# Patient Record
Sex: FEMALE | Race: BLACK OR AFRICAN AMERICAN | Employment: OTHER
[De-identification: names, ages, dates, MRNs, and addresses within clinical notes are randomized per-mention and may not be internally consistent; named-entity substitution may affect disease eponyms.]

---

## 2017-01-06 DIAGNOSIS — I50.22 CHRONIC SYSTOLIC HEART FAILURE (HCC): ICD-10-CM

## 2017-01-06 RX ORDER — SPIRONOLACTONE 25 MG/1
TABLET ORAL
Qty: 28 TABLET | Refills: 0 | Status: SHIPPED | OUTPATIENT
Start: 2017-01-06 | End: 2017-01-31 | Stop reason: SDUPTHER

## 2017-01-06 RX ORDER — LISINOPRIL 5 MG/1
TABLET ORAL
Qty: 28 TABLET | Refills: 0 | Status: SHIPPED | OUTPATIENT
Start: 2017-01-06 | End: 2017-02-01 | Stop reason: SDUPTHER

## 2017-01-12 ENCOUNTER — OFFICE VISIT (OUTPATIENT)
Dept: INTERNAL MEDICINE | Facility: CLINIC | Age: 52
End: 2017-01-12

## 2017-01-12 VITALS
HEART RATE: 55 BPM | HEIGHT: 62 IN | BODY MASS INDEX: 27.97 KG/M2 | WEIGHT: 152 LBS | DIASTOLIC BLOOD PRESSURE: 56 MMHG | SYSTOLIC BLOOD PRESSURE: 101 MMHG

## 2017-01-12 DIAGNOSIS — K21.9 GASTROESOPHAGEAL REFLUX DISEASE WITHOUT ESOPHAGITIS: ICD-10-CM

## 2017-01-12 DIAGNOSIS — Z12.11 SCREEN FOR COLON CANCER: ICD-10-CM

## 2017-01-12 DIAGNOSIS — R21 RASH: ICD-10-CM

## 2017-01-12 DIAGNOSIS — L85.3 XEROSIS OF SKIN: ICD-10-CM

## 2017-01-12 DIAGNOSIS — R16.0 LIVER MASS: Primary | ICD-10-CM

## 2017-01-12 DIAGNOSIS — Z12.31 ENCOUNTER FOR SCREENING MAMMOGRAM FOR BREAST CANCER: ICD-10-CM

## 2017-01-12 DIAGNOSIS — R73.9 HYPERGLYCEMIA: ICD-10-CM

## 2017-01-12 LAB — HBA1C MFR BLD: 6 %

## 2017-01-12 PROCEDURE — 99214 OFFICE O/P EST MOD 30 MIN: CPT | Performed by: INTERNAL MEDICINE

## 2017-01-12 PROCEDURE — 83036 HEMOGLOBIN GLYCOSYLATED A1C: CPT | Performed by: INTERNAL MEDICINE

## 2017-01-12 RX ORDER — DIAPER,BRIEF,INFANT-TODD,DISP
EACH MISCELLANEOUS
Qty: 1 TUBE | Refills: 0 | Status: SHIPPED | OUTPATIENT
Start: 2017-01-12 | End: 2017-01-19

## 2017-01-12 RX ORDER — OMEPRAZOLE 20 MG/1
20 TABLET, DELAYED RELEASE ORAL DAILY
Qty: 30 TABLET | Refills: 3 | Status: SHIPPED | OUTPATIENT
Start: 2017-01-12 | End: 2017-03-15

## 2017-01-12 RX ORDER — MULTIVIT-MIN/IRON FUM/FOLIC AC 7.5 MG-4
1 TABLET ORAL DAILY
Qty: 30 TABLET | Refills: 3 | Status: SHIPPED | OUTPATIENT
Start: 2017-01-12 | End: 2017-03-16

## 2017-01-19 ENCOUNTER — CARE COORDINATION (OUTPATIENT)
Dept: FAMILY MEDICINE CLINIC | Facility: CLINIC | Age: 52
End: 2017-01-19

## 2017-01-26 ENCOUNTER — TELEPHONE (OUTPATIENT)
Dept: INTERNAL MEDICINE | Facility: CLINIC | Age: 52
End: 2017-01-26

## 2017-01-27 ENCOUNTER — TELEPHONE (OUTPATIENT)
Dept: INTERNAL MEDICINE | Facility: CLINIC | Age: 52
End: 2017-01-27

## 2017-01-30 ENCOUNTER — CARE COORDINATION (OUTPATIENT)
Dept: INTERNAL MEDICINE | Facility: CLINIC | Age: 52
End: 2017-01-30

## 2017-01-30 VITALS
HEART RATE: 80 BPM | SYSTOLIC BLOOD PRESSURE: 110 MMHG | RESPIRATION RATE: 18 BRPM | DIASTOLIC BLOOD PRESSURE: 70 MMHG | OXYGEN SATURATION: 97 % | BODY MASS INDEX: 27.44 KG/M2 | WEIGHT: 150 LBS

## 2017-01-31 DIAGNOSIS — I50.22 CHRONIC SYSTOLIC HEART FAILURE (HCC): ICD-10-CM

## 2017-01-31 RX ORDER — SPIRONOLACTONE 25 MG/1
TABLET ORAL
Qty: 28 TABLET | Refills: 0 | Status: SHIPPED | OUTPATIENT
Start: 2017-01-31 | End: 2017-02-01 | Stop reason: SDUPTHER

## 2017-02-01 DIAGNOSIS — I50.22 CHRONIC SYSTOLIC HEART FAILURE (HCC): ICD-10-CM

## 2017-02-01 RX ORDER — SPIRONOLACTONE 25 MG/1
TABLET ORAL
Qty: 28 TABLET | Refills: 0 | Status: SHIPPED | OUTPATIENT
Start: 2017-02-01 | End: 2017-03-01 | Stop reason: SDUPTHER

## 2017-02-01 RX ORDER — LISINOPRIL 5 MG/1
TABLET ORAL
Qty: 28 TABLET | Refills: 0 | Status: SHIPPED | OUTPATIENT
Start: 2017-02-01 | End: 2017-03-01 | Stop reason: SDUPTHER

## 2017-02-03 ENCOUNTER — TELEPHONE (OUTPATIENT)
Dept: INTERNAL MEDICINE | Facility: CLINIC | Age: 52
End: 2017-02-03

## 2017-02-03 DIAGNOSIS — R92.8 ABNORMAL SCREENING MAMMOGRAM: Primary | ICD-10-CM

## 2017-02-03 DIAGNOSIS — R16.0 LIVER MASS: ICD-10-CM

## 2017-02-14 ENCOUNTER — TELEPHONE (OUTPATIENT)
Dept: INTERNAL MEDICINE | Facility: CLINIC | Age: 52
End: 2017-02-14

## 2017-02-23 ENCOUNTER — CARE COORDINATION (OUTPATIENT)
Dept: FAMILY MEDICINE CLINIC | Facility: CLINIC | Age: 52
End: 2017-02-23

## 2017-03-01 DIAGNOSIS — I50.22 CHRONIC SYSTOLIC HEART FAILURE (HCC): ICD-10-CM

## 2017-03-02 RX ORDER — SPIRONOLACTONE 25 MG/1
TABLET ORAL
Qty: 28 TABLET | Refills: 0 | Status: SHIPPED | OUTPATIENT
Start: 2017-03-02 | End: 2017-04-11 | Stop reason: SDUPTHER

## 2017-03-02 RX ORDER — LISINOPRIL 5 MG/1
TABLET ORAL
Qty: 28 TABLET | Refills: 0 | Status: SHIPPED | OUTPATIENT
Start: 2017-03-02 | End: 2017-04-11 | Stop reason: SDUPTHER

## 2017-03-15 ENCOUNTER — TELEPHONE (OUTPATIENT)
Dept: INTERNAL MEDICINE | Age: 52
End: 2017-03-15

## 2017-03-15 ENCOUNTER — CARE COORDINATION (OUTPATIENT)
Dept: INTERNAL MEDICINE | Age: 52
End: 2017-03-15

## 2017-03-15 VITALS
RESPIRATION RATE: 18 BRPM | SYSTOLIC BLOOD PRESSURE: 120 MMHG | DIASTOLIC BLOOD PRESSURE: 92 MMHG | HEART RATE: 100 BPM | OXYGEN SATURATION: 95 %

## 2017-03-15 RX ORDER — LORATADINE 10 MG/1
TABLET ORAL
Qty: 15 TABLET | Refills: 0 | Status: SHIPPED | OUTPATIENT
Start: 2017-03-15 | End: 2017-03-16

## 2017-03-16 ENCOUNTER — TELEPHONE (OUTPATIENT)
Dept: INTERNAL MEDICINE | Age: 52
End: 2017-03-16

## 2017-03-17 ENCOUNTER — OFFICE VISIT (OUTPATIENT)
Dept: INTERNAL MEDICINE | Age: 52
End: 2017-03-17
Payer: MEDICAID

## 2017-03-17 VITALS
DIASTOLIC BLOOD PRESSURE: 82 MMHG | HEIGHT: 62 IN | HEART RATE: 69 BPM | SYSTOLIC BLOOD PRESSURE: 127 MMHG | WEIGHT: 154.4 LBS | BODY MASS INDEX: 28.41 KG/M2

## 2017-03-17 DIAGNOSIS — I50.22 CHRONIC SYSTOLIC HEART FAILURE (HCC): Primary | ICD-10-CM

## 2017-03-17 DIAGNOSIS — J45.20 MILD INTERMITTENT ASTHMA WITHOUT COMPLICATION: ICD-10-CM

## 2017-03-17 DIAGNOSIS — I10 ESSENTIAL HYPERTENSION: ICD-10-CM

## 2017-03-17 DIAGNOSIS — F17.200 SMOKING: ICD-10-CM

## 2017-03-17 DIAGNOSIS — Z12.11 COLON CANCER SCREENING: ICD-10-CM

## 2017-03-17 DIAGNOSIS — K21.9 GASTROESOPHAGEAL REFLUX DISEASE, ESOPHAGITIS PRESENCE NOT SPECIFIED: ICD-10-CM

## 2017-03-17 DIAGNOSIS — R73.03 PREDIABETES: ICD-10-CM

## 2017-03-17 LAB
CONTROL: NORMAL
HEMOCCULT STL QL: NORMAL

## 2017-03-17 PROCEDURE — 99213 OFFICE O/P EST LOW 20 MIN: CPT | Performed by: INTERNAL MEDICINE

## 2017-03-17 PROCEDURE — 82274 ASSAY TEST FOR BLOOD FECAL: CPT | Performed by: INTERNAL MEDICINE

## 2017-03-17 RX ORDER — OMEPRAZOLE 20 MG/1
20 TABLET, DELAYED RELEASE ORAL DAILY
Qty: 30 TABLET | Refills: 3 | Status: SHIPPED | OUTPATIENT
Start: 2017-03-17 | End: 2017-07-30 | Stop reason: SDUPTHER

## 2017-03-17 RX ORDER — ALBUTEROL SULFATE 90 UG/1
2 AEROSOL, METERED RESPIRATORY (INHALATION) EVERY 6 HOURS PRN
Qty: 1 INHALER | Refills: 3 | Status: SHIPPED | OUTPATIENT
Start: 2017-03-17 | End: 2018-03-26 | Stop reason: SDUPTHER

## 2017-03-21 ENCOUNTER — TELEPHONE (OUTPATIENT)
Dept: INTERNAL MEDICINE | Age: 52
End: 2017-03-21

## 2017-03-29 ENCOUNTER — TELEPHONE (OUTPATIENT)
Dept: INTERNAL MEDICINE | Age: 52
End: 2017-03-29

## 2017-04-11 DIAGNOSIS — I50.22 CHRONIC SYSTOLIC HEART FAILURE (HCC): ICD-10-CM

## 2017-04-11 RX ORDER — SPIRONOLACTONE 25 MG/1
TABLET ORAL
Qty: 28 TABLET | Refills: 0 | Status: SHIPPED | OUTPATIENT
Start: 2017-04-11 | End: 2017-05-09

## 2017-04-11 RX ORDER — LISINOPRIL 5 MG/1
TABLET ORAL
Qty: 14 TABLET | Refills: 0 | Status: SHIPPED | OUTPATIENT
Start: 2017-04-11 | End: 2017-05-08 | Stop reason: SDUPTHER

## 2017-04-27 ENCOUNTER — CARE COORDINATION (OUTPATIENT)
Dept: FAMILY MEDICINE CLINIC | Age: 52
End: 2017-04-27

## 2017-05-09 RX ORDER — LISINOPRIL 5 MG/1
TABLET ORAL
Qty: 14 TABLET | Refills: 0 | Status: SHIPPED | OUTPATIENT
Start: 2017-05-09 | End: 2017-05-23 | Stop reason: SDUPTHER

## 2017-05-23 ENCOUNTER — TELEPHONE (OUTPATIENT)
Dept: FAMILY MEDICINE CLINIC | Age: 52
End: 2017-05-23

## 2017-05-25 RX ORDER — LISINOPRIL 5 MG/1
TABLET ORAL
Qty: 14 TABLET | Refills: 0 | Status: SHIPPED | OUTPATIENT
Start: 2017-05-25 | End: 2017-06-05 | Stop reason: SDUPTHER

## 2017-05-25 RX ORDER — SPIRONOLACTONE 25 MG/1
TABLET ORAL
Qty: 28 TABLET | Refills: 0 | Status: SHIPPED | OUTPATIENT
Start: 2017-05-25 | End: 2017-06-06 | Stop reason: SDUPTHER

## 2017-06-05 RX ORDER — LISINOPRIL 5 MG/1
TABLET ORAL
Qty: 14 TABLET | Refills: 0 | Status: SHIPPED | OUTPATIENT
Start: 2017-06-05 | End: 2017-06-06 | Stop reason: SDUPTHER

## 2017-06-06 RX ORDER — LISINOPRIL 5 MG/1
TABLET ORAL
Qty: 30 TABLET | Refills: 0 | Status: SHIPPED | OUTPATIENT
Start: 2017-06-06 | End: 2017-07-18 | Stop reason: SDUPTHER

## 2017-06-06 RX ORDER — MULTIVITAMIN WITH FOLIC ACID 400 MCG
TABLET ORAL
Qty: 30 TABLET | Refills: 0 | Status: SHIPPED | OUTPATIENT
Start: 2017-06-06 | End: 2017-07-18 | Stop reason: SDUPTHER

## 2017-06-06 RX ORDER — SPIRONOLACTONE 25 MG/1
TABLET ORAL
Qty: 28 TABLET | Refills: 0 | Status: SHIPPED | OUTPATIENT
Start: 2017-06-06 | End: 2017-07-18 | Stop reason: SDUPTHER

## 2017-06-15 ENCOUNTER — OFFICE VISIT (OUTPATIENT)
Dept: INTERNAL MEDICINE | Age: 52
End: 2017-06-15
Payer: MEDICAID

## 2017-06-15 VITALS
BODY MASS INDEX: 27.6 KG/M2 | WEIGHT: 150 LBS | HEIGHT: 62 IN | HEART RATE: 62 BPM | DIASTOLIC BLOOD PRESSURE: 72 MMHG | SYSTOLIC BLOOD PRESSURE: 128 MMHG

## 2017-06-15 DIAGNOSIS — R16.0 LIVER MASS: ICD-10-CM

## 2017-06-15 DIAGNOSIS — R92.8 ABNORMAL MAMMOGRAM: ICD-10-CM

## 2017-06-15 PROCEDURE — 99213 OFFICE O/P EST LOW 20 MIN: CPT | Performed by: INTERNAL MEDICINE

## 2017-06-15 RX ORDER — FLUTICASONE PROPIONATE 50 MCG
1 SPRAY, SUSPENSION (ML) NASAL DAILY
Qty: 1 BOTTLE | Refills: 3 | Status: ON HOLD | OUTPATIENT
Start: 2017-06-15 | End: 2019-11-01 | Stop reason: SDUPTHER

## 2017-06-15 ASSESSMENT — PATIENT HEALTH QUESTIONNAIRE - PHQ9
SUM OF ALL RESPONSES TO PHQ9 QUESTIONS 1 & 2: 1
5. POOR APPETITE OR OVEREATING: 1
6. FEELING BAD ABOUT YOURSELF - OR THAT YOU ARE A FAILURE OR HAVE LET YOURSELF OR YOUR FAMILY DOWN: 1
SUM OF ALL RESPONSES TO PHQ QUESTIONS 1-9: 7
10. IF YOU CHECKED OFF ANY PROBLEMS, HOW DIFFICULT HAVE THESE PROBLEMS MADE IT FOR YOU TO DO YOUR WORK, TAKE CARE OF THINGS AT HOME, OR GET ALONG WITH OTHER PEOPLE: 0
9. THOUGHTS THAT YOU WOULD BE BETTER OFF DEAD, OR OF HURTING YOURSELF: 0
1. LITTLE INTEREST OR PLEASURE IN DOING THINGS: 0
3. TROUBLE FALLING OR STAYING ASLEEP: 1
8. MOVING OR SPEAKING SO SLOWLY THAT OTHER PEOPLE COULD HAVE NOTICED. OR THE OPPOSITE, BEING SO FIGETY OR RESTLESS THAT YOU HAVE BEEN MOVING AROUND A LOT MORE THAN USUAL: 1
4. FEELING TIRED OR HAVING LITTLE ENERGY: 1
2. FEELING DOWN, DEPRESSED OR HOPELESS: 1
7. TROUBLE CONCENTRATING ON THINGS, SUCH AS READING THE NEWSPAPER OR WATCHING TELEVISION: 1

## 2017-06-29 ENCOUNTER — TELEPHONE (OUTPATIENT)
Dept: FAMILY MEDICINE CLINIC | Age: 52
End: 2017-06-29

## 2017-06-30 ENCOUNTER — TELEPHONE (OUTPATIENT)
Dept: INTERNAL MEDICINE | Age: 52
End: 2017-06-30

## 2017-07-06 ENCOUNTER — TELEPHONE (OUTPATIENT)
Dept: FAMILY MEDICINE CLINIC | Age: 52
End: 2017-07-06

## 2017-07-13 ENCOUNTER — TELEPHONE (OUTPATIENT)
Dept: FAMILY MEDICINE CLINIC | Age: 52
End: 2017-07-13

## 2017-07-18 RX ORDER — SPIRONOLACTONE 25 MG/1
TABLET ORAL
Qty: 28 TABLET | Refills: 0 | Status: SHIPPED | OUTPATIENT
Start: 2017-07-18 | End: 2017-08-02 | Stop reason: SDUPTHER

## 2017-07-18 RX ORDER — LISINOPRIL 5 MG/1
TABLET ORAL
Qty: 30 TABLET | Refills: 0 | Status: SHIPPED | OUTPATIENT
Start: 2017-07-18 | End: 2017-08-02 | Stop reason: SDUPTHER

## 2017-07-18 RX ORDER — MULTIVITAMIN WITH FOLIC ACID 400 MCG
TABLET ORAL
Qty: 30 TABLET | Refills: 0 | Status: SHIPPED | OUTPATIENT
Start: 2017-07-18 | End: 2017-08-02 | Stop reason: SDUPTHER

## 2017-07-25 ENCOUNTER — TELEPHONE (OUTPATIENT)
Dept: FAMILY MEDICINE CLINIC | Age: 52
End: 2017-07-25

## 2017-07-30 ENCOUNTER — TELEPHONE (OUTPATIENT)
Dept: INTERNAL MEDICINE | Facility: CLINIC | Age: 52
End: 2017-07-30

## 2017-07-30 VITALS
RESPIRATION RATE: 18 BRPM | DIASTOLIC BLOOD PRESSURE: 87 MMHG | OXYGEN SATURATION: 97 % | SYSTOLIC BLOOD PRESSURE: 118 MMHG | HEART RATE: 90 BPM

## 2017-08-01 ENCOUNTER — TELEPHONE (OUTPATIENT)
Dept: FAMILY MEDICINE CLINIC | Age: 52
End: 2017-08-01

## 2017-08-02 RX ORDER — LISINOPRIL 5 MG/1
TABLET ORAL
Qty: 30 TABLET | Refills: 0 | Status: SHIPPED | OUTPATIENT
Start: 2017-08-02 | End: 2017-08-28 | Stop reason: SDUPTHER

## 2017-08-02 RX ORDER — SPIRONOLACTONE 25 MG/1
TABLET ORAL
Qty: 28 TABLET | Refills: 0 | Status: SHIPPED | OUTPATIENT
Start: 2017-08-02 | End: 2017-08-16 | Stop reason: SDUPTHER

## 2017-08-02 RX ORDER — MULTIVITAMIN WITH FOLIC ACID 400 MCG
TABLET ORAL
Qty: 30 TABLET | Refills: 0 | Status: SHIPPED | OUTPATIENT
Start: 2017-08-02 | End: 2017-09-11 | Stop reason: SDUPTHER

## 2017-08-22 ENCOUNTER — TELEPHONE (OUTPATIENT)
Dept: INTERNAL MEDICINE | Facility: CLINIC | Age: 52
End: 2017-08-22

## 2017-08-22 VITALS
HEART RATE: 72 BPM | SYSTOLIC BLOOD PRESSURE: 129 MMHG | DIASTOLIC BLOOD PRESSURE: 95 MMHG | OXYGEN SATURATION: 93 % | RESPIRATION RATE: 18 BRPM

## 2017-08-28 RX ORDER — LISINOPRIL 5 MG/1
TABLET ORAL
Qty: 30 TABLET | Refills: 2 | Status: SHIPPED | OUTPATIENT
Start: 2017-08-28 | End: 2017-12-11 | Stop reason: SDUPTHER

## 2017-09-25 RX ORDER — OMEPRAZOLE 20 MG/1
CAPSULE, DELAYED RELEASE ORAL
Qty: 30 CAPSULE | Refills: 0 | Status: SHIPPED | OUTPATIENT
Start: 2017-09-25 | End: 2017-10-10 | Stop reason: SDUPTHER

## 2017-11-02 ENCOUNTER — TELEPHONE (OUTPATIENT)
Dept: FAMILY MEDICINE CLINIC | Age: 52
End: 2017-11-02

## 2017-11-03 ENCOUNTER — HOSPITAL ENCOUNTER (OUTPATIENT)
Dept: CT IMAGING | Age: 52
Discharge: HOME | End: 2017-11-03
Payer: MEDICAID

## 2017-11-03 DIAGNOSIS — R16.0 LIVER MASS: ICD-10-CM

## 2017-11-03 PROCEDURE — 71250 CT THORAX DX C-: CPT

## 2017-11-07 RX ORDER — FUROSEMIDE 40 MG/1
TABLET ORAL
Qty: 60 TABLET | Refills: 0 | Status: SHIPPED | OUTPATIENT
Start: 2017-11-07 | End: 2017-11-13 | Stop reason: SDUPTHER

## 2017-11-07 RX ORDER — CARVEDILOL 3.12 MG/1
TABLET ORAL
Qty: 60 TABLET | Refills: 0 | Status: SHIPPED | OUTPATIENT
Start: 2017-11-07 | End: 2017-11-13 | Stop reason: SDUPTHER

## 2017-11-07 NOTE — TELEPHONE ENCOUNTER
E-scribe request for     All meds . Please review and e-scribe if applicable.      Last Visit Date:  06/15/17  Next Visit Date:  Not able to reach pt to schedule    Hemoglobin A1C (%)   Date Value   02/03/2017 5.9   01/12/2017 6.0             ( goal A1C is < 7)   No results found for: LABMICR  LDL Cholesterol (mg/dL)   Date Value   10/04/2015 85       (goal LDL is <100)   AST (U/L)   Date Value   05/02/2016 41 (H)     ALT (U/L)   Date Value   05/02/2016 33     BUN (mg/dL)   Date Value   05/03/2016 16     BP Readings from Last 3 Encounters:   08/22/17 (!) 129/95   07/19/17 118/87   06/15/17 128/72          (goal 120/80)        Patient Active Problem List:     Alcohol abuse     Paroxysmal atrial fibrillation (HCC)     Breast mass, left     Fibroids     Lung nodule < 6cm on CT     Chronic systolic heart failure (HCC) s/p AICD     Mild intermittent asthma     Essential hypertension

## 2017-11-08 ENCOUNTER — APPOINTMENT (OUTPATIENT)
Dept: GENERAL RADIOLOGY | Age: 52
End: 2017-11-08
Payer: MEDICAID

## 2017-11-08 ENCOUNTER — APPOINTMENT (OUTPATIENT)
Dept: CT IMAGING | Age: 52
End: 2017-11-08
Payer: MEDICAID

## 2017-11-08 ENCOUNTER — HOSPITAL ENCOUNTER (OUTPATIENT)
Age: 52
Setting detail: OBSERVATION
Discharge: HOME | End: 2017-11-10
Attending: EMERGENCY MEDICINE | Admitting: EMERGENCY MEDICINE
Payer: MEDICAID

## 2017-11-08 DIAGNOSIS — R55 SYNCOPE AND COLLAPSE: Primary | ICD-10-CM

## 2017-11-08 DIAGNOSIS — I50.22 CHRONIC SYSTOLIC CONGESTIVE HEART FAILURE (HCC): ICD-10-CM

## 2017-11-08 LAB
ABSOLUTE EOS #: 0.06 K/UL (ref 0–0.44)
ABSOLUTE IMMATURE GRANULOCYTE: 0 K/UL (ref 0–0.3)
ABSOLUTE LYMPH #: 1.82 K/UL (ref 1.1–3.7)
ABSOLUTE MONO #: 0.63 K/UL (ref 0.1–1.2)
ANION GAP SERPL CALCULATED.3IONS-SCNC: 12 MMOL/L (ref 9–17)
BASOPHILS # BLD: 0 % (ref 0–2)
BASOPHILS ABSOLUTE: 0 K/UL (ref 0–0.2)
BNP INTERPRETATION: ABNORMAL
BUN BLDV-MCNC: 15 MG/DL (ref 6–20)
BUN/CREAT BLD: ABNORMAL (ref 9–20)
CALCIUM SERPL-MCNC: 9.5 MG/DL (ref 8.6–10.4)
CHLORIDE BLD-SCNC: 100 MMOL/L (ref 98–107)
CO2: 29 MMOL/L (ref 20–31)
CREAT SERPL-MCNC: 0.89 MG/DL (ref 0.5–0.9)
DIFFERENTIAL TYPE: ABNORMAL
EOSINOPHILS RELATIVE PERCENT: 1 % (ref 1–4)
GFR AFRICAN AMERICAN: >60 ML/MIN
GFR NON-AFRICAN AMERICAN: >60 ML/MIN
GFR SERPL CREATININE-BSD FRML MDRD: ABNORMAL ML/MIN/{1.73_M2}
GFR SERPL CREATININE-BSD FRML MDRD: ABNORMAL ML/MIN/{1.73_M2}
GLUCOSE BLD-MCNC: 108 MG/DL (ref 70–99)
HCT VFR BLD CALC: 41.9 % (ref 36.3–47.1)
HEMOGLOBIN: 13.3 G/DL (ref 11.9–15.1)
IMMATURE GRANULOCYTES: 0 %
LYMPHOCYTES # BLD: 32 % (ref 24–43)
MAGNESIUM: 1.8 MG/DL (ref 1.6–2.6)
MCH RBC QN AUTO: 29.3 PG (ref 25.2–33.5)
MCHC RBC AUTO-ENTMCNC: 31.7 G/DL (ref 28.4–34.8)
MCV RBC AUTO: 92.3 FL (ref 82.6–102.9)
MONOCYTES # BLD: 11 % (ref 3–12)
MORPHOLOGY: ABNORMAL
PDW BLD-RTO: 14.6 % (ref 11.8–14.4)
PHOSPHORUS: 3.4 MG/DL (ref 2.6–4.5)
PLATELET # BLD: ABNORMAL K/UL (ref 138–453)
PLATELET ESTIMATE: ABNORMAL
PLATELET, FLUORESCENCE: 127 K/UL (ref 138–453)
PLATELET, IMMATURE FRACTION: 13.2 % (ref 1.1–10.3)
PMV BLD AUTO: ABNORMAL FL (ref 8.1–13.5)
POC TROPONIN I: 0 NG/ML (ref 0–0.1)
POC TROPONIN INTERP: NORMAL
POTASSIUM SERPL-SCNC: 3.8 MMOL/L (ref 3.7–5.3)
PRO-BNP: 606 PG/ML
RBC # BLD: 4.54 M/UL (ref 3.95–5.11)
RBC # BLD: ABNORMAL 10*6/UL
SEG NEUTROPHILS: 56 % (ref 36–65)
SEGMENTED NEUTROPHILS ABSOLUTE COUNT: 3.19 K/UL (ref 1.5–8.1)
SODIUM BLD-SCNC: 141 MMOL/L (ref 135–144)
TROPONIN INTERP: NORMAL
TROPONIN T: <0.03 NG/ML
TSH SERPL DL<=0.05 MIU/L-ACNC: 0.73 MIU/L (ref 0.3–5)
WBC # BLD: 5.7 K/UL (ref 3.5–11.3)
WBC # BLD: ABNORMAL 10*3/UL

## 2017-11-08 PROCEDURE — 73030 X-RAY EXAM OF SHOULDER: CPT

## 2017-11-08 PROCEDURE — 6360000002 HC RX W HCPCS: Performed by: EMERGENCY MEDICINE

## 2017-11-08 PROCEDURE — 2500000003 HC RX 250 WO HCPCS: Performed by: EMERGENCY MEDICINE

## 2017-11-08 PROCEDURE — 84443 ASSAY THYROID STIM HORMONE: CPT

## 2017-11-08 PROCEDURE — 84100 ASSAY OF PHOSPHORUS: CPT

## 2017-11-08 PROCEDURE — 96375 TX/PRO/DX INJ NEW DRUG ADDON: CPT

## 2017-11-08 PROCEDURE — 2580000003 HC RX 258: Performed by: EMERGENCY MEDICINE

## 2017-11-08 PROCEDURE — 93005 ELECTROCARDIOGRAM TRACING: CPT

## 2017-11-08 PROCEDURE — 6370000000 HC RX 637 (ALT 250 FOR IP): Performed by: EMERGENCY MEDICINE

## 2017-11-08 PROCEDURE — 84484 ASSAY OF TROPONIN QUANT: CPT

## 2017-11-08 PROCEDURE — 36415 COLL VENOUS BLD VENIPUNCTURE: CPT

## 2017-11-08 PROCEDURE — 96374 THER/PROPH/DIAG INJ IV PUSH: CPT

## 2017-11-08 PROCEDURE — G0378 HOSPITAL OBSERVATION PER HR: HCPCS

## 2017-11-08 PROCEDURE — 83880 ASSAY OF NATRIURETIC PEPTIDE: CPT

## 2017-11-08 PROCEDURE — 85025 COMPLETE CBC W/AUTO DIFF WBC: CPT

## 2017-11-08 PROCEDURE — 83735 ASSAY OF MAGNESIUM: CPT

## 2017-11-08 PROCEDURE — 70450 CT HEAD/BRAIN W/O DYE: CPT

## 2017-11-08 PROCEDURE — 99285 EMERGENCY DEPT VISIT HI MDM: CPT

## 2017-11-08 PROCEDURE — 80048 BASIC METABOLIC PNL TOTAL CA: CPT

## 2017-11-08 PROCEDURE — 71010 XR CHEST PORTABLE: CPT

## 2017-11-08 RX ORDER — ACETAMINOPHEN 325 MG/1
650 TABLET ORAL EVERY 4 HOURS PRN
Status: DISCONTINUED | OUTPATIENT
Start: 2017-11-08 | End: 2017-11-10 | Stop reason: HOSPADM

## 2017-11-08 RX ORDER — LISINOPRIL 5 MG/1
5 TABLET ORAL DAILY
Status: DISCONTINUED | OUTPATIENT
Start: 2017-11-08 | End: 2017-11-10 | Stop reason: HOSPADM

## 2017-11-08 RX ORDER — SPIRONOLACTONE 25 MG/1
25 TABLET ORAL DAILY
Status: DISCONTINUED | OUTPATIENT
Start: 2017-11-08 | End: 2017-11-10 | Stop reason: HOSPADM

## 2017-11-08 RX ORDER — ONDANSETRON 2 MG/ML
4 INJECTION INTRAMUSCULAR; INTRAVENOUS EVERY 8 HOURS PRN
Status: DISCONTINUED | OUTPATIENT
Start: 2017-11-08 | End: 2017-11-10 | Stop reason: HOSPADM

## 2017-11-08 RX ORDER — DIPHENHYDRAMINE HYDROCHLORIDE 50 MG/ML
25 INJECTION INTRAMUSCULAR; INTRAVENOUS ONCE
Status: COMPLETED | OUTPATIENT
Start: 2017-11-08 | End: 2017-11-08

## 2017-11-08 RX ORDER — CARVEDILOL 3.12 MG/1
3.12 TABLET ORAL 2 TIMES DAILY WITH MEALS
Status: DISCONTINUED | OUTPATIENT
Start: 2017-11-08 | End: 2017-11-10 | Stop reason: HOSPADM

## 2017-11-08 RX ORDER — SODIUM CHLORIDE 0.9 % (FLUSH) 0.9 %
10 SYRINGE (ML) INJECTION PRN
Status: DISCONTINUED | OUTPATIENT
Start: 2017-11-08 | End: 2017-11-10 | Stop reason: HOSPADM

## 2017-11-08 RX ORDER — ALBUTEROL SULFATE 90 UG/1
2 AEROSOL, METERED RESPIRATORY (INHALATION) EVERY 6 HOURS PRN
Status: DISCONTINUED | OUTPATIENT
Start: 2017-11-08 | End: 2017-11-10 | Stop reason: HOSPADM

## 2017-11-08 RX ORDER — THIAMINE MONONITRATE (VIT B1) 100 MG
100 TABLET ORAL DAILY
Status: DISCONTINUED | OUTPATIENT
Start: 2017-11-08 | End: 2017-11-10 | Stop reason: HOSPADM

## 2017-11-08 RX ORDER — SODIUM CHLORIDE 0.9 % (FLUSH) 0.9 %
10 SYRINGE (ML) INJECTION EVERY 12 HOURS SCHEDULED
Status: DISCONTINUED | OUTPATIENT
Start: 2017-11-08 | End: 2017-11-10 | Stop reason: HOSPADM

## 2017-11-08 RX ORDER — FOLIC ACID 5 MG/ML
1 INJECTION, SOLUTION INTRAMUSCULAR; INTRAVENOUS; SUBCUTANEOUS DAILY
Status: DISCONTINUED | OUTPATIENT
Start: 2017-11-08 | End: 2017-11-08

## 2017-11-08 RX ORDER — FLUTICASONE PROPIONATE 50 MCG
1 SPRAY, SUSPENSION (ML) NASAL DAILY
Status: DISCONTINUED | OUTPATIENT
Start: 2017-11-08 | End: 2017-11-10 | Stop reason: HOSPADM

## 2017-11-08 RX ORDER — MAGNESIUM SULFATE 1 G/100ML
1 INJECTION INTRAVENOUS ONCE
Status: COMPLETED | OUTPATIENT
Start: 2017-11-08 | End: 2017-11-09

## 2017-11-08 RX ORDER — PANTOPRAZOLE SODIUM 40 MG/1
40 TABLET, DELAYED RELEASE ORAL
Status: DISCONTINUED | OUTPATIENT
Start: 2017-11-09 | End: 2017-11-10 | Stop reason: HOSPADM

## 2017-11-08 RX ORDER — FUROSEMIDE 40 MG/1
40 TABLET ORAL 2 TIMES DAILY
Status: DISCONTINUED | OUTPATIENT
Start: 2017-11-08 | End: 2017-11-10 | Stop reason: HOSPADM

## 2017-11-08 RX ORDER — KETOROLAC TROMETHAMINE 30 MG/ML
15 INJECTION, SOLUTION INTRAMUSCULAR; INTRAVENOUS ONCE
Status: COMPLETED | OUTPATIENT
Start: 2017-11-08 | End: 2017-11-08

## 2017-11-08 RX ORDER — 0.9 % SODIUM CHLORIDE 0.9 %
250 INTRAVENOUS SOLUTION INTRAVENOUS ONCE
Status: COMPLETED | OUTPATIENT
Start: 2017-11-08 | End: 2017-11-08

## 2017-11-08 RX ADMIN — LISINOPRIL 5 MG: 5 TABLET ORAL at 19:16

## 2017-11-08 RX ADMIN — SODIUM CHLORIDE 250 ML: 9 INJECTION, SOLUTION INTRAVENOUS at 13:34

## 2017-11-08 RX ADMIN — Medication 10 ML: at 21:12

## 2017-11-08 RX ADMIN — SPIRONOLACTONE 25 MG: 25 TABLET ORAL at 19:16

## 2017-11-08 RX ADMIN — DIPHENHYDRAMINE HYDROCHLORIDE 25 MG: 50 INJECTION, SOLUTION INTRAMUSCULAR; INTRAVENOUS at 13:19

## 2017-11-08 RX ADMIN — FLUTICASONE PROPIONATE 1 SPRAY: 50 SPRAY, METERED NASAL at 21:12

## 2017-11-08 RX ADMIN — ACETAMINOPHEN 650 MG: 325 TABLET ORAL at 21:09

## 2017-11-08 RX ADMIN — Medication 100 MG: at 13:33

## 2017-11-08 RX ADMIN — CARVEDILOL 3.12 MG: 3.12 TABLET, FILM COATED ORAL at 19:16

## 2017-11-08 RX ADMIN — FOLIC ACID 1 MG: 5 INJECTION, SOLUTION INTRAMUSCULAR; INTRAVENOUS; SUBCUTANEOUS at 14:24

## 2017-11-08 RX ADMIN — MAGNESIUM SULFATE HEPTAHYDRATE 1 G: 1 INJECTION, SOLUTION INTRAVENOUS at 21:12

## 2017-11-08 RX ADMIN — FUROSEMIDE 40 MG: 40 TABLET ORAL at 19:16

## 2017-11-08 RX ADMIN — KETOROLAC TROMETHAMINE 15 MG: 30 INJECTION, SOLUTION INTRAMUSCULAR at 13:19

## 2017-11-08 ASSESSMENT — ENCOUNTER SYMPTOMS
ABDOMINAL PAIN: 0
VOMITING: 0
WHEEZING: 0
SHORTNESS OF BREATH: 0
NAUSEA: 0
COLOR CHANGE: 0

## 2017-11-08 ASSESSMENT — PAIN DESCRIPTION - PAIN TYPE: TYPE: ACUTE PAIN

## 2017-11-08 ASSESSMENT — PAIN SCALES - GENERAL
PAINLEVEL_OUTOF10: 9
PAINLEVEL_OUTOF10: 8
PAINLEVEL_OUTOF10: 8
PAINLEVEL_OUTOF10: 3

## 2017-11-08 ASSESSMENT — PAIN DESCRIPTION - DESCRIPTORS: DESCRIPTORS: HEADACHE

## 2017-11-08 ASSESSMENT — PAIN DESCRIPTION - LOCATION: LOCATION: HEAD

## 2017-11-08 NOTE — ED PROVIDER NOTES
[facility]  Emergency Department  Faculty Attestation     I performed a history and physical examination of the patient and discussed management with the resident. I reviewed the residents note and agree with the documented findings and plan of care. Any areas of disagreement are noted on the chart. I was personally present for the key portions of any procedures. I have documented in the chart those procedures where I was not present during the key portions. I have reviewed the emergency nurses triage note. I agree with the chief complaint, past medical history, past surgical history, allergies, medications, social and family history as documented unless otherwise noted below. For Physician Assistant/ Nurse Practitioner cases/documentation I have personally evaluated this patient and have completed at least one if not all key elements of the E/M (history, physical exam, and MDM). Additional findings are as noted. Primary Care Physician:  Tuyet Vargas MD    Screenings:  [unfilled]    CHIEF COMPLAINT       Chief Complaint   Patient presents with    Loss of Consciousness     with incont of urine       RECENT VITALS:   Temp: 97 °F (36.1 °C),  Pulse: 55, Resp: 16, BP: 102/74    LABS:  Labs Reviewed   CBC WITH AUTO DIFFERENTIAL   BASIC METABOLIC PANEL   BRAIN NATRIURETIC PEPTIDE   POCT TROPONIN   POCT TROPONIN       Radiology  XR Chest Portable    (Results Pending)   XR SHOULDER LEFT (MIN 2 VIEWS)    (Results Pending)       CRITICAL CARE: There was a high probability of clinically significant/life threatening deterioration in this patient's condition which required my urgent intervention. Total critical care time was 5 minutes. This excludes any time for separately reportable procedures.      EKG:  EKG Interpretation    Interpreted by me    Rhythm: paced  Rate: bradycardic  Axis: left  Ectopy: none  Conduction: LBBB  ST Segments: no acute change  T Waves: no acute

## 2017-11-08 NOTE — ED PROVIDER NOTES
defibrillator placement (09/2005); Pacemaker insertion; Tubal ligation; Cardiac defibrillator placement; and Uterine fibroid surgery (maarch 2015). Social History     Social History    Marital status: Single     Spouse name: N/A    Number of children: N/A    Years of education: N/A     Occupational History    Not on file. Social History Main Topics    Smoking status: Current Every Day Smoker     Packs/day: 0.25     Types: Cigarettes     Last attempt to quit: 2/2/2016    Smokeless tobacco: Never Used      Comment: resumed smoking  3-4 cigarettes/day    Alcohol use 0.6 oz/week     1 Cans of beer per week    Drug use: No    Sexual activity: No     Other Topics Concern    Not on file     Social History Narrative    No narrative on file       Family History   Problem Relation Age of Onset    Diabetes Mother     High Blood Pressure Mother     Heart Disease Mother     Diabetes Father     Heart Disease Brother        Allergies:  Review of patient's allergies indicates no known allergies. Home Medications:  Prior to Admission medications    Medication Sig Start Date End Date Taking? Authorizing Provider   carvedilol (COREG) 3.125 MG tablet TAKE ONE TABLET BY MOUTH 2 TIMES A DAY WITH MEALS 11/7/17  Yes Tuyet Vargas MD   furosemide (LASIX) 40 MG tablet TAKE ONE TABLET BY MOUTH 2 TIMES A DAY 11/7/17  Yes Tuyet Vargas MD   omeprazole (PRILOSEC) 20 MG delayed release capsule TAKE (1) CAPSULE BY MOUTH ONE TIME DAILY. 10/11/17  Yes Tuyet Vargas MD   Multiple Vitamin (MULTIVITAMIN) tablet TAKE (1) TABLET BY MOUTH ONE TIME DAILY.  9/11/17  Yes Tuyet Vargas MD   lisinopril (PRINIVIL;ZESTRIL) 5 MG tablet TAKE 1 TABLET BY MOUTH DAILY 8/28/17  Yes Tuyet Vargas MD   spironolactone (ALDACTONE) 25 MG tablet TAKE ONE TABLET BY MOUTH EVERY DAY 8/16/17  Yes Tuyet Vargas MD   fluticasone Phylliss [place]nee) 50 MCG/ACT nasal spray 1 spray by Nasal route daily 6/15/17  Yes Tuyet Vargas MD nasal spray 1 spray    furosemide (LASIX) tablet 40 mg    lisinopril (PRINIVIL;ZESTRIL) tablet 5 mg    pantoprazole (PROTONIX) tablet 40 mg    spironolactone (ALDACTONE) tablet 25 mg    sodium chloride flush 0.9 % injection 10 mL    sodium chloride flush 0.9 % injection 10 mL    enoxaparin (LOVENOX) injection 40 mg    ondansetron (ZOFRAN) injection 4 mg    magnesium sulfate 1 g in dextrose 5% 100 mL IVPB    acetaminophen (TYLENOL) tablet 650 mg       DDX: Cardiogenic syncope versus arrhythmia versus seizure versus personal syncope versus    DIAGNOSTIC RESULTS / EMERGENCY DEPARTMENT COURSE / MDM     LABS:  Results for orders placed or performed during the hospital encounter of 11/08/17   CBC WITH AUTO DIFFERENTIAL   Result Value Ref Range    WBC 5.7 3.5 - 11.3 k/uL    RBC 4.54 3.95 - 5.11 m/uL    Hemoglobin 13.3 11.9 - 15.1 g/dL    Hematocrit 41.9 36.3 - 47.1 %    MCV 92.3 82.6 - 102.9 fL    MCH 29.3 25.2 - 33.5 pg    MCHC 31.7 28.4 - 34.8 g/dL    RDW 14.6 (H) 11.8 - 14.4 %    Platelets See Reflexed IPF Result 138 - 453 k/uL    MPV NOT REPORTED 8.1 - 13.5 fL    Differential Type NOT REPORTED     WBC Morphology NOT REPORTED     RBC Morphology NOT REPORTED     Platelet Estimate NOT REPORTED     Immature Granulocytes 0 0 %    Seg Neutrophils 56 36 - 65 %    Lymphocytes 32 24 - 43 %    Monocytes 11 3 - 12 %    Eosinophils % 1 1 - 4 %    Basophils 0 0 - 2 %    Absolute Immature Granulocyte 0.00 0.00 - 0.30 k/uL    Segs Absolute 3.19 1.50 - 8.10 k/uL    Absolute Lymph # 1.82 1.10 - 3.70 k/uL    Absolute Mono # 0.63 0.10 - 1.20 k/uL    Absolute Eos # 0.06 0.00 - 0.44 k/uL    Basophils # 0.00 0.00 - 0.20 k/uL    Morphology ANISOCYTOSIS PRESENT    BASIC METABOLIC PANEL   Result Value Ref Range    Glucose 108 (H) 70 - 99 mg/dL    BUN 15 6 - 20 mg/dL    CREATININE 0.89 0.50 - 0.90 mg/dL    Bun/Cre Ratio NOT REPORTED 9 - 20    Calcium 9.5 8.6 - 10.4 mg/dL    Sodium 141 135 - 144 mmol/L    Potassium 3.8 3.7 - 5.3 mmol/L Chloride 100 98 - 107 mmol/L    CO2 29 20 - 31 mmol/L    Anion Gap 12 9 - 17 mmol/L    GFR Non-African American >60 >60 mL/min    GFR African American >60 >60 mL/min    GFR Comment          GFR Staging NOT REPORTED    Brain Natriuretic Peptide   Result Value Ref Range    Pro- (H) <300 pg/mL    BNP Interpretation         Immature Platelet Fraction   Result Value Ref Range    Platelet, Immature Fraction 13.2 (H) 1.1 - 10.3 %    Platelet, Fluorescence 127 (L) 138 - 453 k/uL   TSH with Reflex   Result Value Ref Range    TSH 0.73 0.30 - 5.00 mIU/L   Magnesium   Result Value Ref Range    Magnesium 1.8 1.6 - 2.6 mg/dL   Phosphorus   Result Value Ref Range    Phosphorus 3.4 2.6 - 4.5 mg/dL   Troponin   Result Value Ref Range    Troponin T <0.03 <0.03 ng/mL    Troponin Interp         POCT troponin   Result Value Ref Range    POC Troponin I 0.00 0.00 - 0.10 ng/mL    POC Troponin Interp       The Troponin-I (POC) results cannot be compared to the Troponin-T results. EKG 12 Lead   Result Value Ref Range    Ventricular Rate 59 BPM    Atrial Rate 59 BPM    P-R Interval 126 ms    QRS Duration 136 ms    Q-T Interval 526 ms    QTc Calculation (Bazett) 520 ms    P Axis 35 degrees    R Axis -92 degrees    T Axis 71 degrees   EKG 12 Lead   Result Value Ref Range    Ventricular Rate 60 BPM    Atrial Rate 60 BPM    P-R Interval 140 ms    QRS Duration 138 ms    Q-T Interval 520 ms    QTc Calculation (Bazett) 520 ms    P Axis 40 degrees    R Axis -101 degrees    T Axis 60 degrees       IMPRESSION: 79-year-old female comes into the emergency department with a syncopal event. Patient has multiple risk factors including her last ejection fraction of 10%. Concern for a arrhythmia causing the syncopal event versus a seizure. We'll go ahead and do a cardiac workup consisting of EKG, 2 sets of troponins, chest x-ray, CBC, BMP. We will also do pacemaker interrogation.   Given the history of her, and patient saying that she is on Interpretation    Interpreted by me    Rhythm: Paced  Rate: 59  Axis: normal  Ectopy: none  Conduction: QTC prolonged 520  ST Segments: No acute ST segment changes  T Waves: no acute change  Q Waves: none    Clinical Impression: Prolonged QTC, no acute ST segment changes    All EKG's are interpreted by the Emergency Department Physician who either signs or Co-signs this chart in the absence of a cardiologist.    EMERGENCY DEPARTMENT COURSE:    Patient given 1 g of Magnesium for prolonged QTC. Patient defibrillator is from [address] [place] [address]. Interrogation was performed, no events took place. Patient workup was unremarkable, however given the history of EF of 10% will consider a ETU admission for evaluation by cardiology for syncopal episode. PROCEDURES:  None    CONSULTS:  IP CONSULT TO CARDIOLOGY    CRITICAL CARE:  None    FINAL IMPRESSION      1. Syncope and collapse    2.  Chronic systolic congestive heart failure ([address]          DISPOSITION / PLAN     DISPOSITION Admitted    PATIENT REFERRED TO:  Timothy Mancini MD  [address]  [phone]            DISCHARGE MEDICATIONS:  Current Discharge Medication List          Stephania Mckeon MD  Emergency Medicine Resident    (Please note that portions of this note were completed with a voice recognition program.  Efforts were made to edit the dictations but occasionally words are mis-transcribed.)       Stephania Mckeon MD  11/08/17 [id number]

## 2017-11-08 NOTE — ED NOTES
Pt reports loss of consciousness prior to arrival;  Pt reports falling and hitting frontal portion of head;  Pt is alert and oriented x 3 on assessment in ED.        Dottie Jones RN  11/08/17 [id number]

## 2017-11-08 NOTE — ED NOTES
Pt has a [address] pacemaker. Rep stated he will have a card sent to her in the mail. Writer explained pt needs to make sure she keeps card in her wallet.      Lore Hendrickson RN  11/08/17 [id number]

## 2017-11-08 NOTE — ED NOTES
Bed: 12  Expected date:   Expected time:   Means of arrival:   Comments:     Selwyn Pelaez RN  11/08/17 [id number]

## 2017-11-08 NOTE — ED NOTES
Received report from Bettie Ansari. Pt resting in the bed on monitor. NAD noted. Rr are even and unlabored.  Pt updated     Jazlyn Arriaga RN  11/08/17 [id number]

## 2017-11-09 LAB
EKG ATRIAL RATE: 59 BPM
EKG ATRIAL RATE: 60 BPM
EKG ATRIAL RATE: 63 BPM
EKG P AXIS: 35 DEGREES
EKG P AXIS: 40 DEGREES
EKG P AXIS: 46 DEGREES
EKG P-R INTERVAL: 124 MS
EKG P-R INTERVAL: 126 MS
EKG P-R INTERVAL: 140 MS
EKG Q-T INTERVAL: 520 MS
EKG Q-T INTERVAL: 522 MS
EKG Q-T INTERVAL: 526 MS
EKG QRS DURATION: 136 MS
EKG QRS DURATION: 138 MS
EKG QRS DURATION: 138 MS
EKG QTC CALCULATION (BAZETT): 520 MS
EKG QTC CALCULATION (BAZETT): 520 MS
EKG QTC CALCULATION (BAZETT): 534 MS
EKG R AXIS: -101 DEGREES
EKG R AXIS: -85 DEGREES
EKG R AXIS: -92 DEGREES
EKG T AXIS: 60 DEGREES
EKG T AXIS: 69 DEGREES
EKG T AXIS: 71 DEGREES
EKG VENTRICULAR RATE: 59 BPM
EKG VENTRICULAR RATE: 60 BPM
EKG VENTRICULAR RATE: 63 BPM
TROPONIN INTERP: NORMAL
TROPONIN T: <0.03 NG/ML

## 2017-11-09 PROCEDURE — 6370000000 HC RX 637 (ALT 250 FOR IP): Performed by: EMERGENCY MEDICINE

## 2017-11-09 PROCEDURE — 2500000003 HC RX 250 WO HCPCS: Performed by: EMERGENCY MEDICINE

## 2017-11-09 PROCEDURE — 96372 THER/PROPH/DIAG INJ SC/IM: CPT

## 2017-11-09 PROCEDURE — 2580000003 HC RX 258: Performed by: EMERGENCY MEDICINE

## 2017-11-09 PROCEDURE — G0378 HOSPITAL OBSERVATION PER HR: HCPCS

## 2017-11-09 PROCEDURE — 99255 IP/OBS CONSLTJ NEW/EST HI 80: CPT | Performed by: PSYCHIATRY & NEUROLOGY

## 2017-11-09 PROCEDURE — 93005 ELECTROCARDIOGRAM TRACING: CPT

## 2017-11-09 PROCEDURE — 36415 COLL VENOUS BLD VENIPUNCTURE: CPT

## 2017-11-09 PROCEDURE — 6360000002 HC RX W HCPCS: Performed by: EMERGENCY MEDICINE

## 2017-11-09 PROCEDURE — 84484 ASSAY OF TROPONIN QUANT: CPT

## 2017-11-09 RX ADMIN — LISINOPRIL 5 MG: 5 TABLET ORAL at 08:22

## 2017-11-09 RX ADMIN — CARVEDILOL 3.12 MG: 3.12 TABLET, FILM COATED ORAL at 18:20

## 2017-11-09 RX ADMIN — ACETAMINOPHEN 650 MG: 325 TABLET ORAL at 11:37

## 2017-11-09 RX ADMIN — FUROSEMIDE 40 MG: 40 TABLET ORAL at 18:21

## 2017-11-09 RX ADMIN — FLUTICASONE PROPIONATE 1 SPRAY: 50 SPRAY, METERED NASAL at 08:23

## 2017-11-09 RX ADMIN — FUROSEMIDE 40 MG: 40 TABLET ORAL at 08:22

## 2017-11-09 RX ADMIN — FOLIC ACID 1 MG: 5 INJECTION, SOLUTION INTRAMUSCULAR; INTRAVENOUS; SUBCUTANEOUS at 11:30

## 2017-11-09 RX ADMIN — ENOXAPARIN SODIUM 40 MG: 40 INJECTION SUBCUTANEOUS at 08:23

## 2017-11-09 RX ADMIN — Medication 10 ML: at 11:23

## 2017-11-09 RX ADMIN — SPIRONOLACTONE 25 MG: 25 TABLET ORAL at 08:22

## 2017-11-09 RX ADMIN — Medication 10 ML: at 21:00

## 2017-11-09 RX ADMIN — Medication 100 MG: at 08:22

## 2017-11-09 RX ADMIN — PANTOPRAZOLE SODIUM 40 MG: 40 TABLET, DELAYED RELEASE ORAL at 08:22

## 2017-11-09 RX ADMIN — CARVEDILOL 3.12 MG: 3.12 TABLET, FILM COATED ORAL at 08:22

## 2017-11-09 ASSESSMENT — PAIN DESCRIPTION - PAIN TYPE: TYPE: ACUTE PAIN

## 2017-11-09 ASSESSMENT — PAIN DESCRIPTION - LOCATION: LOCATION: HEAD

## 2017-11-09 ASSESSMENT — PAIN DESCRIPTION - DESCRIPTORS: DESCRIPTORS: ACHING

## 2017-11-09 ASSESSMENT — PAIN SCALES - GENERAL
PAINLEVEL_OUTOF10: 0
PAINLEVEL_OUTOF10: 9
PAINLEVEL_OUTOF10: 3

## 2017-11-09 NOTE — PROGRESS NOTES
Our group had seen patient once 2.5 years ago here and the patient never followed up with our group in the office. I offered to see the patient, but she told me our office is too far away for follow up and she would prefer to see a group whose office is closer to [facility] since she cannot come to our office. Will defer consult to staff call.     Dr. Triston Monet

## 2017-11-09 NOTE — H&P
[facility]  CDU / OBSERVATION eNCOUnter  Resident Note     Pt Name: Randolph Godinez  MRN: [id number]  [place] 1965  Date of evaluation: 11/9/17  Patient's PCP is :  Aura Blackman MD    CHIEF COMPLAINT       Chief Complaint   Patient presents with    Loss of Consciousness     with incont of urine     HISTORY OF PRESENT ILLNESS    Randolph Nurse is a 46 y.o. female who presents With acute syncopal episode lasting several seconds onset yesterday while sitting in a chair, with associated episode of urinary incontinence, tongue biting, and 8/10 left-sided aching headache that began after the syncopal episode. Patient fell to the side and hit the L side of her head and her left shoulder on the ground. No neurologic symptoms reported, no symptoms preceding syncopal episode. Patients family witnessed event, did not report any shaking or seizure like movements. Patient with history of severe CHF with AICD, ejection fraction of 10%, hypertension, COPD. AICD was interrogated in the emergency department, no events recorded. Patient was admitted to the observation unit for cardiology evaluation. Will also consult neurology for evaluation.      Location/Symptom: syncopal episode  Timing/Onset: yesterday  Provocation: none  Quality: n/a  Radiation: n/a  Severity: n/a  Timing/Duration: several seconds  Modifying Factors: n/a    REVIEW OF SYSTEMS       General ROS - No fevers, No malaise  Ophthalmic ROS - No discharge, No changes in vision  ENT ROS -  No sore throat, No rhinorrhea, +episode of tongue biting  Respiratory ROS - no shortness of breath, no cough, no  wheezing  Cardiovascular ROS - No chest pain, no dyspnea on exertion  Gastrointestinal ROS - No abdominal pain, no nausea or vomiting, no change in bowel habits, no black or bloody stools  Genito-Urinary ROS - No dysuria, trouble voiding, or hematuria, + episode of urinary incontinence  Musculoskeletal ROS - + L shoulder pain  Neurological ROS - + headache, + syncopal episode, no dizziness/lightheadedness, No focal weakness, no loss of sensation  Dermatological ROS - No lesions, No rash     (PQRS) Advance directives on face sheet per hospital policy. No change unless specifically mentioned in chart    PAST MEDICAL HISTORY    has a past medical history of Asthma; CAD (coronary artery disease); Cardiomegaly; CHF (congestive heart failure) ([facility] COPD (chronic obstructive pulmonary disease) ([facility] Depression; Hypertension; MI (myocardial infarction); and Unspecified diseases of blood and blood-forming organs. I have reviewed the past medical history with the patient and it is pertinent to this complaint. SURGICAL HISTORY      has a past surgical history that includes Cardiac defibrillator placement (2005); Pacemaker insertion; Tubal ligation; Cardiac defibrillator placement; and Uterine fibroid surgery (malucretia ). I have reviewed and agree with Surgical History entered    CURRENT MEDICATIONS       vitamin B-1 (THIAMINE) tablet 577 mg Daily   folic acid 1 mg in dextrose 5 % 50 mL IVPB Daily   albuterol sulfate  (90 Base) MCG/ACT inhaler 2 puff Q6H PRN   carvedilol (COREG) tablet 3.125 mg BID WC   fluticasone (FLONASE) 50 MCG/ACT nasal spray 1 spray Daily   furosemide (LASIX) tablet 40 mg BID   lisinopril (PRINIVIL;ZESTRIL) tablet 5 mg Daily   pantoprazole (PROTONIX) tablet 40 mg QAM AC   spironolactone (ALDACTONE) tablet 25 mg Daily   sodium chloride flush 0.9 % injection 10 mL 2 times per day   sodium chloride flush 0.9 % injection 10 mL PRN   enoxaparin (LOVENOX) injection 40 mg Daily   ondansetron (ZOFRAN) injection 4 mg Q8H PRN   acetaminophen (TYLENOL) tablet 650 mg Q4H PRN     All medication charted and reviewed. ALLERGIES     has No Known Allergies. FAMILY HISTORY     indicated that her mother is alive. She indicated that her father is . She indicated that her brother is alive.  She indicated that her maternal grandmother is . She indicated that her maternal grandfather is . She indicated that her paternal grandmother is . She indicated that her paternal grandfather is . family history includes Diabetes in her father and mother; Heart Disease in her brother and mother; High Blood Pressure in her mother. The patient denies any pertinent family history. I have reviewed and agree with the family history entered. I have reviewed the Family History and it is not significant to the case    SOCIAL HISTORY      reports that she has been smoking Cigarettes. She has been smoking about 0.25 packs per day. She has never used smokeless tobacco. She reports that she drinks about 0.6 oz of alcohol per week . She reports that she does not use drugs. I have reviewed and agree with all Social.  There are concerns for substance abuse/use. Hx of Alcohol abuse    PHYSICAL EXAM     INITIAL VITALS:  height is 5' 2\" (1.575 m) and weight is 140 lb (63.5 kg). Her oral temperature is 97.8 °F (36.6 °C). Her blood pressure is 103/71 and her pulse is 61. Her respiration is 17 and oxygen saturation is 99%.       CONSTITUTIONAL: AOx4, no apparent distress, appears stated age   HEAD: normocephalic, atraumatic   EYES: PERRLA, EOMI    ENT: moist mucous membranes, uvula midline   NECK: supple, symmetric   BACK: symmetric   LUNGS: clear to auscultation bilaterally   CARDIOVASCULAR: regular rate and rhythm, no murmurs, rubs or gallops   ABDOMEN: soft, non-tender, non-distended with normal active bowel sounds   NEUROLOGIC:  MAEx4, no focal sensory or motor deficits   MUSCULOSKELETAL: no clubbing, cyanosis or edema   SKIN: no rash or wounds     DIFFERENTIAL DIAGNOSIS/MDM:   Syncope/ Pre-syncope:  DDX: cardiac arrhythmia/ valvular, low glucose, anemia, SAH, dissection, PE, AAA rupture, ectopic pregnancy rupture, seizure, vasovagal, orthostatic    DIAGNOSTIC RESULTS     EKG: All EKG's are interpreted by Rita Ordonez is a 46 y.o. female who presents with     1. Acute syncopal episode lasting several seconds onset yesterday while sitting in a chair, with associated episode of urinary incontinence, tongue biting    2. Acute 8/10 left sided aching headache and aching L shoulder pain secondary to falling and hitting her side due to syncopal episode    · IP CONSULT TO NEUROLOGY  · IP CONSULT TO CARDIOLOGY  · Further workup and evaluation   · Follow up recommendations   · Continue home meds, pain control   · Monitor vitals, labs, imaging     DISPO: pending consults and clinical improvement     CONSULTS:    IP CONSULT TO NEUROLOGY  IP CONSULT TO CARDIOLOGY    PROCEDURES:  Not indicated     PATIENT REFERRED TO:    Ann Maria MD  [address] [phone]          --  Sb Pod, DO   Emergency Medicine Resident     This dictation was generated by voice recognition computer software. Although all attempts are made to edit the dictation for accuracy, there may be errors in the transcription that are not intended.

## 2017-11-09 NOTE — PROGRESS NOTES
CDU Daily Progress Note  Attending Physician       Pt Name: Vinita Eli  MRN: [id number]  [place] 1965  Date of evaluation: 11/9/17    I performed a history and physical examination of the patient and discussed management with the resident. I reviewed the residents note and agree with the documented findings and plan of care. Any areas of disagreement are noted on the chart. I was personally present for the key portions of any procedures. I have documented in the chart those procedures where I was not present during the key portions. I have reviewed the emergency nurses triage note. I agree with the chief complaint, past medical history, past surgical history, allergies, medications, social and family history as documented unless otherwise noted below. Documentation of the HPI, Physical Exam and Medical Decision Making performed by medical students or scribes is based on my personal performance of the HPI, PE and MDM. For Physician Assistant/ Nurse Practitioner cases/documentation I have personally evaluated this patient and have completed at least one if not all key elements of the E/M (history, physical exam, and MDM). Additional findings are as noted. The Family History, Social History and Review of Systems are unchanged from the previous day. No significant events overnight. Had syncopal episode while sitting in chair, fell sideways hit head and R shoulder, states bit her tongue and had urine incontinence, then developed headache. PMHx: CHF w AICD, COPD, HTN, asthma, alcoholic. ECG ventricular paced rhythm, CXR no acute some cardiomegaly, trops neg. Cards consulted. Neuro consult for EEG    Patient smokes 5-40 cigarettes per day for 31 years. Smoking cessation counseling for 3 minutes. Discussed the effects of smoking in regards to heart disease, lung disease, stroke and cancer.  I explained how this negatively effects their condition, will contribute to increased recovery time, and possible lead to further health problems in the future.       Ivan Dakins, MD  Attending Physician  Critical Decision Unit

## 2017-11-09 NOTE — CONSULTS
NEUROLOGY INPATIENT CONSULT NOTE    11/9/2017         Samuel Tierney is a  46 y.o. female admitted on 11/8/2017 with  Syncope and collapse [R55]      History is obtained mostly from the patient and the medical record and from the caregivers. Chart is reviewed and patient is examined. Briefly, this is a  46 y.o. female admitted on 11/8/2017 with  syncopal episode lasting several seconds 11/7 while sitting in a chair, with associated episode of urinary incontinence, tongue biting, and 8/10 left-sided aching headache that began after the syncopal episode. Patient fell to the side and hit the L side of her head and her left shoulder on the ground. No neurologic symptoms reported, no symptoms preceding syncopal episode. Patients family witnessed event, did not report any shaking or seizure like movements. Patient with history of severe CHF with AICD, ejection fraction of 10%, hypertension, COPD. AICD was interrogated in the emergency department, no events recorded. Patient was admitted to the observation unit for cardiology evaluation. Patient states that she knows what happened. She says, \"I was passing blunts [marijuana cigarettes], I only took 2 puffs then a told me I just went out. \"  As noted above, there was no witnessed convulsive activity. She did have a large amount urinary incontinence. Reportedly she bit her tongue but she just has the slightest sore spot on the left side of her tongue in no apparent contusion, so this is a questionable element of history and seems that it may have been layered on later.            CT scan of brain films reviewed by present position within normal limits. No current facility-administered medications on file prior to encounter.       Current Outpatient Prescriptions on File Prior to Encounter   Medication Sig Dispense Refill    carvedilol (COREG) 3.125 MG tablet TAKE ONE TABLET BY MOUTH 2 TIMES A DAY WITH MEALS 60 tablet 0    furosemide (LASIX) 40 MG tablet TAKE ONE TABLET BY MOUTH 2 TIMES A DAY 60 tablet 0    omeprazole (PRILOSEC) 20 MG delayed release capsule TAKE (1) CAPSULE BY MOUTH ONE TIME DAILY. 28 capsule 3    Multiple Vitamin (MULTIVITAMIN) tablet TAKE (1) TABLET BY MOUTH ONE TIME DAILY. 30 tablet 3    lisinopril (PRINIVIL;ZESTRIL) 5 MG tablet TAKE 1 TABLET BY MOUTH DAILY 30 tablet 2    spironolactone (ALDACTONE) 25 MG tablet TAKE ONE TABLET BY MOUTH EVERY DAY 28 tablet 3    fluticasone (FLONASE) 50 MCG/ACT nasal spray 1 spray by Nasal route daily 1 Bottle 3    Umeclidinium Bromide (INCRUSE ELLIPTA) 62.5 MCG/INH AEPB Inhale 1 puff into the lungs daily DC Spiriva 1 each 3    albuterol sulfate HFA (VENTOLIN HFA) 108 (90 BASE) MCG/ACT inhaler Inhale 2 puffs into the lungs every 6 hours as needed for Wheezing DC Proventil 1 Inhaler 3    nicotine polacrilex (NICORETTE) 2 MG gum Take 1 each by mouth as needed for Smoking cessation 110 each 3    furosemide (LASIX) 40 MG tablet Take 1 tablet by mouth 2 times daily 60 tablet 11    carvedilol (COREG) 3.125 MG tablet Take 1 tablet by mouth 2 times daily (with meals) 60 tablet 11     Allergies: Zoila Arora has No Known Allergies. Past Medical History:   Diagnosis Date    Asthma     CAD (coronary artery disease)     ICD    Cardiomegaly     CHF (congestive heart failure) (HCC)     COPD (chronic obstructive pulmonary disease) (HCC)     Depression     Hypertension     MI (myocardial infarction)     Unspecified diseases of blood and blood-forming organs     anemia,        Past Surgical History:   Procedure Laterality Date    CARDIAC DEFIBRILLATOR PLACEMENT  09/2005    CARDIAC DEFIBRILLATOR PLACEMENT      PACEMAKER INSERTION      TUBAL LIGATION      UTERINE FIBROID SURGERY  maarch 2015     Social History: Columba Coburn  reports that she has been smoking Cigarettes. She has been smoking about 0.25 packs per day.  She has never used smokeless tobacco. She reports that she drinks about intact to confrontation  III,IV,VI -  PRRRE EOMs full, no afferent defect, no                      DINA, no ptosis  V     -     Normal facial sensation  VII    -     Normal facial symmetry  VIII   -     Intact hearing  IX,X -     Symmetrical palate  XI    -     Symmetrical shoulder shrug  XII   -     Midline tongue, no atrophy    MOTOR FUNCTION:  Normal bulk, normal tone, normal power;  no involuntary movements, no tremor   SENSORY FUNCTION:  Normal touch, normal pain grossly   CEREBELLAR FUNCTION:  No limb nor truncal cerebellar signs   REFLEX FUNCTION:  Symmetric, no perverted reflex, no Babinski sign   STATION and GAIT  Normal         Data:    Lab Results:     Lab Results   Component Value Date    CHOL 145 10/04/2015    LDLCHOLESTEROL 85 10/04/2015    HDL 44 10/04/2015    TRIG 80 10/04/2015    ALT 33 05/02/2016    AST 41 (H) 05/02/2016    TSH 0.73 11/08/2017    INR 1.1 05/02/2016    LABA1C 5.9 02/03/2017     Hematology:  Recent Labs      11/08/17   1314   WBC  5.7   HGB  13.3   HCT  41.9   PLT  See Reflexed IPF Result     Chemistry:  Recent Labs      11/08/17   1314   NA  141   K  3.8   CL  100   CO2  29   GLUCOSE  108*   BUN  15   CREATININE  0.89   MG  1.8   CALCIUM  9.5     Recent Labs      11/08/17   1314   TSH  0.73             Diagnostic data reviewed:  Ct Head Wo Contrast    Result Date: 11/8/2017  EXAMINATION: CT OF THE HEAD WITHOUT CONTRAST - STROKE ALERT  11/8/2017 2:21 pm TECHNIQUE: CT of the head was performed without the administration of intravenous contrast. Dose modulation, iterative reconstruction, and/or weight based adjustment of the mA/kV was utilized to reduce the radiation dose to as low as reasonably achievable. COMPARISON: 03/12/2012 HISTORY: ORDERING SYSTEM PROVIDED HISTORY: syncope, headache, anticoagulation TECHNOLOGIST PROVIDED HISTORY: Has a \"code stroke\" or \"stroke alert\" been called? ->No 59-year-old female who is on anticoagulation who complains of headache and syncope FINDINGS: BRAIN/VENTRICLES: Foramen magnum and 4th ventricle appear patent. Stable mild prominence of the sulci, cisterns, and extra-axial spaces near the vertex. Ventricles normal in size and midline in position. Atherosclerotic calcification of the bilateral parasellar carotid arteries. No abnormal extra-axial fluid collections. No clear evidence for acute intracranial hemorrhage, mass, mass effect, or midline shift. Gray-white matter differentiation is relatively well maintained. Bilateral basal ganglia, thalami, and insular ribbons are well defined. ORBITS: The visualized portion of the orbits demonstrate no acute abnormality. SINUSES: The visualized paranasal sinuses and mastoid air cells demonstrate no acute abnormality. SOFT TISSUES/SKULL:  No acute abnormality of the visualized skull or soft tissues. 1. No clear evidence for acute intracranial hemorrhage or midline shift. 2. Atherosclerotic calcification of the vasculature. Ct Chest Wo Contrast    Result Date: 11/3/2017  EXAMINATION: CT OF THE CHEST WITHOUT CONTRAST 11/3/2017 12:02 pm TECHNIQUE: CT of the chest was performed without the administration of intravenous contrast. Multiplanar reformatted images are provided for review. Dose modulation, iterative reconstruction, and/or weight based adjustment of the mA/kV was utilized to reduce the radiation dose to as low as reasonably achievable. COMPARISON: 10/03/2015, 10/10/2014 HISTORY: ORDERING SYSTEM PROVIDED HISTORY: Lung nodule < 10m on CT 55-year-old female with lung nodule less than 6 cm on CT ; follow-up FINDINGS: Mediastinum: Left-sided cardiac pacemaker device in place. Moderate cardiomegaly. Coronary artery disease. No axillary, mediastinal, or hilar lymphadenopathy identified. Fluid in the superior pericardial recess. No sizable pleural effusions. Fluid in the inferior pericardial space. Visualized thyroid gland grossly unremarkable in appearance.  Lungs/pleura: Trachea and proximal central airways appear patent. Mild dependent atelectasis within both lungs. Mild diffuse emphysema. No acute focal airspace consolidation. Calcified granuloma in the medial right lower lobe on image 68, series 4. Stable pulmonary nodule measuring up to 3 mm in the posterior aspect of the right upper lobe on image 40, series 4 which is unchanged dating back to 10/10/2014. Previously described 4.5 mm pulmonary nodule in the left upper lobe is not identified on this examination and appears to have resolved when compared with 10/03/2015. Upper Abdomen: Small hiatal hernia. Partially visualized right renal atrophy. Nonobstructing punctate calculi at the upper pole of the right kidney. Soft Tissues/Bones: Mild diffuse degenerative changes within the spine. Schmorl's node deformity within the lower thoracic spine. 1. 3 mm posterior right upper lobe pulmonary nodule which is stable dating back to 10/10/2014, likely benign. No mandatory follow-up is necessary according to Fleischner society guidelines provided below unless clinically indicated. 2. Previously described 4.5 mm left upper lobe pulmonary nodule not identified on this exam and appears to have resolved when compared with 10/03/2015. This could be related to a prior infectious or inflammatory etiology. 3. Mild emphysema. Mild dependent atelectasis in both lungs. No acute focal airspace consolidation. 4. Small hiatal hernia. 5. Coronary artery disease. Moderate cardiomegaly. Small amount of pericardial fluid. 6. Nonobstructing punctate calculi at the upper pole the right kidney which is partially visualized and atrophic. RECOMMENDATIONS: Fleischner Society guidelines for follow-up and management of incidentally detected pulmonary nodules: Single Solid Nodule: Nodule size less than 6 mm In a low-risk patient, no routine follow-up. In a high-risk patient, optional CT at 12 months.  - Low risk patients include individuals with minimal or absent history of smoking and other known risk factors. - High risk patients include individuals with a history or smoking or known risk factors. Radiology 2017 http://pubs. rsna.org/doi/full/10.1148/radiol. 8906207605     Xr Shoulder Left (min 2 Views)    Result Date: 11/8/2017  EXAMINATION: 3 VIEWS OF THE LEFT SHOULDER 11/8/2017 1:52 pm COMPARISON: None. HISTORY: ORDERING SYSTEM PROVIDED HISTORY: Syncope and fall. humeral head tenderness FINDINGS: Glenohumeral joint is normally aligned. No evidence of acute fracture or dislocation. No abnormal periarticular calcifications. [facility] joint shows no significant abnormality. Negative left shoulder with no acute abnormality. Xr Chest Portable    Result Date: 11/8/2017  EXAMINATION: SINGLE VIEW OF THE CHEST 11/8/2017 1:52 pm COMPARISON: May 2, 2016 HISTORY: ORDERING SYSTEM PROVIDED HISTORY: Syncope FINDINGS: Stable cardiomegaly. Implantable left-sided cardiac device remains in place with stable intact leads. No consolidation, effusion or pneumothorax. Osseous structures appear intact without acute process. Stable cardiomegaly. No focal airspace disease. EKG 12 Lead   11/9/17   Deneise Alberta 46 y.o. multigravida   Reading Physician Unknown Provider Result;  Jose Monaco MD   Ordering Physician Daniella Moyer MD   Result Information     Status: Final result (Resulted: 11/9/2017 14:40) Provider Status: Ordered   Order Questions     Question Answer Comment   Reason for Exam? Chest pain    Reason for Exam? Chest pain           PACS Images     Show images for EKG 12 Lead   Signed     Electronically signed by Jose Monaco MD on 11/9/17 at 1440 EST   11/9/2017  2:40 PM - Mikey, [facility] Incoming Ekg Results From [facility]     Component Results     Component Value Ref Range & Units Status Collected Lab   Ventricular Rate 63  BPM Final 11/09/2017  6:32    Atrial Rate 63  BPM Final 11/09/2017  6:32    P-R Interval 124  ms Final 11/09/2017  6:32    QRS Duration 138  ms Final 11/09/2017  6:32    Q-T Interval 522  ms Final 11/09/2017  6:32    QTc Calculation (Bazett) 534  ms Final 11/09/2017  6:32    P Axis 46  degrees Final 11/09/2017  6:32    R Axis -85  degrees Final 11/09/2017  6:32    T Axis 69  degrees Final 11/09/2017  6:32    Testing Performed By     Lab - Abbreviation Name Director Address Valid Date Range   212-Unknown [facility] STV MUSE Unknown Unknown 12/17/12 1755-Present   Narrative     Atrial-sensed ventricular-paced rhythm  Biventricular pacemaker detected  Abnormal ECG   Lab and Collection     EKG 12 Lead on 11/9/2017   Result History     EKG 12 Lead on 11/9/2017             Impression and Plan: Ms. Brian Cherry is a 46 y.o. female with cardiac history as above, with a brief syncopal event in the context of concurrent marijuana use with urinary incontinence and questionable tongue biting. Neither these latter 2 elements of which the last as stated is questionable anyway need change the diagnosis from syncope to convulsive seizure. There is no epilepsy history. EEG is sufficiently unlikely to alter the evaluation that is not considered warranted for this history. Patient is neurologically stable and clear. Discussed with patient and Nurse. Will follow with you. Thank you for consultation.

## 2017-11-09 NOTE — PROGRESS NOTES
Congestive Heart Failure Education completed and charted. CHF booklet given. Patient was receptive to education. Pt to be contacted post discharge for possible CHF Clinic followup    Discussed 2000 mg sodium restricted daily in diet. Patient instructed to limit fluid intake to  1.5 to 2 liters per day. Patient instructed to weigh self at the same time of each day each morning, reinforced teaching to monitor for 3-5 lb weight increase over 1-2 days notify physician if change noted. Signs and symptoms of CHF discussed with patient, such as feeling more tired than normal, feeling short of breath, coughing that increases when you lie down, sudden weight gain, swelling of your feet, legs or belly. Patient verbalized understanding to notify physician office if these symptoms occur.

## 2017-11-10 VITALS
WEIGHT: 140 LBS | SYSTOLIC BLOOD PRESSURE: 110 MMHG | TEMPERATURE: 98.6 F | RESPIRATION RATE: 17 BRPM | HEART RATE: 61 BPM | BODY MASS INDEX: 25.76 KG/M2 | HEIGHT: 62 IN | DIASTOLIC BLOOD PRESSURE: 70 MMHG | OXYGEN SATURATION: 95 %

## 2017-11-10 PROCEDURE — G0378 HOSPITAL OBSERVATION PER HR: HCPCS

## 2017-11-10 PROCEDURE — 99225 PR SBSQ OBSERVATION CARE/DAY 25 MINUTES: CPT | Performed by: NURSE PRACTITIONER

## 2017-11-10 PROCEDURE — 2580000003 HC RX 258: Performed by: EMERGENCY MEDICINE

## 2017-11-10 PROCEDURE — 2500000003 HC RX 250 WO HCPCS: Performed by: EMERGENCY MEDICINE

## 2017-11-10 PROCEDURE — 6370000000 HC RX 637 (ALT 250 FOR IP): Performed by: EMERGENCY MEDICINE

## 2017-11-10 RX ADMIN — FOLIC ACID 1 MG: 5 INJECTION, SOLUTION INTRAMUSCULAR; INTRAVENOUS; SUBCUTANEOUS at 08:55

## 2017-11-10 RX ADMIN — FLUTICASONE PROPIONATE 1 SPRAY: 50 SPRAY, METERED NASAL at 08:55

## 2017-11-10 RX ADMIN — LISINOPRIL 5 MG: 5 TABLET ORAL at 08:54

## 2017-11-10 RX ADMIN — FUROSEMIDE 40 MG: 40 TABLET ORAL at 08:54

## 2017-11-10 RX ADMIN — PANTOPRAZOLE SODIUM 40 MG: 40 TABLET, DELAYED RELEASE ORAL at 08:54

## 2017-11-10 RX ADMIN — SPIRONOLACTONE 25 MG: 25 TABLET ORAL at 08:54

## 2017-11-10 RX ADMIN — Medication 100 MG: at 08:54

## 2017-11-10 NOTE — PROGRESS NOTES
NEUROLOGY INPATIENT PROGRESS NOTE    11/10/2017         Subjective: Lexis Shirley is a  46 y.o. female admitted on 11/8/2017 with Syncope and collapse [R55]. Chart reviewed. Discussed with patient/caregivers. Patient examined. No acute events overnight. No new motor, sensory, visual or bulbar symptoms. She denies any headache, numbness, tingling, or weakness. No seizures, no syncope. Is ambulating in her room without difficulty. Briefly, this is a  46 y.o. female with H/O asthma, CAD, CHF, COPD, depression, HTN, MI, who was admitted on 11/8/2017 with a syncopal episode lasting several seconds on 11/7 while she was sitting in a chair, and this episode was associated with urinary incontinence, tongue biting, and a left sided headache grade 8/10 that started after the syncopal episode. Per the records the patient reports falling to the side and hitting the left side of her head/left shoulder on the ground. There was no warning to this event, no preceding symptoms. No report of tonic clonic activity by family who witnessed the event. Patient's AICD was interrogated in the ED with no events recorded. The patient did admit to smoking marijuana at the time and reports this episode happened after taking 2 puffs. CT head no acute pathology. No current facility-administered medications on file prior to encounter. Current Outpatient Prescriptions on File Prior to Encounter   Medication Sig Dispense Refill    carvedilol (COREG) 3.125 MG tablet TAKE ONE TABLET BY MOUTH 2 TIMES A DAY WITH MEALS 60 tablet 0    furosemide (LASIX) 40 MG tablet TAKE ONE TABLET BY MOUTH 2 TIMES A DAY 60 tablet 0    omeprazole (PRILOSEC) 20 MG delayed release capsule TAKE (1) CAPSULE BY MOUTH ONE TIME DAILY. 28 capsule 3    Multiple Vitamin (MULTIVITAMIN) tablet TAKE (1) TABLET BY MOUTH ONE TIME DAILY.  30 tablet 3    lisinopril (PRINIVIL;ZESTRIL) 5 MG tablet TAKE 1 TABLET BY MOUTH DAILY 30 tablet 2    spironolactone

## 2017-11-10 NOTE — CONSULTS
Attestation signed by      Attending Physician Statement:    I have discussed the care of  Sallie Wynn , including pertinent history and exam findings, with the Cardiology fellow/resident. I have seen and examined the patient and the key elements of all parts of the encounter have been performed by me. I agree with the assessment, plan and orders as documented by the fellow/resident, after I modified exam findings and plan of treatments, and the final version is my approved version of the assessment. Additional Comments: The patient was seen and examined, agree with below, does have history of dilated cardiomyopathy s/p AICD placement, was smoking weed when she passed out, no reported seizure, no chest pain or SOB, no dizziness or syncope. Feels back to normal and able to ambulate with no symptoms. AICD interrogation showed no arrhythmias. Will check orthostatics. No further cardiac work up needed. [facility]    Consult / H&P               Today's Date: 11/10/2017  Patient Name: Sallie Wynn  Date of admission: 11/8/2017 12:41 PM  Patient's age: 46 y.o., 1965  Admission Dx: Syncope and collapse [R55]    Reason for Consult:  Cardiac evaluation    Requesting Physician: Cirilo Leyva MD    CHIEF COMPLAINT:  Syncopal episode    History Obtained From:  patient    HISTORY OF PRESENT ILLNESS:      The patient is a 46 y.o.  female who is admitted to the hospital for a syncopal episode. The patient has a PMH of severe CHF with AICD (EF of 10%) as well as HTN, COPD. She states that two days ago while she was sitting with her friends in a [place] and passing around a marijuana joint, she suddenly passed out. She states that this has never happened to her recently but in the past she used to pass out occasionally and her boyfriend would wake her up.  During this episode, the patient states that she did not have any indication that she was going History:   reports that she has been smoking Cigarettes. She has been smoking about 0.25 packs per day. She has never used smokeless tobacco. She reports that she drinks about 0.6 oz of alcohol per week . She reports that she does not use drugs. Family History: family history includes Diabetes in her father and mother; Heart Disease in her brother and mother; High Blood Pressure in her mother. No h/o sudden cardiac death. No for premature CAD    REVIEW OF SYSTEMS:    · Constitutional: there has been no unanticipated weight loss. There's been No change in energy level, No change in activity level. · Eyes: No visual changes or diplopia. No scleral icterus. · ENT: +left sided headache  · Cardiovascular: CHF with AICD,   · Respiratory: No previous pulmonary problems, No cough  · Gastrointestinal: No abdominal pain. No change in bowel or bladder habits. · Genitourinary: No dysuria, trouble voiding, or hematuria. · Musculoskeletal:  No gait disturbance, No weakness or joint complaints. · Integumentary: No rash or pruritis. · Neurological: No headache, diplopia, change in muscle strength, numbness or tingling. No change in gait, balance, coordination, mood, affect, memory, mentation, behavior. · Psychiatric: No anxiety, or depression. · Endocrine: No temperature intolerance. No excessive thirst, fluid intake, or urination. No tremor. · Hematologic/Lymphatic: No abnormal bruising or bleeding, blood clots or swollen lymph nodes. · Allergic/Immunologic: No nasal congestion or hives. PHYSICAL EXAM:      /70   Pulse 61   Temp 98.6 °F (37 °C) (Oral)   Resp 17   Ht 5' 2\" (1.575 m)   Wt 140 lb (63.5 kg)   SpO2 95%   BMI 25.61 kg/m²    Constitutional and General Appearance: alert, cooperative, no distress and appears stated age  HEENT: PERRL, no cervical lymphadenopathy. No masses palpable.  Normal oral mucosa  Respiratory:  · Normal excursion and expansion without use of accessory muscles  · Resp LABGLOM  >60   GLUCOSE  108*     BNP: No results for input(s): BNP in the last 72 hours. PT/INR: No results for input(s): PROTIME, INR in the last 72 hours. APTT:No results for input(s): APTT in the last 72 hours. CARDIAC ENZYMES:  Recent Labs      11/08/17   1320   TROPONINI  0.00     FASTING LIPID PANEL:  Lab Results   Component Value Date    HDL 44 10/04/2015    TRIG 80 10/04/2015     LIVER PROFILE:No results for input(s): AST, ALT, LABALBU in the last 72 hours. IMPRESSION:    Patient Active Problem List   Diagnosis    Alcohol abuse    Paroxysmal atrial fibrillation (HCC)    Breast mass, left    Fibroids    Lung nodule < 6cm on CT    Chronic systolic heart failure (HCC) s/p AICD    Mild intermittent asthma    Essential hypertension       RECOMMENDATIONS:  1. Syncopal episode   -unlikely to be cardiogenic, likely situational secondary to drug exposure  2. Continue medical management and supportive care per primary  3. Follow up outpatient given risk factors and CHF    Will discuss with rounding attending Dr. Karla Wilder for final recommendations.     [address]

## 2017-11-10 NOTE — DISCHARGE SUMMARY
PRINIVIL;ZESTRIL  TAKE 1 TABLET BY MOUTH DAILY     multivitamin tablet  TAKE (1) TABLET BY MOUTH ONE TIME DAILY. nicotine polacrilex 2 MG gum  Commonly known as:  NICORETTE  Take 1 each by mouth as needed for Smoking cessation     omeprazole 20 MG delayed release capsule  Commonly known as:  PRILOSEC  TAKE (1) CAPSULE BY MOUTH ONE TIME DAILY. spironolactone 25 MG tablet  Commonly known as:  ALDACTONE  TAKE ONE TABLET BY MOUTH EVERY DAY     Umeclidinium Bromide 62.5 MCG/INH Aepb  Commonly known as:  INCRUSE ELLIPTA  Inhale 1 puff into the lungs daily DC Spiriva        * This list has 4 medication(s) that are the same as other medications prescribed for you. Read the directions carefully, and ask your doctor or other care provider to review them with you.                 Diet:   , advance as tolerated     Activity:  As tolerated    Consultants: IP CONSULT TO NEUROLOGY  IP CONSULT TO CARDIOLOGY    Procedures:  Not indicated     Diagnostic Test:   Results for orders placed or performed during the hospital encounter of 11/08/17   CBC WITH AUTO DIFFERENTIAL   Result Value Ref Range    WBC 5.7 3.5 - 11.3 k/uL    RBC 4.54 3.95 - 5.11 m/uL    Hemoglobin 13.3 11.9 - 15.1 g/dL    Hematocrit 41.9 36.3 - 47.1 %    MCV 92.3 82.6 - 102.9 fL    MCH 29.3 25.2 - 33.5 pg    MCHC 31.7 28.4 - 34.8 g/dL    RDW 14.6 (H) 11.8 - 14.4 %    Platelets See Reflexed IPF Result 138 - 453 k/uL    MPV NOT REPORTED 8.1 - 13.5 fL    Differential Type NOT REPORTED     WBC Morphology NOT REPORTED     RBC Morphology NOT REPORTED     Platelet Estimate NOT REPORTED     Immature Granulocytes 0 0 %    Seg Neutrophils 56 36 - 65 %    Lymphocytes 32 24 - 43 %    Monocytes 11 3 - 12 %    Eosinophils % 1 1 - 4 %    Basophils 0 0 - 2 %    Absolute Immature Granulocyte 0.00 0.00 - 0.30 k/uL    Segs Absolute 3.19 1.50 - 8.10 k/uL    Absolute Lymph # 1.82 1.10 - 3.70 k/uL    Absolute Mono # 0.63 0.10 - 1.20 k/uL    Absolute Eos # 0.06 0.00 - 0.44 k/uL Basophils # 0.00 0.00 - 0.20 k/uL    Morphology ANISOCYTOSIS PRESENT    BASIC METABOLIC PANEL   Result Value Ref Range    Glucose 108 (H) 70 - 99 mg/dL    BUN 15 6 - 20 mg/dL    CREATININE 0.89 0.50 - 0.90 mg/dL    Bun/Cre Ratio NOT REPORTED 9 - 20    Calcium 9.5 8.6 - 10.4 mg/dL    Sodium 141 135 - 144 mmol/L    Potassium 3.8 3.7 - 5.3 mmol/L    Chloride 100 98 - 107 mmol/L    CO2 29 20 - 31 mmol/L    Anion Gap 12 9 - 17 mmol/L    GFR Non-African American >60 >60 mL/min    GFR African American >60 >60 mL/min    GFR Comment          GFR Staging NOT REPORTED    Brain Natriuretic Peptide   Result Value Ref Range    Pro- (H) <300 pg/mL    BNP Interpretation         Immature Platelet Fraction   Result Value Ref Range    Platelet, Immature Fraction 13.2 (H) 1.1 - 10.3 %    Platelet, Fluorescence 127 (L) 138 - 453 k/uL   TSH with Reflex   Result Value Ref Range    TSH 0.73 0.30 - 5.00 mIU/L   Magnesium   Result Value Ref Range    Magnesium 1.8 1.6 - 2.6 mg/dL   Phosphorus   Result Value Ref Range    Phosphorus 3.4 2.6 - 4.5 mg/dL   Troponin   Result Value Ref Range    Troponin T <0.03 <0.03 ng/mL    Troponin Interp         Troponin   Result Value Ref Range    Troponin T <0.03 <0.03 ng/mL    Troponin Interp         POCT troponin   Result Value Ref Range    POC Troponin I 0.00 0.00 - 0.10 ng/mL    POC Troponin Interp       The Troponin-I (POC) results cannot be compared to the Troponin-T results.    EKG 12 Lead   Result Value Ref Range    Ventricular Rate 59 BPM    Atrial Rate 59 BPM    P-R Interval 126 ms    QRS Duration 136 ms    Q-T Interval 526 ms    QTc Calculation (Bazett) 520 ms    P Axis 35 degrees    R Axis -92 degrees    T Axis 71 degrees   EKG 12 Lead   Result Value Ref Range    Ventricular Rate 60 BPM    Atrial Rate 60 BPM    P-R Interval 140 ms    QRS Duration 138 ms    Q-T Interval 520 ms    QTc Calculation (Bazett) 520 ms    P Axis 40 degrees    R Axis -101 degrees    T Axis 60 degrees   EKG 12 Lead Result Value Ref Range    Ventricular Rate 63 BPM    Atrial Rate 63 BPM    P-R Interval 124 ms    QRS Duration 138 ms    Q-T Interval 522 ms    QTc Calculation (Bazett) 534 ms    P Axis 46 degrees    R Axis -85 degrees    T Axis 69 degrees     Ct Head Wo Contrast    Result Date: 11/8/2017  EXAMINATION: CT OF THE HEAD WITHOUT CONTRAST - STROKE ALERT  11/8/2017 2:21 pm TECHNIQUE: CT of the head was performed without the administration of intravenous contrast. Dose modulation, iterative reconstruction, and/or weight based adjustment of the mA/kV was utilized to reduce the radiation dose to as low as reasonably achievable. COMPARISON: 03/12/2012 HISTORY: ORDERING SYSTEM PROVIDED HISTORY: syncope, headache, anticoagulation TECHNOLOGIST PROVIDED HISTORY: Has a \"code stroke\" or \"stroke alert\" been called? ->No 51-year-old female who is on anticoagulation who complains of headache and syncope FINDINGS: BRAIN/VENTRICLES: Foramen magnum and 4th ventricle appear patent. Stable mild prominence of the sulci, cisterns, and extra-axial spaces near the vertex. Ventricles normal in size and midline in position. Atherosclerotic calcification of the bilateral parasellar carotid arteries. No abnormal extra-axial fluid collections. No clear evidence for acute intracranial hemorrhage, mass, mass effect, or midline shift. Gray-white matter differentiation is relatively well maintained. Bilateral basal ganglia, thalami, and insular ribbons are well defined. ORBITS: The visualized portion of the orbits demonstrate no acute abnormality. SINUSES: The visualized paranasal sinuses and mastoid air cells demonstrate no acute abnormality. SOFT TISSUES/SKULL:  No acute abnormality of the visualized skull or soft tissues. 1. No clear evidence for acute intracranial hemorrhage or midline shift. 2. Atherosclerotic calcification of the vasculature.      Xr Shoulder Left (min 2 Views)    Result Date: 11/8/2017  EXAMINATION: 3 VIEWS OF THE LEFT SHOULDER 11/8/2017 1:52 pm COMPARISON: None. HISTORY: ORDERING SYSTEM PROVIDED HISTORY: Syncope and fall. humeral head tenderness FINDINGS: Glenohumeral joint is normally aligned. No evidence of acute fracture or dislocation. No abnormal periarticular calcifications. The [facility] joint shows no significant abnormality. Negative left shoulder with no acute abnormality. Xr Chest Portable    Result Date: 11/8/2017  EXAMINATION: SINGLE VIEW OF THE CHEST 11/8/2017 1:52 pm COMPARISON: May 2, 2016 HISTORY: ORDERING SYSTEM PROVIDED HISTORY: Syncope FINDINGS: Stable cardiomegaly. Implantable left-sided cardiac device remains in place with stable intact leads. No consolidation, effusion or pneumothorax. Osseous structures appear intact without acute process. Stable cardiomegaly. No focal airspace disease. Physical Exam:    General appearance - NAD, AOx 3   Lungs -CTAB, no R/R/R  Heart - RRR, no M/R/G  Abdomen - Soft, NT/ND  Neurological:  MAEx4, No focal motor deficit, sensory loss  Extremities - Cap refil <2 sec in all ext., no edema  Skin -warm, dry      Hospital Course:  Clinical course has improved, labs and imaging reviewed. Amina Stack originally presented to the hospital on 11/8/2017 12:41 PM with      Acute syncopal episode lasting several seconds while sitting in a chair, with associated episode of urinary incontinence, tongue biting  -likely situational secondary to drug exposure (patient had been drinking and had smoked marijuana)  -cleared by both cardiology and neurology  -to follow up with cardiology and PCP      Acute 8/10 left sided aching headache and aching L shoulder pain secondary to falling and hitting her side due to syncopal episode; treated with analgesics with improvement  -follow up with PCP. Labs and imaging were followed daily. Imaging results as above. She is medically stable to be discharged.        Disposition: Home    Patient stated that they will

## 2017-11-13 ENCOUNTER — CARE COORDINATION (OUTPATIENT)
Dept: INTERNAL MEDICINE | Age: 52
End: 2017-11-13

## 2017-11-13 VITALS — SYSTOLIC BLOOD PRESSURE: 97 MMHG | DIASTOLIC BLOOD PRESSURE: 60 MMHG | OXYGEN SATURATION: 97 % | HEART RATE: 70 BPM

## 2017-11-13 NOTE — CARE COORDINATION
vision. She verbalized understanding. Home care services initiated: ordered. Home care to initiate services this week. Services provided: CHF Nurse, Gerard Bland      Does that patient have equipment needs? No   Does the patient have the appropriate equipment? Yes   Can the patient use the equipment properly and safely? Yes    Reinforce Follow-Up  - Does patient have an appointment with her PCP? no - to be scheduled in next week  - Does patient have an appointment with her specialist physicians? No      Care Coordination  Is patient assigned ambulatory care coordinator for chronic condition management? N/A          Follow up appointment date: (7 days for more severe illness, 14 days for others)  No future appointments. Other Interventions or Actions taken as result of  Assessment  - Alert, oriented x 3, engaged and cooperative. Speech clear, articulate. She denies nausea, dizziness. Lungs clear without wheezing or cough. No edema. She had not completed testing ordered prior to hospitalization for CT scan of abdomen. Advised to reschedule testing. She had not contacted PCP for follow up appointment and will schedule tomorrow. Follow up visit in 5-7 days.         Anais Charles RN   [place]

## 2017-11-20 ENCOUNTER — OFFICE VISIT (OUTPATIENT)
Dept: INTERNAL MEDICINE | Age: 52
End: 2017-11-20
Payer: MEDICAID

## 2017-11-20 VITALS
DIASTOLIC BLOOD PRESSURE: 60 MMHG | SYSTOLIC BLOOD PRESSURE: 100 MMHG | HEIGHT: 62 IN | BODY MASS INDEX: 26.31 KG/M2 | HEART RATE: 76 BPM | WEIGHT: 143 LBS

## 2017-11-20 DIAGNOSIS — I50.22 CHRONIC SYSTOLIC HEART FAILURE (HCC): ICD-10-CM

## 2017-11-20 DIAGNOSIS — R92.8 ABNORMAL MAMMOGRAM: ICD-10-CM

## 2017-11-20 DIAGNOSIS — R16.0 LIVER MASS: Primary | ICD-10-CM

## 2017-11-20 DIAGNOSIS — F17.200 SMOKER: ICD-10-CM

## 2017-11-20 PROCEDURE — 99214 OFFICE O/P EST MOD 30 MIN: CPT | Performed by: INTERNAL MEDICINE

## 2017-11-20 PROCEDURE — 3014F SCREEN MAMMO DOC REV: CPT | Performed by: INTERNAL MEDICINE

## 2017-11-20 PROCEDURE — G8427 DOCREV CUR MEDS BY ELIG CLIN: HCPCS | Performed by: INTERNAL MEDICINE

## 2017-11-20 PROCEDURE — G8417 CALC BMI ABV UP PARAM F/U: HCPCS | Performed by: INTERNAL MEDICINE

## 2017-11-20 PROCEDURE — G8484 FLU IMMUNIZE NO ADMIN: HCPCS | Performed by: INTERNAL MEDICINE

## 2017-11-20 PROCEDURE — 4004F PT TOBACCO SCREEN RCVD TLK: CPT | Performed by: INTERNAL MEDICINE

## 2017-11-20 PROCEDURE — 3017F COLORECTAL CA SCREEN DOC REV: CPT | Performed by: INTERNAL MEDICINE

## 2017-11-20 RX ORDER — METRONIDAZOLE 7.5 MG/G
GEL TOPICAL
Qty: 2 TUBE | Refills: 1 | Status: SHIPPED | OUTPATIENT
Start: 2017-11-20 | End: 2018-11-26

## 2017-11-20 RX ORDER — SERTRALINE HYDROCHLORIDE 25 MG/1
25 TABLET, FILM COATED ORAL DAILY
Qty: 30 TABLET | Refills: 3 | Status: ON HOLD | OUTPATIENT
Start: 2017-11-20 | End: 2018-11-25 | Stop reason: HOSPADM

## 2017-11-20 RX ORDER — CYCLOBENZAPRINE HCL 5 MG
5 TABLET ORAL NIGHTLY PRN
Qty: 30 TABLET | Refills: 0 | Status: SHIPPED | OUTPATIENT
Start: 2017-11-20 | End: 2017-11-30

## 2017-11-20 RX ORDER — FAMOTIDINE 20 MG/1
20 TABLET, FILM COATED ORAL 2 TIMES DAILY
COMMUNITY
End: 2018-02-19 | Stop reason: ALTCHOICE

## 2017-11-28 ENCOUNTER — TELEPHONE (OUTPATIENT)
Dept: FAMILY MEDICINE CLINIC | Age: 52
End: 2017-11-28

## 2018-01-19 ENCOUNTER — TELEPHONE (OUTPATIENT)
Dept: FAMILY MEDICINE CLINIC | Age: 53
End: 2018-01-19

## 2018-02-07 ENCOUNTER — CARE COORDINATION (OUTPATIENT)
Dept: CARE COORDINATION | Age: 53
End: 2018-02-07

## 2018-02-07 VITALS
OXYGEN SATURATION: 98 % | SYSTOLIC BLOOD PRESSURE: 132 MMHG | HEART RATE: 70 BPM | DIASTOLIC BLOOD PRESSURE: 72 MMHG | RESPIRATION RATE: 18 BRPM

## 2018-02-12 RX ORDER — FUROSEMIDE 40 MG/1
TABLET ORAL
Qty: 60 TABLET | Refills: 0 | Status: SHIPPED | OUTPATIENT
Start: 2018-02-12 | End: 2018-03-13 | Stop reason: SDUPTHER

## 2018-02-12 RX ORDER — CARVEDILOL 3.12 MG/1
TABLET ORAL
Qty: 60 TABLET | Refills: 0 | Status: SHIPPED | OUTPATIENT
Start: 2018-02-12 | End: 2018-03-13 | Stop reason: SDUPTHER

## 2018-02-12 RX ORDER — SPIRONOLACTONE 25 MG/1
TABLET ORAL
Qty: 28 TABLET | Refills: 3 | Status: SHIPPED | OUTPATIENT
Start: 2018-02-12 | End: 2018-05-17

## 2018-02-19 ENCOUNTER — OFFICE VISIT (OUTPATIENT)
Dept: INTERNAL MEDICINE | Age: 53
End: 2018-02-19
Payer: MEDICAID

## 2018-02-19 VITALS
OXYGEN SATURATION: 98 % | WEIGHT: 155 LBS | HEART RATE: 93 BPM | BODY MASS INDEX: 28.52 KG/M2 | HEIGHT: 62 IN | SYSTOLIC BLOOD PRESSURE: 143 MMHG | DIASTOLIC BLOOD PRESSURE: 100 MMHG

## 2018-02-19 DIAGNOSIS — J45.41 MODERATE PERSISTENT ASTHMA WITH EXACERBATION: ICD-10-CM

## 2018-02-19 DIAGNOSIS — R92.8 ABNORMAL MAMMOGRAM OF RIGHT BREAST: Primary | ICD-10-CM

## 2018-02-19 DIAGNOSIS — R73.03 PREDIABETES: ICD-10-CM

## 2018-02-19 DIAGNOSIS — N92.1 MENORRHAGIA WITH IRREGULAR CYCLE: ICD-10-CM

## 2018-02-19 PROCEDURE — 3017F COLORECTAL CA SCREEN DOC REV: CPT | Performed by: INTERNAL MEDICINE

## 2018-02-19 PROCEDURE — 3014F SCREEN MAMMO DOC REV: CPT | Performed by: INTERNAL MEDICINE

## 2018-02-19 PROCEDURE — G8427 DOCREV CUR MEDS BY ELIG CLIN: HCPCS | Performed by: INTERNAL MEDICINE

## 2018-02-19 PROCEDURE — G8417 CALC BMI ABV UP PARAM F/U: HCPCS | Performed by: INTERNAL MEDICINE

## 2018-02-19 PROCEDURE — 4004F PT TOBACCO SCREEN RCVD TLK: CPT | Performed by: INTERNAL MEDICINE

## 2018-02-19 PROCEDURE — G8484 FLU IMMUNIZE NO ADMIN: HCPCS | Performed by: INTERNAL MEDICINE

## 2018-02-19 PROCEDURE — 99214 OFFICE O/P EST MOD 30 MIN: CPT | Performed by: INTERNAL MEDICINE

## 2018-02-19 RX ORDER — GUAIFENESIN/DEXTROMETHORPHAN 100-10MG/5
5 SYRUP ORAL 3 TIMES DAILY PRN
Qty: 120 ML | Refills: 0 | Status: SHIPPED | OUTPATIENT
Start: 2018-02-19 | End: 2018-03-01

## 2018-02-19 RX ORDER — PREDNISONE 20 MG/1
40 TABLET ORAL DAILY
Qty: 10 TABLET | Refills: 0 | Status: SHIPPED | OUTPATIENT
Start: 2018-02-19 | End: 2018-02-24

## 2018-02-19 RX ORDER — FLUTICASONE PROPIONATE 50 MCG
1 SPRAY, SUSPENSION (ML) NASAL DAILY
Qty: 1 BOTTLE | Refills: 3 | Status: SHIPPED | OUTPATIENT
Start: 2018-02-19 | End: 2018-04-25 | Stop reason: SDUPTHER

## 2018-02-19 RX ORDER — AZITHROMYCIN 250 MG/1
TABLET, FILM COATED ORAL
Qty: 1 PACKET | Refills: 0 | Status: SHIPPED | OUTPATIENT
Start: 2018-02-19 | End: 2018-03-01

## 2018-02-19 NOTE — PATIENT INSTRUCTIONS
[facility] location of your choice    You will be called by the Scheduling Department to schedule your testing. If you do not hear from them within a week, please call them at [phone] to schedule the appointment. On the day of your appointment, please report to the Admitting Department, located on the main floor of the medical center behind the information desk. If you cannot keep this appointment, please call [phone] to cancel and reschedule your appointment.

## 2018-02-20 NOTE — PROGRESS NOTES
nickie. Assessment:     1. Abnormal mammogram of right breast    2. Menorrhagia with irregular cycle    3. Prediabetes    4. Moderate persistent asthma with exacerbation    5. CHF  Plan:   1. Will order diagnostic mammogram and US right breast  2. GYN referral  3. Lifestyle modifications. Check A1c, lipids,  LFT, CBC  4. Azithromycin, prednisone, guaifenesin DM. Continue Flonase and Claritin. Continue current inhalers  Pt has been advised to call or go to the ER if symptoms persist, worsen or new symptoms appear. 5. Continue Coreg, lisinopril, aldactone. Continue follow up at CHF clinic  6. PAP- neg-12/15 HIV-neg pneumovax-05/16  FOBT-neg-03/17  Refused flu shot  7. Return in about 6 weeks (around 4/2/2018).

## 2018-02-26 RX ORDER — MULTIVITAMIN WITH FOLIC ACID 400 MCG
TABLET ORAL
Qty: 30 TABLET | Refills: 3 | Status: ON HOLD | OUTPATIENT
Start: 2018-02-26 | End: 2018-06-18 | Stop reason: SDUPTHER

## 2018-03-07 ENCOUNTER — TELEPHONE (OUTPATIENT)
Dept: INTERNAL MEDICINE | Age: 53
End: 2018-03-07

## 2018-03-13 NOTE — TELEPHONE ENCOUNTER
E-scribe request for Carvedilol 3.125 MG & Furosemide 40 MG. Please review and e-scribe if applicable.      Health Maintenance   Topic Date Due    DTaP/Tdap/Td vaccine (1 - Tdap) 06/13/1984    Shingles Vaccine (1 of 2 - 2 Dose Series) 06/13/2015    Flu vaccine (1) 09/01/2017    A1C test (Diabetic or Prediabetic)  02/03/2018    Colon Cancer Screen FIT/FOBT  03/17/2018    Potassium monitoring  11/08/2018    Creatinine monitoring  11/08/2018    Cervical cancer screen  12/28/2018    Breast cancer screen  02/03/2019    Lipid screen  10/04/2020    Pneumococcal med risk  Completed    Hepatitis C screen  Completed    HIV screen  Completed             (applicable per patient's age: Cancer Screenings, Depression Screening, Fall Risk Screening, Immunizations)    Hemoglobin A1C (%)   Date Value   02/03/2017 5.9   01/12/2017 6.0     LDL Cholesterol (mg/dL)   Date Value   10/04/2015 85     AST (U/L)   Date Value   05/02/2016 41 (H)     ALT (U/L)   Date Value   05/02/2016 33     BUN (mg/dL)   Date Value   11/08/2017 15      (goal A1C is < 7)   (goal LDL is <100) need 30-50% reduction from baseline     BP Readings from Last 3 Encounters:   02/19/18 (!) 143/100   02/07/18 132/72   11/20/17 100/60    (goal /80)      All Future Testing planned in [facility]:  Lab Frequency Next Occurrence   ALYX DIGITAL DIAGNOSTIC RIGHT Once 08/15/2018   US LIVER Once 04/07/2018   Hemoglobin A1C Once 05/27/2018   US BREAST COMPLETE RIGHT Once 05/19/2018   ALYX DIGITAL DIAGNOSTIC W OR WO CAD RIGHT Once 05/19/2018   Hepatic Function Panel Once 05/19/2018   Lipid Panel Once 05/19/2018   CBC With Auto Differential Once 05/19/2018       Next Visit Date:  Future Appointments  Date Time Provider Yanelis Alfaro   3/14/2018 10:45 AM Maria L Farris MD [facility] OB/Gyn [id number]   4/3/2018 10:00 AM Caio Meza MD [facility] IM [address]            Patient Active Problem List:     Alcohol abuse     Paroxysmal atrial fibrillation ([address]     Breast mass,

## 2018-03-14 RX ORDER — FUROSEMIDE 40 MG/1
TABLET ORAL
Qty: 60 TABLET | Refills: 0 | Status: SHIPPED | OUTPATIENT
Start: 2018-03-14 | End: 2018-03-26 | Stop reason: SDUPTHER

## 2018-03-14 RX ORDER — CARVEDILOL 3.12 MG/1
TABLET ORAL
Qty: 60 TABLET | Refills: 0 | Status: SHIPPED | OUTPATIENT
Start: 2018-03-14 | End: 2018-03-26 | Stop reason: SDUPTHER

## 2018-03-26 RX ORDER — FUROSEMIDE 40 MG/1
TABLET ORAL
Qty: 60 TABLET | Refills: 0 | Status: SHIPPED | OUTPATIENT
Start: 2018-03-26 | End: 2018-05-17

## 2018-03-26 RX ORDER — OMEPRAZOLE 20 MG/1
CAPSULE, DELAYED RELEASE ORAL
Qty: 30 CAPSULE | Refills: 3 | Status: ON HOLD | OUTPATIENT
Start: 2018-03-26 | End: 2018-06-18 | Stop reason: HOSPADM

## 2018-03-26 RX ORDER — CARVEDILOL 3.12 MG/1
TABLET ORAL
Qty: 60 TABLET | Refills: 0 | Status: SHIPPED | OUTPATIENT
Start: 2018-03-26 | End: 2018-05-17

## 2018-03-26 RX ORDER — UMECLIDINIUM 62.5 UG/1
AEROSOL, POWDER ORAL
Qty: 1 EACH | Refills: 3 | Status: ON HOLD | OUTPATIENT
Start: 2018-03-26 | End: 2018-11-25

## 2018-04-23 ENCOUNTER — APPOINTMENT (OUTPATIENT)
Dept: GENERAL RADIOLOGY | Age: 53
End: 2018-04-23
Payer: MEDICAID

## 2018-04-23 ENCOUNTER — HOSPITAL ENCOUNTER (OUTPATIENT)
Age: 53
Setting detail: OBSERVATION
Discharge: LEFT BEFORE DISCHARGE | End: 2018-04-24
Attending: EMERGENCY MEDICINE | Admitting: EMERGENCY MEDICINE
Payer: MEDICAID

## 2018-04-23 DIAGNOSIS — R07.9 CHEST PAIN, UNSPECIFIED TYPE: Primary | ICD-10-CM

## 2018-04-23 DIAGNOSIS — B02.9 HERPES ZOSTER WITHOUT COMPLICATION: ICD-10-CM

## 2018-04-23 LAB
ABSOLUTE EOS #: 0.07 K/UL (ref 0–0.44)
ABSOLUTE IMMATURE GRANULOCYTE: <0.03 K/UL (ref 0–0.3)
ABSOLUTE LYMPH #: 2.21 K/UL (ref 1.1–3.7)
ABSOLUTE MONO #: 0.58 K/UL (ref 0.1–1.2)
ANION GAP SERPL CALCULATED.3IONS-SCNC: 20 MMOL/L (ref 9–17)
BASOPHILS # BLD: 1 % (ref 0–2)
BASOPHILS ABSOLUTE: 0.04 K/UL (ref 0–0.2)
BNP INTERPRETATION: ABNORMAL
BUN BLDV-MCNC: 12 MG/DL (ref 6–20)
BUN/CREAT BLD: ABNORMAL (ref 9–20)
CALCIUM SERPL-MCNC: 9.3 MG/DL (ref 8.6–10.4)
CHLORIDE BLD-SCNC: 99 MMOL/L (ref 98–107)
CO2: 19 MMOL/L (ref 20–31)
CREAT SERPL-MCNC: 0.74 MG/DL (ref 0.5–0.9)
DIFFERENTIAL TYPE: ABNORMAL
EOSINOPHILS RELATIVE PERCENT: 1 % (ref 1–4)
GFR AFRICAN AMERICAN: >60 ML/MIN
GFR NON-AFRICAN AMERICAN: >60 ML/MIN
GFR SERPL CREATININE-BSD FRML MDRD: ABNORMAL ML/MIN/{1.73_M2}
GFR SERPL CREATININE-BSD FRML MDRD: ABNORMAL ML/MIN/{1.73_M2}
GLUCOSE BLD-MCNC: 87 MG/DL (ref 70–99)
HCT VFR BLD CALC: 42.8 % (ref 36.3–47.1)
HEMOGLOBIN: 14.2 G/DL (ref 11.9–15.1)
IMMATURE GRANULOCYTES: 0 %
LYMPHOCYTES # BLD: 38 % (ref 24–43)
MCH RBC QN AUTO: 29 PG (ref 25.2–33.5)
MCHC RBC AUTO-ENTMCNC: 33.2 G/DL (ref 28.4–34.8)
MCV RBC AUTO: 87.3 FL (ref 82.6–102.9)
MONOCYTES # BLD: 10 % (ref 3–12)
NRBC AUTOMATED: 0 PER 100 WBC
PDW BLD-RTO: 14.8 % (ref 11.8–14.4)
PLATELET # BLD: ABNORMAL K/UL (ref 138–453)
PLATELET ESTIMATE: ABNORMAL
PLATELET, FLUORESCENCE: 147 K/UL (ref 138–453)
PLATELET, IMMATURE FRACTION: 12.7 % (ref 1.1–10.3)
PMV BLD AUTO: ABNORMAL FL (ref 8.1–13.5)
POC TROPONIN I: 0.01 NG/ML (ref 0–0.1)
POC TROPONIN INTERP: NORMAL
POTASSIUM SERPL-SCNC: 3.7 MMOL/L (ref 3.7–5.3)
PRO-BNP: 3303 PG/ML
RBC # BLD: 4.9 M/UL (ref 3.95–5.11)
RBC # BLD: ABNORMAL 10*6/UL
SEG NEUTROPHILS: 50 % (ref 36–65)
SEGMENTED NEUTROPHILS ABSOLUTE COUNT: 2.94 K/UL (ref 1.5–8.1)
SODIUM BLD-SCNC: 138 MMOL/L (ref 135–144)
TROPONIN INTERP: NORMAL
TROPONIN T: <0.03 NG/ML
WBC # BLD: 5.9 K/UL (ref 3.5–11.3)
WBC # BLD: ABNORMAL 10*3/UL

## 2018-04-23 PROCEDURE — 6370000000 HC RX 637 (ALT 250 FOR IP): Performed by: EMERGENCY MEDICINE

## 2018-04-23 PROCEDURE — 85025 COMPLETE CBC W/AUTO DIFF WBC: CPT

## 2018-04-23 PROCEDURE — G0378 HOSPITAL OBSERVATION PER HR: HCPCS

## 2018-04-23 PROCEDURE — 80048 BASIC METABOLIC PNL TOTAL CA: CPT

## 2018-04-23 PROCEDURE — 83880 ASSAY OF NATRIURETIC PEPTIDE: CPT

## 2018-04-23 PROCEDURE — 99285 EMERGENCY DEPT VISIT HI MDM: CPT

## 2018-04-23 PROCEDURE — 93005 ELECTROCARDIOGRAM TRACING: CPT

## 2018-04-23 PROCEDURE — 84484 ASSAY OF TROPONIN QUANT: CPT

## 2018-04-23 PROCEDURE — 71046 X-RAY EXAM CHEST 2 VIEWS: CPT

## 2018-04-23 PROCEDURE — 85055 RETICULATED PLATELET ASSAY: CPT

## 2018-04-23 RX ORDER — ACETAMINOPHEN 325 MG/1
650 TABLET ORAL EVERY 4 HOURS PRN
Status: DISCONTINUED | OUTPATIENT
Start: 2018-04-23 | End: 2018-04-24 | Stop reason: HOSPADM

## 2018-04-23 RX ORDER — SERTRALINE HYDROCHLORIDE 25 MG/1
25 TABLET, FILM COATED ORAL DAILY
Status: DISCONTINUED | OUTPATIENT
Start: 2018-04-24 | End: 2018-04-24

## 2018-04-23 RX ORDER — ALBUTEROL SULFATE 90 UG/1
1 AEROSOL, METERED RESPIRATORY (INHALATION) EVERY 6 HOURS PRN
Status: DISCONTINUED | OUTPATIENT
Start: 2018-04-23 | End: 2018-04-24 | Stop reason: HOSPADM

## 2018-04-23 RX ORDER — ONDANSETRON 4 MG/1
4 TABLET, ORALLY DISINTEGRATING ORAL EVERY 8 HOURS PRN
Status: DISCONTINUED | OUTPATIENT
Start: 2018-04-23 | End: 2018-04-24 | Stop reason: HOSPADM

## 2018-04-23 RX ORDER — LISINOPRIL 10 MG/1
5 TABLET ORAL DAILY
Status: DISCONTINUED | OUTPATIENT
Start: 2018-04-24 | End: 2018-04-24 | Stop reason: HOSPADM

## 2018-04-23 RX ORDER — SODIUM CHLORIDE 0.9 % (FLUSH) 0.9 %
10 SYRINGE (ML) INJECTION EVERY 12 HOURS SCHEDULED
Status: DISCONTINUED | OUTPATIENT
Start: 2018-04-24 | End: 2018-04-24 | Stop reason: HOSPADM

## 2018-04-23 RX ORDER — ACYCLOVIR 200 MG/1
800 CAPSULE ORAL ONCE
Status: COMPLETED | OUTPATIENT
Start: 2018-04-23 | End: 2018-04-24

## 2018-04-23 RX ORDER — ACYCLOVIR 200 MG/1
800 CAPSULE ORAL
Status: DISCONTINUED | OUTPATIENT
Start: 2018-04-24 | End: 2018-04-24 | Stop reason: HOSPADM

## 2018-04-23 RX ORDER — IBUPROFEN 400 MG/1
400 TABLET ORAL EVERY 6 HOURS PRN
Status: DISCONTINUED | OUTPATIENT
Start: 2018-04-23 | End: 2018-04-24 | Stop reason: HOSPADM

## 2018-04-23 RX ORDER — SODIUM CHLORIDE 0.9 % (FLUSH) 0.9 %
10 SYRINGE (ML) INJECTION PRN
Status: DISCONTINUED | OUTPATIENT
Start: 2018-04-23 | End: 2018-04-24 | Stop reason: HOSPADM

## 2018-04-23 RX ORDER — CARVEDILOL 3.12 MG/1
3.12 TABLET ORAL 2 TIMES DAILY WITH MEALS
Status: DISCONTINUED | OUTPATIENT
Start: 2018-04-24 | End: 2018-04-24 | Stop reason: HOSPADM

## 2018-04-23 RX ORDER — HYDROCODONE BITARTRATE AND ACETAMINOPHEN 5; 325 MG/1; MG/1
1 TABLET ORAL ONCE
Status: COMPLETED | OUTPATIENT
Start: 2018-04-23 | End: 2018-04-23

## 2018-04-23 RX ORDER — ASPIRIN 81 MG/1
324 TABLET, CHEWABLE ORAL ONCE
Status: COMPLETED | OUTPATIENT
Start: 2018-04-23 | End: 2018-04-23

## 2018-04-23 RX ADMIN — HYDROCODONE BITARTRATE AND ACETAMINOPHEN 1 TABLET: 5; 325 TABLET ORAL at 22:47

## 2018-04-23 RX ADMIN — ASPIRIN 81 MG 324 MG: 81 TABLET ORAL at 21:30

## 2018-04-23 ASSESSMENT — HEART SCORE: ECG: 0

## 2018-04-23 ASSESSMENT — PAIN SCALES - GENERAL
PAINLEVEL_OUTOF10: 7
PAINLEVEL_OUTOF10: 8

## 2018-04-23 ASSESSMENT — PAIN DESCRIPTION - PAIN TYPE: TYPE: ACUTE PAIN

## 2018-04-23 ASSESSMENT — PAIN DESCRIPTION - LOCATION: LOCATION: NECK;ARM;CHEST

## 2018-04-23 ASSESSMENT — PAIN DESCRIPTION - ORIENTATION: ORIENTATION: LEFT

## 2018-04-24 VITALS
TEMPERATURE: 98.1 F | HEART RATE: 81 BPM | WEIGHT: 150 LBS | HEIGHT: 62 IN | OXYGEN SATURATION: 94 % | DIASTOLIC BLOOD PRESSURE: 93 MMHG | RESPIRATION RATE: 20 BRPM | BODY MASS INDEX: 27.6 KG/M2 | SYSTOLIC BLOOD PRESSURE: 131 MMHG

## 2018-04-24 LAB
EKG ATRIAL RATE: 103 BPM
EKG P AXIS: 63 DEGREES
EKG P-R INTERVAL: 124 MS
EKG Q-T INTERVAL: 400 MS
EKG QRS DURATION: 124 MS
EKG QTC CALCULATION (BAZETT): 524 MS
EKG R AXIS: -83 DEGREES
EKG T AXIS: 73 DEGREES
EKG VENTRICULAR RATE: 103 BPM
LV EF: 13 %
LVEF MODALITY: NORMAL
TROPONIN INTERP: NORMAL
TROPONIN T: <0.03 NG/ML

## 2018-04-24 PROCEDURE — 6360000002 HC RX W HCPCS: Performed by: EMERGENCY MEDICINE

## 2018-04-24 PROCEDURE — 6370000000 HC RX 637 (ALT 250 FOR IP): Performed by: EMERGENCY MEDICINE

## 2018-04-24 PROCEDURE — 93306 TTE W/DOPPLER COMPLETE: CPT

## 2018-04-24 PROCEDURE — 93005 ELECTROCARDIOGRAM TRACING: CPT

## 2018-04-24 PROCEDURE — 6370000000 HC RX 637 (ALT 250 FOR IP): Performed by: INTERNAL MEDICINE

## 2018-04-24 PROCEDURE — G0378 HOSPITAL OBSERVATION PER HR: HCPCS

## 2018-04-24 PROCEDURE — 84484 ASSAY OF TROPONIN QUANT: CPT

## 2018-04-24 PROCEDURE — 6370000000 HC RX 637 (ALT 250 FOR IP): Performed by: STUDENT IN AN ORGANIZED HEALTH CARE EDUCATION/TRAINING PROGRAM

## 2018-04-24 RX ORDER — CARVEDILOL 3.12 MG/1
TABLET ORAL
Qty: 60 TABLET | Refills: 0 | Status: SHIPPED | OUTPATIENT
Start: 2018-04-24 | End: 2018-04-25 | Stop reason: SDUPTHER

## 2018-04-24 RX ORDER — SPIRONOLACTONE 25 MG/1
25 TABLET ORAL DAILY
Status: DISCONTINUED | OUTPATIENT
Start: 2018-04-24 | End: 2018-04-24 | Stop reason: HOSPADM

## 2018-04-24 RX ORDER — SERTRALINE HYDROCHLORIDE 25 MG/1
25 TABLET, FILM COATED ORAL 2 TIMES DAILY
Status: DISCONTINUED | OUTPATIENT
Start: 2018-04-24 | End: 2018-04-24 | Stop reason: HOSPADM

## 2018-04-24 RX ORDER — FUROSEMIDE 40 MG/1
TABLET ORAL
Qty: 60 TABLET | Refills: 0 | Status: SHIPPED | OUTPATIENT
Start: 2018-04-24 | End: 2018-04-25 | Stop reason: SDUPTHER

## 2018-04-24 RX ORDER — ACYCLOVIR 200 MG/1
800 CAPSULE ORAL
Qty: 35 CAPSULE | Refills: 0 | Status: SHIPPED | OUTPATIENT
Start: 2018-04-24 | End: 2018-05-01

## 2018-04-24 RX ORDER — FUROSEMIDE 40 MG/1
40 TABLET ORAL 2 TIMES DAILY
Status: DISCONTINUED | OUTPATIENT
Start: 2018-04-24 | End: 2018-04-24 | Stop reason: HOSPADM

## 2018-04-24 RX ORDER — OXYCODONE HYDROCHLORIDE 5 MG/1
5 TABLET ORAL
Status: DISCONTINUED | OUTPATIENT
Start: 2018-04-24 | End: 2018-04-24 | Stop reason: HOSPADM

## 2018-04-24 RX ORDER — DIPHENHYDRAMINE HCL 25 MG
25 TABLET ORAL EVERY 6 HOURS PRN
Status: DISCONTINUED | OUTPATIENT
Start: 2018-04-24 | End: 2018-04-24 | Stop reason: HOSPADM

## 2018-04-24 RX ADMIN — SERTRALINE 25 MG: 25 TABLET, FILM COATED ORAL at 01:14

## 2018-04-24 RX ADMIN — DIPHENHYDRAMINE HCL 25 MG: 25 TABLET ORAL at 16:18

## 2018-04-24 RX ADMIN — ACYCLOVIR 800 MG: 200 CAPSULE ORAL at 16:17

## 2018-04-24 RX ADMIN — ACYCLOVIR 800 MG: 200 CAPSULE ORAL at 07:19

## 2018-04-24 RX ADMIN — OXYCODONE HYDROCHLORIDE 5 MG: 5 TABLET ORAL at 08:33

## 2018-04-24 RX ADMIN — LISINOPRIL 5 MG: 10 TABLET ORAL at 12:03

## 2018-04-24 RX ADMIN — ONDANSETRON 4 MG: 4 TABLET, ORALLY DISINTEGRATING ORAL at 08:33

## 2018-04-24 RX ADMIN — SPIRONOLACTONE 25 MG: 25 TABLET ORAL at 12:03

## 2018-04-24 RX ADMIN — ACYCLOVIR 800 MG: 200 CAPSULE ORAL at 12:02

## 2018-04-24 RX ADMIN — SERTRALINE 25 MG: 25 TABLET, FILM COATED ORAL at 12:03

## 2018-04-24 RX ADMIN — OXYCODONE HYDROCHLORIDE 5 MG: 5 TABLET ORAL at 00:24

## 2018-04-24 RX ADMIN — FUROSEMIDE 40 MG: 40 TABLET ORAL at 12:03

## 2018-04-24 RX ADMIN — ACYCLOVIR 800 MG: 200 CAPSULE ORAL at 00:24

## 2018-04-24 ASSESSMENT — PAIN SCALES - GENERAL
PAINLEVEL_OUTOF10: 8
PAINLEVEL_OUTOF10: 7

## 2018-04-25 ENCOUNTER — CARE COORDINATION (OUTPATIENT)
Dept: CASE MANAGEMENT | Age: 53
End: 2018-04-25

## 2018-04-25 ENCOUNTER — CARE COORDINATION (OUTPATIENT)
Dept: CARE COORDINATION | Age: 53
End: 2018-04-25

## 2018-04-25 DIAGNOSIS — R07.9 CHEST PAIN, UNSPECIFIED TYPE: Primary | ICD-10-CM

## 2018-04-25 LAB
EKG ATRIAL RATE: 70 BPM
EKG P AXIS: 50 DEGREES
EKG P-R INTERVAL: 128 MS
EKG Q-T INTERVAL: 498 MS
EKG QRS DURATION: 136 MS
EKG QTC CALCULATION (BAZETT): 537 MS
EKG R AXIS: -92 DEGREES
EKG T AXIS: 74 DEGREES
EKG VENTRICULAR RATE: 70 BPM

## 2018-04-25 PROCEDURE — 1111F DSCHRG MED/CURRENT MED MERGE: CPT | Performed by: INTERNAL MEDICINE

## 2018-05-03 ENCOUNTER — TELEPHONE (OUTPATIENT)
Dept: OBGYN | Age: 53
End: 2018-05-03

## 2018-05-07 ENCOUNTER — CARE COORDINATION (OUTPATIENT)
Dept: CARE COORDINATION | Age: 53
End: 2018-05-07

## 2018-05-17 RX ORDER — SPIRONOLACTONE 25 MG/1
TABLET ORAL
Qty: 28 TABLET | Refills: 3 | Status: SHIPPED | OUTPATIENT
Start: 2018-05-17 | End: 2018-07-16 | Stop reason: SDUPTHER

## 2018-05-17 RX ORDER — CARVEDILOL 3.12 MG/1
TABLET ORAL
Qty: 60 TABLET | Refills: 0 | Status: SHIPPED | OUTPATIENT
Start: 2018-05-17 | End: 2018-06-19 | Stop reason: SDUPTHER

## 2018-05-17 RX ORDER — FUROSEMIDE 40 MG/1
TABLET ORAL
Qty: 60 TABLET | Refills: 0 | Status: ON HOLD | OUTPATIENT
Start: 2018-05-17 | End: 2018-06-18 | Stop reason: HOSPADM

## 2018-05-17 RX ORDER — LISINOPRIL 5 MG/1
TABLET ORAL
Qty: 30 TABLET | Refills: 3 | Status: ON HOLD | OUTPATIENT
Start: 2018-05-17 | End: 2018-06-18 | Stop reason: HOSPADM

## 2018-06-14 ENCOUNTER — CARE COORDINATION (OUTPATIENT)
Dept: CARE COORDINATION | Age: 53
End: 2018-06-14

## 2018-06-15 ENCOUNTER — APPOINTMENT (OUTPATIENT)
Dept: GENERAL RADIOLOGY | Age: 53
DRG: 194 | End: 2018-06-15
Payer: MEDICAID

## 2018-06-15 ENCOUNTER — HOSPITAL ENCOUNTER (INPATIENT)
Age: 53
LOS: 2 days | Discharge: HOME | DRG: 194 | End: 2018-06-18
Attending: EMERGENCY MEDICINE | Admitting: INTERNAL MEDICINE
Payer: MEDICAID

## 2018-06-15 ENCOUNTER — APPOINTMENT (OUTPATIENT)
Dept: CT IMAGING | Age: 53
DRG: 194 | End: 2018-06-15
Payer: MEDICAID

## 2018-06-15 ENCOUNTER — APPOINTMENT (OUTPATIENT)
Dept: ULTRASOUND IMAGING | Age: 53
DRG: 194 | End: 2018-06-15
Payer: MEDICAID

## 2018-06-15 ENCOUNTER — TELEPHONE (OUTPATIENT)
Dept: PHARMACY | Age: 53
End: 2018-06-15

## 2018-06-15 DIAGNOSIS — R07.9 CHEST PAIN, UNSPECIFIED TYPE: Primary | ICD-10-CM

## 2018-06-15 DIAGNOSIS — I50.22 CHRONIC SYSTOLIC HEART FAILURE (HCC): ICD-10-CM

## 2018-06-15 DIAGNOSIS — N28.89 RIGHT RENAL MASS: ICD-10-CM

## 2018-06-15 LAB
-: ABNORMAL
ABSOLUTE EOS #: 0.05 K/UL (ref 0–0.44)
ABSOLUTE IMMATURE GRANULOCYTE: <0.03 K/UL (ref 0–0.3)
ABSOLUTE LYMPH #: 2.22 K/UL (ref 1.1–3.7)
ABSOLUTE MONO #: 0.61 K/UL (ref 0.1–1.2)
ALBUMIN SERPL-MCNC: 4.4 G/DL (ref 3.5–5.2)
ALBUMIN/GLOBULIN RATIO: 1.2 (ref 1–2.5)
ALP BLD-CCNC: 111 U/L (ref 35–104)
ALT SERPL-CCNC: 23 U/L (ref 5–33)
AMORPHOUS: ABNORMAL
ANION GAP SERPL CALCULATED.3IONS-SCNC: 15 MMOL/L (ref 9–17)
AST SERPL-CCNC: 33 U/L
BACTERIA: ABNORMAL
BASOPHILS # BLD: 1 % (ref 0–2)
BASOPHILS ABSOLUTE: 0.03 K/UL (ref 0–0.2)
BILIRUB SERPL-MCNC: 1.67 MG/DL (ref 0.3–1.2)
BILIRUBIN URINE: NEGATIVE
BNP INTERPRETATION: ABNORMAL
BUN BLDV-MCNC: 14 MG/DL (ref 6–20)
BUN/CREAT BLD: ABNORMAL (ref 9–20)
CALCIUM SERPL-MCNC: 9.8 MG/DL (ref 8.6–10.4)
CASTS UA: ABNORMAL /LPF (ref 0–2)
CHLORIDE BLD-SCNC: 105 MMOL/L (ref 98–107)
CO2: 22 MMOL/L (ref 20–31)
COLOR: YELLOW
CREAT SERPL-MCNC: 0.92 MG/DL (ref 0.5–0.9)
CRYSTALS, UA: ABNORMAL /HPF
DIFFERENTIAL TYPE: ABNORMAL
EOSINOPHILS RELATIVE PERCENT: 1 % (ref 1–4)
EPITHELIAL CELLS UA: ABNORMAL /HPF (ref 0–5)
GFR AFRICAN AMERICAN: >60 ML/MIN
GFR NON-AFRICAN AMERICAN: >60 ML/MIN
GFR SERPL CREATININE-BSD FRML MDRD: ABNORMAL ML/MIN/{1.73_M2}
GFR SERPL CREATININE-BSD FRML MDRD: ABNORMAL ML/MIN/{1.73_M2}
GLUCOSE BLD-MCNC: 110 MG/DL (ref 70–99)
GLUCOSE URINE: NEGATIVE
HCG QUANTITATIVE: <1 IU/L
HCT VFR BLD CALC: 43 % (ref 36.3–47.1)
HEMOGLOBIN: 14 G/DL (ref 11.9–15.1)
IMMATURE GRANULOCYTES: 0 %
KETONES, URINE: NEGATIVE
LEUKOCYTE ESTERASE, URINE: ABNORMAL
LIPASE: 10 U/L (ref 13–60)
LYMPHOCYTES # BLD: 38 % (ref 24–43)
MCH RBC QN AUTO: 30 PG (ref 25.2–33.5)
MCHC RBC AUTO-ENTMCNC: 32.6 G/DL (ref 28.4–34.8)
MCV RBC AUTO: 92.1 FL (ref 82.6–102.9)
MONOCYTES # BLD: 10 % (ref 3–12)
MUCUS: ABNORMAL
NITRITE, URINE: NEGATIVE
NRBC AUTOMATED: 0 PER 100 WBC
OTHER OBSERVATIONS UA: ABNORMAL
PDW BLD-RTO: 15.4 % (ref 11.8–14.4)
PH UA: 6 (ref 5–8)
PLATELET # BLD: 159 K/UL (ref 138–453)
PLATELET ESTIMATE: ABNORMAL
PMV BLD AUTO: 13 FL (ref 8.1–13.5)
POC TROPONIN I: 0.01 NG/ML (ref 0–0.1)
POC TROPONIN INTERP: NORMAL
POTASSIUM SERPL-SCNC: 4.2 MMOL/L (ref 3.7–5.3)
PRO-BNP: 5969 PG/ML
PROTEIN UA: ABNORMAL
RBC # BLD: 4.67 M/UL (ref 3.95–5.11)
RBC # BLD: ABNORMAL 10*6/UL
RBC UA: ABNORMAL /HPF (ref 0–2)
RENAL EPITHELIAL, UA: ABNORMAL /HPF
SEG NEUTROPHILS: 50 % (ref 36–65)
SEGMENTED NEUTROPHILS ABSOLUTE COUNT: 2.97 K/UL (ref 1.5–8.1)
SODIUM BLD-SCNC: 142 MMOL/L (ref 135–144)
SPECIFIC GRAVITY UA: 1.01 (ref 1–1.03)
TOTAL PROTEIN: 8.1 G/DL (ref 6.4–8.3)
TRICHOMONAS: ABNORMAL
TROPONIN INTERP: NORMAL
TROPONIN INTERP: NORMAL
TROPONIN T: <0.03 NG/ML
TROPONIN T: <0.03 NG/ML
TURBIDITY: CLEAR
URINE HGB: ABNORMAL
UROBILINOGEN, URINE: NEGATIVE
WBC # BLD: 5.9 K/UL (ref 3.5–11.3)
WBC # BLD: ABNORMAL 10*3/UL
WBC UA: ABNORMAL /HPF (ref 0–5)
YEAST: ABNORMAL

## 2018-06-15 PROCEDURE — 84702 CHORIONIC GONADOTROPIN TEST: CPT

## 2018-06-15 PROCEDURE — 93005 ELECTROCARDIOGRAM TRACING: CPT

## 2018-06-15 PROCEDURE — 6370000000 HC RX 637 (ALT 250 FOR IP): Performed by: STUDENT IN AN ORGANIZED HEALTH CARE EDUCATION/TRAINING PROGRAM

## 2018-06-15 PROCEDURE — 71046 X-RAY EXAM CHEST 2 VIEWS: CPT

## 2018-06-15 PROCEDURE — 2580000003 HC RX 258: Performed by: EMERGENCY MEDICINE

## 2018-06-15 PROCEDURE — G0378 HOSPITAL OBSERVATION PER HR: HCPCS

## 2018-06-15 PROCEDURE — 80053 COMPREHEN METABOLIC PANEL: CPT

## 2018-06-15 PROCEDURE — 96374 THER/PROPH/DIAG INJ IV PUSH: CPT

## 2018-06-15 PROCEDURE — 74177 CT ABD & PELVIS W/CONTRAST: CPT

## 2018-06-15 PROCEDURE — 6370000000 HC RX 637 (ALT 250 FOR IP): Performed by: EMERGENCY MEDICINE

## 2018-06-15 PROCEDURE — 6360000002 HC RX W HCPCS: Performed by: EMERGENCY MEDICINE

## 2018-06-15 PROCEDURE — 84484 ASSAY OF TROPONIN QUANT: CPT

## 2018-06-15 PROCEDURE — 36415 COLL VENOUS BLD VENIPUNCTURE: CPT

## 2018-06-15 PROCEDURE — 76770 US EXAM ABDO BACK WALL COMP: CPT

## 2018-06-15 PROCEDURE — 83690 ASSAY OF LIPASE: CPT

## 2018-06-15 PROCEDURE — 6360000004 HC RX CONTRAST MEDICATION: Performed by: EMERGENCY MEDICINE

## 2018-06-15 PROCEDURE — 83880 ASSAY OF NATRIURETIC PEPTIDE: CPT

## 2018-06-15 PROCEDURE — 81001 URINALYSIS AUTO W/SCOPE: CPT

## 2018-06-15 PROCEDURE — 85025 COMPLETE CBC W/AUTO DIFF WBC: CPT

## 2018-06-15 PROCEDURE — 99285 EMERGENCY DEPT VISIT HI MDM: CPT

## 2018-06-15 RX ORDER — HYDROCODONE BITARTRATE AND ACETAMINOPHEN 5; 325 MG/1; MG/1
2 TABLET ORAL EVERY 4 HOURS PRN
Status: DISCONTINUED | OUTPATIENT
Start: 2018-06-15 | End: 2018-06-18 | Stop reason: HOSPADM

## 2018-06-15 RX ORDER — HYDROCODONE BITARTRATE AND ACETAMINOPHEN 5; 325 MG/1; MG/1
1 TABLET ORAL EVERY 4 HOURS PRN
Status: DISCONTINUED | OUTPATIENT
Start: 2018-06-15 | End: 2018-06-18 | Stop reason: HOSPADM

## 2018-06-15 RX ORDER — SERTRALINE HYDROCHLORIDE 25 MG/1
25 TABLET, FILM COATED ORAL DAILY
Status: DISCONTINUED | OUTPATIENT
Start: 2018-06-15 | End: 2018-06-18 | Stop reason: HOSPADM

## 2018-06-15 RX ORDER — SODIUM CHLORIDE 0.9 % (FLUSH) 0.9 %
10 SYRINGE (ML) INJECTION EVERY 12 HOURS SCHEDULED
Status: DISCONTINUED | OUTPATIENT
Start: 2018-06-15 | End: 2018-06-18 | Stop reason: HOSPADM

## 2018-06-15 RX ORDER — MULTIVITAMIN WITH FOLIC ACID 400 MCG
1 TABLET ORAL DAILY
Status: DISCONTINUED | OUTPATIENT
Start: 2018-06-15 | End: 2018-06-18 | Stop reason: HOSPADM

## 2018-06-15 RX ORDER — CARVEDILOL 3.12 MG/1
3.12 TABLET ORAL 2 TIMES DAILY
Status: DISCONTINUED | OUTPATIENT
Start: 2018-06-15 | End: 2018-06-18 | Stop reason: HOSPADM

## 2018-06-15 RX ORDER — ACETAMINOPHEN 325 MG/1
650 TABLET ORAL EVERY 4 HOURS PRN
Status: DISCONTINUED | OUTPATIENT
Start: 2018-06-15 | End: 2018-06-18 | Stop reason: HOSPADM

## 2018-06-15 RX ORDER — LISINOPRIL 5 MG/1
5 TABLET ORAL DAILY
Status: DISCONTINUED | OUTPATIENT
Start: 2018-06-15 | End: 2018-06-16

## 2018-06-15 RX ORDER — FLUTICASONE PROPIONATE 50 MCG
1 SPRAY, SUSPENSION (ML) NASAL DAILY
Status: DISCONTINUED | OUTPATIENT
Start: 2018-06-15 | End: 2018-06-18 | Stop reason: HOSPADM

## 2018-06-15 RX ORDER — MORPHINE SULFATE 4 MG/ML
4 INJECTION, SOLUTION INTRAMUSCULAR; INTRAVENOUS ONCE
Status: DISCONTINUED | OUTPATIENT
Start: 2018-06-15 | End: 2018-06-18 | Stop reason: HOSPADM

## 2018-06-15 RX ORDER — PANTOPRAZOLE SODIUM 40 MG/1
40 TABLET, DELAYED RELEASE ORAL
Status: DISCONTINUED | OUTPATIENT
Start: 2018-06-16 | End: 2018-06-18

## 2018-06-15 RX ORDER — ASPIRIN 81 MG/1
324 TABLET, CHEWABLE ORAL ONCE
Status: COMPLETED | OUTPATIENT
Start: 2018-06-15 | End: 2018-06-15

## 2018-06-15 RX ORDER — ONDANSETRON 4 MG/1
4 TABLET, ORALLY DISINTEGRATING ORAL EVERY 8 HOURS PRN
Status: DISCONTINUED | OUTPATIENT
Start: 2018-06-15 | End: 2018-06-18

## 2018-06-15 RX ORDER — FUROSEMIDE 40 MG/1
40 TABLET ORAL DAILY
Status: DISCONTINUED | OUTPATIENT
Start: 2018-06-15 | End: 2018-06-15

## 2018-06-15 RX ORDER — FUROSEMIDE 40 MG/1
40 TABLET ORAL 2 TIMES DAILY
Status: DISCONTINUED | OUTPATIENT
Start: 2018-06-15 | End: 2018-06-16

## 2018-06-15 RX ORDER — SPIRONOLACTONE 25 MG/1
25 TABLET ORAL DAILY
Status: DISCONTINUED | OUTPATIENT
Start: 2018-06-15 | End: 2018-06-18 | Stop reason: HOSPADM

## 2018-06-15 RX ORDER — SODIUM CHLORIDE 0.9 % (FLUSH) 0.9 %
10 SYRINGE (ML) INJECTION PRN
Status: DISCONTINUED | OUTPATIENT
Start: 2018-06-15 | End: 2018-06-18 | Stop reason: HOSPADM

## 2018-06-15 RX ORDER — MORPHINE SULFATE 4 MG/ML
4 INJECTION, SOLUTION INTRAMUSCULAR; INTRAVENOUS ONCE
Status: COMPLETED | OUTPATIENT
Start: 2018-06-15 | End: 2018-06-15

## 2018-06-15 RX ADMIN — IOPAMIDOL 75 ML: 755 INJECTION, SOLUTION INTRAVENOUS at 11:39

## 2018-06-15 RX ADMIN — HYDROCODONE BITARTRATE AND ACETAMINOPHEN 2 TABLET: 5; 325 TABLET ORAL at 20:49

## 2018-06-15 RX ADMIN — HYDROCODONE BITARTRATE AND ACETAMINOPHEN 2 TABLET: 5; 325 TABLET ORAL at 15:41

## 2018-06-15 RX ADMIN — ASPIRIN 81 MG 324 MG: 81 TABLET ORAL at 08:19

## 2018-06-15 RX ADMIN — Medication 10 ML: at 23:14

## 2018-06-15 RX ADMIN — FUROSEMIDE 40 MG: 40 TABLET ORAL at 20:49

## 2018-06-15 RX ADMIN — CARVEDILOL 3.12 MG: 3.12 TABLET, FILM COATED ORAL at 20:49

## 2018-06-15 RX ADMIN — MORPHINE SULFATE 4 MG: 4 INJECTION INTRAVENOUS at 11:53

## 2018-06-15 ASSESSMENT — ENCOUNTER SYMPTOMS
VOMITING: 0
COUGH: 0
NAUSEA: 0
EYE PAIN: 0
COLOR CHANGE: 0
ABDOMINAL DISTENTION: 1
SHORTNESS OF BREATH: 1
ABDOMINAL PAIN: 0

## 2018-06-15 ASSESSMENT — HEART SCORE: ECG: 1

## 2018-06-15 ASSESSMENT — PAIN SCALES - GENERAL
PAINLEVEL_OUTOF10: 7
PAINLEVEL_OUTOF10: 8
PAINLEVEL_OUTOF10: 8
PAINLEVEL_OUTOF10: 9

## 2018-06-15 ASSESSMENT — PAIN DESCRIPTION - LOCATION: LOCATION: CHEST

## 2018-06-15 ASSESSMENT — PAIN SCALES - WONG BAKER: WONGBAKER_NUMERICALRESPONSE: 8

## 2018-06-15 ASSESSMENT — PAIN DESCRIPTION - ORIENTATION: ORIENTATION: LEFT

## 2018-06-15 ASSESSMENT — PAIN DESCRIPTION - PAIN TYPE: TYPE: ACUTE PAIN

## 2018-06-16 ENCOUNTER — APPOINTMENT (OUTPATIENT)
Dept: ULTRASOUND IMAGING | Age: 53
DRG: 194 | End: 2018-06-16
Payer: MEDICAID

## 2018-06-16 PROBLEM — I50.21 ACUTE SYSTOLIC CHF (CONGESTIVE HEART FAILURE) (HCC): Status: ACTIVE | Noted: 2018-06-16

## 2018-06-16 LAB
ALBUMIN SERPL-MCNC: 4.2 G/DL (ref 3.5–5.2)
ALBUMIN/GLOBULIN RATIO: 1.4 (ref 1–2.5)
ALP BLD-CCNC: 96 U/L (ref 35–104)
ALT SERPL-CCNC: 19 U/L (ref 5–33)
ANION GAP SERPL CALCULATED.3IONS-SCNC: 17 MMOL/L (ref 9–17)
AST SERPL-CCNC: 29 U/L
BILIRUB SERPL-MCNC: NORMAL MG/DL (ref 0.3–1.2)
BILIRUBIN DIRECT: 0.23 MG/DL
BILIRUBIN, INDIRECT: NORMAL MG/DL (ref 0–1)
BUN BLDV-MCNC: 16 MG/DL (ref 6–20)
BUN/CREAT BLD: ABNORMAL (ref 9–20)
CALCIUM SERPL-MCNC: 9.3 MG/DL (ref 8.6–10.4)
CHLORIDE BLD-SCNC: 105 MMOL/L (ref 98–107)
CO2: 18 MMOL/L (ref 20–31)
CREAT SERPL-MCNC: 0.83 MG/DL (ref 0.5–0.9)
EKG ATRIAL RATE: 105 BPM
EKG ATRIAL RATE: 70 BPM
EKG ATRIAL RATE: 83 BPM
EKG P AXIS: 52 DEGREES
EKG P AXIS: 61 DEGREES
EKG P AXIS: 62 DEGREES
EKG P-R INTERVAL: 122 MS
EKG Q-T INTERVAL: 394 MS
EKG Q-T INTERVAL: 478 MS
EKG Q-T INTERVAL: 494 MS
EKG QRS DURATION: 122 MS
EKG QRS DURATION: 130 MS
EKG QRS DURATION: 132 MS
EKG QTC CALCULATION (BAZETT): 520 MS
EKG QTC CALCULATION (BAZETT): 533 MS
EKG QTC CALCULATION (BAZETT): 561 MS
EKG R AXIS: -81 DEGREES
EKG R AXIS: -88 DEGREES
EKG R AXIS: -90 DEGREES
EKG T AXIS: 61 DEGREES
EKG T AXIS: 68 DEGREES
EKG T AXIS: 72 DEGREES
EKG VENTRICULAR RATE: 105 BPM
EKG VENTRICULAR RATE: 70 BPM
EKG VENTRICULAR RATE: 83 BPM
GFR AFRICAN AMERICAN: >60 ML/MIN
GFR NON-AFRICAN AMERICAN: >60 ML/MIN
GFR SERPL CREATININE-BSD FRML MDRD: ABNORMAL ML/MIN/{1.73_M2}
GFR SERPL CREATININE-BSD FRML MDRD: ABNORMAL ML/MIN/{1.73_M2}
GLOBULIN: NORMAL G/DL (ref 1.5–3.8)
GLUCOSE BLD-MCNC: 95 MG/DL (ref 70–99)
POTASSIUM SERPL-SCNC: 4.2 MMOL/L (ref 3.7–5.3)
SODIUM BLD-SCNC: 140 MMOL/L (ref 135–144)
TOTAL PROTEIN: 7.3 G/DL (ref 6.4–8.3)
TROPONIN INTERP: NORMAL
TROPONIN INTERP: NORMAL
TROPONIN T: <0.03 NG/ML
TROPONIN T: <0.03 NG/ML
TSH SERPL DL<=0.05 MIU/L-ACNC: 1.76 MIU/L (ref 0.3–5)

## 2018-06-16 PROCEDURE — 80076 HEPATIC FUNCTION PANEL: CPT

## 2018-06-16 PROCEDURE — 6370000000 HC RX 637 (ALT 250 FOR IP): Performed by: STUDENT IN AN ORGANIZED HEALTH CARE EDUCATION/TRAINING PROGRAM

## 2018-06-16 PROCEDURE — 96376 TX/PRO/DX INJ SAME DRUG ADON: CPT

## 2018-06-16 PROCEDURE — 2580000003 HC RX 258: Performed by: EMERGENCY MEDICINE

## 2018-06-16 PROCEDURE — 6360000002 HC RX W HCPCS: Performed by: STUDENT IN AN ORGANIZED HEALTH CARE EDUCATION/TRAINING PROGRAM

## 2018-06-16 PROCEDURE — 2500000003 HC RX 250 WO HCPCS: Performed by: INTERNAL MEDICINE

## 2018-06-16 PROCEDURE — 1200000000 HC SEMI PRIVATE

## 2018-06-16 PROCEDURE — 6370000000 HC RX 637 (ALT 250 FOR IP): Performed by: EMERGENCY MEDICINE

## 2018-06-16 PROCEDURE — G0378 HOSPITAL OBSERVATION PER HR: HCPCS

## 2018-06-16 PROCEDURE — 76705 ECHO EXAM OF ABDOMEN: CPT

## 2018-06-16 PROCEDURE — 84484 ASSAY OF TROPONIN QUANT: CPT

## 2018-06-16 PROCEDURE — 93005 ELECTROCARDIOGRAM TRACING: CPT

## 2018-06-16 PROCEDURE — 80048 BASIC METABOLIC PNL TOTAL CA: CPT

## 2018-06-16 PROCEDURE — 96375 TX/PRO/DX INJ NEW DRUG ADDON: CPT

## 2018-06-16 PROCEDURE — 84443 ASSAY THYROID STIM HORMONE: CPT

## 2018-06-16 PROCEDURE — 94640 AIRWAY INHALATION TREATMENT: CPT

## 2018-06-16 PROCEDURE — 99223 1ST HOSP IP/OBS HIGH 75: CPT | Performed by: INTERNAL MEDICINE

## 2018-06-16 PROCEDURE — 36415 COLL VENOUS BLD VENIPUNCTURE: CPT

## 2018-06-16 RX ORDER — IPRATROPIUM BROMIDE AND ALBUTEROL SULFATE 2.5; .5 MG/3ML; MG/3ML
1 SOLUTION RESPIRATORY (INHALATION)
Status: DISCONTINUED | OUTPATIENT
Start: 2018-06-16 | End: 2018-06-16

## 2018-06-16 RX ORDER — IPRATROPIUM BROMIDE AND ALBUTEROL SULFATE 2.5; .5 MG/3ML; MG/3ML
SOLUTION RESPIRATORY (INHALATION)
Status: DISPENSED
Start: 2018-06-16 | End: 2018-06-17

## 2018-06-16 RX ORDER — BUMETANIDE 0.25 MG/ML
1 INJECTION, SOLUTION INTRAMUSCULAR; INTRAVENOUS 2 TIMES DAILY
Status: DISCONTINUED | OUTPATIENT
Start: 2018-06-16 | End: 2018-06-17

## 2018-06-16 RX ORDER — ONDANSETRON 2 MG/ML
4 INJECTION INTRAMUSCULAR; INTRAVENOUS ONCE
Status: COMPLETED | OUTPATIENT
Start: 2018-06-16 | End: 2018-06-16

## 2018-06-16 RX ORDER — ALBUTEROL SULFATE 2.5 MG/3ML
2.5 SOLUTION RESPIRATORY (INHALATION) EVERY 6 HOURS PRN
Status: DISCONTINUED | OUTPATIENT
Start: 2018-06-16 | End: 2018-06-18 | Stop reason: HOSPADM

## 2018-06-16 RX ORDER — IPRATROPIUM BROMIDE AND ALBUTEROL SULFATE 2.5; .5 MG/3ML; MG/3ML
1 SOLUTION RESPIRATORY (INHALATION) 4 TIMES DAILY
Status: DISCONTINUED | OUTPATIENT
Start: 2018-06-17 | End: 2018-06-18

## 2018-06-16 RX ADMIN — BUMETANIDE 1 MG: 0.25 INJECTION INTRAMUSCULAR; INTRAVENOUS at 23:48

## 2018-06-16 RX ADMIN — MOMETASONE FUROATE AND FORMOTEROL FUMARATE DIHYDRATE 2 PUFF: 200; 5 AEROSOL RESPIRATORY (INHALATION) at 21:00

## 2018-06-16 RX ADMIN — CARVEDILOL 3.12 MG: 3.12 TABLET, FILM COATED ORAL at 10:04

## 2018-06-16 RX ADMIN — HYDROCODONE BITARTRATE AND ACETAMINOPHEN 2 TABLET: 5; 325 TABLET ORAL at 10:05

## 2018-06-16 RX ADMIN — IPRATROPIUM BROMIDE AND ALBUTEROL SULFATE 1 AMPULE: .5; 3 SOLUTION RESPIRATORY (INHALATION) at 21:00

## 2018-06-16 RX ADMIN — ONDANSETRON 4 MG: 2 INJECTION INTRAMUSCULAR; INTRAVENOUS at 22:32

## 2018-06-16 RX ADMIN — BUMETANIDE 1 MG: 0.25 INJECTION INTRAMUSCULAR; INTRAVENOUS at 08:23

## 2018-06-16 RX ADMIN — HYDROCODONE BITARTRATE AND ACETAMINOPHEN 2 TABLET: 5; 325 TABLET ORAL at 18:08

## 2018-06-16 RX ADMIN — PANTOPRAZOLE SODIUM 40 MG: 40 TABLET, DELAYED RELEASE ORAL at 10:04

## 2018-06-16 RX ADMIN — THERA TABS 1 TABLET: TAB at 10:04

## 2018-06-16 RX ADMIN — IPRATROPIUM BROMIDE AND ALBUTEROL SULFATE 1 AMPULE: .5; 3 SOLUTION RESPIRATORY (INHALATION) at 17:28

## 2018-06-16 RX ADMIN — SPIRONOLACTONE 25 MG: 25 TABLET ORAL at 10:04

## 2018-06-16 RX ADMIN — Medication 10 ML: at 09:34

## 2018-06-16 RX ADMIN — Medication 10 ML: at 22:32

## 2018-06-16 RX ADMIN — SERTRALINE 25 MG: 25 TABLET, FILM COATED ORAL at 10:04

## 2018-06-16 ASSESSMENT — PAIN SCALES - GENERAL
PAINLEVEL_OUTOF10: 9
PAINLEVEL_OUTOF10: 9

## 2018-06-17 ENCOUNTER — APPOINTMENT (OUTPATIENT)
Dept: CT IMAGING | Age: 53
DRG: 194 | End: 2018-06-17
Payer: MEDICAID

## 2018-06-17 LAB
ANION GAP SERPL CALCULATED.3IONS-SCNC: 13 MMOL/L (ref 9–17)
BUN BLDV-MCNC: 17 MG/DL (ref 6–20)
BUN/CREAT BLD: ABNORMAL (ref 9–20)
CALCIUM SERPL-MCNC: 8.9 MG/DL (ref 8.6–10.4)
CHLORIDE BLD-SCNC: 100 MMOL/L (ref 98–107)
CO2: 21 MMOL/L (ref 20–31)
CREAT SERPL-MCNC: 0.93 MG/DL (ref 0.5–0.9)
GFR AFRICAN AMERICAN: >60 ML/MIN
GFR NON-AFRICAN AMERICAN: >60 ML/MIN
GFR SERPL CREATININE-BSD FRML MDRD: ABNORMAL ML/MIN/{1.73_M2}
GFR SERPL CREATININE-BSD FRML MDRD: ABNORMAL ML/MIN/{1.73_M2}
GLUCOSE BLD-MCNC: 86 MG/DL (ref 70–99)
POTASSIUM SERPL-SCNC: 3.7 MMOL/L (ref 3.7–5.3)
SODIUM BLD-SCNC: 134 MMOL/L (ref 135–144)

## 2018-06-17 PROCEDURE — 94640 AIRWAY INHALATION TREATMENT: CPT

## 2018-06-17 PROCEDURE — 93005 ELECTROCARDIOGRAM TRACING: CPT

## 2018-06-17 PROCEDURE — 6360000004 HC RX CONTRAST MEDICATION: Performed by: INTERNAL MEDICINE

## 2018-06-17 PROCEDURE — 94762 N-INVAS EAR/PLS OXIMTRY CONT: CPT

## 2018-06-17 PROCEDURE — 1200000000 HC SEMI PRIVATE

## 2018-06-17 PROCEDURE — 36415 COLL VENOUS BLD VENIPUNCTURE: CPT

## 2018-06-17 PROCEDURE — 96375 TX/PRO/DX INJ NEW DRUG ADDON: CPT

## 2018-06-17 PROCEDURE — 99232 SBSQ HOSP IP/OBS MODERATE 35: CPT | Performed by: INTERNAL MEDICINE

## 2018-06-17 PROCEDURE — 6360000002 HC RX W HCPCS: Performed by: STUDENT IN AN ORGANIZED HEALTH CARE EDUCATION/TRAINING PROGRAM

## 2018-06-17 PROCEDURE — 6370000000 HC RX 637 (ALT 250 FOR IP): Performed by: INTERNAL MEDICINE

## 2018-06-17 PROCEDURE — G0378 HOSPITAL OBSERVATION PER HR: HCPCS

## 2018-06-17 PROCEDURE — 74178 CT ABD&PLV WO CNTR FLWD CNTR: CPT

## 2018-06-17 PROCEDURE — 2580000003 HC RX 258: Performed by: EMERGENCY MEDICINE

## 2018-06-17 PROCEDURE — 80048 BASIC METABOLIC PNL TOTAL CA: CPT

## 2018-06-17 PROCEDURE — 6370000000 HC RX 637 (ALT 250 FOR IP): Performed by: STUDENT IN AN ORGANIZED HEALTH CARE EDUCATION/TRAINING PROGRAM

## 2018-06-17 PROCEDURE — 6370000000 HC RX 637 (ALT 250 FOR IP): Performed by: EMERGENCY MEDICINE

## 2018-06-17 RX ORDER — BUMETANIDE 1 MG/1
1 TABLET ORAL 2 TIMES DAILY
Status: DISCONTINUED | OUTPATIENT
Start: 2018-06-17 | End: 2018-06-18 | Stop reason: HOSPADM

## 2018-06-17 RX ORDER — DIPHENHYDRAMINE HYDROCHLORIDE 50 MG/ML
10 INJECTION INTRAMUSCULAR; INTRAVENOUS ONCE
Status: COMPLETED | OUTPATIENT
Start: 2018-06-17 | End: 2018-06-17

## 2018-06-17 RX ADMIN — PROCHLORPERAZINE EDISYLATE 5 MG: 5 INJECTION INTRAMUSCULAR; INTRAVENOUS at 10:04

## 2018-06-17 RX ADMIN — SERTRALINE 25 MG: 25 TABLET, FILM COATED ORAL at 12:16

## 2018-06-17 RX ADMIN — SPIRONOLACTONE 25 MG: 25 TABLET ORAL at 12:19

## 2018-06-17 RX ADMIN — BUMETANIDE 1 MG: 1 TABLET ORAL at 12:18

## 2018-06-17 RX ADMIN — SACUBITRIL AND VALSARTAN 0.5 TABLET: 24; 26 TABLET, FILM COATED ORAL at 21:29

## 2018-06-17 RX ADMIN — DIPHENHYDRAMINE HYDROCHLORIDE 10 MG: 50 INJECTION, SOLUTION INTRAMUSCULAR; INTRAVENOUS at 05:56

## 2018-06-17 RX ADMIN — SACUBITRIL AND VALSARTAN 0.5 TABLET: 24; 26 TABLET, FILM COATED ORAL at 12:17

## 2018-06-17 RX ADMIN — THERA TABS 1 TABLET: TAB at 12:17

## 2018-06-17 RX ADMIN — IPRATROPIUM BROMIDE AND ALBUTEROL SULFATE 1 AMPULE: .5; 3 SOLUTION RESPIRATORY (INHALATION) at 14:06

## 2018-06-17 RX ADMIN — MOMETASONE FUROATE AND FORMOTEROL FUMARATE DIHYDRATE 2 PUFF: 200; 5 AEROSOL RESPIRATORY (INHALATION) at 19:29

## 2018-06-17 RX ADMIN — PANTOPRAZOLE SODIUM 40 MG: 40 TABLET, DELAYED RELEASE ORAL at 06:25

## 2018-06-17 RX ADMIN — IOPAMIDOL 75 ML: 755 INJECTION, SOLUTION INTRAVENOUS at 17:11

## 2018-06-17 RX ADMIN — BUMETANIDE 1 MG: 1 TABLET ORAL at 21:29

## 2018-06-17 RX ADMIN — CARVEDILOL 3.12 MG: 3.12 TABLET, FILM COATED ORAL at 21:29

## 2018-06-17 RX ADMIN — CARVEDILOL 3.12 MG: 3.12 TABLET, FILM COATED ORAL at 12:16

## 2018-06-17 RX ADMIN — Medication 10 ML: at 12:19

## 2018-06-17 RX ADMIN — IPRATROPIUM BROMIDE AND ALBUTEROL SULFATE 1 AMPULE: .5; 3 SOLUTION RESPIRATORY (INHALATION) at 19:29

## 2018-06-17 RX ADMIN — Medication 10 ML: at 21:30

## 2018-06-17 ASSESSMENT — PAIN SCALES - GENERAL: PAINLEVEL_OUTOF10: 0

## 2018-06-18 ENCOUNTER — CARE COORDINATION (OUTPATIENT)
Dept: CARE COORDINATION | Age: 53
End: 2018-06-18

## 2018-06-18 VITALS
HEART RATE: 60 BPM | SYSTOLIC BLOOD PRESSURE: 115 MMHG | BODY MASS INDEX: 26.92 KG/M2 | HEIGHT: 62 IN | TEMPERATURE: 98.5 F | OXYGEN SATURATION: 100 % | WEIGHT: 146.3 LBS | DIASTOLIC BLOOD PRESSURE: 79 MMHG | RESPIRATION RATE: 16 BRPM

## 2018-06-18 LAB
ANION GAP SERPL CALCULATED.3IONS-SCNC: 13 MMOL/L (ref 9–17)
BUN BLDV-MCNC: 18 MG/DL (ref 6–20)
BUN/CREAT BLD: ABNORMAL (ref 9–20)
CALCIUM SERPL-MCNC: 9 MG/DL (ref 8.6–10.4)
CHLORIDE BLD-SCNC: 96 MMOL/L (ref 98–107)
CO2: 28 MMOL/L (ref 20–31)
CREAT SERPL-MCNC: 0.9 MG/DL (ref 0.5–0.9)
EKG ATRIAL RATE: 59 BPM
EKG ATRIAL RATE: 62 BPM
EKG P AXIS: 46 DEGREES
EKG P AXIS: 49 DEGREES
EKG P-R INTERVAL: 126 MS
EKG P-R INTERVAL: 128 MS
EKG Q-T INTERVAL: 490 MS
EKG Q-T INTERVAL: 536 MS
EKG QRS DURATION: 130 MS
EKG QRS DURATION: 134 MS
EKG QTC CALCULATION (BAZETT): 497 MS
EKG QTC CALCULATION (BAZETT): 530 MS
EKG R AXIS: -83 DEGREES
EKG R AXIS: -86 DEGREES
EKG T AXIS: 68 DEGREES
EKG T AXIS: 79 DEGREES
EKG VENTRICULAR RATE: 59 BPM
EKG VENTRICULAR RATE: 62 BPM
GFR AFRICAN AMERICAN: >60 ML/MIN
GFR NON-AFRICAN AMERICAN: >60 ML/MIN
GFR SERPL CREATININE-BSD FRML MDRD: ABNORMAL ML/MIN/{1.73_M2}
GFR SERPL CREATININE-BSD FRML MDRD: ABNORMAL ML/MIN/{1.73_M2}
GLUCOSE BLD-MCNC: 90 MG/DL (ref 70–99)
POTASSIUM SERPL-SCNC: 3.7 MMOL/L (ref 3.7–5.3)
SODIUM BLD-SCNC: 137 MMOL/L (ref 135–144)

## 2018-06-18 PROCEDURE — 6370000000 HC RX 637 (ALT 250 FOR IP): Performed by: INTERNAL MEDICINE

## 2018-06-18 PROCEDURE — 6360000002 HC RX W HCPCS: Performed by: EMERGENCY MEDICINE

## 2018-06-18 PROCEDURE — 6370000000 HC RX 637 (ALT 250 FOR IP): Performed by: STUDENT IN AN ORGANIZED HEALTH CARE EDUCATION/TRAINING PROGRAM

## 2018-06-18 PROCEDURE — G0378 HOSPITAL OBSERVATION PER HR: HCPCS

## 2018-06-18 PROCEDURE — 2580000003 HC RX 258: Performed by: EMERGENCY MEDICINE

## 2018-06-18 PROCEDURE — 80048 BASIC METABOLIC PNL TOTAL CA: CPT

## 2018-06-18 PROCEDURE — 99239 HOSP IP/OBS DSCHRG MGMT >30: CPT | Performed by: INTERNAL MEDICINE

## 2018-06-18 PROCEDURE — 93005 ELECTROCARDIOGRAM TRACING: CPT

## 2018-06-18 PROCEDURE — 6360000002 HC RX W HCPCS: Performed by: STUDENT IN AN ORGANIZED HEALTH CARE EDUCATION/TRAINING PROGRAM

## 2018-06-18 PROCEDURE — 6370000000 HC RX 637 (ALT 250 FOR IP): Performed by: EMERGENCY MEDICINE

## 2018-06-18 PROCEDURE — 36415 COLL VENOUS BLD VENIPUNCTURE: CPT

## 2018-06-18 PROCEDURE — 94640 AIRWAY INHALATION TREATMENT: CPT

## 2018-06-18 PROCEDURE — 96372 THER/PROPH/DIAG INJ SC/IM: CPT

## 2018-06-18 RX ORDER — LISINOPRIL 10 MG/1
10 TABLET ORAL DAILY
Qty: 30 TABLET | Refills: 2 | Status: SHIPPED | OUTPATIENT
Start: 2018-06-18 | End: 2018-07-16 | Stop reason: SDUPTHER

## 2018-06-18 RX ORDER — IPRATROPIUM BROMIDE AND ALBUTEROL SULFATE 2.5; .5 MG/3ML; MG/3ML
1 SOLUTION RESPIRATORY (INHALATION) 3 TIMES DAILY
Status: DISCONTINUED | OUTPATIENT
Start: 2018-06-18 | End: 2018-06-18 | Stop reason: HOSPADM

## 2018-06-18 RX ORDER — IPRATROPIUM BROMIDE AND ALBUTEROL SULFATE 2.5; .5 MG/3ML; MG/3ML
3 SOLUTION RESPIRATORY (INHALATION) 2 TIMES DAILY
Qty: 360 ML | Refills: 1 | Status: SHIPPED | OUTPATIENT
Start: 2018-06-18 | End: 2018-06-18

## 2018-06-18 RX ORDER — IPRATROPIUM BROMIDE AND ALBUTEROL SULFATE 2.5; .5 MG/3ML; MG/3ML
3 SOLUTION RESPIRATORY (INHALATION) 2 TIMES DAILY PRN
Qty: 360 ML | Refills: 1 | Status: ON HOLD | OUTPATIENT
Start: 2018-06-18 | End: 2018-11-25

## 2018-06-18 RX ORDER — CARVEDILOL 3.12 MG/1
TABLET ORAL
Qty: 60 TABLET | Refills: 0 | Status: SHIPPED | OUTPATIENT
Start: 2018-06-18 | End: 2018-07-16 | Stop reason: SDUPTHER

## 2018-06-18 RX ORDER — MULTIVITAMIN WITH FOLIC ACID 400 MCG
TABLET ORAL
Qty: 30 TABLET | Refills: 3 | Status: SHIPPED | OUTPATIENT
Start: 2018-06-18

## 2018-06-18 RX ORDER — BUMETANIDE 1 MG/1
1 TABLET ORAL 2 TIMES DAILY
Qty: 30 TABLET | Refills: 3 | Status: SHIPPED | OUTPATIENT
Start: 2018-06-18 | End: 2018-07-16 | Stop reason: SDUPTHER

## 2018-06-18 RX ADMIN — ENOXAPARIN SODIUM 40 MG: 100 INJECTION SUBCUTANEOUS at 08:35

## 2018-06-18 RX ADMIN — SERTRALINE 25 MG: 25 TABLET, FILM COATED ORAL at 08:35

## 2018-06-18 RX ADMIN — IPRATROPIUM BROMIDE AND ALBUTEROL SULFATE 1 AMPULE: .5; 3 SOLUTION RESPIRATORY (INHALATION) at 07:50

## 2018-06-18 RX ADMIN — SACUBITRIL AND VALSARTAN 0.5 TABLET: 24; 26 TABLET, FILM COATED ORAL at 08:36

## 2018-06-18 RX ADMIN — SPIRONOLACTONE 25 MG: 25 TABLET ORAL at 08:35

## 2018-06-18 RX ADMIN — HYDROCODONE BITARTRATE AND ACETAMINOPHEN 2 TABLET: 5; 325 TABLET ORAL at 08:39

## 2018-06-18 RX ADMIN — BUMETANIDE 1 MG: 1 TABLET ORAL at 08:34

## 2018-06-18 RX ADMIN — THERA TABS 1 TABLET: TAB at 08:34

## 2018-06-18 RX ADMIN — ALBUTEROL SULFATE 2.5 MG: 2.5 SOLUTION RESPIRATORY (INHALATION) at 14:52

## 2018-06-18 RX ADMIN — CARVEDILOL 3.12 MG: 3.12 TABLET, FILM COATED ORAL at 08:34

## 2018-06-18 RX ADMIN — Medication 10 ML: at 08:35

## 2018-06-18 ASSESSMENT — PAIN SCALES - GENERAL: PAINLEVEL_OUTOF10: 8

## 2018-06-19 ENCOUNTER — CARE COORDINATION (OUTPATIENT)
Dept: CASE MANAGEMENT | Age: 53
End: 2018-06-19

## 2018-06-19 DIAGNOSIS — I50.21 ACUTE SYSTOLIC CHF (CONGESTIVE HEART FAILURE) (HCC): Primary | ICD-10-CM

## 2018-06-19 PROCEDURE — 1111F DSCHRG MED/CURRENT MED MERGE: CPT | Performed by: INTERNAL MEDICINE

## 2018-06-20 ENCOUNTER — CARE COORDINATION (OUTPATIENT)
Dept: CARE COORDINATION | Age: 53
End: 2018-06-20

## 2018-06-20 VITALS — DIASTOLIC BLOOD PRESSURE: 88 MMHG | SYSTOLIC BLOOD PRESSURE: 155 MMHG | OXYGEN SATURATION: 97 % | HEART RATE: 54 BPM

## 2018-06-27 ENCOUNTER — HOSPITAL ENCOUNTER (OUTPATIENT)
Dept: OTHER | Age: 53
Discharge: HOME | End: 2018-06-27
Payer: MEDICAID

## 2018-06-27 VITALS
BODY MASS INDEX: 25.42 KG/M2 | OXYGEN SATURATION: 99 % | SYSTOLIC BLOOD PRESSURE: 110 MMHG | HEART RATE: 75 BPM | RESPIRATION RATE: 20 BRPM | WEIGHT: 139 LBS | DIASTOLIC BLOOD PRESSURE: 62 MMHG

## 2018-06-27 PROCEDURE — 99211 OFF/OP EST MAY X REQ PHY/QHP: CPT

## 2018-07-16 ENCOUNTER — HOSPITAL ENCOUNTER (OUTPATIENT)
Age: 53
Setting detail: SPECIMEN
Discharge: HOME | End: 2018-07-16
Payer: MEDICAID

## 2018-07-16 ENCOUNTER — OFFICE VISIT (OUTPATIENT)
Dept: FAMILY MEDICINE CLINIC | Age: 53
End: 2018-07-16
Payer: MEDICAID

## 2018-07-16 VITALS
SYSTOLIC BLOOD PRESSURE: 121 MMHG | HEART RATE: 78 BPM | HEIGHT: 62 IN | DIASTOLIC BLOOD PRESSURE: 78 MMHG | WEIGHT: 138.6 LBS | BODY MASS INDEX: 25.51 KG/M2

## 2018-07-16 DIAGNOSIS — I10 ESSENTIAL HYPERTENSION: ICD-10-CM

## 2018-07-16 DIAGNOSIS — I50.22 CHRONIC SYSTOLIC HEART FAILURE (HCC): Primary | ICD-10-CM

## 2018-07-16 DIAGNOSIS — J42 CHRONIC BRONCHITIS, UNSPECIFIED CHRONIC BRONCHITIS TYPE (HCC): ICD-10-CM

## 2018-07-16 DIAGNOSIS — Z00.00 HEALTHCARE MAINTENANCE: ICD-10-CM

## 2018-07-16 LAB
CHOLESTEROL/HDL RATIO: 3.9
CHOLESTEROL: 281 MG/DL
ESTIMATED AVERAGE GLUCOSE: 137 MG/DL
HBA1C MFR BLD: 6.4 % (ref 4–6)
HDLC SERPL-MCNC: 72 MG/DL
LDL CHOLESTEROL: 191 MG/DL (ref 0–130)
TRIGL SERPL-MCNC: 89 MG/DL
VLDLC SERPL CALC-MCNC: ABNORMAL MG/DL (ref 1–30)

## 2018-07-16 PROCEDURE — 1111F DSCHRG MED/CURRENT MED MERGE: CPT | Performed by: HOSPITALIST

## 2018-07-16 PROCEDURE — G8926 SPIRO NO PERF OR DOC: HCPCS | Performed by: HOSPITALIST

## 2018-07-16 PROCEDURE — G8417 CALC BMI ABV UP PARAM F/U: HCPCS | Performed by: HOSPITALIST

## 2018-07-16 PROCEDURE — G8428 CUR MEDS NOT DOCUMENT: HCPCS | Performed by: HOSPITALIST

## 2018-07-16 PROCEDURE — 3023F SPIROM DOC REV: CPT | Performed by: HOSPITALIST

## 2018-07-16 PROCEDURE — 3017F COLORECTAL CA SCREEN DOC REV: CPT | Performed by: HOSPITALIST

## 2018-07-16 PROCEDURE — 99203 OFFICE O/P NEW LOW 30 MIN: CPT | Performed by: HOSPITALIST

## 2018-07-16 PROCEDURE — 4004F PT TOBACCO SCREEN RCVD TLK: CPT | Performed by: HOSPITALIST

## 2018-07-16 PROCEDURE — 99213 OFFICE O/P EST LOW 20 MIN: CPT | Performed by: HOSPITALIST

## 2018-07-16 RX ORDER — OMEPRAZOLE 20 MG/1
20 CAPSULE, DELAYED RELEASE ORAL DAILY
COMMUNITY
End: 2018-07-16 | Stop reason: SDUPTHER

## 2018-07-16 RX ORDER — OMEPRAZOLE 20 MG/1
20 CAPSULE, DELAYED RELEASE ORAL DAILY
Qty: 30 CAPSULE | Refills: 0 | Status: SHIPPED | OUTPATIENT
Start: 2018-07-16 | End: 2018-08-08 | Stop reason: SDUPTHER

## 2018-07-16 RX ORDER — LISINOPRIL 10 MG/1
10 TABLET ORAL DAILY
Qty: 30 TABLET | Refills: 2 | Status: ON HOLD | OUTPATIENT
Start: 2018-07-16 | End: 2018-11-25

## 2018-07-16 RX ORDER — BUMETANIDE 1 MG/1
1 TABLET ORAL 2 TIMES DAILY
Qty: 30 TABLET | Refills: 3 | Status: SHIPPED | OUTPATIENT
Start: 2018-07-16 | End: 2018-10-08 | Stop reason: SDUPTHER

## 2018-07-16 RX ORDER — SPIRONOLACTONE 25 MG/1
TABLET ORAL
Qty: 28 TABLET | Refills: 3 | Status: ON HOLD | OUTPATIENT
Start: 2018-07-16 | End: 2018-11-25

## 2018-07-16 RX ORDER — CARVEDILOL 3.12 MG/1
TABLET ORAL
Qty: 60 TABLET | Refills: 0 | Status: SHIPPED | OUTPATIENT
Start: 2018-07-16 | End: 2018-08-06 | Stop reason: SDUPTHER

## 2018-07-16 ASSESSMENT — ENCOUNTER SYMPTOMS
COUGH: 0
VOMITING: 0
WHEEZING: 0
ABDOMINAL PAIN: 0
SHORTNESS OF BREATH: 0
CONSTIPATION: 0
RHINORRHEA: 0
DIARRHEA: 0
NAUSEA: 0
BACK PAIN: 0
SORE THROAT: 0

## 2018-07-16 ASSESSMENT — PATIENT HEALTH QUESTIONNAIRE - PHQ9
1. LITTLE INTEREST OR PLEASURE IN DOING THINGS: 1
SUM OF ALL RESPONSES TO PHQ9 QUESTIONS 1 & 2: 2
2. FEELING DOWN, DEPRESSED OR HOPELESS: 1
SUM OF ALL RESPONSES TO PHQ QUESTIONS 1-9: 2

## 2018-07-16 NOTE — PROGRESS NOTES
Negative for difficulty urinating, dysuria and flank pain. Musculoskeletal: Negative for arthralgias, back pain, myalgias and neck pain. Neurological: Negative for dizziness, syncope, speech difficulty, numbness and headaches. Psychiatric/Behavioral: Negative for confusion, decreased concentration, self-injury, sleep disturbance and suicidal ideas. The patient is nervous/anxious. Objective:    /78 (Site: Left Arm, Position: Sitting, Cuff Size: Medium Adult)   Pulse 78   Ht 5' 2\" (1.575 m)   Wt 138 lb 9.6 oz (62.9 kg)   LMP  (Within Months) Comment: LMP approx 2 mo ago  BMI 25.35 kg/m²    BP Readings from Last 3 Encounters:   07/16/18 121/78   06/27/18 110/62   06/20/18 (!) 155/88     Physical Exam   Constitutional: She is oriented to person, place, and time. She appears well-developed and well-nourished. HENT:   Head: Normocephalic and atraumatic. Right Ear: Hearing and tympanic membrane normal. No drainage, swelling or tenderness. No middle ear effusion. Left Ear: Hearing and tympanic membrane normal. No drainage, swelling or tenderness. No middle ear effusion. Mouth/Throat: Uvula is midline, oropharynx is clear and moist and mucous membranes are normal. No oropharyngeal exudate. Neck: Normal range of motion. Neck supple. Cardiovascular: Normal rate, regular rhythm and normal heart sounds. Pulmonary/Chest: Effort normal and breath sounds normal. She has no wheezes. Abdominal: Soft. Bowel sounds are normal. There is no tenderness. Neurological: She is alert and oriented to person, place, and time. Nursing note and vitals reviewed.         Lab Results   Component Value Date    WBC 5.9 06/15/2018    HGB 14.0 06/15/2018    HCT 43.0 06/15/2018     06/15/2018    CHOL 145 10/04/2015    TRIG 80 10/04/2015    HDL 44 10/04/2015    ALT 19 06/16/2018    AST 29 06/16/2018     06/18/2018    K 3.7 06/18/2018    CL 96 (L) 06/18/2018    CREATININE 0.90 06/18/2018    BUN 18 cessation  Reviewed prior labs and health maintenance  Continue current medications, diet and exercise. Discussed use, benefit, and side effects of prescribed medications. Barriers to medication compliance addressed. Patient given educational materials - see patient instructions  Was a self-tracking handout given in paper form or via AboutMyStart? No    Requested Prescriptions     Signed Prescriptions Disp Refills    omeprazole (PRILOSEC) 20 MG delayed release capsule 30 capsule 0     Sig: Take 1 capsule by mouth daily    bumetanide (BUMEX) 1 MG tablet 30 tablet 3     Sig: Take 1 tablet by mouth 2 times daily    lisinopril (PRINIVIL;ZESTRIL) 10 MG tablet 30 tablet 2     Sig: Take 1 tablet by mouth daily    carvedilol (COREG) 3.125 MG tablet 60 tablet 0     Sig: TAKE 1 TABLET BY MOUTH 2 TIMES A DAY WITH MEALS    spironolactone (ALDACTONE) 25 MG tablet 28 tablet 3     Sig: TAKE 1 TABLET BY MOUTH DAILY       All patient questions answered. Patient voiced understanding. Quality Measures    Body mass index is 25.35 kg/m². Normal. Weight control planned discussed Healthy diet and regular exercise. BP: 121/78 Blood pressure is normal. Treatment plan consists of No treatment change needed   . Lab Results   Component Value Date    LDLCHOLESTEROL 85 10/04/2015    (goal LDL reduction with dx if diabetes is 50% LDL reduction)      PHQ Scores 7/16/2018 6/15/2017 4/7/2016   PHQ2 Score 2 1 2   PHQ9 Score 2 7 2     Interpretation of Total Score Depression Severity: 1-4 = Minimal depression, 5-9 = Mild depression, 10-14 = Moderate depression, 15-19 = Moderately severe depression, 20-27 = Severe depression  Discussed use, benefit, and side effects of prescribed medications. Barriers to medication compliance addressed. All patient questions answered. Pt voiced understanding.    Medications Discontinued During This Encounter   Medication Reason    omeprazole (PRILOSEC) 20 MG delayed release capsule REORDER    bumetanide (BUMEX) 1 MG tablet REORDER    lisinopril (PRINIVIL;ZESTRIL) 10 MG tablet REORDER    carvedilol (COREG) 3.125 MG tablet REORDER    spironolactone (ALDACTONE) 25 MG tablet REORDER       Return in about 2 months (around 9/16/2018).

## 2018-07-24 ENCOUNTER — CARE COORDINATION (OUTPATIENT)
Dept: CARE COORDINATION | Age: 53
End: 2018-07-24

## 2018-07-24 NOTE — CARE COORDINATION
Marilia Orozco phoned [facility] office and requested follow up visit. She c/o of onset of swelling in right neck and jaw area in the past 2 days. She denies fever, cough, cold symptoms. Right lower jaw and neck tender to palpation with tenderness along lower jaw. She c/o of pain when swallowing and difficulty chewing foods. Respirations unlabored. No visible swelling noted of mouth and pharynx. B/P 104/55    Pulse 80 and regular resp 20/min and easy. 29 [facility] office phoned and sick visit scheduled for Thursday July 26 at 2:30 pm.  She was advised to call the [facility] office for increase in symptoms. Instructed to go the ED for increase in swelling of face, difficulty breathing or swallowing. She was advised to rinse mouth with warm water and attempt salt water gargle for discomfort. She reports taking OTC motrin for control of discomfort.     Bin Johnson RN, BSN  [place]

## 2018-08-06 DIAGNOSIS — I50.22 CHRONIC SYSTOLIC HEART FAILURE (HCC): ICD-10-CM

## 2018-08-06 RX ORDER — CARVEDILOL 3.12 MG/1
TABLET ORAL
Qty: 60 TABLET | Refills: 0 | Status: ON HOLD | OUTPATIENT
Start: 2018-08-06 | End: 2018-11-25

## 2018-08-06 NOTE — TELEPHONE ENCOUNTER
Next Visit Date:  Future Appointments  Date Time Provider Yanelis Alfaro   8/9/2018 1:30 PM Kell Krishna MD [facility] Maintenance   Topic Date Due    DTaP/Tdap/Td vaccine (1 - Tdap) 06/13/1984    Shingles Vaccine (1 of 2 - 2 Dose Series) 06/13/2015    Colon Cancer Screen FIT/FOBT  03/17/2018    Flu vaccine (1) 09/01/2018    Cervical cancer screen  12/28/2018    Breast cancer screen  02/03/2019    Potassium monitoring  06/18/2019    Creatinine monitoring  06/18/2019    A1C test (Diabetic or Prediabetic)  07/16/2019    Lipid screen  07/16/2023    Pneumococcal med risk  Completed    Hepatitis C screen  Completed    HIV screen  Completed       Hemoglobin A1C (%)   Date Value   07/16/2018 6.4 (H)   02/03/2017 5.9   01/12/2017 6.0             ( goal A1C is < 7)   No results found for: LABMICR  LDL Cholesterol (mg/dL)   Date Value   07/16/2018 191 (H)   10/04/2015 85       (goal LDL is <100)   AST (U/L)   Date Value   06/16/2018 29     ALT (U/L)   Date Value   06/16/2018 19     BUN (mg/dL)   Date Value   06/18/2018 18     BP Readings from Last 3 Encounters:   07/16/18 121/78   06/27/18 110/62   06/20/18 (!) 155/88          (goal 120/80)    All Future Testing planned in [facility]  Lab Frequency Next Occurrence   ALYX DIGITAL DIAGNOSTIC RIGHT Once 08/15/2018   US LIVER Once 07/23/2018   US BREAST COMPLETE RIGHT Once 12/31/2018   ALYX DIGITAL DIAGNOSTIC W OR WO CAD RIGHT Once 12/31/2018   Hepatic Function Panel Once 07/29/2018               Patient Active Problem List:     Fibroids     Lung nodule < 6cm on CT     Chronic systolic heart failure (HCC) s/p AICD     Mild intermittent asthma     Essential hypertension     Chest pain     Acute systolic CHF (congestive heart failure) ([address]     Please address the medication refill and close the encounter. If I can be of assistance, please route to the applicable pool. Thank you.

## 2018-08-08 ENCOUNTER — TELEPHONE (OUTPATIENT)
Dept: FAMILY MEDICINE CLINIC | Age: 53
End: 2018-08-08

## 2018-08-08 RX ORDER — OMEPRAZOLE 20 MG/1
CAPSULE, DELAYED RELEASE ORAL
Qty: 30 CAPSULE | Refills: 0 | Status: SHIPPED | OUTPATIENT
Start: 2018-08-08 | End: 2018-08-29 | Stop reason: SDUPTHER

## 2018-08-15 NOTE — TELEPHONE ENCOUNTER
Phone call to patient to remind her of her upcoming medical appointment and appointment with [facility]. Patient stated that she would not be able to make it to the [facility] appointment as she is in [facility] with family. Patient stated that she will be at the appointment tomorrow. Patient stated that she also would like discuss housing options.

## 2018-08-30 RX ORDER — OMEPRAZOLE 20 MG/1
CAPSULE, DELAYED RELEASE ORAL
Qty: 30 CAPSULE | Refills: 3 | Status: ON HOLD | OUTPATIENT
Start: 2018-08-30 | End: 2018-11-25

## 2018-08-30 RX ORDER — CARVEDILOL 3.12 MG/1
TABLET ORAL
Qty: 60 TABLET | Refills: 0 | Status: SHIPPED | OUTPATIENT
Start: 2018-08-30 | End: 2018-09-17 | Stop reason: SDUPTHER

## 2018-09-17 ENCOUNTER — TELEPHONE (OUTPATIENT)
Dept: FAMILY MEDICINE CLINIC | Age: 53
End: 2018-09-17

## 2018-09-17 ENCOUNTER — CARE COORDINATION (OUTPATIENT)
Dept: CARE COORDINATION | Age: 53
End: 2018-09-17

## 2018-09-17 VITALS
DIASTOLIC BLOOD PRESSURE: 78 MMHG | HEART RATE: 82 BPM | SYSTOLIC BLOOD PRESSURE: 104 MMHG | OXYGEN SATURATION: 97 % | RESPIRATION RATE: 18 BRPM

## 2018-09-19 ENCOUNTER — TELEPHONE (OUTPATIENT)
Dept: FAMILY MEDICINE CLINIC | Age: 53
End: 2018-09-19

## 2018-09-25 ENCOUNTER — TELEPHONE (OUTPATIENT)
Dept: FAMILY MEDICINE CLINIC | Age: 53
End: 2018-09-25

## 2018-09-25 NOTE — TELEPHONE ENCOUNTER
Phone call to patient to remind about upcoming appointment. She stated she remembered and planned to attend. This  called [facility] on 9/24 to ensure transportation has been arranged; transportation had been arranged. Patient complained of some back pain due to sleeping the wrong way but otherwise had no complaints.

## 2018-09-26 ENCOUNTER — HOSPITAL ENCOUNTER (OUTPATIENT)
Dept: OTHER | Age: 53
Discharge: HOME | End: 2018-09-26
Payer: MEDICAID

## 2018-09-26 ENCOUNTER — TELEPHONE (OUTPATIENT)
Dept: FAMILY MEDICINE CLINIC | Age: 53
End: 2018-09-26

## 2018-09-26 VITALS
BODY MASS INDEX: 25.42 KG/M2 | DIASTOLIC BLOOD PRESSURE: 60 MMHG | RESPIRATION RATE: 20 BRPM | WEIGHT: 139 LBS | OXYGEN SATURATION: 99 % | HEART RATE: 85 BPM | SYSTOLIC BLOOD PRESSURE: 102 MMHG

## 2018-09-26 PROCEDURE — 99211 OFF/OP EST MAY X REQ PHY/QHP: CPT

## 2018-09-26 NOTE — PROGRESS NOTES
Date:  2018  Time:  12:56 PM    CHF Clinic at [facility]    Office: [phone] Fax: [phone]    Re:  Charo Ordoñez   Patient : 1965    Vital Signs: /60   Pulse 85   Resp 20   Wt 139 lb (63 kg)   LMP  (Within Months) Comment: LMP approx 2 mo ago  SpO2 99%   BMI 25.42 kg/m²                       O2 Device: None (Room air)                           No results for input(s): CBC, HGB, HCT, WBC, PLATELET, NA, K, CL, CO2, BUN, CREATININE, GLUCOSE, BNP, INR in the last 72 hours. Respiratory:    Assessment  Charting Type: Reassessment    Breath Sounds  Right Upper Lobe: Clear  Right Middle Lobe: Clear  Right Lower Lobe: Clear  Left Upper Lobe: Clear  Left Lower Lobe: Clear    Cough/Sputum  Cough: None         Peripheral Vascular  RLE Edema: None  LLE Edema: None      Complaints: None        Comment : Weight stable. Last visit here 3 months ago. No new or acute s/s CHF. States compliance with medications. Reminded of low salt diet and fluid limits. Patient has a pacemaker/ICD and is unsure of when she last saw a cardiologist. Kenny Garrido with [address] [facility] and [facility] and patient has not been seen either place for at least 5 years. Discussed with patient importance of cardiology follow up. Left a message with Yudy Toledo RN with [facility] who visits the patient in her home, and helps her arrange medical appointments. Next visit here 18.     Electronically signed by Krysta Gaines RN on 2018 at 12:56 PM

## 2018-09-27 ENCOUNTER — TELEPHONE (OUTPATIENT)
Dept: FAMILY MEDICINE CLINIC | Age: 53
End: 2018-09-27

## 2018-10-03 ENCOUNTER — TELEPHONE (OUTPATIENT)
Dept: FAMILY MEDICINE CLINIC | Age: 53
End: 2018-10-03

## 2018-10-03 RX ORDER — CARVEDILOL 3.12 MG/1
TABLET ORAL
Qty: 60 TABLET | Refills: 0 | Status: ON HOLD | OUTPATIENT
Start: 2018-10-03 | End: 2018-11-25 | Stop reason: HOSPADM

## 2018-10-03 NOTE — TELEPHONE ENCOUNTER
Pharmacy called and said patient need a refill and it is not to early for the refill on Bumetanide 1 mg and need qaunity of 60 for 30 day supply

## 2018-10-08 DIAGNOSIS — I50.22 CHRONIC SYSTOLIC HEART FAILURE (HCC): ICD-10-CM

## 2018-10-08 RX ORDER — BUMETANIDE 1 MG/1
TABLET ORAL
Qty: 30 TABLET | Refills: 3 | Status: ON HOLD | OUTPATIENT
Start: 2018-10-08 | End: 2018-11-25

## 2018-11-01 ENCOUNTER — TELEPHONE (OUTPATIENT)
Dept: FAMILY MEDICINE CLINIC | Age: 53
End: 2018-11-01

## 2018-11-08 ENCOUNTER — TELEPHONE (OUTPATIENT)
Dept: FAMILY MEDICINE CLINIC | Age: 53
End: 2018-11-08

## 2018-11-15 ENCOUNTER — TELEPHONE (OUTPATIENT)
Dept: FAMILY MEDICINE CLINIC | Age: 53
End: 2018-11-15

## 2018-11-21 NOTE — TELEPHONE ENCOUNTER
Phone call to patient to inform her of her sick visit appointment time. Patient stated that she would not be able to make it as she had just moved and is getting utilities turned on. Strongly urged patient to keep her appointment as her health is her priority. Patient still refused and stated that she would come in another day. Patient stated she would call the office in about a week to follow up.

## 2018-11-24 ENCOUNTER — APPOINTMENT (OUTPATIENT)
Dept: GENERAL RADIOLOGY | Age: 53
DRG: 194 | End: 2018-11-24
Payer: MEDICAID

## 2018-11-24 ENCOUNTER — HOSPITAL ENCOUNTER (INPATIENT)
Age: 53
LOS: 1 days | Discharge: HOME | DRG: 194 | End: 2018-11-25
Attending: EMERGENCY MEDICINE | Admitting: FAMILY MEDICINE
Payer: MEDICAID

## 2018-11-24 DIAGNOSIS — R07.9 CHEST PAIN, UNSPECIFIED TYPE: ICD-10-CM

## 2018-11-24 DIAGNOSIS — I50.22 CHRONIC SYSTOLIC HEART FAILURE (HCC): ICD-10-CM

## 2018-11-24 DIAGNOSIS — I50.9 ACUTE ON CHRONIC CONGESTIVE HEART FAILURE, UNSPECIFIED HEART FAILURE TYPE (HCC): Primary | ICD-10-CM

## 2018-11-24 DIAGNOSIS — I10 ESSENTIAL HYPERTENSION: ICD-10-CM

## 2018-11-24 LAB
ABSOLUTE EOS #: 0.14 K/UL (ref 0–0.44)
ABSOLUTE IMMATURE GRANULOCYTE: <0.03 K/UL (ref 0–0.3)
ABSOLUTE LYMPH #: 2.18 K/UL (ref 1.1–3.7)
ABSOLUTE MONO #: 0.55 K/UL (ref 0.1–1.2)
ALBUMIN SERPL-MCNC: 4.1 G/DL (ref 3.5–5.2)
ALBUMIN/GLOBULIN RATIO: 1.2 (ref 1–2.5)
ALP BLD-CCNC: 86 U/L (ref 35–104)
ALT SERPL-CCNC: 23 U/L (ref 5–33)
ANION GAP SERPL CALCULATED.3IONS-SCNC: 13 MMOL/L (ref 9–17)
AST SERPL-CCNC: 24 U/L
BASOPHILS # BLD: 1 % (ref 0–2)
BASOPHILS ABSOLUTE: 0.05 K/UL (ref 0–0.2)
BILIRUB SERPL-MCNC: 0.45 MG/DL (ref 0.3–1.2)
BNP INTERPRETATION: ABNORMAL
BUN BLDV-MCNC: 20 MG/DL (ref 6–20)
BUN/CREAT BLD: ABNORMAL (ref 9–20)
CALCIUM SERPL-MCNC: 9.6 MG/DL (ref 8.6–10.4)
CHLORIDE BLD-SCNC: 104 MMOL/L (ref 98–107)
CO2: 25 MMOL/L (ref 20–31)
CREAT SERPL-MCNC: 1.03 MG/DL (ref 0.5–0.9)
DIFFERENTIAL TYPE: ABNORMAL
EOSINOPHILS RELATIVE PERCENT: 2 % (ref 1–4)
GFR AFRICAN AMERICAN: >60 ML/MIN
GFR NON-AFRICAN AMERICAN: 56 ML/MIN
GFR SERPL CREATININE-BSD FRML MDRD: ABNORMAL ML/MIN/{1.73_M2}
GFR SERPL CREATININE-BSD FRML MDRD: ABNORMAL ML/MIN/{1.73_M2}
GLUCOSE BLD-MCNC: 118 MG/DL (ref 70–99)
HCT VFR BLD CALC: 37.5 % (ref 36.3–47.1)
HEMOGLOBIN: 12 G/DL (ref 11.9–15.1)
IMMATURE GRANULOCYTES: 0 %
LIPASE: 14 U/L (ref 13–60)
LYMPHOCYTES # BLD: 35 % (ref 24–43)
MCH RBC QN AUTO: 30.2 PG (ref 25.2–33.5)
MCHC RBC AUTO-ENTMCNC: 32 G/DL (ref 28.4–34.8)
MCV RBC AUTO: 94.2 FL (ref 82.6–102.9)
MONOCYTES # BLD: 9 % (ref 3–12)
MYOGLOBIN: 26 NG/ML (ref 25–58)
NRBC AUTOMATED: 0 PER 100 WBC
PDW BLD-RTO: 15.7 % (ref 11.8–14.4)
PLATELET # BLD: 203 K/UL (ref 138–453)
PLATELET ESTIMATE: ABNORMAL
PMV BLD AUTO: 12.1 FL (ref 8.1–13.5)
POC TROPONIN I: 0.01 NG/ML (ref 0–0.1)
POC TROPONIN INTERP: NORMAL
POTASSIUM SERPL-SCNC: 3.6 MMOL/L (ref 3.7–5.3)
PRO-BNP: 6634 PG/ML
PROCALCITONIN: 0.06 NG/ML
RBC # BLD: 3.98 M/UL (ref 3.95–5.11)
RBC # BLD: ABNORMAL 10*6/UL
SEG NEUTROPHILS: 53 % (ref 36–65)
SEGMENTED NEUTROPHILS ABSOLUTE COUNT: 3.22 K/UL (ref 1.5–8.1)
SODIUM BLD-SCNC: 142 MMOL/L (ref 135–144)
TOTAL PROTEIN: 7.4 G/DL (ref 6.4–8.3)
TROPONIN INTERP: NORMAL
TROPONIN T: <0.03 NG/ML
WBC # BLD: 6.2 K/UL (ref 3.5–11.3)
WBC # BLD: ABNORMAL 10*3/UL

## 2018-11-24 PROCEDURE — 6370000000 HC RX 637 (ALT 250 FOR IP): Performed by: STUDENT IN AN ORGANIZED HEALTH CARE EDUCATION/TRAINING PROGRAM

## 2018-11-24 PROCEDURE — 6360000002 HC RX W HCPCS: Performed by: STUDENT IN AN ORGANIZED HEALTH CARE EDUCATION/TRAINING PROGRAM

## 2018-11-24 PROCEDURE — 96375 TX/PRO/DX INJ NEW DRUG ADDON: CPT

## 2018-11-24 PROCEDURE — 94660 CPAP INITIATION&MGMT: CPT

## 2018-11-24 PROCEDURE — 71046 X-RAY EXAM CHEST 2 VIEWS: CPT

## 2018-11-24 PROCEDURE — 83690 ASSAY OF LIPASE: CPT

## 2018-11-24 PROCEDURE — 83874 ASSAY OF MYOGLOBIN: CPT

## 2018-11-24 PROCEDURE — 99285 EMERGENCY DEPT VISIT HI MDM: CPT

## 2018-11-24 PROCEDURE — 96374 THER/PROPH/DIAG INJ IV PUSH: CPT

## 2018-11-24 PROCEDURE — G0378 HOSPITAL OBSERVATION PER HR: HCPCS

## 2018-11-24 PROCEDURE — 83880 ASSAY OF NATRIURETIC PEPTIDE: CPT

## 2018-11-24 PROCEDURE — 36415 COLL VENOUS BLD VENIPUNCTURE: CPT

## 2018-11-24 PROCEDURE — 2060000000 HC ICU INTERMEDIATE R&B

## 2018-11-24 PROCEDURE — 85025 COMPLETE CBC W/AUTO DIFF WBC: CPT

## 2018-11-24 PROCEDURE — 84484 ASSAY OF TROPONIN QUANT: CPT

## 2018-11-24 PROCEDURE — 2580000003 HC RX 258: Performed by: STUDENT IN AN ORGANIZED HEALTH CARE EDUCATION/TRAINING PROGRAM

## 2018-11-24 PROCEDURE — 84145 PROCALCITONIN (PCT): CPT

## 2018-11-24 PROCEDURE — 80053 COMPREHEN METABOLIC PANEL: CPT

## 2018-11-24 PROCEDURE — 93005 ELECTROCARDIOGRAM TRACING: CPT

## 2018-11-24 PROCEDURE — 2500000003 HC RX 250 WO HCPCS: Performed by: STUDENT IN AN ORGANIZED HEALTH CARE EDUCATION/TRAINING PROGRAM

## 2018-11-24 RX ORDER — ALBUTEROL SULFATE 2.5 MG/3ML
2.5 SOLUTION RESPIRATORY (INHALATION) EVERY 4 HOURS PRN
Status: DISCONTINUED | OUTPATIENT
Start: 2018-11-24 | End: 2018-11-25 | Stop reason: HOSPADM

## 2018-11-24 RX ORDER — ONDANSETRON 2 MG/ML
4 INJECTION INTRAMUSCULAR; INTRAVENOUS EVERY 6 HOURS PRN
Status: DISCONTINUED | OUTPATIENT
Start: 2018-11-24 | End: 2018-11-25 | Stop reason: HOSPADM

## 2018-11-24 RX ORDER — POTASSIUM CHLORIDE 7.45 MG/ML
10 INJECTION INTRAVENOUS PRN
Status: DISCONTINUED | OUTPATIENT
Start: 2018-11-24 | End: 2018-11-25 | Stop reason: HOSPADM

## 2018-11-24 RX ORDER — BISACODYL 10 MG
10 SUPPOSITORY, RECTAL RECTAL DAILY PRN
Status: DISCONTINUED | OUTPATIENT
Start: 2018-11-24 | End: 2018-11-25 | Stop reason: HOSPADM

## 2018-11-24 RX ORDER — B12/LEVOMEFOLATE CALCIUM/B-6 2-1.13-25
1 TABLET ORAL DAILY
Status: DISCONTINUED | OUTPATIENT
Start: 2018-11-24 | End: 2018-11-25 | Stop reason: HOSPADM

## 2018-11-24 RX ORDER — HEPARIN SODIUM 5000 [USP'U]/ML
5000 INJECTION, SOLUTION INTRAVENOUS; SUBCUTANEOUS EVERY 8 HOURS SCHEDULED
Status: DISCONTINUED | OUTPATIENT
Start: 2018-11-24 | End: 2018-11-25 | Stop reason: HOSPADM

## 2018-11-24 RX ORDER — DOCUSATE SODIUM 100 MG/1
100 CAPSULE, LIQUID FILLED ORAL 2 TIMES DAILY PRN
Status: DISCONTINUED | OUTPATIENT
Start: 2018-11-24 | End: 2018-11-25 | Stop reason: HOSPADM

## 2018-11-24 RX ORDER — FUROSEMIDE 10 MG/ML
20 INJECTION INTRAMUSCULAR; INTRAVENOUS ONCE
Status: COMPLETED | OUTPATIENT
Start: 2018-11-24 | End: 2018-11-24

## 2018-11-24 RX ORDER — LISINOPRIL 10 MG/1
10 TABLET ORAL DAILY
Status: DISCONTINUED | OUTPATIENT
Start: 2018-11-24 | End: 2018-11-25 | Stop reason: HOSPADM

## 2018-11-24 RX ORDER — POTASSIUM CHLORIDE 20MEQ/15ML
40 LIQUID (ML) ORAL PRN
Status: DISCONTINUED | OUTPATIENT
Start: 2018-11-24 | End: 2018-11-25 | Stop reason: HOSPADM

## 2018-11-24 RX ORDER — ACETAMINOPHEN 325 MG/1
650 TABLET ORAL EVERY 4 HOURS PRN
Status: DISCONTINUED | OUTPATIENT
Start: 2018-11-24 | End: 2018-11-25 | Stop reason: HOSPADM

## 2018-11-24 RX ORDER — BUMETANIDE 0.25 MG/ML
1 INJECTION, SOLUTION INTRAMUSCULAR; INTRAVENOUS 2 TIMES DAILY
Status: DISCONTINUED | OUTPATIENT
Start: 2018-11-24 | End: 2018-11-25

## 2018-11-24 RX ORDER — FLUTICASONE PROPIONATE 50 MCG
1 SPRAY, SUSPENSION (ML) NASAL DAILY
Status: DISCONTINUED | OUTPATIENT
Start: 2018-11-24 | End: 2018-11-25 | Stop reason: HOSPADM

## 2018-11-24 RX ORDER — PANTOPRAZOLE SODIUM 40 MG/1
40 TABLET, DELAYED RELEASE ORAL
Status: DISCONTINUED | OUTPATIENT
Start: 2018-11-25 | End: 2018-11-25 | Stop reason: HOSPADM

## 2018-11-24 RX ORDER — SPIRONOLACTONE 25 MG/1
25 TABLET ORAL DAILY
Status: DISCONTINUED | OUTPATIENT
Start: 2018-11-24 | End: 2018-11-25 | Stop reason: HOSPADM

## 2018-11-24 RX ORDER — CARVEDILOL 3.12 MG/1
3.12 TABLET ORAL 2 TIMES DAILY WITH MEALS
Status: DISCONTINUED | OUTPATIENT
Start: 2018-11-24 | End: 2018-11-25 | Stop reason: HOSPADM

## 2018-11-24 RX ORDER — SODIUM CHLORIDE 0.9 % (FLUSH) 0.9 %
10 SYRINGE (ML) INJECTION EVERY 12 HOURS SCHEDULED
Status: DISCONTINUED | OUTPATIENT
Start: 2018-11-24 | End: 2018-11-25 | Stop reason: HOSPADM

## 2018-11-24 RX ORDER — POTASSIUM CHLORIDE 20 MEQ/1
40 TABLET, EXTENDED RELEASE ORAL PRN
Status: DISCONTINUED | OUTPATIENT
Start: 2018-11-24 | End: 2018-11-25 | Stop reason: HOSPADM

## 2018-11-24 RX ORDER — SERTRALINE HYDROCHLORIDE 25 MG/1
25 TABLET, FILM COATED ORAL DAILY
Status: DISCONTINUED | OUTPATIENT
Start: 2018-11-24 | End: 2018-11-25 | Stop reason: HOSPADM

## 2018-11-24 RX ORDER — KETOROLAC TROMETHAMINE 30 MG/ML
15 INJECTION, SOLUTION INTRAMUSCULAR; INTRAVENOUS ONCE
Status: COMPLETED | OUTPATIENT
Start: 2018-11-24 | End: 2018-11-24

## 2018-11-24 RX ORDER — SODIUM CHLORIDE 0.9 % (FLUSH) 0.9 %
10 SYRINGE (ML) INJECTION PRN
Status: DISCONTINUED | OUTPATIENT
Start: 2018-11-24 | End: 2018-11-25 | Stop reason: HOSPADM

## 2018-11-24 RX ORDER — ASPIRIN 81 MG/1
324 TABLET, CHEWABLE ORAL ONCE
Status: COMPLETED | OUTPATIENT
Start: 2018-11-24 | End: 2018-11-24

## 2018-11-24 RX ADMIN — CARVEDILOL 3.12 MG: 3.12 TABLET, FILM COATED ORAL at 20:13

## 2018-11-24 RX ADMIN — FUROSEMIDE 20 MG: 10 INJECTION, SOLUTION INTRAMUSCULAR; INTRAVENOUS at 16:52

## 2018-11-24 RX ADMIN — KETOROLAC TROMETHAMINE 15 MG: 30 INJECTION, SOLUTION INTRAMUSCULAR at 15:50

## 2018-11-24 RX ADMIN — FLUTICASONE PROPIONATE 1 SPRAY: 50 SPRAY, METERED NASAL at 20:13

## 2018-11-24 RX ADMIN — Medication 10 ML: at 20:10

## 2018-11-24 RX ADMIN — Medication 1 TABLET: at 20:12

## 2018-11-24 RX ADMIN — BUMETANIDE 1 MG: 0.25 INJECTION INTRAMUSCULAR; INTRAVENOUS at 20:12

## 2018-11-24 RX ADMIN — ASPIRIN 81 MG 324 MG: 81 TABLET ORAL at 15:49

## 2018-11-24 ASSESSMENT — ENCOUNTER SYMPTOMS
VOICE CHANGE: 0
NAUSEA: 0
CONSTIPATION: 0
RHINORRHEA: 1
VOMITING: 0
DIARRHEA: 0
TROUBLE SWALLOWING: 0
WHEEZING: 0
COUGH: 1
ABDOMINAL PAIN: 1
SHORTNESS OF BREATH: 1

## 2018-11-24 ASSESSMENT — HEART SCORE: ECG: 1

## 2018-11-24 ASSESSMENT — PAIN SCALES - GENERAL
PAINLEVEL_OUTOF10: 8
PAINLEVEL_OUTOF10: 8
PAINLEVEL_OUTOF10: 9

## 2018-11-24 ASSESSMENT — PAIN DESCRIPTION - PAIN TYPE: TYPE: CHRONIC PAIN

## 2018-11-24 ASSESSMENT — PAIN DESCRIPTION - LOCATION
LOCATION: GENERALIZED
LOCATION: BACK

## 2018-11-24 ASSESSMENT — PAIN DESCRIPTION - DESCRIPTORS: DESCRIPTORS: ACHING

## 2018-11-24 NOTE — H&P
[facility]  History & Physical Examination Note              Date:   11/24/2018  Patient name:  Mamie Rodriguez  Date of admission:  11/24/2018  3:07 PM  MRN:   [id number]  YOB: 1965    CHIEF COMPLAINT:     Chief Complaint   Patient presents with    Cough     green productive mucous    Illness     thinks she has the flu       History Obtained From:  Patient and chart review. HPI:     The patient is a 48 y.o.  female with history of Chronic systolic CHF, HFrEF, severe mitral regurgitation likely 2/2 dilated cardiomyopathy s/p AICD placement (2005), Mild intermittent asthma, HTN, MI, COPD, who presented worsening SOB for the past few weeks. Associated with green productive cough, chills, 2 episodes of non-bloody vomitus, dull upper abdominal pain, intermittent bilateral LE edema. Denies fever, headache, dysuria, diarrhea/constipation. Pt has not used nebulizer in one month due to moving houses but has been compliant with other meds. In the ER, CXR showed Cardiomegaly without evidence of failure, Trop was negative x1, BNP was elevated to 6,634 (last noted to be 5,969 in 6/2018)    Pt given IV lasix and placed on BiPAP which helped improve her symptoms      PAST MEDICAL HISTORY:        has a past medical history of Asthma; CAD (coronary artery disease); Cardiomegaly; CHF (congestive heart failure) ([address] COPD (chronic obstructive pulmonary disease) ([facility] [address] Depression; Hypertension; MI (myocardial infarction) ([facility] [address] and Unspecified diseases of blood and blood-forming organs. PAST SURGICAL HISTORY:      has a past surgical history that includes Cardiac defibrillator placement (09/2005); Pacemaker insertion; Tubal ligation; Cardiac defibrillator placement; and Uterine fibroid surgery (jocelin 2015).      FAMILY HISTORY:     family history includes Diabetes in her father and mother; Heart Disease in her brother and mother; High about 0.25 packs per day. She has never used smokeless tobacco. She reports that she drinks about 0.6 oz of alcohol per week . She reports that she does not use drugs. REVIEW OF SYSTEMS:     CONSTITUTIONAL:  Chills, no fevers  EYES: negative for double vision  HEENT: No headaches, no rhinorrhea, no nasal congestion, no sore throat, no difficulty swallowing  RESPIRATORY:SOB, greenish productive cough negative for dyspnea, no wheezing. CARDIOVASCULAR: negative for chest pain, no palpitations, no fatigue, intermittent bilateral LE edema  GASTROINTESTINAL: no nausea, positive vomiting x2, no change in bowel habits, upper abdominal pain   GENITOURINARY: negative for frequency, no dysuria, no nocturia   INTEGUMENT: negative for rash, no easy bruising   HEMATOLOGIC/LYMPHATIC: negative for swelling/edema   ALLERGIC/IMMUNOLOGIC: negative for urticaria   ENDOCRINE: no polydipsia, no polyuria, no hot or cold intolerance  MUSCULOSKELETAL: no joint pains, no muscle aches, no swelling of joints or extremities  NEUROLOGICAL: no numbness, no tingling  BEHAVIOR/PSYCH: negative      PHYSICAL EXAM:     Vitals:    11/24/18 1631 11/24/18 1632 11/24/18 1633 11/24/18 1713   BP: (!) 125/92   (!) 134/95   Pulse: 85 81 80 81   Resp:    20   Temp:    97.4 °F (36.3 °C)   TempSrc:    Oral   SpO2:  97% 96% 92%   Weight:    135 lb 12.9 oz (61.6 kg)   Height:    5' 2\" (1.575 m)       No intake or output data in the 24 hours ending 11/24/18 1746    General Appearance  Alert , awake , oriented x 3, not in acute distress  HEENT - Head is normocephalic, atraumatic. Eye - no icterus no redness, EOMI  Ear- NO ear pain, normal external ear , no discharge  Lungs - Bilateral equal air entry , no wheezes, rales or rhonchi, aeration good  Cardiovascular - Heart sounds are normal.  Regular rhythm, normal rate without murmur, gallop or rub.   Abdomen - Soft, nontender, nondistended, no masses or organomegaly  Neurologic - There are no new focal motor or sensory deficits, muscle strength 5/5 in all extremities, normal muscle tone and bulk, no abnormal sensation. Skin - No bruising or bleeding on exposed skin area  Extremities - No cyanosis, clubbing or edema  Psych - normal affect        DIAGNOSTICS:      Laboratory Testing:    Recent Results (from the past 24 hour(s))   POCT troponin    Collection Time: 11/24/18  3:26 PM   Result Value Ref Range    POC Troponin I 0.01 0.00 - 0.10 ng/mL    POC Troponin Interp       The Troponin-I (POC) results cannot be compared to the Troponin-T results.    CBC Auto Differential    Collection Time: 11/24/18  3:27 PM   Result Value Ref Range    WBC 6.2 3.5 - 11.3 k/uL    RBC 3.98 3.95 - 5.11 m/uL    Hemoglobin 12.0 11.9 - 15.1 g/dL    Hematocrit 37.5 36.3 - 47.1 %    MCV 94.2 82.6 - 102.9 fL    MCH 30.2 25.2 - 33.5 pg    MCHC 32.0 28.4 - 34.8 g/dL    RDW 15.7 (H) 11.8 - 14.4 %    Platelets 672 899 - 909 k/uL    MPV 12.1 8.1 - 13.5 fL    NRBC Automated 0.0 0.0 per 100 WBC    Differential Type NOT REPORTED     Seg Neutrophils 53 36 - 65 %    Lymphocytes 35 24 - 43 %    Monocytes 9 3 - 12 %    Eosinophils % 2 1 - 4 %    Basophils 1 0 - 2 %    Immature Granulocytes 0 0 %    Segs Absolute 3.22 1.50 - 8.10 k/uL    Absolute Lymph # 2.18 1.10 - 3.70 k/uL    Absolute Mono # 0.55 0.10 - 1.20 k/uL    Absolute Eos # 0.14 0.00 - 0.44 k/uL    Basophils # 0.05 0.00 - 0.20 k/uL    Absolute Immature Granulocyte <0.03 0.00 - 0.30 k/uL    WBC Morphology NOT REPORTED     RBC Morphology ANISOCYTOSIS PRESENT     Platelet Estimate NOT REPORTED    Comprehensive Metabolic Panel    Collection Time: 11/24/18  3:27 PM   Result Value Ref Range    Glucose 118 (H) 70 - 99 mg/dL    BUN 20 6 - 20 mg/dL    CREATININE 1.03 (H) 0.50 - 0.90 mg/dL    Bun/Cre Ratio NOT REPORTED 9 - 20    Calcium 9.6 8.6 - 10.4 mg/dL    Sodium 142 135 - 144 mmol/L    Potassium 3.6 (L) 3.7 - 5.3 mmol/L    Chloride 104 98 - 107 mmol/L    CO2 25 20 - 31 mmol/L    Anion Gap 13 9 - 17 mmol/L Alkaline Phosphatase 86 35 - 104 U/L    ALT 23 5 - 33 U/L    AST 24 <32 U/L    Total Bilirubin 0.45 0.3 - 1.2 mg/dL    Total Protein 7.4 6.4 - 8.3 g/dL    Alb 4.1 3.5 - 5.2 g/dL    Albumin/Globulin Ratio 1.2 1.0 - 2.5    GFR Non- 56 (L) >60 mL/min    GFR African American >60 >60 mL/min    GFR Comment          GFR Staging NOT REPORTED    Brain Natriuretic Peptide    Collection Time: 11/24/18  3:27 PM   Result Value Ref Range    Pro-BNP 6,634 (H) <300 pg/mL    BNP Interpretation         Procalcitonin    Collection Time: 11/24/18  3:27 PM   Result Value Ref Range    Procalcitonin 0.06 <0.09 ng/mL         Imaging/Diagonstics:  Xr Chest Standard (2 Vw)    Result Date: 11/24/2018  EXAMINATION: TWO VIEWS OF THE CHEST 11/24/2018 3:35 pm COMPARISON: None. HISTORY: ORDERING SYSTEM PROVIDED HISTORY: Dyspnea on exertion, Productive cough green phlegm, HX CAD, COPD TECHNOLOGIST PROVIDED HISTORY: Dyspnea on exertion, Productive cough green phlegm, HX CAD, COPD FINDINGS: Lungs: Clear Mediastinum: Cardiomegaly Pleura: No pleural effusion or pneumothorax Other: Left chest wall cardiac pacemaker noted. Cardiomegaly without evidence of failure. ASSESSMENT:       Principal Problem:    Acute decompensated heart failure (HCC)  Active Problems:    COPD (chronic obstructive pulmonary disease) (HCC)    Mild intermittent asthma    Essential hypertension  Resolved Problems:    * No resolved hospital problems.  *      PLAN:     Patient status: Admit the patient as Inpatient in the Progressive Unit     Acute on Chronic Systolic HF, HFrEF   - EF of 10-15% from ECHO on 4/2018  - IV Bumex 1 mg BID  - Cardiology Consulted  - BNP every other day  - Continue home medications  - Strict I&O  - Cardiac diet, low sodium and 1500 mL fluid restriction    Mild Intermittent asthma  - Resp eval and treat  - Continue home mdications  - BiPAP PRN    HTN  - Coreg 3.125 mg BID  - Lisinopril 10 mg       DVT prophylaxis: heparin (porcine) injection 5,000 TID  GI prophylaxis: Protonix 40 mg daily      Consultations:   Consults: IP CONSULT TO FAMILY MEDICINE  IP CONSULT TO HEART FAILURE NURSE/COORDINATOR  IP CONSULT TO DIETITIAN  IP CONSULT TO CARDIOLOGY  PT/OT       The severity of this patient's signs and symptoms (specify CHF, SOB, CP) indicate the need for an inpatient admission.       Above plan discussed with the patient, who agreed to the above plan     Plan will be discussed with the attending, Dr. Jolie Gerber MD  Family Medicine Resident  11/24/2018 5:46 PM

## 2018-11-24 NOTE — ED PROVIDER NOTES
will need admission and based on fluid on lungs will await BNP for furosemide as the pt did not have significant edema in legs to suggest fluid overload. Dayana Leal,, DO, RDMS.   Attending Emergency Physician          Dayana Leal DO  11/24/18 9299

## 2018-11-24 NOTE — ED PROVIDER NOTES
HOURS AS NEEDED FOR WHEEZING 3/26/18   Shamir Shook MD   metroNIDAZOLE (METROGEL) 0.75 % gel Apply topically 2 times daily. 11/20/17   Shamir Shook MD   sertraline (ZOLOFT) 25 MG tablet Take 1 tablet by mouth daily 11/20/17   Shamir Shook MD   fluticasone [facility]) 50 MCG/ACT nasal spray 1 spray by Nasal route daily 6/15/17   Shamir Shook MD       REVIEW OF SYSTEMS    (2-9 systems for level 4, 10 or more for level 5)      Review of Systems   Constitutional: Positive for appetite change and fatigue. Negative for activity change and fever. HENT: Positive for congestion and rhinorrhea. Negative for trouble swallowing and voice change. Respiratory: Positive for cough and shortness of breath. Negative for wheezing. Cardiovascular: Positive for chest pain. Patient admits to dyspnea on exertion   Gastrointestinal: Positive for abdominal pain (Diffuse nonspecific). Negative for constipation, diarrhea, nausea and vomiting. Genitourinary: Negative for difficulty urinating and dysuria. Musculoskeletal: Negative for gait problem. Skin: Negative for rash. Neurological: Negative for weakness and headaches. PHYSICAL EXAM   (up to 7 for level 4, 8 or more for level 5)      INITIAL VITALS:  /86   Pulse 75   Temp 97.6 °F (36.4 °C) (Oral)   Resp 24   Ht 5' 2\" (1.575 m)   Wt 135 lb 12.9 oz (61.6 kg)   LMP  (Within Months) Comment: LMP approx 2 mo ago  SpO2 100%   BMI 24.84 kg/m²     Physical Exam   Constitutional: She is oriented to person, place, and time. She appears well-developed and well-nourished. No distress. HENT:   Head: Normocephalic and atraumatic. Eyes: Pupils are equal, round, and reactive to light. Conjunctivae and EOM are normal.   Neck: Normal range of motion. Neck supple. Cardiovascular: Normal rate, regular rhythm, normal heart sounds and intact distal pulses. Pulmonary/Chest: No stridor. She has no wheezes.    Patient has diminished breath sounds at the bases bilaterally with slight tachypnea and worsening shortness of breath while speaking. Abdominal: Soft. Bowel sounds are normal. There is no tenderness. There is no rebound. Musculoskeletal: Normal range of motion. She exhibits no edema. Neurological: She is alert and oriented to person, place, and time. No cranial nerve deficit. Skin: Skin is warm and dry. She is not diaphoretic. Psychiatric: She has a normal mood and affect. Thought content normal.   Nursing note and vitals reviewed. DIFFERENTIAL  DIAGNOSIS     PLAN (LABS / IMAGING / EKG):  Orders Placed This Encounter   Procedures    XR CHEST STANDARD (2 VW)    CBC Auto Differential    Comprehensive Metabolic Panel    Brain Natriuretic Peptide    Basic Metabolic Panel w/ Reflex to MG    Magnesium    TSH without Reflex    Brain Natriuretic Peptide    Lipid panel - fasting    CBC    Procalcitonin    Lipase    TROP/MYOGLOBIN    DIET CARDIAC; Low Sodium (2 GM);  Daily Fluid Restriction: 1500 ml    Vital signs per unit routine    Telemetry monitoring    Notify physician    Up with assistance    Daily weights    Intake and output    Tobacco cessation education protocol    Encourage deep breathing and coughing every two hours while awake    Place intermittent pneumatic compression device    Full Code    Inpatient [facility] to [facility]    Inpatient consult to Heart Failure Nurse/Coordinator    Inpatient consult to Dietitian    Consult to Cardiology    OT eval and treat    PT evaluation and treat    BIPAP    Initiate Oxygen Therapy Protocol    Pulse Oximetry Spot Check    Respiratory Care Evaluation and Treat    POCT troponin    POCT troponin    EKG 12 Lead    EKG 12 Lead    PATIENT STATUS (FROM ED OR OR/PROCEDURAL) Inpatient       MEDICATIONS ORDERED:  Orders Placed This Encounter   Medications    aspirin chewable tablet 324 mg    ketorolac (TORADOL) injection 15 mg    furosemide (LASIX) injection 20 Admitted    PATIENT REFERRED TO:  Howie Gregory MD  [address]  [phone]            DISCHARGE MEDICATIONS:  Current Discharge Medication List          Amanda Phillips DO  Emergency Medicine Resident    (Please note thatportions of this note were completed with a voice recognition program.  Efforts were made to edit the dictations but occasionally words are mis-transcribed.)       Amanda Phillips DO  Resident  11/24/18 0065

## 2018-11-25 VITALS
OXYGEN SATURATION: 98 % | HEART RATE: 80 BPM | HEIGHT: 62 IN | RESPIRATION RATE: 18 BRPM | BODY MASS INDEX: 24.99 KG/M2 | DIASTOLIC BLOOD PRESSURE: 58 MMHG | WEIGHT: 135.8 LBS | SYSTOLIC BLOOD PRESSURE: 105 MMHG | TEMPERATURE: 98 F

## 2018-11-25 LAB
ANION GAP SERPL CALCULATED.3IONS-SCNC: 15 MMOL/L (ref 9–17)
BNP INTERPRETATION: ABNORMAL
BUN BLDV-MCNC: 21 MG/DL (ref 6–20)
BUN/CREAT BLD: ABNORMAL (ref 9–20)
CALCIUM SERPL-MCNC: 9.5 MG/DL (ref 8.6–10.4)
CHLORIDE BLD-SCNC: 103 MMOL/L (ref 98–107)
CHOLESTEROL/HDL RATIO: 3.6
CHOLESTEROL: 217 MG/DL
CO2: 22 MMOL/L (ref 20–31)
CREAT SERPL-MCNC: 0.96 MG/DL (ref 0.5–0.9)
GFR AFRICAN AMERICAN: >60 ML/MIN
GFR NON-AFRICAN AMERICAN: >60 ML/MIN
GFR SERPL CREATININE-BSD FRML MDRD: ABNORMAL ML/MIN/{1.73_M2}
GFR SERPL CREATININE-BSD FRML MDRD: ABNORMAL ML/MIN/{1.73_M2}
GLUCOSE BLD-MCNC: 108 MG/DL (ref 70–99)
HCT VFR BLD CALC: 38.9 % (ref 36.3–47.1)
HDLC SERPL-MCNC: 61 MG/DL
HEMOGLOBIN: 11.8 G/DL (ref 11.9–15.1)
LDL CHOLESTEROL: 128 MG/DL (ref 0–130)
MAGNESIUM: 1.8 MG/DL (ref 1.6–2.6)
MCH RBC QN AUTO: 29.4 PG (ref 25.2–33.5)
MCHC RBC AUTO-ENTMCNC: 30.3 G/DL (ref 28.4–34.8)
MCV RBC AUTO: 96.8 FL (ref 82.6–102.9)
NRBC AUTOMATED: 0 PER 100 WBC
PDW BLD-RTO: 15.7 % (ref 11.8–14.4)
PLATELET # BLD: 203 K/UL (ref 138–453)
PMV BLD AUTO: 12.1 FL (ref 8.1–13.5)
POTASSIUM SERPL-SCNC: 3.7 MMOL/L (ref 3.7–5.3)
PRO-BNP: 3728 PG/ML
RBC # BLD: 4.02 M/UL (ref 3.95–5.11)
SODIUM BLD-SCNC: 140 MMOL/L (ref 135–144)
TRIGL SERPL-MCNC: 140 MG/DL
TSH SERPL DL<=0.05 MIU/L-ACNC: 1.76 MIU/L (ref 0.3–5)
VLDLC SERPL CALC-MCNC: ABNORMAL MG/DL (ref 1–30)
WBC # BLD: 6.5 K/UL (ref 3.5–11.3)

## 2018-11-25 PROCEDURE — 84443 ASSAY THYROID STIM HORMONE: CPT

## 2018-11-25 PROCEDURE — G0378 HOSPITAL OBSERVATION PER HR: HCPCS

## 2018-11-25 PROCEDURE — 36415 COLL VENOUS BLD VENIPUNCTURE: CPT

## 2018-11-25 PROCEDURE — 83735 ASSAY OF MAGNESIUM: CPT

## 2018-11-25 PROCEDURE — 80048 BASIC METABOLIC PNL TOTAL CA: CPT

## 2018-11-25 PROCEDURE — 80061 LIPID PANEL: CPT

## 2018-11-25 PROCEDURE — 6370000000 HC RX 637 (ALT 250 FOR IP): Performed by: STUDENT IN AN ORGANIZED HEALTH CARE EDUCATION/TRAINING PROGRAM

## 2018-11-25 PROCEDURE — 99236 HOSP IP/OBS SAME DATE HI 85: CPT | Performed by: FAMILY MEDICINE

## 2018-11-25 PROCEDURE — 85027 COMPLETE CBC AUTOMATED: CPT

## 2018-11-25 PROCEDURE — 83880 ASSAY OF NATRIURETIC PEPTIDE: CPT

## 2018-11-25 RX ORDER — ALBUTEROL SULFATE 2.5 MG/3ML
2.5 SOLUTION RESPIRATORY (INHALATION) EVERY 4 HOURS PRN
Status: DISCONTINUED | OUTPATIENT
Start: 2018-11-25 | End: 2018-11-25 | Stop reason: HOSPADM

## 2018-11-25 RX ORDER — BUMETANIDE 1 MG/1
TABLET ORAL
Qty: 30 TABLET | Refills: 3 | Status: SHIPPED | OUTPATIENT
Start: 2018-11-25 | End: 2019-08-29 | Stop reason: DRUGHIGH

## 2018-11-25 RX ORDER — ALBUTEROL SULFATE 90 UG/1
AEROSOL, METERED RESPIRATORY (INHALATION)
Qty: 18 G | Refills: 3 | Status: SHIPPED | OUTPATIENT
Start: 2018-11-25 | End: 2018-12-07 | Stop reason: SDUPTHER

## 2018-11-25 RX ORDER — BUMETANIDE 1 MG/1
1 TABLET ORAL 2 TIMES DAILY
Status: DISCONTINUED | OUTPATIENT
Start: 2018-11-25 | End: 2018-11-25 | Stop reason: HOSPADM

## 2018-11-25 RX ORDER — SPIRONOLACTONE 25 MG/1
TABLET ORAL
Qty: 28 TABLET | Refills: 3 | Status: SHIPPED | OUTPATIENT
Start: 2018-11-25 | End: 2019-05-30 | Stop reason: SDUPTHER

## 2018-11-25 RX ORDER — CARVEDILOL 3.12 MG/1
TABLET ORAL
Qty: 60 TABLET | Refills: 0 | Status: SHIPPED | OUTPATIENT
Start: 2018-11-25 | End: 2019-01-09 | Stop reason: SDUPTHER

## 2018-11-25 RX ORDER — IPRATROPIUM BROMIDE AND ALBUTEROL SULFATE 2.5; .5 MG/3ML; MG/3ML
3 SOLUTION RESPIRATORY (INHALATION) 2 TIMES DAILY PRN
Qty: 360 ML | Refills: 1 | Status: SHIPPED | OUTPATIENT
Start: 2018-11-25 | End: 2020-01-19 | Stop reason: SDUPTHER

## 2018-11-25 RX ORDER — OMEPRAZOLE 20 MG/1
CAPSULE, DELAYED RELEASE ORAL
Qty: 30 CAPSULE | Refills: 3 | Status: SHIPPED | OUTPATIENT
Start: 2018-11-25 | End: 2019-03-18 | Stop reason: SDUPTHER

## 2018-11-25 RX ORDER — LISINOPRIL 10 MG/1
10 TABLET ORAL DAILY
Qty: 30 TABLET | Refills: 2 | Status: SHIPPED | OUTPATIENT
Start: 2018-11-25 | End: 2019-03-28 | Stop reason: SDUPTHER

## 2018-11-25 RX ADMIN — Medication 1 TABLET: at 10:17

## 2018-11-25 RX ADMIN — PANTOPRAZOLE SODIUM 40 MG: 40 TABLET, DELAYED RELEASE ORAL at 06:14

## 2018-11-25 RX ADMIN — FLUTICASONE PROPIONATE 1 SPRAY: 50 SPRAY, METERED NASAL at 10:17

## 2018-11-25 RX ADMIN — BUMETANIDE 1 MG: 1 TABLET ORAL at 10:16

## 2018-11-25 RX ADMIN — CARVEDILOL 3.12 MG: 3.12 TABLET, FILM COATED ORAL at 10:17

## 2018-11-25 RX ADMIN — LISINOPRIL 10 MG: 10 TABLET ORAL at 10:16

## 2018-11-25 RX ADMIN — SPIRONOLACTONE 25 MG: 25 TABLET ORAL at 10:17

## 2018-11-25 NOTE — DISCHARGE SUMMARY
45 [facility]  Discharge Summary      NAME:  Jaciel Valdez  :  1965  MRN:  [id number]    Admit date:  2018  Discharge date:   18      Admitting Physician:  Fidel Layne MD    Primary Diagnosis on Admission:   Present on Admission:   Acute decompensated heart failure ([facility] [address]   Essential hypertension   Mild intermittent asthma   COPD (chronic obstructive pulmonary disease) ([facility] [address]      Secondary Diagnoses:   does not have any pertinent problems on file. Admission Condition:  fair     Discharged Condition: stable    Hospital Course: The patient was admitted for the management of acute COPD and CHF failure due to non compliance and running out of her meds. She was given a dose of IV diuretic as well as Duoneb nebs. She markedly improved overnight and had meds to beds given she ran out of her meds. She was also given inhaler education and advised to use a spacer with inhalers which was ordered for her to  from pharmacy. Today on day of discharge pt feels better with no further complaints. Vitals and Labs are at pts baseline. All consultants involved during this admission are agreeable to d/c.     Consults:  cardiology    Significant Diagnostic/theraputic interventions:       Lab Results   Component Value Date    WBC 6.5 2018    HGB 11.8 (L) 2018    HCT 38.9 2018     2018    CHOL 217 (H) 2018    TRIG 140 2018    HDL 61 2018    ALT 23 2018    AST 24 2018     2018    K 3.7 2018     2018    CREATININE 0.96 (H) 2018    BUN 21 (H) 2018    CO2 22 2018    TSH 1.76 2018    INR 1.1 2016    LABA1C 6.4 (H) 2018         Disposition:   home    Instructions to Patient:     Follow up with MD Howie Ronquillo MD  [address]  [phone]            Discharge

## 2018-11-25 NOTE — PROGRESS NOTES
CLINICAL PHARMACY NOTE: MEDS TO [address] Select Patient?: No  Total # of Prescriptions Filled: 9   The following medications were delivered to the patient:  · Omeprazole  · Lisinopril  · Spironolactone  · Bumetanide  · incruse  · Ventolin  · Carvedilol  · Vortex  · Ipratropium-albuterol  Total # of Interventions Completed: 0  Time Spent (min): 0    Additional Documentation:

## 2018-11-25 NOTE — CONSULTS
203  203     BMP: Recent Labs      11/24/18   1527   NA  142   K  3.6*   CO2  25   BUN  20   CREATININE  1.03*   LABGLOM  56*   GLUCOSE  118*     BNP: No results for input(s): BNP in the last 72 hours. PT/INR: No results for input(s): PROTIME, INR in the last 72 hours. APTT:No results for input(s): APTT in the last 72 hours. CARDIAC ENZYMES:  Recent Labs      11/24/18   1526   TROPONINI  0.01     FASTING LIPID PANEL:  Lab Results   Component Value Date    HDL 72 07/16/2018    TRIG 89 07/16/2018     LIVER PROFILE:  Recent Labs      11/24/18   1527   AST  24   ALT  23   LABALBU  4.1       Patient Active Problem List   Diagnosis    COPD (chronic obstructive pulmonary disease) ([address]    Fibroids    Lung nodule < 6cm on CT    Chronic systolic heart failure (HCC) s/p AICD    Mild intermittent asthma    Essential hypertension    Chest pain    Acute systolic CHF (congestive heart failure) ([facility] [address]    Acute decompensated heart failure (HCC)         IMPRESSION:    1. Exertional dyspnea likely secondary to COPD. 2. Low ejection fraction heart failure. 3. Noncardiac chest pain. 4. Smoking. 5. Alcohol abuse    RECOMMENDATIONS:  1. IV bumec changed to oral Bumex 1 mg twice a day. Follow up in CHF clinic as an outpatient. 2. Patient to be follow up in cardiology clinic. 3. Patient educated about smoking cessation and medication compliance.   4. Patient can be discharge per cardiology        Electronically signed by Jessie Palacio MD on 11/25/2018 at 10:43 AM  Internal Medicine resident [facility]

## 2018-11-26 ENCOUNTER — CARE COORDINATION (OUTPATIENT)
Dept: CARE COORDINATION | Age: 53
End: 2018-11-26

## 2018-11-26 ENCOUNTER — TELEPHONE (OUTPATIENT)
Dept: PHARMACY | Age: 53
End: 2018-11-26

## 2018-11-26 LAB
EKG ATRIAL RATE: 89 BPM
EKG P AXIS: 57 DEGREES
EKG P-R INTERVAL: 124 MS
EKG Q-T INTERVAL: 494 MS
EKG QRS DURATION: 176 MS
EKG QTC CALCULATION (BAZETT): 601 MS
EKG R AXIS: -78 DEGREES
EKG T AXIS: 59 DEGREES
EKG VENTRICULAR RATE: 89 BPM

## 2018-11-26 RX ORDER — GUAIFENESIN DEXTROMETHORPHAN HYDROBROMIDE ORAL SOLUTION 10; 100 MG/5ML; MG/5ML
15 SOLUTION ORAL EVERY 6 HOURS PRN
COMMUNITY
End: 2019-06-07 | Stop reason: ALTCHOICE

## 2018-11-27 NOTE — CARE COORDINATION
MCG/INH AEPB INHALE 1 PUFF INTO THE LUNGS DAILY STOP SPIRIVA 1 each 3    lisinopril (PRINIVIL;ZESTRIL) 10 MG tablet Take 1 tablet by mouth daily 30 tablet 2    omeprazole (PRILOSEC) 20 MG delayed release capsule TAKE (1) CAPSULE BY MOUTH ONE TIME DAILY. 30 capsule 3    spironolactone (ALDACTONE) 25 MG tablet TAKE 1 TABLET BY MOUTH DAILY 28 tablet 3    albuterol sulfate HFA (VENTOLIN HFA) 108 (90 Base) MCG/ACT inhaler INHALE 2 PUFFS INTO THE LUNGS EVERY 6 HOURS AS NEEDED FOR WHEEZING 18 g 3    folic acid-pyridoxine-cyanocobalamine (FOLTX) 2.5-25-1 MG TABS tablet Take 1 tablet by mouth daily 30 tablet 0    Multiple Vitamin (MULTIVITAMIN) tablet TAKE ONE TABLET BY MOUTH ONE TIME A DAY 30 tablet 3    fluticasone (FLONASE) 50 MCG/ACT nasal spray 1 spray by Nasal route daily 1 Bottle 3     No current facility-administered medications for this visit.           Current Medications (record all meds as 'taken' or 'not taken' in home med activity)  Outpatient Prescriptions Marked as Taking for the 11/26/18 encounter (Care Coordination) with Russell Hutton RN   Medication Sig Dispense Refill    dextromethorphan-guaiFENesin (TUSSIN DM)  MG/5ML syrup Take 15 mLs by mouth every 6 hours as needed for Cough      ipratropium-albuterol (DUONEB) 0.5-2.5 (3) MG/3ML SOLN nebulizer solution Inhale 3 mLs into the lungs 2 times daily as needed for Shortness of Breath 360 mL 1    Spacer/Aero-Holding Chambers (E-Z SPACER) ELVER 1 Device by Does not apply route daily 1 Device 0    bumetanide (BUMEX) 1 MG tablet TAKE ONE TABLET BY MOUTH TWICE A DAY 30 tablet 3    carvedilol (COREG) 3.125 MG tablet TAKE ONE TABLET BY MOUTH TWICE A DAY WITH MEALS 60 tablet 0    Umeclidinium Bromide (INCRUSE ELLIPTA) 62.5 MCG/INH AEPB INHALE 1 PUFF INTO THE LUNGS DAILY STOP SPIRIVA 1 each 3    lisinopril (PRINIVIL;ZESTRIL) 10 MG tablet Take 1 tablet by mouth daily 30 tablet 2    omeprazole (PRILOSEC) 20 MG delayed release capsule TAKE (1)

## 2018-12-05 ENCOUNTER — HOSPITAL ENCOUNTER (OUTPATIENT)
Dept: OTHER | Age: 53
Discharge: HOME | End: 2018-12-05
Payer: MEDICAID

## 2018-12-05 VITALS
WEIGHT: 136.25 LBS | DIASTOLIC BLOOD PRESSURE: 58 MMHG | BODY MASS INDEX: 24.92 KG/M2 | RESPIRATION RATE: 18 BRPM | SYSTOLIC BLOOD PRESSURE: 93 MMHG | OXYGEN SATURATION: 98 % | HEART RATE: 71 BPM

## 2018-12-05 PROCEDURE — 99211 OFF/OP EST MAY X REQ PHY/QHP: CPT

## 2018-12-07 ENCOUNTER — OFFICE VISIT (OUTPATIENT)
Dept: FAMILY MEDICINE CLINIC | Age: 53
End: 2018-12-07
Payer: MEDICAID

## 2018-12-07 VITALS
OXYGEN SATURATION: 97 % | BODY MASS INDEX: 25.42 KG/M2 | WEIGHT: 139 LBS | HEART RATE: 76 BPM | SYSTOLIC BLOOD PRESSURE: 102 MMHG | DIASTOLIC BLOOD PRESSURE: 69 MMHG | TEMPERATURE: 97.3 F

## 2018-12-07 DIAGNOSIS — J44.9 CHRONIC OBSTRUCTIVE PULMONARY DISEASE, UNSPECIFIED COPD TYPE (HCC): ICD-10-CM

## 2018-12-07 DIAGNOSIS — I50.22 CHRONIC SYSTOLIC HEART FAILURE (HCC): ICD-10-CM

## 2018-12-07 DIAGNOSIS — G43.809 OTHER MIGRAINE WITHOUT STATUS MIGRAINOSUS, NOT INTRACTABLE: Primary | ICD-10-CM

## 2018-12-07 LAB
EKG ATRIAL RATE: 82 BPM
EKG P AXIS: 63 DEGREES
EKG P-R INTERVAL: 120 MS
EKG Q-T INTERVAL: 514 MS
EKG QRS DURATION: 180 MS
EKG QTC CALCULATION (BAZETT): 600 MS
EKG R AXIS: -96 DEGREES
EKG T AXIS: 55 DEGREES
EKG VENTRICULAR RATE: 82 BPM

## 2018-12-07 PROCEDURE — 4004F PT TOBACCO SCREEN RCVD TLK: CPT | Performed by: HOSPITALIST

## 2018-12-07 PROCEDURE — G8926 SPIRO NO PERF OR DOC: HCPCS | Performed by: HOSPITALIST

## 2018-12-07 PROCEDURE — 1111F DSCHRG MED/CURRENT MED MERGE: CPT | Performed by: HOSPITALIST

## 2018-12-07 PROCEDURE — G8417 CALC BMI ABV UP PARAM F/U: HCPCS | Performed by: HOSPITALIST

## 2018-12-07 PROCEDURE — 99213 OFFICE O/P EST LOW 20 MIN: CPT | Performed by: HOSPITALIST

## 2018-12-07 PROCEDURE — G8484 FLU IMMUNIZE NO ADMIN: HCPCS | Performed by: HOSPITALIST

## 2018-12-07 PROCEDURE — 3017F COLORECTAL CA SCREEN DOC REV: CPT | Performed by: HOSPITALIST

## 2018-12-07 PROCEDURE — 3023F SPIROM DOC REV: CPT | Performed by: HOSPITALIST

## 2018-12-07 PROCEDURE — G8427 DOCREV CUR MEDS BY ELIG CLIN: HCPCS | Performed by: HOSPITALIST

## 2018-12-07 RX ORDER — SUMATRIPTAN 100 MG/1
100 TABLET, FILM COATED ORAL
Qty: 9 TABLET | Refills: 3 | Status: ON HOLD | OUTPATIENT
Start: 2018-12-07 | End: 2019-02-05 | Stop reason: HOSPADM

## 2018-12-07 RX ORDER — ALBUTEROL SULFATE 90 UG/1
AEROSOL, METERED RESPIRATORY (INHALATION)
Qty: 18 G | Refills: 3 | Status: SHIPPED | OUTPATIENT
Start: 2018-12-07 | End: 2020-04-01 | Stop reason: SDUPTHER

## 2018-12-07 RX ORDER — AMITRIPTYLINE HYDROCHLORIDE 25 MG/1
25 TABLET, FILM COATED ORAL NIGHTLY
Qty: 30 TABLET | Refills: 3 | Status: SHIPPED | OUTPATIENT
Start: 2018-12-07 | End: 2019-03-28 | Stop reason: SDUPTHER

## 2018-12-07 NOTE — PROGRESS NOTES
Post-Discharge Transitional Care Management Services      Hui Massey   YOB: 1965    Date of Visit:  12/7/2018    No Known Allergies  Outpatient Prescriptions Marked as Taking for the 12/7/18 encounter (Office Visit) with Renetta Cates MD   Medication Sig Dispense Refill    albuterol sulfate HFA (VENTOLIN HFA) 108 (90 Base) MCG/ACT inhaler INHALE 2 PUFFS INTO THE LUNGS EVERY 6 HOURS AS NEEDED FOR WHEEZING 18 g 3    amitriptyline (ELAVIL) 25 MG tablet Take 1 tablet by mouth nightly 30 tablet 3    SUMAtriptan (IMITREX) 100 MG tablet Take 1 tablet by mouth once as needed for Migraine 9 tablet 3    dextromethorphan-guaiFENesin (TUSSIN DM)  MG/5ML syrup Take 15 mLs by mouth every 6 hours as needed for Cough      ipratropium-albuterol (DUONEB) 0.5-2.5 (3) MG/3ML SOLN nebulizer solution Inhale 3 mLs into the lungs 2 times daily as needed for Shortness of Breath 360 mL 1    Spacer/Aero-Holding Chambers (E-Z SPACER) ELVER 1 Device by Does not apply route daily 1 Device 0    bumetanide (BUMEX) 1 MG tablet TAKE ONE TABLET BY MOUTH TWICE A DAY 30 tablet 3    carvedilol (COREG) 3.125 MG tablet TAKE ONE TABLET BY MOUTH TWICE A DAY WITH MEALS 60 tablet 0    folic acid-pyridoxine-cyanocobalamine (FOLTX) 2.5-25-1 MG TABS tablet Take 1 tablet by mouth daily 30 tablet 0    Umeclidinium Bromide (INCRUSE ELLIPTA) 62.5 MCG/INH AEPB INHALE 1 PUFF INTO THE LUNGS DAILY STOP SPIRIVA 1 each 3    lisinopril (PRINIVIL;ZESTRIL) 10 MG tablet Take 1 tablet by mouth daily 30 tablet 2    omeprazole (PRILOSEC) 20 MG delayed release capsule TAKE (1) CAPSULE BY MOUTH ONE TIME DAILY.  30 capsule 3    spironolactone (ALDACTONE) 25 MG tablet TAKE 1 TABLET BY MOUTH DAILY 28 tablet 3    Multiple Vitamin (MULTIVITAMIN) tablet TAKE ONE TABLET BY MOUTH ONE TIME A DAY 30 tablet 3    fluticasone (FLONASE) 50 MCG/ACT nasal spray 1 spray by Nasal route daily 1 Bottle 3         Vitals:    12/07/18 1022   BP: 102/69

## 2018-12-10 ENCOUNTER — TELEPHONE (OUTPATIENT)
Dept: FAMILY MEDICINE CLINIC | Age: 53
End: 2018-12-10

## 2018-12-20 ENCOUNTER — TELEPHONE (OUTPATIENT)
Dept: FAMILY MEDICINE CLINIC | Age: 53
End: 2018-12-20

## 2018-12-23 ENCOUNTER — HOSPITAL ENCOUNTER (EMERGENCY)
Age: 53
Discharge: HOME | End: 2018-12-23
Attending: EMERGENCY MEDICINE
Payer: MEDICAID

## 2018-12-23 VITALS
DIASTOLIC BLOOD PRESSURE: 76 MMHG | RESPIRATION RATE: 18 BRPM | HEART RATE: 84 BPM | TEMPERATURE: 97.3 F | SYSTOLIC BLOOD PRESSURE: 113 MMHG | OXYGEN SATURATION: 98 %

## 2018-12-23 DIAGNOSIS — K42.9 UMBILICAL HERNIA WITHOUT OBSTRUCTION AND WITHOUT GANGRENE: Primary | ICD-10-CM

## 2018-12-23 PROCEDURE — 99283 EMERGENCY DEPT VISIT LOW MDM: CPT

## 2018-12-23 RX ORDER — SENNA AND DOCUSATE SODIUM 50; 8.6 MG/1; MG/1
1 TABLET, FILM COATED ORAL DAILY
Qty: 20 TABLET | Refills: 0 | Status: SHIPPED | OUTPATIENT
Start: 2018-12-23

## 2018-12-23 ASSESSMENT — PAIN DESCRIPTION - ORIENTATION: ORIENTATION: MID

## 2018-12-23 ASSESSMENT — PAIN SCALES - GENERAL: PAINLEVEL_OUTOF10: 9

## 2018-12-23 ASSESSMENT — PAIN SCALES - WONG BAKER: WONGBAKER_NUMERICALRESPONSE: 10

## 2018-12-23 ASSESSMENT — PAIN DESCRIPTION - DESCRIPTORS: DESCRIPTORS: CONSTANT;ACHING

## 2018-12-23 ASSESSMENT — PAIN DESCRIPTION - PAIN TYPE: TYPE: ACUTE PAIN

## 2018-12-23 ASSESSMENT — PAIN DESCRIPTION - LOCATION: LOCATION: ABDOMEN

## 2018-12-24 NOTE — ED PROVIDER NOTES
[address] [facility]     Emergency Department     Faculty Attestation    I performed a history and physical examination of the patient and discussed management with the resident. I reviewed the residents note and agree with the documented findings and plan of care. Any areas of disagreement are noted on the chart. I was personally present for the key portions of any procedures. I have documented in the chart those procedures where I was not present during the key portions. I have reviewed the emergency nurses triage note. I agree with the chief complaint, past medical history, past surgical history, allergies, medications, social and family history as documented unless otherwise noted below. For Physician Assistant/ Nurse Practitioner cases/documentation I have personally evaluated this patient and have completed at least one if not all key elements of the E/M (history, physical exam, and MDM). Additional findings are as noted. I have personally seen and evaluated the patient. I find the patient's history and physical exam are consistent with the NP/PA documentation. I agree with the care provided, treatment rendered, disposition and follow-up plan.    48 y.o. female with small umbilical hernia. Became larger and more tender after heavy lifting yesterday. Easily reducible on exam. Having normal bowel movements, no other abdominal pain. No nausea or vomiting. Denies blood in the bowels. Discussed signs of strangulated hernia, patient stated verbal understanding of when to return to ED. Follow up with PCP.        Critical Care    Tisha Quick MD   Attending Emergency  Physician             Tisha Quick MD  12/23/18 2236

## 2019-01-09 DIAGNOSIS — I50.22 CHRONIC SYSTOLIC HEART FAILURE (HCC): ICD-10-CM

## 2019-01-09 RX ORDER — CARVEDILOL 3.12 MG/1
TABLET ORAL
Qty: 60 TABLET | Refills: 0 | Status: SHIPPED | OUTPATIENT
Start: 2019-01-09 | End: 2019-02-01 | Stop reason: SDUPTHER

## 2019-01-15 ENCOUNTER — CARE COORDINATION (OUTPATIENT)
Dept: CARE COORDINATION | Age: 54
End: 2019-01-15

## 2019-02-01 DIAGNOSIS — I50.22 CHRONIC SYSTOLIC HEART FAILURE (HCC): ICD-10-CM

## 2019-02-01 RX ORDER — CARVEDILOL 3.12 MG/1
TABLET ORAL
Qty: 60 TABLET | Refills: 0 | Status: SHIPPED | OUTPATIENT
Start: 2019-02-01 | End: 2019-03-01 | Stop reason: SDUPTHER

## 2019-02-02 ENCOUNTER — APPOINTMENT (OUTPATIENT)
Dept: GENERAL RADIOLOGY | Age: 54
DRG: 140 | End: 2019-02-02
Payer: MEDICAID

## 2019-02-02 ENCOUNTER — HOSPITAL ENCOUNTER (INPATIENT)
Age: 54
LOS: 3 days | Discharge: HOME | DRG: 140 | End: 2019-02-05
Attending: EMERGENCY MEDICINE | Admitting: INTERNAL MEDICINE
Payer: MEDICAID

## 2019-02-02 DIAGNOSIS — I50.9 ACUTE ON CHRONIC CONGESTIVE HEART FAILURE, UNSPECIFIED HEART FAILURE TYPE (HCC): ICD-10-CM

## 2019-02-02 DIAGNOSIS — J44.1 COPD EXACERBATION (HCC): ICD-10-CM

## 2019-02-02 DIAGNOSIS — I50.22 CHRONIC SYSTOLIC HEART FAILURE (HCC): ICD-10-CM

## 2019-02-02 DIAGNOSIS — R06.89 DYSPNEA AND RESPIRATORY ABNORMALITIES: Primary | ICD-10-CM

## 2019-02-02 DIAGNOSIS — I10 ESSENTIAL HYPERTENSION: ICD-10-CM

## 2019-02-02 DIAGNOSIS — R06.00 DYSPNEA AND RESPIRATORY ABNORMALITIES: Primary | ICD-10-CM

## 2019-02-02 LAB
ABSOLUTE EOS #: 0.07 K/UL (ref 0–0.4)
ABSOLUTE IMMATURE GRANULOCYTE: 0 K/UL (ref 0–0.3)
ABSOLUTE LYMPH #: 2.7 K/UL (ref 1–4.8)
ABSOLUTE MONO #: 0.71 K/UL (ref 0.1–0.8)
ALBUMIN SERPL-MCNC: 4.5 G/DL (ref 3.5–5.2)
ALBUMIN/GLOBULIN RATIO: 1.3 (ref 1–2.5)
ALLEN TEST: ABNORMAL
ALP BLD-CCNC: 93 U/L (ref 35–104)
ALT SERPL-CCNC: 21 U/L (ref 5–33)
ANION GAP SERPL CALCULATED.3IONS-SCNC: 13 MMOL/L (ref 9–17)
ANION GAP: 9 MMOL/L (ref 7–16)
AST SERPL-CCNC: 27 U/L
BASOPHILS # BLD: 0 % (ref 0–2)
BASOPHILS ABSOLUTE: 0 K/UL (ref 0–0.2)
BILIRUB SERPL-MCNC: 0.54 MG/DL (ref 0.3–1.2)
BNP INTERPRETATION: ABNORMAL
BUN BLDV-MCNC: 19 MG/DL (ref 6–20)
BUN/CREAT BLD: ABNORMAL (ref 9–20)
CALCIUM IONIZED: 1.16 MMOL/L (ref 1.13–1.33)
CALCIUM SERPL-MCNC: 9.8 MG/DL (ref 8.6–10.4)
CHLORIDE BLD-SCNC: 113 MMOL/L (ref 98–107)
CO2: 18 MMOL/L (ref 20–31)
CREAT SERPL-MCNC: 1.22 MG/DL (ref 0.5–0.9)
DIFFERENTIAL TYPE: ABNORMAL
DIRECT EXAM: NORMAL
EOSINOPHILS RELATIVE PERCENT: 1 % (ref 1–4)
FIO2: ABNORMAL
GFR AFRICAN AMERICAN: 56 ML/MIN
GFR NON-AFRICAN AMERICAN: 46 ML/MIN
GFR NON-AFRICAN AMERICAN: 52 ML/MIN
GFR SERPL CREATININE-BSD FRML MDRD: >60 ML/MIN
GFR SERPL CREATININE-BSD FRML MDRD: ABNORMAL ML/MIN/{1.73_M2}
GLUCOSE BLD-MCNC: 85 MG/DL (ref 74–100)
GLUCOSE BLD-MCNC: 88 MG/DL (ref 70–99)
HCO3 VENOUS: 19.5 MMOL/L (ref 22–29)
HCT VFR BLD CALC: 40.4 % (ref 36.3–47.1)
HEMOGLOBIN: 13.1 G/DL (ref 11.9–15.1)
IMMATURE GRANULOCYTES: 0 %
INR BLD: 0.9
LACTIC ACID, WHOLE BLOOD: 1.7 MMOL/L (ref 0.7–2.1)
LIPASE: 13 U/L (ref 13–60)
LYMPHOCYTES # BLD: 38 % (ref 24–44)
Lab: NORMAL
MAGNESIUM: 2.1 MG/DL (ref 1.6–2.6)
MCH RBC QN AUTO: 29.6 PG (ref 25.2–33.5)
MCHC RBC AUTO-ENTMCNC: 32.4 G/DL (ref 28.4–34.8)
MCV RBC AUTO: 91.2 FL (ref 82.6–102.9)
MODE: ABNORMAL
MONOCYTES # BLD: 10 % (ref 1–7)
MORPHOLOGY: NORMAL
NEGATIVE BASE EXCESS, VEN: 4 (ref 0–2)
NRBC AUTOMATED: 0 PER 100 WBC
O2 DEVICE/FLOW/%: ABNORMAL
O2 SAT, VEN: 88 % (ref 60–85)
PARTIAL THROMBOPLASTIN TIME: 24.5 SEC (ref 20.5–30.5)
PATIENT TEMP: ABNORMAL
PCO2, VEN: 30.1 MM HG (ref 41–51)
PDW BLD-RTO: 15 % (ref 11.8–14.4)
PH VENOUS: 7.42 (ref 7.32–7.43)
PLATELET # BLD: 130 K/UL (ref 138–453)
PLATELET ESTIMATE: ABNORMAL
PMV BLD AUTO: 13 FL (ref 8.1–13.5)
PO2, VEN: 52.9 MM HG (ref 30–50)
POC CHLORIDE: 119 MMOL/L (ref 98–107)
POC CREATININE: 1.1 MG/DL (ref 0.51–1.19)
POC HEMATOCRIT: 41 % (ref 36–46)
POC HEMOGLOBIN: 14.1 G/DL (ref 12–16)
POC IONIZED CALCIUM: 1.07 MMOL/L (ref 1.15–1.33)
POC LACTIC ACID: 1.47 MMOL/L (ref 0.56–1.39)
POC PCO2 TEMP: ABNORMAL MM HG
POC PH TEMP: ABNORMAL
POC PO2 TEMP: ABNORMAL MM HG
POC POTASSIUM: 4.7 MMOL/L (ref 3.5–4.5)
POC SODIUM: 147 MMOL/L (ref 138–146)
POSITIVE BASE EXCESS, VEN: ABNORMAL (ref 0–3)
POTASSIUM SERPL-SCNC: 4.9 MMOL/L (ref 3.7–5.3)
PRO-BNP: 9745 PG/ML
PROTHROMBIN TIME: 10.2 SEC (ref 9–12)
RBC # BLD: 4.43 M/UL (ref 3.95–5.11)
RBC # BLD: ABNORMAL 10*6/UL
SAMPLE SITE: ABNORMAL
SEG NEUTROPHILS: 51 % (ref 36–66)
SEGMENTED NEUTROPHILS ABSOLUTE COUNT: 3.62 K/UL (ref 1.8–7.7)
SODIUM BLD-SCNC: 144 MMOL/L (ref 135–144)
SPECIMEN DESCRIPTION: NORMAL
STATUS: NORMAL
TOTAL CK: 152 U/L (ref 26–192)
TOTAL CO2, VENOUS: 21 MMOL/L (ref 23–30)
TOTAL PROTEIN: 8.1 G/DL (ref 6.4–8.3)
TROPONIN INTERP: ABNORMAL
TROPONIN INTERP: ABNORMAL
TROPONIN T: ABNORMAL NG/ML
TROPONIN T: ABNORMAL NG/ML
TROPONIN, HIGH SENSITIVITY: 19 NG/L (ref 0–14)
TROPONIN, HIGH SENSITIVITY: 22 NG/L (ref 0–14)
WBC # BLD: 7.1 K/UL (ref 3.5–11.3)
WBC # BLD: ABNORMAL 10*3/UL

## 2019-02-02 PROCEDURE — 2580000003 HC RX 258: Performed by: STUDENT IN AN ORGANIZED HEALTH CARE EDUCATION/TRAINING PROGRAM

## 2019-02-02 PROCEDURE — 6370000000 HC RX 637 (ALT 250 FOR IP): Performed by: EMERGENCY MEDICINE

## 2019-02-02 PROCEDURE — 85025 COMPLETE CBC W/AUTO DIFF WBC: CPT

## 2019-02-02 PROCEDURE — 82565 ASSAY OF CREATININE: CPT

## 2019-02-02 PROCEDURE — 87804 INFLUENZA ASSAY W/OPTIC: CPT

## 2019-02-02 PROCEDURE — 83690 ASSAY OF LIPASE: CPT

## 2019-02-02 PROCEDURE — 82947 ASSAY GLUCOSE BLOOD QUANT: CPT

## 2019-02-02 PROCEDURE — 6360000002 HC RX W HCPCS: Performed by: STUDENT IN AN ORGANIZED HEALTH CARE EDUCATION/TRAINING PROGRAM

## 2019-02-02 PROCEDURE — 84295 ASSAY OF SERUM SODIUM: CPT

## 2019-02-02 PROCEDURE — 82330 ASSAY OF CALCIUM: CPT

## 2019-02-02 PROCEDURE — G0378 HOSPITAL OBSERVATION PER HR: HCPCS

## 2019-02-02 PROCEDURE — 6360000002 HC RX W HCPCS: Performed by: EMERGENCY MEDICINE

## 2019-02-02 PROCEDURE — 93005 ELECTROCARDIOGRAM TRACING: CPT

## 2019-02-02 PROCEDURE — 83880 ASSAY OF NATRIURETIC PEPTIDE: CPT

## 2019-02-02 PROCEDURE — 2700000000 HC OXYGEN THERAPY PER DAY

## 2019-02-02 PROCEDURE — 85730 THROMBOPLASTIN TIME PARTIAL: CPT

## 2019-02-02 PROCEDURE — 83605 ASSAY OF LACTIC ACID: CPT

## 2019-02-02 PROCEDURE — 94640 AIRWAY INHALATION TREATMENT: CPT

## 2019-02-02 PROCEDURE — 80053 COMPREHEN METABOLIC PANEL: CPT

## 2019-02-02 PROCEDURE — 6370000000 HC RX 637 (ALT 250 FOR IP): Performed by: STUDENT IN AN ORGANIZED HEALTH CARE EDUCATION/TRAINING PROGRAM

## 2019-02-02 PROCEDURE — 99285 EMERGENCY DEPT VISIT HI MDM: CPT

## 2019-02-02 PROCEDURE — 82803 BLOOD GASES ANY COMBINATION: CPT

## 2019-02-02 PROCEDURE — 96365 THER/PROPH/DIAG IV INF INIT: CPT

## 2019-02-02 PROCEDURE — 84484 ASSAY OF TROPONIN QUANT: CPT

## 2019-02-02 PROCEDURE — 96375 TX/PRO/DX INJ NEW DRUG ADDON: CPT

## 2019-02-02 PROCEDURE — 84132 ASSAY OF SERUM POTASSIUM: CPT

## 2019-02-02 PROCEDURE — 82550 ASSAY OF CK (CPK): CPT

## 2019-02-02 PROCEDURE — 94664 DEMO&/EVAL PT USE INHALER: CPT

## 2019-02-02 PROCEDURE — 82435 ASSAY OF BLOOD CHLORIDE: CPT

## 2019-02-02 PROCEDURE — 96374 THER/PROPH/DIAG INJ IV PUSH: CPT

## 2019-02-02 PROCEDURE — 2500000003 HC RX 250 WO HCPCS: Performed by: EMERGENCY MEDICINE

## 2019-02-02 PROCEDURE — 1200000000 HC SEMI PRIVATE

## 2019-02-02 PROCEDURE — 83735 ASSAY OF MAGNESIUM: CPT

## 2019-02-02 PROCEDURE — 71045 X-RAY EXAM CHEST 1 VIEW: CPT

## 2019-02-02 PROCEDURE — 85610 PROTHROMBIN TIME: CPT

## 2019-02-02 PROCEDURE — 85014 HEMATOCRIT: CPT

## 2019-02-02 RX ORDER — ALBUTEROL SULFATE 90 UG/1
2 AEROSOL, METERED RESPIRATORY (INHALATION)
Status: DISCONTINUED | OUTPATIENT
Start: 2019-02-02 | End: 2019-02-05 | Stop reason: HOSPADM

## 2019-02-02 RX ORDER — BUMETANIDE 0.25 MG/ML
1 INJECTION, SOLUTION INTRAMUSCULAR; INTRAVENOUS ONCE
Status: COMPLETED | OUTPATIENT
Start: 2019-02-02 | End: 2019-02-02

## 2019-02-02 RX ORDER — SODIUM CHLORIDE 0.9 % (FLUSH) 0.9 %
10 SYRINGE (ML) INJECTION EVERY 12 HOURS SCHEDULED
Status: DISCONTINUED | OUTPATIENT
Start: 2019-02-02 | End: 2019-02-05 | Stop reason: HOSPADM

## 2019-02-02 RX ORDER — AMITRIPTYLINE HYDROCHLORIDE 25 MG/1
25 TABLET, FILM COATED ORAL NIGHTLY
Status: DISCONTINUED | OUTPATIENT
Start: 2019-02-02 | End: 2019-02-05 | Stop reason: HOSPADM

## 2019-02-02 RX ORDER — DEXAMETHASONE SODIUM PHOSPHATE 10 MG/ML
10 INJECTION INTRAMUSCULAR; INTRAVENOUS ONCE
Status: DISCONTINUED | OUTPATIENT
Start: 2019-02-02 | End: 2019-02-02

## 2019-02-02 RX ORDER — IPRATROPIUM BROMIDE AND ALBUTEROL SULFATE 2.5; .5 MG/3ML; MG/3ML
1 SOLUTION RESPIRATORY (INHALATION)
Status: DISCONTINUED | OUTPATIENT
Start: 2019-02-02 | End: 2019-02-03

## 2019-02-02 RX ORDER — SPIRONOLACTONE 25 MG/1
25 TABLET ORAL DAILY
Status: DISCONTINUED | OUTPATIENT
Start: 2019-02-03 | End: 2019-02-05 | Stop reason: HOSPADM

## 2019-02-02 RX ORDER — POTASSIUM CHLORIDE 7.45 MG/ML
10 INJECTION INTRAVENOUS PRN
Status: DISCONTINUED | OUTPATIENT
Start: 2019-02-02 | End: 2019-02-05 | Stop reason: HOSPADM

## 2019-02-02 RX ORDER — BUMETANIDE 1 MG/1
1 TABLET ORAL 2 TIMES DAILY
Status: DISCONTINUED | OUTPATIENT
Start: 2019-02-03 | End: 2019-02-05 | Stop reason: HOSPADM

## 2019-02-02 RX ORDER — ONDANSETRON 2 MG/ML
4 INJECTION INTRAMUSCULAR; INTRAVENOUS EVERY 6 HOURS PRN
Status: DISCONTINUED | OUTPATIENT
Start: 2019-02-02 | End: 2019-02-05 | Stop reason: HOSPADM

## 2019-02-02 RX ORDER — SODIUM CHLORIDE 450 MG/100ML
INJECTION, SOLUTION INTRAVENOUS CONTINUOUS
Status: DISCONTINUED | OUTPATIENT
Start: 2019-02-02 | End: 2019-02-03

## 2019-02-02 RX ORDER — LEVOFLOXACIN 5 MG/ML
750 INJECTION, SOLUTION INTRAVENOUS EVERY 24 HOURS
Status: DISCONTINUED | OUTPATIENT
Start: 2019-02-02 | End: 2019-02-03

## 2019-02-02 RX ORDER — SODIUM CHLORIDE 0.9 % (FLUSH) 0.9 %
10 SYRINGE (ML) INJECTION PRN
Status: DISCONTINUED | OUTPATIENT
Start: 2019-02-02 | End: 2019-02-05 | Stop reason: HOSPADM

## 2019-02-02 RX ORDER — DEXAMETHASONE 4 MG/1
8 TABLET ORAL ONCE
Status: COMPLETED | OUTPATIENT
Start: 2019-02-02 | End: 2019-02-02

## 2019-02-02 RX ORDER — ALBUTEROL SULFATE 90 UG/1
2 AEROSOL, METERED RESPIRATORY (INHALATION)
Status: DISCONTINUED | OUTPATIENT
Start: 2019-02-02 | End: 2019-02-02

## 2019-02-02 RX ORDER — MAGNESIUM SULFATE 1 G/100ML
1 INJECTION INTRAVENOUS PRN
Status: DISCONTINUED | OUTPATIENT
Start: 2019-02-02 | End: 2019-02-05 | Stop reason: HOSPADM

## 2019-02-02 RX ORDER — CARVEDILOL 3.12 MG/1
3.12 TABLET ORAL 2 TIMES DAILY
Status: DISCONTINUED | OUTPATIENT
Start: 2019-02-02 | End: 2019-02-05 | Stop reason: HOSPADM

## 2019-02-02 RX ORDER — DOCUSATE SODIUM 100 MG/1
100 CAPSULE, LIQUID FILLED ORAL 2 TIMES DAILY PRN
Status: DISCONTINUED | OUTPATIENT
Start: 2019-02-02 | End: 2019-02-05 | Stop reason: HOSPADM

## 2019-02-02 RX ORDER — HEPARIN SODIUM 5000 [USP'U]/ML
5000 INJECTION, SOLUTION INTRAVENOUS; SUBCUTANEOUS EVERY 8 HOURS SCHEDULED
Status: DISCONTINUED | OUTPATIENT
Start: 2019-02-02 | End: 2019-02-05 | Stop reason: HOSPADM

## 2019-02-02 RX ORDER — ALBUTEROL SULFATE 2.5 MG/3ML
5 SOLUTION RESPIRATORY (INHALATION)
Status: DISCONTINUED | OUTPATIENT
Start: 2019-02-02 | End: 2019-02-02

## 2019-02-02 RX ORDER — B12/LEVOMEFOLATE CALCIUM/B-6 2-1.13-25
1 TABLET ORAL DAILY
Status: DISCONTINUED | OUTPATIENT
Start: 2019-02-03 | End: 2019-02-05 | Stop reason: HOSPADM

## 2019-02-02 RX ADMIN — ALBUTEROL SULFATE 2.5 MG: 2.5 SOLUTION RESPIRATORY (INHALATION) at 17:27

## 2019-02-02 RX ADMIN — CARVEDILOL 3.12 MG: 3.12 TABLET, FILM COATED ORAL at 21:59

## 2019-02-02 RX ADMIN — IPRATROPIUM BROMIDE AND ALBUTEROL SULFATE 1 AMPULE: .5; 3 SOLUTION RESPIRATORY (INHALATION) at 15:48

## 2019-02-02 RX ADMIN — AMITRIPTYLINE HYDROCHLORIDE 25 MG: 25 TABLET, FILM COATED ORAL at 21:59

## 2019-02-02 RX ADMIN — BUMETANIDE 1 MG: 0.25 INJECTION INTRAMUSCULAR; INTRAVENOUS at 18:48

## 2019-02-02 RX ADMIN — SODIUM CHLORIDE: 4.5 INJECTION, SOLUTION INTRAVENOUS at 21:58

## 2019-02-02 RX ADMIN — DEXAMETHASONE 8 MG: 4 TABLET ORAL at 15:55

## 2019-02-02 RX ADMIN — ALBUTEROL SULFATE 2.5 MG: 2.5 SOLUTION RESPIRATORY (INHALATION) at 15:48

## 2019-02-02 RX ADMIN — LEVOFLOXACIN 750 MG: 5 INJECTION, SOLUTION INTRAVENOUS at 21:59

## 2019-02-02 ASSESSMENT — PAIN SCALES - GENERAL
PAINLEVEL_OUTOF10: 0
PAINLEVEL_OUTOF10: 10

## 2019-02-02 ASSESSMENT — HEART SCORE: ECG: 1

## 2019-02-02 ASSESSMENT — PAIN DESCRIPTION - PAIN TYPE: TYPE: ACUTE PAIN

## 2019-02-02 ASSESSMENT — PAIN DESCRIPTION - LOCATION: LOCATION: THROAT

## 2019-02-03 PROBLEM — N28.9 LESION OF RIGHT NATIVE KIDNEY: Status: ACTIVE | Noted: 2019-02-03

## 2019-02-03 PROBLEM — I42.8 NON-ISCHEMIC CARDIOMYOPATHY (HCC): Status: ACTIVE | Noted: 2019-02-03

## 2019-02-03 PROBLEM — J45.901 ASTHMA WITH COPD WITH EXACERBATION (HCC): Status: ACTIVE | Noted: 2019-02-03

## 2019-02-03 PROBLEM — J06.9 VIRAL URI WITH COUGH: Status: ACTIVE | Noted: 2019-02-03

## 2019-02-03 PROBLEM — R94.31 QT PROLONGATION: Status: ACTIVE | Noted: 2019-02-03

## 2019-02-03 PROBLEM — J44.1 ASTHMA WITH COPD WITH EXACERBATION (HCC): Status: ACTIVE | Noted: 2019-02-03

## 2019-02-03 LAB
ABSOLUTE EOS #: <0.03 K/UL (ref 0–0.44)
ABSOLUTE IMMATURE GRANULOCYTE: <0.03 K/UL (ref 0–0.3)
ABSOLUTE LYMPH #: 1.35 K/UL (ref 1.1–3.7)
ABSOLUTE MONO #: 0.29 K/UL (ref 0.1–1.2)
ADENOVIRUS PCR: NOT DETECTED
ANION GAP SERPL CALCULATED.3IONS-SCNC: 14 MMOL/L (ref 9–17)
BASOPHILS # BLD: 0 % (ref 0–2)
BASOPHILS ABSOLUTE: <0.03 K/UL (ref 0–0.2)
BORDETELLA PERTUSSIS PCR: NOT DETECTED
BUN BLDV-MCNC: 23 MG/DL (ref 6–20)
BUN/CREAT BLD: ABNORMAL (ref 9–20)
CALCIUM SERPL-MCNC: 9.3 MG/DL (ref 8.6–10.4)
CHLAMYDIA PNEUMONIAE BY PCR: NOT DETECTED
CHLORIDE BLD-SCNC: 108 MMOL/L (ref 98–107)
CO2: 17 MMOL/L (ref 20–31)
CORONAVIRUS 229E PCR: NOT DETECTED
CORONAVIRUS HKU1 PCR: NOT DETECTED
CORONAVIRUS NL63 PCR: NOT DETECTED
CORONAVIRUS OC43 PCR: NOT DETECTED
CREAT SERPL-MCNC: 1 MG/DL (ref 0.5–0.9)
DIFFERENTIAL TYPE: ABNORMAL
EOSINOPHILS RELATIVE PERCENT: 0 % (ref 1–4)
GFR AFRICAN AMERICAN: >60 ML/MIN
GFR NON-AFRICAN AMERICAN: 58 ML/MIN
GFR SERPL CREATININE-BSD FRML MDRD: ABNORMAL ML/MIN/{1.73_M2}
GFR SERPL CREATININE-BSD FRML MDRD: ABNORMAL ML/MIN/{1.73_M2}
GLUCOSE BLD-MCNC: 108 MG/DL (ref 65–105)
GLUCOSE BLD-MCNC: 116 MG/DL (ref 70–99)
GLUCOSE BLD-MCNC: 124 MG/DL (ref 65–105)
HCT VFR BLD CALC: 37.1 % (ref 36.3–47.1)
HEMOGLOBIN: 11.9 G/DL (ref 11.9–15.1)
HUMAN METAPNEUMOVIRUS PCR: NOT DETECTED
IMMATURE GRANULOCYTES: 0 %
INFLUENZA A BY PCR: NOT DETECTED
INFLUENZA A H1 (2009) PCR: NORMAL
INFLUENZA A H1 PCR: NORMAL
INFLUENZA A H3 PCR: NORMAL
INFLUENZA B BY PCR: NOT DETECTED
LYMPHOCYTES # BLD: 28 % (ref 24–43)
MCH RBC QN AUTO: 29.8 PG (ref 25.2–33.5)
MCHC RBC AUTO-ENTMCNC: 32.1 G/DL (ref 28.4–34.8)
MCV RBC AUTO: 93 FL (ref 82.6–102.9)
MONOCYTES # BLD: 6 % (ref 3–12)
MYCOPLASMA PNEUMONIAE PCR: NOT DETECTED
NRBC AUTOMATED: 0 PER 100 WBC
PARAINFLUENZA 1 PCR: NOT DETECTED
PARAINFLUENZA 2 PCR: NOT DETECTED
PARAINFLUENZA 3 PCR: NOT DETECTED
PARAINFLUENZA 4 PCR: NOT DETECTED
PDW BLD-RTO: 15.2 % (ref 11.8–14.4)
PLATELET # BLD: 117 K/UL (ref 138–453)
PLATELET ESTIMATE: ABNORMAL
PMV BLD AUTO: 12.6 FL (ref 8.1–13.5)
POTASSIUM SERPL-SCNC: 4.1 MMOL/L (ref 3.7–5.3)
RBC # BLD: 3.99 M/UL (ref 3.95–5.11)
RBC # BLD: ABNORMAL 10*6/UL
RESP SYNCYTIAL VIRUS PCR: NOT DETECTED
RHINO/ENTEROVIRUS PCR: NOT DETECTED
SEG NEUTROPHILS: 65 % (ref 36–65)
SEGMENTED NEUTROPHILS ABSOLUTE COUNT: 3.15 K/UL (ref 1.5–8.1)
SODIUM BLD-SCNC: 139 MMOL/L (ref 135–144)
SOURCE: NORMAL
WBC # BLD: 4.8 K/UL (ref 3.5–11.3)
WBC # BLD: ABNORMAL 10*3/UL

## 2019-02-03 PROCEDURE — 87581 M.PNEUMON DNA AMP PROBE: CPT

## 2019-02-03 PROCEDURE — 6370000000 HC RX 637 (ALT 250 FOR IP): Performed by: STUDENT IN AN ORGANIZED HEALTH CARE EDUCATION/TRAINING PROGRAM

## 2019-02-03 PROCEDURE — 99223 1ST HOSP IP/OBS HIGH 75: CPT | Performed by: INTERNAL MEDICINE

## 2019-02-03 PROCEDURE — 2580000003 HC RX 258: Performed by: STUDENT IN AN ORGANIZED HEALTH CARE EDUCATION/TRAINING PROGRAM

## 2019-02-03 PROCEDURE — 36415 COLL VENOUS BLD VENIPUNCTURE: CPT

## 2019-02-03 PROCEDURE — G0378 HOSPITAL OBSERVATION PER HR: HCPCS

## 2019-02-03 PROCEDURE — 87486 CHLMYD PNEUM DNA AMP PROBE: CPT

## 2019-02-03 PROCEDURE — 93005 ELECTROCARDIOGRAM TRACING: CPT

## 2019-02-03 PROCEDURE — 82947 ASSAY GLUCOSE BLOOD QUANT: CPT

## 2019-02-03 PROCEDURE — 87798 DETECT AGENT NOS DNA AMP: CPT

## 2019-02-03 PROCEDURE — 80048 BASIC METABOLIC PNL TOTAL CA: CPT

## 2019-02-03 PROCEDURE — 87633 RESP VIRUS 12-25 TARGETS: CPT

## 2019-02-03 PROCEDURE — 85025 COMPLETE CBC W/AUTO DIFF WBC: CPT

## 2019-02-03 PROCEDURE — 2700000000 HC OXYGEN THERAPY PER DAY

## 2019-02-03 PROCEDURE — 6360000002 HC RX W HCPCS: Performed by: STUDENT IN AN ORGANIZED HEALTH CARE EDUCATION/TRAINING PROGRAM

## 2019-02-03 PROCEDURE — 94640 AIRWAY INHALATION TREATMENT: CPT

## 2019-02-03 PROCEDURE — 96367 TX/PROPH/DG ADDL SEQ IV INF: CPT

## 2019-02-03 PROCEDURE — 6370000000 HC RX 637 (ALT 250 FOR IP): Performed by: INTERNAL MEDICINE

## 2019-02-03 PROCEDURE — 1200000000 HC SEMI PRIVATE

## 2019-02-03 RX ORDER — METHYLPREDNISOLONE SODIUM SUCCINATE 40 MG/ML
40 INJECTION, POWDER, LYOPHILIZED, FOR SOLUTION INTRAMUSCULAR; INTRAVENOUS EVERY 12 HOURS
Status: DISCONTINUED | OUTPATIENT
Start: 2019-02-03 | End: 2019-02-03

## 2019-02-03 RX ORDER — OXYCODONE HYDROCHLORIDE AND ACETAMINOPHEN 5; 325 MG/1; MG/1
1 TABLET ORAL EVERY 8 HOURS PRN
Status: DISCONTINUED | OUTPATIENT
Start: 2019-02-03 | End: 2019-02-05 | Stop reason: HOSPADM

## 2019-02-03 RX ORDER — MAGNESIUM SULFATE 1 G/100ML
1 INJECTION INTRAVENOUS
Status: DISPENSED | OUTPATIENT
Start: 2019-02-03 | End: 2019-02-03

## 2019-02-03 RX ORDER — GUAIFENESIN DEXTROMETHORPHAN HYDROBROMIDE ORAL SOLUTION 10; 100 MG/5ML; MG/5ML
10 SOLUTION ORAL EVERY 6 HOURS PRN
Status: DISCONTINUED | OUTPATIENT
Start: 2019-02-03 | End: 2019-02-05 | Stop reason: HOSPADM

## 2019-02-03 RX ORDER — DOXYCYCLINE HYCLATE 100 MG
100 TABLET ORAL EVERY 12 HOURS SCHEDULED
Status: DISCONTINUED | OUTPATIENT
Start: 2019-02-03 | End: 2019-02-05 | Stop reason: HOSPADM

## 2019-02-03 RX ORDER — IPRATROPIUM BROMIDE AND ALBUTEROL SULFATE 2.5; .5 MG/3ML; MG/3ML
1 SOLUTION RESPIRATORY (INHALATION) 4 TIMES DAILY
Status: DISCONTINUED | OUTPATIENT
Start: 2019-02-03 | End: 2019-02-05 | Stop reason: HOSPADM

## 2019-02-03 RX ORDER — PREDNISONE 20 MG/1
40 TABLET ORAL DAILY
Status: DISCONTINUED | OUTPATIENT
Start: 2019-02-03 | End: 2019-02-05 | Stop reason: HOSPADM

## 2019-02-03 RX ORDER — METHYLPREDNISOLONE SODIUM SUCCINATE 40 MG/ML
40 INJECTION, POWDER, LYOPHILIZED, FOR SOLUTION INTRAMUSCULAR; INTRAVENOUS DAILY
Status: DISCONTINUED | OUTPATIENT
Start: 2019-02-03 | End: 2019-02-03

## 2019-02-03 RX ADMIN — DOXYCYCLINE HYCLATE 100 MG: 100 TABLET, COATED ORAL at 09:42

## 2019-02-03 RX ADMIN — OXYCODONE HYDROCHLORIDE AND ACETAMINOPHEN 1 TABLET: 5; 325 TABLET ORAL at 18:57

## 2019-02-03 RX ADMIN — AMITRIPTYLINE HYDROCHLORIDE 25 MG: 25 TABLET, FILM COATED ORAL at 21:26

## 2019-02-03 RX ADMIN — CARVEDILOL 3.12 MG: 3.12 TABLET, FILM COATED ORAL at 09:42

## 2019-02-03 RX ADMIN — SPIRONOLACTONE 25 MG: 25 TABLET ORAL at 12:15

## 2019-02-03 RX ADMIN — IPRATROPIUM BROMIDE AND ALBUTEROL SULFATE 1 AMPULE: .5; 3 SOLUTION RESPIRATORY (INHALATION) at 15:34

## 2019-02-03 RX ADMIN — Medication 1 TABLET: at 09:42

## 2019-02-03 RX ADMIN — BUMETANIDE 1 MG: 1 TABLET ORAL at 12:15

## 2019-02-03 RX ADMIN — DOXYCYCLINE HYCLATE 100 MG: 100 TABLET, COATED ORAL at 21:26

## 2019-02-03 RX ADMIN — PREDNISONE 40 MG: 20 TABLET ORAL at 18:57

## 2019-02-03 RX ADMIN — MAGNESIUM SULFATE HEPTAHYDRATE 1 G: 1 INJECTION, SOLUTION INTRAVENOUS at 07:36

## 2019-02-03 RX ADMIN — CARVEDILOL 3.12 MG: 3.12 TABLET, FILM COATED ORAL at 21:26

## 2019-02-03 RX ADMIN — Medication 10 ML: at 21:27

## 2019-02-03 RX ADMIN — BUMETANIDE 1 MG: 1 TABLET ORAL at 21:26

## 2019-02-03 RX ADMIN — BENZOCAINE, MENTHOL 1 LOZENGE: 15; 3.6 LOZENGE ORAL at 21:31

## 2019-02-03 RX ADMIN — IPRATROPIUM BROMIDE AND ALBUTEROL SULFATE 1 AMPULE: .5; 3 SOLUTION RESPIRATORY (INHALATION) at 08:18

## 2019-02-03 ASSESSMENT — ENCOUNTER SYMPTOMS
ABDOMINAL DISTENTION: 1
SHORTNESS OF BREATH: 1
DIARRHEA: 1
BACK PAIN: 0
RHINORRHEA: 0
ABDOMINAL PAIN: 1
VOMITING: 1
BLOOD IN STOOL: 0
SORE THROAT: 1
COUGH: 1
COLOR CHANGE: 0
NAUSEA: 0

## 2019-02-03 ASSESSMENT — PAIN SCALES - GENERAL
PAINLEVEL_OUTOF10: 6
PAINLEVEL_OUTOF10: 0
PAINLEVEL_OUTOF10: 3

## 2019-02-04 LAB
ABSOLUTE EOS #: <0.03 K/UL (ref 0–0.44)
ABSOLUTE IMMATURE GRANULOCYTE: 0.07 K/UL (ref 0–0.3)
ABSOLUTE LYMPH #: 1.68 K/UL (ref 1.1–3.7)
ABSOLUTE MONO #: 0.25 K/UL (ref 0.1–1.2)
ANION GAP SERPL CALCULATED.3IONS-SCNC: 13 MMOL/L (ref 9–17)
BASOPHILS # BLD: 0 % (ref 0–2)
BASOPHILS ABSOLUTE: <0.03 K/UL (ref 0–0.2)
BUN BLDV-MCNC: 33 MG/DL (ref 6–20)
BUN/CREAT BLD: ABNORMAL (ref 9–20)
CALCIUM SERPL-MCNC: 9.5 MG/DL (ref 8.6–10.4)
CHLORIDE BLD-SCNC: 102 MMOL/L (ref 98–107)
CO2: 20 MMOL/L (ref 20–31)
CREAT SERPL-MCNC: 1.08 MG/DL (ref 0.5–0.9)
DIFFERENTIAL TYPE: ABNORMAL
EKG ATRIAL RATE: 100 BPM
EKG ATRIAL RATE: 115 BPM
EKG ATRIAL RATE: 92 BPM
EKG P AXIS: 118 DEGREES
EKG P AXIS: 65 DEGREES
EKG P AXIS: 67 DEGREES
EKG P-R INTERVAL: 114 MS
EKG P-R INTERVAL: 116 MS
EKG P-R INTERVAL: 122 MS
EKG Q-T INTERVAL: 390 MS
EKG Q-T INTERVAL: 466 MS
EKG Q-T INTERVAL: 472 MS
EKG QRS DURATION: 166 MS
EKG QRS DURATION: 176 MS
EKG QRS DURATION: 178 MS
EKG QTC CALCULATION (BAZETT): 539 MS
EKG QTC CALCULATION (BAZETT): 583 MS
EKG QTC CALCULATION (BAZETT): 601 MS
EKG R AXIS: -85 DEGREES
EKG R AXIS: -87 DEGREES
EKG R AXIS: -97 DEGREES
EKG T AXIS: 115 DEGREES
EKG T AXIS: 61 DEGREES
EKG T AXIS: 70 DEGREES
EKG VENTRICULAR RATE: 100 BPM
EKG VENTRICULAR RATE: 115 BPM
EKG VENTRICULAR RATE: 92 BPM
EOSINOPHILS RELATIVE PERCENT: 0 % (ref 1–4)
GFR AFRICAN AMERICAN: >60 ML/MIN
GFR NON-AFRICAN AMERICAN: 53 ML/MIN
GFR SERPL CREATININE-BSD FRML MDRD: ABNORMAL ML/MIN/{1.73_M2}
GFR SERPL CREATININE-BSD FRML MDRD: ABNORMAL ML/MIN/{1.73_M2}
GLUCOSE BLD-MCNC: 124 MG/DL (ref 65–105)
GLUCOSE BLD-MCNC: 126 MG/DL (ref 70–99)
GLUCOSE BLD-MCNC: 130 MG/DL (ref 65–105)
GLUCOSE BLD-MCNC: 137 MG/DL (ref 65–105)
GLUCOSE BLD-MCNC: 165 MG/DL (ref 65–105)
HCT VFR BLD CALC: 38.4 % (ref 36.3–47.1)
HEMOGLOBIN: 12.6 G/DL (ref 11.9–15.1)
IMMATURE GRANULOCYTES: 1 %
LYMPHOCYTES # BLD: 22 % (ref 24–43)
MCH RBC QN AUTO: 30 PG (ref 25.2–33.5)
MCHC RBC AUTO-ENTMCNC: 32.8 G/DL (ref 28.4–34.8)
MCV RBC AUTO: 91.4 FL (ref 82.6–102.9)
MONOCYTES # BLD: 3 % (ref 3–12)
NRBC AUTOMATED: 0 PER 100 WBC
PDW BLD-RTO: 15 % (ref 11.8–14.4)
PLATELET # BLD: 150 K/UL (ref 138–453)
PLATELET ESTIMATE: ABNORMAL
PMV BLD AUTO: 13 FL (ref 8.1–13.5)
POTASSIUM SERPL-SCNC: 4.5 MMOL/L (ref 3.7–5.3)
RBC # BLD: 4.2 M/UL (ref 3.95–5.11)
RBC # BLD: ABNORMAL 10*6/UL
SEG NEUTROPHILS: 74 % (ref 36–65)
SEGMENTED NEUTROPHILS ABSOLUTE COUNT: 5.69 K/UL (ref 1.5–8.1)
SODIUM BLD-SCNC: 135 MMOL/L (ref 135–144)
WBC # BLD: 7.7 K/UL (ref 3.5–11.3)
WBC # BLD: ABNORMAL 10*3/UL

## 2019-02-04 PROCEDURE — 94640 AIRWAY INHALATION TREATMENT: CPT

## 2019-02-04 PROCEDURE — 6370000000 HC RX 637 (ALT 250 FOR IP): Performed by: STUDENT IN AN ORGANIZED HEALTH CARE EDUCATION/TRAINING PROGRAM

## 2019-02-04 PROCEDURE — 36415 COLL VENOUS BLD VENIPUNCTURE: CPT

## 2019-02-04 PROCEDURE — 2700000000 HC OXYGEN THERAPY PER DAY

## 2019-02-04 PROCEDURE — G0378 HOSPITAL OBSERVATION PER HR: HCPCS

## 2019-02-04 PROCEDURE — 80048 BASIC METABOLIC PNL TOTAL CA: CPT

## 2019-02-04 PROCEDURE — 1200000000 HC SEMI PRIVATE

## 2019-02-04 PROCEDURE — 6370000000 HC RX 637 (ALT 250 FOR IP): Performed by: INTERNAL MEDICINE

## 2019-02-04 PROCEDURE — 2580000003 HC RX 258: Performed by: STUDENT IN AN ORGANIZED HEALTH CARE EDUCATION/TRAINING PROGRAM

## 2019-02-04 PROCEDURE — 76937 US GUIDE VASCULAR ACCESS: CPT

## 2019-02-04 PROCEDURE — 82947 ASSAY GLUCOSE BLOOD QUANT: CPT

## 2019-02-04 PROCEDURE — 85025 COMPLETE CBC W/AUTO DIFF WBC: CPT

## 2019-02-04 PROCEDURE — 99232 SBSQ HOSP IP/OBS MODERATE 35: CPT | Performed by: INTERNAL MEDICINE

## 2019-02-04 RX ADMIN — IPRATROPIUM BROMIDE AND ALBUTEROL SULFATE 1 AMPULE: .5; 3 SOLUTION RESPIRATORY (INHALATION) at 12:38

## 2019-02-04 RX ADMIN — PREDNISONE 40 MG: 20 TABLET ORAL at 09:19

## 2019-02-04 RX ADMIN — OXYCODONE HYDROCHLORIDE AND ACETAMINOPHEN 1 TABLET: 5; 325 TABLET ORAL at 14:35

## 2019-02-04 RX ADMIN — BUMETANIDE 1 MG: 1 TABLET ORAL at 09:20

## 2019-02-04 RX ADMIN — IPRATROPIUM BROMIDE AND ALBUTEROL SULFATE 1 AMPULE: .5; 3 SOLUTION RESPIRATORY (INHALATION) at 16:39

## 2019-02-04 RX ADMIN — Medication 1 TABLET: at 09:19

## 2019-02-04 RX ADMIN — Medication 10 ML: at 14:35

## 2019-02-04 RX ADMIN — Medication 10 ML: at 07:05

## 2019-02-04 RX ADMIN — DOXYCYCLINE HYCLATE 100 MG: 100 TABLET, COATED ORAL at 09:20

## 2019-02-04 RX ADMIN — CARVEDILOL 3.12 MG: 3.12 TABLET, FILM COATED ORAL at 20:55

## 2019-02-04 RX ADMIN — OXYCODONE HYDROCHLORIDE AND ACETAMINOPHEN 1 TABLET: 5; 325 TABLET ORAL at 07:05

## 2019-02-04 RX ADMIN — CARVEDILOL 3.12 MG: 3.12 TABLET, FILM COATED ORAL at 09:20

## 2019-02-04 RX ADMIN — IPRATROPIUM BROMIDE AND ALBUTEROL SULFATE 1 AMPULE: .5; 3 SOLUTION RESPIRATORY (INHALATION) at 20:10

## 2019-02-04 RX ADMIN — BUMETANIDE 1 MG: 1 TABLET ORAL at 20:55

## 2019-02-04 RX ADMIN — Medication 10 ML: at 20:55

## 2019-02-04 RX ADMIN — IPRATROPIUM BROMIDE AND ALBUTEROL SULFATE 1 AMPULE: .5; 3 SOLUTION RESPIRATORY (INHALATION) at 09:05

## 2019-02-04 RX ADMIN — DOXYCYCLINE HYCLATE 100 MG: 100 TABLET, COATED ORAL at 20:55

## 2019-02-04 RX ADMIN — Medication 10 ML: at 00:07

## 2019-02-04 RX ADMIN — AMITRIPTYLINE HYDROCHLORIDE 25 MG: 25 TABLET, FILM COATED ORAL at 20:55

## 2019-02-04 RX ADMIN — SPIRONOLACTONE 25 MG: 25 TABLET ORAL at 09:19

## 2019-02-04 ASSESSMENT — PAIN SCALES - GENERAL
PAINLEVEL_OUTOF10: 5
PAINLEVEL_OUTOF10: 7
PAINLEVEL_OUTOF10: 6
PAINLEVEL_OUTOF10: 6

## 2019-02-05 ENCOUNTER — TELEPHONE (OUTPATIENT)
Dept: FAMILY MEDICINE CLINIC | Age: 54
End: 2019-02-05

## 2019-02-05 VITALS
DIASTOLIC BLOOD PRESSURE: 68 MMHG | HEIGHT: 62 IN | TEMPERATURE: 97.3 F | HEART RATE: 73 BPM | RESPIRATION RATE: 17 BRPM | SYSTOLIC BLOOD PRESSURE: 104 MMHG | BODY MASS INDEX: 27.05 KG/M2 | OXYGEN SATURATION: 99 % | WEIGHT: 147 LBS

## 2019-02-05 PROBLEM — J44.1 COPD WITH EXACERBATION (HCC): Status: ACTIVE | Noted: 2019-02-05

## 2019-02-05 LAB
ANION GAP SERPL CALCULATED.3IONS-SCNC: 15 MMOL/L (ref 9–17)
BUN BLDV-MCNC: 38 MG/DL (ref 6–20)
BUN/CREAT BLD: ABNORMAL (ref 9–20)
CALCIUM SERPL-MCNC: 9.7 MG/DL (ref 8.6–10.4)
CHLORIDE BLD-SCNC: 102 MMOL/L (ref 98–107)
CO2: 20 MMOL/L (ref 20–31)
CREAT SERPL-MCNC: 1.11 MG/DL (ref 0.5–0.9)
GFR AFRICAN AMERICAN: >60 ML/MIN
GFR NON-AFRICAN AMERICAN: 51 ML/MIN
GFR SERPL CREATININE-BSD FRML MDRD: ABNORMAL ML/MIN/{1.73_M2}
GFR SERPL CREATININE-BSD FRML MDRD: ABNORMAL ML/MIN/{1.73_M2}
GLUCOSE BLD-MCNC: 102 MG/DL (ref 70–99)
GLUCOSE BLD-MCNC: 131 MG/DL (ref 65–105)
GLUCOSE BLD-MCNC: 139 MG/DL (ref 65–105)
GLUCOSE BLD-MCNC: 99 MG/DL (ref 65–105)
POTASSIUM SERPL-SCNC: 4.1 MMOL/L (ref 3.7–5.3)
SODIUM BLD-SCNC: 137 MMOL/L (ref 135–144)

## 2019-02-05 PROCEDURE — 99239 HOSP IP/OBS DSCHRG MGMT >30: CPT | Performed by: INTERNAL MEDICINE

## 2019-02-05 PROCEDURE — G0378 HOSPITAL OBSERVATION PER HR: HCPCS

## 2019-02-05 PROCEDURE — 94760 N-INVAS EAR/PLS OXIMETRY 1: CPT

## 2019-02-05 PROCEDURE — 6370000000 HC RX 637 (ALT 250 FOR IP): Performed by: STUDENT IN AN ORGANIZED HEALTH CARE EDUCATION/TRAINING PROGRAM

## 2019-02-05 PROCEDURE — 36415 COLL VENOUS BLD VENIPUNCTURE: CPT

## 2019-02-05 PROCEDURE — 6370000000 HC RX 637 (ALT 250 FOR IP): Performed by: INTERNAL MEDICINE

## 2019-02-05 PROCEDURE — 2580000003 HC RX 258: Performed by: STUDENT IN AN ORGANIZED HEALTH CARE EDUCATION/TRAINING PROGRAM

## 2019-02-05 PROCEDURE — 82947 ASSAY GLUCOSE BLOOD QUANT: CPT

## 2019-02-05 PROCEDURE — 2700000000 HC OXYGEN THERAPY PER DAY

## 2019-02-05 PROCEDURE — 80048 BASIC METABOLIC PNL TOTAL CA: CPT

## 2019-02-05 PROCEDURE — 94618 PULMONARY STRESS TESTING: CPT

## 2019-02-05 PROCEDURE — 94640 AIRWAY INHALATION TREATMENT: CPT

## 2019-02-05 RX ORDER — DOXYCYCLINE HYCLATE 100 MG
100 TABLET ORAL EVERY 12 HOURS SCHEDULED
Qty: 4 TABLET | Refills: 0 | Status: SHIPPED | OUTPATIENT
Start: 2019-02-05 | End: 2019-02-07

## 2019-02-05 RX ORDER — PREDNISONE 20 MG/1
40 TABLET ORAL DAILY
Qty: 8 TABLET | Refills: 0 | Status: SHIPPED | OUTPATIENT
Start: 2019-02-06 | End: 2019-02-10

## 2019-02-05 RX ADMIN — CARVEDILOL 3.12 MG: 3.12 TABLET, FILM COATED ORAL at 08:51

## 2019-02-05 RX ADMIN — Medication 10 ML: at 08:54

## 2019-02-05 RX ADMIN — IPRATROPIUM BROMIDE AND ALBUTEROL SULFATE 1 AMPULE: .5; 3 SOLUTION RESPIRATORY (INHALATION) at 07:41

## 2019-02-05 RX ADMIN — Medication 1 TABLET: at 08:50

## 2019-02-05 RX ADMIN — PREDNISONE 40 MG: 20 TABLET ORAL at 08:50

## 2019-02-05 RX ADMIN — DOXYCYCLINE HYCLATE 100 MG: 100 TABLET, COATED ORAL at 08:51

## 2019-02-05 RX ADMIN — IPRATROPIUM BROMIDE AND ALBUTEROL SULFATE 1 AMPULE: .5; 3 SOLUTION RESPIRATORY (INHALATION) at 16:54

## 2019-02-05 RX ADMIN — IPRATROPIUM BROMIDE AND ALBUTEROL SULFATE 1 AMPULE: .5; 3 SOLUTION RESPIRATORY (INHALATION) at 12:12

## 2019-02-05 RX ADMIN — Medication 10 ML: at 08:51

## 2019-02-05 RX ADMIN — OXYCODONE HYDROCHLORIDE AND ACETAMINOPHEN 1 TABLET: 5; 325 TABLET ORAL at 08:52

## 2019-02-05 RX ADMIN — SPIRONOLACTONE 25 MG: 25 TABLET ORAL at 08:50

## 2019-02-05 RX ADMIN — BUMETANIDE 1 MG: 1 TABLET ORAL at 08:51

## 2019-02-05 ASSESSMENT — ENCOUNTER SYMPTOMS
SHORTNESS OF BREATH: 1
ABDOMINAL PAIN: 0
COUGH: 1
NAUSEA: 0
RHINORRHEA: 0
DIARRHEA: 0

## 2019-02-05 ASSESSMENT — PAIN SCALES - GENERAL
PAINLEVEL_OUTOF10: 8
PAINLEVEL_OUTOF10: 5

## 2019-02-06 ENCOUNTER — TELEPHONE (OUTPATIENT)
Dept: FAMILY MEDICINE CLINIC | Age: 54
End: 2019-02-06

## 2019-02-07 ENCOUNTER — CARE COORDINATION (OUTPATIENT)
Dept: CARE COORDINATION | Age: 54
End: 2019-02-07

## 2019-03-01 DIAGNOSIS — I50.22 CHRONIC SYSTOLIC HEART FAILURE (HCC): ICD-10-CM

## 2019-03-04 RX ORDER — CARVEDILOL 3.12 MG/1
TABLET ORAL
Qty: 60 TABLET | Refills: 0 | Status: SHIPPED | OUTPATIENT
Start: 2019-03-04 | End: 2019-03-28 | Stop reason: SDUPTHER

## 2019-03-13 ENCOUNTER — CARE COORDINATION (OUTPATIENT)
Dept: CARE COORDINATION | Age: 54
End: 2019-03-13

## 2019-03-13 VITALS
DIASTOLIC BLOOD PRESSURE: 74 MMHG | RESPIRATION RATE: 18 BRPM | OXYGEN SATURATION: 96 % | SYSTOLIC BLOOD PRESSURE: 107 MMHG | HEART RATE: 82 BPM

## 2019-03-18 ENCOUNTER — OFFICE VISIT (OUTPATIENT)
Dept: FAMILY MEDICINE CLINIC | Age: 54
End: 2019-03-18
Payer: MEDICAID

## 2019-03-18 ENCOUNTER — HOSPITAL ENCOUNTER (OUTPATIENT)
Dept: OTHER | Age: 54
Discharge: HOME | End: 2019-03-18
Payer: MEDICAID

## 2019-03-18 ENCOUNTER — TELEPHONE (OUTPATIENT)
Dept: FAMILY MEDICINE CLINIC | Age: 54
End: 2019-03-18

## 2019-03-18 VITALS
RESPIRATION RATE: 18 BRPM | DIASTOLIC BLOOD PRESSURE: 64 MMHG | OXYGEN SATURATION: 100 % | SYSTOLIC BLOOD PRESSURE: 97 MMHG | WEIGHT: 142.6 LBS | HEART RATE: 88 BPM | BODY MASS INDEX: 26.08 KG/M2

## 2019-03-18 VITALS
HEIGHT: 62 IN | HEART RATE: 88 BPM | BODY MASS INDEX: 26.68 KG/M2 | WEIGHT: 145 LBS | DIASTOLIC BLOOD PRESSURE: 76 MMHG | TEMPERATURE: 98.6 F | SYSTOLIC BLOOD PRESSURE: 102 MMHG

## 2019-03-18 DIAGNOSIS — I50.22 CHRONIC SYSTOLIC HEART FAILURE (HCC): ICD-10-CM

## 2019-03-18 DIAGNOSIS — J45.20 MILD INTERMITTENT ASTHMA WITHOUT COMPLICATION: ICD-10-CM

## 2019-03-18 DIAGNOSIS — G89.4 CHRONIC PAIN SYNDROME: ICD-10-CM

## 2019-03-18 DIAGNOSIS — N28.89 RIGHT RENAL MASS: Primary | ICD-10-CM

## 2019-03-18 DIAGNOSIS — I42.8 NON-ISCHEMIC CARDIOMYOPATHY (HCC): ICD-10-CM

## 2019-03-18 DIAGNOSIS — J44.9 CHRONIC OBSTRUCTIVE PULMONARY DISEASE, UNSPECIFIED COPD TYPE (HCC): ICD-10-CM

## 2019-03-18 PROCEDURE — 3023F SPIROM DOC REV: CPT | Performed by: HOSPITALIST

## 2019-03-18 PROCEDURE — 99211 OFF/OP EST MAY X REQ PHY/QHP: CPT | Performed by: NURSE PRACTITIONER

## 2019-03-18 PROCEDURE — 3017F COLORECTAL CA SCREEN DOC REV: CPT | Performed by: HOSPITALIST

## 2019-03-18 PROCEDURE — 99213 OFFICE O/P EST LOW 20 MIN: CPT | Performed by: HOSPITALIST

## 2019-03-18 PROCEDURE — G8417 CALC BMI ABV UP PARAM F/U: HCPCS | Performed by: HOSPITALIST

## 2019-03-18 PROCEDURE — 4004F PT TOBACCO SCREEN RCVD TLK: CPT | Performed by: HOSPITALIST

## 2019-03-18 PROCEDURE — G8484 FLU IMMUNIZE NO ADMIN: HCPCS | Performed by: HOSPITALIST

## 2019-03-18 PROCEDURE — G8427 DOCREV CUR MEDS BY ELIG CLIN: HCPCS | Performed by: HOSPITALIST

## 2019-03-18 PROCEDURE — 99211 OFF/OP EST MAY X REQ PHY/QHP: CPT | Performed by: HOSPITALIST

## 2019-03-18 PROCEDURE — G8926 SPIRO NO PERF OR DOC: HCPCS | Performed by: HOSPITALIST

## 2019-03-18 RX ORDER — OMEPRAZOLE 20 MG/1
20 CAPSULE, DELAYED RELEASE ORAL
Qty: 30 CAPSULE | Refills: 0 | Status: SHIPPED | OUTPATIENT
Start: 2019-03-18 | End: 2019-05-02 | Stop reason: SDUPTHER

## 2019-03-18 ASSESSMENT — ENCOUNTER SYMPTOMS
CONSTIPATION: 0
DIARRHEA: 0
BACK PAIN: 0
WHEEZING: 0
COUGH: 0
VOMITING: 0
SHORTNESS OF BREATH: 0
ABDOMINAL PAIN: 0
NAUSEA: 0

## 2019-03-18 ASSESSMENT — PATIENT HEALTH QUESTIONNAIRE - PHQ9
SUM OF ALL RESPONSES TO PHQ QUESTIONS 1-9: 0
1. LITTLE INTEREST OR PLEASURE IN DOING THINGS: 0
2. FEELING DOWN, DEPRESSED OR HOPELESS: 0
SUM OF ALL RESPONSES TO PHQ QUESTIONS 1-9: 0
SUM OF ALL RESPONSES TO PHQ9 QUESTIONS 1 & 2: 0

## 2019-03-19 ENCOUNTER — TELEPHONE (OUTPATIENT)
Dept: FAMILY MEDICINE CLINIC | Age: 54
End: 2019-03-19

## 2019-03-21 RX ORDER — PROMETHAZINE HYDROCHLORIDE 25 MG/ML
25 INJECTION, SOLUTION INTRAMUSCULAR; INTRAVENOUS ONCE
Qty: 1 ML | Refills: 0
Start: 2019-03-21 | End: 2019-03-21

## 2019-03-22 ENCOUNTER — HOSPITAL ENCOUNTER (OUTPATIENT)
Dept: PAIN MANAGEMENT | Age: 54
Discharge: HOME | End: 2019-03-22
Payer: MEDICAID

## 2019-03-22 VITALS
HEIGHT: 62 IN | WEIGHT: 144 LBS | HEART RATE: 84 BPM | DIASTOLIC BLOOD PRESSURE: 50 MMHG | RESPIRATION RATE: 16 BRPM | SYSTOLIC BLOOD PRESSURE: 88 MMHG | TEMPERATURE: 97.9 F | BODY MASS INDEX: 26.5 KG/M2

## 2019-03-22 DIAGNOSIS — M54.12 CERVICAL RADICULOPATHY: ICD-10-CM

## 2019-03-22 DIAGNOSIS — G89.29 CHRONIC BILATERAL THORACIC BACK PAIN: ICD-10-CM

## 2019-03-22 DIAGNOSIS — M54.6 CHRONIC BILATERAL THORACIC BACK PAIN: ICD-10-CM

## 2019-03-22 DIAGNOSIS — M54.16 LUMBAR RADICULOPATHY: Primary | ICD-10-CM

## 2019-03-22 PROCEDURE — 99244 OFF/OP CNSLTJ NEW/EST MOD 40: CPT | Performed by: PAIN MEDICINE

## 2019-03-22 PROCEDURE — 99203 OFFICE O/P NEW LOW 30 MIN: CPT

## 2019-03-22 ASSESSMENT — ENCOUNTER SYMPTOMS
BACK PAIN: 1
SHORTNESS OF BREATH: 1
BOWEL INCONTINENCE: 0
BLURRED VISION: 0

## 2019-03-25 ENCOUNTER — TELEPHONE (OUTPATIENT)
Dept: FAMILY MEDICINE CLINIC | Age: 54
End: 2019-03-25

## 2019-03-26 ENCOUNTER — TELEPHONE (OUTPATIENT)
Dept: FAMILY MEDICINE CLINIC | Age: 54
End: 2019-03-26

## 2019-03-27 DIAGNOSIS — I50.22 CHRONIC SYSTOLIC HEART FAILURE (HCC): ICD-10-CM

## 2019-03-27 DIAGNOSIS — G43.809 OTHER MIGRAINE WITHOUT STATUS MIGRAINOSUS, NOT INTRACTABLE: ICD-10-CM

## 2019-03-27 DIAGNOSIS — I10 ESSENTIAL HYPERTENSION: ICD-10-CM

## 2019-03-28 ENCOUNTER — TELEPHONE (OUTPATIENT)
Dept: FAMILY MEDICINE CLINIC | Age: 54
End: 2019-03-28

## 2019-03-28 RX ORDER — LISINOPRIL 10 MG/1
TABLET ORAL
Qty: 30 TABLET | Refills: 1 | Status: SHIPPED | OUTPATIENT
Start: 2019-03-28 | End: 2019-05-30 | Stop reason: SDUPTHER

## 2019-03-28 RX ORDER — AMITRIPTYLINE HYDROCHLORIDE 25 MG/1
TABLET, FILM COATED ORAL
Qty: 30 TABLET | Refills: 3 | Status: SHIPPED | OUTPATIENT
Start: 2019-03-28 | End: 2019-08-22 | Stop reason: SDUPTHER

## 2019-03-28 RX ORDER — CARVEDILOL 3.12 MG/1
TABLET ORAL
Qty: 60 TABLET | Refills: 0 | Status: SHIPPED | OUTPATIENT
Start: 2019-03-28 | End: 2019-05-02 | Stop reason: SDUPTHER

## 2019-04-02 ENCOUNTER — TELEPHONE (OUTPATIENT)
Dept: FAMILY MEDICINE CLINIC | Age: 54
End: 2019-04-02

## 2019-04-03 ENCOUNTER — TELEPHONE (OUTPATIENT)
Dept: FAMILY MEDICINE CLINIC | Age: 54
End: 2019-04-03

## 2019-04-03 ENCOUNTER — TELEPHONE (OUTPATIENT)
Dept: INTERVENTIONAL RADIOLOGY/VASCULAR | Age: 54
End: 2019-04-03

## 2019-04-08 NOTE — TELEPHONE ENCOUNTER
Home visit scheduled for noon. Patient called and stated she would be out and wishes to reschedule the appointment for 1pm.  Attempted visit around 1pm.  No answer at door, left vm.

## 2019-04-08 NOTE — TELEPHONE ENCOUNTER
Follow up visit with patient in her home. Patient stated that she followed up with pain clinic. Patient apprehensive about conversation with physician but will continue to follow up. Gave patient reminder about upcoming CT scan.  scheduled transportation for appointment. Patient stated she will attend. Discussed housing and options. Patient stated that she would prefer to stay at [facility] as it is a better option financially and it is closer to family. Patient stated that she still needs assistance with managing her utilities as she is unable to pay them. Referred to financial counselor. Patient stated that she received a request from [employer] for documents proving she has a disability and had been following up with her physicians. Printed a few after visit summaries as proof. Referred to financial counselor for further assistance. Discussed mental health. Patient stated that she has a mental health diagnosis but unsure of the name. Patient stated that she received assistance from [facility] in the past and feel as if she needs to return in the future. Patient admitted to feeling symptoms of anxiety and is having difficulty with coping at times. Patient stated that she has no desire of turing to substances to use as a coping mechanism and prefers to stay away from environments that could potentially interfere with her sobriety. Discussed services to combat victimization and to help boost her self esteem. Patient stated that she participated in such programs and the did not help as she is still in the same mindset and situation. She declined. Patient is open to attending Christianity as she felt it helped. Patient is planning on putting money aside next check to purchase Christianity clothes. Will follow up with patient next week.

## 2019-04-08 NOTE — TELEPHONE ENCOUNTER
Phone call to patient for follow up regarding utilities. Patient stated that she has yet to follow up but will do so later in the day.

## 2019-04-08 NOTE — TELEPHONE ENCOUNTER
Phone call to patient to remind her that the cab will be arriving soon for her medical appointment. Patient stated that she forgot she had an appointment and is at the [facility]. Patient was urged to have someone take her to appointment.

## 2019-04-08 NOTE — TELEPHONE ENCOUNTER
Attempted to pickup patient's medication from [address] [place] [address]. Was told other than routine bubble packed medication, nothing else was found in system. To follow up with RN. Phone call to patient, patient stated that she had not received any new medication via delivery nor did she pick it up. Patient stated that she followed up with the utility companies and all of the utilities have been restored.

## 2019-04-17 ENCOUNTER — TELEPHONE (OUTPATIENT)
Dept: FAMILY MEDICINE CLINIC | Age: 54
End: 2019-04-17

## 2019-04-18 ENCOUNTER — HOSPITAL ENCOUNTER (OUTPATIENT)
Dept: OTHER | Age: 54
Discharge: HOME | End: 2019-04-18
Payer: MEDICAID

## 2019-04-18 VITALS
RESPIRATION RATE: 18 BRPM | HEART RATE: 79 BPM | BODY MASS INDEX: 27.18 KG/M2 | DIASTOLIC BLOOD PRESSURE: 66 MMHG | SYSTOLIC BLOOD PRESSURE: 99 MMHG | WEIGHT: 148.6 LBS | OXYGEN SATURATION: 99 %

## 2019-04-18 DIAGNOSIS — I50.22 CHRONIC SYSTOLIC HEART FAILURE (HCC): Primary | ICD-10-CM

## 2019-04-18 PROCEDURE — 99211 OFF/OP EST MAY X REQ PHY/QHP: CPT | Performed by: NURSE PRACTITIONER

## 2019-04-18 PROCEDURE — 99212 OFFICE O/P EST SF 10 MIN: CPT | Performed by: NURSE PRACTITIONER

## 2019-04-18 ASSESSMENT — PAIN DESCRIPTION - LOCATION: LOCATION: HIP;NECK

## 2019-04-18 ASSESSMENT — PAIN DESCRIPTION - PAIN TYPE: TYPE: CHRONIC PAIN

## 2019-04-18 ASSESSMENT — PAIN SCALES - GENERAL: PAINLEVEL_OUTOF10: 6

## 2019-04-18 NOTE — PROGRESS NOTES
Preventing Falls: After Your Visit  Your Care Instructions  If you are a patient with heart problems you may be at risk for falling because of unsteadiness, dizziness, diabetes, and shortness of breathe. Some of your medicines also may add to your risk. By making your home safer, you can lower your risk of falling. You can make your home safer with a few simple measures. Follow-up care is a key part of your treatment and safety. Be sure to make and go to all appointments, and call your doctor if you are having problems. It's also a good idea to know your test results and keep a list of the medicines you take. How can you care for yourself at home? Taking care of yourself  · Keep your blood sugar at a target level (which you set with your doctor). · Exercise regularly, as approved by your Doctor, to improve your strength, muscle tone, and balance. Walk if you can. Swimming may be a good choice if you cannot walk easily. · Have your vision checked as often as your doctor recommends. It is usually once a year or more often if you have eye problems. · Know the side effects of the medicines you take. Ask your doctor or pharmacist whether the medicines you take can affect your balance. Sleeping pills or sedatives can affect your balance. · Limit the amount of alcohol you drink. Alcohol can impair your balance and other senses. ·   Preventing falls at home  · Remove raised doorway thresholds, throw rugs, and clutter. Repair loose carpet or raised areas in the floor. · Move furniture and electrical cords to keep them out of walking paths. · Use nonskid floor wax, and wipe up spills right away, especially on ceramic tile floors. · If you use a walker or cane, put rubber tips on it. If you use crutches, clean the bottoms of them regularly with an abrasive pad, such as steel wool. · Keep your house well lit, especially Carvel Browner, and outside walkways.  Use night-lights in areas such as hallways and bathrooms. Add extra light switches or use remote switches (such as switches that go on or off when you clap your hands) to make it easier to turn lights on if you have to get up during the night. · Install sturdy handrails on stairways. Put grab bars near your shower, bathtub, and toilet. · Store household items on low shelves so that you do not have to climb or reach high. Or use a reaching device that you can get at a medical supply store. If you have to climb for something, use a step stool with handrails, or ask someone to get it for you. · Keep a cordless phone and a flashlight with new batteries by your bed. If possible, put a phone in each of the main rooms of your house, or carry a cell phone in case you fall and cannot reach a phone. Or you can wear a device around your neck or wrist. You push a button that sends a signal for help. · Wear low-heeled shoes that fit well and give your feet good support. Use footwear with nonskid soles. Check the heels and soles of your shoes for wear. Repair or replace worn heels or soles. · Do not wear socks without shoes on wood floors. · Walk on the grass when the sidewalks are slippery. If you live in an area that gets snow and ice in the winter, sprinkle salt on slippery steps and sidewalks. Where can you learn more? Go to [email or web address] and sign in to your [employer] account. Enter Q294 in the [facility] box to learn more about Diabetes and Preventing Falls: After Your Visit.     If you do not have an account, please click on the Sign Up Now link. © 0404-0150 Healthwise, Incorporated. Care instructions adapted under license by [facility].  This care instruction is for use with your licensed healthcare professional. If you have questions about a medical condition or this instruction, always ask your healthcare professional. [address] any warranty or liability for your use of this

## 2019-04-18 NOTE — PROGRESS NOTES
Verbally reviewed medication list with patient; patient verbalized understanding. Discussed 2000mg/day sodium restricted diet; patient verbalized understanding. Moderate daily exercise encouraged as tolerated. Discussed rest breaks as needed; patient verbalized understanding. Patient instructed to weigh self at the same time of each day, using same clothes and same scale; reinforced teaching to monitor for 3-5 lb weight increase over 1-2 days, and to notify the CHF clinic at [phone] or physician office if weight change noted. Patient verbalized understanding. Risks of smoking discussed with the patient if applicable; patient strongly discouraged to smoke. Patient verbalized understanding. Signs and symptoms of CHF discussed with patient, such as feeling more tired than normal, feeling short of breath, coughing that increases when you lie down, sudden weight gain, swelling of your feet, legs or belly. Patient verbalized understanding to notify the CHF clinic at [phone] or physician office if these symptoms occur. Compliance with plan of care and further disease process causes discussed with patient, patient encouraged to keep all follow up appointments. Patient verbalized understanding.

## 2019-05-01 NOTE — TELEPHONE ENCOUNTER
Met with patient in her home.  and financial counselor also present during visit. Started process to schedule all of her scheduled CT tests. Rescheduled PCP appointment and scheduled a next day CHF clinic appointment. Reminded patient that she has a housing interview with [facility] next week. Patient stated that she has all of her documents and is prepared. Financial counselor assisted patient with paperwork pertaining to [employer] benefits, social security, and her utilities. Will contact patient next week for a reminder regarding her appointment.

## 2019-05-02 ENCOUNTER — HOSPITAL ENCOUNTER (OUTPATIENT)
Age: 54
Discharge: HOME | End: 2019-05-02
Payer: MEDICAID

## 2019-05-02 ENCOUNTER — HOSPITAL ENCOUNTER (OUTPATIENT)
Dept: OTHER | Age: 54
Discharge: HOME | End: 2019-05-02
Payer: MEDICAID

## 2019-05-02 VITALS
RESPIRATION RATE: 16 BRPM | WEIGHT: 154 LBS | BODY MASS INDEX: 28.17 KG/M2 | HEART RATE: 56 BPM | SYSTOLIC BLOOD PRESSURE: 102 MMHG | DIASTOLIC BLOOD PRESSURE: 60 MMHG | OXYGEN SATURATION: 99 %

## 2019-05-02 DIAGNOSIS — I50.22 CHRONIC SYSTOLIC HEART FAILURE (HCC): ICD-10-CM

## 2019-05-02 LAB
ANION GAP SERPL CALCULATED.3IONS-SCNC: 13 MMOL/L (ref 9–17)
BUN BLDV-MCNC: 19 MG/DL (ref 6–20)
BUN/CREAT BLD: ABNORMAL (ref 9–20)
CALCIUM SERPL-MCNC: 10 MG/DL (ref 8.6–10.4)
CHLORIDE BLD-SCNC: 102 MMOL/L (ref 98–107)
CO2: 24 MMOL/L (ref 20–31)
CREAT SERPL-MCNC: 0.81 MG/DL (ref 0.5–0.9)
GFR AFRICAN AMERICAN: >60 ML/MIN
GFR NON-AFRICAN AMERICAN: >60 ML/MIN
GFR SERPL CREATININE-BSD FRML MDRD: ABNORMAL ML/MIN/{1.73_M2}
GFR SERPL CREATININE-BSD FRML MDRD: ABNORMAL ML/MIN/{1.73_M2}
GLUCOSE BLD-MCNC: 112 MG/DL (ref 70–99)
POTASSIUM SERPL-SCNC: 3.7 MMOL/L (ref 3.7–5.3)
SODIUM BLD-SCNC: 139 MMOL/L (ref 135–144)

## 2019-05-02 PROCEDURE — 80048 BASIC METABOLIC PNL TOTAL CA: CPT

## 2019-05-02 PROCEDURE — 99212 OFFICE O/P EST SF 10 MIN: CPT

## 2019-05-02 ASSESSMENT — PAIN SCALES - GENERAL: PAINLEVEL_OUTOF10: 0

## 2019-05-02 NOTE — PROGRESS NOTES
Verbally reviewed medication list with patient; patient verbalized understanding. Discussed 2000mg/day sodium restricted diet; patient verbalized understanding. Moderate daily exercise encouraged as tolerated. Discussed rest breaks as needed; patient verbalized understanding. Patient instructed to weigh self at the same time of each day, using same clothes and same scale; reinforced teaching to monitor for 3-5 lb weight increase over 1-2 days, and to notify the CHF clinic at [phone] or physician office if weight change noted. Patient verbalized understanding. Risks of smoking discussed with the patient if applicable; patient strongly discouraged to smoke. Patient verbalized understanding. Signs and symptoms of CHF discussed with patient, such as feeling more tired than normal, feeling short of breath, coughing that increases when you lie down, sudden weight gain, swelling of your feet, legs or belly. Patient verbalized understanding to notify the CHF clinic at [phone] or physician office if these symptoms occur. Compliance with plan of care and further disease process causes discussed with patient, patient encouraged to keep all follow up appointments. Patient verbalized understanding.

## 2019-05-02 NOTE — PROGRESS NOTES
Lab results obtained, will fax results to [facility] and call patient with results. Next scheduled CHF Clinic appointment in 2 weeks.

## 2019-05-03 NOTE — TELEPHONE ENCOUNTER
Please address the medication refill and close the encounter. If I can be of assistance, please route to the applicable pool. Thank you.   Last visit: [id number]  Last Med refill: [id number]  Does patient have enough medication for 72 hours:   Next Visit Date:  Future Appointments   Date Time Provider Yanelis Alfaro   5/6/2019  2:30 PM STV CT  [facility] CT SCAN STV Radiolog   5/17/2019  1:30 PM [facility] CHF CLINIC RM 1 ST[facility] CHF CLI [place] Maintenance   Topic Date Due    DTaP/Tdap/Td vaccine (1 - Tdap) 06/13/1984    Shingles Vaccine (1 of 2) 06/13/2015    Colon Cancer Screen FIT/FOBT  03/17/2018    Cervical cancer screen  12/28/2018    Breast cancer screen  02/03/2019    Flu vaccine (Season Ended) 12/07/2019 (Originally 9/1/2019)    A1C test (Diabetic or Prediabetic)  07/16/2019    Potassium monitoring  05/02/2020    Creatinine monitoring  05/02/2020    Lipid screen  11/25/2023    Pneumococcal 0-64 years Vaccine  Completed    Hepatitis C screen  Completed    HIV screen  Completed       Hemoglobin A1C (%)   Date Value   07/16/2018 6.4 (H)   02/03/2017 5.9   01/12/2017 6.0             ( goal A1C is < 7)   No results found for: LABMICR  LDL Cholesterol (mg/dL)   Date Value   11/25/2018 128   07/16/2018 191 (H)       (goal LDL is <100)   AST (U/L)   Date Value   02/02/2019 27     ALT (U/L)   Date Value   02/02/2019 21     BUN (mg/dL)   Date Value   05/02/2019 19     BP Readings from Last 3 Encounters:   05/02/19 102/60   04/18/19 99/66   03/22/19 (!) 88/50          (goal 120/80)    All Future Testing planned in [facility]  Lab Frequency Next Occurrence   CT ABDOMEN PELVIS W WO CONTRAST Additional Contrast? Radiologist Recommendation Once 03/18/2020   IR Lumbar Puncture For Myelogram CT Once 03/22/2019   CT Cervical Spine W Contrast Once 03/22/2019   CT Lumbar Spine W Contrast Once 03/22/2019   CT Thoracic Spine W Contrast Once 45/67/5697   Basic Metabolic Panel Once 86/95/4906 Patient Active Problem List:     COPD (chronic obstructive pulmonary disease) ([address]     Fibroids     Lung nodule < 6cm on CT     Chronic systolic heart failure (HCC) s/p AICD     Mild intermittent asthma     Essential hypertension     Chest pain     Acute systolic CHF (congestive heart failure) (HCC)     Acute on chronic congestive heart failure (HCC)     QT prolongation     Viral URI with cough     Asthma with COPD with exacerbation ([address]     Non-ischemic cardiomyopathy ([address]     Lesion of right native kidney     COPD with exacerbation ([address]

## 2019-05-06 RX ORDER — OMEPRAZOLE 20 MG/1
CAPSULE, DELAYED RELEASE ORAL
Qty: 30 CAPSULE | Refills: 0 | Status: SHIPPED | OUTPATIENT
Start: 2019-05-06 | End: 2019-05-30 | Stop reason: SDUPTHER

## 2019-05-06 RX ORDER — CARVEDILOL 3.12 MG/1
TABLET ORAL
Qty: 60 TABLET | Refills: 0 | Status: SHIPPED | OUTPATIENT
Start: 2019-05-06 | End: 2019-05-30 | Stop reason: SDUPTHER

## 2019-05-06 RX ORDER — BUMETANIDE 1 MG/1
TABLET ORAL
Qty: 60 TABLET | Refills: 3 | Status: SHIPPED | OUTPATIENT
Start: 2019-05-06 | End: 2019-06-07 | Stop reason: SDUPTHER

## 2019-05-09 ENCOUNTER — TELEPHONE (OUTPATIENT)
Dept: FAMILY MEDICINE CLINIC | Age: 54
End: 2019-05-09

## 2019-05-14 ENCOUNTER — TELEPHONE (OUTPATIENT)
Dept: FAMILY MEDICINE CLINIC | Age: 54
End: 2019-05-14

## 2019-05-30 DIAGNOSIS — I50.22 CHRONIC SYSTOLIC HEART FAILURE (HCC): ICD-10-CM

## 2019-05-30 DIAGNOSIS — I10 ESSENTIAL HYPERTENSION: ICD-10-CM

## 2019-05-30 NOTE — TELEPHONE ENCOUNTER
Acute on chronic congestive heart failure (HCC)     QT prolongation     Viral URI with cough     Asthma with COPD with exacerbation (HCC)     Non-ischemic cardiomyopathy (HCC)     Lesion of right native kidney     COPD with exacerbation ([facility]

## 2019-05-31 RX ORDER — LISINOPRIL 10 MG/1
TABLET ORAL
Qty: 30 TABLET | Refills: 1 | Status: SHIPPED | OUTPATIENT
Start: 2019-05-31 | End: 2019-06-26 | Stop reason: DRUGHIGH

## 2019-05-31 RX ORDER — CARVEDILOL 3.12 MG/1
TABLET ORAL
Qty: 60 TABLET | Refills: 0 | Status: ON HOLD | OUTPATIENT
Start: 2019-05-31 | End: 2019-06-14 | Stop reason: HOSPADM

## 2019-05-31 RX ORDER — SPIRONOLACTONE 25 MG/1
TABLET ORAL
Qty: 28 TABLET | Refills: 2 | Status: SHIPPED | OUTPATIENT
Start: 2019-05-31 | End: 2019-08-22 | Stop reason: SDUPTHER

## 2019-05-31 RX ORDER — OMEPRAZOLE 20 MG/1
CAPSULE, DELAYED RELEASE ORAL
Qty: 30 CAPSULE | Refills: 0 | Status: SHIPPED | OUTPATIENT
Start: 2019-05-31 | End: 2019-08-22 | Stop reason: SDUPTHER

## 2019-06-04 ENCOUNTER — APPOINTMENT (OUTPATIENT)
Dept: CT IMAGING | Age: 54
DRG: 198 | End: 2019-06-04
Payer: MEDICAID

## 2019-06-04 ENCOUNTER — APPOINTMENT (OUTPATIENT)
Dept: GENERAL RADIOLOGY | Age: 54
DRG: 198 | End: 2019-06-04
Payer: MEDICAID

## 2019-06-04 ENCOUNTER — HOSPITAL ENCOUNTER (INPATIENT)
Age: 54
LOS: 1 days | Discharge: LEFT BEFORE DISCHARGE | DRG: 198 | End: 2019-06-05
Attending: EMERGENCY MEDICINE | Admitting: FAMILY MEDICINE
Payer: MEDICAID

## 2019-06-04 DIAGNOSIS — R07.9 CHEST PAIN, UNSPECIFIED TYPE: Primary | ICD-10-CM

## 2019-06-04 PROBLEM — I20.0 UNSTABLE ANGINA (HCC): Status: ACTIVE | Noted: 2019-06-04

## 2019-06-04 LAB
ANION GAP SERPL CALCULATED.3IONS-SCNC: 16 MMOL/L (ref 9–17)
BNP INTERPRETATION: ABNORMAL
BUN BLDV-MCNC: 18 MG/DL (ref 6–20)
BUN/CREAT BLD: ABNORMAL (ref 9–20)
CALCIUM SERPL-MCNC: 9.6 MG/DL (ref 8.6–10.4)
CHLORIDE BLD-SCNC: 102 MMOL/L (ref 98–107)
CO2: 23 MMOL/L (ref 20–31)
CREAT SERPL-MCNC: 0.8 MG/DL (ref 0.5–0.9)
GFR AFRICAN AMERICAN: >60 ML/MIN
GFR NON-AFRICAN AMERICAN: >60 ML/MIN
GFR SERPL CREATININE-BSD FRML MDRD: ABNORMAL ML/MIN/{1.73_M2}
GFR SERPL CREATININE-BSD FRML MDRD: ABNORMAL ML/MIN/{1.73_M2}
GLUCOSE BLD-MCNC: 87 MG/DL (ref 70–99)
HCT VFR BLD CALC: 41.1 % (ref 36.3–47.1)
HEMOGLOBIN: 13.1 G/DL (ref 11.9–15.1)
MCH RBC QN AUTO: 29.4 PG (ref 25.2–33.5)
MCHC RBC AUTO-ENTMCNC: 31.9 G/DL (ref 28.4–34.8)
MCV RBC AUTO: 92.4 FL (ref 82.6–102.9)
NRBC AUTOMATED: 0 PER 100 WBC
PDW BLD-RTO: 15.2 % (ref 11.8–14.4)
PLATELET # BLD: 175 K/UL (ref 138–453)
PMV BLD AUTO: 12.8 FL (ref 8.1–13.5)
POTASSIUM SERPL-SCNC: 3.2 MMOL/L (ref 3.7–5.3)
PRO-BNP: 1089 PG/ML
RBC # BLD: 4.45 M/UL (ref 3.95–5.11)
SODIUM BLD-SCNC: 141 MMOL/L (ref 135–144)
TROPONIN INTERP: ABNORMAL
TROPONIN INTERP: ABNORMAL
TROPONIN T: ABNORMAL NG/ML
TROPONIN T: ABNORMAL NG/ML
TROPONIN, HIGH SENSITIVITY: 21 NG/L (ref 0–14)
TROPONIN, HIGH SENSITIVITY: 22 NG/L (ref 0–14)
WBC # BLD: 4.8 K/UL (ref 3.5–11.3)

## 2019-06-04 PROCEDURE — 71046 X-RAY EXAM CHEST 2 VIEWS: CPT

## 2019-06-04 PROCEDURE — 93005 ELECTROCARDIOGRAM TRACING: CPT | Performed by: STUDENT IN AN ORGANIZED HEALTH CARE EDUCATION/TRAINING PROGRAM

## 2019-06-04 PROCEDURE — 80048 BASIC METABOLIC PNL TOTAL CA: CPT

## 2019-06-04 PROCEDURE — 85027 COMPLETE CBC AUTOMATED: CPT

## 2019-06-04 PROCEDURE — 93005 ELECTROCARDIOGRAM TRACING: CPT

## 2019-06-04 PROCEDURE — 83880 ASSAY OF NATRIURETIC PEPTIDE: CPT

## 2019-06-04 PROCEDURE — 84484 ASSAY OF TROPONIN QUANT: CPT

## 2019-06-04 PROCEDURE — 6370000000 HC RX 637 (ALT 250 FOR IP): Performed by: STUDENT IN AN ORGANIZED HEALTH CARE EDUCATION/TRAINING PROGRAM

## 2019-06-04 PROCEDURE — 2060000000 HC ICU INTERMEDIATE R&B

## 2019-06-04 PROCEDURE — 71275 CT ANGIOGRAPHY CHEST: CPT

## 2019-06-04 PROCEDURE — 6360000004 HC RX CONTRAST MEDICATION: Performed by: STUDENT IN AN ORGANIZED HEALTH CARE EDUCATION/TRAINING PROGRAM

## 2019-06-04 PROCEDURE — 99285 EMERGENCY DEPT VISIT HI MDM: CPT

## 2019-06-04 RX ORDER — SODIUM CHLORIDE 0.9 % (FLUSH) 0.9 %
10 SYRINGE (ML) INJECTION EVERY 12 HOURS SCHEDULED
Status: DISCONTINUED | OUTPATIENT
Start: 2019-06-04 | End: 2019-06-05 | Stop reason: HOSPADM

## 2019-06-04 RX ORDER — ASPIRIN 81 MG/1
324 TABLET, CHEWABLE ORAL ONCE
Status: COMPLETED | OUTPATIENT
Start: 2019-06-04 | End: 2019-06-04

## 2019-06-04 RX ORDER — BUMETANIDE 1 MG/1
1 TABLET ORAL 2 TIMES DAILY
Status: DISCONTINUED | OUTPATIENT
Start: 2019-06-04 | End: 2019-06-05 | Stop reason: HOSPADM

## 2019-06-04 RX ORDER — NICOTINE 21 MG/24HR
1 PATCH, TRANSDERMAL 24 HOURS TRANSDERMAL DAILY
Status: DISCONTINUED | OUTPATIENT
Start: 2019-06-05 | End: 2019-06-05 | Stop reason: HOSPADM

## 2019-06-04 RX ORDER — SPIRONOLACTONE 25 MG/1
25 TABLET ORAL DAILY
Status: DISCONTINUED | OUTPATIENT
Start: 2019-06-04 | End: 2019-06-05 | Stop reason: HOSPADM

## 2019-06-04 RX ORDER — SODIUM CHLORIDE 0.9 % (FLUSH) 0.9 %
10 SYRINGE (ML) INJECTION PRN
Status: DISCONTINUED | OUTPATIENT
Start: 2019-06-04 | End: 2019-06-05 | Stop reason: HOSPADM

## 2019-06-04 RX ORDER — MORPHINE SULFATE 2 MG/ML
2 INJECTION, SOLUTION INTRAMUSCULAR; INTRAVENOUS ONCE
Status: DISCONTINUED | OUTPATIENT
Start: 2019-06-05 | End: 2019-06-05 | Stop reason: HOSPADM

## 2019-06-04 RX ORDER — FAMOTIDINE 20 MG/1
20 TABLET, FILM COATED ORAL 2 TIMES DAILY
Status: DISCONTINUED | OUTPATIENT
Start: 2019-06-04 | End: 2019-06-05 | Stop reason: HOSPADM

## 2019-06-04 RX ORDER — CARVEDILOL 3.12 MG/1
3.12 TABLET ORAL 2 TIMES DAILY
Status: DISCONTINUED | OUTPATIENT
Start: 2019-06-04 | End: 2019-06-05 | Stop reason: HOSPADM

## 2019-06-04 RX ORDER — ACETAMINOPHEN 500 MG
1000 TABLET ORAL EVERY 6 HOURS PRN
Status: DISCONTINUED | OUTPATIENT
Start: 2019-06-04 | End: 2019-06-05 | Stop reason: HOSPADM

## 2019-06-04 RX ORDER — POTASSIUM CHLORIDE 20 MEQ/1
40 TABLET, EXTENDED RELEASE ORAL PRN
Status: DISCONTINUED | OUTPATIENT
Start: 2019-06-04 | End: 2019-06-05

## 2019-06-04 RX ORDER — NITROGLYCERIN 0.4 MG/1
0.4 TABLET SUBLINGUAL EVERY 5 MIN PRN
Status: DISCONTINUED | OUTPATIENT
Start: 2019-06-04 | End: 2019-06-05 | Stop reason: HOSPADM

## 2019-06-04 RX ORDER — DIPHENHYDRAMINE HCL 25 MG
25 TABLET ORAL NIGHTLY PRN
Status: DISCONTINUED | OUTPATIENT
Start: 2019-06-04 | End: 2019-06-05 | Stop reason: HOSPADM

## 2019-06-04 RX ORDER — ALBUTEROL SULFATE 90 UG/1
1 AEROSOL, METERED RESPIRATORY (INHALATION) EVERY 6 HOURS PRN
Status: DISCONTINUED | OUTPATIENT
Start: 2019-06-04 | End: 2019-06-05 | Stop reason: HOSPADM

## 2019-06-04 RX ORDER — ASPIRIN 81 MG/1
81 TABLET, CHEWABLE ORAL DAILY
Status: DISCONTINUED | OUTPATIENT
Start: 2019-06-05 | End: 2019-06-05 | Stop reason: HOSPADM

## 2019-06-04 RX ORDER — ATORVASTATIN CALCIUM 40 MG/1
40 TABLET, FILM COATED ORAL NIGHTLY
Status: DISCONTINUED | OUTPATIENT
Start: 2019-06-04 | End: 2019-06-05 | Stop reason: HOSPADM

## 2019-06-04 RX ORDER — POTASSIUM CHLORIDE 7.45 MG/ML
10 INJECTION INTRAVENOUS PRN
Status: DISCONTINUED | OUTPATIENT
Start: 2019-06-04 | End: 2019-06-05

## 2019-06-04 RX ORDER — AMITRIPTYLINE HYDROCHLORIDE 25 MG/1
25 TABLET, FILM COATED ORAL NIGHTLY
Status: DISCONTINUED | OUTPATIENT
Start: 2019-06-04 | End: 2019-06-05 | Stop reason: HOSPADM

## 2019-06-04 RX ORDER — LISINOPRIL 10 MG/1
10 TABLET ORAL DAILY
Status: DISCONTINUED | OUTPATIENT
Start: 2019-06-04 | End: 2019-06-05 | Stop reason: HOSPADM

## 2019-06-04 RX ORDER — ONDANSETRON 2 MG/ML
4 INJECTION INTRAMUSCULAR; INTRAVENOUS EVERY 6 HOURS PRN
Status: DISCONTINUED | OUTPATIENT
Start: 2019-06-04 | End: 2019-06-05 | Stop reason: HOSPADM

## 2019-06-04 RX ADMIN — ASPIRIN 81 MG 324 MG: 81 TABLET ORAL at 19:27

## 2019-06-04 RX ADMIN — NITROGLYCERIN 0.4 MG: 0.4 TABLET SUBLINGUAL at 21:41

## 2019-06-04 RX ADMIN — IOHEXOL 75 ML: 350 INJECTION, SOLUTION INTRAVENOUS at 19:06

## 2019-06-04 ASSESSMENT — PAIN DESCRIPTION - PAIN TYPE
TYPE: ACUTE PAIN
TYPE: ACUTE PAIN

## 2019-06-04 ASSESSMENT — PAIN DESCRIPTION - LOCATION
LOCATION: CHEST;ARM
LOCATION: CHEST
LOCATION: CHEST

## 2019-06-04 ASSESSMENT — PAIN DESCRIPTION - ORIENTATION
ORIENTATION: LEFT
ORIENTATION: LEFT

## 2019-06-04 ASSESSMENT — HEART SCORE: ECG: 1

## 2019-06-04 ASSESSMENT — PAIN SCALES - GENERAL
PAINLEVEL_OUTOF10: 10
PAINLEVEL_OUTOF10: 8
PAINLEVEL_OUTOF10: 7

## 2019-06-04 ASSESSMENT — PAIN SCALES - WONG BAKER: WONGBAKER_NUMERICALRESPONSE: 0

## 2019-06-04 ASSESSMENT — PAIN DESCRIPTION - FREQUENCY: FREQUENCY: INTERMITTENT

## 2019-06-04 ASSESSMENT — PAIN DESCRIPTION - DESCRIPTORS
DESCRIPTORS: ACHING;PRESSURE
DESCRIPTORS: ACHING

## 2019-06-04 NOTE — ED PROVIDER NOTES
Medications:  Prior to Admission medications    Medication Sig Start Date End Date Taking?  Authorizing Provider   lisinopril (PRINIVIL;ZESTRIL) 10 MG tablet TAKE 1 TABLET BY MOUTH 1 TIME A DAY. 5/31/19   Chan Haque MD   omeprazole (PRILOSEC) 20 MG delayed release capsule TAKE ONE CAPSULE BY MOUTH EVERY MORNING BEFORE BREAKFAST 5/31/19   Chan Haque MD   carvedilol (COREG) 3.125 MG tablet TAKE 1 TABLET BY MOUTH 2 TIMES A DAY WITH MEALS 5/31/19   Marisa Farr MD   spironolactone (ALDACTONE) 25 MG tablet TAKE 1 TABLET BY MOUTH ONE TIME A DAY 5/31/19   Chan Haque MD   bumetanide (BUMEX) 1 MG tablet TAKE 1 TABLET BY MOUTH 2 TIMES A DAY 5/6/19   Cuba Hernandez MD   amitriptyline (ELAVIL) 25 MG tablet TAKE ONE TABLET BY MOUTH NIGHTLY 3/28/19   Cuba Hernandez MD   sennosides-docusate sodium (SENOKOT-S) 8.6-50 MG tablet Take 1 tablet by mouth daily 12/23/18   Karli Erazo MD   albuterol sulfate HFA (VENTOLIN HFA) 108 (90 Base) MCG/ACT inhaler INHALE 2 PUFFS INTO THE LUNGS EVERY 6 HOURS AS NEEDED FOR WHEEZING 12/7/18   Cuba Hernandez MD   dextromethorphan-guaiFENesin (TUSSIN DM)  MG/5ML syrup Take 15 mLs by mouth every 6 hours as needed for Cough    Historical Provider, MD   ipratropium-albuterol (DUONEB) 0.5-2.5 (3) MG/3ML SOLN nebulizer solution Inhale 3 mLs into the lungs 2 times daily as needed for Shortness of Breath 11/25/18   Candace Kunz MD   Spacer/Aero-Holding Chambers (E-Z SPACER) ELVER 1 Device by Does not apply route daily 11/25/18   Candace Kunz MD   bumetanide (BUMEX) 1 MG tablet TAKE ONE TABLET BY MOUTH TWICE A DAY 11/25/18   Candace Kunz MD   folic acid-pyridoxine-cyanocobalamine (FOLTX) 2.5-25-1 MG TABS tablet Take 1 tablet by mouth daily 11/25/18   Yodit Meyers MD   Umeclidinium Bromide (INCRUSE ELLIPTA) 62.5 MCG/INH AEPB INHALE 1 PUFF INTO THE LUNGS DAILY STOP SPIRIVA 11/25/18   Candace Kunz MD   Multiple Vitamin (MULTIVITAMIN) tablet TAKE ONE TABLET BY MOUTH ONE TIME A DAY 6/18/18   Carlos Lugo MD   fluticasone Jose Sickle) 50 MCG/ACT nasal spray 1 spray by Nasal route daily 6/15/17   Carlos Lugo MD       REVIEW OF SYSTEMS    (2-9 systems for level 4, 10 or more for level 5)      General ROS - No fevers, No chills, no gradual weight loss, no night sweats  Ophthalmic ROS - No discharge, No changes in vision  ENT ROS -  No sore throat, No rhinorrhea,   Respiratory ROS - no shortness of breath, no cough, no  wheezing  Cardiovascular ROS - positive chest pain, no dyspnea on exertion  Gastrointestinal ROS - No abdominal pain, no nausea, no vomiting, no change in bowel habits, no black or bloody stools  Genito-Urinary ROS - No dysuria, trouble voiding, or hematuria  Musculoskeletal ROS - No myalgias, No arthalgias  Neurological ROS - No headache, no dizziness/lightheadedness, positive focal weakness, no loss of sensation  Dermatological ROS - No lesions, No rash         PHYSICAL EXAM   (up to 7 for level 4, 8 or more for level 5)      INITIAL VITALS:   /73   Pulse 82   Temp 97.5 °F (36.4 °C) (Axillary)   Resp 20   Ht 5' 2\" (1.575 m)   Wt 146 lb 6.4 oz (66.4 kg)   LMP  (Approximate)   SpO2 100%   BMI 26.78 kg/m²     General Appearance: Well-appearing, in no acute distress    HEENT: Head: normocephalic/atraumatic eyes: PERRLA, EOMT, conjunctiva not injected, sclerae nonicteric ears: External canals patent nose: Nares patent, no rhinorrhea, throat:mucous membranes moist, oropharynx clear     Neck: Trachea midline, no JVD. Lungs: No evidence of increased work of breathing. CTA B/L, no wheezes/rhonchi     Cardiovascular: RRR, no murmur, 2+ peripheral pulses bilaterally. Cap refill less than 2 seconds. Mild bilateral nonpitting edema to mid tibia    Abdomen: Soft, nontender. No guarding or rebound tenderness. No post pulsatile mass. Back nontender to palpation    Neurologic: RANDHAWA  to person, place, time, and event.    No sensation deficits. Moving all extremities    Cranial nerves II through XII intact. Equal strength upper extremities and lower extremities fingerheel to shin intact. Extraocular muscles intact, pupils equally reactive    No focal unilateral deficits    Extremities: Skin warm, dry and intact.       DIFFERENTIAL  DIAGNOSIS     PLAN (LABS / IMAGING / EKG):  Orders Placed This Encounter   Procedures    XR CHEST STANDARD (2 VW)    CTA CHEST W WO CONTRAST    NM MYOCARDIAL SPECT REST EXERCISE OR RX    Troponin    CBC    BASIC METABOLIC PANEL    Brain Natriuretic Peptide    Troponin    Troponin    Comprehensive Metabolic Panel w/ Reflex to MG    CBC auto differential    Diet NPO, After Midnight    Misc nursing order (specify)    Vital signs per unit routine    Telemetry monitoring    Daily weights    Intake and output    Tobacco cessation education    Notify physician    Activity as tolerated    Full Code    Inpatient consult to Internal Medicine    Inpatient consult to Family Practice    Consult to Cardiology    Inpatient consult to Social Work    OT eval and treat    PT evaluation and treat    Initiate Oxygen Therapy Protocol    EKG 12 Lead    EKG 12 Lead    EKG 12 lead    EKG 12 lead    Echocardiogram complete 2D with doppler with color    PATIENT STATUS (FROM ED OR OR/PROCEDURAL) Inpatient       MEDICATIONS ORDERED:  Orders Placed This Encounter   Medications    nitroGLYCERIN (NITROSTAT) SL tablet 0.4 mg    iohexol (OMNIPAQUE 350) solution 75 mL    aspirin chewable tablet 324 mg    albuterol sulfate  (90 Base) MCG/ACT inhaler 1 puff    amitriptyline (ELAVIL) tablet 25 mg    bumetanide (BUMEX) tablet 1 mg    carvedilol (COREG) tablet 3.125 mg    lisinopril (PRINIVIL;ZESTRIL) tablet 10 mg    spironolactone (ALDACTONE) tablet 25 mg    sodium chloride flush 0.9 % injection 10 mL    sodium chloride flush 0.9 % injection 10 mL    magnesium hydroxide (MILK OF MAGNESIA) 400 MG/5ML suspension 30 mL    ondansetron (ZOFRAN) injection 4 mg    atorvastatin (LIPITOR) tablet 40 mg    aspirin chewable tablet 81 mg    OR Linked Order Group     potassium chloride (KLOR-CON M) extended release tablet 40 mEq     potassium bicarb-citric acid (EFFER-K) effervescent tablet 40 mEq     potassium chloride 10 mEq/100 mL IVPB (Peripheral Line)    nicotine (NICODERM CQ) 21 MG/24HR 1 patch    famotidine (PEPCID) tablet 20 mg    enoxaparin (LOVENOX) injection 40 mg    diphenhydrAMINE (BENADRYL) tablet 25 mg    morphine injection 2 mg    acetaminophen (TYLENOL) tablet 1,000 mg       DDX: N STEMI, unstable angina, CHF exacerbation, COPD exacerbation, TIA unlikely    DIAGNOSTIC RESULTS / EMERGENCY DEPARTMENT COURSE / MDM     LABS:  Results for orders placed or performed during the hospital encounter of 06/04/19   Troponin   Result Value Ref Range    Troponin, High Sensitivity 21 (H) 0 - 14 ng/L    Troponin T NOT REPORTED <0.03 ng/mL    Troponin Interp NOT REPORTED    CBC   Result Value Ref Range    WBC 4.8 3.5 - 11.3 k/uL    RBC 4.45 3.95 - 5.11 m/uL    Hemoglobin 13.1 11.9 - 15.1 g/dL    Hematocrit 41.1 36.3 - 47.1 %    MCV 92.4 82.6 - 102.9 fL    MCH 29.4 25.2 - 33.5 pg    MCHC 31.9 28.4 - 34.8 g/dL    RDW 15.2 (H) 11.8 - 14.4 %    Platelets 467 717 - 554 k/uL    MPV 12.8 8.1 - 13.5 fL    NRBC Automated 0.0 0.0 per 100 WBC   BASIC METABOLIC PANEL   Result Value Ref Range    Glucose 87 70 - 99 mg/dL    BUN 18 6 - 20 mg/dL    CREATININE 0.80 0.50 - 0.90 mg/dL    Bun/Cre Ratio NOT REPORTED 9 - 20    Calcium 9.6 8.6 - 10.4 mg/dL    Sodium 141 135 - 144 mmol/L    Potassium 3.2 (L) 3.7 - 5.3 mmol/L    Chloride 102 98 - 107 mmol/L    CO2 23 20 - 31 mmol/L    Anion Gap 16 9 - 17 mmol/L    GFR Non-African American >60 >60 mL/min    GFR African American >60 >60 mL/min    GFR Comment          GFR Staging NOT REPORTED    Brain Natriuretic Peptide   Result Value Ref Range    Pro-BNP 1,089 (H) <300 pg/mL    BNP Interpretation Pro-BNP Reference Range:    Troponin   Result Value Ref Range    Troponin, High Sensitivity 22 (H) 0 - 14 ng/L    Troponin T NOT REPORTED <0.03 ng/mL    Troponin Interp NOT REPORTED            RADIOLOGY:  No results found. EKG  EKG Interpretation    Interpreted by me    Rhythm: normal sinus   Rate: normal  Axis: Left  Ectopy: no   Conduction: Atrial sense, ventricular paced  ST Segments: no acute change  T Waves: no acute change  Q Waves: none    Clinical Impression: no acute changes     No criteria for sgarbossa     All EKG's are interpreted by the Emergency Department Physician who either signs or Co-signs this chart in the absence of a cardiologist.    EMERGENCY DEPARTMENT COURSE/IMPRESSION:  ED Course as of Jun 04 2358   Tue Jun 04, 2019   1803 Hemoglobin Quant: 13.1 [EF]   1803 Platelet Count: 338 [EF]   1812 2 hours of central chest pain with radiation to back left arm heaviness, significant cardiac history    Cardiac workup, BNP, CTA chest evaluate for dissection or PE, bedside ultrasound evaluate cardiac function    Will give nitro for pain and reassess, will get one hour delta troponin given pain happened within 3 hours    Patient is not received aspirin, after CT if no dissection will get a full dose aspirin    Likely consult and cardiology as well as medicine admission pending workup    [EF]   1814 Pro-BNP(!): 1,089 [EF]   1814 Troponin, High Sensitivity(!): 21 [EF]   1900 XR CHEST STANDARD (2 VW) [EF]   1900 Will recheck pt, initial lab    XR CHEST STANDARD (2 VW) [EF]   1922 Repeat EKG shows no acute changes    [EF]   1922 CTA CHEST W WO CONTRAST [EF]   1958 Troponin, High Sensitivity(!): 22 [EF]   2005 Accepted by Family medicine    [EF]   2006 Pain now 8/10 improved from 10/10 updated pt on resutls, and admission , pt is aggreable    [EF]      ED Course User Index  [EF] Richard Madrid DO     Heart Score    Heart Score for chest pain patients  History:  Moderately Suspicious  ECG: Non-Specifc repolarization disturbance/LBTB/PM  Patient Age: > 39 and < 65 years  *Risk factors for Atherosclerotic disease: Cigarette smoking, Hypertension, Obesity, Coronary Artery Disease  Risk Factors: > 3 Risk factors or history of atherosclerotic disease*  Troponin: > 1 and < 3X normal limit  Heart Score Total: 6    Score 0 - 3 = 2.5%  MACE over next 6 wks = Discharge home  Score 4 - 6 = 20.3%  MACE over next 6 wks = Obs admit  Score 7 - 10 = 72.7%  MACE over next 6 wks = Early invasive Rx      PROCEDURES:  None    CONSULTS:  IP CONSULT TO INTERNAL MEDICINE  IP CONSULT TO FAMILY MEDICINE  IP CONSULT TO CARDIOLOGY  IP CONSULT TO SOCIAL WORK    CRITICAL CARE:  None    FINAL IMPRESSION      1. Chest pain, unspecified type          DISPOSITION / PLAN     DISPOSITION        PATIENT REFERRED TO:  Alexx Leyva MD  [address] [place] [address] 21             DISCHARGE MEDICATIONS:  Current Discharge Medication List          DO Shilpa Chilel D.O.   Emergency Medicine Resident    (Please note that portions of this note were completed with a voice recognition program.  Efforts were made to edit the dictations but occasionally words aremis-transcribed.)     Giovanni Soto DO  Resident  06/04/19 [id number]

## 2019-06-04 NOTE — ED TRIAGE NOTES
Arrives to room ambulatory from triage; reports she has been moving and initially reported left arm pain; on assessment, she is clutching left side chest, reports hx of defibrillator and states she is nauseated; alert/appropriate from home; skin is intact; VSS

## 2019-06-04 NOTE — ED PROVIDER NOTES
[address]     Emergency Department     Faculty Attestation    I performed a history and physical examination of the patient and discussed management with the resident. I reviewed the residents note and agree with the documented findings and plan of care. Any areas of disagreement are noted on the chart. I was personally present for the key portions of any procedures. I have documented in the chart those procedures where I was not present during the key portions. I have reviewed the emergency nurses triage note. I agree with the chief complaint, past medical history, past surgical history, allergies, medications, social and family history as documented unless otherwise noted below. Documentation of the HPI, Physical Exam and Medical Decision Making performed by medical students or scribes is based on my personal performance of the HPI, PE and MDM. For Physician Assistant/ Nurse Practitioner cases/documentation I have personally evaluated this patient and have completed at least one if not all key elements of the E/M (history, physical exam, and MDM). Additional findings are as noted. Vital signs:   Vitals:    06/04/19 1719   BP: 114/79   Pulse: 85   Resp: 17   Temp:    SpO2: 8%      68-year-old female presents complaining of retrosternal chest pressure. Patient has a known history of coronary artery disease, cardiomyopathy, cocaine abuse, and has a defibrillator in place. Complains of left arm numbness. Pain also radiates to her back. Patient complains of nausea as well. Also has known history of COPD.     EKG Interpretation    Interpreted by emergency department physician    Rhythm: paced  Rate: paced  Axis: left  Ectopy: none  Conduction: paced  ST Segments: no acute change  T Waves: no acute change  Q Waves: none    Clinical Impression: non-specific EKG    Lewis Cardoso M.D,  Attending Emergency  Physician            Juni Rudolph MD  06/04/19 4031

## 2019-06-04 NOTE — PROGRESS NOTES
CARDIAC ULTRASOUND:  A limited, bedside ultrasound of the heart was performed. The medical necessity was to evaluate for a pericardial effusion and ejection fraction. The structures studied were the heart and pericardium. FINDINGS:  Pericardial effusion was not identified. The ejection fraction was not calculated during the examination, however, the mitral valve did not touch the septal wall during systole, which could indicate reduced ejection fraction. The study was technically adequate    THORACIC ULTRASOUND:    A limited, bedside ultrasound of the thorax was performed. The structures studied were the overlying soft tissue, ribs, and pleural space. The purpose was to evaluate for pulmonary edema and pneumothorax. Evaluation for B-lines for pulmonary edema, lung sliding and comet tails for pneumothorax was performed. Findings:  Study was negative for B-lines. Study was positive for lung sliding. The study was technically adequate.     Justin Culver MD  6/4/2019

## 2019-06-04 NOTE — LETTER
[facility] ED  [address] [address]  Phone: [phone]         To Whom It May Concern    June 4, 2019      Ms. Orlando Cornejo was present at our Emergency Department this evening (6/4/2019) due to an illness of a family member.  This letter is to document her presence at the hospital.       Sincerely,    Brenda Stokes MD

## 2019-06-05 ENCOUNTER — APPOINTMENT (OUTPATIENT)
Dept: NUCLEAR MEDICINE | Age: 54
DRG: 198 | End: 2019-06-05
Payer: MEDICAID

## 2019-06-05 VITALS
WEIGHT: 146.39 LBS | BODY MASS INDEX: 26.94 KG/M2 | HEIGHT: 62 IN | TEMPERATURE: 97.7 F | SYSTOLIC BLOOD PRESSURE: 86 MMHG | OXYGEN SATURATION: 96 % | DIASTOLIC BLOOD PRESSURE: 44 MMHG | HEART RATE: 73 BPM | RESPIRATION RATE: 18 BRPM

## 2019-06-05 PROBLEM — I21.4 NSTEMI (NON-ST ELEVATED MYOCARDIAL INFARCTION) (HCC): Status: ACTIVE | Noted: 2019-06-05

## 2019-06-05 PROBLEM — I25.10 CAD (CORONARY ARTERY DISEASE): Status: ACTIVE | Noted: 2019-06-05

## 2019-06-05 LAB
ALBUMIN SERPL-MCNC: 3.9 G/DL (ref 3.5–5.2)
ALBUMIN/GLOBULIN RATIO: 1.2 (ref 1–2.5)
ALP BLD-CCNC: 92 U/L (ref 35–104)
ALT SERPL-CCNC: 12 U/L (ref 5–33)
ANION GAP SERPL CALCULATED.3IONS-SCNC: 15 MMOL/L (ref 9–17)
AST SERPL-CCNC: 24 U/L
BILIRUB SERPL-MCNC: 0.48 MG/DL (ref 0.3–1.2)
BUN BLDV-MCNC: 20 MG/DL (ref 6–20)
BUN/CREAT BLD: ABNORMAL (ref 9–20)
CALCIUM SERPL-MCNC: 9.4 MG/DL (ref 8.6–10.4)
CHLORIDE BLD-SCNC: 101 MMOL/L (ref 98–107)
CO2: 23 MMOL/L (ref 20–31)
CREAT SERPL-MCNC: 0.84 MG/DL (ref 0.5–0.9)
EKG ATRIAL RATE: 82 BPM
EKG ATRIAL RATE: 90 BPM
EKG P AXIS: 55 DEGREES
EKG P AXIS: 58 DEGREES
EKG P-R INTERVAL: 120 MS
EKG P-R INTERVAL: 122 MS
EKG Q-T INTERVAL: 462 MS
EKG Q-T INTERVAL: 490 MS
EKG QRS DURATION: 132 MS
EKG QRS DURATION: 132 MS
EKG QTC CALCULATION (BAZETT): 565 MS
EKG QTC CALCULATION (BAZETT): 572 MS
EKG R AXIS: -81 DEGREES
EKG R AXIS: -84 DEGREES
EKG T AXIS: 52 DEGREES
EKG T AXIS: 57 DEGREES
EKG VENTRICULAR RATE: 82 BPM
EKG VENTRICULAR RATE: 90 BPM
GFR AFRICAN AMERICAN: >60 ML/MIN
GFR NON-AFRICAN AMERICAN: >60 ML/MIN
GFR SERPL CREATININE-BSD FRML MDRD: ABNORMAL ML/MIN/{1.73_M2}
GFR SERPL CREATININE-BSD FRML MDRD: ABNORMAL ML/MIN/{1.73_M2}
GLUCOSE BLD-MCNC: 94 MG/DL (ref 70–99)
MAGNESIUM: 1.8 MG/DL (ref 1.6–2.6)
PARTIAL THROMBOPLASTIN TIME: 24.1 SEC (ref 20.5–30.5)
PARTIAL THROMBOPLASTIN TIME: 33.7 SEC (ref 20.5–30.5)
POTASSIUM SERPL-SCNC: 3.1 MMOL/L (ref 3.7–5.3)
SODIUM BLD-SCNC: 139 MMOL/L (ref 135–144)
TOTAL PROTEIN: 7.1 G/DL (ref 6.4–8.3)
TROPONIN INTERP: ABNORMAL
TROPONIN INTERP: ABNORMAL
TROPONIN T: ABNORMAL NG/ML
TROPONIN T: ABNORMAL NG/ML
TROPONIN, HIGH SENSITIVITY: 178 NG/L (ref 0–14)
TROPONIN, HIGH SENSITIVITY: 59 NG/L (ref 0–14)

## 2019-06-05 PROCEDURE — 97116 GAIT TRAINING THERAPY: CPT

## 2019-06-05 PROCEDURE — 97162 PT EVAL MOD COMPLEX 30 MIN: CPT

## 2019-06-05 PROCEDURE — 93005 ELECTROCARDIOGRAM TRACING: CPT | Performed by: STUDENT IN AN ORGANIZED HEALTH CARE EDUCATION/TRAINING PROGRAM

## 2019-06-05 PROCEDURE — 2580000003 HC RX 258: Performed by: STUDENT IN AN ORGANIZED HEALTH CARE EDUCATION/TRAINING PROGRAM

## 2019-06-05 PROCEDURE — 83735 ASSAY OF MAGNESIUM: CPT

## 2019-06-05 PROCEDURE — 84484 ASSAY OF TROPONIN QUANT: CPT

## 2019-06-05 PROCEDURE — 6370000000 HC RX 637 (ALT 250 FOR IP): Performed by: STUDENT IN AN ORGANIZED HEALTH CARE EDUCATION/TRAINING PROGRAM

## 2019-06-05 PROCEDURE — 80053 COMPREHEN METABOLIC PANEL: CPT

## 2019-06-05 PROCEDURE — 6360000002 HC RX W HCPCS: Performed by: STUDENT IN AN ORGANIZED HEALTH CARE EDUCATION/TRAINING PROGRAM

## 2019-06-05 PROCEDURE — 93010 ELECTROCARDIOGRAM REPORT: CPT | Performed by: INTERNAL MEDICINE

## 2019-06-05 PROCEDURE — 36415 COLL VENOUS BLD VENIPUNCTURE: CPT

## 2019-06-05 PROCEDURE — 85730 THROMBOPLASTIN TIME PARTIAL: CPT

## 2019-06-05 PROCEDURE — 99223 1ST HOSP IP/OBS HIGH 75: CPT | Performed by: FAMILY MEDICINE

## 2019-06-05 RX ORDER — HEPARIN SODIUM 1000 [USP'U]/ML
60 INJECTION, SOLUTION INTRAVENOUS; SUBCUTANEOUS PRN
Status: DISCONTINUED | OUTPATIENT
Start: 2019-06-05 | End: 2019-06-05 | Stop reason: HOSPADM

## 2019-06-05 RX ORDER — HEPARIN SODIUM 10000 [USP'U]/100ML
12 INJECTION, SOLUTION INTRAVENOUS CONTINUOUS
Status: DISCONTINUED | OUTPATIENT
Start: 2019-06-05 | End: 2019-06-05 | Stop reason: HOSPADM

## 2019-06-05 RX ORDER — POTASSIUM CHLORIDE 7.45 MG/ML
40 INJECTION INTRAVENOUS ONCE
Status: COMPLETED | OUTPATIENT
Start: 2019-06-05 | End: 2019-06-05

## 2019-06-05 RX ORDER — HEPARIN SODIUM 1000 [USP'U]/ML
60 INJECTION, SOLUTION INTRAVENOUS; SUBCUTANEOUS ONCE
Status: COMPLETED | OUTPATIENT
Start: 2019-06-05 | End: 2019-06-05

## 2019-06-05 RX ORDER — HEPARIN SODIUM 1000 [USP'U]/ML
30 INJECTION, SOLUTION INTRAVENOUS; SUBCUTANEOUS PRN
Status: DISCONTINUED | OUTPATIENT
Start: 2019-06-05 | End: 2019-06-05 | Stop reason: HOSPADM

## 2019-06-05 RX ORDER — 0.9 % SODIUM CHLORIDE 0.9 %
250 INTRAVENOUS SOLUTION INTRAVENOUS ONCE
Status: COMPLETED | OUTPATIENT
Start: 2019-06-05 | End: 2019-06-05

## 2019-06-05 RX ORDER — KETOROLAC TROMETHAMINE 30 MG/ML
30 INJECTION, SOLUTION INTRAMUSCULAR; INTRAVENOUS ONCE
Status: DISCONTINUED | OUTPATIENT
Start: 2019-06-05 | End: 2019-06-05 | Stop reason: HOSPADM

## 2019-06-05 RX ADMIN — DIPHENHYDRAMINE HCL 25 MG: 25 TABLET ORAL at 00:07

## 2019-06-05 RX ADMIN — NITROGLYCERIN 0.4 MG: 0.4 TABLET SUBLINGUAL at 08:50

## 2019-06-05 RX ADMIN — POTASSIUM CHLORIDE 40 MEQ: 20 TABLET, EXTENDED RELEASE ORAL at 02:23

## 2019-06-05 RX ADMIN — POTASSIUM CHLORIDE 40 MEQ: 7.46 INJECTION, SOLUTION INTRAVENOUS at 08:42

## 2019-06-05 RX ADMIN — HEPARIN SODIUM 3980 UNITS: 1000 INJECTION, SOLUTION INTRAVENOUS; SUBCUTANEOUS at 03:43

## 2019-06-05 RX ADMIN — Medication 10 ML: at 00:07

## 2019-06-05 RX ADMIN — HEPARIN SODIUM AND DEXTROSE 12 UNITS/KG/HR: 10000; 5 INJECTION INTRAVENOUS at 03:45

## 2019-06-05 RX ADMIN — SODIUM CHLORIDE 250 ML: 9 INJECTION, SOLUTION INTRAVENOUS at 09:01

## 2019-06-05 ASSESSMENT — PAIN SCALES - WONG BAKER: WONGBAKER_NUMERICALRESPONSE: 0

## 2019-06-05 ASSESSMENT — ENCOUNTER SYMPTOMS
ABDOMINAL PAIN: 0
SHORTNESS OF BREATH: 0
SORE THROAT: 0
CHEST TIGHTNESS: 0
COUGH: 0
VOMITING: 0
NAUSEA: 0
CONSTIPATION: 0
DIARRHEA: 0

## 2019-06-05 ASSESSMENT — PAIN SCALES - GENERAL
PAINLEVEL_OUTOF10: 0
PAINLEVEL_OUTOF10: 0
PAINLEVEL_OUTOF10: 6
PAINLEVEL_OUTOF10: 0

## 2019-06-05 ASSESSMENT — PAIN DESCRIPTION - LOCATION: LOCATION: CHEST

## 2019-06-05 ASSESSMENT — PAIN DESCRIPTION - PAIN TYPE: TYPE: ACUTE PAIN

## 2019-06-05 ASSESSMENT — PAIN DESCRIPTION - ORIENTATION: ORIENTATION: LEFT

## 2019-06-05 ASSESSMENT — PAIN DESCRIPTION - FREQUENCY: FREQUENCY: INTERMITTENT

## 2019-06-05 ASSESSMENT — PAIN DESCRIPTION - DESCRIPTORS: DESCRIPTORS: ACHING

## 2019-06-05 NOTE — PROGRESS NOTES
Occupational Therapy Not Seen Note    DATE: 2019  Name: Freddy Frederick  : 1965  MRN: [id number]    Patient not available for Occupational Therapy due to:    Pt independent with ADL/functional tasks. Pt with no OT acute care needs at this time, will defer OT eval. Pt demo minimal unsteadiness with functional mobility, will defer to PT for mobility needs.  Pt states pt has no safety/ balance concerns and is IND with ADL activities     Next Scheduled Treatment: Defer OT per pt request    Electronically signed by BRII Stewart on 2019 at 11:32 AM

## 2019-06-05 NOTE — PROGRESS NOTES
Pt c/o \"arm hurting because of Potassium\". Per Pool Hendrickson MD, \"stop Potassium IV\". Potassium IV stopped as ordered. Informed Dr. Pool Hendrickson of pt NPO for stress test, Potassium of 3.1, pt can not take medications by mouth at this time and protocol calls for Potassium IV. Per Dr. Pool Hendrickson \"stop Potassium IV\".

## 2019-06-05 NOTE — PROGRESS NOTES
Cardiac Testing:    ICD -- report? ?      ECHO 4/24/18: EF 10-15%. Lead noted in right sided chambers. Severe MR. Mod TR. RVSP 48.      STRESS 10/10/14: Mod size fixed defect involving the apical to mid inferior wall of the LV. EF 42%      CATH 9/19/11: Normal coronaries. EF 30%.      1. History of chronic smoking and I did  her regarding stopping smoking. 2. Hypertension. 3. Severe dilated cardiomyopathy, status post AICD placement. 4. History of alcoholism and she said that she stopped drinking alcohol a few weeks back.

## 2019-06-05 NOTE — PROGRESS NOTES
Physical Therapy    Facility/Department: [facility] [[email or web address] CAR 3  Initial Assessment    NAME: Kathi Diaz  : 1965  MRN: [id number]    Date of Service: 2019    Discharge Recommendations:Further therapy recommended at discharge. To focus on higher level balance reactions. PT Equipment Recommendations  Equipment Needed: (TBD)    Assessment   Body structures, Functions, Activity limitations: Decreased functional mobility ; Decreased safe awareness;Decreased balance;Decreased ADL status; Decreased endurance;Decreased high-level IADLs;Decreased strength  Assessment: Pt amb 150ft with CGA no AD. Pt unsteady with ambulation and requires physical assist to complete safely. Pt would benefit from continued skilled physical therapy s/p d/c to help improve balance reactions. Prognosis: Good  Decision Making: Medium Complexity  Patient Education: PT POC; safety; importance of mobility  REQUIRES PT FOLLOW UP: Yes  Activity Tolerance  Activity Tolerance: Patient Tolerated treatment well;Patient limited by endurance; Patient limited by fatigue       Patient Diagnosis(es): The encounter diagnosis was Chest pain, unspecified type. has a past medical history of Asthma, CAD (coronary artery disease), Cardiomegaly, CHF (congestive heart failure) ([address] COPD (chronic obstructive pulmonary disease) ([facility] [address] Depression, Hypertension, Lesion of right native kidney, MI (myocardial infarction) ([facility] [address] and Unspecified diseases of blood and blood-forming organs. has a past surgical history that includes Cardiac defibrillator placement (2005); Pacemaker insertion; Tubal ligation; Cardiac defibrillator placement; and Uterine fibroid surgery (malucretia ). Restrictions  Restrictions/Precautions  Restrictions/Precautions: (Activity as tolerated. )  Required Braces or Orthoses?: No  Position Activity Restriction  Other position/activity restrictions: Activity as tolerated.    Vision/Hearing  Vision: Within Functional Limits  Hearing: Within functional limits     Subjective  General  Patient assessed for rehabilitation services?: Yes  Family / Caregiver Present: No  Follows Commands: Within Functional Limits  Subjective  Subjective: Pt lying supine upon PT arrival. Pt and RN agreeable to PT this morning. Reports she is very hungry.    Pain Screening  Patient Currently in Pain: No  Pain Assessment  Pain Assessment: 0-10  Pain Level: 0  Small-Baker Pain Rating: No hurt  Vital Signs  Patient Currently in Pain: No       Orientation  Orientation  Overall Orientation Status: Within Normal Limits  Social/Functional History  Social/Functional History  Lives With: Significant other(boyfriend)  Type of Home: House  Home Layout: One level  Home Access: Stairs to enter with rails  Entrance Stairs - Number of Steps: 3  Entrance Stairs - Rails: Left  Bathroom Shower/Tub: Tub/Shower unit  Bathroom Toilet: Standard  Home Equipment: (none)  Receives Help From: Family  ADL Assistance: Independent  Homemaking Assistance: Independent  Homemaking Responsibilities: Yes  Ambulation Assistance: Independent  Transfer Assistance: Independent  Active : Yes  Mode of Transportation: Car  Occupation: Unemployed  Cognition   Cognition  Overall Cognitive Status: WNL    Objective     Observation/Palpation  Posture: Good    Joint Mobility  Spine: WFL  ROM RLE: WFL  ROM LLE: WFL  ROM RUE: WFL  ROM LUE: WFL  Strength RLE  Strength RLE: WFL  Comment: Grossly 4/5  Strength LLE  Strength LLE: WFL  Comment: Grossly 4/5  Strength RUE  Strength RUE: WFL  Comment: Grossly 4/5  Strength LUE  Strength LUE: WFL  Comment: Grossly 4/5  Tone RLE  RLE Tone: Normotonic  Tone LLE  LLE Tone: Normotonic  Motor Control  Gross Motor?: WNL  Sensation  Overall Sensation Status: WNL  Bed mobility  Bridging: Independent  Rolling to Left: Independent  Rolling to Right: Independent  Supine to Sit: Contact guard assistance  Sit to Supine: Contact guard assistance  Scooting: Contact guard assistance  Transfers  Sit to Stand: Contact guard assistance  Stand to sit: Contact guard assistance  Ambulation  Ambulation?: Yes  Ambulation 1  Surface: level tile  Device: No Device  Assistance: Contact guard assistance  Quality of Gait: Pt displays decreased maxwell, decreased steadiness, increased MICHA. Gait Deviations: Increased MICHA  Distance: 150ft  Stairs/Curb  Stairs?: No     Balance  Posture: Fair  Sitting - Static: Good  Sitting - Dynamic: Good  Standing - Static: Fair;+  Standing - Dynamic: Fair;-        Plan   Plan  Times per week: 3-5x  Current Treatment Recommendations: Strengthening, Transfer Training, Endurance Training, Equipment Evaluation, Education, & procurement, Balance Training, IADL Training, Gait Training, Home Exercise Program, Functional Mobility Training, Stair training, Safety Education & Training  Safety Devices  Type of devices: All fall risk precautions in place, Call light within reach, Gait belt, Patient at risk for falls, Left in bed      AM-PAC Score  AM-PAC Inpatient Mobility Raw Score : 19 (06/05/19 1202)  AM-PAC Inpatient T-Scale Score : 45.44 (06/05/19 1202)  Mobility Inpatient CMS 0-100% Score: 41.77 (06/05/19 1202)  Mobility Inpatient CMS G-Code Modifier : CK (06/05/19 1202)          Goals  Short term goals  Time Frame for Short term goals: 14 visits  Short term goal 1: Pt to ambulate 300ft independently. Short term goal 2: Pt to negotiate stairs with one handrail independently. Short term goal 3: Pt to tolerate at least 30 min of skilled physical therapy. Short term goal 4: Pt to complete transfers independently.         Therapy Time   Individual Concurrent Group Co-treatment   Time In 1019         Time Out 1031         Minutes 12         Timed Code Treatment Minutes: 9 Minutes       Wilfredo Leiva PT

## 2019-06-05 NOTE — H&P
Department of Family Medicine  Resident History and Physical  [address] [address] [facility] Date: 6/4/2019  Admitting Diagnosis:- Chest pain  Attending Physician: Carolyn Shepherd MD    Chief Complaint:     Chest pain    HPI:   Aziza Asher is a 48 y. o.female with history of NIDCM s/p AICD, EF 10-15% and essential HTN and smoking who presented with 1 day hx of chest pain. CP: started this AM, occurring intermittently throughout the day, left sided, radiating down left shoulder/arm, 8/10, relieved by nitro tabs, not positional, no associated SOB/diaphoresis/fatigue/palpitations/swelling of legs    No other associated symptoms. Reports compliance with diet and medications. Pt was admitted with acute on ch CHF in Nov 2018, cardiology saw her as inpatient consult then. Known hx of NIDCM, s/p AICD, EF in April 2018 was 10-15%, Stress test in 2014: mod size fixed defect. Cath in 9/19/11, NIC, reports no stents placed. In the ED, EKG showed no abnormalities, CTPE was negative, CXR was unremarkable, trop was 21, 22, symptoms relieved by nitro tabs. Patient was admitted for management of chest pain.     Past Medical History     Past Medical History:   Diagnosis Date    Asthma     CAD (coronary artery disease)     ICD    Cardiomegaly     CHF (congestive heart failure) (HCC)     COPD (chronic obstructive pulmonary disease) (HCC)     Depression     Hypertension     Lesion of right native kidney 2/3/2019    MI (myocardial infarction) ([facility] [address]     Unspecified diseases of blood and blood-forming organs     anemia,        Past Surgical History     Past Surgical History:   Procedure Laterality Date    CARDIAC DEFIBRILLATOR PLACEMENT  09/2005    CARDIAC DEFIBRILLATOR PLACEMENT      PACEMAKER INSERTION      TUBAL LIGATION      UTERINE FIBROID SURGERY  jocelin 2015       Social History     Social History     Tobacco Use    Smoking status: Current Some Day Smoker     Packs/day: 0.25     Types: Cigarettes     Last attempt to quit: 2/2/2016     Years since quitting: 3.3    Smokeless tobacco: Never Used    Tobacco comment: resumed smoking  3-4 cigarettes/day   Substance Use Topics    Alcohol use: Yes     Alcohol/week: 0.6 oz     Types: 1 Cans of beer per week    Drug use: No       Family History     Family History   Problem Relation Age of Onset    Diabetes Mother     High Blood Pressure Mother     Heart Disease Mother     Diabetes Father     Heart Disease Brother        Allergies   No Known Allergies    Home Medications     Prior to Admission medications    Medication Sig Start Date End Date Taking?  Authorizing Provider   lisinopril (PRINIVIL;ZESTRIL) 10 MG tablet TAKE 1 TABLET BY MOUTH 1 TIME A DAY. 5/31/19   Chan Haque MD   omeprazole (PRILOSEC) 20 MG delayed release capsule TAKE ONE CAPSULE BY MOUTH EVERY MORNING BEFORE BREAKFAST 5/31/19   Chan Haque MD   carvedilol (COREG) 3.125 MG tablet TAKE 1 TABLET BY MOUTH 2 TIMES A DAY WITH MEALS 5/31/19   Ashley Olivares MD   spironolactone (ALDACTONE) 25 MG tablet TAKE 1 TABLET BY MOUTH ONE TIME A DAY 5/31/19   Chan Haque MD   bumetanide (BUMEX) 1 MG tablet TAKE 1 TABLET BY MOUTH 2 TIMES A DAY 5/6/19   Wilma Anthony MD   amitriptyline (ELAVIL) 25 MG tablet TAKE ONE TABLET BY MOUTH NIGHTLY 3/28/19   Wilma Anthony MD   sennosides-docusate sodium (SENOKOT-S) 8.6-50 MG tablet Take 1 tablet by mouth daily 12/23/18   Angy Callahan MD   albuterol sulfate HFA (VENTOLIN HFA) 108 (90 Base) MCG/ACT inhaler INHALE 2 PUFFS INTO THE LUNGS EVERY 6 HOURS AS NEEDED FOR WHEEZING 12/7/18   Wilma Anthony MD   dextromethorphan-guaiFENesin (TUSSIN DM)  MG/5ML syrup Take 15 mLs by mouth every 6 hours as needed for Cough    Historical Provider, MD   ipratropium-albuterol (DUONEB) 0.5-2.5 (3) MG/3ML SOLN nebulizer solution Inhale 3 mLs into the lungs 2 times daily as needed for Shortness of Breath 11/25/18   Broderick Joy MD Spacer/Aero-Holding Chambers (E-Z SPACER) ELVER 1 Device by Does not apply route daily 11/25/18   Yodit Ross MD   bumetanide (BUMEX) 1 MG tablet TAKE ONE TABLET BY MOUTH TWICE A DAY 11/25/18   Rock Rosmery MD   folic acid-pyridoxine-cyanocobalamine (FOLTX) 2.5-25-1 MG TABS tablet Take 1 tablet by mouth daily 11/25/18   Yodit Ross MD   Umeclidinium Bromide (INCRUSE ELLIPTA) 62.5 MCG/INH AEPB INHALE 1 PUFF INTO THE LUNGS DAILY STOP SPIRIVA 11/25/18   Yodit Ross MD   Multiple Vitamin (MULTIVITAMIN) tablet TAKE ONE TABLET BY MOUTH ONE TIME A DAY 6/18/18   Shimon Kennedy MD   fluticasone [facility]) 50 MCG/ACT nasal spray 1 spray by Nasal route daily 6/15/17   Shimon Kennedy MD       Review of Systems:   CONSTITUTIONAL: Negative for fevers  EYES: Negative for double vision, blurry vision  HEENT: Negative for headaches, rhinorrhea, nasal congestion, sore throat, difficulty swallowing  RESPIRATORY: Negative for dyspnea, no wheezing  CARDIOVASCULAR: Positive for chest pain, negative for palpitations, fatigue, edema   GASTROINTESTINAL: Negative for nausea, vomiting, change in bowel habits, abdominal pain   HEMATOLOGIC/LYMPHATIC: Negative for swelling/edema   MUSCULOSKELETAL: Negative for joint pains, muscle aches, swelling of joints or extremities  NEUROLOGICAL: Negative for numbness, tingling  BEHAVIOR/PSYCH: Negative for depressed mood    Physical Exam:     Vitals:    06/04/19 1718 06/04/19 1719 06/04/19 1928 06/04/19 2140   BP: 112/81 114/79 119/81 110/89   Pulse: 82 85 87 (!) 8   Resp: 19 17 14   Temp:       TempSrc:       SpO2: 100% 100% 100% 99%   Weight:       Height:         CONSTITUTIONAL: No apparent distress  EYES: EOMI, conjunctiva not injected  HEENT: NCAT,no cervical adenopathy,   RESPIRATORY: No tachypnea, clear to auscultation bilaterally, no wheezes, ronchi or rales  CARDIOVASCULAR: Regular rate and rhythm, normal S1 and S2 without murmur, rubs, or gallops,  GASTROINTESTINAL: Soft, non-tender, non-distended. No masses or organomegaly. GENITOURINARY: No CVA tenderness  INTEGUMENT: No visible rash, no jaundice. HEMATOLOGIC/LYMPHATIC: Capillary refill <2 seconds, no edema in extremities. NEUROLOGICAL: Alert and oriented x 3    Diagnostic Labs:   CBC:   Recent Labs     06/04/19  1727   WBC 4.8   HGB 13.1        BMP:    Recent Labs     06/04/19  1727      K 3.2*      CO2 23   BUN 18   CREATININE 0.80   GLUCOSE 87     Hepatic: No results for input(s): AST, ALT, ALB, BILITOT, ALKPHOS in the last 72 hours. Troponin: No results for input(s): TROPONINI in the last 72 hours. BNP: No results for input(s): BNP in the last 72 hours. Lipids: No results for input(s): CHOL, HDL in the last 72 hours. Invalid input(s): LDLCALCU  INR: No results for input(s): INR in the last 72 hours. Imaging:   CT PE negative  CXR negative  Bedside heart u/s, negative for pericardial effusion    Assessment:   Principal Problem:    Chest pain  Resolved Problems:    * No resolved hospital problems. *      Plan:     Chest pain in the setting of NIDCM (EF 10-15%), s/p AICD implant 2005  -no EKG changes, trop 21, 22, continue to trend x 2  -EKG prn   -nitro tabs PRN  -complete ECHO  -Stress test in the AM, NPO after midnight  -heparin gtt started due to jump in troponin  -Cardiology consult    Essential HTN and NIDCM  -resume home meds as follows:  -lisinopril 10 mg daily  -coreg 3.125 mg BID  -Aldactone 25 mg daily  -Bumex 1 mg BID    Smoker  -nicotine patch  -albuterol as needed    Home meds: elavil 25 mg daily to resume  GI Prophylaxis: famotidine  DVT Proph: lovenox    Above plan discussed with the patient and family in room, who agreed to the above plan. The severity of this patient's signs and symptoms (specify chest pain) indicate the need for an inpatient admission. This plan will be discussed with the rounding attending: Nicholas Griffin.       Tayla Navarrete MD PGY-2  Family Medicine Inpatient

## 2019-06-05 NOTE — PROGRESS NOTES
Dr. Tonja Rae notified of pt's troponin elevated and b/p 98/76, pt c/o chest pain. Dr. Tonja Rae aware of Potassium of 3.1. Per Dr. Tonja Rae, give Potassium IV as ordered.

## 2019-06-05 NOTE — CARE COORDINATION
Discharge [address] [facility]  Clinical Case Management Department  Written by: Inderjit Milian RN    Patient Name: Kathi Diaz  Attending Provider: No att. providers found  Admit Date: 2019  4:55 PM  MRN: [id number]  Account: [de-identified]                     : 1965  Discharge Date: 2019      Disposition: home, left [address], RN

## 2019-06-05 NOTE — PROGRESS NOTES
Pt stated \"I want to leave\". \"I am hungry and ready to ear\". \"I want to leave now\". AMA signed at this time. Discontinued INT with cathlon intact x2. Small pressure dressing applied x2. Pt tolerated procedure well. Pt left walking with family member. Dr. Zbigniew Vincent notified of pt left AMA.

## 2019-06-05 NOTE — PROGRESS NOTES
Smoking Cessation - topics covered   []  Health Risks  []  Benefits of Quitting   []  Smoking Cessation  []  Patient has no history of tobacco use  []  Patient is former smoker. []  No need for tobacco cessation education. []  Booklet given  [x]  Patient verbalizes understanding. [x]  Patient denies need for tobacco cessation education. []  Unable to meet with patient today. Will follow up as able.   Malika Lofton  11:58 AM

## 2019-06-05 NOTE — CONSULTS
[facility]  CONSULT NOTE                  Date:   6/5/2019  Patient name: Clare Reid  Date of admission:  6/4/2019  4:55 PM  MRN:   [id number]  YOB: 1965    Reason for Admission: NSTEMI    CHIEF COMPLAINT:   Chest and left arm pain     History Obtained From:  Patient and chart review     HISTORY OF PRESENT ILLNESS:    Patient with pertinent hx of Non-ischemic CMP, last cath in 2011, s/p dual chamber ICD and chronic smoker admitted with c/o left upper chest pain radiating to left arm since yesterday, partially relieved with nitro. She denies any dyspnea, orthopnea, palpitations, ICD alarms or shock. Past Medical History:   has a past medical history of Asthma, CAD (coronary artery disease), Cardiomegaly, CHF (congestive heart failure) ([facility] [address] COPD (chronic obstructive pulmonary disease) ([facility] [address] Depression, Hypertension, Lesion of right native kidney, MI (myocardial infarction) ([facility] [address] and Unspecified diseases of blood and blood-forming organs. Past Surgical History:   has a past surgical history that includes Cardiac defibrillator placement (09/2005); Pacemaker insertion; Tubal ligation; Cardiac defibrillator placement; and Uterine fibroid surgery ([facility] 2015). Home Medications:    Prior to Admission medications    Medication Sig Start Date End Date Taking?  Authorizing Provider   lisinopril (PRINIVIL;ZESTRIL) 10 MG tablet TAKE 1 TABLET BY MOUTH 1 TIME A DAY. 5/31/19   Lorna Gresham MD   omeprazole (PRILOSEC) 20 MG delayed release capsule TAKE ONE CAPSULE BY MOUTH EVERY MORNING BEFORE BREAKFAST 5/31/19   Chan Haque MD   carvedilol (COREG) 3.125 MG tablet TAKE 1 TABLET BY MOUTH 2 TIMES A DAY WITH MEALS 5/31/19   Lorna Gresham MD   spironolactone (ALDACTONE) 25 MG tablet TAKE 1 TABLET BY MOUTH ONE TIME A DAY 5/31/19   Chan Haque MD   bumetanide (BUMEX) 1 MG tablet TAKE 1 TABLET BY MOUTH 2 TIMES A DAY 5/6/19   Johanna Nino MD dry  Neurological:  · Not done     DATA:    Diagnostics:      EKG:   A sensed V paced rhythm       Previous cardiac testing:       ECHO 4/24/18: EF 10-15%. Lead noted in right sided chambers. Severe MR. Mod TR. RVSP 48.      STRESS 10/10/14: Mod size fixed defect involving the apical to mid inferior wall of the LV. EF 42%      CATH 9/19/11: Normal coronaries. EF 30%. Labs:     CBC:   Recent Labs     06/04/19  1727   WBC 4.8   HGB 13.1   HCT 41.1        BMP:   Recent Labs     06/04/19  1727 06/05/19  0502    139   K 3.2* 3.1*   CO2 23 23   BUN 18 20   CREATININE 0.80 0.84   LABGLOM >60 >60   GLUCOSE 87 94       APTT:  Recent Labs     06/05/19  0326 06/05/19  0927   APTT 24.1 33.7*       FASTING LIPID PANEL:  Lab Results   Component Value Date    HDL 61 11/25/2018    TRIG 140 11/25/2018     LIVER PROFILE:  Recent Labs     06/05/19  0502   AST 24   ALT 12   LABALBU 3.9       IMPRESSION:    Anginal chest pain  Troponin's elevation with upward trend concerning for NSTEMI   Severe non-ischemic CMP with ICD in situ   Chronic smoker  COPD  Hx of hypertension       RECOMMENDATIONS:  · Given coronary risk factors and ongoing symptoms, recommend cardiac cath for definitive diagnosis and r/o underlying obstructive CAD. I have discussed in detail the risks, benefits, and alternatives to the procedure including but not limited to risk of bleeding/hematoma requiring surgical intervention, contrast induced allergy and/or nephropathy, arrythmia, CVA, MI or death. The patient verbalized understanding and wishes to proceed. · Continue medical therapy with asa, lipitor and IV heparin in meantime.    · ICD interrogation         Electronically signed by Shobha Mackay MD on 6/5/2019 at 11:04 AM.  [address] [place] [address] [facility]   [address] [facility]

## 2019-06-05 NOTE — PROGRESS NOTES
Dr. Domenic Blanc here to see patient. Informed Dr. Domenic Blanc of pt c/o chest pain, rated 10. B/P low, see flowsheet, Nitroglycerin given due to b/p too low for Morphine. Per Dr. Domenic Blanc, pt to have \"heart cath\".

## 2019-06-05 NOTE — PROGRESS NOTES
[facility]  Progress Note    Date:   6/5/2019  Patient name:  Tana Hdz  Date of admission:  6/4/2019  4:55 PM  MRN:   [id number]  YOB: 1965    SUBJECTIVE/Last 24 hours update:     Day 1 Hospitalization:     Patient seen and examined at bedside this morning. Patient continues to complain of 10 out of 10 chest pain that is heavy and crushing in nature. Will address with IV Toradol due patient's hypotension. Elevated troponins addressed with cardiology consult and cath procedure. Patient currently stable for procedure. Will continue current treatment and plan and follow up with cardiology recommendations. REVIEW OF SYSTEMS:      Review of Systems   Constitutional: Negative for chills, fatigue, fever and unexpected weight change. HENT: Negative for congestion, mouth sores and sore throat. Eyes: Negative for visual disturbance. Respiratory: Negative for cough, chest tightness and shortness of breath. Cardiovascular: Positive for chest pain. Negative for leg swelling. Gastrointestinal: Negative for abdominal pain, constipation, diarrhea, nausea and vomiting. Genitourinary: Negative for difficulty urinating. Musculoskeletal: Negative for joint swelling. Skin: Negative for rash. Neurological: Negative for dizziness, weakness and headaches. PAST MEDICAL HISTORY:      has a past medical history of Asthma, CAD (coronary artery disease), Cardiomegaly, CHF (congestive heart failure) ([facility] [address] COPD (chronic obstructive pulmonary disease) ([facility] [address] Depression, Hypertension, Lesion of right native kidney, MI (myocardial infarction) ([facility] [address] and Unspecified diseases of blood and blood-forming organs. PAST SURGICAL HISTORY:      has a past surgical history that includes Cardiac defibrillator placement (09/2005);  Pacemaker insertion; Tubal ligation; Cardiac defibrillator placement; and Uterine fibroid surgery [facility] 2015).     SOCIALHISTORY:      reports that she has been smoking cigarettes. She has been smoking about 0.25 packs per day. She has never used smokeless tobacco. She reports that she drinks about 0.6 oz of alcohol per week. She reports that she does not use drugs. FAMILY HISTORY:      family history includes Diabetes in her father and mother; Heart Disease in her brother and mother; High Blood Pressure in her mother. HOME MEDICATIONS:      Prior to Admission medications    Medication Sig Start Date End Date Taking?  Authorizing Provider   lisinopril (PRINIVIL;ZESTRIL) 10 MG tablet TAKE 1 TABLET BY MOUTH 1 TIME A DAY. 5/31/19   Chan Haque MD   omeprazole (PRILOSEC) 20 MG delayed release capsule TAKE ONE CAPSULE BY MOUTH EVERY MORNING BEFORE BREAKFAST 5/31/19   Chan Haque MD   carvedilol (COREG) 3.125 MG tablet TAKE 1 TABLET BY MOUTH 2 TIMES A DAY WITH MEALS 5/31/19   Benji Sutherland MD   spironolactone (ALDACTONE) 25 MG tablet TAKE 1 TABLET BY MOUTH ONE TIME A DAY 5/31/19   Chan Haque MD   bumetanide (BUMEX) 1 MG tablet TAKE 1 TABLET BY MOUTH 2 TIMES A DAY 5/6/19   Mary Munoz MD   amitriptyline (ELAVIL) 25 MG tablet TAKE ONE TABLET BY MOUTH NIGHTLY 3/28/19   Mary Munoz MD   sennosides-docusate sodium (SENOKOT-S) 8.6-50 MG tablet Take 1 tablet by mouth daily 12/23/18   Jose F Lezama MD   albuterol sulfate HFA (VENTOLIN HFA) 108 (90 Base) MCG/ACT inhaler INHALE 2 PUFFS INTO THE LUNGS EVERY 6 HOURS AS NEEDED FOR WHEEZING 12/7/18   Mary Munoz MD   dextromethorphan-guaiFENesin (TUSSIN DM)  MG/5ML syrup Take 15 mLs by mouth every 6 hours as needed for Cough    Historical Provider, MD   ipratropium-albuterol (DUONEB) 0.5-2.5 (3) MG/3ML SOLN nebulizer solution Inhale 3 mLs into the lungs 2 times daily as needed for Shortness of Breath 11/25/18   Talita Caal MD   Spacer/Aero-Holding Chambers (E-Z SPACER) ELVER 1 Device by Does not apply route daily 11/25/18 Rasheeda Gallo MD   bumetanide (BUMEX) 1 MG tablet TAKE ONE TABLET BY MOUTH TWICE A DAY 11/25/18   Rasheeda Gallo MD   folic acid-pyridoxine-cyanocobalamine (FOLTX) 2.5-25-1 MG TABS tablet Take 1 tablet by mouth daily 11/25/18   Yodit Rodriguez MD   Umeclidinium Bromide (INCRUSE ELLIPTA) 62.5 MCG/INH AEPB INHALE 1 PUFF INTO THE LUNGS DAILY STOP SPIRIVA 11/25/18   Yodit Rodriguez MD   Multiple Vitamin (MULTIVITAMIN) tablet TAKE ONE TABLET BY MOUTH ONE TIME A DAY 6/18/18   Everlean Cushing, MD   fluticasone [facility]) 50 MCG/ACT nasal spray 1 spray by Nasal route daily 6/15/17   Everlean Cushing, MD       ALLERGIES:     Patient has no known allergies. OBJECTIVE:       Vitals:    06/05/19 0600 06/05/19 0700 06/05/19 0722 06/05/19 0755   BP: 89/69 93/64 98/76    Pulse: 73 73 73    Resp:   16    Temp:   97.7 °F (36.5 °C)    TempSrc:   Oral Oral   SpO2: 99% 99% 100%    Weight:       Height:             Intake/Output Summary (Last 24 hours) at 6/5/2019 0950  Last data filed at 6/5/2019 0007  Gross per 24 hour   Intake 610 ml   Output 75 ml   Net 535 ml       PHYSICAL EXAM:    Physical Exam   Constitutional: She is oriented to person, place, and time. She appears well-developed. Cardiovascular: Normal rate, normal heart sounds and intact distal pulses. Exam reveals no gallop and no friction rub. No murmur heard. AICD placement   Pulmonary/Chest: Effort normal and breath sounds normal. No respiratory distress. She has no wheezes. She has no rales. She exhibits no tenderness. Abdominal: Soft. Bowel sounds are normal. She exhibits no distension and no mass. There is no tenderness. There is no guarding. Musculoskeletal: She exhibits no edema. Neurological: She is alert and oriented to person, place, and time. Skin: Capillary refill takes less than 2 seconds. Psychiatric: She has a normal mood and affect. Nursing note and vitals reviewed.       ASSESSMENT:      Principal Problem:    Chest pain  Resolved Problems:    * No resolved hospital problems. *      PLAN:     1. NSTEMI  - Trops at 22 and then 178  - EKG - Atrial-sensed ventricular-paced rhythm  - CXR - no evidence for acute process  - Heparin drip  - IV Toradol for pain   - Cath procedure today    2. Chronic CHF s/p AICD   - Pro BNP at 1089  - CXR - no evidence for acute process  - Bumex 1mg - HELD (hypotension)  - ECHO ( 4/23/2019)- 10-15 % LVEF; severe MR, Pacemaker    3. CAD  - Aspirin 81mg  - Lisinopril 10mg  - Coreg 3.125  - Aldactone 25mg     4. HTN  - Coreg 3.125 BID      Plan will be discussed with the attending, Dr. Rosa Hsu MD  Family Medicine Resident  6/5/2019 9:50 AM   Attending Physician Statement  I have discussed the care of Va Bedoya, including pertinent history and exam findings,  with the resident. I have seen and examined the patient and the key elements of all parts of the encounter have been performed by me. I agree with the assessment, plan and orders as documented by the resident. (Brain Kotyk) - Justina Griffith M.D  Vitals:    06/05/19 1125   BP: (!) 86/44   Pulse:    Resp:    Temp:    SpO2:      1.  Chest pain, unspecified type

## 2019-06-06 LAB
EKG ATRIAL RATE: 72 BPM
EKG P AXIS: 46 DEGREES
EKG P-R INTERVAL: 130 MS
EKG Q-T INTERVAL: 562 MS
EKG QRS DURATION: 188 MS
EKG QTC CALCULATION (BAZETT): 615 MS
EKG R AXIS: -100 DEGREES
EKG T AXIS: 59 DEGREES
EKG VENTRICULAR RATE: 72 BPM

## 2019-06-06 PROCEDURE — 93010 ELECTROCARDIOGRAM REPORT: CPT | Performed by: INTERNAL MEDICINE

## 2019-06-07 ENCOUNTER — CARE COORDINATION (OUTPATIENT)
Dept: CARE COORDINATION | Age: 54
End: 2019-06-07

## 2019-06-07 ENCOUNTER — TELEPHONE (OUTPATIENT)
Dept: FAMILY MEDICINE CLINIC | Age: 54
End: 2019-06-07

## 2019-06-07 VITALS
RESPIRATION RATE: 16 BRPM | HEART RATE: 77 BPM | OXYGEN SATURATION: 99 % | DIASTOLIC BLOOD PRESSURE: 77 MMHG | SYSTOLIC BLOOD PRESSURE: 105 MMHG

## 2019-06-07 NOTE — CARE COORDINATION
Post Acute Care Discharge Phone Assessment     Review purpose of telephone call with: Alanna Sky  Date: 19    - To evaluate the client after hospital discharge  - To ensure the client has received and understands self care instructions  - To identify and prevent potential adverse events  - To provide additional education and initiate post acute services       Kimberly Varner   : 1965 Phone #: [phone] (home)    1. Hospital Information    Discharged from: [facility]   Discharge to home  Discharge Date: 2019  Patient left AMA  Admission Date: 2019    Non-face-to-face services provided:  Obtained and reviewed discharge summary and/or continuity of care documents    Hospital records: obtained and reviewed    Was instructed to follow up in: 7-10 days    Hospital Diagnosis: Chest pain  Non STEMI      Hospital Consultants:  Cardiology, Family Practice    Initial contact Date: 2019  Type of Contact:  in person      2. Assessment of Current Condition      Questions: How have you felt since discharge from the hospital:     Reports feeling fatigued. Did you receive a discharge summary from the hospital? yes    Is there any lingering or new fever? No    Are you eating and drinking OK? Yes  Reports appetite is fair. Are there any new complaints of pain? Reports intermittent upper chest discomfort that occurs with activity and relieves with rest    If you have a wound - is the dressing clean, dry, and intact? N/A    Reinforce Education    Does the patient know -   1. What to do if her symptoms worsen? yes   2. For what symptoms she should call the doctor?  yes   3. What symptoms she should get help with right away? Yes  Instructed on signs and symptoms of CHF to report and when to call physician   4. Does she know how to contact her physician?  yes    Are there any questions about the patients medications?  No   Does the patient have a list of all the medications that were prescribed to be taken after discharge? She left hospital AMA prior to receiving discharge instructions   Does the patient have all the medications that were prescribed?  yes   Is the patient taking all the prescribed medications? yes   Does the patient understand what all the medications are taken for? yes      ( Update medication list and refresh medication smartlinks below)        All Active Meds in Chart - (keep all current active meds, add hospital meds)  Current Outpatient Medications   Medication Sig Dispense Refill    lisinopril (PRINIVIL;ZESTRIL) 10 MG tablet TAKE 1 TABLET BY MOUTH 1 TIME A DAY.  30 tablet 1    omeprazole (PRILOSEC) 20 MG delayed release capsule TAKE ONE CAPSULE BY MOUTH EVERY MORNING BEFORE BREAKFAST 30 capsule 0    carvedilol (COREG) 3.125 MG tablet TAKE 1 TABLET BY MOUTH 2 TIMES A DAY WITH MEALS 60 tablet 0    spironolactone (ALDACTONE) 25 MG tablet TAKE 1 TABLET BY MOUTH ONE TIME A DAY 28 tablet 2    amitriptyline (ELAVIL) 25 MG tablet TAKE ONE TABLET BY MOUTH NIGHTLY 30 tablet 3    albuterol sulfate HFA (VENTOLIN HFA) 108 (90 Base) MCG/ACT inhaler INHALE 2 PUFFS INTO THE LUNGS EVERY 6 HOURS AS NEEDED FOR WHEEZING 18 g 3    ipratropium-albuterol (DUONEB) 0.5-2.5 (3) MG/3ML SOLN nebulizer solution Inhale 3 mLs into the lungs 2 times daily as needed for Shortness of Breath 360 mL 1    bumetanide (BUMEX) 1 MG tablet TAKE ONE TABLET BY MOUTH TWICE A DAY 30 tablet 3    folic acid-pyridoxine-cyanocobalamine (FOLTX) 2.5-25-1 MG TABS tablet Take 1 tablet by mouth daily 30 tablet 0    Multiple Vitamin (MULTIVITAMIN) tablet TAKE ONE TABLET BY MOUTH ONE TIME A DAY 30 tablet 3    sennosides-docusate sodium (SENOKOT-S) 8.6-50 MG tablet Take 1 tablet by mouth daily 20 tablet 0    Spacer/Aero-Holding Chambers (E-Z SPACER) ELVER 1 Device by Does not apply route daily 1 Device 0    Umeclidinium Bromide (INCRUSE ELLIPTA) 62.5 MCG/INH AEPB INHALE 1 PUFF INTO THE LUNGS DAILY STOP SPIRIVA 1 each 3    fluticasone (FLONASE) 50 MCG/ACT nasal spray 1 spray by Nasal route daily 1 Bottle 3     No current facility-administered medications for this visit. Current Medications (record all meds as 'taken' or 'not taken' in home med activity)  Outpatient Medications Marked as Taking for the 6/7/19 encounter (Care Coordination) with Christina Daniels RN   Medication Sig Dispense Refill    lisinopril (PRINIVIL;ZESTRIL) 10 MG tablet TAKE 1 TABLET BY MOUTH 1 TIME A DAY. 30 tablet 1    omeprazole (PRILOSEC) 20 MG delayed release capsule TAKE ONE CAPSULE BY MOUTH EVERY MORNING BEFORE BREAKFAST 30 capsule 0    carvedilol (COREG) 3.125 MG tablet TAKE 1 TABLET BY MOUTH 2 TIMES A DAY WITH MEALS 60 tablet 0    spironolactone (ALDACTONE) 25 MG tablet TAKE 1 TABLET BY MOUTH ONE TIME A DAY 28 tablet 2    amitriptyline (ELAVIL) 25 MG tablet TAKE ONE TABLET BY MOUTH NIGHTLY 30 tablet 3    albuterol sulfate HFA (VENTOLIN HFA) 108 (90 Base) MCG/ACT inhaler INHALE 2 PUFFS INTO THE LUNGS EVERY 6 HOURS AS NEEDED FOR WHEEZING 18 g 3    ipratropium-albuterol (DUONEB) 0.5-2.5 (3) MG/3ML SOLN nebulizer solution Inhale 3 mLs into the lungs 2 times daily as needed for Shortness of Breath 360 mL 1    bumetanide (BUMEX) 1 MG tablet TAKE ONE TABLET BY MOUTH TWICE A DAY 30 tablet 3    folic acid-pyridoxine-cyanocobalamine (FOLTX) 2.5-25-1 MG TABS tablet Take 1 tablet by mouth daily 30 tablet 0    Multiple Vitamin (MULTIVITAMIN) tablet TAKE ONE TABLET BY MOUTH ONE TIME A DAY 30 tablet 3         Are there any questions about how the patient should take care of herself? Yes Instructed on signs and symptoms of MI, CHF and when to call physician   Does the patient understand her diet regimen? yes   Does the patient understand his or her activity level? Yes  Instructed on activity limitations and pacing activities to tolerance   Does the patient understand how to care for her wound?   N/A   Does the patient understand how to monitor her weight?  yes   Does the patient understand what to do if she becomes short of breath? yes   Does the patient understand what to do if she has increased edema? yes    Does the patient understand how to monitor vital signs? Blood Pressure  105/72 sit   115/79    Pulse 77  resp 16/min at rst  02 sat 99 %     Blood sugar? N/A    Is the patient having any trouble with ADL's? No   Any problems showering or bathing? No   Does the patient have trouble eating or feeding themselves? No   Is the patient having trouble getting out of bed or going to the toilet? No   Is the patient able to move around as expected? Yes. Reports she has intermittent weakness, fatigue and rests throughout the day      Home care services initiated: no     Services provided: [employer] and social work      Does that patient have equipment needs? No   Does the patient have the appropriate equipment? Yes   Can the patient use the equipment properly and safely? Yes    Reinforce Follow-Up  - Does patient have an appointment with her PCP? yes  - Does patient have an appointment with her specialist physicians? No      Care Coordination  Is patient assigned ambulatory care coordinator for chronic condition management?  Yes          Follow up appointment date: (7 days for more severe illness, 14 days for others)  Future Appointments   Date Time Provider Yanelis Alfaro   6/13/2019 11:00 AM Yenny Tee MD [address]       Other Interventions or Actions taken as result of  Assessment  -  Plan  Navigate at PCP appointment on 6/13/19        [address]ne [address]

## 2019-06-07 NOTE — DISCHARGE SUMMARY
TRIG 140 11/25/2018    HDL 61 11/25/2018    ALT 12 06/05/2019    AST 24 06/05/2019     06/05/2019    K 3.1 (L) 06/05/2019     06/05/2019    CREATININE 0.84 06/05/2019    BUN 20 06/05/2019    CO2 23 06/05/2019    TSH 1.76 11/25/2018    INR 0.9 02/02/2019    LABA1C 6.4 (H) 07/16/2018         Disposition:   Left AMA    Instructions to Patient: Left AMA      Nolvia Soler MD  [facility] [address] [facility] [address] [place] [address] [phone]            Discharge Medications:     Julianne Cluster   Home Medication Instructions [id number]    Printed on:06/07/19 2629   Medication Information                      albuterol sulfate HFA (VENTOLIN HFA) 108 (90 Base) MCG/ACT inhaler  INHALE 2 PUFFS INTO THE LUNGS EVERY 6 HOURS AS NEEDED FOR WHEEZING             amitriptyline (ELAVIL) 25 MG tablet  TAKE ONE TABLET BY MOUTH NIGHTLY             bumetanide (BUMEX) 1 MG tablet  TAKE ONE TABLET BY MOUTH TWICE A DAY             bumetanide (BUMEX) 1 MG tablet  TAKE 1 TABLET BY MOUTH 2 TIMES A DAY             carvedilol (COREG) 3.125 MG tablet  TAKE 1 TABLET BY MOUTH 2 TIMES A DAY WITH MEALS             dextromethorphan-guaiFENesin (TUSSIN DM)  MG/5ML syrup  Take 15 mLs by mouth every 6 hours as needed for Cough             fluticasone (FLONASE) 50 MCG/ACT nasal spray  1 spray by Nasal route daily             folic acid-pyridoxine-cyanocobalamine (FOLTX) 2.5-25-1 MG TABS tablet  Take 1 tablet by mouth daily             ipratropium-albuterol (DUONEB) 0.5-2.5 (3) MG/3ML SOLN nebulizer solution  Inhale 3 mLs into the lungs 2 times daily as needed for Shortness of Breath             lisinopril (PRINIVIL;ZESTRIL) 10 MG tablet  TAKE 1 TABLET BY MOUTH 1 TIME A DAY.              Multiple Vitamin (MULTIVITAMIN) tablet  TAKE ONE TABLET BY MOUTH ONE TIME A DAY             omeprazole (PRILOSEC) 20 MG delayed release capsule  TAKE ONE CAPSULE BY MOUTH EVERY

## 2019-06-07 NOTE — TELEPHONE ENCOUNTER
[facility] Initial Follow Up Call    Call within 2 business days of discharge: Yes     Patient: Ravi Blanco Patient : 1965 MRN: [id number]    [unfilled]    RARS: Readmission Risk Score: 12       Spoke with:  Pt states she is doing better no more chest pain just slight dizziness.  Pt states she is taking all of her home medications advised to bring them to her appointment    Discharge department/facility: [address] [place] [address] services provided:  Scheduled appointment with PCP- 6-13-19 @ 11 am    Follow Up  Future Appointments   Date Time Provider Yanelis Alfaro   2019 11:00 AM Yvette Iverson 236

## 2019-06-09 ENCOUNTER — APPOINTMENT (OUTPATIENT)
Dept: GENERAL RADIOLOGY | Age: 54
DRG: 026 | End: 2019-06-09
Payer: MEDICAID

## 2019-06-09 ENCOUNTER — APPOINTMENT (OUTPATIENT)
Dept: CT IMAGING | Age: 54
DRG: 026 | End: 2019-06-09
Payer: MEDICAID

## 2019-06-09 ENCOUNTER — HOSPITAL ENCOUNTER (INPATIENT)
Age: 54
LOS: 5 days | Discharge: HOME | DRG: 026 | End: 2019-06-14
Attending: EMERGENCY MEDICINE | Admitting: INTERNAL MEDICINE
Payer: MEDICAID

## 2019-06-09 DIAGNOSIS — R55 SYNCOPE AND COLLAPSE: ICD-10-CM

## 2019-06-09 DIAGNOSIS — I21.4 NSTEMI (NON-ST ELEVATED MYOCARDIAL INFARCTION) (HCC): ICD-10-CM

## 2019-06-09 DIAGNOSIS — I49.01 VENTRICULAR FIBRILLATION (HCC): Primary | ICD-10-CM

## 2019-06-09 LAB
ABSOLUTE EOS #: 0.3 K/UL (ref 0–0.4)
ABSOLUTE IMMATURE GRANULOCYTE: 0 K/UL (ref 0–0.3)
ABSOLUTE LYMPH #: 2.1 K/UL (ref 1–4.8)
ABSOLUTE MONO #: 0.42 K/UL (ref 0.1–0.8)
ACETAMINOPHEN LEVEL: <5 UG/ML (ref 10–30)
ALBUMIN SERPL-MCNC: 4.3 G/DL (ref 3.5–5.2)
ALBUMIN/GLOBULIN RATIO: 1.2 (ref 1–2.5)
ALP BLD-CCNC: 97 U/L (ref 35–104)
ALT SERPL-CCNC: 12 U/L (ref 5–33)
ANION GAP SERPL CALCULATED.3IONS-SCNC: 14 MMOL/L (ref 9–17)
AST SERPL-CCNC: 16 U/L
BASOPHILS # BLD: 0 % (ref 0–2)
BASOPHILS ABSOLUTE: 0 K/UL (ref 0–0.2)
BILIRUB SERPL-MCNC: 0.33 MG/DL (ref 0.3–1.2)
BNP INTERPRETATION: ABNORMAL
BUN BLDV-MCNC: 18 MG/DL (ref 6–20)
BUN/CREAT BLD: ABNORMAL (ref 9–20)
CALCIUM SERPL-MCNC: 9.6 MG/DL (ref 8.6–10.4)
CHLORIDE BLD-SCNC: 98 MMOL/L (ref 98–107)
CO2: 25 MMOL/L (ref 20–31)
CREAT SERPL-MCNC: 0.95 MG/DL (ref 0.5–0.9)
DIFFERENTIAL TYPE: ABNORMAL
EOSINOPHILS RELATIVE PERCENT: 5 % (ref 1–4)
ETHANOL PERCENT: <0.01 %
ETHANOL: <10 MG/DL
GFR AFRICAN AMERICAN: >60 ML/MIN
GFR NON-AFRICAN AMERICAN: >60 ML/MIN
GFR SERPL CREATININE-BSD FRML MDRD: ABNORMAL ML/MIN/{1.73_M2}
GFR SERPL CREATININE-BSD FRML MDRD: ABNORMAL ML/MIN/{1.73_M2}
GLUCOSE BLD-MCNC: 120 MG/DL (ref 70–99)
HCT VFR BLD CALC: 42.8 % (ref 36.3–47.1)
HEMOGLOBIN: 13.8 G/DL (ref 11.9–15.1)
IMMATURE GRANULOCYTES: 0 %
LYMPHOCYTES # BLD: 35 % (ref 24–44)
MCH RBC QN AUTO: 29.7 PG (ref 25.2–33.5)
MCHC RBC AUTO-ENTMCNC: 32.2 G/DL (ref 28.4–34.8)
MCV RBC AUTO: 92.2 FL (ref 82.6–102.9)
MONOCYTES # BLD: 7 % (ref 1–7)
MORPHOLOGY: ABNORMAL
NRBC AUTOMATED: 0 PER 100 WBC
PARTIAL THROMBOPLASTIN TIME: 25.7 SEC (ref 20.5–30.5)
PDW BLD-RTO: 14.9 % (ref 11.8–14.4)
PLATELET # BLD: 171 K/UL (ref 138–453)
PLATELET ESTIMATE: ABNORMAL
PMV BLD AUTO: 12.4 FL (ref 8.1–13.5)
POTASSIUM SERPL-SCNC: 3.2 MMOL/L (ref 3.7–5.3)
PRO-BNP: 519 PG/ML
RBC # BLD: 4.64 M/UL (ref 3.95–5.11)
RBC # BLD: ABNORMAL 10*6/UL
SALICYLATE LEVEL: <1 MG/DL (ref 3–10)
SEG NEUTROPHILS: 53 % (ref 36–66)
SEGMENTED NEUTROPHILS ABSOLUTE COUNT: 3.18 K/UL (ref 1.8–7.7)
SODIUM BLD-SCNC: 137 MMOL/L (ref 135–144)
TOTAL PROTEIN: 8 G/DL (ref 6.4–8.3)
TOXIC TRICYCLIC SC,BLOOD: NEGATIVE
TROPONIN INTERP: ABNORMAL
TROPONIN INTERP: ABNORMAL
TROPONIN T: ABNORMAL NG/ML
TROPONIN T: ABNORMAL NG/ML
TROPONIN, HIGH SENSITIVITY: 117 NG/L (ref 0–14)
TROPONIN, HIGH SENSITIVITY: 134 NG/L (ref 0–14)
WBC # BLD: 6 K/UL (ref 3.5–11.3)
WBC # BLD: ABNORMAL 10*3/UL

## 2019-06-09 PROCEDURE — G0480 DRUG TEST DEF 1-7 CLASSES: HCPCS

## 2019-06-09 PROCEDURE — 72125 CT NECK SPINE W/O DYE: CPT

## 2019-06-09 PROCEDURE — 87086 URINE CULTURE/COLONY COUNT: CPT

## 2019-06-09 PROCEDURE — 80307 DRUG TEST PRSMV CHEM ANLYZR: CPT

## 2019-06-09 PROCEDURE — 6360000002 HC RX W HCPCS: Performed by: STUDENT IN AN ORGANIZED HEALTH CARE EDUCATION/TRAINING PROGRAM

## 2019-06-09 PROCEDURE — 2580000003 HC RX 258: Performed by: INTERNAL MEDICINE

## 2019-06-09 PROCEDURE — 2580000003 HC RX 258: Performed by: STUDENT IN AN ORGANIZED HEALTH CARE EDUCATION/TRAINING PROGRAM

## 2019-06-09 PROCEDURE — 81001 URINALYSIS AUTO W/SCOPE: CPT

## 2019-06-09 PROCEDURE — 6360000002 HC RX W HCPCS: Performed by: EMERGENCY MEDICINE

## 2019-06-09 PROCEDURE — 90471 IMMUNIZATION ADMIN: CPT | Performed by: EMERGENCY MEDICINE

## 2019-06-09 PROCEDURE — 85025 COMPLETE CBC W/AUTO DIFF WBC: CPT

## 2019-06-09 PROCEDURE — 71045 X-RAY EXAM CHEST 1 VIEW: CPT

## 2019-06-09 PROCEDURE — 96375 TX/PRO/DX INJ NEW DRUG ADDON: CPT

## 2019-06-09 PROCEDURE — 80053 COMPREHEN METABOLIC PANEL: CPT

## 2019-06-09 PROCEDURE — 2060000000 HC ICU INTERMEDIATE R&B

## 2019-06-09 PROCEDURE — 70450 CT HEAD/BRAIN W/O DYE: CPT

## 2019-06-09 PROCEDURE — 99285 EMERGENCY DEPT VISIT HI MDM: CPT

## 2019-06-09 PROCEDURE — 93005 ELECTROCARDIOGRAM TRACING: CPT | Performed by: EMERGENCY MEDICINE

## 2019-06-09 PROCEDURE — 85730 THROMBOPLASTIN TIME PARTIAL: CPT

## 2019-06-09 PROCEDURE — 84484 ASSAY OF TROPONIN QUANT: CPT

## 2019-06-09 PROCEDURE — 96365 THER/PROPH/DIAG IV INF INIT: CPT

## 2019-06-09 PROCEDURE — 93005 ELECTROCARDIOGRAM TRACING: CPT

## 2019-06-09 PROCEDURE — 73030 X-RAY EXAM OF SHOULDER: CPT

## 2019-06-09 PROCEDURE — 90715 TDAP VACCINE 7 YRS/> IM: CPT | Performed by: EMERGENCY MEDICINE

## 2019-06-09 PROCEDURE — 2580000003 HC RX 258: Performed by: EMERGENCY MEDICINE

## 2019-06-09 PROCEDURE — 99223 1ST HOSP IP/OBS HIGH 75: CPT | Performed by: INTERNAL MEDICINE

## 2019-06-09 PROCEDURE — 83880 ASSAY OF NATRIURETIC PEPTIDE: CPT

## 2019-06-09 PROCEDURE — 6370000000 HC RX 637 (ALT 250 FOR IP): Performed by: EMERGENCY MEDICINE

## 2019-06-09 RX ORDER — FLUTICASONE PROPIONATE 50 MCG
1 SPRAY, SUSPENSION (ML) NASAL DAILY
Status: DISCONTINUED | OUTPATIENT
Start: 2019-06-10 | End: 2019-06-14 | Stop reason: HOSPADM

## 2019-06-09 RX ORDER — HEPARIN SODIUM 1000 [USP'U]/ML
30 INJECTION, SOLUTION INTRAVENOUS; SUBCUTANEOUS PRN
Status: DISCONTINUED | OUTPATIENT
Start: 2019-06-09 | End: 2019-06-11

## 2019-06-09 RX ORDER — HEPARIN SODIUM 1000 [USP'U]/ML
60 INJECTION, SOLUTION INTRAVENOUS; SUBCUTANEOUS PRN
Status: DISCONTINUED | OUTPATIENT
Start: 2019-06-09 | End: 2019-06-09

## 2019-06-09 RX ORDER — ACETAMINOPHEN 325 MG/1
650 TABLET ORAL EVERY 4 HOURS PRN
Status: CANCELLED | OUTPATIENT
Start: 2019-06-09

## 2019-06-09 RX ORDER — HEPARIN SODIUM 10000 [USP'U]/100ML
12 INJECTION, SOLUTION INTRAVENOUS CONTINUOUS
Status: DISCONTINUED | OUTPATIENT
Start: 2019-06-09 | End: 2019-06-09

## 2019-06-09 RX ORDER — ALBUTEROL SULFATE 90 UG/1
2 AEROSOL, METERED RESPIRATORY (INHALATION) EVERY 4 HOURS PRN
Status: DISCONTINUED | OUTPATIENT
Start: 2019-06-09 | End: 2019-06-13

## 2019-06-09 RX ORDER — LISINOPRIL 10 MG/1
10 TABLET ORAL DAILY
Status: DISCONTINUED | OUTPATIENT
Start: 2019-06-10 | End: 2019-06-14 | Stop reason: HOSPADM

## 2019-06-09 RX ORDER — SODIUM CHLORIDE 0.9 % (FLUSH) 0.9 %
10 SYRINGE (ML) INJECTION EVERY 12 HOURS SCHEDULED
Status: DISCONTINUED | OUTPATIENT
Start: 2019-06-09 | End: 2019-06-11

## 2019-06-09 RX ORDER — POTASSIUM CHLORIDE 1.5 G/1.77G
40 POWDER, FOR SOLUTION ORAL ONCE
Status: COMPLETED | OUTPATIENT
Start: 2019-06-09 | End: 2019-06-09

## 2019-06-09 RX ORDER — POTASSIUM CHLORIDE 7.45 MG/ML
40 INJECTION INTRAVENOUS ONCE
Status: DISCONTINUED | OUTPATIENT
Start: 2019-06-09 | End: 2019-06-14 | Stop reason: HOSPADM

## 2019-06-09 RX ORDER — FOLIC ACID/VIT B COMPLEX AND C 5 MG
1 TABLET ORAL DAILY
Status: DISCONTINUED | OUTPATIENT
Start: 2019-06-10 | End: 2019-06-14 | Stop reason: HOSPADM

## 2019-06-09 RX ORDER — HEPARIN SODIUM 10000 [USP'U]/100ML
12 INJECTION, SOLUTION INTRAVENOUS CONTINUOUS
Status: DISCONTINUED | OUTPATIENT
Start: 2019-06-09 | End: 2019-06-10

## 2019-06-09 RX ORDER — HEPARIN SODIUM 1000 [USP'U]/ML
30 INJECTION, SOLUTION INTRAVENOUS; SUBCUTANEOUS PRN
Status: DISCONTINUED | OUTPATIENT
Start: 2019-06-09 | End: 2019-06-09

## 2019-06-09 RX ORDER — ASPIRIN 81 MG/1
324 TABLET, CHEWABLE ORAL ONCE
Status: COMPLETED | OUTPATIENT
Start: 2019-06-09 | End: 2019-06-09

## 2019-06-09 RX ORDER — HEPARIN SODIUM 1000 [USP'U]/ML
60 INJECTION, SOLUTION INTRAVENOUS; SUBCUTANEOUS ONCE
Status: DISCONTINUED | OUTPATIENT
Start: 2019-06-09 | End: 2019-06-09

## 2019-06-09 RX ORDER — HEPARIN SODIUM 1000 [USP'U]/ML
60 INJECTION, SOLUTION INTRAVENOUS; SUBCUTANEOUS ONCE
Status: DISCONTINUED | OUTPATIENT
Start: 2019-06-09 | End: 2019-06-09 | Stop reason: ALTCHOICE

## 2019-06-09 RX ORDER — MORPHINE SULFATE 4 MG/ML
4 INJECTION, SOLUTION INTRAMUSCULAR; INTRAVENOUS ONCE
Status: COMPLETED | OUTPATIENT
Start: 2019-06-09 | End: 2019-06-09

## 2019-06-09 RX ORDER — SODIUM CHLORIDE 0.9 % (FLUSH) 0.9 %
10 SYRINGE (ML) INJECTION PRN
Status: CANCELLED | OUTPATIENT
Start: 2019-06-09

## 2019-06-09 RX ORDER — ONDANSETRON 2 MG/ML
4 INJECTION INTRAMUSCULAR; INTRAVENOUS EVERY 6 HOURS PRN
Status: DISCONTINUED | OUTPATIENT
Start: 2019-06-09 | End: 2019-06-11

## 2019-06-09 RX ORDER — SODIUM CHLORIDE 9 MG/ML
INJECTION, SOLUTION INTRAVENOUS CONTINUOUS
Status: DISCONTINUED | OUTPATIENT
Start: 2019-06-09 | End: 2019-06-14 | Stop reason: HOSPADM

## 2019-06-09 RX ORDER — MORPHINE SULFATE 2 MG/ML
1 INJECTION, SOLUTION INTRAMUSCULAR; INTRAVENOUS EVERY 4 HOURS PRN
Status: DISCONTINUED | OUTPATIENT
Start: 2019-06-09 | End: 2019-06-14 | Stop reason: HOSPADM

## 2019-06-09 RX ORDER — CARVEDILOL 3.12 MG/1
3.12 TABLET ORAL 2 TIMES DAILY WITH MEALS
Status: DISCONTINUED | OUTPATIENT
Start: 2019-06-10 | End: 2019-06-13

## 2019-06-09 RX ORDER — IPRATROPIUM BROMIDE AND ALBUTEROL SULFATE 2.5; .5 MG/3ML; MG/3ML
3 SOLUTION RESPIRATORY (INHALATION) 2 TIMES DAILY PRN
Status: DISCONTINUED | OUTPATIENT
Start: 2019-06-09 | End: 2019-06-13

## 2019-06-09 RX ORDER — MAGNESIUM SULFATE 1 G/100ML
2 INJECTION INTRAVENOUS ONCE
Status: COMPLETED | OUTPATIENT
Start: 2019-06-09 | End: 2019-06-10

## 2019-06-09 RX ORDER — SPIRONOLACTONE 25 MG/1
25 TABLET ORAL DAILY
Status: DISCONTINUED | OUTPATIENT
Start: 2019-06-10 | End: 2019-06-14 | Stop reason: HOSPADM

## 2019-06-09 RX ORDER — LIDOCAINE HYDROCHLORIDE ANHYDROUS AND DEXTROSE MONOHYDRATE .4; 5 G/100ML; G/100ML
1 INJECTION, SOLUTION INTRAVENOUS CONTINUOUS
Status: DISCONTINUED | OUTPATIENT
Start: 2019-06-09 | End: 2019-06-09

## 2019-06-09 RX ORDER — POTASSIUM CHLORIDE 20 MEQ/1
40 TABLET, EXTENDED RELEASE ORAL 2 TIMES DAILY WITH MEALS
Status: DISPENSED | OUTPATIENT
Start: 2019-06-09 | End: 2019-06-10

## 2019-06-09 RX ORDER — SODIUM CHLORIDE 0.9 % (FLUSH) 0.9 %
10 SYRINGE (ML) INJECTION EVERY 12 HOURS SCHEDULED
Status: CANCELLED | OUTPATIENT
Start: 2019-06-09

## 2019-06-09 RX ORDER — HEPARIN SODIUM 1000 [USP'U]/ML
60 INJECTION, SOLUTION INTRAVENOUS; SUBCUTANEOUS PRN
Status: DISCONTINUED | OUTPATIENT
Start: 2019-06-09 | End: 2019-06-11

## 2019-06-09 RX ORDER — HEPARIN SODIUM 1000 [USP'U]/ML
60 INJECTION, SOLUTION INTRAVENOUS; SUBCUTANEOUS ONCE
Status: COMPLETED | OUTPATIENT
Start: 2019-06-09 | End: 2019-06-09

## 2019-06-09 RX ORDER — 0.9 % SODIUM CHLORIDE 0.9 %
500 INTRAVENOUS SOLUTION INTRAVENOUS ONCE
Status: COMPLETED | OUTPATIENT
Start: 2019-06-09 | End: 2019-06-09

## 2019-06-09 RX ORDER — SODIUM CHLORIDE 0.9 % (FLUSH) 0.9 %
10 SYRINGE (ML) INJECTION PRN
Status: DISCONTINUED | OUTPATIENT
Start: 2019-06-09 | End: 2019-06-11

## 2019-06-09 RX ADMIN — ASPIRIN 81 MG 324 MG: 81 TABLET ORAL at 17:20

## 2019-06-09 RX ADMIN — MAGNESIUM SULFATE HEPTAHYDRATE 1 G: 1 INJECTION, SOLUTION INTRAVENOUS at 21:48

## 2019-06-09 RX ADMIN — Medication 10 ML: at 21:49

## 2019-06-09 RX ADMIN — ONDANSETRON 4 MG: 2 INJECTION INTRAMUSCULAR; INTRAVENOUS at 23:46

## 2019-06-09 RX ADMIN — TETANUS TOXOID, REDUCED DIPHTHERIA TOXOID AND ACELLULAR PERTUSSIS VACCINE, ADSORBED 0.5 ML: 5; 2.5; 8; 8; 2.5 SUSPENSION INTRAMUSCULAR at 16:48

## 2019-06-09 RX ADMIN — SODIUM CHLORIDE: 9 INJECTION, SOLUTION INTRAVENOUS at 21:48

## 2019-06-09 RX ADMIN — MORPHINE SULFATE 1 MG: 2 INJECTION, SOLUTION INTRAMUSCULAR; INTRAVENOUS at 22:20

## 2019-06-09 RX ADMIN — HEPARIN SODIUM 3790 UNITS: 1000 INJECTION, SOLUTION INTRAVENOUS; SUBCUTANEOUS at 18:38

## 2019-06-09 RX ADMIN — AMIODARONE HYDROCHLORIDE 150 MG: 900 INJECTION, SOLUTION INTRAVENOUS at 19:04

## 2019-06-09 RX ADMIN — AMIODARONE HYDROCHLORIDE 1 MG/MIN: 50 INJECTION, SOLUTION INTRAVENOUS at 19:14

## 2019-06-09 RX ADMIN — HEPARIN SODIUM AND DEXTROSE 12 UNITS/KG/HR: 10000; 5 INJECTION INTRAVENOUS at 19:51

## 2019-06-09 RX ADMIN — HEPARIN SODIUM AND DEXTROSE 12 UNITS/HR: 10000; 5 INJECTION INTRAVENOUS at 18:36

## 2019-06-09 RX ADMIN — MORPHINE SULFATE 4 MG: 4 INJECTION INTRAVENOUS at 16:46

## 2019-06-09 RX ADMIN — POTASSIUM CHLORIDE 40 MEQ: 1.5 POWDER, FOR SOLUTION ORAL at 19:29

## 2019-06-09 RX ADMIN — SODIUM CHLORIDE 500 ML: 9 INJECTION, SOLUTION INTRAVENOUS at 18:14

## 2019-06-09 ASSESSMENT — PAIN DESCRIPTION - LOCATION
LOCATION: GENERALIZED
LOCATION: HEAD

## 2019-06-09 ASSESSMENT — ENCOUNTER SYMPTOMS
CONSTIPATION: 0
RHINORRHEA: 0
COUGH: 0
EYE PAIN: 0
SHORTNESS OF BREATH: 0
ABDOMINAL PAIN: 0
NAUSEA: 0
VOMITING: 0
DIARRHEA: 0

## 2019-06-09 ASSESSMENT — PAIN SCALES - GENERAL
PAINLEVEL_OUTOF10: 6
PAINLEVEL_OUTOF10: 0
PAINLEVEL_OUTOF10: 8
PAINLEVEL_OUTOF10: 6
PAINLEVEL_OUTOF10: 0

## 2019-06-09 ASSESSMENT — PAIN DESCRIPTION - DESCRIPTORS
DESCRIPTORS: ACHING
DESCRIPTORS: ACHING

## 2019-06-09 ASSESSMENT — PAIN DESCRIPTION - PAIN TYPE: TYPE: ACUTE PAIN

## 2019-06-09 NOTE — ED PROVIDER NOTES
[facility]  ED  Emergency Department Encounter  EmergencyMedicine Resident     Pt Name:Stacia Meyers  MRN: [id number]  [place] 1965  Date of evaluation: 6/9/19  PCP:  Jovita Oseguera MD    CHIEF COMPLAINT       Chief Complaint   Patient presents with    Loss of Consciousness       HISTORY OF PRESENT ILLNESS  (Location/Symptom, Timing/Onset, Context/Setting, Quality, Duration, Modifying Factors, Severity.)      Hayley Rodriguez is a 48 y.o. female who presents with multiple syncopal episodes. Patient states first episode was yesterday. Patient states she fell and hit her head and right shoulder. Did not want to be evaluated at that time. Patient then states she had 2 brief episodes today when she was caught by family members. Denies any episodes of syncope in the past.  Was just recently admitted here for an STEMI and left AMA prior to cardiac cath. Does have history of CAD, CHF, hypertension, and MI. States she has been taking her medications as prescribed. Denies being on blood thinners. Denies any numbness or tingling. Complaining of the right side of her head. Denies any changes in vision currently. Also complains of pain to the right shoulder. Denies any chest pain or shortness of breath. Denies any nausea or vomiting. Patient is postmenopausal and no concern for pregnancy. PAST MEDICAL / SURGICAL / SOCIAL / FAMILY HISTORY      has a past medical history of Asthma, CAD (coronary artery disease), Cardiomegaly, CHF [facility]congestive heart failure) ([facility]), COPD (chronic obstructive pulmonary disease) ([facility] [address] Depression, Hypertension, Lesion of right native kidney, MI (myocardial infarction) ([facility] [address] and Unspecified diseases of blood and blood-forming organs. has a past surgical history that includes Cardiac defibrillator placement (09/2005); Pacemaker insertion; Tubal ligation; Cardiac defibrillator placement; and Uterine fibroid surgery ([facility] 2015).     Social History     Socioeconomic History    Marital status: Single     Spouse name: Not on file    Number of children: Not on file    Years of education: Not on file    Highest education level: Not on file   Occupational History    Not on file   Social Needs    Financial resource strain: Not on file    Food insecurity:     Worry: Not on file     Inability: Not on file    Transportation needs:     Medical: Not on file     Non-medical: Not on file   Tobacco Use    Smoking status: Current Some Day Smoker     Packs/day: 0.25     Types: Cigarettes     Last attempt to quit: 2/2/2016     Years since quitting: 3.3    Smokeless tobacco: Never Used    Tobacco comment: resumed smoking  3-4 cigarettes/day   Substance and Sexual Activity    Alcohol use: Yes     Alcohol/week: 0.6 oz     Types: 1 Cans of beer per week    Drug use: No    Sexual activity: Never   Lifestyle    Physical activity:     Days per week: Not on file     Minutes per session: Not on file    Stress: Not on file   Relationships    Social connections:     Talks on phone: Not on file     Gets together: Not on file     Attends Jehovah's witness service: Not on file     Active member of club or organization: Not on file     Attends meetings of clubs or organizations: Not on file     Relationship status: Not on file    Intimate partner violence:     Fear of current or ex partner: Not on file     Emotionally abused: Not on file     Physically abused: Not on file     Forced sexual activity: Not on file   Other Topics Concern    Not on file   Social History Narrative    Not on file       Family History   Problem Relation Age of Onset    Diabetes Mother     High Blood Pressure Mother     Heart Disease Mother     Diabetes Father     Heart Disease Brother        Allergies:  Patient has no known allergies. Home Medications:  Prior to Admission medications    Medication Sig Start Date End Date Taking?  Authorizing Provider   lisinopril (PRINIVIL;ZESTRIL) 10 MG CONSULT TO CARDIOLOGY  IP CONSULT TO CARDIOLOGY  IP CONSULT TO FAMILY MEDICINE  IP CONSULT TO FAMILY MEDICINE  IP CONSULT TO INTERNAL MEDICINE    CRITICAL CARE:  None    FINAL IMPRESSION      1. Ventricular fibrillation ([facility] [address]    2. NSTEMI (non-ST elevated myocardial infarction) ([facility] [address]    3. Syncope and collapse          DISPOSITION / PLAN     DISPOSITION Decision To Admit 06/09/2019 06:19:33 PM      PATIENT REFERRED TO:  No follow-up provider specified.     DISCHARGE MEDICATIONS:  New Prescriptions    No medications on file       Gertrudis Richardson DO  Emergency Medicine Resident    (Please note that portions of thisnote were completed with a voice recognition program.  Efforts were made to edit the dictations but occasionally words are mis-transcribed.)        Gertrudis Richardson DO  06/09/19 5073

## 2019-06-09 NOTE — CONSULTS
5/31/19   Madeline Cleveland MD   carvedilol (COREG) 3.125 MG tablet TAKE 1 TABLET BY MOUTH 2 TIMES A DAY WITH MEALS 5/31/19   Madeline Cleveland MD   spironolactone (ALDACTONE) 25 MG tablet TAKE 1 TABLET BY MOUTH ONE TIME A DAY 5/31/19   Madeline Cleveland MD   amitriptyline (ELAVIL) 25 MG tablet TAKE ONE TABLET BY MOUTH NIGHTLY 3/28/19   Alfreda Navarro MD   sennosides-docusate sodium (SENOKOT-S) 8.6-50 MG tablet Take 1 tablet by mouth daily 12/23/18   Mary Richardson MD   albuterol sulfate HFA (VENTOLIN HFA) 108 (90 Base) MCG/ACT inhaler INHALE 2 PUFFS INTO THE LUNGS EVERY 6 HOURS AS NEEDED FOR WHEEZING 12/7/18   Alfreda Navarro MD   ipratropium-albuterol (DUONEB) 0.5-2.5 (3) MG/3ML SOLN nebulizer solution Inhale 3 mLs into the lungs 2 times daily as needed for Shortness of Breath 11/25/18   Mckinley Hatfield MD   Spacer/Aero-Holding Chambers (E-Z SPACER) ELVER 1 Device by Does not apply route daily 11/25/18   Mckinley Hatfield MD   bumetanide (BUMEX) 1 MG tablet TAKE ONE TABLET BY MOUTH TWICE A DAY 11/25/18   Mckinley Hatfield MD   folic acid-pyridoxine-cyanocobalamine (FOLTX) 2.5-25-1 MG TABS tablet Take 1 tablet by mouth daily 11/25/18   Yodit Villanueva MD   Umeclidinium Bromide (INCRUSE ELLIPTA) 62.5 MCG/INH AEPB INHALE 1 PUFF INTO THE LUNGS DAILY STOP SPIRIVA 11/25/18   Yodit Villanueva MD   Multiple Vitamin (MULTIVITAMIN) tablet TAKE ONE TABLET BY MOUTH ONE TIME A DAY 6/18/18   Maggi Nunez MD   fluticasone [facility]) 50 MCG/ACT nasal spray 1 spray by Nasal route daily 6/15/17   Maggi Nunez MD       Current Medications:   Current Facility-Administered Medications: 0.9 % sodium chloride bolus, 500 mL, Intravenous, Once  heparin 25,000 units in dextrose 5% 250 mL infusion, 12 Units/kg/hr, Intravenous, Continuous  amiodarone (CORDARONE) 150 mg in dextrose 5 % 100 mL bolus, 150 mg, Intravenous, Once **FOLLOWED BY** amiodarone (CORDARONE) 450 mg in dextrose 5 % 250 mL infusion, 1 mg/min, Intravenous, Continuous

## 2019-06-09 NOTE — ED PROVIDER NOTES
[address] [facility]     Emergency Department     Faculty Attestation    I performed a history and physical examination of the patient and discussed management with the resident. I reviewed the residents note and agree with the documented findings and plan of care. Any areas of disagreement are noted on the chart. I was personally present for the key portions of any procedures. I have documented in the chart those procedures where I was not present during the key portions. I have reviewed the emergency nurses triage note. I agree with the chief complaint, past medical history, past surgical history, allergies, medications, social and family history as documented unless otherwise noted below. For Physician Assistant/ Nurse Practitioner cases/documentation I have personally evaluated this patient and have completed at least one if not all key elements of the E/M (history, physical exam, and MDM). Additional findings are as noted. I have personally seen and evaluated the patient. I find the patient's history and physical exam are consistent with the NP/PA documentation. I agree with the care provided, treatment rendered, disposition and follow-up plan. Ultimate syncopal episodes over the last 24 hours. Currently complaining of intermittent chest discomfort    EKG Interpretation    Interpreted by emergency department physician    Rhythm: paced  Rate: normal  Axis: normal  Conduction: normal  ST Segments: no acute change  T Waves: no acute change  Q Waves: no acute change    Clinical Impression:  nonspecific EKG. Critical Care     Deshawn Goss M.D.   Attending Emergency  Physician              Sonia Ramsay MD  06/09/19 0838

## 2019-06-09 NOTE — ED NOTES
Lois Dowell with [employer] at bedside to interrogate pacemaker.       Carmen Clark RN  06/09/19 0829

## 2019-06-10 ENCOUNTER — APPOINTMENT (OUTPATIENT)
Dept: CT IMAGING | Age: 54
DRG: 026 | End: 2019-06-10
Payer: MEDICAID

## 2019-06-10 LAB
-: ABNORMAL
ABSOLUTE EOS #: 0.16 K/UL (ref 0–0.44)
ABSOLUTE IMMATURE GRANULOCYTE: <0.03 K/UL (ref 0–0.3)
ABSOLUTE LYMPH #: 2.27 K/UL (ref 1.1–3.7)
ABSOLUTE MONO #: 0.75 K/UL (ref 0.1–1.2)
AMORPHOUS: ABNORMAL
AMPHETAMINE SCREEN URINE: NEGATIVE
ANION GAP SERPL CALCULATED.3IONS-SCNC: 11 MMOL/L (ref 9–17)
BACTERIA: ABNORMAL
BARBITURATE SCREEN URINE: NEGATIVE
BASOPHILS # BLD: 1 % (ref 0–2)
BASOPHILS ABSOLUTE: 0.06 K/UL (ref 0–0.2)
BENZODIAZEPINE SCREEN, URINE: NEGATIVE
BILIRUBIN URINE: NEGATIVE
BUN BLDV-MCNC: 20 MG/DL (ref 6–20)
BUN/CREAT BLD: ABNORMAL (ref 9–20)
BUPRENORPHINE URINE: ABNORMAL
CALCIUM SERPL-MCNC: 9.6 MG/DL (ref 8.6–10.4)
CANNABINOID SCREEN URINE: NEGATIVE
CASTS UA: ABNORMAL /LPF (ref 0–2)
CHLORIDE BLD-SCNC: 102 MMOL/L (ref 98–107)
CO2: 24 MMOL/L (ref 20–31)
COCAINE METABOLITE, URINE: NEGATIVE
COLOR: YELLOW
COMMENT UA: ABNORMAL
CREAT SERPL-MCNC: 0.93 MG/DL (ref 0.5–0.9)
CRYSTALS, UA: ABNORMAL /HPF
DIFFERENTIAL TYPE: ABNORMAL
EKG ATRIAL RATE: 87 BPM
EKG P AXIS: 62 DEGREES
EKG P-R INTERVAL: 124 MS
EKG Q-T INTERVAL: 458 MS
EKG QRS DURATION: 138 MS
EKG QTC CALCULATION (BAZETT): 551 MS
EKG R AXIS: -84 DEGREES
EKG T AXIS: 67 DEGREES
EKG VENTRICULAR RATE: 87 BPM
EOSINOPHILS RELATIVE PERCENT: 3 % (ref 1–4)
EPITHELIAL CELLS UA: ABNORMAL /HPF (ref 0–5)
GFR AFRICAN AMERICAN: >60 ML/MIN
GFR NON-AFRICAN AMERICAN: >60 ML/MIN
GFR SERPL CREATININE-BSD FRML MDRD: ABNORMAL ML/MIN/{1.73_M2}
GFR SERPL CREATININE-BSD FRML MDRD: ABNORMAL ML/MIN/{1.73_M2}
GLUCOSE BLD-MCNC: 100 MG/DL (ref 70–99)
GLUCOSE URINE: NEGATIVE
HCT VFR BLD CALC: 39.7 % (ref 36.3–47.1)
HCT VFR BLD CALC: 40.5 % (ref 36.3–47.1)
HEMOGLOBIN: 12.6 G/DL (ref 11.9–15.1)
HEMOGLOBIN: 12.8 G/DL (ref 11.9–15.1)
IMMATURE GRANULOCYTES: 0 %
KETONES, URINE: NEGATIVE
LEUKOCYTE ESTERASE, URINE: ABNORMAL
LYMPHOCYTES # BLD: 41 % (ref 24–43)
MAGNESIUM: 2.2 MG/DL (ref 1.6–2.6)
MCH RBC QN AUTO: 29.2 PG (ref 25.2–33.5)
MCH RBC QN AUTO: 29.9 PG (ref 25.2–33.5)
MCHC RBC AUTO-ENTMCNC: 31.6 G/DL (ref 28.4–34.8)
MCHC RBC AUTO-ENTMCNC: 31.7 G/DL (ref 28.4–34.8)
MCV RBC AUTO: 92.5 FL (ref 82.6–102.9)
MCV RBC AUTO: 94.1 FL (ref 82.6–102.9)
MDMA URINE: ABNORMAL
METHADONE SCREEN, URINE: NEGATIVE
METHAMPHETAMINE, URINE: ABNORMAL
MONOCYTES # BLD: 14 % (ref 3–12)
MUCUS: ABNORMAL
NITRITE, URINE: NEGATIVE
NRBC AUTOMATED: 0 PER 100 WBC
NRBC AUTOMATED: 0 PER 100 WBC
OPIATES, URINE: POSITIVE
OTHER OBSERVATIONS UA: ABNORMAL
OXYCODONE SCREEN URINE: NEGATIVE
PARTIAL THROMBOPLASTIN TIME: 27.5 SEC (ref 20.5–30.5)
PARTIAL THROMBOPLASTIN TIME: 47.9 SEC (ref 20.5–30.5)
PARTIAL THROMBOPLASTIN TIME: 55.8 SEC (ref 20.5–30.5)
PARTIAL THROMBOPLASTIN TIME: 73.1 SEC (ref 20.5–30.5)
PDW BLD-RTO: 15 % (ref 11.8–14.4)
PDW BLD-RTO: 15.1 % (ref 11.8–14.4)
PH UA: 5 (ref 5–8)
PHENCYCLIDINE, URINE: NEGATIVE
PLATELET # BLD: 152 K/UL (ref 138–453)
PLATELET # BLD: 153 K/UL (ref 138–453)
PLATELET ESTIMATE: ABNORMAL
PMV BLD AUTO: 12.5 FL (ref 8.1–13.5)
PMV BLD AUTO: 13 FL (ref 8.1–13.5)
POTASSIUM SERPL-SCNC: 4.2 MMOL/L (ref 3.7–5.3)
PROPOXYPHENE, URINE: ABNORMAL
PROTEIN UA: NEGATIVE
RBC # BLD: 4.22 M/UL (ref 3.95–5.11)
RBC # BLD: 4.38 M/UL (ref 3.95–5.11)
RBC # BLD: ABNORMAL 10*6/UL
RBC UA: ABNORMAL /HPF (ref 0–2)
RENAL EPITHELIAL, UA: ABNORMAL /HPF
SEG NEUTROPHILS: 41 % (ref 36–65)
SEGMENTED NEUTROPHILS ABSOLUTE COUNT: 2.27 K/UL (ref 1.5–8.1)
SODIUM BLD-SCNC: 137 MMOL/L (ref 135–144)
SPECIFIC GRAVITY UA: 1.02 (ref 1–1.03)
TEST INFORMATION: ABNORMAL
TRICHOMONAS: ABNORMAL
TRICYCLIC ANTIDEPRESSANTS, UR: ABNORMAL
TROPONIN INTERP: ABNORMAL
TROPONIN INTERP: ABNORMAL
TROPONIN T: ABNORMAL NG/ML
TROPONIN T: ABNORMAL NG/ML
TROPONIN, HIGH SENSITIVITY: 61 NG/L (ref 0–14)
TROPONIN, HIGH SENSITIVITY: 95 NG/L (ref 0–14)
TURBIDITY: CLEAR
URINE HGB: NEGATIVE
UROBILINOGEN, URINE: NORMAL
WBC # BLD: 5.5 K/UL (ref 3.5–11.3)
WBC # BLD: 6.3 K/UL (ref 3.5–11.3)
WBC # BLD: ABNORMAL 10*3/UL
WBC UA: ABNORMAL /HPF (ref 0–5)
YEAST: ABNORMAL

## 2019-06-10 PROCEDURE — 2060000000 HC ICU INTERMEDIATE R&B

## 2019-06-10 PROCEDURE — 85730 THROMBOPLASTIN TIME PARTIAL: CPT

## 2019-06-10 PROCEDURE — 2580000003 HC RX 258: Performed by: STUDENT IN AN ORGANIZED HEALTH CARE EDUCATION/TRAINING PROGRAM

## 2019-06-10 PROCEDURE — 6360000002 HC RX W HCPCS: Performed by: STUDENT IN AN ORGANIZED HEALTH CARE EDUCATION/TRAINING PROGRAM

## 2019-06-10 PROCEDURE — 99221 1ST HOSP IP/OBS SF/LOW 40: CPT | Performed by: NEUROLOGICAL SURGERY

## 2019-06-10 PROCEDURE — 97166 OT EVAL MOD COMPLEX 45 MIN: CPT

## 2019-06-10 PROCEDURE — 99233 SBSQ HOSP IP/OBS HIGH 50: CPT | Performed by: INTERNAL MEDICINE

## 2019-06-10 PROCEDURE — 6360000002 HC RX W HCPCS: Performed by: INTERNAL MEDICINE

## 2019-06-10 PROCEDURE — 85025 COMPLETE CBC W/AUTO DIFF WBC: CPT

## 2019-06-10 PROCEDURE — 83735 ASSAY OF MAGNESIUM: CPT

## 2019-06-10 PROCEDURE — 36415 COLL VENOUS BLD VENIPUNCTURE: CPT

## 2019-06-10 PROCEDURE — 93010 ELECTROCARDIOGRAM REPORT: CPT | Performed by: INTERNAL MEDICINE

## 2019-06-10 PROCEDURE — 93005 ELECTROCARDIOGRAM TRACING: CPT | Performed by: STUDENT IN AN ORGANIZED HEALTH CARE EDUCATION/TRAINING PROGRAM

## 2019-06-10 PROCEDURE — 85027 COMPLETE CBC AUTOMATED: CPT

## 2019-06-10 PROCEDURE — 6370000000 HC RX 637 (ALT 250 FOR IP): Performed by: STUDENT IN AN ORGANIZED HEALTH CARE EDUCATION/TRAINING PROGRAM

## 2019-06-10 PROCEDURE — 84484 ASSAY OF TROPONIN QUANT: CPT

## 2019-06-10 PROCEDURE — 80048 BASIC METABOLIC PNL TOTAL CA: CPT

## 2019-06-10 RX ORDER — HEPARIN SODIUM 10000 [USP'U]/100ML
12 INJECTION, SOLUTION INTRAVENOUS CONTINUOUS
Status: DISCONTINUED | OUTPATIENT
Start: 2019-06-10 | End: 2019-06-11

## 2019-06-10 RX ORDER — ACETAMINOPHEN 325 MG/1
650 TABLET ORAL EVERY 6 HOURS PRN
Status: DISCONTINUED | OUTPATIENT
Start: 2019-06-10 | End: 2019-06-11

## 2019-06-10 RX ADMIN — HEPARIN SODIUM AND DEXTROSE 16 UNITS/KG/HR: 10000; 5 INJECTION INTRAVENOUS at 01:40

## 2019-06-10 RX ADMIN — MORPHINE SULFATE 1 MG: 2 INJECTION, SOLUTION INTRAMUSCULAR; INTRAVENOUS at 20:17

## 2019-06-10 RX ADMIN — Medication 1 TABLET: at 08:46

## 2019-06-10 RX ADMIN — AMIODARONE HYDROCHLORIDE 0.5 MG/MIN: 50 INJECTION, SOLUTION INTRAVENOUS at 07:09

## 2019-06-10 RX ADMIN — MORPHINE SULFATE 1 MG: 2 INJECTION, SOLUTION INTRAMUSCULAR; INTRAVENOUS at 15:51

## 2019-06-10 RX ADMIN — Medication 10 ML: at 20:17

## 2019-06-10 RX ADMIN — Medication 10 ML: at 08:47

## 2019-06-10 RX ADMIN — HEPARIN SODIUM 3790 UNITS: 1000 INJECTION, SOLUTION INTRAVENOUS; SUBCUTANEOUS at 01:39

## 2019-06-10 RX ADMIN — HEPARIN SODIUM 1890 UNITS: 1000 INJECTION, SOLUTION INTRAVENOUS; SUBCUTANEOUS at 15:30

## 2019-06-10 RX ADMIN — HEPARIN SODIUM AND DEXTROSE 14 UNITS/KG/HR: 10000; 5 INJECTION INTRAVENOUS at 08:39

## 2019-06-10 RX ADMIN — AMIODARONE HYDROCHLORIDE 0.5 MG/MIN: 50 INJECTION, SOLUTION INTRAVENOUS at 22:05

## 2019-06-10 RX ADMIN — POTASSIUM CHLORIDE 40 MEQ: 1500 TABLET, EXTENDED RELEASE ORAL at 08:46

## 2019-06-10 RX ADMIN — CARVEDILOL 3.12 MG: 3.12 TABLET, FILM COATED ORAL at 17:38

## 2019-06-10 RX ADMIN — FLUTICASONE PROPIONATE 1 SPRAY: 50 SPRAY, METERED NASAL at 08:48

## 2019-06-10 ASSESSMENT — PAIN DESCRIPTION - LOCATION
LOCATION: HEAD
LOCATION: HEAD

## 2019-06-10 ASSESSMENT — PAIN - FUNCTIONAL ASSESSMENT: PAIN_FUNCTIONAL_ASSESSMENT: ACTIVITIES ARE NOT PREVENTED

## 2019-06-10 ASSESSMENT — PAIN DESCRIPTION - FREQUENCY: FREQUENCY: CONTINUOUS

## 2019-06-10 ASSESSMENT — PAIN DESCRIPTION - PAIN TYPE
TYPE: ACUTE PAIN
TYPE: ACUTE PAIN

## 2019-06-10 ASSESSMENT — PAIN SCALES - GENERAL
PAINLEVEL_OUTOF10: 8
PAINLEVEL_OUTOF10: 0
PAINLEVEL_OUTOF10: 8
PAINLEVEL_OUTOF10: 3
PAINLEVEL_OUTOF10: 7
PAINLEVEL_OUTOF10: 8

## 2019-06-10 ASSESSMENT — PAIN DESCRIPTION - DESCRIPTORS: DESCRIPTORS: ACHING

## 2019-06-10 ASSESSMENT — PAIN DESCRIPTION - PROGRESSION: CLINICAL_PROGRESSION: NOT CHANGED

## 2019-06-10 NOTE — CONSULTS
[facility]  In Patient Cardiology Consult             Cardiology   Consult Note          History Obtained From:  patient, electronic medical record    HISTORY OF PRESENT ILLNESS:      The patient is a 48 y.o. female seen for v fib episodes with appropriate shocks from AICD. 44-year-old -American female with significant past history of nonischemic cardiomyopathy status post dual-chamber ICD and severe mitral regurgitation presented with multiple syncopal episodes since last 3-4 days. In one of these episodes she fell and hit her head and right side of shoulder sustained small subarachnoid bleed. On arrival in ED AICD interrogation was done which showed about 6 episodes of V. fib with appropriate shocks delivered and about 123 episodes of NSVT's. Neurosurgery was okay with starting heparin. Cardiology was consulted and patient was started on heparin and amiodarone drip. Also noticed with prolonged QTC of 615. On last admission plan was to do cardiac cath but patient left AMA. Cardiac Testing:     ICD -- 6 V. fib episodes with appropriate shocks delivered. 123 and SVT episodes.     ECHO 4/24/18: EF 10-15%. Lead noted in right sided chambers. Severe MR. Mod TR. RVSP 48.      STRESS 10/10/14: Mod size fixed defect involving the apical to mid inferior wall of the LV. EF 42%      CATH 9/19/11: Normal coronaries. EF 30%.    1. History of chronic smoking and I did  her regarding stopping smoking. 2. Hypertension. 3. Severe dilated cardiomyopathy, status post AICD placement.    4. History of alcoholism and she said that she stopped drinking alcohol a few weeks back.         Past Medical History:    Past Medical History:   Diagnosis Date    Asthma     CAD (coronary artery disease)     ICD    Cardiomegaly     CHF (congestive heart failure) (HCC)     COPD (chronic obstructive pulmonary disease) (HCC)     Depression     Hypertension     Lesion of right native kidney INHALE 1 PUFF INTO THE LUNGS DAILY STOP SPIRIVA 11/25/18   Yodit Faulkner MD   Multiple Vitamin (MULTIVITAMIN) tablet TAKE ONE TABLET BY MOUTH ONE TIME A DAY 6/18/18   Ridge Heck MD   fluticasone [facility]) 50 MCG/ACT nasal spray 1 spray by Nasal route daily 6/15/17   Ridge Heck MD     Current Inpatient Medications:   potassium chloride  40 mEq Intravenous Once    sodium chloride flush  10 mL Intravenous 2 times per day    carvedilol  3.125 mg Oral BID WC    fluticasone  1 spray Nasal Daily    folbee plus  1 tablet Oral Daily    lisinopril  10 mg Oral Daily    spironolactone  25 mg Oral Daily    potassium chloride  40 mEq Oral BID WC     Allergies:  Patient has no known allergies. Social History:    Social History     Socioeconomic History    Marital status: Single     Spouse name: Not on file    Number of children: Not on file    Years of education: Not on file    Highest education level: Not on file   Occupational History    Not on file   Social Needs    Financial resource strain: Not on file    Food insecurity:     Worry: Not on file     Inability: Not on file    Transportation needs:     Medical: Not on file     Non-medical: Not on file   Tobacco Use    Smoking status: Current Some Day Smoker     Packs/day: 0.25     Types: Cigarettes     Last attempt to quit: 2/2/2016     Years since quitting: 3.3    Smokeless tobacco: Never Used    Tobacco comment: resumed smoking  3-4 cigarettes/day   Substance and Sexual Activity    Alcohol use:  Yes     Alcohol/week: 0.6 oz     Types: 1 Cans of beer per week    Drug use: No    Sexual activity: Never   Lifestyle    Physical activity:     Days per week: Not on file     Minutes per session: Not on file    Stress: Not on file   Relationships    Social connections:     Talks on phone: Not on file     Gets together: Not on file     Attends Muslim service: Not on file     Active member of club or organization: Not on file     Attends

## 2019-06-10 NOTE — PROGRESS NOTES
Physical Therapy    Facility/Department: [facility]  Initial Assessment    NAME: Blanca Henderson  : 1965  MRN: [id number]    Date of Service: 6/10/2019  Chief Complaint   Patient presents with    Loss of Consciousness     Per chart the patient is a 48 y.o.  female who is admitted to the hospital for multiple syncopal episode. She does have history of nonischemic cardiomyopathy, dual-chamber ICD. From last 3 days patient had multiple syncopal episode. Has history of fall because of syncopal episode. She hit her head and right shoulder. A she was recently admitted under family practice patient left AMA. Cardiology was consulted at that time and the plan was to do cardiac cath. Last cath was done in . Patient had dual-chamber AICD and AICD interrogation was done in ER which showed multiple episode of nonsustained V. tach/V. fib. The patient was shocked appropriately. Cardiology was consulted and they recommended starting heparin and lidocaine as patient had prolonged QTC after that cardiology decided to start patient on heparin and amiodarone. CT head was done which showed layering densities in left parietal sulcus and sending for hemorrhage. Neurology was consulted in ER. They recommended continuing heparin and repeat CT head 6 hrs after pTT is therapeutic. She denies any chest pain, shortness of breath, nausea, vomiting, diaphoresis. Last echo was done and 2018 and it showed ejection fraction of 10-15% and severe mitral regurgitation. Discharge Recommendations:  Patient would benefit from continued therapy after discharge   PT Equipment Recommendations  Equipment Needed: (TBD)    Assessment   Body structures, Functions, Activity limitations: Decreased functional mobility ; Decreased safe awareness;Decreased balance;Decreased ADL status; Decreased endurance;Decreased high-level IADLs;Decreased strength  Assessment: Patient currently completes bed mobility with SBA.  Patient was not

## 2019-06-10 NOTE — PROGRESS NOTES
Smoking Cessation - topics covered   []  Health Risks  []  Benefits of Quitting   []  Smoking Cessation  []  Patient has no history of tobacco use  []  Patient is former smoker. []  No need for tobacco cessation education. []  Booklet given  [x]  Patient verbalizes understanding. [x]  Patient denies need for tobacco cessation education. []  Unable to meet with patient today. Will follow up as able.   Martine Bassett  3:34 PM

## 2019-06-10 NOTE — H&P
[address] [employer]    HISTORY AND PHYSICAL EXAMINATION            Date:   6/9/2019  Patient name:  Christy Najera  Date of admission:  6/9/2019  4:17 PM  MRN:   [id number]  YOB: 1965    CHIEF COMPLAINT     Chief Complaint   Patient presents with    Loss of Consciousness       HISTORY OF PRESENT ILLNESS      The patient is a 48 y.o.  female who is admitted to the hospital for multiple syncopal episode. She does have history of nonischemic cardiomyopathy, dual-chamber ICD. From last 3 days patient had multiple syncopal episode. Has history of fall because of syncopal episode. She hit her head and right shoulder. A she was recently admitted under family practice patient left AMA. Cardiology was consulted at that time and the plan was to do cardiac cath. Last cath was done in 2011. Patient had dual-chamber AICD and AICD interrogation was done in ER which showed multiple episode of nonsustained V. tach/V. fib. The patient was shocked appropriately. Cardiology was consulted and they recommended starting heparin and lidocaine as patient had prolonged QTC after that cardiology decided to start patient on heparin and amiodarone. CT head was done which showed layering densities in left parietal sulcus and sending for hemorrhage. Neurology was consulted in ER. They recommended continuing heparin and repeat CT head 6 hrs after pTT is therapeutic. She denies any chest pain, shortness of breath, nausea, vomiting, diaphoresis. Last echo was done and 2018 and it showed ejection fraction of 10-15% and severe mitral regurgitation.     PAST MEDICAL HISTORY       has a past medical history of Asthma, CAD (coronary artery disease), Cardiomegaly, CHF (congestive heart failure) ([address] COPD (chronic obstructive pulmonary disease) ([address] Depression, Hypertension, Lesion of right native kidney, MI (myocardial infarction) ([address] and Unspecified diseases of blood and blood-forming organs. PAST SURGICAL HISTORY       has a past surgical history that includes Cardiac defibrillator placement (09/2005); Pacemaker insertion; Tubal ligation; Cardiac defibrillator placement; and Uterine fibroid surgery (maarch 2015). HOME MEDICATIONS        Prior to Admission medications    Medication Sig Start Date End Date Taking?  Authorizing Provider   lisinopril (PRINIVIL;ZESTRIL) 10 MG tablet TAKE 1 TABLET BY MOUTH 1 TIME A DAY. 5/31/19   Chan Haque MD   omeprazole (PRILOSEC) 20 MG delayed release capsule TAKE ONE CAPSULE BY MOUTH EVERY MORNING BEFORE BREAKFAST 5/31/19   Chan Haque MD   carvedilol (COREG) 3.125 MG tablet TAKE 1 TABLET BY MOUTH 2 TIMES A DAY WITH MEALS 5/31/19   Claudia Myles MD   spironolactone (ALDACTONE) 25 MG tablet TAKE 1 TABLET BY MOUTH ONE TIME A DAY 5/31/19   Chan Haque MD   amitriptyline (ELAVIL) 25 MG tablet TAKE ONE TABLET BY MOUTH NIGHTLY 3/28/19   Xochitl Parikh MD   sennosides-docusate sodium (SENOKOT-S) 8.6-50 MG tablet Take 1 tablet by mouth daily 12/23/18   Ulisses Soto MD   albuterol sulfate HFA (VENTOLIN HFA) 108 (90 Base) MCG/ACT inhaler INHALE 2 PUFFS INTO THE LUNGS EVERY 6 HOURS AS NEEDED FOR WHEEZING 12/7/18   Xochitl Parikh MD   ipratropium-albuterol (DUONEB) 0.5-2.5 (3) MG/3ML SOLN nebulizer solution Inhale 3 mLs into the lungs 2 times daily as needed for Shortness of Breath 11/25/18   Christian Berrios MD   Spacer/Aero-Holding Chambers (E-Z SPACER) ELVER 1 Device by Does not apply route daily 11/25/18   Christian Berrios MD   bumetanide (BUMEX) 1 MG tablet TAKE ONE TABLET BY MOUTH TWICE A DAY 11/25/18   Christian Berrios MD   folic acid-pyridoxine-cyanocobalamine (FOLTX) 2.5-25-1 MG TABS tablet Take 1 tablet by mouth daily 11/25/18   Yodit Li MD   Umeclidinium Bromide (INCRUSE ELLIPTA) 62.5 MCG/INH AEPB INHALE 1 PUFF INTO THE LUNGS DAILY STOP SPIRIVA 11/25/18   Christian Berrios MD

## 2019-06-11 ENCOUNTER — APPOINTMENT (OUTPATIENT)
Dept: CT IMAGING | Age: 54
DRG: 026 | End: 2019-06-11
Payer: MEDICAID

## 2019-06-11 ENCOUNTER — APPOINTMENT (OUTPATIENT)
Dept: CARDIAC CATH/INVASIVE PROCEDURES | Age: 54
DRG: 026 | End: 2019-06-11
Payer: MEDICAID

## 2019-06-11 LAB
ANION GAP SERPL CALCULATED.3IONS-SCNC: 13 MMOL/L (ref 9–17)
BUN BLDV-MCNC: 20 MG/DL (ref 6–20)
BUN/CREAT BLD: ABNORMAL (ref 9–20)
CALCIUM SERPL-MCNC: 9.4 MG/DL (ref 8.6–10.4)
CHLORIDE BLD-SCNC: 98 MMOL/L (ref 98–107)
CO2: 20 MMOL/L (ref 20–31)
CREAT SERPL-MCNC: 0.82 MG/DL (ref 0.5–0.9)
CULTURE: NORMAL
EKG ATRIAL RATE: 63 BPM
EKG P AXIS: -2 DEGREES
EKG P-R INTERVAL: 132 MS
EKG Q-T INTERVAL: 554 MS
EKG QRS DURATION: 134 MS
EKG QTC CALCULATION (BAZETT): 566 MS
EKG R AXIS: -86 DEGREES
EKG T AXIS: 45 DEGREES
EKG VENTRICULAR RATE: 63 BPM
GFR AFRICAN AMERICAN: >60 ML/MIN
GFR NON-AFRICAN AMERICAN: >60 ML/MIN
GFR SERPL CREATININE-BSD FRML MDRD: ABNORMAL ML/MIN/{1.73_M2}
GFR SERPL CREATININE-BSD FRML MDRD: ABNORMAL ML/MIN/{1.73_M2}
GLUCOSE BLD-MCNC: 97 MG/DL (ref 70–99)
Lab: NORMAL
PARTIAL THROMBOPLASTIN TIME: 24.8 SEC (ref 20.5–30.5)
PARTIAL THROMBOPLASTIN TIME: 48 SEC (ref 20.5–30.5)
PARTIAL THROMBOPLASTIN TIME: 55.7 SEC (ref 20.5–30.5)
PLATELET # BLD: 140 K/UL (ref 138–453)
POTASSIUM SERPL-SCNC: 4.4 MMOL/L (ref 3.7–5.3)
SODIUM BLD-SCNC: 131 MMOL/L (ref 135–144)
SPECIMEN DESCRIPTION: NORMAL

## 2019-06-11 PROCEDURE — 85049 AUTOMATED PLATELET COUNT: CPT

## 2019-06-11 PROCEDURE — 6360000002 HC RX W HCPCS: Performed by: STUDENT IN AN ORGANIZED HEALTH CARE EDUCATION/TRAINING PROGRAM

## 2019-06-11 PROCEDURE — 6370000000 HC RX 637 (ALT 250 FOR IP): Performed by: STUDENT IN AN ORGANIZED HEALTH CARE EDUCATION/TRAINING PROGRAM

## 2019-06-11 PROCEDURE — 4A023N8 MEASUREMENT OF CARDIAC SAMPLING AND PRESSURE, BILATERAL, PERCUTANEOUS APPROACH: ICD-10-PCS | Performed by: INTERNAL MEDICINE

## 2019-06-11 PROCEDURE — B2111ZZ FLUOROSCOPY OF MULTIPLE CORONARY ARTERIES USING LOW OSMOLAR CONTRAST: ICD-10-PCS | Performed by: INTERNAL MEDICINE

## 2019-06-11 PROCEDURE — 6360000002 HC RX W HCPCS: Performed by: INTERNAL MEDICINE

## 2019-06-11 PROCEDURE — 85730 THROMBOPLASTIN TIME PARTIAL: CPT

## 2019-06-11 PROCEDURE — APPSS15 APP SPLIT SHARED TIME 0-15 MINUTES: Performed by: NURSE PRACTITIONER

## 2019-06-11 PROCEDURE — 2580000003 HC RX 258: Performed by: STUDENT IN AN ORGANIZED HEALTH CARE EDUCATION/TRAINING PROGRAM

## 2019-06-11 PROCEDURE — 6360000004 HC RX CONTRAST MEDICATION

## 2019-06-11 PROCEDURE — 93454 CORONARY ARTERY ANGIO S&I: CPT | Performed by: INTERNAL MEDICINE

## 2019-06-11 PROCEDURE — 99232 SBSQ HOSP IP/OBS MODERATE 35: CPT | Performed by: INTERNAL MEDICINE

## 2019-06-11 PROCEDURE — 2580000003 HC RX 258: Performed by: INTERNAL MEDICINE

## 2019-06-11 PROCEDURE — 93010 ELECTROCARDIOGRAM REPORT: CPT | Performed by: INTERNAL MEDICINE

## 2019-06-11 PROCEDURE — C1894 INTRO/SHEATH, NON-LASER: HCPCS

## 2019-06-11 PROCEDURE — B2131ZZ FLUOROSCOPY OF MULTIPLE CORONARY ARTERY BYPASS GRAFTS USING LOW OSMOLAR CONTRAST: ICD-10-PCS | Performed by: INTERNAL MEDICINE

## 2019-06-11 PROCEDURE — 2709999900 HC NON-CHARGEABLE SUPPLY

## 2019-06-11 PROCEDURE — 80048 BASIC METABOLIC PNL TOTAL CA: CPT

## 2019-06-11 PROCEDURE — 36415 COLL VENOUS BLD VENIPUNCTURE: CPT

## 2019-06-11 PROCEDURE — 2500000003 HC RX 250 WO HCPCS

## 2019-06-11 PROCEDURE — 6360000002 HC RX W HCPCS

## 2019-06-11 PROCEDURE — 70450 CT HEAD/BRAIN W/O DYE: CPT

## 2019-06-11 PROCEDURE — 93458 L HRT ARTERY/VENTRICLE ANGIO: CPT | Performed by: INTERNAL MEDICINE

## 2019-06-11 PROCEDURE — C1887 CATHETER, GUIDING: HCPCS

## 2019-06-11 PROCEDURE — 2060000000 HC ICU INTERMEDIATE R&B

## 2019-06-11 RX ORDER — ATORVASTATIN CALCIUM 40 MG/1
40 TABLET, FILM COATED ORAL NIGHTLY
Status: DISCONTINUED | OUTPATIENT
Start: 2019-06-11 | End: 2019-06-14 | Stop reason: HOSPADM

## 2019-06-11 RX ORDER — SODIUM CHLORIDE 9 MG/ML
INJECTION, SOLUTION INTRAVENOUS CONTINUOUS
Status: DISCONTINUED | OUTPATIENT
Start: 2019-06-11 | End: 2019-06-12

## 2019-06-11 RX ORDER — ONDANSETRON 2 MG/ML
4 INJECTION INTRAMUSCULAR; INTRAVENOUS EVERY 6 HOURS PRN
Status: DISCONTINUED | OUTPATIENT
Start: 2019-06-11 | End: 2019-06-14 | Stop reason: HOSPADM

## 2019-06-11 RX ORDER — SODIUM CHLORIDE 0.9 % (FLUSH) 0.9 %
10 SYRINGE (ML) INJECTION PRN
Status: DISCONTINUED | OUTPATIENT
Start: 2019-06-11 | End: 2019-06-14 | Stop reason: HOSPADM

## 2019-06-11 RX ORDER — MAGNESIUM SULFATE 1 G/100ML
1 INJECTION INTRAVENOUS ONCE
Status: COMPLETED | OUTPATIENT
Start: 2019-06-11 | End: 2019-06-11

## 2019-06-11 RX ORDER — SODIUM CHLORIDE 0.9 % (FLUSH) 0.9 %
10 SYRINGE (ML) INJECTION EVERY 12 HOURS SCHEDULED
Status: DISCONTINUED | OUTPATIENT
Start: 2019-06-11 | End: 2019-06-14 | Stop reason: HOSPADM

## 2019-06-11 RX ORDER — ACETAMINOPHEN 325 MG/1
650 TABLET ORAL EVERY 4 HOURS PRN
Status: DISCONTINUED | OUTPATIENT
Start: 2019-06-11 | End: 2019-06-14 | Stop reason: HOSPADM

## 2019-06-11 RX ADMIN — MAGNESIUM SULFATE HEPTAHYDRATE 1 G: 1 INJECTION, SOLUTION INTRAVENOUS at 16:11

## 2019-06-11 RX ADMIN — LISINOPRIL 10 MG: 10 TABLET ORAL at 09:11

## 2019-06-11 RX ADMIN — CARVEDILOL 3.12 MG: 3.12 TABLET, FILM COATED ORAL at 09:10

## 2019-06-11 RX ADMIN — AMIODARONE HYDROCHLORIDE 0.5 MG/MIN: 50 INJECTION, SOLUTION INTRAVENOUS at 16:12

## 2019-06-11 RX ADMIN — MORPHINE SULFATE 1 MG: 2 INJECTION, SOLUTION INTRAMUSCULAR; INTRAVENOUS at 00:03

## 2019-06-11 RX ADMIN — SODIUM CHLORIDE: 9 INJECTION, SOLUTION INTRAVENOUS at 21:23

## 2019-06-11 RX ADMIN — FLUTICASONE PROPIONATE 1 SPRAY: 50 SPRAY, METERED NASAL at 09:11

## 2019-06-11 RX ADMIN — CARVEDILOL 3.12 MG: 3.12 TABLET, FILM COATED ORAL at 16:15

## 2019-06-11 RX ADMIN — HEPARIN SODIUM AND DEXTROSE 18.06 UNITS/KG/HR: 10000; 5 INJECTION INTRAVENOUS at 16:12

## 2019-06-11 RX ADMIN — DESMOPRESSIN ACETATE 40 MG: 0.2 TABLET ORAL at 21:23

## 2019-06-11 RX ADMIN — SPIRONOLACTONE 25 MG: 25 TABLET ORAL at 09:11

## 2019-06-11 RX ADMIN — HEPARIN SODIUM 1890 UNITS: 1000 INJECTION, SOLUTION INTRAVENOUS; SUBCUTANEOUS at 07:24

## 2019-06-11 RX ADMIN — Medication 1 TABLET: at 09:10

## 2019-06-11 ASSESSMENT — PAIN SCALES - GENERAL
PAINLEVEL_OUTOF10: 0
PAINLEVEL_OUTOF10: 9
PAINLEVEL_OUTOF10: 3

## 2019-06-11 NOTE — PROGRESS NOTES
[facility].     Neurosurgery Service      Progress Note      PTT this morning at 6:05 was supra therapeutic at 48    CT  head images 6/11/ 19 reviewed today  No convincing evidence of intracranial hemorrhage  Okay to proceed with heart catheterization per cardiology              Partha Lawson .  [facility]  Neurosurgery    Cell : Cell [phone]  pager [phone]

## 2019-06-11 NOTE — PROGRESS NOTES
11/25/18   Yodit Nassar MD   bumetanide (BUMEX) 1 MG tablet TAKE ONE TABLET BY MOUTH TWICE A DAY 11/25/18   Russell Hernandez MD   folic acid-pyridoxine-cyanocobalamine (FOLTX) 2.5-25-1 MG TABS tablet Take 1 tablet by mouth daily 11/25/18   Yodit Nassar MD   Umeclidinium Bromide (INCRUSE ELLIPTA) 62.5 MCG/INH AEPB INHALE 1 PUFF INTO THE LUNGS DAILY STOP SPIRIVA 11/25/18   Yodit Nassar MD   Multiple Vitamin (MULTIVITAMIN) tablet TAKE ONE TABLET BY MOUTH ONE TIME A DAY 6/18/18   Elaine Tenorio MD   fluticasone [facility]) 50 MCG/ACT nasal spray 1 spray by Nasal route daily 6/15/17   Elaine Tenorio MD       ALLERGIES      Patient has no known allergies. SOCIAL HISTORY       reports that she has been smoking cigarettes. She has been smoking about 0.25 packs per day. She has never used smokeless tobacco. She reports that she drinks about 0.6 oz of alcohol per week. She reports that she does not use drugs. FAMILY HISTORY      family history includes Diabetes in her father and mother; Heart Disease in her brother and mother; High Blood Pressure in her mother. REVIEW OF SYSTEMS      · Constitutional: Negative for Fever, chills  · Eyes: Negative for visual changes, diplopia  · ENT: Negative for mouth sores, sore throat. · Cardiovascular: Negative for lightheadedness ,chest pain, palpitations   · Respiratory:Negative for Shortness of breath,cough or wheezing. · Gastrointestinal: Negative for nausea/vomiting, change in bowel habits, abdominal pain  · Genitourinary:Negative for change in bladder habits, dysuria, trouble voiding, hematuria.   · Musculoskeletal: Negative for joint pain   · Neurological: Multiple syncopal episodes   · Psychiatric: negative for change in mood, affect    PHYSICAL EXAM      BP 95/73   Pulse 67   Temp 97.5 °F (36.4 °C) (Oral)   Resp 20   Ht 5' 2\" (1.575 m)   Wt 158 lb 9.6 oz (71.9 kg)   LMP  (Approximate)   SpO2 99%   BMI 29.01 kg/m²      · General appearance: well

## 2019-06-12 LAB
ABSOLUTE EOS #: 0.08 K/UL (ref 0–0.44)
ABSOLUTE IMMATURE GRANULOCYTE: <0.03 K/UL (ref 0–0.3)
ABSOLUTE LYMPH #: 1.63 K/UL (ref 1.1–3.7)
ABSOLUTE MONO #: 0.69 K/UL (ref 0.1–1.2)
ANION GAP SERPL CALCULATED.3IONS-SCNC: 13 MMOL/L (ref 9–17)
BASOPHILS # BLD: 1 % (ref 0–2)
BASOPHILS ABSOLUTE: 0.03 K/UL (ref 0–0.2)
BUN BLDV-MCNC: 17 MG/DL (ref 6–20)
BUN/CREAT BLD: ABNORMAL (ref 9–20)
CALCIUM SERPL-MCNC: 9.3 MG/DL (ref 8.6–10.4)
CHLORIDE BLD-SCNC: 103 MMOL/L (ref 98–107)
CO2: 21 MMOL/L (ref 20–31)
CREAT SERPL-MCNC: 0.87 MG/DL (ref 0.5–0.9)
DIFFERENTIAL TYPE: ABNORMAL
EOSINOPHILS RELATIVE PERCENT: 2 % (ref 1–4)
GFR AFRICAN AMERICAN: >60 ML/MIN
GFR NON-AFRICAN AMERICAN: >60 ML/MIN
GFR SERPL CREATININE-BSD FRML MDRD: ABNORMAL ML/MIN/{1.73_M2}
GFR SERPL CREATININE-BSD FRML MDRD: ABNORMAL ML/MIN/{1.73_M2}
GLUCOSE BLD-MCNC: 100 MG/DL (ref 70–99)
HCT VFR BLD CALC: 36.5 % (ref 36.3–47.1)
HEMOGLOBIN: 11.3 G/DL (ref 11.9–15.1)
IMMATURE GRANULOCYTES: 0 %
LYMPHOCYTES # BLD: 31 % (ref 24–43)
MCH RBC QN AUTO: 28.6 PG (ref 25.2–33.5)
MCHC RBC AUTO-ENTMCNC: 31 G/DL (ref 28.4–34.8)
MCV RBC AUTO: 92.4 FL (ref 82.6–102.9)
MONOCYTES # BLD: 13 % (ref 3–12)
NRBC AUTOMATED: 0 PER 100 WBC
PDW BLD-RTO: 14.7 % (ref 11.8–14.4)
PLATELET # BLD: ABNORMAL K/UL (ref 138–453)
PLATELET ESTIMATE: ABNORMAL
PLATELET, FLUORESCENCE: 134 K/UL (ref 138–453)
PLATELET, IMMATURE FRACTION: 11.8 % (ref 1.1–10.3)
PMV BLD AUTO: ABNORMAL FL (ref 8.1–13.5)
POTASSIUM SERPL-SCNC: 4.3 MMOL/L (ref 3.7–5.3)
RBC # BLD: 3.95 M/UL (ref 3.95–5.11)
RBC # BLD: ABNORMAL 10*6/UL
SEG NEUTROPHILS: 54 % (ref 36–65)
SEGMENTED NEUTROPHILS ABSOLUTE COUNT: 2.83 K/UL (ref 1.5–8.1)
SODIUM BLD-SCNC: 137 MMOL/L (ref 135–144)
WBC # BLD: 5.3 K/UL (ref 3.5–11.3)
WBC # BLD: ABNORMAL 10*3/UL

## 2019-06-12 PROCEDURE — 6370000000 HC RX 637 (ALT 250 FOR IP): Performed by: STUDENT IN AN ORGANIZED HEALTH CARE EDUCATION/TRAINING PROGRAM

## 2019-06-12 PROCEDURE — 85025 COMPLETE CBC W/AUTO DIFF WBC: CPT

## 2019-06-12 PROCEDURE — 97535 SELF CARE MNGMENT TRAINING: CPT

## 2019-06-12 PROCEDURE — 6360000002 HC RX W HCPCS: Performed by: STUDENT IN AN ORGANIZED HEALTH CARE EDUCATION/TRAINING PROGRAM

## 2019-06-12 PROCEDURE — 85055 RETICULATED PLATELET ASSAY: CPT

## 2019-06-12 PROCEDURE — 6370000000 HC RX 637 (ALT 250 FOR IP): Performed by: INTERNAL MEDICINE

## 2019-06-12 PROCEDURE — 94640 AIRWAY INHALATION TREATMENT: CPT

## 2019-06-12 PROCEDURE — 2580000003 HC RX 258: Performed by: STUDENT IN AN ORGANIZED HEALTH CARE EDUCATION/TRAINING PROGRAM

## 2019-06-12 PROCEDURE — 36415 COLL VENOUS BLD VENIPUNCTURE: CPT

## 2019-06-12 PROCEDURE — 99232 SBSQ HOSP IP/OBS MODERATE 35: CPT | Performed by: INTERNAL MEDICINE

## 2019-06-12 PROCEDURE — 2060000000 HC ICU INTERMEDIATE R&B

## 2019-06-12 PROCEDURE — 93458 L HRT ARTERY/VENTRICLE ANGIO: CPT | Performed by: INTERNAL MEDICINE

## 2019-06-12 PROCEDURE — 80048 BASIC METABOLIC PNL TOTAL CA: CPT

## 2019-06-12 RX ORDER — MECLIZINE HCL 12.5 MG/1
25 TABLET ORAL ONCE
Status: COMPLETED | OUTPATIENT
Start: 2019-06-12 | End: 2019-06-12

## 2019-06-12 RX ORDER — AMIODARONE HYDROCHLORIDE 200 MG/1
200 TABLET ORAL DAILY
Status: DISCONTINUED | OUTPATIENT
Start: 2019-06-23 | End: 2019-06-14 | Stop reason: HOSPADM

## 2019-06-12 RX ORDER — IPRATROPIUM BROMIDE AND ALBUTEROL SULFATE 2.5; .5 MG/3ML; MG/3ML
1 SOLUTION RESPIRATORY (INHALATION) 3 TIMES DAILY
Status: DISCONTINUED | OUTPATIENT
Start: 2019-06-12 | End: 2019-06-13

## 2019-06-12 RX ORDER — AMIODARONE HYDROCHLORIDE 200 MG/1
400 TABLET ORAL 2 TIMES DAILY
Status: DISCONTINUED | OUTPATIENT
Start: 2019-06-12 | End: 2019-06-14 | Stop reason: HOSPADM

## 2019-06-12 RX ORDER — ALBUTEROL SULFATE 2.5 MG/3ML
2.5 SOLUTION RESPIRATORY (INHALATION)
Status: DISCONTINUED | OUTPATIENT
Start: 2019-06-12 | End: 2019-06-13

## 2019-06-12 RX ADMIN — IPRATROPIUM BROMIDE AND ALBUTEROL SULFATE 3 ML: .5; 3 SOLUTION RESPIRATORY (INHALATION) at 14:34

## 2019-06-12 RX ADMIN — AMIODARONE HYDROCHLORIDE 0.5 MG/MIN: 50 INJECTION, SOLUTION INTRAVENOUS at 08:53

## 2019-06-12 RX ADMIN — LISINOPRIL 10 MG: 10 TABLET ORAL at 09:33

## 2019-06-12 RX ADMIN — CARVEDILOL 3.12 MG: 3.12 TABLET, FILM COATED ORAL at 09:33

## 2019-06-12 RX ADMIN — SPIRONOLACTONE 25 MG: 25 TABLET ORAL at 09:33

## 2019-06-12 RX ADMIN — MORPHINE SULFATE 1 MG: 2 INJECTION, SOLUTION INTRAMUSCULAR; INTRAVENOUS at 09:33

## 2019-06-12 RX ADMIN — DESMOPRESSIN ACETATE 40 MG: 0.2 TABLET ORAL at 21:50

## 2019-06-12 RX ADMIN — FLUTICASONE PROPIONATE 1 SPRAY: 50 SPRAY, METERED NASAL at 09:33

## 2019-06-12 RX ADMIN — IPRATROPIUM BROMIDE AND ALBUTEROL SULFATE 1 AMPULE: .5; 3 SOLUTION RESPIRATORY (INHALATION) at 19:25

## 2019-06-12 RX ADMIN — AMIODARONE HYDROCHLORIDE 400 MG: 200 TABLET ORAL at 21:49

## 2019-06-12 RX ADMIN — Medication 1 TABLET: at 09:33

## 2019-06-12 RX ADMIN — CARVEDILOL 3.12 MG: 3.12 TABLET, FILM COATED ORAL at 17:36

## 2019-06-12 RX ADMIN — MECLIZINE HYDROCHLORIDE 25 MG: 12.5 TABLET, FILM COATED ORAL at 12:07

## 2019-06-12 ASSESSMENT — PAIN SCALES - GENERAL
PAINLEVEL_OUTOF10: 9
PAINLEVEL_OUTOF10: 6
PAINLEVEL_OUTOF10: 0
PAINLEVEL_OUTOF10: 8

## 2019-06-12 ASSESSMENT — PAIN DESCRIPTION - FREQUENCY: FREQUENCY: CONTINUOUS

## 2019-06-12 ASSESSMENT — PAIN DESCRIPTION - PROGRESSION: CLINICAL_PROGRESSION: NOT CHANGED

## 2019-06-12 ASSESSMENT — PAIN DESCRIPTION - LOCATION
LOCATION: HEAD
LOCATION: HEAD

## 2019-06-12 ASSESSMENT — PAIN DESCRIPTION - PAIN TYPE
TYPE: ACUTE PAIN
TYPE: ACUTE PAIN

## 2019-06-12 NOTE — CONSULTS
[facility]    Consult              Today's Date: 6/12/2019  Patient Name: Blanca Henderson  Date of admission: 6/9/2019  4:17 PM  Patient's age: 48 y.o., 1965  Admission Dx: NSTEMI (non-ST elevated myocardial infarction) ([facility] [I21.4]    Reason for  Consult:  ICD shock    Requesting Physician: Puneet Ocasio MD    CHIEF COMPLAINT:  Syncope and ICD shock    History Obtained From:  patient    HISTORY OF PRESENT ILLNESS:      The patient is a 48 y.o.  female PMH of nonischemic cardiomyopathy status post Biv ICD and severe mitral regurgitation presented with multiple syncopal episodes since last 3-4 days. In one of these episodes she fell and hit her head and right side of shoulder sustained small subarachnoid bleed. On arrival in ED AICD interrogation was done which showed about 6 episodes of V. fib with appropriate shocks delivered. Shocks were for VF. Cath with normal coronaries. K level was 3.2      Past Medical History:   has a past medical history of Asthma, CAD (coronary artery disease), Cardiomegaly, CHF (congestive heart failure) (HCC), COPD (chronic obstructive pulmonary disease) ([facility] Depression, Hypertension, Lesion of right native kidney, MI (myocardial infarction) ([facility] and Unspecified diseases of blood and blood-forming organs. Past Surgical History:   has a past surgical history that includes Cardiac defibrillator placement (09/2005); Pacemaker insertion; Tubal ligation; Cardiac defibrillator placement; and Uterine fibroid surgery (maarch 2015). Home Medications:    Prior to Admission medications    Medication Sig Start Date End Date Taking?  Authorizing Provider   lisinopril (PRINIVIL;ZESTRIL) 10 MG tablet TAKE 1 TABLET BY MOUTH 1 TIME A DAY. 5/31/19   Chan Haque MD   omeprazole (PRILOSEC) 20 MG delayed release capsule TAKE ONE CAPSULE BY MOUTH EVERY MORNING BEFORE BREAKFAST 5/31/19   Herbert Mcconnell MD   carvedilol (COREG) 3.125 MG

## 2019-06-12 NOTE — PROGRESS NOTES
[facility]  Progress Note                   Date:   6/12/2019  Patient name: Jonas Salas  Date of admission:  6/9/2019  4:17 PM  MRN:   [id number]  YOB: 1965  PCP: Mariano Barrett MD    Reason for Admission: NSTEMI (non-ST elevated myocardial infarction) ([facility] [I21.4]    Subjective:       Clinical Changes /Abnormalities: Seen & examined in room lying quietly in bed. Denies CP or SOB. Admits to lightheadedness. States she has been up to restroom several times and makes sure she stands at bedside before she walks b/c she is dizzy. Tele reviewed - paced 1:1. No post cath site complications overnight. Remains on IV AMio gtt    Review of Systems    Medications:   Scheduled Meds:   atorvastatin  40 mg Oral Nightly    sodium chloride flush  10 mL Intravenous 2 times per day    potassium chloride  40 mEq Intravenous Once    carvedilol  3.125 mg Oral BID WC    fluticasone  1 spray Nasal Daily    folbee plus  1 tablet Oral Daily    lisinopril  10 mg Oral Daily    spironolactone  25 mg Oral Daily     Continuous Infusions:   sodium chloride 75 mL/hr at 06/11/19 2123    amiodarone 0.5 mg/min (06/12/19 0853)    sodium chloride 20 mL/hr at 06/09/19 2159     CBC:   Recent Labs     06/10/19  0059 06/10/19  0759 06/11/19  0605 06/12/19  0636   WBC 6.3 5.5  --  5.3   HGB 12.8 12.6  --  11.3*    153 140 See Reflexed IPF Result     BMP:    Recent Labs     06/10/19  0759 06/11/19  1640 06/12/19  0636    131* 137   K 4.2 4.4 4.3    98 103   CO2 24 20 21   BUN 20 20 17   CREATININE 0.93* 0.82 0.87   GLUCOSE 100* 97 100*     Hepatic:  Recent Labs     06/09/19  1642   AST 16   ALT 12   BILITOT 0.33   ALKPHOS 97     Troponin:   Recent Labs     06/09/19  1914 06/10/19  0059 06/10/19  0759   TROPHS 117* 95* 61*     BNP: No results for input(s): BNP in the last 72 hours. Lipids: No results for input(s): CHOL, HDL in the last 72 hours.     Invalid input(s): LDLCALCU  INR:

## 2019-06-12 NOTE — FLOWSHEET NOTE
Assessment: Upon entering the room the patient was awake and laying in bed. She said that she was very tired and that the machines that were monitoring her kept her from getting a restful night's sleep. She also said that she was in a bit of pain this morning. The patient's son was resting on the couch in the room as well. The patient was speaking to me, and stated that she needed all the prayers that she could get, however I could tell that she was very tired. Intervention: I had a brief conversation with the patient about how she was going today and I expressed empathy hearing about the poor sleep the night before. I offered to pray with the patient. Outcome: The patient stated that she was appreciative of the visit and prayer, however I might have caught her at a bad time in the morning.      06/12/19 0906   Encounter Summary   Services provided to: Patient and family together   Referral/Consult From: Other    Support System Children   Complexity of Encounter Low   Length of Encounter 15 minutes   Spiritual Assessment Completed Yes   Spiritual/Anglican   Type Spiritual support   Assessment Calm; Approachable;Fearful; Hopeful;Coping;Concerns with suffering   Intervention Active listening;Explored feelings, thoughts, concerns;Prayer;Sustaining presence/ Ministry of presence   Outcome Comfort;Expressed gratitude;Expressed feelings of bethany, peace, and/or awe;Expressed feelings/needs/concerns;Coping;Less anxious, less agitated

## 2019-06-12 NOTE — PROGRESS NOTES
Smoking Cessation - topics covered   []  Health Risks  []  Benefits of Quitting   []  Smoking Cessation  []  Patient has no history of tobacco use  []  Patient is former smoker. []  No need for tobacco cessation education. []  Booklet given  [x]  Patient verbalizes understanding. [x]  Patient denies need for tobacco cessation education. []  Unable to meet with patient today. Will follow up as able.   Nemo Barrera  9:48 AM

## 2019-06-12 NOTE — PROGRESS NOTES
Occupational Therapy  Facility/Department: [facility] CAR 3  Daily Treatment Note  NAME: Sudarshan Castro  : 1965  MRN: [id number]    Date of Service: 2019    Discharge Recommendations: No therapy recommended at discharge. Assessment   Performance deficits / Impairments: Decreased functional mobility ; Decreased strength;Decreased endurance;Decreased high-level IADLs;Decreased ADL status  Treatment Diagnosis: Syncope  Prognosis: Good  Patient Education: OT POC, AE/DME, EC/WS tech, Fall Prev, Safety with func mob and adls. Issued written info and pt verbalized understanding. REQUIRES OT FOLLOW UP: Yes  Activity Tolerance  Activity Tolerance: Patient Tolerated treatment well;Patient limited by pain  Safety Devices  Safety Devices in place: Yes  Type of devices: Call light within reach; Left in chair;Nurse notified         Patient Diagnosis(es): The primary encounter diagnosis was Ventricular fibrillation ([facility] [address] Diagnoses of NSTEMI (non-ST elevated myocardial infarction) ([address] and Syncope and collapse were also pertinent to this visit. has a past medical history of Asthma, CAD (coronary artery disease), Cardiomegaly, CHF (congestive heart failure) ([address] COPD (chronic obstructive pulmonary disease) ([address] Depression, Hypertension, Lesion of right native kidney, MI (myocardial infarction) ([address] and Unspecified diseases of blood and blood-forming organs. has a past surgical history that includes Cardiac defibrillator placement (2005); Pacemaker insertion; Tubal ligation; Cardiac defibrillator placement; and Uterine fibroid surgery (malucretia ).     Restrictions  Restrictions/Precautions  Restrictions/Precautions: Cardiac, General Precautions, Fall Risk, Up as Tolerated(Up with assist)  Required Braces or Orthoses?: No  Position Activity Restriction  Other position/activity restrictions: Syncope; up with assist.   Subjective   General  Chart Reviewed: No  Patient assessed for rehabilitation

## 2019-06-13 PROCEDURE — 2709999900 HC NON-CHARGEABLE SUPPLY

## 2019-06-13 PROCEDURE — 6360000002 HC RX W HCPCS: Performed by: STUDENT IN AN ORGANIZED HEALTH CARE EDUCATION/TRAINING PROGRAM

## 2019-06-13 PROCEDURE — 6370000000 HC RX 637 (ALT 250 FOR IP): Performed by: INTERNAL MEDICINE

## 2019-06-13 PROCEDURE — 0JPT0PZ REMOVAL OF CARDIAC RHYTHM RELATED DEVICE FROM TRUNK SUBCUTANEOUS TISSUE AND FASCIA, OPEN APPROACH: ICD-10-PCS | Performed by: INTERNAL MEDICINE

## 2019-06-13 PROCEDURE — 99233 SBSQ HOSP IP/OBS HIGH 50: CPT | Performed by: INTERNAL MEDICINE

## 2019-06-13 PROCEDURE — C1882 AICD, OTHER THAN SING/DUAL: HCPCS

## 2019-06-13 PROCEDURE — 6360000002 HC RX W HCPCS: Performed by: INTERNAL MEDICINE

## 2019-06-13 PROCEDURE — 3E0102A INTRODUCTION OF ANTI-INFECTIVE ENVELOPE INTO SUBCUTANEOUS TISSUE, OPEN APPROACH: ICD-10-PCS | Performed by: INTERNAL MEDICINE

## 2019-06-13 PROCEDURE — 94640 AIRWAY INHALATION TREATMENT: CPT

## 2019-06-13 PROCEDURE — 2720000010 HC SURG SUPPLY STERILE

## 2019-06-13 PROCEDURE — 6370000000 HC RX 637 (ALT 250 FOR IP): Performed by: STUDENT IN AN ORGANIZED HEALTH CARE EDUCATION/TRAINING PROGRAM

## 2019-06-13 PROCEDURE — C1781 MESH (IMPLANTABLE): HCPCS

## 2019-06-13 PROCEDURE — 2500000003 HC RX 250 WO HCPCS

## 2019-06-13 PROCEDURE — 2580000003 HC RX 258: Performed by: INTERNAL MEDICINE

## 2019-06-13 PROCEDURE — 2060000000 HC ICU INTERMEDIATE R&B

## 2019-06-13 PROCEDURE — 2500000003 HC RX 250 WO HCPCS: Performed by: INTERNAL MEDICINE

## 2019-06-13 PROCEDURE — 0JH609Z INSERTION OF CARDIAC RESYNCHRONIZATION DEFIBRILLATOR PULSE GENERATOR INTO CHEST SUBCUTANEOUS TISSUE AND FASCIA, OPEN APPROACH: ICD-10-PCS | Performed by: INTERNAL MEDICINE

## 2019-06-13 PROCEDURE — 6360000002 HC RX W HCPCS

## 2019-06-13 PROCEDURE — 33264 RMVL & RPLCMT DFB GEN MLT LD: CPT | Performed by: INTERNAL MEDICINE

## 2019-06-13 RX ORDER — CARVEDILOL 6.25 MG/1
6.25 TABLET ORAL 2 TIMES DAILY WITH MEALS
Status: DISCONTINUED | OUTPATIENT
Start: 2019-06-13 | End: 2019-06-14 | Stop reason: HOSPADM

## 2019-06-13 RX ORDER — CEFAZOLIN SODIUM 1 G/50ML
1 INJECTION, SOLUTION INTRAVENOUS ONCE
Status: DISCONTINUED | OUTPATIENT
Start: 2019-06-13 | End: 2019-06-14 | Stop reason: HOSPADM

## 2019-06-13 RX ORDER — SODIUM CHLORIDE 9 MG/ML
INJECTION, SOLUTION INTRAVENOUS CONTINUOUS
Status: DISCONTINUED | OUTPATIENT
Start: 2019-06-13 | End: 2019-06-14 | Stop reason: HOSPADM

## 2019-06-13 RX ORDER — VANCOMYCIN HYDROCHLORIDE 1 G/200ML
1000 INJECTION, SOLUTION INTRAVENOUS ONCE
Status: COMPLETED | OUTPATIENT
Start: 2019-06-13 | End: 2019-06-13

## 2019-06-13 RX ORDER — CEPHALEXIN 500 MG/1
500 CAPSULE ORAL EVERY 8 HOURS SCHEDULED
Status: DISCONTINUED | OUTPATIENT
Start: 2019-06-13 | End: 2019-06-14 | Stop reason: HOSPADM

## 2019-06-13 RX ORDER — ALBUTEROL SULFATE 2.5 MG/3ML
2.5 SOLUTION RESPIRATORY (INHALATION) EVERY 4 HOURS PRN
Status: DISCONTINUED | OUTPATIENT
Start: 2019-06-13 | End: 2019-06-14 | Stop reason: HOSPADM

## 2019-06-13 RX ORDER — IPRATROPIUM BROMIDE AND ALBUTEROL SULFATE 2.5; .5 MG/3ML; MG/3ML
1 SOLUTION RESPIRATORY (INHALATION) 2 TIMES DAILY
Status: DISCONTINUED | OUTPATIENT
Start: 2019-06-13 | End: 2019-06-14 | Stop reason: HOSPADM

## 2019-06-13 RX ADMIN — DEXTROSE MONOHYDRATE 1 G: 5 INJECTION INTRAVENOUS at 14:27

## 2019-06-13 RX ADMIN — IPRATROPIUM BROMIDE AND ALBUTEROL SULFATE 1 AMPULE: .5; 3 SOLUTION RESPIRATORY (INHALATION) at 20:04

## 2019-06-13 RX ADMIN — MORPHINE SULFATE 1 MG: 2 INJECTION, SOLUTION INTRAMUSCULAR; INTRAVENOUS at 19:16

## 2019-06-13 RX ADMIN — CEPHALEXIN 500 MG: 500 CAPSULE ORAL at 20:56

## 2019-06-13 RX ADMIN — AMIODARONE HYDROCHLORIDE 400 MG: 200 TABLET ORAL at 20:54

## 2019-06-13 RX ADMIN — ACETAMINOPHEN 650 MG: 325 TABLET ORAL at 09:06

## 2019-06-13 RX ADMIN — CEPHALEXIN 500 MG: 500 CAPSULE ORAL at 17:15

## 2019-06-13 RX ADMIN — SPIRONOLACTONE 25 MG: 25 TABLET ORAL at 09:02

## 2019-06-13 RX ADMIN — VANCOMYCIN HYDROCHLORIDE 1000 MG: 1 INJECTION, SOLUTION INTRAVENOUS at 14:26

## 2019-06-13 RX ADMIN — BACITRACIN 100000 UNITS: 50000 INJECTION, POWDER, FOR SOLUTION INTRAMUSCULAR at 14:26

## 2019-06-13 RX ADMIN — CARVEDILOL 3.12 MG: 3.12 TABLET, FILM COATED ORAL at 09:01

## 2019-06-13 RX ADMIN — Medication 1 TABLET: at 09:01

## 2019-06-13 RX ADMIN — LISINOPRIL 10 MG: 10 TABLET ORAL at 09:01

## 2019-06-13 RX ADMIN — CARVEDILOL 6.25 MG: 6.25 TABLET, FILM COATED ORAL at 17:15

## 2019-06-13 RX ADMIN — MORPHINE SULFATE 1 MG: 2 INJECTION, SOLUTION INTRAMUSCULAR; INTRAVENOUS at 09:06

## 2019-06-13 RX ADMIN — AMIODARONE HYDROCHLORIDE 400 MG: 200 TABLET ORAL at 09:02

## 2019-06-13 RX ADMIN — DESMOPRESSIN ACETATE 40 MG: 0.2 TABLET ORAL at 20:54

## 2019-06-13 RX ADMIN — MORPHINE SULFATE 1 MG: 2 INJECTION, SOLUTION INTRAMUSCULAR; INTRAVENOUS at 23:29

## 2019-06-13 RX ADMIN — MORPHINE SULFATE 1 MG: 2 INJECTION, SOLUTION INTRAMUSCULAR; INTRAVENOUS at 17:20

## 2019-06-13 RX ADMIN — Medication 10 ML: at 20:56

## 2019-06-13 RX ADMIN — SODIUM CHLORIDE: 9 INJECTION, SOLUTION INTRAVENOUS at 11:24

## 2019-06-13 ASSESSMENT — PAIN SCALES - GENERAL
PAINLEVEL_OUTOF10: 8
PAINLEVEL_OUTOF10: 4
PAINLEVEL_OUTOF10: 8
PAINLEVEL_OUTOF10: 4
PAINLEVEL_OUTOF10: 8
PAINLEVEL_OUTOF10: 3
PAINLEVEL_OUTOF10: 8

## 2019-06-13 NOTE — PROGRESS NOTES
Occupational Therapy Not Seen Note    DATE: 2019  Name: Elham Duckworth  : 1965  MRN: [id number]    Patient not available for Occupational Therapy due to:    Surgery/Procedure:  ICD change out for battery depletion      Next Scheduled Treatment: Re-check 2019    Electronically signed by BRII Hall on 2019 at 2:09 PM

## 2019-06-14 VITALS
HEIGHT: 62 IN | OXYGEN SATURATION: 98 % | BODY MASS INDEX: 29.19 KG/M2 | DIASTOLIC BLOOD PRESSURE: 73 MMHG | RESPIRATION RATE: 16 BRPM | TEMPERATURE: 97.7 F | SYSTOLIC BLOOD PRESSURE: 98 MMHG | WEIGHT: 158.6 LBS | HEART RATE: 78 BPM

## 2019-06-14 LAB
ABSOLUTE EOS #: 0.09 K/UL (ref 0–0.44)
ABSOLUTE IMMATURE GRANULOCYTE: <0.03 K/UL (ref 0–0.3)
ABSOLUTE LYMPH #: 1.67 K/UL (ref 1.1–3.7)
ABSOLUTE MONO #: 0.92 K/UL (ref 0.1–1.2)
ANION GAP SERPL CALCULATED.3IONS-SCNC: 14 MMOL/L (ref 9–17)
BASOPHILS # BLD: 0 % (ref 0–2)
BASOPHILS ABSOLUTE: 0.03 K/UL (ref 0–0.2)
BUN BLDV-MCNC: 24 MG/DL (ref 6–20)
BUN/CREAT BLD: ABNORMAL (ref 9–20)
CALCIUM SERPL-MCNC: 9.2 MG/DL (ref 8.6–10.4)
CHLORIDE BLD-SCNC: 103 MMOL/L (ref 98–107)
CO2: 17 MMOL/L (ref 20–31)
CREAT SERPL-MCNC: 1.01 MG/DL (ref 0.5–0.9)
DIFFERENTIAL TYPE: ABNORMAL
EOSINOPHILS RELATIVE PERCENT: 1 % (ref 1–4)
GFR AFRICAN AMERICAN: >60 ML/MIN
GFR NON-AFRICAN AMERICAN: 57 ML/MIN
GFR SERPL CREATININE-BSD FRML MDRD: ABNORMAL ML/MIN/{1.73_M2}
GFR SERPL CREATININE-BSD FRML MDRD: ABNORMAL ML/MIN/{1.73_M2}
GLUCOSE BLD-MCNC: 124 MG/DL (ref 70–99)
HCT VFR BLD CALC: 36.3 % (ref 36.3–47.1)
HEMOGLOBIN: 11.5 G/DL (ref 11.9–15.1)
IMMATURE GRANULOCYTES: 0 %
LYMPHOCYTES # BLD: 22 % (ref 24–43)
MCH RBC QN AUTO: 29.6 PG (ref 25.2–33.5)
MCHC RBC AUTO-ENTMCNC: 31.7 G/DL (ref 28.4–34.8)
MCV RBC AUTO: 93.3 FL (ref 82.6–102.9)
MONOCYTES # BLD: 12 % (ref 3–12)
NRBC AUTOMATED: 0 PER 100 WBC
PDW BLD-RTO: 15.1 % (ref 11.8–14.4)
PLATELET # BLD: ABNORMAL K/UL (ref 138–453)
PLATELET ESTIMATE: ABNORMAL
PLATELET, FLUORESCENCE: 148 K/UL (ref 138–453)
PLATELET, IMMATURE FRACTION: 11.4 % (ref 1.1–10.3)
PMV BLD AUTO: ABNORMAL FL (ref 8.1–13.5)
POTASSIUM SERPL-SCNC: 4.8 MMOL/L (ref 3.7–5.3)
RBC # BLD: 3.89 M/UL (ref 3.95–5.11)
RBC # BLD: ABNORMAL 10*6/UL
SEG NEUTROPHILS: 64 % (ref 36–65)
SEGMENTED NEUTROPHILS ABSOLUTE COUNT: 4.8 K/UL (ref 1.5–8.1)
SODIUM BLD-SCNC: 134 MMOL/L (ref 135–144)
WBC # BLD: 7.5 K/UL (ref 3.5–11.3)
WBC # BLD: ABNORMAL 10*3/UL

## 2019-06-14 PROCEDURE — 97116 GAIT TRAINING THERAPY: CPT

## 2019-06-14 PROCEDURE — 6370000000 HC RX 637 (ALT 250 FOR IP): Performed by: INTERNAL MEDICINE

## 2019-06-14 PROCEDURE — 85055 RETICULATED PLATELET ASSAY: CPT

## 2019-06-14 PROCEDURE — 97535 SELF CARE MNGMENT TRAINING: CPT

## 2019-06-14 PROCEDURE — 36415 COLL VENOUS BLD VENIPUNCTURE: CPT

## 2019-06-14 PROCEDURE — 85025 COMPLETE CBC W/AUTO DIFF WBC: CPT

## 2019-06-14 PROCEDURE — 80048 BASIC METABOLIC PNL TOTAL CA: CPT

## 2019-06-14 PROCEDURE — 6360000002 HC RX W HCPCS: Performed by: INTERNAL MEDICINE

## 2019-06-14 PROCEDURE — 99232 SBSQ HOSP IP/OBS MODERATE 35: CPT | Performed by: INTERNAL MEDICINE

## 2019-06-14 PROCEDURE — 2580000003 HC RX 258: Performed by: INTERNAL MEDICINE

## 2019-06-14 RX ORDER — CARVEDILOL 6.25 MG/1
6.25 TABLET ORAL 2 TIMES DAILY WITH MEALS
Qty: 60 TABLET | Refills: 3 | Status: ON HOLD | OUTPATIENT
Start: 2019-06-14 | End: 2020-02-28 | Stop reason: HOSPADM

## 2019-06-14 RX ORDER — AMIODARONE HYDROCHLORIDE 200 MG/1
200 TABLET ORAL DAILY
Qty: 30 TABLET | Refills: 3 | Status: ON HOLD | OUTPATIENT
Start: 2019-06-22 | End: 2020-02-28 | Stop reason: HOSPADM

## 2019-06-14 RX ORDER — ATORVASTATIN CALCIUM 40 MG/1
40 TABLET, FILM COATED ORAL NIGHTLY
Qty: 30 TABLET | Refills: 3 | Status: SHIPPED | OUTPATIENT
Start: 2019-06-14 | End: 2022-04-04 | Stop reason: SDUPTHER

## 2019-06-14 RX ORDER — CEPHALEXIN 500 MG/1
500 CAPSULE ORAL EVERY 8 HOURS SCHEDULED
Qty: 21 CAPSULE | Refills: 0 | Status: SHIPPED | OUTPATIENT
Start: 2019-06-14 | End: 2019-06-21

## 2019-06-14 RX ORDER — AMIODARONE HYDROCHLORIDE 400 MG/1
400 TABLET ORAL 2 TIMES DAILY
Qty: 14 TABLET | Refills: 0 | Status: SHIPPED | OUTPATIENT
Start: 2019-06-14 | End: 2019-07-11 | Stop reason: DRUGHIGH

## 2019-06-14 RX ADMIN — AMIODARONE HYDROCHLORIDE 400 MG: 200 TABLET ORAL at 08:36

## 2019-06-14 RX ADMIN — Medication 1 TABLET: at 08:36

## 2019-06-14 RX ADMIN — CEPHALEXIN 500 MG: 500 CAPSULE ORAL at 08:35

## 2019-06-14 RX ADMIN — MAGNESIUM HYDROXIDE 30 ML: 400 SUSPENSION ORAL at 08:52

## 2019-06-14 RX ADMIN — Medication 10 ML: at 08:35

## 2019-06-14 RX ADMIN — LISINOPRIL 10 MG: 10 TABLET ORAL at 08:36

## 2019-06-14 RX ADMIN — SPIRONOLACTONE 25 MG: 25 TABLET ORAL at 08:36

## 2019-06-14 RX ADMIN — MORPHINE SULFATE 1 MG: 2 INJECTION, SOLUTION INTRAMUSCULAR; INTRAVENOUS at 04:40

## 2019-06-14 RX ADMIN — CARVEDILOL 6.25 MG: 6.25 TABLET, FILM COATED ORAL at 08:36

## 2019-06-14 ASSESSMENT — PAIN SCALES - GENERAL
PAINLEVEL_OUTOF10: 10
PAINLEVEL_OUTOF10: 9
PAINLEVEL_OUTOF10: 3
PAINLEVEL_OUTOF10: 10

## 2019-06-14 ASSESSMENT — PAIN DESCRIPTION - LOCATION
LOCATION: CHEST

## 2019-06-14 ASSESSMENT — PAIN DESCRIPTION - PROGRESSION
CLINICAL_PROGRESSION: NOT CHANGED

## 2019-06-14 ASSESSMENT — PAIN DESCRIPTION - ORIENTATION
ORIENTATION: LEFT

## 2019-06-14 ASSESSMENT — PAIN DESCRIPTION - PAIN TYPE
TYPE: SURGICAL PAIN
TYPE: ACUTE PAIN
TYPE: ACUTE PAIN

## 2019-06-14 ASSESSMENT — PAIN DESCRIPTION - DESCRIPTORS
DESCRIPTORS: ACHING

## 2019-06-14 ASSESSMENT — PAIN DESCRIPTION - ONSET: ONSET: UNABLE TO TELL

## 2019-06-14 ASSESSMENT — PAIN DESCRIPTION - FREQUENCY
FREQUENCY: INTERMITTENT
FREQUENCY: INTERMITTENT

## 2019-06-14 NOTE — PROGRESS NOTES
[facility] [address]    PROGRESS NOTE             Date:   6/14/2019  Patient name:  Vishnu Riley  Date of admission:  6/9/2019  4:17 PM  MRN:   [id number]  YOB: 1965    CHIEF COMPLAINT     Syncope    HISTORY OF PRESENT ILLNESS    The patient is a 48 y.o.  female who is admitted to the hospital for multiple syncopal episode. She does have history of nonischemic cardiomyopathy, dual-chamber ICD. From last 3 days patient had multiple syncopal episode. Has history of fall because of syncopal episode. She hit her head and right shoulder. A she was recently admitted under family practice patient left AMA. Cardiology was consulted at that time and the plan was to do cardiac cath. Last cath was done in 2011. Patient had dual-chamber AICD and AICD interrogation was done in ER which showed multiple episode of nonsustained V. tach/V. fib. The patient was shocked appropriately. Cardiology was consulted and they recommended starting heparin and lidocaine as patient had prolonged QTC after that cardiology decided to start patient on heparin and amiodarone. CT head was done which showed layering densities in left parietal sulcus and sending for hemorrhage. Neurology was consulted in ER. They recommended continuing heparin and repeat CT head 6 hrs after pTT is therapeutic. She denies any chest pain, shortness of breath, nausea, vomiting, diaphoresis. Last echo was done and 2018 and it showed ejection fraction of 10-15% and severe mitral regurgitation.      Current evaluation 06/14/19  ICD battery change was done yesterday  No local complications  Doing well  Ready for discharge    [address]       has a past medical history of Asthma, CAD (coronary artery disease), Cardiomegaly, CHF (congestive heart failure) ([address] COPD (chronic obstructive pulmonary disease) ([address] Depression, Hypertension, Lesion of right DAILY STOP SPIRIVA 11/25/18   Yodit Duckworth MD   Multiple Vitamin (MULTIVITAMIN) tablet TAKE ONE TABLET BY MOUTH ONE TIME A DAY 6/18/18   Gael Gary MD   fluticasone Myrtlewoodshante Ramos) 50 MCG/ACT nasal spray 1 spray by Nasal route daily 6/15/17   Gael Gary MD       ALLERGIES      Patient has no known allergies. SOCIAL HISTORY       reports that she has been smoking cigarettes. She has been smoking about 0.25 packs per day. She has never used smokeless tobacco. She reports that she drinks about 0.6 oz of alcohol per week. She reports that she does not use drugs. FAMILY HISTORY      family history includes Diabetes in her father and mother; Heart Disease in her brother and mother; High Blood Pressure in her mother. REVIEW OF SYSTEMS      · Constitutional: Negative for Fever, chills  · Eyes: Negative for visual changes, diplopia  · ENT: Negative for mouth sores, sore throat. · Cardiovascular: Negative for lightheadedness ,chest pain, palpitations   · Respiratory:Negative for Shortness of breath,cough or wheezing. · Gastrointestinal: Negative for nausea/vomiting, change in bowel habits, abdominal pain  · Genitourinary:Negative for change in bladder habits, dysuria, trouble voiding, hematuria. · Musculoskeletal: Negative for joint pain   · Neurological: Multiple syncopal episodes   · Psychiatric: negative for change in mood, affect    PHYSICAL EXAM      /85   Pulse 78   Temp 97.7 °F (36.5 °C) (Oral)   Resp 16   Ht 5' 2\" (1.575 m)   Wt 158 lb 9.6 oz (71.9 kg)   LMP  (Approximate)   SpO2 98%   BMI 29.01 kg/m²      · General appearance: well nourished  · HEENT: Bruise over right side of the face.   · Lungs: clear to auscultation bilaterally  · Heart: regular rate and rhythm, S1, S2 normal, no murmur  · Abdomen: soft, non-tender; bowel sounds normal; no masses,  no organomegaly  · Extremities: extremities normal, atraumatic, no cyanosis or edema  · Neurological:  Reflexes normal and hematoma of left side of brain due to trauma [facility])  Resolved Problems:    * No resolved hospital problems. *      PLAN      . NSTEMI: On aspirin, lipitor 40, coreg 3.125 BID,cardiac cath - normal coronaries   2. Multiple episode of syncope secondary to nonsustained ventricular fibrillation. Patient has AICD. On Amiodarone  Monitor QTc interval. Keep Mg>2 and k>4. Larradanette Trevino AICD battery change done   4. Non ischemic cardiomyopathy. Last ECHO in 2018 showed EF 10-15% with sever MR. On Coreg, lisinopril, Aldactone and Bumex. Increase doses as tolerated to maximally tolerated. Cardiac diet with fluid restriction of 1800 ml after cath. Change lisinopril to entresto OP. 5. Prolonged qTC. Monitor K and mg. Replace accordingly. Keep k>4 and Mg>2.   6. Small subarachnoid hemorrhage in left parietal sulci: ruled out. 7. COPD: Resume home inhalers, no need for steroids as patient is not wheezing. RT aerosol protocol     disCharge today           Jaxon Mcgill MD      Department of Internal Medicine  [facility]         6/14/2019, 10:25 AM      Attending Physician Statement  I have discussed the care of Jacquie Lombardo and I have examined the patient myselft and taken ros and hpi , including pertinent history and exam findings,  with the resident. I have reviewed the key elements of all parts of the encounter with the resident. I agree with the assessment, plan and orders as documented by the resident.       Electronically signed by Rogelio Massey MD

## 2019-06-14 NOTE — PROGRESS NOTES
Occupational Therapy  Facility/Department: [facility] CAR 3  Daily Treatment Note  NAME: Jacquie Lombardo  : 1965  MRN: [id number]    Date of Service: 2019    Discharge Recommendations: Further therapy recommended at discharge. Assessment   Performance deficits / Impairments: Decreased functional mobility ; Decreased strength;Decreased endurance;Decreased high-level IADLs;Decreased ADL status  Treatment Diagnosis: Syncope S/P AICD Battery change  Prognosis: Good  Patient Education: OT POC, AE/DME, EC/WS tech, AICD Sx Prec, Fall Prev, Safety with func mob and adls. Issued written info and pt verbalized understanding. REQUIRES OT FOLLOW UP: Yes  Activity Tolerance  Activity Tolerance: Patient Tolerated treatment well;Patient limited by pain  Safety Devices  Safety Devices in place: Yes  Type of devices: Call light within reach; Left in chair;Nurse notified         Patient Diagnosis(es): The primary encounter diagnosis was Ventricular fibrillation ([facility] [address] Diagnoses of NSTEMI (non-ST elevated myocardial infarction) ([address] and Syncope and collapse were also pertinent to this visit. has a past medical history of Asthma, CAD (coronary artery disease), Cardiomegaly, CHF (congestive heart failure) ([address] COPD (chronic obstructive pulmonary disease) ([address] Depression, Hypertension, Lesion of right native kidney, MI (myocardial infarction) ([facility] [address] and Unspecified diseases of blood and blood-forming organs. has a past surgical history that includes Cardiac defibrillator placement (2005); Pacemaker insertion; Tubal ligation; Cardiac defibrillator placement; and Uterine fibroid surgery (jocelin ).     Restrictions  Restrictions/Precautions  Restrictions/Precautions: Cardiac, General Precautions, Fall Risk, Up as Tolerated(Up with assist)  Required Braces or Orthoses?: No  Position Activity Restriction  Other position/activity restrictions: Syncope; up with assist.   Subjective   General  Chart Reviewed:

## 2019-06-14 NOTE — PROGRESS NOTES
General  Response To Previous Treatment: Patient with no complaints from previous session. Family / Caregiver Present: No  Subjective  Subjective: RN and pt agreed to PT, pt awake sitting EOB upon arrival and agreeable to ambulate, pt denies pain but c/o tightness at incision site  Pain Screening  Patient Currently in Pain: Denies  Vital Signs  Patient Currently in Pain: Denies       Orientation  Orientation  Overall Orientation Status: Within Normal Limits  Objective   Bed mobility  Supine to Sit: Stand by assistance  Sit to Supine: Stand by assistance  Scooting: Stand by assistance  Transfers  Sit to Stand: Stand by assistance  Stand to sit: Stand by assistance  Ambulation  Ambulation?: Yes  Ambulation 1  Surface: level tile  Device: No Device  Assistance: Contact guard assistance;Stand by assistance  Gait Deviations: Slow Fabiola  Distance: 300ft   Comments: SOB, 3 short standing rest breaks needed to complete distance; pt c/o mild lightheadedness  Stairs/Curb  Stairs?: Yes        Exercises  Comments: LUE AROM including shoulder flex to 90 degrees, punches, bicep curls, shoulder shrugs x 10               Goals  Short term goals  Time Frame for Short term goals: 14 visits. Short term goal 1: Pt to ambulate 300ft with least resistive AD vs no device with Alayna  Short term goal 2: Pt to negotiate stairs with no handrail and SBA  Short term goal 3: Pt to tolerate at least 30 min of skilled physical therapy.    Short term goal 4: Pt to complete transfers with Alayna  Short term goal 5: Pt to complete bed mobility with [address]  Times per week: 5x  Current Treatment Recommendations: Strengthening, Transfer Training, Endurance Training, Patient/Caregiver Education & Training, ADL/Self-care Training, Equipment Evaluation, Education, & procurement, Balance Training, ROM, IADL Training, Gait Training, Home Exercise Program, Functional Mobility Training, Stair training, Safety Education & Training  Safety

## 2019-06-14 NOTE — PROGRESS NOTES
74 76   25 [phone]    65 [phone] 321 [phone] 381 65 [phone]   70 [phone] 314 329 344 359 374 70 353 382 410 [phone]   75 261 274 289 305 319 334 348 364 75 344 372 400 [phone]   80 253 266 282 296 312 327 342 356 80 335 [phone] 448 [phone]

## 2019-06-17 ENCOUNTER — TELEPHONE (OUTPATIENT)
Dept: FAMILY MEDICINE CLINIC | Age: 54
End: 2019-06-17

## 2019-06-17 NOTE — TELEPHONE ENCOUNTER
[address] Transitions Initial Follow Up Call    Call within 2 business days of discharge: Yes     Patient: Leandra Poag Patient : 1965 MRN: [id number]    [unfilled]    RARS: Readmission Risk Score: 25       Spoke with:  Spoke with pt she states she is doing better but her pacemaker site is sore. She states she is keeping it dry and taking all of her current medications including antibiotics. She has instructed to call [facility] for a follow up appointment and office number given to the pt. Reminded pt she has appt here at [facility] on 19 at 2:30 pm she verbalized understanding of this plan.     Discharge department/facility:  [address] [place] [address] services provided:  Obtained and reviewed discharge summary and/or continuity of care documents    Follow Up  Future Appointments   Date Time Provider Yanelis Alfaro   2019  2:30 PM MD Nida Valente [facility]   2019 11:30 AM STV CHF CLINIC RM 1 [facility] CHF CLI [facility]       Akosua Bee

## 2019-06-17 NOTE — DISCHARGE SUMMARY
Medication List as of 6/14/2019 12:32 PM      START taking these medications    Details   !! amiodarone (PACERONE) 400 MG tablet Take 1 tablet by mouth 2 times daily for 7 days, Disp-14 tablet, R-0Normal      !! amiodarone (CORDARONE) 200 MG tablet Take 1 tablet by mouth daily, Disp-30 tablet, R-3Normal      atorvastatin (LIPITOR) 40 MG tablet Take 1 tablet by mouth nightly, Disp-30 tablet, R-3Normal      cephALEXin (KEFLEX) 500 MG capsule Take 1 capsule by mouth every 8 hours for 7 days, Disp-21 capsule, R-0Normal       !! - Potential duplicate medications found. Please discuss with provider.       CONTINUE these medications which have CHANGED    Details   carvedilol (COREG) 6.25 MG tablet Take 1 tablet by mouth 2 times daily (with meals), Disp-60 tablet, R-3Normal         CONTINUE these medications which have NOT CHANGED    Details   lisinopril (PRINIVIL;ZESTRIL) 10 MG tablet TAKE 1 TABLET BY MOUTH 1 TIME A DAY., Disp-30 tablet, R-1Normal      omeprazole (PRILOSEC) 20 MG delayed release capsule TAKE ONE CAPSULE BY MOUTH EVERY MORNING BEFORE BREAKFAST, Disp-30 capsule, R-0Normal      spironolactone (ALDACTONE) 25 MG tablet TAKE 1 TABLET BY MOUTH ONE TIME A DAY, Disp-28 tablet, R-2Normal      amitriptyline (ELAVIL) 25 MG tablet TAKE ONE TABLET BY MOUTH NIGHTLY, Disp-30 tablet, R-3Normal      sennosides-docusate sodium (SENOKOT-S) 8.6-50 MG tablet Take 1 tablet by mouth daily, Disp-20 tablet, R-0Print      albuterol sulfate HFA (VENTOLIN HFA) 108 (90 Base) MCG/ACT inhaler INHALE 2 PUFFS INTO THE LUNGS EVERY 6 HOURS AS NEEDED FOR WHEEZING, Disp-18 g, R-3Normal      ipratropium-albuterol (DUONEB) 0.5-2.5 (3) MG/3ML SOLN nebulizer solution Inhale 3 mLs into the lungs 2 times daily as needed for Shortness of Breath, Disp-360 mL, R-1Normal      Spacer/Aero-Holding Chambers (E-Z SPACER) ELVER DAILY Starting Sun 11/25/2018, Disp-1 Device, R-0, Normal      bumetanide (BUMEX) 1 MG tablet TAKE ONE TABLET BY MOUTH TWICE A DAY, Disp-30 tablet, H-5OFUEAH      folic acid-pyridoxine-cyanocobalamine (FOLTX) 2.5-25-1 MG TABS tablet Take 1 tablet by mouth daily, Disp-30 tablet, R-0Normal      Umeclidinium Bromide (INCRUSE ELLIPTA) 62.5 MCG/INH AEPB INHALE 1 PUFF INTO THE LUNGS DAILY STOP SPIRIVA, Disp-1 each, R-3Normal      Multiple Vitamin (MULTIVITAMIN) tablet TAKE ONE TABLET BY MOUTH ONE TIME A DAY, Disp-30 tablet, R-3Normal      fluticasone (FLONASE) 50 MCG/ACT nasal spray 1 spray by Nasal route daily, Disp-1 Bottle, R-3Normal           Activity: activity as tolerated    Diet: cardiac diet    Disposition: home    Follow-up:  in 7 days with MD Nick King MD  [address] [place] [address]  [phone]    In 1 week  Follow up regarding CHF and recent hospitalizaiton    [facility] [place] [id number].  [facility] [id number]  [facility]  [address] [place] [address]  [phone]  On 6/21/2019  For wound re-check at 11:30am        Time Spent on discharge is more than 35 minutes in the examination, evaluation, counseling and review of medications and discharge plan.     Adrianne Luis MD  Internal Medicine [address], [facility]  PGY-2

## 2019-06-18 ENCOUNTER — TELEPHONE (OUTPATIENT)
Dept: FAMILY MEDICINE CLINIC | Age: 54
End: 2019-06-18

## 2019-06-19 ENCOUNTER — OFFICE VISIT (OUTPATIENT)
Dept: FAMILY MEDICINE CLINIC | Age: 54
End: 2019-06-19
Payer: MEDICAID

## 2019-06-19 VITALS
HEIGHT: 62 IN | SYSTOLIC BLOOD PRESSURE: 93 MMHG | DIASTOLIC BLOOD PRESSURE: 59 MMHG | WEIGHT: 147.6 LBS | BODY MASS INDEX: 27.16 KG/M2 | TEMPERATURE: 97 F | HEART RATE: 62 BPM

## 2019-06-19 DIAGNOSIS — I50.22 CHRONIC SYSTOLIC HEART FAILURE (HCC): Primary | ICD-10-CM

## 2019-06-19 PROCEDURE — 99211 OFF/OP EST MAY X REQ PHY/QHP: CPT | Performed by: STUDENT IN AN ORGANIZED HEALTH CARE EDUCATION/TRAINING PROGRAM

## 2019-06-19 PROCEDURE — 99495 TRANSJ CARE MGMT MOD F2F 14D: CPT | Performed by: STUDENT IN AN ORGANIZED HEALTH CARE EDUCATION/TRAINING PROGRAM

## 2019-06-19 PROCEDURE — 1111F DSCHRG MED/CURRENT MED MERGE: CPT | Performed by: STUDENT IN AN ORGANIZED HEALTH CARE EDUCATION/TRAINING PROGRAM

## 2019-06-19 ASSESSMENT — ENCOUNTER SYMPTOMS
COUGH: 0
VOMITING: 0
WHEEZING: 0
RHINORRHEA: 0
SORE THROAT: 0
PHOTOPHOBIA: 0
NAUSEA: 0
ABDOMINAL PAIN: 0
SHORTNESS OF BREATH: 0
EYE PAIN: 0

## 2019-06-19 NOTE — PROGRESS NOTES
25 MG tablet  TAKE ONE TABLET BY MOUTH NIGHTLY             atorvastatin (LIPITOR) 40 MG tablet  Take 1 tablet by mouth nightly             bumetanide (BUMEX) 1 MG tablet  TAKE ONE TABLET BY MOUTH TWICE A DAY             carvedilol (COREG) 6.25 MG tablet  Take 1 tablet by mouth 2 times daily (with meals)             cephALEXin (KEFLEX) 500 MG capsule  Take 1 capsule by mouth every 8 hours for 7 days             fluticasone (FLONASE) 50 MCG/ACT nasal spray  1 spray by Nasal route daily             folic acid-pyridoxine-cyanocobalamine (FOLTX) 2.5-25-1 MG TABS tablet  Take 1 tablet by mouth daily             ipratropium-albuterol (DUONEB) 0.5-2.5 (3) MG/3ML SOLN nebulizer solution  Inhale 3 mLs into the lungs 2 times daily as needed for Shortness of Breath             lisinopril (PRINIVIL;ZESTRIL) 10 MG tablet  TAKE 1 TABLET BY MOUTH 1 TIME A DAY. Multiple Vitamin (MULTIVITAMIN) tablet  TAKE ONE TABLET BY MOUTH ONE TIME A DAY             omeprazole (PRILOSEC) 20 MG delayed release capsule  TAKE ONE CAPSULE BY MOUTH EVERY MORNING BEFORE BREAKFAST             sennosides-docusate sodium (SENOKOT-S) 8.6-50 MG tablet  Take 1 tablet by mouth daily             Spacer/Aero-Holding Chambers (E-Z SPACER) ELVER  1 Device by Does not apply route daily             spironolactone (ALDACTONE) 25 MG tablet  TAKE 1 TABLET BY MOUTH ONE TIME A DAY             Umeclidinium Bromide (INCRUSE ELLIPTA) 62.5 MCG/INH AEPB  INHALE 1 PUFF INTO THE LUNGS DAILY STOP SPIRIVA                   Medications marked \"taking\" at this time  No outpatient medications have been marked as taking for the 6/19/19 encounter (Office Visit) with Toñito Mcdonald MD.        Medications patient taking as of now reconciled against medications ordered at time of hospital discharge: Yes    Chief Complaint   Patient presents with    Follow-Up from Hospital       HPI    Inpatient course: Discharge summary reviewed- see chart.     59-year-old female with past well-developed. She is cooperative. No distress. HENT:   Head: Normocephalic and atraumatic. Neck: Neck supple. No tracheal deviation present. Cardiovascular: Normal rate and regular rhythm. Exam reveals no gallop and no friction rub. No murmur heard. Pulmonary/Chest: Effort normal and breath sounds normal. She has no wheezes. She has no rales. Abdominal: Soft. She exhibits no distension and no mass. There is no tenderness. Musculoskeletal: Normal range of motion. She exhibits no tenderness or deformity. Left arm in sling but pt able to move freely   Neurological: She is alert and oriented to person, place, and time. Skin: Skin is warm and dry. No rash noted. No erythema. Incision wound over the left upper chest is covered by clear dressing. Wound looks clean, dry, intact. No erythema or drainage noted. Assessment/Plan:  1.  Chronic systolic heart failure (HCC) s/p AICD  - LA DISCHARGE MEDS RECONCILED W/ CURRENT OUTPATIENT MED LIST  - Pt to schedule 1 week follow up with Cardiology for wound check  - Continue current medications    Medical Decision Making: moderate complexity

## 2019-06-19 NOTE — PROGRESS NOTES
Attending Physician Statement  I have discussed the care of Amanda Thomson, including pertinent history and exam findings,  with the resident. I have seen and examined the patient and the key elements of all parts of the encounter have been performed by me. I agree with the assessment, plan and orders as documented by the resident.   (Cookie Setting) - Jazmin Valero M.D  Vitals:    06/19/19 1429   BP: (!) 93/59   Pulse: 62   Temp: 97 °F (36.1 °C)

## 2019-06-19 NOTE — PROGRESS NOTES
Visit Information    Have you changed or started any medications since your last visit including any over-the-counter medicines, vitamins, or herbal medicines? no   Have you stopped taking any of your medications? Is so, why? -  no  Are you having any side effects from any of your medications? - no    Have you seen any other physician or provider since your last visit?  no   Have you had any other diagnostic tests since your last visit?  no   Have you been seen in the emergency room and/or had an admission in a hospital since we last saw you?  no   Have you had your routine dental cleaning in the past 6 months?  no     Do you have an active MyChart account? If no, what is the barrier?   Yes    Patient Care Team:  Jacob Strange MD as PCP - General (Family Medicine)  Carlos Lugo MD as PCP - [facility]  Jarocho Chu RN as     Medical History Review  Past Medical, Family, and Social History reviewed and does not contribute to the patient presenting condition    Health Maintenance   Topic Date Due    Shingles Vaccine (1 of 2) 06/13/2015    Colon Cancer Screen FIT/FOBT  03/17/2018    Cervical cancer screen  12/28/2018    Breast cancer screen  02/03/2019    Flu vaccine (Season Ended) 12/07/2019 (Originally 9/1/2019)    A1C test (Diabetic or Prediabetic)  07/16/2019    TSH testing  11/25/2019    Potassium monitoring  06/14/2020    Creatinine monitoring  06/14/2020    Lipid screen  11/25/2023    DTaP/Tdap/Td vaccine (2 - Td) 06/09/2029    Pneumococcal 0-64 years Vaccine  Completed    Hepatitis C screen  Completed    HIV screen  Completed

## 2019-06-24 ENCOUNTER — CARE COORDINATION (OUTPATIENT)
Dept: CARE COORDINATION | Age: 54
End: 2019-06-24

## 2019-06-25 ENCOUNTER — CARE COORDINATION (OUTPATIENT)
Dept: CARE COORDINATION | Age: 54
End: 2019-06-25

## 2019-06-25 ENCOUNTER — TELEPHONE (OUTPATIENT)
Dept: FAMILY MEDICINE CLINIC | Age: 54
End: 2019-06-25

## 2019-06-26 RX ORDER — LISINOPRIL 5 MG/1
5 TABLET ORAL DAILY
Status: ON HOLD | COMMUNITY
Start: 2019-06-26 | End: 2020-02-28 | Stop reason: HOSPADM

## 2019-06-26 NOTE — CARE COORDINATION
[address] [facility] home vs   Attempted home vs earlier today at 9 am.  She did not answer door at that time. Revisit attempted at 1:10 pm    Emotional/coping  Alert, oriented x 3, engaged and cooperative. Affect-  Blunted. Thought processes - Logcial.     Socialization/family  She reports that her brother, Aron Laguerre and significant other, Mckayla Melo have been assisting with housekeeping, yard work. Reports family and friend support are meeting her needs    Housing  She recently moved to one [facility]. 2 Stair entrance to home. No inside stairs in the home. She does not have smoke alarm and was advised to obtain. Her brother and significant other are working on home repairs and adding electrical outlets. She was instructed on home safety and fire prevention. She was advised to not use extension cords for applicances. Medications   Medication schedule reviewed. Oral meds are blister packaged. Lisinopril 10 mg tab in morning medication blister packs cut in half to adjust for 5 mg daily dose. Med schedule instructed and she verbalized understanding.   She had not taken morning medications prior to home vs and took morning meds during vs      Current Outpatient Medications:     lisinopril (PRINIVIL;ZESTRIL) 5 MG tablet, Take 5 mg by mouth daily, Disp: , Rfl:     amiodarone (CORDARONE) 200 MG tablet, Take 1 tablet by mouth daily, Disp: 30 tablet, Rfl: 3    atorvastatin (LIPITOR) 40 MG tablet, Take 1 tablet by mouth nightly, Disp: 30 tablet, Rfl: 3    carvedilol (COREG) 6.25 MG tablet, Take 1 tablet by mouth 2 times daily (with meals), Disp: 60 tablet, Rfl: 3    omeprazole (PRILOSEC) 20 MG delayed release capsule, TAKE ONE CAPSULE BY MOUTH EVERY MORNING BEFORE BREAKFAST, Disp: 30 capsule, Rfl: 0    spironolactone (ALDACTONE) 25 MG tablet, TAKE 1 TABLET BY MOUTH ONE TIME A DAY, Disp: 28 tablet, Rfl: 2    amitriptyline (ELAVIL) 25 MG tablet, TAKE ONE TABLET BY MOUTH NIGHTLY, Disp: 30 tablet, Rfl: 3  

## 2019-06-27 ENCOUNTER — TELEPHONE (OUTPATIENT)
Dept: FAMILY MEDICINE CLINIC | Age: 54
End: 2019-06-27

## 2019-07-03 ENCOUNTER — HOSPITAL ENCOUNTER (OUTPATIENT)
Dept: OTHER | Age: 54
Discharge: HOME | End: 2019-07-03
Payer: MEDICAID

## 2019-07-03 VITALS
WEIGHT: 144 LBS | SYSTOLIC BLOOD PRESSURE: 87 MMHG | OXYGEN SATURATION: 100 % | BODY MASS INDEX: 26.33 KG/M2 | HEART RATE: 68 BPM | DIASTOLIC BLOOD PRESSURE: 48 MMHG | RESPIRATION RATE: 18 BRPM

## 2019-07-03 DIAGNOSIS — I50.22 CHRONIC SYSTOLIC HEART FAILURE (HCC): Primary | ICD-10-CM

## 2019-07-03 PROCEDURE — 99214 OFFICE O/P EST MOD 30 MIN: CPT

## 2019-07-03 PROCEDURE — 99214 OFFICE O/P EST MOD 30 MIN: CPT | Performed by: NURSE PRACTITIONER

## 2019-07-03 ASSESSMENT — ENCOUNTER SYMPTOMS
COUGH: 0
EYE DISCHARGE: 0
ABDOMINAL PAIN: 0
WHEEZING: 1
SHORTNESS OF BREATH: 1
BLOOD IN STOOL: 0
NAUSEA: 1

## 2019-07-03 NOTE — PROGRESS NOTES
CHF Clinic at [facility]    Office: [phone] Fax: [address]  [address]  [place] [address]  Dept: [phone]  Loc: [phone]    Pineda Naylor is a 47 y.o. female who presents today for CHF evaluation. HPI:     No syncope sicne admit  ICD interrogation done which showed multiple episodes of NSVT's. Now , occas  sob , assoc w/ walking stairs,  Denies swelling  + fatiuge  occas  cp, she reprots it as upper GI region, epigastric region,  and occas sharp pain L upper arm- reports stabbing in nature, duration 3-5 sec  Still seeing Neuro following last admit. Just moved, not all meds unpacked like dulera    Sees TCC   Was lost in f/u  Now back to see [facility], kept most recent appt     Pt compliant with bumex, spironolactone and ACEI  Her bp was low at [place] visit and low today, taken manually and better BP obtained.    Wt is down 2.5 lbs        Past Medical History:   Diagnosis Date    Asthma     CAD (coronary artery disease)     ICD    Cardiomegaly     CHF (congestive heart failure) (HCC)     COPD (chronic obstructive pulmonary disease) (HCC)     Depression     Hypertension     Lesion of right native kidney 2/3/2019    MI [facility]myocardial infarction) ([facility] [address]     Unspecified diseases of blood and blood-forming organs     anemia,      Past Surgical History:   Procedure Laterality Date    CARDIAC DEFIBRILLATOR PLACEMENT  09/2005    [facility]    CARDIAC DEFIBRILLATOR PLACEMENT      PACEMAKER INSERTION      TUBAL LIGATION      UTERINE FIBROID SURGERY  jocelin 2015       Family History   Problem Relation Age of Onset    Diabetes Mother     High Blood Pressure Mother     Heart Disease Mother     Diabetes Father     Heart Disease Brother        Social History     Tobacco Use    Smoking status: Former Smoker     Packs/day: 0.25     Types: Cigarettes     Last attempt to quit:

## 2019-07-09 ENCOUNTER — CARE COORDINATION (OUTPATIENT)
Dept: CARE COORDINATION | Age: 54
End: 2019-07-09

## 2019-07-11 RX ORDER — AMIODARONE HYDROCHLORIDE 200 MG/1
200 TABLET ORAL DAILY
COMMUNITY
Start: 2019-06-21 | End: 2019-08-29 | Stop reason: SDUPTHER

## 2019-08-22 DIAGNOSIS — I50.22 CHRONIC SYSTOLIC HEART FAILURE (HCC): ICD-10-CM

## 2019-08-22 DIAGNOSIS — G43.809 OTHER MIGRAINE WITHOUT STATUS MIGRAINOSUS, NOT INTRACTABLE: ICD-10-CM

## 2019-08-22 NOTE — TELEPHONE ENCOUNTER
Please address the medication refill and close the encounter. If I can be of assistance, please route to the applicable pool. Thank you.     Last visit: 6/19/19  Last Med refill: 7/31/19  Does patient have enough medication for 72 hours: Yes    Next Visit Date:  Future Appointments   Date Time Provider Yanelis Alfaro   8/26/2019  1:30 PM STV CHF CLINIC RM 1 [facility] CHF CLI [facility]       Health Maintenance   Topic Date Due    Colon Cancer Screen FIT/FOBT  03/17/2018    Cervical cancer screen  12/28/2018    Breast cancer screen  02/03/2019    A1C test (Diabetic or Prediabetic)  07/16/2019    Flu vaccine (1) 12/07/2019 (Originally 9/1/2019)    Shingles Vaccine (1 of 2) 06/19/2020 (Originally 6/13/2015)    TSH testing  11/25/2019    Potassium monitoring  06/14/2020    Creatinine monitoring  06/14/2020    Lipid screen  11/25/2023    DTaP/Tdap/Td vaccine (2 - Td) 06/09/2029    Pneumococcal 0-64 years Vaccine  Completed    Hepatitis C screen  Completed    HIV screen  Completed       Hemoglobin A1C (%)   Date Value   07/16/2018 6.4 (H)   02/03/2017 5.9   01/12/2017 6.0             ( goal A1C is < 7)   No results found for: LABMICR  LDL Cholesterol (mg/dL)   Date Value   11/25/2018 128   07/16/2018 191 (H)       (goal LDL is <100)   AST (U/L)   Date Value   06/09/2019 16     ALT (U/L)   Date Value   06/09/2019 12     BUN (mg/dL)   Date Value   06/14/2019 24 (H)     BP Readings from Last 3 Encounters:   07/03/19 (!) 87/48   06/19/19 (!) 93/59   06/14/19 98/73          (goal 120/80)    All Future Testing planned in [facility]  Lab Frequency Next Occurrence   CT ABDOMEN PELVIS W WO CONTRAST Additional Contrast? Radiologist Recommendation Once 03/18/2020   IR Lumbar Puncture For Myelogram CT Once 03/22/2019   CT Cervical Spine W Contrast Once 03/22/2019   CT Lumbar Spine W Contrast Once 03/22/2019   CT Thoracic Spine W Contrast Once 58/43/0995   Basic Metabolic Panel Once 05/67/8378               Patient

## 2019-08-25 RX ORDER — AMITRIPTYLINE HYDROCHLORIDE 25 MG/1
TABLET, FILM COATED ORAL
Qty: 30 TABLET | Refills: 3 | Status: SHIPPED | OUTPATIENT
Start: 2019-08-25 | End: 2019-12-27

## 2019-08-25 RX ORDER — OMEPRAZOLE 20 MG/1
CAPSULE, DELAYED RELEASE ORAL
Qty: 30 CAPSULE | Refills: 2 | Status: SHIPPED | OUTPATIENT
Start: 2019-08-25 | End: 2019-11-27 | Stop reason: SDUPTHER

## 2019-08-25 RX ORDER — BUMETANIDE 1 MG/1
TABLET ORAL
Qty: 60 TABLET | Refills: 3 | Status: SHIPPED | OUTPATIENT
Start: 2019-08-25 | End: 2019-12-27

## 2019-08-25 RX ORDER — SPIRONOLACTONE 25 MG/1
TABLET ORAL
Qty: 28 TABLET | Refills: 2 | Status: SHIPPED | OUTPATIENT
Start: 2019-08-25 | End: 2019-11-27 | Stop reason: SDUPTHER

## 2019-08-29 ENCOUNTER — CARE COORDINATION (OUTPATIENT)
Dept: CARE COORDINATION | Age: 54
End: 2019-08-29

## 2019-08-29 NOTE — CARE COORDINATION
lungs 2 times daily as needed for Shortness of Breath, Disp: 360 mL, Rfl: 1    sennosides-docusate sodium (SENOKOT-S) 8.6-50 MG tablet, Take 1 tablet by mouth daily (Patient not taking: Reported on 8/29/2019), Disp: 20 tablet, Rfl: 0    Spacer/Aero-Holding Chambers (E-Z SPACER) ELVER, 1 Device by Does not apply route daily, Disp: 1 Device, Rfl: 0    folic acid-pyridoxine-cyanocobalamine (FOLTX) 2.5-25-1 MG TABS tablet, Take 1 tablet by mouth daily (Patient not taking: Reported on 8/29/2019), Disp: 30 tablet, Rfl: 0    Umeclidinium Bromide (INCRUSE ELLIPTA) 62.5 MCG/INH AEPB, INHALE 1 PUFF INTO THE LUNGS DAILY STOP SPIRIVA (Patient not taking: Reported on 8/29/2019), Disp: 1 each, Rfl: 3    Multiple Vitamin (MULTIVITAMIN) tablet, TAKE ONE TABLET BY MOUTH ONE TIME A DAY (Patient not taking: Reported on 8/29/2019), Disp: 30 tablet, Rfl: 3    fluticasone (FLONASE) 50 MCG/ACT nasal spray, 1 spray by Nasal route daily (Patient not taking: Reported on 8/29/2019), Disp: 1 Bottle, Rfl: 3     Medical  Skin warm and dry. Respirations easy at rest and with activities. No edema. Lungs clear. She denies chest pain, dyspnea. Abdomen soft and non distended. She reports feeling lightheaded/dizzy after standing longer than 5 min. Reports she sits and rests frequently to avoid lightheadedness. B/P 103/61 sitting;  95/69 standing. Pulse 68  resp 16/min and non-labored. She denies chest pain, dyspnea with ambulation. Spiritual  Reports spiritual needs are met. Plan  Reinforced importance of maintaining medical follow up. Last CHF clinic follow up - 7/3/2019   Scheduled CHF clinic visit at [facility] on 9/5/19  Cab transportation arranged for appointment. Revisit in 3-4 weeks.     Kennedi Mcgraw RN, BSN  [facility]der

## 2019-09-05 ENCOUNTER — HOSPITAL ENCOUNTER (OUTPATIENT)
Dept: OTHER | Age: 54
Discharge: HOME | End: 2019-09-05
Payer: MEDICAID

## 2019-09-05 VITALS
OXYGEN SATURATION: 100 % | DIASTOLIC BLOOD PRESSURE: 72 MMHG | HEART RATE: 72 BPM | WEIGHT: 147.6 LBS | RESPIRATION RATE: 20 BRPM | BODY MASS INDEX: 26.99 KG/M2 | SYSTOLIC BLOOD PRESSURE: 102 MMHG

## 2019-09-05 PROCEDURE — 99212 OFFICE O/P EST SF 10 MIN: CPT

## 2019-09-20 ENCOUNTER — CARE COORDINATION (OUTPATIENT)
Dept: CARE COORDINATION | Age: 54
End: 2019-09-20

## 2019-10-29 ENCOUNTER — TELEPHONE (OUTPATIENT)
Dept: FAMILY MEDICINE CLINIC | Age: 54
End: 2019-10-29

## 2019-10-30 ENCOUNTER — HOSPITAL ENCOUNTER (INPATIENT)
Age: 54
LOS: 1 days | Discharge: HOME | DRG: 140 | End: 2019-11-01
Attending: EMERGENCY MEDICINE | Admitting: FAMILY MEDICINE
Payer: MEDICAID

## 2019-10-30 ENCOUNTER — APPOINTMENT (OUTPATIENT)
Dept: GENERAL RADIOLOGY | Age: 54
DRG: 140 | End: 2019-10-30
Payer: MEDICAID

## 2019-10-30 DIAGNOSIS — I50.41 ACUTE COMBINED SYSTOLIC AND DIASTOLIC CONGESTIVE HEART FAILURE (HCC): Primary | ICD-10-CM

## 2019-10-30 LAB
ABSOLUTE EOS #: 0.08 K/UL (ref 0–0.44)
ABSOLUTE IMMATURE GRANULOCYTE: 0.03 K/UL (ref 0–0.3)
ABSOLUTE LYMPH #: 1.99 K/UL (ref 1.1–3.7)
ABSOLUTE MONO #: 0.7 K/UL (ref 0.1–1.2)
ANION GAP SERPL CALCULATED.3IONS-SCNC: 16 MMOL/L (ref 9–17)
BASOPHILS # BLD: 1 % (ref 0–2)
BASOPHILS ABSOLUTE: 0.05 K/UL (ref 0–0.2)
BNP INTERPRETATION: ABNORMAL
BUN BLDV-MCNC: 14 MG/DL (ref 6–20)
BUN/CREAT BLD: ABNORMAL (ref 9–20)
CALCIUM SERPL-MCNC: 9.6 MG/DL (ref 8.6–10.4)
CHLORIDE BLD-SCNC: 104 MMOL/L (ref 98–107)
CO2: 21 MMOL/L (ref 20–31)
CREAT SERPL-MCNC: 1.01 MG/DL (ref 0.5–0.9)
D-DIMER QUANTITATIVE: 1.08 MG/L FEU
DIFFERENTIAL TYPE: ABNORMAL
EOSINOPHILS RELATIVE PERCENT: 1 % (ref 1–4)
GFR AFRICAN AMERICAN: >60 ML/MIN
GFR NON-AFRICAN AMERICAN: 57 ML/MIN
GFR SERPL CREATININE-BSD FRML MDRD: ABNORMAL ML/MIN/{1.73_M2}
GFR SERPL CREATININE-BSD FRML MDRD: ABNORMAL ML/MIN/{1.73_M2}
GLUCOSE BLD-MCNC: 111 MG/DL (ref 70–99)
HCT VFR BLD CALC: 35.4 % (ref 36.3–47.1)
HEMOGLOBIN: 11.3 G/DL (ref 11.9–15.1)
IMMATURE GRANULOCYTES: 0 %
LYMPHOCYTES # BLD: 27 % (ref 24–43)
MAGNESIUM: 2 MG/DL (ref 1.6–2.6)
MCH RBC QN AUTO: 29.6 PG (ref 25.2–33.5)
MCHC RBC AUTO-ENTMCNC: 31.9 G/DL (ref 28.4–34.8)
MCV RBC AUTO: 92.7 FL (ref 82.6–102.9)
MONOCYTES # BLD: 10 % (ref 3–12)
NRBC AUTOMATED: 0 PER 100 WBC
PDW BLD-RTO: 16.2 % (ref 11.8–14.4)
PLATELET # BLD: ABNORMAL K/UL (ref 138–453)
PLATELET ESTIMATE: ABNORMAL
PLATELET, FLUORESCENCE: 171 K/UL (ref 138–453)
PLATELET, IMMATURE FRACTION: 11.7 % (ref 1.1–10.3)
PMV BLD AUTO: ABNORMAL FL (ref 8.1–13.5)
POTASSIUM SERPL-SCNC: 4.2 MMOL/L (ref 3.7–5.3)
PRO-BNP: 4893 PG/ML
RBC # BLD: 3.82 M/UL (ref 3.95–5.11)
RBC # BLD: ABNORMAL 10*6/UL
SEG NEUTROPHILS: 61 % (ref 36–65)
SEGMENTED NEUTROPHILS ABSOLUTE COUNT: 4.4 K/UL (ref 1.5–8.1)
SODIUM BLD-SCNC: 141 MMOL/L (ref 135–144)
TROPONIN INTERP: NORMAL
TROPONIN T: NORMAL NG/ML
TROPONIN, HIGH SENSITIVITY: 14 NG/L (ref 0–14)
WBC # BLD: 7.3 K/UL (ref 3.5–11.3)
WBC # BLD: ABNORMAL 10*3/UL

## 2019-10-30 PROCEDURE — 84484 ASSAY OF TROPONIN QUANT: CPT

## 2019-10-30 PROCEDURE — 85055 RETICULATED PLATELET ASSAY: CPT

## 2019-10-30 PROCEDURE — 80048 BASIC METABOLIC PNL TOTAL CA: CPT

## 2019-10-30 PROCEDURE — 93005 ELECTROCARDIOGRAM TRACING: CPT | Performed by: STUDENT IN AN ORGANIZED HEALTH CARE EDUCATION/TRAINING PROGRAM

## 2019-10-30 PROCEDURE — 99285 EMERGENCY DEPT VISIT HI MDM: CPT

## 2019-10-30 PROCEDURE — 83735 ASSAY OF MAGNESIUM: CPT

## 2019-10-30 PROCEDURE — 85379 FIBRIN DEGRADATION QUANT: CPT

## 2019-10-30 PROCEDURE — 71046 X-RAY EXAM CHEST 2 VIEWS: CPT

## 2019-10-30 PROCEDURE — 83880 ASSAY OF NATRIURETIC PEPTIDE: CPT

## 2019-10-30 PROCEDURE — 85025 COMPLETE CBC W/AUTO DIFF WBC: CPT

## 2019-10-30 RX ORDER — IPRATROPIUM BROMIDE AND ALBUTEROL SULFATE 2.5; .5 MG/3ML; MG/3ML
1 SOLUTION RESPIRATORY (INHALATION)
Status: DISCONTINUED | OUTPATIENT
Start: 2019-10-31 | End: 2019-10-30

## 2019-10-30 RX ORDER — ALBUTEROL SULFATE 90 UG/1
2 AEROSOL, METERED RESPIRATORY (INHALATION)
Status: DISCONTINUED | OUTPATIENT
Start: 2019-10-30 | End: 2019-10-30

## 2019-10-30 RX ORDER — ALBUTEROL SULFATE 2.5 MG/3ML
5 SOLUTION RESPIRATORY (INHALATION)
Status: DISCONTINUED | OUTPATIENT
Start: 2019-10-30 | End: 2019-10-30

## 2019-10-30 ASSESSMENT — PAIN DESCRIPTION - PAIN TYPE: TYPE: ACUTE PAIN

## 2019-10-30 ASSESSMENT — PAIN DESCRIPTION - LOCATION: LOCATION: BACK

## 2019-10-31 ENCOUNTER — APPOINTMENT (OUTPATIENT)
Dept: CT IMAGING | Age: 54
DRG: 140 | End: 2019-10-31
Payer: MEDICAID

## 2019-10-31 PROBLEM — I50.42 CHRONIC COMBINED SYSTOLIC AND DIASTOLIC CONGESTIVE HEART FAILURE (HCC): Status: ACTIVE | Noted: 2019-10-31

## 2019-10-31 PROBLEM — I50.9 CONGESTIVE HEART FAILURE (HCC): Status: ACTIVE | Noted: 2019-10-31

## 2019-10-31 LAB
EKG ATRIAL RATE: 92 BPM
EKG P AXIS: 64 DEGREES
EKG P-R INTERVAL: 182 MS
EKG Q-T INTERVAL: 460 MS
EKG QRS DURATION: 156 MS
EKG QTC CALCULATION (BAZETT): 568 MS
EKG R AXIS: 13 DEGREES
EKG T AXIS: 3 DEGREES
EKG VENTRICULAR RATE: 92 BPM
TROPONIN INTERP: NORMAL
TROPONIN T: NORMAL NG/ML
TROPONIN, HIGH SENSITIVITY: 14 NG/L (ref 0–14)

## 2019-10-31 PROCEDURE — 6370000000 HC RX 637 (ALT 250 FOR IP): Performed by: FAMILY MEDICINE

## 2019-10-31 PROCEDURE — 6370000000 HC RX 637 (ALT 250 FOR IP): Performed by: NURSE PRACTITIONER

## 2019-10-31 PROCEDURE — 2580000003 HC RX 258: Performed by: NURSE PRACTITIONER

## 2019-10-31 PROCEDURE — 93010 ELECTROCARDIOGRAM REPORT: CPT | Performed by: INTERNAL MEDICINE

## 2019-10-31 PROCEDURE — 84484 ASSAY OF TROPONIN QUANT: CPT

## 2019-10-31 PROCEDURE — 1200000000 HC SEMI PRIVATE

## 2019-10-31 PROCEDURE — 96374 THER/PROPH/DIAG INJ IV PUSH: CPT

## 2019-10-31 PROCEDURE — 6360000004 HC RX CONTRAST MEDICATION: Performed by: STUDENT IN AN ORGANIZED HEALTH CARE EDUCATION/TRAINING PROGRAM

## 2019-10-31 PROCEDURE — 6360000002 HC RX W HCPCS: Performed by: FAMILY MEDICINE

## 2019-10-31 PROCEDURE — 6360000002 HC RX W HCPCS: Performed by: STUDENT IN AN ORGANIZED HEALTH CARE EDUCATION/TRAINING PROGRAM

## 2019-10-31 PROCEDURE — 6360000002 HC RX W HCPCS: Performed by: NURSE PRACTITIONER

## 2019-10-31 PROCEDURE — 71260 CT THORAX DX C+: CPT

## 2019-10-31 PROCEDURE — 99223 1ST HOSP IP/OBS HIGH 75: CPT | Performed by: FAMILY MEDICINE

## 2019-10-31 RX ORDER — ATORVASTATIN CALCIUM 80 MG/1
40 TABLET, FILM COATED ORAL NIGHTLY
Status: DISCONTINUED | OUTPATIENT
Start: 2019-10-31 | End: 2019-11-01 | Stop reason: HOSPADM

## 2019-10-31 RX ORDER — AZITHROMYCIN 250 MG/1
500 TABLET, FILM COATED ORAL DAILY
Status: DISCONTINUED | OUTPATIENT
Start: 2019-10-31 | End: 2019-10-31

## 2019-10-31 RX ORDER — METHYLPREDNISOLONE SODIUM SUCCINATE 125 MG/2ML
40 INJECTION, POWDER, LYOPHILIZED, FOR SOLUTION INTRAMUSCULAR; INTRAVENOUS EVERY 6 HOURS
Status: DISCONTINUED | OUTPATIENT
Start: 2019-10-31 | End: 2019-11-01 | Stop reason: HOSPADM

## 2019-10-31 RX ORDER — ALBUTEROL SULFATE 2.5 MG/3ML
2.5 SOLUTION RESPIRATORY (INHALATION)
Status: DISCONTINUED | OUTPATIENT
Start: 2019-10-31 | End: 2019-11-01 | Stop reason: HOSPADM

## 2019-10-31 RX ORDER — AMIODARONE HYDROCHLORIDE 200 MG/1
200 TABLET ORAL DAILY
Status: DISCONTINUED | OUTPATIENT
Start: 2019-10-31 | End: 2019-11-01 | Stop reason: HOSPADM

## 2019-10-31 RX ORDER — AZITHROMYCIN 250 MG/1
250 TABLET, FILM COATED ORAL DAILY
Status: DISCONTINUED | OUTPATIENT
Start: 2019-11-01 | End: 2019-10-31

## 2019-10-31 RX ORDER — SPIRONOLACTONE 25 MG/1
25 TABLET ORAL DAILY
Status: DISCONTINUED | OUTPATIENT
Start: 2019-10-31 | End: 2019-11-01 | Stop reason: HOSPADM

## 2019-10-31 RX ORDER — LISINOPRIL 10 MG/1
5 TABLET ORAL DAILY
Status: DISCONTINUED | OUTPATIENT
Start: 2019-10-31 | End: 2019-11-01 | Stop reason: HOSPADM

## 2019-10-31 RX ORDER — AMITRIPTYLINE HYDROCHLORIDE 25 MG/1
25 TABLET, FILM COATED ORAL NIGHTLY
Status: DISCONTINUED | OUTPATIENT
Start: 2019-10-31 | End: 2019-11-01 | Stop reason: HOSPADM

## 2019-10-31 RX ORDER — FUROSEMIDE 10 MG/ML
40 INJECTION INTRAMUSCULAR; INTRAVENOUS 2 TIMES DAILY
Status: DISCONTINUED | OUTPATIENT
Start: 2019-10-31 | End: 2019-10-31

## 2019-10-31 RX ORDER — ACETAMINOPHEN 325 MG/1
650 TABLET ORAL EVERY 4 HOURS PRN
Status: DISCONTINUED | OUTPATIENT
Start: 2019-10-31 | End: 2019-11-01 | Stop reason: HOSPADM

## 2019-10-31 RX ORDER — MULTIVITAMIN WITH FOLIC ACID 400 MCG
1 TABLET ORAL DAILY
Status: DISCONTINUED | OUTPATIENT
Start: 2019-10-31 | End: 2019-11-01 | Stop reason: HOSPADM

## 2019-10-31 RX ORDER — NICOTINE 21 MG/24HR
1 PATCH, TRANSDERMAL 24 HOURS TRANSDERMAL DAILY PRN
Status: DISCONTINUED | OUTPATIENT
Start: 2019-10-31 | End: 2019-11-01 | Stop reason: HOSPADM

## 2019-10-31 RX ORDER — CARVEDILOL 12.5 MG/1
6.25 TABLET ORAL 2 TIMES DAILY WITH MEALS
Status: DISCONTINUED | OUTPATIENT
Start: 2019-10-31 | End: 2019-11-01 | Stop reason: HOSPADM

## 2019-10-31 RX ORDER — SODIUM CHLORIDE 0.9 % (FLUSH) 0.9 %
10 SYRINGE (ML) INJECTION EVERY 12 HOURS SCHEDULED
Status: DISCONTINUED | OUTPATIENT
Start: 2019-10-31 | End: 2019-11-01 | Stop reason: HOSPADM

## 2019-10-31 RX ORDER — ONDANSETRON 2 MG/ML
4 INJECTION INTRAMUSCULAR; INTRAVENOUS ONCE
Status: COMPLETED | OUTPATIENT
Start: 2019-10-31 | End: 2019-10-31

## 2019-10-31 RX ORDER — IPRATROPIUM BROMIDE AND ALBUTEROL SULFATE 2.5; .5 MG/3ML; MG/3ML
3 SOLUTION RESPIRATORY (INHALATION) 2 TIMES DAILY PRN
Status: DISCONTINUED | OUTPATIENT
Start: 2019-10-31 | End: 2019-11-01 | Stop reason: HOSPADM

## 2019-10-31 RX ORDER — PANTOPRAZOLE SODIUM 40 MG/1
40 TABLET, DELAYED RELEASE ORAL
Status: DISCONTINUED | OUTPATIENT
Start: 2019-10-31 | End: 2019-11-01 | Stop reason: HOSPADM

## 2019-10-31 RX ORDER — BUMETANIDE 1 MG/1
1 TABLET ORAL 2 TIMES DAILY
Status: DISCONTINUED | OUTPATIENT
Start: 2019-11-01 | End: 2019-11-01

## 2019-10-31 RX ORDER — DIPHENHYDRAMINE HYDROCHLORIDE 50 MG/ML
25 INJECTION INTRAMUSCULAR; INTRAVENOUS NIGHTLY PRN
Status: DISCONTINUED | OUTPATIENT
Start: 2019-10-31 | End: 2019-11-01 | Stop reason: HOSPADM

## 2019-10-31 RX ORDER — FUROSEMIDE 10 MG/ML
40 INJECTION INTRAMUSCULAR; INTRAVENOUS ONCE
Status: COMPLETED | OUTPATIENT
Start: 2019-10-31 | End: 2019-10-31

## 2019-10-31 RX ORDER — FLUTICASONE PROPIONATE 50 MCG
1 SPRAY, SUSPENSION (ML) NASAL DAILY
Status: DISCONTINUED | OUTPATIENT
Start: 2019-10-31 | End: 2019-11-01 | Stop reason: HOSPADM

## 2019-10-31 RX ORDER — GUAIFENESIN DEXTROMETHORPHAN HYDROBROMIDE ORAL SOLUTION 10; 100 MG/5ML; MG/5ML
10 SOLUTION ORAL EVERY 4 HOURS PRN
Status: DISCONTINUED | OUTPATIENT
Start: 2019-10-31 | End: 2019-11-01 | Stop reason: HOSPADM

## 2019-10-31 RX ORDER — ONDANSETRON 2 MG/ML
4 INJECTION INTRAMUSCULAR; INTRAVENOUS EVERY 6 HOURS PRN
Status: DISCONTINUED | OUTPATIENT
Start: 2019-10-31 | End: 2019-11-01 | Stop reason: HOSPADM

## 2019-10-31 RX ORDER — SODIUM CHLORIDE 0.9 % (FLUSH) 0.9 %
10 SYRINGE (ML) INJECTION PRN
Status: DISCONTINUED | OUTPATIENT
Start: 2019-10-31 | End: 2019-11-01 | Stop reason: HOSPADM

## 2019-10-31 RX ORDER — B12/LEVOMEFOLATE CALCIUM/B-6 2-1.13-25
1 TABLET ORAL DAILY
Status: DISCONTINUED | OUTPATIENT
Start: 2019-10-31 | End: 2019-11-01 | Stop reason: HOSPADM

## 2019-10-31 RX ORDER — DOXYCYCLINE HYCLATE 100 MG
100 TABLET ORAL EVERY 12 HOURS SCHEDULED
Status: DISCONTINUED | OUTPATIENT
Start: 2019-10-31 | End: 2019-11-01 | Stop reason: HOSPADM

## 2019-10-31 RX ADMIN — SPIRONOLACTONE 25 MG: 25 TABLET ORAL at 08:42

## 2019-10-31 RX ADMIN — AMITRIPTYLINE HYDROCHLORIDE 25 MG: 25 TABLET, FILM COATED ORAL at 22:12

## 2019-10-31 RX ADMIN — AMIODARONE HYDROCHLORIDE 200 MG: 200 TABLET ORAL at 08:43

## 2019-10-31 RX ADMIN — METHYLPREDNISOLONE SODIUM SUCCINATE 40 MG: 125 INJECTION, POWDER, FOR SOLUTION INTRAMUSCULAR; INTRAVENOUS at 22:13

## 2019-10-31 RX ADMIN — FLUTICASONE PROPIONATE 1 SPRAY: 50 SPRAY, METERED NASAL at 08:43

## 2019-10-31 RX ADMIN — SODIUM CHLORIDE, PRESERVATIVE FREE 10 ML: 5 INJECTION INTRAVENOUS at 08:45

## 2019-10-31 RX ADMIN — IOHEXOL 75 ML: 350 INJECTION, SOLUTION INTRAVENOUS at 01:59

## 2019-10-31 RX ADMIN — ATORVASTATIN CALCIUM 40 MG: 80 TABLET, FILM COATED ORAL at 22:13

## 2019-10-31 RX ADMIN — ONDANSETRON 4 MG: 2 INJECTION INTRAMUSCULAR; INTRAVENOUS at 01:53

## 2019-10-31 RX ADMIN — DIPHENHYDRAMINE HYDROCHLORIDE 25 MG: 50 INJECTION, SOLUTION INTRAMUSCULAR; INTRAVENOUS at 22:13

## 2019-10-31 RX ADMIN — SODIUM CHLORIDE, PRESERVATIVE FREE 10 ML: 5 INJECTION INTRAVENOUS at 22:17

## 2019-10-31 RX ADMIN — FUROSEMIDE 40 MG: 10 INJECTION, SOLUTION INTRAMUSCULAR; INTRAVENOUS at 09:28

## 2019-10-31 RX ADMIN — LISINOPRIL 5 MG: 10 TABLET ORAL at 08:39

## 2019-10-31 RX ADMIN — ACETAMINOPHEN 650 MG: 325 TABLET ORAL at 09:27

## 2019-10-31 RX ADMIN — DOXYCYCLINE HYCLATE 100 MG: 100 TABLET, COATED ORAL at 22:12

## 2019-10-31 RX ADMIN — THERA TABS 1 TABLET: TAB at 08:42

## 2019-10-31 RX ADMIN — Medication 1 TABLET: at 08:42

## 2019-10-31 RX ADMIN — METHYLPREDNISOLONE SODIUM SUCCINATE 40 MG: 125 INJECTION, POWDER, FOR SOLUTION INTRAMUSCULAR; INTRAVENOUS at 14:45

## 2019-10-31 RX ADMIN — FUROSEMIDE 40 MG: 10 INJECTION, SOLUTION INTRAMUSCULAR; INTRAVENOUS at 00:39

## 2019-10-31 RX ADMIN — CARVEDILOL 6.25 MG: 12.5 TABLET, FILM COATED ORAL at 08:41

## 2019-10-31 RX ADMIN — PANTOPRAZOLE SODIUM 40 MG: 40 TABLET, DELAYED RELEASE ORAL at 06:47

## 2019-10-31 RX ADMIN — CARVEDILOL 6.25 MG: 12.5 TABLET, FILM COATED ORAL at 18:47

## 2019-10-31 ASSESSMENT — ENCOUNTER SYMPTOMS
RHINORRHEA: 0
BLOOD IN STOOL: 0
WHEEZING: 1
CHEST TIGHTNESS: 0
CONSTIPATION: 0
BACK PAIN: 1
PHOTOPHOBIA: 0
COUGH: 1
NAUSEA: 0
DIARRHEA: 0
VOMITING: 0
SHORTNESS OF BREATH: 1
ABDOMINAL PAIN: 0

## 2019-10-31 ASSESSMENT — PAIN SCALES - GENERAL
PAINLEVEL_OUTOF10: 6
PAINLEVEL_OUTOF10: 6
PAINLEVEL_OUTOF10: 8

## 2019-10-31 ASSESSMENT — PAIN DESCRIPTION - LOCATION
LOCATION: BACK
LOCATION: BACK

## 2019-10-31 ASSESSMENT — PAIN DESCRIPTION - ORIENTATION
ORIENTATION: LOWER
ORIENTATION: LOWER

## 2019-11-01 ENCOUNTER — APPOINTMENT (OUTPATIENT)
Dept: GENERAL RADIOLOGY | Age: 54
DRG: 140 | End: 2019-11-01
Payer: MEDICAID

## 2019-11-01 VITALS
SYSTOLIC BLOOD PRESSURE: 104 MMHG | OXYGEN SATURATION: 98 % | TEMPERATURE: 97.7 F | HEART RATE: 44 BPM | RESPIRATION RATE: 18 BRPM | BODY MASS INDEX: 28.5 KG/M2 | HEIGHT: 62 IN | WEIGHT: 154.9 LBS | DIASTOLIC BLOOD PRESSURE: 61 MMHG

## 2019-11-01 LAB
ANION GAP SERPL CALCULATED.3IONS-SCNC: 15 MMOL/L (ref 9–17)
BILIRUBIN URINE: NEGATIVE
BNP INTERPRETATION: ABNORMAL
BUN BLDV-MCNC: 19 MG/DL (ref 6–20)
BUN/CREAT BLD: ABNORMAL (ref 9–20)
CALCIUM SERPL-MCNC: 9.1 MG/DL (ref 8.6–10.4)
CHLORIDE BLD-SCNC: 99 MMOL/L (ref 98–107)
CHOLESTEROL/HDL RATIO: 3.5
CHOLESTEROL: 209 MG/DL
CO2: 23 MMOL/L (ref 20–31)
COLOR: YELLOW
COMMENT UA: NORMAL
CREAT SERPL-MCNC: 0.98 MG/DL (ref 0.5–0.9)
GFR AFRICAN AMERICAN: >60 ML/MIN
GFR NON-AFRICAN AMERICAN: 59 ML/MIN
GFR SERPL CREATININE-BSD FRML MDRD: ABNORMAL ML/MIN/{1.73_M2}
GFR SERPL CREATININE-BSD FRML MDRD: ABNORMAL ML/MIN/{1.73_M2}
GLUCOSE BLD-MCNC: 151 MG/DL (ref 70–99)
GLUCOSE URINE: NEGATIVE
HCT VFR BLD CALC: 34.4 % (ref 36.3–47.1)
HDLC SERPL-MCNC: 59 MG/DL
HEMOGLOBIN: 10.9 G/DL (ref 11.9–15.1)
KETONES, URINE: NEGATIVE
LDL CHOLESTEROL: 131 MG/DL (ref 0–130)
LEUKOCYTE ESTERASE, URINE: NEGATIVE
MAGNESIUM: 1.7 MG/DL (ref 1.6–2.6)
MCH RBC QN AUTO: 29.1 PG (ref 25.2–33.5)
MCHC RBC AUTO-ENTMCNC: 31.7 G/DL (ref 28.4–34.8)
MCV RBC AUTO: 92 FL (ref 82.6–102.9)
NITRITE, URINE: NEGATIVE
NRBC AUTOMATED: 0 PER 100 WBC
PDW BLD-RTO: 15.7 % (ref 11.8–14.4)
PH UA: 6 (ref 5–8)
PLATELET # BLD: ABNORMAL K/UL (ref 138–453)
PLATELET, FLUORESCENCE: NORMAL K/UL (ref 138–453)
PMV BLD AUTO: ABNORMAL FL (ref 8.1–13.5)
POTASSIUM SERPL-SCNC: 4.1 MMOL/L (ref 3.7–5.3)
PRO-BNP: 2474 PG/ML
PROTEIN UA: NEGATIVE
RBC # BLD: 3.74 M/UL (ref 3.95–5.11)
SODIUM BLD-SCNC: 137 MMOL/L (ref 135–144)
SPECIFIC GRAVITY UA: 1.01 (ref 1–1.03)
TRIGL SERPL-MCNC: 95 MG/DL
TSH SERPL DL<=0.05 MIU/L-ACNC: 0.3 MIU/L (ref 0.3–5)
TURBIDITY: CLEAR
URINE HGB: NEGATIVE
UROBILINOGEN, URINE: NORMAL
VLDLC SERPL CALC-MCNC: ABNORMAL MG/DL (ref 1–30)
WBC # BLD: 6.1 K/UL (ref 3.5–11.3)

## 2019-11-01 PROCEDURE — 2500000003 HC RX 250 WO HCPCS: Performed by: FAMILY MEDICINE

## 2019-11-01 PROCEDURE — 6370000000 HC RX 637 (ALT 250 FOR IP): Performed by: NURSE PRACTITIONER

## 2019-11-01 PROCEDURE — 83880 ASSAY OF NATRIURETIC PEPTIDE: CPT

## 2019-11-01 PROCEDURE — 99232 SBSQ HOSP IP/OBS MODERATE 35: CPT | Performed by: FAMILY MEDICINE

## 2019-11-01 PROCEDURE — 85027 COMPLETE CBC AUTOMATED: CPT

## 2019-11-01 PROCEDURE — 81003 URINALYSIS AUTO W/O SCOPE: CPT

## 2019-11-01 PROCEDURE — 6370000000 HC RX 637 (ALT 250 FOR IP): Performed by: FAMILY MEDICINE

## 2019-11-01 PROCEDURE — 84443 ASSAY THYROID STIM HORMONE: CPT

## 2019-11-01 PROCEDURE — 2580000003 HC RX 258: Performed by: NURSE PRACTITIONER

## 2019-11-01 PROCEDURE — 97166 OT EVAL MOD COMPLEX 45 MIN: CPT

## 2019-11-01 PROCEDURE — 6360000002 HC RX W HCPCS: Performed by: FAMILY MEDICINE

## 2019-11-01 PROCEDURE — 83735 ASSAY OF MAGNESIUM: CPT

## 2019-11-01 PROCEDURE — 85055 RETICULATED PLATELET ASSAY: CPT

## 2019-11-01 PROCEDURE — 71046 X-RAY EXAM CHEST 2 VIEWS: CPT

## 2019-11-01 PROCEDURE — 97535 SELF CARE MNGMENT TRAINING: CPT

## 2019-11-01 PROCEDURE — 80061 LIPID PANEL: CPT

## 2019-11-01 PROCEDURE — 36415 COLL VENOUS BLD VENIPUNCTURE: CPT

## 2019-11-01 PROCEDURE — 80048 BASIC METABOLIC PNL TOTAL CA: CPT

## 2019-11-01 RX ORDER — GUAIFENESIN DEXTROMETHORPHAN HYDROBROMIDE ORAL SOLUTION 10; 100 MG/5ML; MG/5ML
10 SOLUTION ORAL EVERY 4 HOURS PRN
Qty: 200 ML | Refills: 0 | Status: ON HOLD | OUTPATIENT
Start: 2019-11-01 | End: 2020-05-18 | Stop reason: ALTCHOICE

## 2019-11-01 RX ORDER — FLUTICASONE PROPIONATE 50 MCG
1 SPRAY, SUSPENSION (ML) NASAL DAILY
Qty: 1 BOTTLE | Refills: 3 | Status: SHIPPED | OUTPATIENT
Start: 2019-11-01 | End: 2022-06-10 | Stop reason: ALTCHOICE

## 2019-11-01 RX ORDER — NICOTINE 21 MG/24HR
1 PATCH, TRANSDERMAL 24 HOURS TRANSDERMAL DAILY PRN
Qty: 30 PATCH | Refills: 3 | Status: SHIPPED | OUTPATIENT
Start: 2019-11-01 | End: 2020-01-17

## 2019-11-01 RX ORDER — PREDNISONE 20 MG/1
20 TABLET ORAL 2 TIMES DAILY
Qty: 10 TABLET | Refills: 0 | Status: SHIPPED | OUTPATIENT
Start: 2019-11-01 | End: 2019-11-06

## 2019-11-01 RX ORDER — BUMETANIDE 0.25 MG/ML
2 INJECTION, SOLUTION INTRAMUSCULAR; INTRAVENOUS 2 TIMES DAILY
Status: DISCONTINUED | OUTPATIENT
Start: 2019-11-01 | End: 2019-11-01 | Stop reason: HOSPADM

## 2019-11-01 RX ORDER — BUMETANIDE 1 MG/1
1 TABLET ORAL 2 TIMES DAILY
Status: DISCONTINUED | OUTPATIENT
Start: 2019-11-02 | End: 2019-11-01 | Stop reason: HOSPADM

## 2019-11-01 RX ORDER — DOXYCYCLINE HYCLATE 100 MG
100 TABLET ORAL EVERY 12 HOURS SCHEDULED
Qty: 20 TABLET | Refills: 0 | Status: SHIPPED | OUTPATIENT
Start: 2019-11-01 | End: 2019-11-11

## 2019-11-01 RX ADMIN — Medication 1 TABLET: at 10:01

## 2019-11-01 RX ADMIN — LISINOPRIL 5 MG: 10 TABLET ORAL at 10:02

## 2019-11-01 RX ADMIN — PANTOPRAZOLE SODIUM 40 MG: 40 TABLET, DELAYED RELEASE ORAL at 06:10

## 2019-11-01 RX ADMIN — METHYLPREDNISOLONE SODIUM SUCCINATE 40 MG: 125 INJECTION, POWDER, FOR SOLUTION INTRAMUSCULAR; INTRAVENOUS at 02:30

## 2019-11-01 RX ADMIN — CARVEDILOL 6.25 MG: 12.5 TABLET, FILM COATED ORAL at 10:02

## 2019-11-01 RX ADMIN — AMIODARONE HYDROCHLORIDE 200 MG: 200 TABLET ORAL at 10:02

## 2019-11-01 RX ADMIN — DOXYCYCLINE HYCLATE 100 MG: 100 TABLET, COATED ORAL at 10:01

## 2019-11-01 RX ADMIN — SPIRONOLACTONE 25 MG: 25 TABLET ORAL at 10:02

## 2019-11-01 RX ADMIN — THERA TABS 1 TABLET: TAB at 10:01

## 2019-11-01 RX ADMIN — BUMETANIDE 2 MG: 0.25 INJECTION INTRAMUSCULAR; INTRAVENOUS at 10:03

## 2019-11-01 RX ADMIN — SODIUM CHLORIDE, PRESERVATIVE FREE 10 ML: 5 INJECTION INTRAVENOUS at 10:09

## 2019-11-01 RX ADMIN — METHYLPREDNISOLONE SODIUM SUCCINATE 40 MG: 125 INJECTION, POWDER, FOR SOLUTION INTRAMUSCULAR; INTRAVENOUS at 10:02

## 2019-11-01 ASSESSMENT — PAIN DESCRIPTION - ORIENTATION: ORIENTATION: LOWER

## 2019-11-01 ASSESSMENT — ENCOUNTER SYMPTOMS
BLOOD IN STOOL: 0
VOMITING: 0
WHEEZING: 0
RHINORRHEA: 0
CONSTIPATION: 0
NAUSEA: 0
SHORTNESS OF BREATH: 0
COUGH: 1
ABDOMINAL PAIN: 0
CHEST TIGHTNESS: 0
DIARRHEA: 0

## 2019-11-01 ASSESSMENT — PAIN DESCRIPTION - PAIN TYPE: TYPE: ACUTE PAIN

## 2019-11-01 ASSESSMENT — PAIN SCALES - GENERAL
PAINLEVEL_OUTOF10: 7
PAINLEVEL_OUTOF10: 8

## 2019-11-01 ASSESSMENT — PAIN DESCRIPTION - LOCATION: LOCATION: ABDOMEN;BACK

## 2019-11-04 ENCOUNTER — CARE COORDINATION (OUTPATIENT)
Dept: FAMILY MEDICINE CLINIC | Age: 54
End: 2019-11-04

## 2019-11-06 ENCOUNTER — OFFICE VISIT (OUTPATIENT)
Dept: FAMILY MEDICINE CLINIC | Age: 54
End: 2019-11-06
Payer: MEDICAID

## 2019-11-06 VITALS
BODY MASS INDEX: 26.13 KG/M2 | HEIGHT: 62 IN | HEART RATE: 64 BPM | SYSTOLIC BLOOD PRESSURE: 93 MMHG | WEIGHT: 142 LBS | DIASTOLIC BLOOD PRESSURE: 61 MMHG

## 2019-11-06 DIAGNOSIS — J44.1 COPD EXACERBATION (HCC): Primary | ICD-10-CM

## 2019-11-06 DIAGNOSIS — Z12.11 COLON CANCER SCREENING: ICD-10-CM

## 2019-11-06 DIAGNOSIS — Z12.31 ENCOUNTER FOR SCREENING MAMMOGRAM FOR BREAST CANCER: ICD-10-CM

## 2019-11-06 DIAGNOSIS — Z13.220 SCREENING FOR HYPERLIPIDEMIA: ICD-10-CM

## 2019-11-06 LAB — HBA1C MFR BLD: 5.7 %

## 2019-11-06 PROCEDURE — 99211 OFF/OP EST MAY X REQ PHY/QHP: CPT | Performed by: FAMILY MEDICINE

## 2019-11-06 PROCEDURE — 1111F DSCHRG MED/CURRENT MED MERGE: CPT | Performed by: STUDENT IN AN ORGANIZED HEALTH CARE EDUCATION/TRAINING PROGRAM

## 2019-11-06 PROCEDURE — 83036 HEMOGLOBIN GLYCOSYLATED A1C: CPT | Performed by: STUDENT IN AN ORGANIZED HEALTH CARE EDUCATION/TRAINING PROGRAM

## 2019-11-06 PROCEDURE — 99214 OFFICE O/P EST MOD 30 MIN: CPT | Performed by: STUDENT IN AN ORGANIZED HEALTH CARE EDUCATION/TRAINING PROGRAM

## 2019-11-06 RX ORDER — DOCUSATE SODIUM 100 MG/1
100 CAPSULE, LIQUID FILLED ORAL 2 TIMES DAILY PRN
Qty: 60 CAPSULE | Refills: 0 | Status: SHIPPED | OUTPATIENT
Start: 2019-11-06 | End: 2019-12-06

## 2019-11-06 ASSESSMENT — ENCOUNTER SYMPTOMS
ABDOMINAL PAIN: 1
SHORTNESS OF BREATH: 0
COUGH: 0
VOMITING: 0
NAUSEA: 0

## 2019-11-27 DIAGNOSIS — I50.22 CHRONIC SYSTOLIC HEART FAILURE (HCC): ICD-10-CM

## 2019-11-29 RX ORDER — SPIRONOLACTONE 25 MG/1
TABLET ORAL
Qty: 28 TABLET | Refills: 2 | Status: SHIPPED | OUTPATIENT
Start: 2019-11-29 | End: 2020-02-21 | Stop reason: SDUPTHER

## 2019-11-29 RX ORDER — OMEPRAZOLE 20 MG/1
CAPSULE, DELAYED RELEASE ORAL
Qty: 30 CAPSULE | Refills: 2 | Status: SHIPPED | OUTPATIENT
Start: 2019-11-29 | End: 2020-02-21 | Stop reason: SDUPTHER

## 2019-12-03 ENCOUNTER — CARE COORDINATION (OUTPATIENT)
Dept: FAMILY MEDICINE CLINIC | Age: 54
End: 2019-12-03

## 2019-12-05 ENCOUNTER — TELEPHONE (OUTPATIENT)
Dept: FAMILY MEDICINE CLINIC | Age: 54
End: 2019-12-05

## 2019-12-09 ENCOUNTER — TELEPHONE (OUTPATIENT)
Dept: FAMILY MEDICINE CLINIC | Age: 54
End: 2019-12-09

## 2019-12-10 ENCOUNTER — TELEPHONE (OUTPATIENT)
Dept: FAMILY MEDICINE CLINIC | Age: 54
End: 2019-12-10

## 2019-12-11 ENCOUNTER — TELEPHONE (OUTPATIENT)
Dept: FAMILY MEDICINE CLINIC | Age: 54
End: 2019-12-11

## 2019-12-12 ENCOUNTER — TELEPHONE (OUTPATIENT)
Dept: FAMILY MEDICINE CLINIC | Age: 54
End: 2019-12-12

## 2019-12-13 ENCOUNTER — TELEPHONE (OUTPATIENT)
Dept: FAMILY MEDICINE CLINIC | Age: 54
End: 2019-12-13

## 2019-12-13 DIAGNOSIS — Z12.11 COLON CANCER SCREENING: ICD-10-CM

## 2019-12-17 ENCOUNTER — TELEPHONE (OUTPATIENT)
Dept: FAMILY MEDICINE CLINIC | Age: 54
End: 2019-12-17

## 2019-12-18 ENCOUNTER — TELEPHONE (OUTPATIENT)
Dept: FAMILY MEDICINE CLINIC | Age: 54
End: 2019-12-18

## 2019-12-23 ENCOUNTER — TELEPHONE (OUTPATIENT)
Dept: FAMILY MEDICINE CLINIC | Age: 54
End: 2019-12-23

## 2019-12-24 ENCOUNTER — TELEPHONE (OUTPATIENT)
Dept: FAMILY MEDICINE CLINIC | Age: 54
End: 2019-12-24

## 2019-12-26 DIAGNOSIS — I50.22 CHRONIC SYSTOLIC HEART FAILURE (HCC): ICD-10-CM

## 2019-12-26 DIAGNOSIS — G43.809 OTHER MIGRAINE WITHOUT STATUS MIGRAINOSUS, NOT INTRACTABLE: ICD-10-CM

## 2019-12-27 RX ORDER — BUMETANIDE 1 MG/1
TABLET ORAL
Qty: 60 TABLET | Refills: 3 | Status: SHIPPED | OUTPATIENT
Start: 2019-12-27 | End: 2020-04-17

## 2019-12-27 RX ORDER — AMITRIPTYLINE HYDROCHLORIDE 25 MG/1
TABLET, FILM COATED ORAL
Qty: 30 TABLET | Refills: 3 | Status: SHIPPED | OUTPATIENT
Start: 2019-12-27 | End: 2020-04-17

## 2020-01-02 NOTE — TELEPHONE ENCOUNTER
Attempted to follow up with patient to submit documents to [address] [place] [address]. Patient stated that she was not at home but would be able to meet tomorrow. Reminded patient of the upcoming deadline and documents needed.

## 2020-01-02 NOTE — TELEPHONE ENCOUNTER
Spoke with patient to follow up regarding previous conversation. Patient unable to submit documents to [address] [place] [address] as it is closed today. Met with patient at a friend's home and obtained documents. It should be noted that patient gave this  a letter from her bank and proof of income from [employer]. Several documents that were requested were not present. This  submitted the available documents electronically to [address] [place] [address] and returned them to her.  to follow up with patient after the holidays.

## 2020-01-13 ENCOUNTER — TELEPHONE (OUTPATIENT)
Dept: FAMILY MEDICINE CLINIC | Age: 55
End: 2020-01-13

## 2020-01-17 ENCOUNTER — CARE COORDINATION (OUTPATIENT)
Dept: FAMILY MEDICINE CLINIC | Age: 55
End: 2020-01-17

## 2020-01-17 NOTE — CARE COORDINATION
tablet, Rfl: 3    sennosides-docusate sodium (SENOKOT-S) 8.6-50 MG tablet, Take 1 tablet by mouth daily, Disp: 20 tablet, Rfl: 0    albuterol sulfate HFA (VENTOLIN HFA) 108 (90 Base) MCG/ACT inhaler, INHALE 2 PUFFS INTO THE LUNGS EVERY 6 HOURS AS NEEDED FOR WHEEZING, Disp: 18 g, Rfl: 3    Multiple Vitamin (MULTIVITAMIN) tablet, TAKE ONE TABLET BY MOUTH ONE TIME A DAY, Disp: 30 tablet, Rfl: 3    Umeclidinium Bromide (INCRUSE ELLIPTA) 62.5 MCG/INH AEPB, INHALE 1 PUFF INTO THE LUNGS DAILY STOP SPIRIVA (Patient not taking: Reported on 11/4/2019), Disp: 1 each, Rfl: 3    fluticasone (FLONASE) 50 MCG/ACT nasal spray, 1 spray by Nasal route daily, Disp: 1 Bottle, Rfl: 3    dextromethorphan-guaiFENesin (ROBITUSSIN-DM)  MG/5ML syrup, Take 10 mLs by mouth every 4 hours as needed for Cough (Patient not taking: Reported on 11/4/2019), Disp: 200 mL, Rfl: 0    ipratropium-albuterol (DUONEB) 0.5-2.5 (3) MG/3ML SOLN nebulizer solution, Inhale 3 mLs into the lungs 2 times daily as needed for Shortness of Breath (Patient not taking: Reported on 12/3/2019), Disp: 360 mL, Rfl: 1    Spacer/Aero-Holding Chambers (E-Z SPACER) ELVER, 1 Device by Does not apply route daily (Patient not taking: Reported on 11/4/2019), Disp: 1 Device, Rfl: 0    folic acid-pyridoxine-cyanocobalamine (FOLTX) 2.5-25-1 MG TABS tablet, Take 1 tablet by mouth daily (Patient not taking: Reported on 8/29/2019), Disp: 30 tablet, Rfl: 0    Medication discrepancies  She needs script refill for duoneb aerosols   Refill request sent to PCP office    Medical  Skin warm and dry. Lungs clear in all lobes. She reports nasal congestion and minimal yellow sputum production. B/P 116/75  Pulse 68   resp 17/min at rest.   No edema. Appetite good. Abdomen soft and non distended. No edema. Reports minimal dyspnea with exertion and intermittent fatigue.    She reports re occurring vaginal discharge and cramping   Rui Liriano missed appointment for mammogram testing    Spiritual   Reports spiritual needs are met through personal prayer, meditation    Plan   Reschedule mammogram, schedule for revisit with PCP for evaluation of cough and pap testing. [employer] revisit in 3 weeks.     Kelsea Bernabe RN, BSN  [employer]

## 2020-01-20 RX ORDER — IPRATROPIUM BROMIDE AND ALBUTEROL SULFATE 2.5; .5 MG/3ML; MG/3ML
3 SOLUTION RESPIRATORY (INHALATION) 2 TIMES DAILY PRN
Qty: 360 ML | Refills: 3 | Status: SHIPPED | OUTPATIENT
Start: 2020-01-20 | End: 2022-04-04 | Stop reason: SDUPTHER

## 2020-01-20 NOTE — TELEPHONE ENCOUNTER
Dr Samie Severin request refill for duo neb aerosol. She reports taking 2-3 x day as needed.     Medication refill request pended    Thank you

## 2020-01-21 ENCOUNTER — CARE COORDINATION (OUTPATIENT)
Dept: FAMILY MEDICINE CLINIC | Age: 55
End: 2020-01-21

## 2020-01-22 ENCOUNTER — TELEPHONE (OUTPATIENT)
Dept: FAMILY MEDICINE CLINIC | Age: 55
End: 2020-01-22

## 2020-01-22 ENCOUNTER — OFFICE VISIT (OUTPATIENT)
Dept: FAMILY MEDICINE CLINIC | Age: 55
End: 2020-01-22
Payer: MEDICAID

## 2020-01-22 VITALS
TEMPERATURE: 96.9 F | WEIGHT: 145 LBS | HEART RATE: 83 BPM | OXYGEN SATURATION: 97 % | BODY MASS INDEX: 26.52 KG/M2 | SYSTOLIC BLOOD PRESSURE: 126 MMHG | DIASTOLIC BLOOD PRESSURE: 73 MMHG

## 2020-01-22 PROCEDURE — G8417 CALC BMI ABV UP PARAM F/U: HCPCS | Performed by: STUDENT IN AN ORGANIZED HEALTH CARE EDUCATION/TRAINING PROGRAM

## 2020-01-22 PROCEDURE — G8484 FLU IMMUNIZE NO ADMIN: HCPCS | Performed by: STUDENT IN AN ORGANIZED HEALTH CARE EDUCATION/TRAINING PROGRAM

## 2020-01-22 PROCEDURE — 3023F SPIROM DOC REV: CPT | Performed by: STUDENT IN AN ORGANIZED HEALTH CARE EDUCATION/TRAINING PROGRAM

## 2020-01-22 PROCEDURE — G8926 SPIRO NO PERF OR DOC: HCPCS | Performed by: STUDENT IN AN ORGANIZED HEALTH CARE EDUCATION/TRAINING PROGRAM

## 2020-01-22 PROCEDURE — G8427 DOCREV CUR MEDS BY ELIG CLIN: HCPCS | Performed by: STUDENT IN AN ORGANIZED HEALTH CARE EDUCATION/TRAINING PROGRAM

## 2020-01-22 PROCEDURE — 3017F COLORECTAL CA SCREEN DOC REV: CPT | Performed by: STUDENT IN AN ORGANIZED HEALTH CARE EDUCATION/TRAINING PROGRAM

## 2020-01-22 PROCEDURE — 1036F TOBACCO NON-USER: CPT | Performed by: STUDENT IN AN ORGANIZED HEALTH CARE EDUCATION/TRAINING PROGRAM

## 2020-01-22 PROCEDURE — 99211 OFF/OP EST MAY X REQ PHY/QHP: CPT | Performed by: FAMILY MEDICINE

## 2020-01-22 PROCEDURE — 99213 OFFICE O/P EST LOW 20 MIN: CPT | Performed by: STUDENT IN AN ORGANIZED HEALTH CARE EDUCATION/TRAINING PROGRAM

## 2020-01-22 RX ORDER — PREDNISONE 20 MG/1
40 TABLET ORAL DAILY
Qty: 10 TABLET | Refills: 0 | Status: SHIPPED | OUTPATIENT
Start: 2020-01-22 | End: 2020-01-27

## 2020-01-22 RX ORDER — AZITHROMYCIN 250 MG/1
250 TABLET, FILM COATED ORAL SEE ADMIN INSTRUCTIONS
Qty: 6 TABLET | Refills: 0 | Status: SHIPPED | OUTPATIENT
Start: 2020-01-22 | End: 2020-01-27

## 2020-01-22 RX ORDER — BENZONATATE 100 MG/1
100 CAPSULE ORAL 2 TIMES DAILY PRN
Qty: 20 CAPSULE | Refills: 0 | Status: SHIPPED | OUTPATIENT
Start: 2020-01-22 | End: 2020-01-29

## 2020-01-22 ASSESSMENT — ENCOUNTER SYMPTOMS
WHEEZING: 1
COUGH: 1
RHINORRHEA: 1
SHORTNESS OF BREATH: 1
SORE THROAT: 0

## 2020-01-22 ASSESSMENT — PATIENT HEALTH QUESTIONNAIRE - PHQ9
2. FEELING DOWN, DEPRESSED OR HOPELESS: 1
SUM OF ALL RESPONSES TO PHQ9 QUESTIONS 1 & 2: 2
1. LITTLE INTEREST OR PLEASURE IN DOING THINGS: 1
SUM OF ALL RESPONSES TO PHQ QUESTIONS 1-9: 2
SUM OF ALL RESPONSES TO PHQ QUESTIONS 1-9: 2

## 2020-01-22 NOTE — PROGRESS NOTES
Subjective:      Laurel Burns is a 47 y.o. female with Hx of  has a past medical history of Asthma, CAD (coronary artery disease), Cardiomegaly, CHF (congestive heart failure) ([facility] [address] COPD (chronic obstructive pulmonary disease) ([facility] [address] Depression, Hypertension, Lesion of right native kidney, MI (myocardial infarction) ([facility] [address] and Unspecified diseases of blood and blood-forming organs. HPI    Mr. Geneva Woodruff is a 51-year-old female presenting to the office for a 3-day history of cough, congestion, headaches and shortness of breath. Patient is a history of COPD. States that she has been using her breathing treatment more frequently. States she is a prior to visit. She reports that she does feel warm however has not taken her temperature. She states she does feel herself wheezing. Review of Systems   Constitutional: Negative for activity change and appetite change. HENT: Positive for rhinorrhea. Negative for congestion and sore throat. Respiratory: Positive for cough, shortness of breath and wheezing. Cardiovascular: Negative for chest pain. Musculoskeletal: Negative for arthralgias and myalgias. Neurological: Positive for headaches. Negative for dizziness.        Family History   Problem Relation Age of Onset    Diabetes Mother     High Blood Pressure Mother     Heart Disease Mother     Diabetes Father     Heart Disease Brother        Social History     Socioeconomic History    Marital status: Single     Spouse name: Not on file    Number of children: Not on file    Years of education: Not on file    Highest education level: Not on file   Occupational History    Not on file   Social Needs    Financial resource strain: Not on file    Food insecurity:     Worry: Not on file     Inability: Not on file    Transportation needs:     Medical: Not on file     Non-medical: Not on file   Tobacco Use    Smoking status: Former Smoker     Packs/day: 0.25     Types: Cigarettes     Last depression, 20-27 = Severe depression    Requested Prescriptions     Signed Prescriptions Disp Refills    predniSONE (DELTASONE) 20 MG tablet 10 tablet 0     Sig: Take 2 tablets by mouth daily for 5 days    azithromycin (ZITHROMAX) 250 MG tablet 6 tablet 0     Sig: Take 1 tablet by mouth See Admin Instructions for 5 days 500mg on day 1 followed by 250mg on days 2 - 5    benzonatate (TESSALON) 100 MG capsule 20 capsule 0     Sig: Take 1 capsule by mouth 2 times daily as needed for Cough       There are no discontinued medications. Carlos Alberto Isaacs received counseling on the following healthy behaviors: nutrition, exercise and medication adherence    Discussed use, benefit, and side effects of prescribed medications. Barriers to medication compliance addressed. All patient questions answered. Pt voiced understanding. Return in about 1 week (around 1/29/2020).

## 2020-01-22 NOTE — CARE COORDINATION
Randy Tyler phoned [facility] office and reports onset of increased cough with wheezing and yellow/green sputum production. She denies fever. She reports no increase in dyspnea and is able to speak in full sentences without difficulty. She declined same day medical appointment today 1/21/20 and was scheduled for sick day appointment at [facility] office, [place] on 1/22/20. She reports she received albuterol aerosol solution from her pharmacy and was instructed to use 3-4 x day. She verbalized understanding.     Herman Martinez RN, BSN  [employer]

## 2020-01-22 NOTE — PROGRESS NOTES
Visit Information    Have you changed or started any medications since your last visit including any over-the-counter medicines, vitamins, or herbal medicines? no   Have you stopped taking any of your medications? Is so, why? -  no  Are you having any side effects from any of your medications? - no    Have you seen any other physician or provider since your last visit?  no   Have you had any other diagnostic tests since your last visit?  no   Have you been seen in the emergency room and/or had an admission in a hospital since we last saw you?  no   Have you had your routine dental cleaning in the past 6 months?  no     Do you have an active MyChart account? If no, what is the barrier?   Yes    Patient Care Team:  Ayo Valencia MD as PCP - General (Family Medicine)  Juan Molina RN as     Medical History Review  Past Medical, Family, and Social History reviewed and does not contribute to the patient presenting condition    Health Maintenance   Topic Date Due    Cervical cancer screen  12/28/2018    Breast cancer screen  02/03/2019    Flu vaccine (1) 09/01/2019    Shingles Vaccine (1 of 2) 06/19/2020 (Originally 6/13/2015)    Lipid screen  11/01/2020    TSH testing  11/01/2020    Potassium monitoring  11/01/2020    Creatinine monitoring  11/01/2020    A1C test (Diabetic or Prediabetic)  11/06/2020    Colon cancer screen fecal DNA test (Cologuard)  12/04/2022    DTaP/Tdap/Td vaccine (2 - Td) 06/09/2029    Pneumococcal 0-64 years Vaccine  Completed    Hepatitis C screen  Completed    HIV screen  Completed

## 2020-01-22 NOTE — PATIENT INSTRUCTIONS
Patient Education        Chronic Obstructive Pulmonary Disease (COPD) Flare-Ups: Care Instructions  Your Care Instructions    Chronic obstructive pulmonary disease (COPD) is a lung disease that makes it hard to breathe. It is caused by damage to the lungs over many years, usually from smoking. COPD is often a mix of two diseases:  · Chronic bronchitis: The airways that carry air to the lungs (bronchial tubes) get inflamed and make a lot of mucus. This can narrow or block the airways. · Emphysema: In a healthy person, the tiny air sacs in the lungs are like balloons. As you breathe in and out, they get bigger and smaller to move air through your lungs. But with emphysema, these air sacs are damaged and lose their stretch. Less air gets in and out of the lungs. Many people with COPD have attacks called flare-ups or exacerbations. This is when your usual symptoms quickly get worse and stay worse. The doctor has checked you carefully. But problems can develop later. If you notice any problems or new symptoms, get medical treatment right away. Follow-up care is a key part of your treatment and safety. Be sure to make and go to all appointments, and call your doctor if you are having problems. It's also a good idea to know your test results and keep a list of the medicines you take. How can you care for yourself at home? · Be safe with medicines. Take your medicines exactly as prescribed. Call your doctor if you think you are having a problem with your medicine. You may be taking medicines such as:  ? Bronchodilators. These help open your airways and make breathing easier. ? Corticosteroids. These reduce airway inflammation. They may be given as pills, in a vein, or in an inhaled form. You may go home with pills in addition to an inhaler that you already use. · A spacer may help you get more inhaled medicine to your lungs. Ask your doctor or pharmacist if a spacer is right for you.  If it is, ask how to use it properly. · If your doctor prescribed antibiotics, take them as directed. Do not stop taking them just because you feel better. You need to take the full course of antibiotics. · If your doctor prescribed oxygen, use the flow rate your doctor has recommended. Do not change it without talking to your doctor first.  · Do not smoke. Smoking makes COPD worse. If you need help quitting, talk to your doctor about stop-smoking programs and medicines. These can increase your chances of quitting for good. When should you call for help? Call 911 anytime you think you may need emergency care. For example, call if:    · You have severe trouble breathing.    Call your doctor now or seek immediate medical care if:    · You have new or worse trouble breathing.     · Your coughing or wheezing gets worse.     · You cough up dark brown or bloody mucus (sputum).     · You have a new or higher fever.    Watch closely for changes in your health, and be sure to contact your doctor if:    · You notice more mucus or a change in the color of your mucus.     · You need to use your antibiotic or steroid pills.     · You do not get better as expected. Where can you learn more? Go to [email or web address]. [email or web address] and sign in to your [email or web address] account. Enter S515 in the [employer] box to learn more about \"Chronic Obstructive Pulmonary Disease (COPD) Flare-Ups: Care Instructions. \"     If you do not have an account, please click on the \"Sign Up Now\" link. Current as of: June 9, 2019  Content Version: 12.3  © 5345-3044 Healthwise, Incorporated. Care instructions adapted under license by [facility] ([facility]). If you have questions about a medical condition or this instruction, always ask your healthcare professional. [address] any warranty or liability for your use of this information. Thank you for letting us take care of you today. We hope all your questions were addressed.  If a question was overlooked or something else comes to mind after you return home, please contact a member of your Care Team listed below. Please make sure you have a routine office visit set up to follow-up on [address]. Your Care Team at [place] [address] is Team #3  Bernabe Farmer MD (Faculty)  Rajat Garcia MD (Faculty  Nilo Oliver MD (Resident)  Kerrie Hernandez MD (Resident)   Js Stallings MD (Resident)  Chanel Pelaez MD (Resident)  Jim Burns MD (Resident)  BRYANT Stern., KILO Aguilar., [facility] office)  [facility] office)  [facility] ([address] [facility])  [place], [place] ([address] [facility])  Gilbert Kirkland, Ph.D., (Behavioral Services)  Arcelia Gordillo [facility] (Clinical Pharmacist)     Office phone number: [phone]    If you need to get in right away due to illness, please be advised we have \"Same Day\" appointments available Monday-Friday. Please call us at [phone] option #3 to schedule your \"Same Day\" appointment.

## 2020-02-21 ENCOUNTER — CARE COORDINATION (OUTPATIENT)
Dept: FAMILY MEDICINE CLINIC | Age: 55
End: 2020-02-21

## 2020-02-21 RX ORDER — SPIRONOLACTONE 25 MG/1
TABLET ORAL
Qty: 28 TABLET | Refills: 2 | Status: SHIPPED | OUTPATIENT
Start: 2020-02-21 | End: 2020-05-15

## 2020-02-21 RX ORDER — OMEPRAZOLE 20 MG/1
CAPSULE, DELAYED RELEASE ORAL
Qty: 30 CAPSULE | Refills: 2 | Status: SHIPPED | OUTPATIENT
Start: 2020-02-21 | End: 2020-05-15

## 2020-02-21 NOTE — CARE COORDINATION
times daily (with meals), Disp: 60 tablet, Rfl: 3    sennosides-docusate sodium (SENOKOT-S) 8.6-50 MG tablet, Take 1 tablet by mouth daily, Disp: 20 tablet, Rfl: 0    albuterol sulfate HFA (VENTOLIN HFA) 108 (90 Base) MCG/ACT inhaler, INHALE 2 PUFFS INTO THE LUNGS EVERY 6 HOURS AS NEEDED FOR WHEEZING, Disp: 18 g, Rfl: 3    Spacer/Aero-Holding Chambers (E-Z SPACER) ELVER, 1 Device by Does not apply route daily (Patient not taking: Reported on 11/4/2019), Disp: 1 Device, Rfl: 0    folic acid-pyridoxine-cyanocobalamine (FOLTX) 2.5-25-1 MG TABS tablet, Take 1 tablet by mouth daily (Patient not taking: Reported on 8/29/2019), Disp: 30 tablet, Rfl: 0    Multiple Vitamin (MULTIVITAMIN) tablet, TAKE ONE TABLET BY MOUTH ONE TIME A DAY, Disp: 30 tablet, Rfl: 3        Medical   Skin warm and dry. Nail beds pink. Respirations easy at rest and she reports mild dyspnea with ambulation and ADL's. Lungs have slight expiratory wheezing. She reports dry cough. She reports abdominal bloating and abdomen is mildly distended. B/P 139/89 sitting  Pulse  88 at rest and 98 with slow ambulation. resp  16/min  02 saturation 96% Appetite reported as good. She denies nocturnal dyspnea. She reports hearing a pinging sound from her chest from the AICD unit that has occurred for the past 1-2 months. Audible ringing tone heard from patient chest at area of AICD unit during the home vs.  She has not reported this to her cardiologist office    Plan   Message left with CHF clinic at [facility] to request revisit at CHF clinic in the next week.   Nursing to call and schedule appt with [place] [facility] on Monday 2/24/20 and to request device check on [employer] AICD unit    [facility], [address] [place] [address].

## 2020-02-26 ENCOUNTER — HOSPITAL ENCOUNTER (INPATIENT)
Age: 55
LOS: 2 days | Discharge: HOME | DRG: 201 | End: 2020-02-28
Attending: EMERGENCY MEDICINE | Admitting: INTERNAL MEDICINE
Payer: MEDICAID

## 2020-02-26 ENCOUNTER — APPOINTMENT (OUTPATIENT)
Dept: GENERAL RADIOLOGY | Age: 55
DRG: 201 | End: 2020-02-26
Payer: MEDICAID

## 2020-02-26 PROBLEM — E83.42 HYPOMAGNESEMIA: Status: ACTIVE | Noted: 2020-02-26

## 2020-02-26 PROBLEM — I50.9 ACUTE ON CHRONIC CONGESTIVE HEART FAILURE (HCC): Status: RESOLVED | Noted: 2018-11-24 | Resolved: 2020-02-26

## 2020-02-26 PROBLEM — Z45.02 AICD DISCHARGE: Status: ACTIVE | Noted: 2020-02-26

## 2020-02-26 PROBLEM — J06.9 VIRAL URI WITH COUGH: Status: RESOLVED | Noted: 2019-02-03 | Resolved: 2020-02-26

## 2020-02-26 PROBLEM — J44.1 COPD WITH ACUTE EXACERBATION (HCC): Status: RESOLVED | Noted: 2019-02-05 | Resolved: 2020-02-26

## 2020-02-26 PROBLEM — R07.9 CHEST PAIN: Status: RESOLVED | Noted: 2019-06-04 | Resolved: 2020-02-26

## 2020-02-26 PROBLEM — I50.21 ACUTE SYSTOLIC CHF (CONGESTIVE HEART FAILURE) (HCC): Status: RESOLVED | Noted: 2018-06-16 | Resolved: 2020-02-26

## 2020-02-26 PROBLEM — N17.9 AKI (ACUTE KIDNEY INJURY) (HCC): Status: ACTIVE | Noted: 2020-02-26

## 2020-02-26 LAB
ABSOLUTE EOS #: 0.09 K/UL (ref 0–0.44)
ABSOLUTE IMMATURE GRANULOCYTE: 0.05 K/UL (ref 0–0.3)
ABSOLUTE LYMPH #: 2.55 K/UL (ref 1.1–3.7)
ABSOLUTE MONO #: 0.9 K/UL (ref 0.1–1.2)
ALBUMIN SERPL-MCNC: 3.8 G/DL (ref 3.5–5.2)
ALBUMIN/GLOBULIN RATIO: 1.1 (ref 1–2.5)
ALP BLD-CCNC: 85 U/L (ref 35–104)
ALT SERPL-CCNC: 13 U/L (ref 5–33)
ANION GAP SERPL CALCULATED.3IONS-SCNC: 15 MMOL/L (ref 9–17)
AST SERPL-CCNC: 25 U/L
BASOPHILS # BLD: 1 % (ref 0–2)
BASOPHILS ABSOLUTE: 0.05 K/UL (ref 0–0.2)
BILIRUB SERPL-MCNC: 0.33 MG/DL (ref 0.3–1.2)
BNP INTERPRETATION: ABNORMAL
BUN BLDV-MCNC: 23 MG/DL (ref 6–20)
BUN/CREAT BLD: ABNORMAL (ref 9–20)
CALCIUM SERPL-MCNC: 9.1 MG/DL (ref 8.6–10.4)
CHLORIDE BLD-SCNC: 98 MMOL/L (ref 98–107)
CO2: 21 MMOL/L (ref 20–31)
CREAT SERPL-MCNC: 1.47 MG/DL (ref 0.5–0.9)
DIFFERENTIAL TYPE: ABNORMAL
EKG ATRIAL RATE: 46 BPM
EKG P AXIS: 55 DEGREES
EKG Q-T INTERVAL: 448 MS
EKG QRS DURATION: 138 MS
EKG QTC CALCULATION (BAZETT): 557 MS
EKG R AXIS: -16 DEGREES
EKG T AXIS: 102 DEGREES
EKG VENTRICULAR RATE: 93 BPM
EOSINOPHILS RELATIVE PERCENT: 1 % (ref 1–4)
GFR AFRICAN AMERICAN: 45 ML/MIN
GFR NON-AFRICAN AMERICAN: 37 ML/MIN
GFR SERPL CREATININE-BSD FRML MDRD: ABNORMAL ML/MIN/{1.73_M2}
GFR SERPL CREATININE-BSD FRML MDRD: ABNORMAL ML/MIN/{1.73_M2}
GLUCOSE BLD-MCNC: 104 MG/DL (ref 70–99)
HCT VFR BLD CALC: 38.9 % (ref 36.3–47.1)
HEMOGLOBIN: 12.6 G/DL (ref 11.9–15.1)
IMMATURE GRANULOCYTES: 1 %
INR BLD: 1
LYMPHOCYTES # BLD: 30 % (ref 24–43)
MAGNESIUM: 1.5 MG/DL (ref 1.6–2.6)
MAGNESIUM: 2.4 MG/DL (ref 1.6–2.6)
MCH RBC QN AUTO: 29.6 PG (ref 25.2–33.5)
MCHC RBC AUTO-ENTMCNC: 32.4 G/DL (ref 28.4–34.8)
MCV RBC AUTO: 91.3 FL (ref 82.6–102.9)
MONOCYTES # BLD: 11 % (ref 3–12)
NRBC AUTOMATED: 0 PER 100 WBC
PARTIAL THROMBOPLASTIN TIME: 23.4 SEC (ref 20.5–30.5)
PDW BLD-RTO: 16.4 % (ref 11.8–14.4)
PLATELET # BLD: ABNORMAL K/UL (ref 138–453)
PLATELET ESTIMATE: ABNORMAL
PLATELET, FLUORESCENCE: 176 K/UL (ref 138–453)
PLATELET, IMMATURE FRACTION: 8.4 % (ref 1.1–10.3)
PMV BLD AUTO: ABNORMAL FL (ref 8.1–13.5)
POTASSIUM SERPL-SCNC: 4 MMOL/L (ref 3.7–5.3)
PRO-BNP: 2398 PG/ML
PROTHROMBIN TIME: 10.8 SEC (ref 9–12)
RBC # BLD: 4.26 M/UL (ref 3.95–5.11)
RBC # BLD: ABNORMAL 10*6/UL
SEG NEUTROPHILS: 58 % (ref 36–65)
SEGMENTED NEUTROPHILS ABSOLUTE COUNT: 4.97 K/UL (ref 1.5–8.1)
SODIUM BLD-SCNC: 134 MMOL/L (ref 135–144)
TOTAL PROTEIN: 7.2 G/DL (ref 6.4–8.3)
TROPONIN INTERP: ABNORMAL
TROPONIN T: ABNORMAL NG/ML
TROPONIN, HIGH SENSITIVITY: 20 NG/L (ref 0–14)
WBC # BLD: 8.6 K/UL (ref 3.5–11.3)
WBC # BLD: ABNORMAL 10*3/UL

## 2020-02-26 PROCEDURE — 96365 THER/PROPH/DIAG IV INF INIT: CPT

## 2020-02-26 PROCEDURE — 85730 THROMBOPLASTIN TIME PARTIAL: CPT

## 2020-02-26 PROCEDURE — 6370000000 HC RX 637 (ALT 250 FOR IP): Performed by: INTERNAL MEDICINE

## 2020-02-26 PROCEDURE — 80053 COMPREHEN METABOLIC PANEL: CPT

## 2020-02-26 PROCEDURE — 99223 1ST HOSP IP/OBS HIGH 75: CPT | Performed by: INTERNAL MEDICINE

## 2020-02-26 PROCEDURE — G0378 HOSPITAL OBSERVATION PER HR: HCPCS

## 2020-02-26 PROCEDURE — 71045 X-RAY EXAM CHEST 1 VIEW: CPT

## 2020-02-26 PROCEDURE — 83880 ASSAY OF NATRIURETIC PEPTIDE: CPT

## 2020-02-26 PROCEDURE — 36415 COLL VENOUS BLD VENIPUNCTURE: CPT

## 2020-02-26 PROCEDURE — 93010 ELECTROCARDIOGRAM REPORT: CPT | Performed by: INTERNAL MEDICINE

## 2020-02-26 PROCEDURE — 96366 THER/PROPH/DIAG IV INF ADDON: CPT

## 2020-02-26 PROCEDURE — 2060000000 HC ICU INTERMEDIATE R&B

## 2020-02-26 PROCEDURE — 85025 COMPLETE CBC W/AUTO DIFF WBC: CPT

## 2020-02-26 PROCEDURE — 84484 ASSAY OF TROPONIN QUANT: CPT

## 2020-02-26 PROCEDURE — 6370000000 HC RX 637 (ALT 250 FOR IP): Performed by: NURSE PRACTITIONER

## 2020-02-26 PROCEDURE — 2580000003 HC RX 258: Performed by: NURSE PRACTITIONER

## 2020-02-26 PROCEDURE — 85055 RETICULATED PLATELET ASSAY: CPT

## 2020-02-26 PROCEDURE — 6360000002 HC RX W HCPCS: Performed by: EMERGENCY MEDICINE

## 2020-02-26 PROCEDURE — 83735 ASSAY OF MAGNESIUM: CPT

## 2020-02-26 PROCEDURE — 97166 OT EVAL MOD COMPLEX 45 MIN: CPT

## 2020-02-26 PROCEDURE — 6370000000 HC RX 637 (ALT 250 FOR IP): Performed by: EMERGENCY MEDICINE

## 2020-02-26 PROCEDURE — 93005 ELECTROCARDIOGRAM TRACING: CPT | Performed by: EMERGENCY MEDICINE

## 2020-02-26 PROCEDURE — 97535 SELF CARE MNGMENT TRAINING: CPT

## 2020-02-26 PROCEDURE — 85610 PROTHROMBIN TIME: CPT

## 2020-02-26 PROCEDURE — 99285 EMERGENCY DEPT VISIT HI MDM: CPT

## 2020-02-26 RX ORDER — PROMETHAZINE HYDROCHLORIDE 25 MG/1
12.5 TABLET ORAL EVERY 6 HOURS PRN
Status: DISCONTINUED | OUTPATIENT
Start: 2020-02-26 | End: 2020-02-28 | Stop reason: HOSPADM

## 2020-02-26 RX ORDER — ACETAMINOPHEN 325 MG/1
650 TABLET ORAL EVERY 6 HOURS PRN
Status: DISCONTINUED | OUTPATIENT
Start: 2020-02-26 | End: 2020-02-28 | Stop reason: HOSPADM

## 2020-02-26 RX ORDER — PANTOPRAZOLE SODIUM 40 MG/1
40 TABLET, DELAYED RELEASE ORAL
Status: DISCONTINUED | OUTPATIENT
Start: 2020-02-26 | End: 2020-02-28 | Stop reason: HOSPADM

## 2020-02-26 RX ORDER — LISINOPRIL 5 MG/1
5 TABLET ORAL DAILY
Status: DISCONTINUED | OUTPATIENT
Start: 2020-02-26 | End: 2020-02-26

## 2020-02-26 RX ORDER — NICOTINE 21 MG/24HR
1 PATCH, TRANSDERMAL 24 HOURS TRANSDERMAL DAILY PRN
Status: DISCONTINUED | OUTPATIENT
Start: 2020-02-26 | End: 2020-02-28 | Stop reason: HOSPADM

## 2020-02-26 RX ORDER — ACETAMINOPHEN 650 MG/1
650 SUPPOSITORY RECTAL EVERY 6 HOURS PRN
Status: DISCONTINUED | OUTPATIENT
Start: 2020-02-26 | End: 2020-02-28 | Stop reason: HOSPADM

## 2020-02-26 RX ORDER — SPIRONOLACTONE 25 MG/1
25 TABLET ORAL DAILY
Status: DISCONTINUED | OUTPATIENT
Start: 2020-02-26 | End: 2020-02-28 | Stop reason: HOSPADM

## 2020-02-26 RX ORDER — CARVEDILOL 6.25 MG/1
6.25 TABLET ORAL 2 TIMES DAILY WITH MEALS
Status: DISCONTINUED | OUTPATIENT
Start: 2020-02-26 | End: 2020-02-26

## 2020-02-26 RX ORDER — ATORVASTATIN CALCIUM 40 MG/1
40 TABLET, FILM COATED ORAL NIGHTLY
Status: DISCONTINUED | OUTPATIENT
Start: 2020-02-26 | End: 2020-02-28 | Stop reason: HOSPADM

## 2020-02-26 RX ORDER — MAGNESIUM SULFATE 1 G/100ML
1 INJECTION INTRAVENOUS ONCE
Status: COMPLETED | OUTPATIENT
Start: 2020-02-26 | End: 2020-02-26

## 2020-02-26 RX ORDER — HYDROCODONE BITARTRATE AND ACETAMINOPHEN 5; 325 MG/1; MG/1
1 TABLET ORAL EVERY 6 HOURS PRN
Status: DISCONTINUED | OUTPATIENT
Start: 2020-02-26 | End: 2020-02-28 | Stop reason: HOSPADM

## 2020-02-26 RX ORDER — AMIODARONE HYDROCHLORIDE 200 MG/1
200 TABLET ORAL DAILY
Status: DISCONTINUED | OUTPATIENT
Start: 2020-02-26 | End: 2020-02-26

## 2020-02-26 RX ORDER — SODIUM CHLORIDE 0.9 % (FLUSH) 0.9 %
10 SYRINGE (ML) INJECTION PRN
Status: DISCONTINUED | OUTPATIENT
Start: 2020-02-26 | End: 2020-02-28 | Stop reason: HOSPADM

## 2020-02-26 RX ORDER — B12/LEVOMEFOLATE CALCIUM/B-6 2-1.13-25
1 TABLET ORAL DAILY
Status: DISCONTINUED | OUTPATIENT
Start: 2020-02-26 | End: 2020-02-28 | Stop reason: HOSPADM

## 2020-02-26 RX ORDER — POLYETHYLENE GLYCOL 3350 17 G/17G
17 POWDER, FOR SOLUTION ORAL DAILY PRN
Status: DISCONTINUED | OUTPATIENT
Start: 2020-02-26 | End: 2020-02-28 | Stop reason: HOSPADM

## 2020-02-26 RX ORDER — ASPIRIN 81 MG/1
324 TABLET, CHEWABLE ORAL ONCE
Status: COMPLETED | OUTPATIENT
Start: 2020-02-26 | End: 2020-02-26

## 2020-02-26 RX ORDER — FLUTICASONE PROPIONATE 50 MCG
1 SPRAY, SUSPENSION (ML) NASAL DAILY
Status: DISCONTINUED | OUTPATIENT
Start: 2020-02-26 | End: 2020-02-28 | Stop reason: HOSPADM

## 2020-02-26 RX ORDER — ONDANSETRON 2 MG/ML
4 INJECTION INTRAMUSCULAR; INTRAVENOUS EVERY 6 HOURS PRN
Status: DISCONTINUED | OUTPATIENT
Start: 2020-02-26 | End: 2020-02-28 | Stop reason: HOSPADM

## 2020-02-26 RX ORDER — METOPROLOL SUCCINATE 100 MG/1
100 TABLET, EXTENDED RELEASE ORAL 2 TIMES DAILY
Status: DISCONTINUED | OUTPATIENT
Start: 2020-02-26 | End: 2020-02-27

## 2020-02-26 RX ORDER — AMIODARONE HYDROCHLORIDE 200 MG/1
200 TABLET ORAL 2 TIMES DAILY
Status: DISCONTINUED | OUTPATIENT
Start: 2020-02-26 | End: 2020-02-28 | Stop reason: HOSPADM

## 2020-02-26 RX ORDER — BUMETANIDE 1 MG/1
1 TABLET ORAL 2 TIMES DAILY
Status: DISCONTINUED | OUTPATIENT
Start: 2020-02-26 | End: 2020-02-28 | Stop reason: HOSPADM

## 2020-02-26 RX ORDER — SODIUM CHLORIDE 0.9 % (FLUSH) 0.9 %
10 SYRINGE (ML) INJECTION EVERY 12 HOURS SCHEDULED
Status: DISCONTINUED | OUTPATIENT
Start: 2020-02-26 | End: 2020-02-28 | Stop reason: HOSPADM

## 2020-02-26 RX ADMIN — AMIODARONE HYDROCHLORIDE 200 MG: 200 TABLET ORAL at 09:29

## 2020-02-26 RX ADMIN — SODIUM CHLORIDE, PRESERVATIVE FREE 10 ML: 5 INJECTION INTRAVENOUS at 21:24

## 2020-02-26 RX ADMIN — CARVEDILOL 6.25 MG: 6.25 TABLET, FILM COATED ORAL at 09:29

## 2020-02-26 RX ADMIN — Medication 1 TABLET: at 09:28

## 2020-02-26 RX ADMIN — MAGNESIUM SULFATE HEPTAHYDRATE 1 G: 1 INJECTION, SOLUTION INTRAVENOUS at 04:32

## 2020-02-26 RX ADMIN — PANTOPRAZOLE SODIUM 40 MG: 40 TABLET, DELAYED RELEASE ORAL at 09:28

## 2020-02-26 RX ADMIN — BUMETANIDE 1 MG: 1 TABLET ORAL at 09:29

## 2020-02-26 RX ADMIN — ASPIRIN 81 MG 324 MG: 81 TABLET ORAL at 01:52

## 2020-02-26 RX ADMIN — SPIRONOLACTONE 25 MG: 25 TABLET ORAL at 09:29

## 2020-02-26 RX ADMIN — ACETAMINOPHEN 650 MG: 325 TABLET ORAL at 11:40

## 2020-02-26 RX ADMIN — MAGNESIUM SULFATE HEPTAHYDRATE 1 G: 1 INJECTION, SOLUTION INTRAVENOUS at 04:59

## 2020-02-26 RX ADMIN — ACETAMINOPHEN 650 MG: 325 TABLET ORAL at 21:24

## 2020-02-26 RX ADMIN — FLUTICASONE PROPIONATE 1 SPRAY: 50 SPRAY, METERED NASAL at 09:29

## 2020-02-26 RX ADMIN — AMIODARONE HYDROCHLORIDE 200 MG: 200 TABLET ORAL at 21:24

## 2020-02-26 RX ADMIN — DESMOPRESSIN ACETATE 40 MG: 0.2 TABLET ORAL at 21:24

## 2020-02-26 RX ADMIN — MAGNESIUM GLUCONATE 500 MG ORAL TABLET 400 MG: 500 TABLET ORAL at 11:40

## 2020-02-26 RX ADMIN — METOPROLOL SUCCINATE 100 MG: 100 TABLET, FILM COATED, EXTENDED RELEASE ORAL at 21:24

## 2020-02-26 RX ADMIN — SODIUM CHLORIDE, PRESERVATIVE FREE 10 ML: 5 INJECTION INTRAVENOUS at 11:40

## 2020-02-26 RX ADMIN — HYDROCODONE BITARTRATE AND ACETAMINOPHEN 1 TABLET: 5; 325 TABLET ORAL at 23:07

## 2020-02-26 RX ADMIN — HYDROCODONE BITARTRATE AND ACETAMINOPHEN 1 TABLET: 5; 325 TABLET ORAL at 16:39

## 2020-02-26 RX ADMIN — BUMETANIDE 1 MG: 1 TABLET ORAL at 21:24

## 2020-02-26 RX ADMIN — LISINOPRIL 5 MG: 5 TABLET ORAL at 09:29

## 2020-02-26 ASSESSMENT — PAIN SCALES - GENERAL
PAINLEVEL_OUTOF10: 3
PAINLEVEL_OUTOF10: 8
PAINLEVEL_OUTOF10: 6
PAINLEVEL_OUTOF10: 5
PAINLEVEL_OUTOF10: 4
PAINLEVEL_OUTOF10: 3
PAINLEVEL_OUTOF10: 6

## 2020-02-26 ASSESSMENT — ENCOUNTER SYMPTOMS
WHEEZING: 0
NAUSEA: 0
ABDOMINAL PAIN: 0
BLOOD IN STOOL: 0
EYE DISCHARGE: 0
VOMITING: 0
SHORTNESS OF BREATH: 0
SORE THROAT: 0
COLOR CHANGE: 0
BACK PAIN: 0

## 2020-02-26 NOTE — PROGRESS NOTES
Nutrition Education    Type and Reason for Visit: Patient Education    Nutrition Assessment:  Consulted for Cardiac diet education. Pr admitted for AICD discharge. Pt reports she does not really follow a specific diet but she does not use much salt and avoids extra fat. Provided handout on Cardiac diet along with contact information. Pt very receptive. Reports having a usual good appetite and that she has been eating more than 75% of her meals. · Verbally reviewed information with Patient  · Written educational materials provided. · Contact name and number provided. · Refer to Patient Education activity for more details.     Electronically signed by John Singh RD, LD on 2/26/20 at 12:20 PM    Contact Number: [phone]

## 2020-02-26 NOTE — PROGRESS NOTES
ONE TIME A DAY  Patient not taking: Reported on 2/21/2020 6/18/18   Benson Mcardle, MD      Current Facility-Administered Medications: amiodarone (CORDARONE) tablet 200 mg, 200 mg, Oral, Daily  atorvastatin (LIPITOR) tablet 40 mg, 40 mg, Oral, Nightly  carvedilol (COREG) tablet 6.25 mg, 6.25 mg, Oral, BID WC  fluticasone (FLONASE) 50 MCG/ACT nasal spray 1 spray, 1 spray, Nasal, Daily  lisinopril (PRINIVIL;ZESTRIL) tablet 5 mg, 5 mg, Oral, Daily  pantoprazole (PROTONIX) tablet 40 mg, 40 mg, Oral, QAM AC  spironolactone (ALDACTONE) tablet 25 mg, 25 mg, Oral, Daily  tiotropium (SPIRIVA RESPIMAT) 2.5 MCG/ACT inhaler 2 puff, 2 puff, Inhalation, Daily  folic acid-pyridoxine-cyancobalamin (FOLTX) 1.13-25-2 MG TABS 1 tablet, 1 tablet, Oral, Daily  bumetanide (BUMEX) tablet 1 mg, 1 mg, Oral, BID  sodium chloride flush 0.9 % injection 10 mL, 10 mL, Intravenous, 2 times per day  sodium chloride flush 0.9 % injection 10 mL, 10 mL, Intravenous, PRN  acetaminophen (TYLENOL) tablet 650 mg, 650 mg, Oral, Q6H PRN  acetaminophen (TYLENOL) suppository 650 mg, 650 mg, Rectal, Q6H PRN  polyethylene glycol (GLYCOLAX) packet 17 g, 17 g, Oral, Daily PRN  promethazine (PHENERGAN) tablet 12.5 mg, 12.5 mg, Oral, Q6H PRN  ondansetron (ZOFRAN) injection 4 mg, 4 mg, Intravenous, Q6H PRN  nicotine (NICODERM CQ) 21 MG/24HR 1 patch, 1 patch, Transdermal, Daily PRN  enoxaparin (LOVENOX) injection 40 mg, 40 mg, Subcutaneous, Daily    Labs:     CBC:   Recent Labs     02/26/20 0149   WBC 8.6   HGB 12.6   HCT 38.9   PLT See Reflexed IPF Result     BMP:   Recent Labs     02/26/20 0149   *   K 4.0   CO2 21   BUN 23*   CREATININE 1.47*   LABGLOM 37*   GLUCOSE 104*     PT/INR:   Recent Labs     02/26/20 0149   PROTIME 10.8   INR 1.0     APTT:  Recent Labs     02/26/20 0149   APTT 23.4     CARDIAC ENZYMES:  Recent Labs     02/26/20 0149   TROPHS 20*     FASTING LIPID PANEL:  Lab Results   Component Value Date    HDL 59 11/01/2019    TRIG 95

## 2020-02-26 NOTE — ED NOTES
Pt laying on cart. Pt states she has a bad cold and coughing hurts her chest. Pt updated on bed placement.       Jorden Escoto RN  02/26/20 0719

## 2020-02-26 NOTE — H&P
[facility]    HISTORY AND PHYSICAL EXAMINATION            Date:   2/26/2020  Patient name:  Devendra Baer  Date of admission:  2/26/2020  1:24 AM  MRN:   [id number]  Account:  [de-identified]  YOB: 1965  PCP:    Domenic Culver MD  Room:   04020402-01  Code Status:    Full Code    Chief Complaint:     Chief Complaint   Patient presents with    AICD Problem     fired once while watching tv       History Obtained From:     patient    History of Present Illness:     Devendra Baer is a 47 y.o. Non-/non  female with chronic systolic CHF, nonischemic, s/p AICD who presents with AICD Problem. She report her AICD fired once while watching tv. Pt states AICD was making beeping noise yesterday. Patient reports feeling of racing hear but t denies chest pain, shortness of breath. Previous cardiac testing include  CATH 6/11/19: Normal coronaries. EP eval for VFib. AICD CHANGE OUT 6/9/19: Done by Dr. Wei Lehman due to battery at [facility]. ECHO 4/24/18: EF 10-15%. Lead noted in right sided chambers. Severe MR. Mod TR. RVSP 48. STRESS 10/10/14: Mod size fixed defect involving the apical to mid inferior wall of the LV. EF 42%. Work up the ED show RIP with low mag. Potassium is normal. Chest xray did not show any acute process.      Past Medical History:     Past Medical History:   Diagnosis Date    Asthma     CAD (coronary artery disease)     ICD    Cardiomegaly     CHF (congestive heart failure) (HCC)     COPD (chronic obstructive pulmonary disease) ([address]     Depression     Hypertension     Lesion of right native kidney 2/3/2019    [facility]     Unspecified diseases of blood and blood-forming organs     anemia,         Past Surgical History:     Past Surgical History:   Procedure Laterality Date    CARDIAC DEFIBRILLATOR PLACEMENT  09/2005    [facility]    CARDIAC DEFIBRILLATOR PLACEMENT      12/23/18   Caleb Springer MD   albuterol sulfate HFA (VENTOLIN HFA) 108 (90 Base) MCG/ACT inhaler INHALE 2 PUFFS INTO THE LUNGS EVERY 6 HOURS AS NEEDED FOR WHEEZING 12/7/18   Kathleen Monsivais MD   Spacer/Aero-Holding Chambers (E-Z SPACER) ELVER 1 Device by Does not apply route daily  Patient not taking: Reported on 11/4/2019 11/25/18   Oksana Forrest MD   folic acid-pyridoxine-cyanocobalamine (FOLTX) 2.5-25-1 MG TABS tablet Take 1 tablet by mouth daily  Patient not taking: Reported on 8/29/2019 11/25/18   Oksana Forrest MD   Multiple Vitamin (MULTIVITAMIN) tablet TAKE ONE TABLET BY MOUTH ONE TIME A DAY  Patient not taking: Reported on 2/21/2020 6/18/18   Cecilio Chappell MD        Allergies: Iv contrast [iodides]    Social History:     Tobacco:    reports that she quit smoking about 4 years ago. Her smoking use included cigarettes. She smoked 0.25 packs per day. She has never used smokeless tobacco.  Alcohol:      reports current alcohol use of about 1.0 standard drinks of alcohol per week. Drug Use:  reports no history of drug use. Family History:     Family History   Problem Relation Age of Onset    Diabetes Mother     High Blood Pressure Mother     Heart Disease Mother     Diabetes Father     Heart Disease Brother        Review of Systems:     Positive and Negative as described in HPI. Review of Systems   Constitutional: Negative for appetite change and fever. HENT: Negative for congestion and ear discharge. Eyes: Negative for discharge and visual disturbance. Respiratory: Negative for shortness of breath and wheezing. Cardiovascular: Positive for palpitations. Negative for chest pain and leg swelling. Gastrointestinal: Negative for abdominal pain, blood in stool, nausea and vomiting. Endocrine: Negative for cold intolerance and heat intolerance. Genitourinary: Negative for dysuria and frequency. Musculoskeletal: Negative for arthralgias, back pain and joint swelling.    Skin: RIP  7. Continue close monitoring, telemetry     Consultations:   [address] TO HOSPITALIST  IP CONSULT TO CARDIOLOGY  IP CONSULT TO DIETITIAN    Patient is admitted as inpatient status because of co-morbidities listed above, severity of signs and symptoms as outlined, requirement for current medical therapies and most importantly because of direct risk to patient if care not provided in a hospital setting.     Chavez Morgan MD  2/26/2020  10:28 AM    Copy sent to Dr. Pipe Navarrete MD

## 2020-02-26 NOTE — PLAN OF CARE
Problem: Falls - Risk of:  Goal: Will remain free from falls  Description  Will remain free from falls  Outcome: Ongoing  Note:   Bed locked in lowest position, call light and personal belongings within reach, non-skid footwear on, side rails x3, floor kept clear of clutter, adequate lighting maintained, and hourly rounding continued. Goal: Absence of physical injury  Description  Absence of physical injury  Outcome: Ongoing     Problem: Infection:  Goal: Will remain free from infection  Description  Will remain free from infection  Outcome: Ongoing     Problem: Safety:  Goal: Free from accidental physical injury  Description  Free from accidental physical injury  Outcome: Ongoing  Goal: Free from intentional harm  Description  Free from intentional harm  Outcome: Ongoing     Problem: Daily Care:  Goal: Daily care needs are met  Description  Daily care needs are met  Outcome: Ongoing     Problem: Pain:  Goal: Patient's pain/discomfort is manageable  Description  Patient's pain/discomfort is manageable  Outcome: Ongoing     Problem: Skin Integrity:  Goal: Skin integrity will stabilize  Description  Skin integrity will stabilize  Outcome: Ongoing     Problem: Discharge Planning:  Goal: Patients continuum of care needs are met  Description  Patients continuum of care needs are met  Outcome: Ongoing     Problem: Cardiac:  Goal: Ability to maintain vital signs within normal range will improve  Description  Ability to maintain vital signs within normal range will improve  Outcome: Ongoing  Note:   Continuous telemetry maintained, pending 2D echo, and pt verbalizes understanding to report any chest pain or MAJOR to RN.    Goal: Cardiovascular alteration will improve  Description  Cardiovascular alteration will improve  Outcome: Ongoing     Problem: Health Behavior:  Goal: Will modify at least one risk factor affecting health status  Description  Will modify at least one risk factor affecting health status  Outcome: Ongoing  Goal: Identification of resources available to assist in meeting health care needs will improve  Description  Identification of resources available to assist in meeting health care needs will improve  Outcome: Ongoing     Problem: Physical Regulation:  Goal: Complications related to the disease process, condition or treatment will be avoided or minimized  Description  Complications related to the disease process, condition or treatment will be avoided or minimized  Outcome: Ongoing

## 2020-02-26 NOTE — CONSULTS
[facility]   Consult Note         Today's Date: 2/26/2020  Patient Name: Daphnie Perez  Date of admission: 2/26/2020  1:24 AM  Patient's age: 47 y.o., 1965  Admission Dx: AICD discharge [Z45.02]    Reason for Consult:  Cardiac evaluation    Requesting Physician: Chavez Ashby MD    REASON FOR CONSULT:  AICD firing. History Obtained From:  Patient, chart, staff, records    HISTORY OF PRESENT ILLNESS:      The patient is a 47 y.o. female with a history of HTN, CAD, Chronic Systolic CHF and Dilated Cardiomyopathy s/p AICD who is admitted to the hospital for AICD firing. States she felt well all day but noticed some palpitations with lightheadedness intermittently. Several beeps from pacemaker throughout the day. While making her bed last night she felt the sensation of a punch to her back. Presented to the emergency department and on pacemaker interrogation VF was identified. Denies chest pain, shortness of breath, near syncope, nausea or vomiting. Past Medical History:   has a past medical history of Asthma, CAD (coronary artery disease), Cardiomegaly, CHF (congestive heart failure) ([address] COPD (chronic obstructive pulmonary disease) [facility] Depression, Hypertension, Lesion of right native kidney, MI (myocardial infarction) ([facility] [facility] and Unspecified diseases of blood and blood-forming organs. Past Surgical History:   has a past surgical history that includes Cardiac defibrillator placement (09/2005); Pacemaker insertion; Tubal ligation; Cardiac defibrillator placement; and Uterine fibroid surgery (malucretia 2015). Home Medications:    Prior to Admission medications    Medication Sig Start Date End Date Taking?  Authorizing Provider   omeprazole (PRILOSEC) 20 MG delayed release capsule TAKE 1 CAPSULE BY MOUTH ONE TIME A DAY 2/21/20   Jennifer Fuller MD   spironolactone (ALDACTONE) 25 MG tablet TAKE 1 TABLET BY MOUTH ONE TIME A DAY 2/21/20   Jennifer Fuller MD complaints. · Integumentary: No rash or pruritis. · Neurological: No headache, diplopia, change in muscle strength, numbness or tingling. No change in gait, balance, coordination, mood, affect, memory, mentation, behavior. · Psychiatric: No anxiety, or depression. · Endocrine: No temperature intolerance. No excessive thirst, fluid intake, or urination. No tremor. · Hematologic/Lymphatic: No abnormal bruising or bleeding, blood clots or swollen lymph nodes. · Allergic/Immunologic: No nasal congestion or hives. PHYSICAL EXAM:      /68   Pulse 76   Temp 98.2 °F (36.8 °C) (Oral)   Resp 18   Wt 142 lb (64.4 kg)   LMP  (Approximate)   SpO2 96%   BMI 25.97 kg/m²    Constitutional and General Appearance: alert, cooperative, no distress and appears stated age  HEENT: PERRL, no cervical lymphadenopathy. No masses palpable. Normal oral mucosa  Respiratory:  · Normal excursion and expansion without use of accessory muscles  · Resp Auscultation: Good respiratory effort. Expiratory wheezing. Cardiovascular:  · The apical impulse is not displaced  · Heart tones are crisp and normal. regular S1 and S2.  · Holosystolic murmur heard best at the apex. · Peripheral pulses are symmetrical and full   Abdomen:   · No masses or tenderness  · Bowel sounds present  Extremities:  ·  No Cyanosis or Clubbing  ·  Lower extremity edema: No  ·  Skin: Warm and dry  Neurological:  · Alert and oriented. · Moves all extremities well    EKG:    Date: 02/26/20  Reading: Biventricular paced rhythm with multiple PVCs. CATH 6/11/19: Normal coronaries. EP eval for VFib.      ICD CHANGE OUT 6/9/19: Done by Dr. Ronal Camargo due to battery at [facility].      ECHO 4/24/18: EF 10-15%. Lead noted in right sided chambers. Severe MR. Mod TR. RVSP 48.      STRESS 10/10/14: Mod size fixed defect involving the apical to mid inferior wall of the LV.  EF 42%    Labs:     CBC:   Recent Labs     02/26/20  0149   WBC 8.6   HGB 12.6   HCT 38.9   PLT See Reflexed IPF Result     BMP:   Recent Labs     02/26/20 0149   *   K 4.0   CO2 21   BUN 23*   CREATININE 1.47*   LABGLOM 37*   GLUCOSE 104*     BNP: No results for input(s): BNP in the last 72 hours. PT/INR:   Recent Labs     02/26/20 0149   PROTIME 10.8   INR 1.0     APTT:  Recent Labs     02/26/20 0149   APTT 23.4     CARDIAC ENZYMES:No results for input(s): CKTOTAL, CKMB, CKMBINDEX, TROPONINI in the last 72 hours. FASTING LIPID PANEL:  Lab Results   Component Value Date    HDL 59 11/01/2019    TRIG 95 11/01/2019     LIVER PROFILE:  Recent Labs     02/26/20 0149   AST 25   ALT 13   LABALBU 3.8     Troponins: Invalid input(s): TROPONIN     Other Current Problems  Patient Active Problem List   Diagnosis    [facility]) [facility]    Fibroids    Syncope    Lung nodule < 6cm on CT    Chronic systolic heart failure (HCC) s/p AICD    Mild intermittent asthma    Essential hypertension    Chest pain    Acute systolic CHF (congestive heart failure) (HCC)    Acute on chronic congestive heart failure (HCC)    QT prolongation    Viral URI with cough    Asthma with COPD with exacerbation ([address]    Non-ischemic cardiomyopathy ([address]    Lesion of right native kidney    COPD with acute exacerbation ([address]    Chest pain    NSTEMI (non-ST elevated myocardial infarction) ([address]    CAD (coronary artery disease)    Intraparenchymal hematoma of left side of brain due to trauma ([address]    Chronic combined systolic and diastolic congestive heart failure ([address]    AICD discharge     IMPRESSION & Recommendations:    1. Ventricular Fibrillation s/p AICD shock. Amiodarone 200 mg BID, Switch Coreg to Troprol  mg BID. Obtain Echocardiogram. Will consult EP. 2. Hypomagnesemia. Magnesium 1.4. Patient has been given 2 g mag total. Will give 400 mg po.   3. Chronic Systolic Heart Failure s/p AICD. Lisinopril 5 mg, Toprol  mg BID. 4. Essential Hypertension.  Lisinopril 5 mg, Toprol  mg BID, Aldactone 25 mg daily. 5. Acute Kidney Injury. IVF. Discussed with patient, family, and Nurse. Mayra Gutiérrez DO  PGY-1, Internal medicine resident  [place], [place]  2/26/2020 8:56 AM    Attending Cardiologist Addendum: I have reviewed and performed the history, physical, subjective, objective, assessment, and plan with the resident/fellow and agree with the note. I performed the history and physical personally. I have made changes to the note above as needed. Appropriate AICD shock for VF (has history of this)  Frequent Ectopy  Hypo mag  Chronic sys HF, HFrEF, NICM s/p BiV AICD for CRT-D  Severe MR in past  - stop coreg  - add toprol bid for PVC suppression  - replace mag  - change amio 200 mg po bid for now  - repeat echo to re eval MR - ? Mitraclip  - will get EP eval  - continue bumex  - stop lisinopril (allow for 36 hour washout)  - will consider Entresto prior to d/c     Thank you for allowing me to participate in the care of this patient, please do not hesitate to call if you have any questions. Hemant Castillo DO, [facility], [address] [facility]  [email or web address]  [phone]

## 2020-02-26 NOTE — ED NOTES
Bed: 17  Expected date:   Expected time:   Means of arrival:   Comments:  [address], RN  02/26/20 0124

## 2020-02-26 NOTE — ED PROVIDER NOTES
Previous Medications    ALBUTEROL SULFATE HFA (VENTOLIN HFA) 108 (90 BASE) MCG/ACT INHALER    INHALE 2 PUFFS INTO THE LUNGS EVERY 6 HOURS AS NEEDED FOR WHEEZING    AMIODARONE (CORDARONE) 200 MG TABLET    Take 1 tablet by mouth daily    AMITRIPTYLINE (ELAVIL) 25 MG TABLET    TAKE 1 TABLET BY MOUTH EVERY NIGHT AT BEDTIME    ATORVASTATIN (LIPITOR) 40 MG TABLET    Take 1 tablet by mouth nightly    BUMETANIDE (BUMEX) 1 MG TABLET    TAKE 1 TABLET BY MOUTH 2 TIMES A DAY    CARVEDILOL (COREG) 6.25 MG TABLET    Take 1 tablet by mouth 2 times daily (with meals)    DEXTROMETHORPHAN-GUAIFENESIN (ROBITUSSIN-DM)  MG/5ML SYRUP    Take 10 mLs by mouth every 4 hours as needed for Cough    FLUTICASONE (FLONASE) 50 MCG/ACT NASAL SPRAY    1 spray by Nasal route daily    FOLIC ACID-PYRIDOXINE-CYANOCOBALAMINE (FOLTX) 2.5-25-1 MG TABS TABLET    Take 1 tablet by mouth daily    IPRATROPIUM-ALBUTEROL (DUONEB) 0.5-2.5 (3) MG/3ML SOLN NEBULIZER SOLUTION    Inhale 3 mLs into the lungs 2 times daily as needed for Shortness of Breath    LISINOPRIL (PRINIVIL;ZESTRIL) 5 MG TABLET    Take 5 mg by mouth daily    MULTIPLE VITAMIN (MULTIVITAMIN) TABLET    TAKE ONE TABLET BY MOUTH ONE TIME A DAY    OMEPRAZOLE (PRILOSEC) 20 MG DELAYED RELEASE CAPSULE    TAKE 1 CAPSULE BY MOUTH ONE TIME A DAY    SENNOSIDES-DOCUSATE SODIUM (SENOKOT-S) 8.6-50 MG TABLET    Take 1 tablet by mouth daily    SPACER/AERO-HOLDING CHAMBERS (E-Z SPACER) ELVER    1 Device by Does not apply route daily    SPIRONOLACTONE (ALDACTONE) 25 MG TABLET    TAKE 1 TABLET BY MOUTH ONE TIME A DAY    UMECLIDINIUM BROMIDE (INCRUSE ELLIPTA) 62.5 MCG/INH AEPB    INHALE 1 PUFF INTO THE LUNGS DAILY STOP SPIRIVA       ALLERGIES     is allergic to iv contrast [iodides]. FAMILY HISTORY     She indicated that her mother is alive. She indicated that her father is . She indicated that her brother is alive. She indicated that her maternal grandmother is .  She indicated that her maternal grandfather is . She indicated that her paternal grandmother is . She indicated that her paternal grandfather is . family history includes Diabetes in her father and mother; Heart Disease in her brother and mother; High Blood Pressure in her mother. SOCIAL HISTORY      reports that she quit smoking about 4 years ago. Her smoking use included cigarettes. She smoked 0.25 packs per day. She has never used smokeless tobacco. She reports current alcohol use of about 1.0 standard drinks of alcohol per week. She reports that she does not use drugs. PHYSICAL EXAM     INITIAL VITALS:  weight is 142 lb (64.4 kg). Her oral temperature is 98 °F (36.7 °C). Her blood pressure is 134/75 and her pulse is 79. Her respiration is 30 and oxygen saturation is 94%. Physical Exam  Vitals signs reviewed. Constitutional:       General: She is not in acute distress. Appearance: Normal appearance. She is not ill-appearing. HENT:      Head: Normocephalic and atraumatic. Neck:      Musculoskeletal: Normal range of motion and neck supple. Cardiovascular:      Rate and Rhythm: Normal rate. Heart sounds: No murmur. Comments: Irregular rythm  Pulmonary:      Effort: Pulmonary effort is normal. No respiratory distress. Breath sounds: No rhonchi. Abdominal:      General: Abdomen is flat. There is no distension. Palpations: There is no mass. Tenderness: There is no abdominal tenderness. There is no guarding. Musculoskeletal: Normal range of motion. General: No swelling or tenderness. Skin:     General: Skin is warm and dry. Capillary Refill: Capillary refill takes less than 2 seconds. Neurological:      General: No focal deficit present. Mental Status: She is alert and oriented to person, place, and time. Psychiatric:         Mood and Affect: Mood normal.         Thought Content:  Thought content normal.         DIFFERENTIAL DIAGNOSIS/ MDM: SpO2: 99% 94%   Weight: 142 lb (64.4 kg)      -------------------------  BP: 134/75, Temp: 98 °F (36.7 °C), Pulse: 79, Resp: 30        CRITICAL CARE:   IP CONSULT TO CARDIOLOGY  15      CONSULTS:    Cardiology consulted, Dr Jaime Sanon,   PROCEDURES:  None    FINAL IMPRESSION      1. AICD discharge          DISPOSITION/PLAN   DISPOSITION Decision To Admit 02/26/2020 04:23:03 AM      PATIENT REFERRED TO:  No follow-up provider specified.     DISCHARGE MEDICATIONS:  New Prescriptions    No medications on file       (Please note that portions of this note were completed with a voice recognition program.  Efforts were made to edit the dictations butoccasionally words are mis-transcribed.)    Elham Hunt,, DO  Attending Emergency Physician                  Christina Cuevas, DO  02/26/20 [address] [facility], DO  02/26/20 [facility], DO  02/26/20 [facility], DO  02/26/20 [address] [place] [address], DO  02/26/20 025

## 2020-02-26 NOTE — PROGRESS NOTES
position/activity restrictions: up with assistance     Subjective   General  Patient assessed for rehabilitation services?: Yes  Family / Caregiver Present: No  Patient Currently in Pain: Yes  Vital Signs  Patient Currently in Pain: Yes    Social/Functional History  Social/Functional History  Lives With: Significant other  Type of Home: Apartment  Home Layout: One level  Home Access: Stairs to enter with rails  Entrance Stairs - Number of Steps: 3  Entrance Stairs - Rails: Right  Bathroom Shower/Tub: Walk-in shower  Bathroom Toilet: Standard  Home Equipment: Oxygen(PRN )  Receives Help From: Other ([facility])  ADL Assistance: Independent  Homemaking Assistance: Independent  Homemaking Responsibilities: Yes  Meal Prep Responsibility: Secondary  Laundry Responsibility: Secondary  Cleaning Responsibility: Secondary  Shopping Responsibility: Secondary  Ambulation Assistance: Independent  Transfer Assistance: Independent  Active : No  Mode of Transportation: Family, Cab  Occupation: On disability  Leisure & Hobbies: internet   IADL Comments: signifcant other is able to assist with ADL/IADLs PRN        Objective   Vision: Impaired  Vision Exceptions: Wears glasses for reading  Hearing: Within functional limits    Orientation  Overall Orientation Status: Within Functional Limits     Balance  Sitting Balance: Supervision  Standing Balance: Stand by assistance  Functional Mobility  Functional - Mobility Device: No device  Activity: To/from bathroom;Transport items  Assist Level: Contact guard assistance  Functional Mobility Comments: unsteady gait noted at this time; improving as continued  Toilet Transfers  Toilet - Technique: Ambulating  Equipment Used: Grab bars  Toilet Transfer: Contact guard assistance  ADL  Feeding: Independent  Grooming: Supervision(completed hand hygiene standing sinkside )  UE Bathing: Supervision; Increased time to complete  LE Bathing: Stand by assistance; Increased time to complete  UE Dressing: Supervision; Increased time to complete  LE Dressing: Stand by assistance; Increased time to complete(donning socks EOB, increased time to complete )  Toileting: Contact guard assistance; Increased time to complete(completed toileting using grab bars for transfers, pericare at SBA )  Additional Comments: heart rate reaching 120s post toileting and with coughing spell on the EOB; returning to the 60s shortly with deep breathing technique educated on with good return   Tone RUE  RUE Tone: Normotonic  Tone LUE  LUE Tone: Normotonic  Coordination  Movements Are Fluid And Coordinated: Yes     Bed mobility  Supine to Sit: Stand by assistance  Sit to Supine: Stand by assistance  Scooting: Stand by assistance  Transfers  Sit to stand: Stand by assistance  Stand to sit: Stand by assistance     Cognition  Overall Cognitive Status: WFL        Sensation  Overall Sensation Status: Impaired(bilateral fingers numbness and tingling; mainly the L hand )        LUE AROM : WFL  Left Hand AROM: WFL  RUE AROM : WFL  Right Hand AROM: WFL  LUE Strength  Gross LUE Strength: WFL  L Hand General: 4+/5  RUE Strength  Gross RUE Strength: WFL  R Hand General: 4+/5               Plan   Plan  Times per week: 2-3x/wk     AM-PAC Score        AM-[facility] Inpatient Daily Activity Raw Score: 21 (02/26/20 1611)  AM-PAC Inpatient ADL T-Scale Score : 44.27 (02/26/20 1611)  ADL Inpatient CMS 0-100% Score: 32.79 (02/26/20 1611)  ADL Inpatient CMS G-Code Modifier : Amie Bamberger (02/26/20 1611)    Goals  Short term goals  Time Frame for Short term goals: Patient will, by discharge  Short term goal 1: demonstrate UB self-cares independently   Short term goal 2: demonstrate LB self-cares at independently   Short term goal 3: demonstrate functional transfers/mobility using LRD at Mod I to engage in ADLs  Short term goal 4: demonstrate ~30 min of functional activity tolerance to engage in ADL/IADL tasks        Therapy Time   Individual Concurrent Group Co-treatment   Time In 1428         Time Out 1443         Minutes 15         Timed Code Treatment Minutes: [address] OTR/L

## 2020-02-26 NOTE — PROGRESS NOTES
Smoking Cessation - topics covered   []  Health Risks  []  Benefits of Quitting   []  Smoking Cessation  []  Patient has no history of tobacco use per note in significant history. [x]  Patient is former smoker per note in significant history. Patient quit in 2016. [x]  No need for tobacco cessation education. []  Booklet given  []  Patient verbalizes understanding. []  Patient denies need for tobacco cessation education. []  Unable to meet with patient today. Will follow up as able.   Felisha Perez  9:57 AM

## 2020-02-27 LAB
ANION GAP SERPL CALCULATED.3IONS-SCNC: 14 MMOL/L (ref 9–17)
BUN BLDV-MCNC: 25 MG/DL (ref 6–20)
BUN/CREAT BLD: ABNORMAL (ref 9–20)
CALCIUM SERPL-MCNC: 8.7 MG/DL (ref 8.6–10.4)
CHLORIDE BLD-SCNC: 100 MMOL/L (ref 98–107)
CO2: 24 MMOL/L (ref 20–31)
CREAT SERPL-MCNC: 1.17 MG/DL (ref 0.5–0.9)
GFR AFRICAN AMERICAN: 58 ML/MIN
GFR NON-AFRICAN AMERICAN: 48 ML/MIN
GFR SERPL CREATININE-BSD FRML MDRD: ABNORMAL ML/MIN/{1.73_M2}
GFR SERPL CREATININE-BSD FRML MDRD: ABNORMAL ML/MIN/{1.73_M2}
GLUCOSE BLD-MCNC: 96 MG/DL (ref 70–99)
LV EF: 20 %
LVEF MODALITY: NORMAL
MAGNESIUM: 1.9 MG/DL (ref 1.6–2.6)
POTASSIUM SERPL-SCNC: 3.6 MMOL/L (ref 3.7–5.3)
SODIUM BLD-SCNC: 138 MMOL/L (ref 135–144)

## 2020-02-27 PROCEDURE — 6370000000 HC RX 637 (ALT 250 FOR IP): Performed by: INTERNAL MEDICINE

## 2020-02-27 PROCEDURE — 6370000000 HC RX 637 (ALT 250 FOR IP): Performed by: NURSE PRACTITIONER

## 2020-02-27 PROCEDURE — G0378 HOSPITAL OBSERVATION PER HR: HCPCS

## 2020-02-27 PROCEDURE — 96375 TX/PRO/DX INJ NEW DRUG ADDON: CPT

## 2020-02-27 PROCEDURE — 93306 TTE W/DOPPLER COMPLETE: CPT

## 2020-02-27 PROCEDURE — 94640 AIRWAY INHALATION TREATMENT: CPT

## 2020-02-27 PROCEDURE — 80048 BASIC METABOLIC PNL TOTAL CA: CPT

## 2020-02-27 PROCEDURE — 83735 ASSAY OF MAGNESIUM: CPT

## 2020-02-27 PROCEDURE — 99233 SBSQ HOSP IP/OBS HIGH 50: CPT | Performed by: INTERNAL MEDICINE

## 2020-02-27 PROCEDURE — 2060000000 HC ICU INTERMEDIATE R&B

## 2020-02-27 PROCEDURE — 6360000002 HC RX W HCPCS: Performed by: NURSE PRACTITIONER

## 2020-02-27 PROCEDURE — 36415 COLL VENOUS BLD VENIPUNCTURE: CPT

## 2020-02-27 PROCEDURE — 2580000003 HC RX 258: Performed by: NURSE PRACTITIONER

## 2020-02-27 RX ORDER — METOPROLOL SUCCINATE 50 MG/1
50 TABLET, EXTENDED RELEASE ORAL 2 TIMES DAILY
Status: DISCONTINUED | OUTPATIENT
Start: 2020-02-27 | End: 2020-02-28

## 2020-02-27 RX ADMIN — SODIUM CHLORIDE, PRESERVATIVE FREE 10 ML: 5 INJECTION INTRAVENOUS at 21:52

## 2020-02-27 RX ADMIN — AMIODARONE HYDROCHLORIDE 200 MG: 200 TABLET ORAL at 08:43

## 2020-02-27 RX ADMIN — DESMOPRESSIN ACETATE 40 MG: 0.2 TABLET ORAL at 21:51

## 2020-02-27 RX ADMIN — ONDANSETRON 4 MG: 2 INJECTION INTRAMUSCULAR; INTRAVENOUS at 07:41

## 2020-02-27 RX ADMIN — PANTOPRAZOLE SODIUM 40 MG: 40 TABLET, DELAYED RELEASE ORAL at 05:26

## 2020-02-27 RX ADMIN — AMIODARONE HYDROCHLORIDE 200 MG: 200 TABLET ORAL at 21:51

## 2020-02-27 RX ADMIN — HYDROCODONE BITARTRATE AND ACETAMINOPHEN 1 TABLET: 5; 325 TABLET ORAL at 05:24

## 2020-02-27 RX ADMIN — BUMETANIDE 1 MG: 1 TABLET ORAL at 21:51

## 2020-02-27 RX ADMIN — TIOTROPIUM BROMIDE INHALATION SPRAY 2 PUFF: 3.12 SPRAY, METERED RESPIRATORY (INHALATION) at 09:07

## 2020-02-27 RX ADMIN — Medication 1 TABLET: at 08:42

## 2020-02-27 RX ADMIN — SODIUM CHLORIDE, PRESERVATIVE FREE 10 ML: 5 INJECTION INTRAVENOUS at 08:43

## 2020-02-27 RX ADMIN — METOPROLOL SUCCINATE 100 MG: 100 TABLET, FILM COATED, EXTENDED RELEASE ORAL at 08:42

## 2020-02-27 RX ADMIN — MAGNESIUM GLUCONATE 500 MG ORAL TABLET 400 MG: 500 TABLET ORAL at 08:42

## 2020-02-27 RX ADMIN — FLUTICASONE PROPIONATE 1 SPRAY: 50 SPRAY, METERED NASAL at 08:43

## 2020-02-27 RX ADMIN — BUMETANIDE 1 MG: 1 TABLET ORAL at 08:43

## 2020-02-27 RX ADMIN — HYDROCODONE BITARTRATE AND ACETAMINOPHEN 1 TABLET: 5; 325 TABLET ORAL at 21:51

## 2020-02-27 RX ADMIN — SPIRONOLACTONE 25 MG: 25 TABLET ORAL at 08:42

## 2020-02-27 ASSESSMENT — ENCOUNTER SYMPTOMS
SHORTNESS OF BREATH: 0
EYE DISCHARGE: 0
VOMITING: 0
BACK PAIN: 0
BLOOD IN STOOL: 0
NAUSEA: 0
ABDOMINAL PAIN: 0
COLOR CHANGE: 0
WHEEZING: 0

## 2020-02-27 ASSESSMENT — PAIN SCALES - GENERAL
PAINLEVEL_OUTOF10: 5
PAINLEVEL_OUTOF10: 6
PAINLEVEL_OUTOF10: 7
PAINLEVEL_OUTOF10: 0
PAINLEVEL_OUTOF10: 7
PAINLEVEL_OUTOF10: 7
PAINLEVEL_OUTOF10: 5
PAINLEVEL_OUTOF10: 6
PAINLEVEL_OUTOF10: 6

## 2020-02-27 ASSESSMENT — PAIN DESCRIPTION - LOCATION
LOCATION: ABDOMEN
LOCATION: ABDOMEN

## 2020-02-27 NOTE — PROGRESS NOTES
Physical Therapy  DATE: 2020    NAME: Mireya Virgen  MRN: [id number]   : 1965    Patient not seen this date for Physical Therapy due to:  [] Blood transfusion in progress  [] Hemodialysis  []  Patient Declined  [] Spine Precautions   [] Strict Bedrest  [] Surgery/ Procedure  [] Testing      [x] Other, pt declines due to dizziness and not feeling well, check         [] PT being discontinued at this time. Patient independent. No further needs. [] PT being discontinued at this time as the patient has been transferred to palliative care. No further needs.     Amaury Cali, PT

## 2020-02-27 NOTE — PROGRESS NOTES
[address]    Progress Note    2/27/2020    9:56 AM    Name:   Beatriz Ndiaye  MRN:     [id number]     Acct:      [de-identified]   Room:   [facility] Day:  1  Admit Date:  2/26/2020  1:24 AM    PCP:   Laron Gong MD  Code Status:  Full Code    Subjective:     C/C:   Chief Complaint   Patient presents with    AICD Problem     fired once while watching tv     Interval History Status: significantly improved. Complain of one episode of \"cold sweat\"  No chest pain or palpitation     Brief History:   Beatriz Ndiaye is a 47 y.o. Non-/non  female with chronic systolic CHF, nonischemic, s/p AICD who presents with AICD Problem. She report her AICD fired once while watching tv. Pt states AICD was making beeping noise yesterday. Patient reports feeling of racing hear but t denies chest pain, shortness of breath.      Previous cardiac testing include  CATH 6/11/19: Normal coronaries. EP eval for VFib. AICD CHANGE OUT 6/9/19: Done by Dr. Laly Echols due to battery at [facility]. ECHO 4/24/18: EF 10-15%. Lead noted in right sided chambers. Severe MR. Mod TR. RVSP 48. STRESS 10/10/14: Mod size fixed defect involving the apical to mid inferior wall of the LV. EF 42%.     Work up the ED show RIP with low mag. Potassium is normal. Chest xray did not show any acute process. Review of Systems:     Review of Systems   Constitutional: Negative for appetite change and fever. HENT: Negative for congestion and ear discharge. Eyes: Negative for discharge and visual disturbance. Respiratory: Negative for shortness of breath and wheezing. Cardiovascular: Negative for chest pain, palpitations and leg swelling. Gastrointestinal: Negative for abdominal pain, blood in stool, nausea and vomiting. Endocrine: Negative for cold intolerance and heat intolerance. Genitourinary: Negative for dysuria and frequency.    Musculoskeletal: Negative for arthralgias, back pain and joint swelling. Skin: Negative for color change and rash. Neurological: Negative for dizziness, tremors, seizures and light-headedness. Psychiatric/Behavioral: Negative for agitation and confusion. Medications: Allergies: Allergies   Allergen Reactions    Iv Contrast [Iodides] Nausea And Vomiting     Nausea/vomit. NO ALLERGY OR ANAPHYLAXIS       Current Meds:   Scheduled Meds:    atorvastatin  40 mg Oral Nightly    fluticasone  1 spray Nasal Daily    pantoprazole  40 mg Oral QAM AC    spironolactone  25 mg Oral Daily    folic acid-pyridoxine-cyancobalamin  1 tablet Oral Daily    bumetanide  1 mg Oral BID    sodium chloride flush  10 mL Intravenous 2 times per day    enoxaparin  40 mg Subcutaneous Daily    tiotropium  2 puff Inhalation Daily    magnesium oxide  400 mg Oral Daily    metoprolol succinate  100 mg Oral BID    amiodarone  200 mg Oral BID     Continuous Infusions:   PRN Meds: sodium chloride flush, acetaminophen, acetaminophen, polyethylene glycol, promethazine, ondansetron, nicotine, HYDROcodone 5 mg - acetaminophen    Data:     Past Medical History:   has a past medical history of Asthma, CAD (coronary artery disease), Cardiomegaly, CHF (congestive heart failure) ([facility]), COPD (chronic obstructive pulmonary disease) ([facility] [address] Depression, Hypertension, Lesion of right native kidney, MI (myocardial infarction) ([facility] [address] and Unspecified diseases of blood and blood-forming organs. Social History:   reports that she quit smoking about 4 years ago. Her smoking use included cigarettes. She smoked 0.25 packs per day. She has never used smokeless tobacco. She reports current alcohol use of about 1.0 standard drinks of alcohol per week. She reports that she does not use drugs.      Family History:   Family History   Problem Relation Age of Onset    Diabetes Mother     High Blood Pressure Mother     Heart Disease Mother     Diabetes Father    [employer] Heart Disease Brother        Vitals:  BP 94/62   Pulse 54   Temp 97.5 °F (36.4 °C) (Oral)   Resp 16   Ht 5' 2\" (1.575 m)   Wt 142 lb (64.4 kg)   LMP  (Approximate)   SpO2 95%   BMI 25.97 kg/m²   Temp (24hrs), Av.9 °F (36.1 °C), Min:94.6 °F (34.8 °C), Max:97.7 °F (36.5 °C)    No results for input(s): POCGLU in the last 72 hours. I/O (24Hr): Intake/Output Summary (Last 24 hours) at 2020 0956  Last data filed at 2020 0319  Gross per 24 hour   Intake 300 ml   Output 1350 ml   Net -1050 ml       Labs:  Hematology:  Recent Labs     20  014   WBC 8.6   RBC 4.26   HGB 12.6   HCT 38.9   MCV 91.3   MCH 29.6   MCHC 32.4   RDW 16.4*   PLT See Reflexed IPF Result   MPV NOT REPORTED   INR 1.0     Chemistry:  Recent Labs     20  01420  0958 20  0636   *  --  138   K 4.0  --  3.6*   CL 98  --  100   CO2 21  --  24   GLUCOSE 104*  --  96   BUN 23*  --  25*   CREATININE 1.47*  --  1.17*   MG 1.5* 2.4 1.9   ANIONGAP 15  --  14   LABGLOM 37*  --  48*   GFRAA 45*  --  58*   CALCIUM 9.1  --  8.7   PROBNP 2,398*  --   --    TROPHS 20*  --   --          Physical Examination:        Physical Exam  Vitals signs reviewed. Constitutional:       General: She is not in acute distress. Appearance: Normal appearance. She is not ill-appearing or diaphoretic. HENT:      Head: Normocephalic and atraumatic. Nose: No congestion or rhinorrhea. Eyes:      Extraocular Movements: Extraocular movements intact. Conjunctiva/sclera: Conjunctivae normal.      Pupils: Pupils are equal, round, and reactive to light. Neck:      Musculoskeletal: Normal range of motion and neck supple. Cardiovascular:      Rate and Rhythm: Normal rate and regular rhythm. Pulses: Normal pulses. Heart sounds: Normal heart sounds. No murmur. Pulmonary:      Effort: Pulmonary effort is normal.      Breath sounds: Normal breath sounds. No wheezing or rales.    Abdominal:      General: Bowel

## 2020-02-27 NOTE — PLAN OF CARE
Problem: Falls - Risk of:  Goal: Will remain free from falls  Description  Will remain free from falls  2/26/2020 2210 by Hang Monsivais RN  Outcome: Met This Shift     Problem: Pain:  Goal: Patient's pain/discomfort is manageable  Description  Patient's pain/discomfort is manageable  2/26/2020 2210 by Fernanda Couletr RN  Outcome: Met This Shift  2/26/2020 1334 by Bo Blanco RN  Outcome: Ongoing   Pt's pain assessed frequently with hourly rounding; assessed all pain characteristics including level, type, location, frequency, and onset. Pt medicated by RN per PRN orders. Non-pharmacologic interventions offered to pt as well. Pt states pain is tolerable at this time. Will continue to monitor.     Problem: Falls - Risk of:  Goal: Absence of physical injury  Description  Absence of physical injury  2/26/2020 1334 by Bo lBanco RN  Outcome: Ongoing

## 2020-02-27 NOTE — PROGRESS NOTES
[facility]   Progress Note                   Date:   2/27/2020  Patient name: Daphnie Perez  Date of admission:  2/26/2020  1:24 AM  MRN:   [id number]  YOB: 1965  PCP: Jennifer Fuller MD    Reason for Admission: AICD discharge [Z45.02]    Subjective:       Clinical Changes / Abnormalities: Pt seen and examined in the room. Pt resting in bed. She denies any CP or SOB but states that she is feeling weak today and was dizzy this am.        Medications:   Scheduled Meds:   atorvastatin  40 mg Oral Nightly    fluticasone  1 spray Nasal Daily    pantoprazole  40 mg Oral QAM AC    spironolactone  25 mg Oral Daily    folic acid-pyridoxine-cyancobalamin  1 tablet Oral Daily    bumetanide  1 mg Oral BID    sodium chloride flush  10 mL Intravenous 2 times per day    enoxaparin  40 mg Subcutaneous Daily    tiotropium  2 puff Inhalation Daily    magnesium oxide  400 mg Oral Daily    metoprolol succinate  100 mg Oral BID    amiodarone  200 mg Oral BID     Continuous Infusions:  CBC:   Recent Labs     02/26/20 0149   WBC 8.6   HGB 12.6   PLT See Reflexed IPF Result     BMP:    Recent Labs     02/26/20 0149 02/27/20  0636   * 138   K 4.0 3.6*   CL 98 100   CO2 21 24   BUN 23* 25*   CREATININE 1.47* 1.17*   GLUCOSE 104* 96     Hepatic:   Recent Labs     02/26/20 0149   AST 25   ALT 13   BILITOT 0.33   ALKPHOS 85     Troponin:   Recent Labs     02/26/20 0149   TROPHS 20*     BNP: No results for input(s): BNP in the last 72 hours. Lipids: No results for input(s): CHOL, HDL in the last 72 hours. Invalid input(s): LDLCALCU  INR:   Recent Labs     02/26/20 0149   INR 1.0     EKG:    Date: 02/26/20  Reading: Biventricular paced rhythm with multiple PVCs.      CATH 6/11/19: Normal coronaries. EP eval for VFib.      ICD CHANGE OUT 6/9/19: Done by Dr. Ashanti Randall due to battery at [facility].      ECHO 4/24/18: EF 10-15%. Lead noted in right sided chambers. Severe MR. Mod TR. RVSP 48. d/c'd.  Plan for Entresto after 36hr washout if BP can tolerate. Bumex BID. 5. Severe MR. Await ECHO results. Possible Mitral Clip as outpt.       Electronically signed by Fulton Cowden, APRN - CNP on 2/27/2020 at 9:56 AM  [address]  [phone]

## 2020-02-28 VITALS
DIASTOLIC BLOOD PRESSURE: 84 MMHG | OXYGEN SATURATION: 99 % | HEART RATE: 49 BPM | TEMPERATURE: 98 F | WEIGHT: 142 LBS | BODY MASS INDEX: 26.13 KG/M2 | RESPIRATION RATE: 18 BRPM | HEIGHT: 62 IN | SYSTOLIC BLOOD PRESSURE: 105 MMHG

## 2020-02-28 PROBLEM — I47.20 V-TACH (HCC): Status: ACTIVE | Noted: 2020-02-28

## 2020-02-28 LAB
ANION GAP SERPL CALCULATED.3IONS-SCNC: 14 MMOL/L (ref 9–17)
BUN BLDV-MCNC: 27 MG/DL (ref 6–20)
BUN/CREAT BLD: ABNORMAL (ref 9–20)
CALCIUM SERPL-MCNC: 9.2 MG/DL (ref 8.6–10.4)
CHLORIDE BLD-SCNC: 98 MMOL/L (ref 98–107)
CO2: 25 MMOL/L (ref 20–31)
CREAT SERPL-MCNC: 1.21 MG/DL (ref 0.5–0.9)
GFR AFRICAN AMERICAN: 56 ML/MIN
GFR NON-AFRICAN AMERICAN: 46 ML/MIN
GFR SERPL CREATININE-BSD FRML MDRD: ABNORMAL ML/MIN/{1.73_M2}
GFR SERPL CREATININE-BSD FRML MDRD: ABNORMAL ML/MIN/{1.73_M2}
GLUCOSE BLD-MCNC: 101 MG/DL (ref 70–99)
MAGNESIUM: 1.9 MG/DL (ref 1.6–2.6)
POTASSIUM SERPL-SCNC: 4.2 MMOL/L (ref 3.7–5.3)
SODIUM BLD-SCNC: 137 MMOL/L (ref 135–144)

## 2020-02-28 PROCEDURE — 2580000003 HC RX 258: Performed by: NURSE PRACTITIONER

## 2020-02-28 PROCEDURE — 94640 AIRWAY INHALATION TREATMENT: CPT

## 2020-02-28 PROCEDURE — 6370000000 HC RX 637 (ALT 250 FOR IP): Performed by: NURSE PRACTITIONER

## 2020-02-28 PROCEDURE — 99239 HOSP IP/OBS DSCHRG MGMT >30: CPT | Performed by: INTERNAL MEDICINE

## 2020-02-28 PROCEDURE — 83735 ASSAY OF MAGNESIUM: CPT

## 2020-02-28 PROCEDURE — 80048 BASIC METABOLIC PNL TOTAL CA: CPT

## 2020-02-28 PROCEDURE — G0378 HOSPITAL OBSERVATION PER HR: HCPCS

## 2020-02-28 PROCEDURE — 6370000000 HC RX 637 (ALT 250 FOR IP): Performed by: INTERNAL MEDICINE

## 2020-02-28 PROCEDURE — 97162 PT EVAL MOD COMPLEX 30 MIN: CPT

## 2020-02-28 PROCEDURE — 97530 THERAPEUTIC ACTIVITIES: CPT

## 2020-02-28 RX ORDER — MAGNESIUM SULFATE 1 G/100ML
1 INJECTION INTRAVENOUS ONCE
Status: DISCONTINUED | OUTPATIENT
Start: 2020-02-28 | End: 2020-02-28 | Stop reason: HOSPADM

## 2020-02-28 RX ORDER — METOPROLOL SUCCINATE 25 MG/1
25 TABLET, EXTENDED RELEASE ORAL 2 TIMES DAILY
Qty: 30 TABLET | Refills: 3 | Status: SHIPPED | OUTPATIENT
Start: 2020-02-28 | End: 2020-04-21 | Stop reason: SDUPTHER

## 2020-02-28 RX ORDER — METOPROLOL SUCCINATE 25 MG/1
25 TABLET, EXTENDED RELEASE ORAL 2 TIMES DAILY
Status: DISCONTINUED | OUTPATIENT
Start: 2020-02-28 | End: 2020-02-28 | Stop reason: HOSPADM

## 2020-02-28 RX ORDER — AMIODARONE HYDROCHLORIDE 200 MG/1
200 TABLET ORAL 2 TIMES DAILY
Qty: 30 TABLET | Refills: 3 | Status: SHIPPED | OUTPATIENT
Start: 2020-02-28 | End: 2020-04-21 | Stop reason: SDUPTHER

## 2020-02-28 RX ORDER — METOPROLOL SUCCINATE 50 MG/1
50 TABLET, EXTENDED RELEASE ORAL 2 TIMES DAILY
Qty: 30 TABLET | Refills: 3 | Status: CANCELLED | OUTPATIENT
Start: 2020-02-28

## 2020-02-28 RX ORDER — POTASSIUM CHLORIDE 29.8 MG/ML
20 INJECTION INTRAVENOUS PRN
Status: DISCONTINUED | OUTPATIENT
Start: 2020-02-28 | End: 2020-02-28

## 2020-02-28 RX ORDER — MAGNESIUM SULFATE 1 G/100ML
1 INJECTION INTRAVENOUS
Status: DISPENSED | OUTPATIENT
Start: 2020-02-28 | End: 2020-02-28

## 2020-02-28 RX ADMIN — HYDROCODONE BITARTRATE AND ACETAMINOPHEN 1 TABLET: 5; 325 TABLET ORAL at 11:51

## 2020-02-28 RX ADMIN — FLUTICASONE PROPIONATE 1 SPRAY: 50 SPRAY, METERED NASAL at 10:03

## 2020-02-28 RX ADMIN — Medication 1 TABLET: at 10:01

## 2020-02-28 RX ADMIN — BUMETANIDE 1 MG: 1 TABLET ORAL at 10:01

## 2020-02-28 RX ADMIN — TIOTROPIUM BROMIDE INHALATION SPRAY 2 PUFF: 3.12 SPRAY, METERED RESPIRATORY (INHALATION) at 09:25

## 2020-02-28 RX ADMIN — HYDROCODONE BITARTRATE AND ACETAMINOPHEN 1 TABLET: 5; 325 TABLET ORAL at 04:04

## 2020-02-28 RX ADMIN — SODIUM CHLORIDE, PRESERVATIVE FREE 10 ML: 5 INJECTION INTRAVENOUS at 10:04

## 2020-02-28 RX ADMIN — SPIRONOLACTONE 25 MG: 25 TABLET ORAL at 10:01

## 2020-02-28 RX ADMIN — PANTOPRAZOLE SODIUM 40 MG: 40 TABLET, DELAYED RELEASE ORAL at 06:32

## 2020-02-28 RX ADMIN — MAGNESIUM GLUCONATE 500 MG ORAL TABLET 400 MG: 500 TABLET ORAL at 10:01

## 2020-02-28 ASSESSMENT — ENCOUNTER SYMPTOMS
VOMITING: 0
ABDOMINAL PAIN: 0
SHORTNESS OF BREATH: 0
BACK PAIN: 0
COLOR CHANGE: 0
NAUSEA: 0
EYE DISCHARGE: 0
WHEEZING: 0
BLOOD IN STOOL: 0

## 2020-02-28 ASSESSMENT — PAIN SCALES - GENERAL
PAINLEVEL_OUTOF10: 6
PAINLEVEL_OUTOF10: 7
PAINLEVEL_OUTOF10: 3

## 2020-02-28 ASSESSMENT — PAIN DESCRIPTION - ORIENTATION: ORIENTATION: LEFT

## 2020-02-28 ASSESSMENT — PAIN DESCRIPTION - PAIN TYPE: TYPE: ACUTE PAIN

## 2020-02-28 ASSESSMENT — PAIN DESCRIPTION - LOCATION: LOCATION: RIB CAGE

## 2020-02-28 NOTE — PROGRESS NOTES
Genitourinary: Negative for dysuria and frequency. Musculoskeletal: Negative for arthralgias, back pain and joint swelling. Skin: Negative for color change and rash. Neurological: Negative for dizziness, tremors, seizures and light-headedness. Psychiatric/Behavioral: Negative for agitation and confusion. Medications: Allergies: Allergies   Allergen Reactions    Iv Contrast [Iodides] Nausea And Vomiting     Nausea/vomit. NO ALLERGY OR ANAPHYLAXIS       Current Meds:   Scheduled Meds:    metoprolol succinate  50 mg Oral BID    atorvastatin  40 mg Oral Nightly    fluticasone  1 spray Nasal Daily    pantoprazole  40 mg Oral QAM AC    spironolactone  25 mg Oral Daily    folic acid-pyridoxine-cyancobalamin  1 tablet Oral Daily    bumetanide  1 mg Oral BID    sodium chloride flush  10 mL Intravenous 2 times per day    enoxaparin  40 mg Subcutaneous Daily    tiotropium  2 puff Inhalation Daily    magnesium oxide  400 mg Oral Daily    amiodarone  200 mg Oral BID     Continuous Infusions:   PRN Meds: sodium chloride flush, acetaminophen, acetaminophen, polyethylene glycol, promethazine, ondansetron, nicotine, HYDROcodone 5 mg - acetaminophen    Data:     Past Medical History:   has a past medical history of Asthma, CAD (coronary artery disease), Cardiomegaly, CHF (congestive heart failure) ([facility]), COPD (chronic obstructive pulmonary disease) ([facility] [address] Depression, Hypertension, Lesion of right native kidney, MI (myocardial infarction) ([facility] [address] and Unspecified diseases of blood and blood-forming organs. Social History:   reports that she quit smoking about 4 years ago. Her smoking use included cigarettes. She smoked 0.25 packs per day. She has never used smokeless tobacco. She reports current alcohol use of about 1.0 standard drinks of alcohol per week. She reports that she does not use drugs.      Family History:   Family History   Problem Relation Age of Onset    Diabetes Mother    Mirna Sullivan

## 2020-02-28 NOTE — PROGRESS NOTES
[facility]   Progress Note                   Date:   2/28/2020  Patient name: Carmenza Weiss  Date of admission:  2/26/2020  1:24 AM  MRN:   [id number]  YOB: 1965  PCP: Tali Thomas MD    Reason for Admission: AICD discharge [Z45.02]    Subjective:       Clinical Changes / Abnormalities: Pt seen and examined in the room. Pt resting in bed. Pt states that she felt light headed today. Found to be slightly hypotensive  and HR 49. She denies any CP or SOB.      -2.6 L since admission   Medications:   Scheduled Meds:   metoprolol succinate  50 mg Oral BID    atorvastatin  40 mg Oral Nightly    fluticasone  1 spray Nasal Daily    pantoprazole  40 mg Oral QAM AC    spironolactone  25 mg Oral Daily    folic acid-pyridoxine-cyancobalamin  1 tablet Oral Daily    bumetanide  1 mg Oral BID    sodium chloride flush  10 mL Intravenous 2 times per day    enoxaparin  40 mg Subcutaneous Daily    tiotropium  2 puff Inhalation Daily    magnesium oxide  400 mg Oral Daily    amiodarone  200 mg Oral BID     Continuous Infusions:  CBC:   Recent Labs     02/26/20 0149   WBC 8.6   HGB 12.6   PLT See Reflexed IPF Result     BMP:    Recent Labs     02/26/20  0149 02/27/20  0636 02/28/20  0600   * 138 137   K 4.0 3.6* 4.2   CL 98 100 98   CO2 21 24 25   BUN 23* 25* 27*   CREATININE 1.47* 1.17* 1.21*   GLUCOSE 104* 96 101*     Hepatic:   Recent Labs     02/26/20 0149   AST 25   ALT 13   BILITOT 0.33   ALKPHOS 85     Troponin:   Recent Labs     02/26/20 0149   TROPHS 20*     BNP: No results for input(s): BNP in the last 72 hours. Lipids: No results for input(s): CHOL, HDL in the last 72 hours. Invalid input(s): LDLCALCU  INR:   Recent Labs     02/26/20 0149   INR 1.0     EKG:      ECHO 2/28/20  Summary  Left ventricle is enlarged. Global left ventricular systolic function is  severely reduced. Estimated ejection fraction is 20 % .   Calculated EF via heart model is 22 %.  Mild left ventricular hypertrophy. Severe global hypokinesis. Grade III (severe) left ventricular diastolic dysfunction. Biatrial enlargement. Right ventricular dilatation with reduced systolic function. At least moderate mitral valve regurgitation, possiibly severe  Moderate to severe tricuspid regurgitation. Small pericardial effusion. Date: 02/26/20  Reading: Biventricular paced rhythm with multiple PVCs.      CATH 6/11/19: Normal coronaries. EP eval for VFib.      ICD CHANGE OUT 6/9/19: Done by Dr. Lisa Hunt due to battery at [facility].      ECHO 4/24/18: EF 10-15%. Lead noted in right sided chambers. Severe MR. Mod TR. RVSP 48.      STRESS 10/10/14: Mod size fixed defect involving the apical to mid inferior wall of the LV. EF 42%  Objective:   Vitals: /79   Pulse 58   Temp 97.5 °F (36.4 °C) (Oral)   Resp 16   Ht 5' 2\" (1.575 m)   Wt 142 lb (64.4 kg)   LMP  (Approximate)   SpO2 99%   BMI 25.97 kg/m²   General appearance: alert and cooperative with exam  HEENT: Head: Normocephalic, no lesions, without obvious abnormality. Neck: no JVD, trachea midline, no adenopathy  Lungs: Clear to auscultation  Heart: Regular rate and rhythm, s1/s2 auscultated, + systolic murmurs  Abdomen: soft, non-tender, bowel sounds active  Extremities: no edema  Neurologic: not done        Assessment / Acute Cardiac Problems:   1. Appropriate AICD shock for VF  2. Frequent Ectopy   3. Hypomag  4. Chronic systolic HF, HFrEF, NICM s/p AICD for CRT-D   5.  Severe MR     Patient Active Problem List:     COPD (chronic obstructive pulmonary disease) (HCC)     Fibroids     Syncope     Lung nodule < 6cm on CT     Chronic systolic heart failure (HCC) s/p AICD     Mild intermittent asthma     Essential hypertension     Chest pain     QT prolongation     Asthma with COPD with exacerbation ([address]     Non-ischemic cardiomyopathy ([address]     Lesion of right native kidney     NSTEMI (non-ST elevated myocardial infarction) ([address] CAD (coronary artery disease)     Intraparenchymal hematoma of left side of brain due to trauma [facility])     Chronic combined systolic and diastolic congestive heart failure ([facility] [address]     AICD discharge     RIP (acute kidney injury) ([facility] [address]     Hypomagnesemia      Plan of Treatment:   1. VF with appropriate shock. Continue increased Amiodarone 200mg BID for now. Will decrease BB to 25mg BID    2. Keep K > 4 and Mag > 2. Replace per scale. Mag daily. 3. Dizziness. Likely due to hypotension, BB decreased to 25mg BID   4. CHF. Stable. No signs of fluid overload today. Continue BB. Will ACE d/c'd. Plan for Entresto after 36hr washout if BP can tolerate. Will consider as outpt. Bumex BID. 5. Severe MR. ECHO reviewed. Possible Mitral Clip as outpt. Will discuss TAMIR as outpt. 6. Ok to d/c home from cardiology if BP improves later today. Plan to  follow up in 2 weeks.       Electronically signed by ANGY Robertson CNP on 2/28/2020 at [address] [place] [address].  [phone]

## 2020-02-28 NOTE — DISCHARGE SUMMARY
tablet by mouth daily  Start taking on:  February 29, 2020     metoprolol succinate 25 MG extended release tablet  Commonly known as:  TOPROL XL  Take 1 tablet by mouth 2 times daily        CHANGE how you take these medications    amiodarone 200 MG tablet  Commonly known as:  CORDARONE  Take 1 tablet by mouth 2 times daily  What changed:  when to take this        CONTINUE taking these medications    albuterol sulfate  (90 Base) MCG/ACT inhaler  Commonly known as:  Ventolin HFA  INHALE 2 PUFFS INTO THE LUNGS EVERY 6 HOURS AS NEEDED FOR WHEEZING     amitriptyline 25 MG tablet  Commonly known as:  ELAVIL  TAKE 1 TABLET BY MOUTH EVERY NIGHT AT BEDTIME     atorvastatin 40 MG tablet  Commonly known as:  LIPITOR  Take 1 tablet by mouth nightly     bumetanide 1 MG tablet  Commonly known as:  BUMEX  TAKE 1 TABLET BY MOUTH 2 TIMES A DAY     dextromethorphan-guaiFENesin  MG/5ML syrup  Commonly known as:  ROBITUSSIN-DM  Take 10 mLs by mouth every 4 hours as needed for Cough     E-Z Spacer Rosibel  1 Device by Does not apply route daily     fluticasone 50 MCG/ACT nasal spray  Commonly known as:  Flonase  1 spray by Nasal route daily     folic acid-pyridoxine-cyanocobalamine 2.5-25-1 MG Tabs tablet  Commonly known as:  FOLTX  Take 1 tablet by mouth daily     ipratropium-albuterol 0.5-2.5 (3) MG/3ML Soln nebulizer solution  Commonly known as:  DUONEB  Inhale 3 mLs into the lungs 2 times daily as needed for Shortness of Breath     multivitamin tablet  TAKE ONE TABLET BY MOUTH ONE TIME A DAY     omeprazole 20 MG delayed release capsule  Commonly known as:  PRILOSEC  TAKE 1 CAPSULE BY MOUTH ONE TIME A DAY     sennosides-docusate sodium 8.6-50 MG tablet  Commonly known as:  SENOKOT-S  Take 1 tablet by mouth daily     spironolactone 25 MG tablet  Commonly known as:  ALDACTONE  TAKE 1 TABLET BY MOUTH ONE TIME A DAY     Umeclidinium Bromide 62.5 MCG/INH Aepb  Commonly known as:   Incruse Ellipta  INHALE 1 PUFF INTO THE LUNGS

## 2020-02-28 NOTE — CONSULTS
[facility]  In PatientCardiology Consult             Date:   2/27/20  Patient name: Mireya Virgen  Date of admission:  2/26/2020  1:24 AM  MRN:   [id number]  YOB: 1965      Reason for Admission:  ICD shock    CHIEF COMPLAINT:  ICD shock    History Obtained From:  Patient and medical record    HISTORY OF PRESENT ILLNESS:      The patient is a 47 y.o woman with h/o non-ischemic cardiomyopathy VT/VF on amiodarone, and [employer] CRT-D admitted for one ICD shock. This occurred just after having an argument, and while she was sitting on the bed felt a shock to her back accompanied by a brief near-syncopal sensation. She had been doing well with no recent CHF and good control of VT/VF on amiodarone. She had also heard intermittent beeping from her ICD earlier that day. Evaluation of the ICD showed a several non-sustained episodes of VT/VF, with a sustained episode of polymorphous VT ( averarge rate 282 bpm) leading to one appropriate ICD shock. All lead impedances, thresholds, HV parameters and battery voltage were WNL. Her serum K+ was 3.5 mEq/L and Mg++ was 1.4 mg/dl. She has had no further episodes. Past Medical History:   has a past medical history of Asthma, CAD (coronary artery disease), Cardiomegaly, CHF (congestive heart failure) ([facility] [address] COPD (chronic obstructive pulmonary disease) ([facility] [address] Depression, Hypertension, Lesion of right native kidney, MI (myocardial infarction) ([facility] [address] and Unspecified diseases of blood and blood-forming organs. Past Surgical History:   has a past surgical history that includes Cardiac defibrillator placement (09/2005); Pacemaker insertion; Tubal ligation; Cardiac defibrillator placement; and Uterine fibroid surgery (maarch 2015). Home Medications:    Prior to Admission medications    Medication Sig Start Date End Date Taking?  Authorizing Provider   omeprazole (PRILOSEC) 20 MG delayed release capsule TAKE 1 CAPSULE BY MOUTH ONE DAILY STOP [facility]  Patient not taking: Reported on 2/21/2020 11/1/19   Maykel Mancia MD   Spacer/Aero-Holding Chambers (E-Z SPACER) ELVER 1 Device by Does not apply route daily  Patient not taking: Reported on 11/4/2019 11/25/18   Hodan Frank MD       Allergies: Iv contrast [iodides]    Social History:   reports that she quit smoking about 4 years ago. Her smoking use included cigarettes. She smoked 0.25 packs per day. She has never used smokeless tobacco. She reports current alcohol use of about 1.0 standard drinks of alcohol per week. She reports that she does not use drugs. Family History:   Positive for early CAD    REVIEW OF SYSTEMS:    · Constitutional: there has been no unanticipated weight loss. There's been No change in energy level, No change in activity level. · Eyes: No visual changes or diplopia. No scleral icterus. · ENT: No Headaches, hearing loss or vertigo. No mouth sores or sore throat. · Cardiovascular: No problem  · Respiratory: No previous reported problems  · Gastrointestinal: No abdominal pain, appetite loss, blood in stools. No change in bowel or bladder habits. · Genitourinary: No dysuria, trouble voiding, or hematuria. · Musculoskeletal:  No gait disturbance, No weakness or joint complaints. · Integumentary: No rash or pruritis. · Neurological: No headache, diplopia, change in muscle strength, numbness or tingling. No change in gait, balance, coordination, mood, affect, memory, mentation, behavior. · Psychiatric: No anxiety, or depression. · Endocrine: No temperature intolerance. No excessive thirst, fluid intake, or urination. No tremor. · Hematologic/Lymphatic: No abnormal bruising or bleeding, blood clots or swollen lymph nodes. · Allergic/Immunologic: No nasal congestion or hives.     PHYSICAL EXAM:    Physical Examination:    /79   Pulse 58   Temp 97.5 °F (36.4 °C) (Oral)   Resp 16   Ht 5' 2\" (1.575 m)   Wt 142 lb (64.4 kg)   LMP  (Approximate)   SpO2 Jeffery Marcos MD on 2/28/2020 at 6:05 AM.  [facility] cardiology Consultant

## 2020-02-28 NOTE — PROGRESS NOTES
CLINICAL PHARMACY NOTE: MEDS TO [address] Select Patient?: Yes  Total # of Prescriptions Filled: 8   The following medications were delivered to the patient:  · Bumetanide 1mg tab  · Metoprolol succ er 50 mg tab  · Amiodarone 200 mg tab  · Amitriptyline 25 mg tab  · Atorvastatin 40 mg  · Spironolactone 25 mg tab  · Omeprazole 20 mg cap  · Magnesium oxide 400 mg tab  Total # of Interventions Completed: 1  Time Spent (min): 120    Additional Documentation:Good did bubble packs and we delivered medications to the room.

## 2020-02-28 NOTE — PLAN OF CARE
Problem: Falls - Risk of:  Goal: Will remain free from falls  Description  Will remain free from falls  Outcome: Met This Shift     Problem: Infection:  Goal: Will remain free from infection  Description  Will remain free from infection  Outcome: Met This Shift     Problem: Pain:  Goal: Patient's pain/discomfort is manageable  Description  Patient's pain/discomfort is manageable  Outcome: Met This Shift   Pt's pain assessed frequently with hourly rounding; assessed all pain characteristics including level, type, location, frequency, and onset. Pt medicated by RN per PRN orders. Non-pharmacologic interventions offered to pt as well. Pt states pain is tolerable at this time. Will continue to monitor.

## 2020-02-28 NOTE — PROGRESS NOTES
Physical Therapy    Facility/Department: [facility]A [facility]  Initial Assessment    NAME: Carmenza Baileypshire  : 1965  MRN: [id number]    Chief Complaint   Patient presents with    AICD Problem     fired once while watching tv       Date of Service: 2020    Discharge Recommendations:    Further therapy recommended at discharge. PT Equipment Recommendations  Other: Pt may benefit from short term cane use. Assessment   Body structures, Functions, Activity limitations: Decreased functional mobility ; Decreased strength;Decreased balance  Assessment: Pt grossly CGA to Eduardo, fatigues. Pt amb 200' no AD CGA unsteady. PT would benefit from [facility] acute PT to address deficits. Prognosis: Good  Decision Making: Medium Complexity  PT Education: Plan of Care;General Safety;Transfer Training;Functional Mobility Training  REQUIRES PT FOLLOW UP: Yes  Activity Tolerance  Activity Tolerance: Patient Tolerated treatment well;Patient limited by fatigue       Patient Diagnosis(es): The encounter diagnosis was AICD discharge. has a past medical history of Asthma, CAD (coronary artery disease), Cardiomegaly, CHF (congestive heart failure) ([facility] COPD (chronic obstructive pulmonary disease) ([facility] Depression, Hypertension, Lesion of right native kidney, MI (myocardial infarction) ([facility] and Unspecified diseases of blood and blood-forming organs. has a past surgical history that includes Cardiac defibrillator placement (2005); Pacemaker insertion; Tubal ligation; Cardiac defibrillator placement; and Uterine fibroid surgery (malucretia ).     Restrictions  Restrictions/Precautions  Restrictions/Precautions: General Precautions, Fall Risk  Required Braces or Orthoses?: No  Position Activity Restriction  Other position/activity restrictions: up with assistance   Vision/Hearing        Subjective  General  Chart Reviewed: Yes  Patient assessed for rehabilitation services?: Yes  Response To Previous Treatment: Not applicable  Family / Caregiver Present: No  Follows Commands: Within Functional Limits  Subjective  Subjective: RN and pt agreeable to PT. Pt alert in bed upon arrival.   Pain Screening  Patient Currently in Pain: Yes  Pain Assessment  Pain Assessment: 0-10  Pain Level: 7  Pain Type: Acute pain  Pain Location: Rib cage  Pain Orientation: Left  Non-Pharmaceutical Pain Intervention(s): Ambulation/Increased Activity;Repositioned; Emotional support  Response to Pain Intervention: Patient Satisfied  Vital Signs  Patient Currently in Pain: Yes  Pre Treatment Pain Screening  Intervention List: Patient able to continue with treatment    Orientation  Orientation  Overall Orientation Status: Within Functional Limits  Social/Functional History  Social/Functional History  Lives With: Significant other  Type of Home: Apartment  Home Layout: One level  Home Access: Stairs to enter with rails  Entrance Stairs - Number of Steps: 3  Entrance Stairs - Rails: Right  Bathroom Shower/Tub: Walk-in shower  Bathroom Toilet: Standard  Home Equipment: Oxygen(PRN )  Receives Help From: Other (comment)([facility])  ADL Assistance: Independent  Homemaking Assistance: Independent  Homemaking Responsibilities: Yes  Meal Prep Responsibility: Secondary  Laundry Responsibility: Secondary  Cleaning Responsibility: Secondary  Shopping Responsibility: Secondary  Ambulation Assistance: Independent  Transfer Assistance: Independent  Active : No  Mode of Transportation: Family, Cab  Occupation: On disability  Leisure & Hobbies: internet   IADL Comments: signifcant other is able to assist with ADL/IADLs PRN   Cognition        Objective          AROM RLE (degrees)  RLE AROM: WFL  AROM LLE (degrees)  LLE AROM : WFL  AROM RUE (degrees)  RUE AROM : WFL  AROM LUE (degrees)  LUE AROM : WFL  Strength RLE  Strength RLE: WFL  Strength LLE  Strength LLE: WFL  Strength RUE  Strength RUE: WFL  Strength LUE  Strength LUE: WFL     Sensation  Overall Sensation Status: Impaired(c/o tingling in L hand currently, intermittant Bilat, chronic.)  Bed mobility  Supine to Sit: Stand by assistance  Sit to Supine: Stand by assistance  Scooting: Stand by assistance  Transfers  Sit to Stand: Contact guard assistance  Stand to sit: Contact guard assistance  Ambulation  Ambulation?: Yes  Ambulation 1  Surface: level tile  Device: No Device  Assistance: Contact guard assistance;Minimal assistance  Quality of Gait: unsteady, slowed, no LOB,   Distance: 200'  Stairs/Curb  Stairs?: No     Balance  Posture: Good  Sitting - Static: Good  Sitting - Dynamic: Good  Standing - Static: Good;-  Standing - Dynamic: Fair;+  Comments: no AD used  Exercises  Comments: standing heel raises 12x Bilat, Eduardo while standing, c/o cramp in calves limiting. 15x seated AROM Bilat: LAQ, marches, ankle rocks.      Plan   Plan  Times per week: 5-6x/wk  Current Treatment Recommendations: Strengthening, Balance Training, Functional Mobility Training, Endurance Training, Transfer Training, Gait Training, Stair training, Home Exercise Program, Safety Education & Training, Patient/Caregiver Education & Training, Equipment Evaluation, Education, & procurement  Safety Devices  Type of devices: Call light within reach, Nurse notified, Gait belt, Patient at risk for falls, All fall risk precautions in place, Left in bed  Restraints  Initially in place: No    AM-PAC Score  AM-PAC Inpatient Mobility Raw Score : 21 (02/28/20 1249)  AM-PAC Inpatient T-Scale Score : 50.25 (02/28/20 1249)  Mobility Inpatient CMS 0-100% Score: 28.97 (02/28/20 1249)  Mobility Inpatient CMS G-Code Modifier : Amie Bamberger (02/28/20 1249)          Goals  Short term goals  Time Frame for Short term goals: 14 visits  Short term goal 1: Pt will be I with bed mobility  Short term goal 2: Pt will be I with transfers  Short term goal 3: Pt will be Herbert amb 200' straight cane  Short term goal 4: Pt will navigate 6 steps Herbert R rail       Therapy Time   Individual Concurrent Group Co-treatment   Time In 0838         Time Out 0854         Minutes 16         Timed Code Treatment Minutes: 8 Minutes       Chayito Eastman, PT

## 2020-03-03 ENCOUNTER — HOSPITAL ENCOUNTER (OUTPATIENT)
Age: 55
Setting detail: SPECIMEN
Discharge: HOME | End: 2020-03-03
Payer: MEDICAID

## 2020-03-03 ENCOUNTER — TELEPHONE (OUTPATIENT)
Dept: FAMILY MEDICINE CLINIC | Age: 55
End: 2020-03-03

## 2020-03-03 ENCOUNTER — OFFICE VISIT (OUTPATIENT)
Dept: FAMILY MEDICINE CLINIC | Age: 55
End: 2020-03-03
Payer: MEDICAID

## 2020-03-03 VITALS
HEART RATE: 74 BPM | WEIGHT: 145 LBS | BODY MASS INDEX: 26.68 KG/M2 | HEIGHT: 62 IN | DIASTOLIC BLOOD PRESSURE: 86 MMHG | SYSTOLIC BLOOD PRESSURE: 119 MMHG | TEMPERATURE: 98.2 F

## 2020-03-03 LAB
ANION GAP SERPL CALCULATED.3IONS-SCNC: 12 MMOL/L (ref 9–17)
BUN BLDV-MCNC: 17 MG/DL (ref 6–20)
BUN/CREAT BLD: ABNORMAL (ref 9–20)
CALCIUM SERPL-MCNC: 9.7 MG/DL (ref 8.6–10.4)
CHLORIDE BLD-SCNC: 104 MMOL/L (ref 98–107)
CO2: 25 MMOL/L (ref 20–31)
CREAT SERPL-MCNC: 0.98 MG/DL (ref 0.5–0.9)
GFR AFRICAN AMERICAN: >60 ML/MIN
GFR NON-AFRICAN AMERICAN: 59 ML/MIN
GFR SERPL CREATININE-BSD FRML MDRD: ABNORMAL ML/MIN/{1.73_M2}
GFR SERPL CREATININE-BSD FRML MDRD: ABNORMAL ML/MIN/{1.73_M2}
GLUCOSE BLD-MCNC: 87 MG/DL (ref 70–99)
MAGNESIUM: 2.2 MG/DL (ref 1.6–2.6)
POTASSIUM SERPL-SCNC: 4.1 MMOL/L (ref 3.7–5.3)
SODIUM BLD-SCNC: 141 MMOL/L (ref 135–144)

## 2020-03-03 PROCEDURE — 1036F TOBACCO NON-USER: CPT | Performed by: STUDENT IN AN ORGANIZED HEALTH CARE EDUCATION/TRAINING PROGRAM

## 2020-03-03 PROCEDURE — G8417 CALC BMI ABV UP PARAM F/U: HCPCS | Performed by: STUDENT IN AN ORGANIZED HEALTH CARE EDUCATION/TRAINING PROGRAM

## 2020-03-03 PROCEDURE — 1111F DSCHRG MED/CURRENT MED MERGE: CPT | Performed by: STUDENT IN AN ORGANIZED HEALTH CARE EDUCATION/TRAINING PROGRAM

## 2020-03-03 PROCEDURE — G8428 CUR MEDS NOT DOCUMENT: HCPCS | Performed by: STUDENT IN AN ORGANIZED HEALTH CARE EDUCATION/TRAINING PROGRAM

## 2020-03-03 PROCEDURE — 3017F COLORECTAL CA SCREEN DOC REV: CPT | Performed by: STUDENT IN AN ORGANIZED HEALTH CARE EDUCATION/TRAINING PROGRAM

## 2020-03-03 PROCEDURE — G8482 FLU IMMUNIZE ORDER/ADMIN: HCPCS | Performed by: STUDENT IN AN ORGANIZED HEALTH CARE EDUCATION/TRAINING PROGRAM

## 2020-03-03 PROCEDURE — 99213 OFFICE O/P EST LOW 20 MIN: CPT | Performed by: STUDENT IN AN ORGANIZED HEALTH CARE EDUCATION/TRAINING PROGRAM

## 2020-03-03 RX ORDER — IBUPROFEN 400 MG/1
400 TABLET ORAL 2 TIMES DAILY PRN
Qty: 60 TABLET | Refills: 0 | Status: ON HOLD | OUTPATIENT
Start: 2020-03-03 | End: 2020-05-18 | Stop reason: ALTCHOICE

## 2020-03-03 ASSESSMENT — ENCOUNTER SYMPTOMS
SHORTNESS OF BREATH: 0
COUGH: 0
WHEEZING: 0

## 2020-03-03 NOTE — PROGRESS NOTES
Visit Information    Have you changed or started any medications since your last visit including any over-the-counter medicines, vitamins, or herbal medicines? no   Have you stopped taking any of your medications? Is so, why? -  no  Are you having any side effects from any of your medications? - no    Have you seen any other physician or provider since your last visit?  no   Have you had any other diagnostic tests since your last visit? yes    Have you been seen in the emergency room and/or had an admission in a hospital since we last saw you?  yes    Have you had your routine dental cleaning in the past 6 months?  no     Do you have an active MyChart account? If no, what is the barrier?   Yes    Patient Care Team:  Pipe Navarrete MD as PCP - General (Family Medicine)  Jada Proctor RN as     Medical History Review  Past Medical, Family, and Social History reviewed and does contribute to the patient presenting condition    Health Maintenance   Topic Date Due    Cervical cancer screen  12/28/2018    Breast cancer screen  02/03/2019    Shingles Vaccine (1 of 2) 06/19/2020 (Originally 6/13/2015)    Lipid screen  11/01/2020    TSH testing  11/01/2020    A1C test (Diabetic or Prediabetic)  11/06/2020    Potassium monitoring  02/28/2021    Creatinine monitoring  02/28/2021    Colon cancer screen fecal DNA test (Cologuard)  12/04/2022    DTaP/Tdap/Td vaccine (2 - Td) 06/09/2029    Flu vaccine  Completed    Pneumococcal 0-64 years Vaccine  Completed    Hepatitis C screen  Completed    HIV screen  Completed    Hepatitis A vaccine  Aged Out    Hepatitis B vaccine  Aged Out    Hib vaccine  Aged Out    Meningococcal (ACWY) vaccine  Aged Out
no discontinued medications. Kelly Vicente received counseling on the following healthy behaviors: nutrition, exercise and medication adherence    Discussed use,benefit, and side effects of prescribed medications. Barriers to medication compliance addressed. All patient questions answered. Pt voiced understanding. Return in about 3 months (around 6/3/2020) for follow up, htn, CHF. Disclaimer: Some orall of this note was transcribed using voice-recognition software. This may cause typographical errors occasionally. Although all effort is made to fix these errors, please do not hesitate to contact our office if there Yehuda Alejandro concern with the understanding of this note.

## 2020-03-04 ENCOUNTER — TELEPHONE (OUTPATIENT)
Dept: FAMILY MEDICINE CLINIC | Age: 55
End: 2020-03-04

## 2020-03-05 ENCOUNTER — TELEPHONE (OUTPATIENT)
Dept: FAMILY MEDICINE CLINIC | Age: 55
End: 2020-03-05

## 2020-03-05 NOTE — TELEPHONE ENCOUNTER
PA request for [employer].     PA processed and submitted to pt insurance, waiting for response in regards to coverage

## 2020-03-11 ENCOUNTER — HOSPITAL ENCOUNTER (OUTPATIENT)
Dept: OTHER | Age: 55
Discharge: HOME | End: 2020-03-11
Payer: MEDICAID

## 2020-03-11 VITALS
HEART RATE: 74 BPM | OXYGEN SATURATION: 97 % | RESPIRATION RATE: 16 BRPM | DIASTOLIC BLOOD PRESSURE: 78 MMHG | WEIGHT: 145 LBS | SYSTOLIC BLOOD PRESSURE: 118 MMHG | BODY MASS INDEX: 26.51 KG/M2

## 2020-03-11 PROCEDURE — 99212 OFFICE O/P EST SF 10 MIN: CPT

## 2020-03-11 ASSESSMENT — PAIN DESCRIPTION - PAIN TYPE: TYPE: CHRONIC PAIN

## 2020-03-11 ASSESSMENT — PAIN DESCRIPTION - DESCRIPTORS: DESCRIPTORS: DISCOMFORT

## 2020-03-11 ASSESSMENT — PAIN DESCRIPTION - LOCATION: LOCATION: RIB CAGE;BACK

## 2020-03-11 ASSESSMENT — PAIN DESCRIPTION - ORIENTATION: ORIENTATION: LEFT;UPPER

## 2020-03-11 ASSESSMENT — PAIN SCALES - GENERAL: PAINLEVEL_OUTOF10: 7

## 2020-03-11 NOTE — PROGRESS NOTES
Date:  3/11/2020  Time:  4:11 PM    CHF Clinic at [facility]    Office: [phone] Fax: [phone]    Re:  Mireya Virgen   Patient : 1965    Vital Signs: /78   Pulse 74   Resp 16   Wt 145 lb (65.8 kg)   LMP  (Approximate)   SpO2 97%   BMI 26.51 kg/m²                       O2 Device: None (Room air)                           No results for input(s): CBC, HGB, HCT, WBC, PLATELET, NA, K, CL, CO2, BUN, CREATININE, GLUCOSE, BNP, INR in the last 72 hours. Respiratory:    Assessment  Charting Type: Reassessment    Breath Sounds  Right Upper Lobe: Clear  Right Middle Lobe: Clear  Right Lower Lobe: Clear  Left Upper Lobe: Clear  Left Lower Lobe: Clear    Cough/Sputum  Cough: None         Peripheral Vascular  RLE Edema: None  LLE Edema: None      Complaints: None at this time    Physician Orders None    Comment : Patient here for scheduled visit. VS and weight obtained and assessment completed. Patient has not been seen in the CHF Clinic in several months. Was recently hospitalized for V-Tach and AICD firing. She denies any increase in shortness of breath or chest pain, no lower leg, ankle or pedal edema noted. Medication list reviewed and updated. States compliance with low sodium diet and fluid restrictions. Next CHF Clinic appt. In 2 weeks to closely monitor patient.     Electronically signed by Brandi Diggs RN on 3/11/2020 at 4:11 PM

## 2020-03-11 NOTE — PROGRESS NOTES
Verbally reviewed medication list with patient; patient verbalized understanding. Discussed 2000mg/day sodium restricted diet; patient verbalized understanding. Moderate daily exercise encouraged as tolerated. Discussed rest breaks as needed; patient verbalized understanding. Patient instructed to weigh self at the same time of each day, using same clothes and same scale; reinforced teaching to monitor for 3-5 lb weight increase over 1-2 days, and to notify the CHF clinic at [phone] or physician office if weight change noted. Patient verbalized understanding. Risks of smoking discussed with the patient if applicable; patient strongly discouraged to smoke. Patient verbalized understanding. Signs and symptoms of CHF discussed with patient, such as feeling more tired than normal, feeling short of breath, coughing that increases when you lie down, sudden weight gain, swelling of your feet, legs or belly. Patient verbalized understanding to notify the CHF clinic at [phone] or physician office if these symptoms occur. Compliance with plan of care and further disease process causes discussed with patient, patient encouraged to keep all follow up appointments. Patient verbalized understanding.

## 2020-03-19 ENCOUNTER — TELEPHONE (OUTPATIENT)
Dept: FAMILY MEDICINE CLINIC | Age: 55
End: 2020-03-19

## 2020-03-19 ENCOUNTER — CARE COORDINATION (OUTPATIENT)
Dept: FAMILY MEDICINE CLINIC | Age: 55
End: 2020-03-19

## 2020-03-24 ENCOUNTER — CARE COORDINATION (OUTPATIENT)
Dept: FAMILY MEDICINE CLINIC | Age: 55
End: 2020-03-24
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
## 2020-03-24 NOTE — CARE COORDINATION
lungs 2 times daily as needed for Shortness of Breath, Disp: 360 mL, Rfl: 3    bumetanide (BUMEX) 1 MG tablet, TAKE 1 TABLET BY MOUTH 2 TIMES A DAY, Disp: 60 tablet, Rfl: 3    amitriptyline (ELAVIL) 25 MG tablet, TAKE 1 TABLET BY MOUTH EVERY NIGHT AT BEDTIME, Disp: 30 tablet, Rfl: 3    fluticasone (FLONASE) 50 MCG/ACT nasal spray, 1 spray by Nasal route daily, Disp: 1 Bottle, Rfl: 3    atorvastatin (LIPITOR) 40 MG tablet, Take 1 tablet by mouth nightly, Disp: 30 tablet, Rfl: 3    sennosides-docusate sodium (SENOKOT-S) 8.6-50 MG tablet, Take 1 tablet by mouth daily, Disp: 20 tablet, Rfl: 0    albuterol sulfate HFA (VENTOLIN HFA) 108 (90 Base) MCG/ACT inhaler, INHALE 2 PUFFS INTO THE LUNGS EVERY 6 HOURS AS NEEDED FOR WHEEZING, Disp: 18 g, Rfl: 3    folic acid-pyridoxine-cyanocobalamine (FOLTX) 2.5-25-1 MG TABS tablet, Take 1 tablet by mouth daily, Disp: 30 tablet, Rfl: 0    ibuprofen (ADVIL;MOTRIN) 400 MG tablet, Take 1 tablet by mouth 2 times daily as needed for Pain, Disp: 60 tablet, Rfl: 0    Umeclidinium Bromide (INCRUSE ELLIPTA) 62.5 MCG/INH AEPB, INHALE 1 PUFF INTO THE LUNGS DAILY STOP SPIRIVA (Patient not taking: Reported on 3/24/2020), Disp: 1 each, Rfl: 3    dextromethorphan-guaiFENesin (ROBITUSSIN-DM)  MG/5ML syrup, Take 10 mLs by mouth every 4 hours as needed for Cough, Disp: 200 mL, Rfl: 0    Spacer/Aero-Holding Chambers (E-Z SPACER) ELVER, 1 Device by Does not apply route daily, Disp: 1 Device, Rfl: 0    Multiple Vitamin (MULTIVITAMIN) tablet, TAKE ONE TABLET BY MOUTH ONE TIME A DAY (Patient not taking: Reported on 3/24/2020), Disp: 30 tablet, Rfl: 3     Medical  Skin warm and dry. Respirations easy at rest.  She denies fever. Denies increase in sputum, cough. Lungs clear. No edema. She reports she has experienced intermittent chest pain when feeling anxious. States chest pain relieves with use of relaxation and refocusing. B/P 122/70  Pulse 88  resp 18/min.       Spirituality

## 2020-04-01 ENCOUNTER — CARE COORDINATION (OUTPATIENT)
Dept: FAMILY MEDICINE CLINIC | Age: 55
End: 2020-04-01

## 2020-04-01 RX ORDER — ALBUTEROL SULFATE 90 UG/1
AEROSOL, METERED RESPIRATORY (INHALATION)
Qty: 18 G | Refills: 3 | Status: SHIPPED | OUTPATIENT
Start: 2020-04-01 | End: 2022-04-04 | Stop reason: SDUPTHER

## 2020-04-01 NOTE — TELEPHONE ENCOUNTER
Dr Nadeem Leiva called today to request a refill on the Albuterol inhaler. She uses as needed and the last order was placed in 2018. She also has duoneb in the home and takes 2 x day. She was using the albuterol inhaler when she was not at home and needed the rescue inhaler.     Thanks    Yduy Toledo RN  [employer]
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
## 2020-04-08 ENCOUNTER — TELEPHONE (OUTPATIENT)
Dept: FAMILY MEDICINE CLINIC | Age: 55
End: 2020-04-08
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
## 2020-04-16 ENCOUNTER — TELEPHONE (OUTPATIENT)
Dept: FAMILY MEDICINE CLINIC | Age: 55
End: 2020-04-16

## 2020-04-17 RX ORDER — METOPROLOL SUCCINATE 50 MG/1
TABLET, EXTENDED RELEASE ORAL
Qty: 15 TABLET | Refills: 3 | OUTPATIENT
Start: 2020-04-17

## 2020-04-17 RX ORDER — AMIODARONE HYDROCHLORIDE 200 MG/1
TABLET ORAL
Qty: 30 TABLET | Refills: 3 | OUTPATIENT
Start: 2020-04-17

## 2020-04-21 ENCOUNTER — TELEPHONE (OUTPATIENT)
Dept: FAMILY MEDICINE CLINIC | Age: 55
End: 2020-04-21

## 2020-04-21 RX ORDER — METOPROLOL SUCCINATE 25 MG/1
25 TABLET, EXTENDED RELEASE ORAL 2 TIMES DAILY
Qty: 60 TABLET | Refills: 3 | Status: ON HOLD | OUTPATIENT
Start: 2020-04-21 | End: 2020-05-18 | Stop reason: HOSPADM

## 2020-04-21 RX ORDER — AMIODARONE HYDROCHLORIDE 200 MG/1
200 TABLET ORAL 2 TIMES DAILY
Qty: 60 TABLET | Refills: 3 | Status: SHIPPED | OUTPATIENT
Start: 2020-04-21 | End: 2020-06-02 | Stop reason: DRUGHIGH

## 2020-05-15 RX ORDER — SPIRONOLACTONE 25 MG/1
TABLET ORAL
Qty: 28 TABLET | Refills: 2 | Status: SHIPPED | OUTPATIENT
Start: 2020-05-15 | End: 2020-08-04

## 2020-05-15 RX ORDER — OMEPRAZOLE 20 MG/1
CAPSULE, DELAYED RELEASE ORAL
Qty: 30 CAPSULE | Refills: 2 | Status: SHIPPED | OUTPATIENT
Start: 2020-05-15 | End: 2020-08-04

## 2020-05-15 NOTE — TELEPHONE ENCOUNTER
E-scribe request for pending medication. Please review and e-scribe if applicable.      Last Visit Date:  3-3-20  Next Visit Date:  Visit date not found    Hemoglobin A1C (%)   Date Value   11/06/2019 5.7   07/16/2018 6.4 (H)   02/03/2017 5.9             ( goal A1C is < 7)   No results found for: LABMICR  LDL Cholesterol (mg/dL)   Date Value   11/01/2019 131 (H)       (goal LDL is <100)   AST (U/L)   Date Value   02/26/2020 25     ALT (U/L)   Date Value   02/26/2020 13     BUN (mg/dL)   Date Value   03/03/2020 17     BP Readings from Last 3 Encounters:   03/11/20 118/78   03/03/20 119/86   02/28/20 105/84          (goal 120/80)        Patient Active Problem List:     COPD (chronic obstructive pulmonary disease) (HCC)     Fibroids     Syncope     Lung nodule < 6cm on CT     Chronic systolic heart failure (HCC) s/p AICD     Mild intermittent asthma     Essential hypertension     Chest pain     QT prolongation     Asthma with COPD with exacerbation ([facility])     Non-ischemic cardiomyopathy ([address]     Lesion of right native kidney     NSTEMI (non-ST elevated myocardial infarction) ([address]     CAD (coronary artery disease)     Intraparenchymal hematoma of left side of brain due to trauma ([address]     Chronic combined systolic and diastolic congestive heart failure ([address]     AICD discharge     RIP (acute kidney injury) ([address]     Hypomagnesemia     V-tach ([address]      ----[place]

## 2020-05-17 ENCOUNTER — HOSPITAL ENCOUNTER (OUTPATIENT)
Age: 55
Setting detail: OBSERVATION
Discharge: HOME | End: 2020-05-18
Attending: EMERGENCY MEDICINE | Admitting: EMERGENCY MEDICINE
Payer: MEDICAID

## 2020-05-17 ENCOUNTER — APPOINTMENT (OUTPATIENT)
Dept: GENERAL RADIOLOGY | Age: 55
End: 2020-05-17
Payer: MEDICAID

## 2020-05-17 PROBLEM — I50.89 OTHER HEART FAILURE (HCC): Status: ACTIVE | Noted: 2020-05-17

## 2020-05-17 LAB
-: NORMAL
ABSOLUTE EOS #: 0.14 K/UL (ref 0–0.4)
ABSOLUTE IMMATURE GRANULOCYTE: 0 K/UL (ref 0–0.3)
ABSOLUTE LYMPH #: 2.13 K/UL (ref 1–4.8)
ABSOLUTE MONO #: 0.64 K/UL (ref 0.1–0.8)
ANION GAP SERPL CALCULATED.3IONS-SCNC: 17 MMOL/L (ref 9–17)
BASOPHILS # BLD: 0 % (ref 0–2)
BASOPHILS ABSOLUTE: 0 K/UL (ref 0–0.2)
BNP INTERPRETATION: ABNORMAL
BUN BLDV-MCNC: 16 MG/DL (ref 6–20)
BUN/CREAT BLD: ABNORMAL (ref 9–20)
CALCIUM SERPL-MCNC: 9.5 MG/DL (ref 8.6–10.4)
CHLORIDE BLD-SCNC: 101 MMOL/L (ref 98–107)
CO2: 20 MMOL/L (ref 20–31)
CREAT SERPL-MCNC: 0.92 MG/DL (ref 0.5–0.9)
D-DIMER QUANTITATIVE: 0.5 MG/L FEU
DIFFERENTIAL TYPE: ABNORMAL
EOSINOPHILS RELATIVE PERCENT: 2 % (ref 1–4)
GFR AFRICAN AMERICAN: >60 ML/MIN
GFR NON-AFRICAN AMERICAN: >60 ML/MIN
GFR SERPL CREATININE-BSD FRML MDRD: ABNORMAL ML/MIN/{1.73_M2}
GFR SERPL CREATININE-BSD FRML MDRD: ABNORMAL ML/MIN/{1.73_M2}
GLUCOSE BLD-MCNC: 94 MG/DL (ref 70–99)
HCT VFR BLD CALC: 39.2 % (ref 36.3–47.1)
HEMOGLOBIN: 12.7 G/DL (ref 11.9–15.1)
IMMATURE GRANULOCYTES: 0 %
LYMPHOCYTES # BLD: 30 % (ref 24–44)
MCH RBC QN AUTO: 28.6 PG (ref 25.2–33.5)
MCHC RBC AUTO-ENTMCNC: 32.4 G/DL (ref 28.4–34.8)
MCV RBC AUTO: 88.3 FL (ref 82.6–102.9)
MONOCYTES # BLD: 9 % (ref 1–7)
MORPHOLOGY: ABNORMAL
NRBC AUTOMATED: 0 PER 100 WBC
PDW BLD-RTO: 16.2 % (ref 11.8–14.4)
PLATELET # BLD: ABNORMAL K/UL (ref 138–453)
PLATELET ESTIMATE: ABNORMAL
PLATELET, FLUORESCENCE: 133 K/UL (ref 138–453)
PLATELET, IMMATURE FRACTION: 10.7 % (ref 1.1–10.3)
PMV BLD AUTO: ABNORMAL FL (ref 8.1–13.5)
POTASSIUM SERPL-SCNC: 3.6 MMOL/L (ref 3.7–5.3)
PRO-BNP: 4531 PG/ML
RBC # BLD: 4.44 M/UL (ref 3.95–5.11)
RBC # BLD: ABNORMAL 10*6/UL
REASON FOR REJECTION: NORMAL
SEG NEUTROPHILS: 59 % (ref 36–66)
SEGMENTED NEUTROPHILS ABSOLUTE COUNT: 4.19 K/UL (ref 1.8–7.7)
SODIUM BLD-SCNC: 138 MMOL/L (ref 135–144)
TROPONIN INTERP: ABNORMAL
TROPONIN INTERP: ABNORMAL
TROPONIN T: ABNORMAL NG/ML
TROPONIN T: ABNORMAL NG/ML
TROPONIN, HIGH SENSITIVITY: 20 NG/L (ref 0–14)
TROPONIN, HIGH SENSITIVITY: 22 NG/L (ref 0–14)
WBC # BLD: 7.1 K/UL (ref 3.5–11.3)
WBC # BLD: ABNORMAL 10*3/UL
ZZ NTE CLEAN UP: ORDERED TEST: NORMAL
ZZ NTE WITH NAME CLEAN UP: SPECIMEN SOURCE: NORMAL

## 2020-05-17 PROCEDURE — 99285 EMERGENCY DEPT VISIT HI MDM: CPT

## 2020-05-17 PROCEDURE — 80048 BASIC METABOLIC PNL TOTAL CA: CPT

## 2020-05-17 PROCEDURE — 85025 COMPLETE CBC W/AUTO DIFF WBC: CPT

## 2020-05-17 PROCEDURE — 85055 RETICULATED PLATELET ASSAY: CPT

## 2020-05-17 PROCEDURE — 85379 FIBRIN DEGRADATION QUANT: CPT

## 2020-05-17 PROCEDURE — 71045 X-RAY EXAM CHEST 1 VIEW: CPT

## 2020-05-17 PROCEDURE — 84484 ASSAY OF TROPONIN QUANT: CPT

## 2020-05-17 PROCEDURE — G0378 HOSPITAL OBSERVATION PER HR: HCPCS

## 2020-05-17 PROCEDURE — 93005 ELECTROCARDIOGRAM TRACING: CPT | Performed by: STUDENT IN AN ORGANIZED HEALTH CARE EDUCATION/TRAINING PROGRAM

## 2020-05-17 PROCEDURE — 83880 ASSAY OF NATRIURETIC PEPTIDE: CPT

## 2020-05-17 PROCEDURE — 96374 THER/PROPH/DIAG INJ IV PUSH: CPT

## 2020-05-17 PROCEDURE — 2500000003 HC RX 250 WO HCPCS: Performed by: STUDENT IN AN ORGANIZED HEALTH CARE EDUCATION/TRAINING PROGRAM

## 2020-05-17 RX ORDER — BUMETANIDE 0.25 MG/ML
1 INJECTION, SOLUTION INTRAMUSCULAR; INTRAVENOUS ONCE
Status: COMPLETED | OUTPATIENT
Start: 2020-05-17 | End: 2020-05-17

## 2020-05-17 RX ORDER — METOPROLOL TARTRATE 50 MG/1
25 TABLET, FILM COATED ORAL ONCE
Status: DISCONTINUED | OUTPATIENT
Start: 2020-05-17 | End: 2020-05-18 | Stop reason: HOSPADM

## 2020-05-17 RX ORDER — AMIODARONE HYDROCHLORIDE 200 MG/1
200 TABLET ORAL ONCE
Status: DISCONTINUED | OUTPATIENT
Start: 2020-05-17 | End: 2020-05-18 | Stop reason: HOSPADM

## 2020-05-17 RX ADMIN — BUMETANIDE 1 MG: 0.25 INJECTION INTRAMUSCULAR; INTRAVENOUS at 19:47

## 2020-05-17 ASSESSMENT — ENCOUNTER SYMPTOMS
WHEEZING: 0
CONSTIPATION: 0
VOMITING: 0
COUGH: 1
ABDOMINAL PAIN: 0
DIARRHEA: 0
SHORTNESS OF BREATH: 1
NAUSEA: 0

## 2020-05-17 NOTE — ED NOTES
Bed: 14  Expected date: 5/17/20  Expected time: 6:41 PM  Means of arrival: [facility]  Comments:  LS [address], RN  05/17/20 5606

## 2020-05-18 ENCOUNTER — CARE COORDINATION (OUTPATIENT)
Dept: FAMILY MEDICINE CLINIC | Age: 55
End: 2020-05-18

## 2020-05-18 VITALS
RESPIRATION RATE: 3 BRPM | TEMPERATURE: 98.2 F | HEIGHT: 62 IN | DIASTOLIC BLOOD PRESSURE: 94 MMHG | BODY MASS INDEX: 27.6 KG/M2 | OXYGEN SATURATION: 93 % | HEART RATE: 83 BPM | SYSTOLIC BLOOD PRESSURE: 129 MMHG | WEIGHT: 150 LBS

## 2020-05-18 LAB
ANION GAP SERPL CALCULATED.3IONS-SCNC: 18 MMOL/L (ref 9–17)
BUN BLDV-MCNC: 17 MG/DL (ref 6–20)
BUN/CREAT BLD: ABNORMAL (ref 9–20)
CALCIUM SERPL-MCNC: 10 MG/DL (ref 8.6–10.4)
CHLORIDE BLD-SCNC: 99 MMOL/L (ref 98–107)
CO2: 24 MMOL/L (ref 20–31)
CREAT SERPL-MCNC: 0.99 MG/DL (ref 0.5–0.9)
EKG ATRIAL RATE: 82 BPM
EKG P AXIS: 52 DEGREES
EKG P-R INTERVAL: 192 MS
EKG Q-T INTERVAL: 470 MS
EKG QRS DURATION: 136 MS
EKG QTC CALCULATION (BAZETT): 549 MS
EKG R AXIS: -15 DEGREES
EKG T AXIS: 36 DEGREES
EKG VENTRICULAR RATE: 82 BPM
GFR AFRICAN AMERICAN: >60 ML/MIN
GFR NON-AFRICAN AMERICAN: 58 ML/MIN
GFR SERPL CREATININE-BSD FRML MDRD: ABNORMAL ML/MIN/{1.73_M2}
GFR SERPL CREATININE-BSD FRML MDRD: ABNORMAL ML/MIN/{1.73_M2}
GLUCOSE BLD-MCNC: 77 MG/DL (ref 70–99)
POTASSIUM SERPL-SCNC: 4.2 MMOL/L (ref 3.7–5.3)
SODIUM BLD-SCNC: 141 MMOL/L (ref 135–144)
TROPONIN INTERP: ABNORMAL
TROPONIN INTERP: ABNORMAL
TROPONIN T: ABNORMAL NG/ML
TROPONIN T: ABNORMAL NG/ML
TROPONIN, HIGH SENSITIVITY: 20 NG/L (ref 0–14)
TROPONIN, HIGH SENSITIVITY: 23 NG/L (ref 0–14)

## 2020-05-18 PROCEDURE — 6370000000 HC RX 637 (ALT 250 FOR IP): Performed by: STUDENT IN AN ORGANIZED HEALTH CARE EDUCATION/TRAINING PROGRAM

## 2020-05-18 PROCEDURE — 36415 COLL VENOUS BLD VENIPUNCTURE: CPT

## 2020-05-18 PROCEDURE — G0378 HOSPITAL OBSERVATION PER HR: HCPCS

## 2020-05-18 PROCEDURE — 80048 BASIC METABOLIC PNL TOTAL CA: CPT

## 2020-05-18 PROCEDURE — 6370000000 HC RX 637 (ALT 250 FOR IP): Performed by: INTERNAL MEDICINE

## 2020-05-18 PROCEDURE — 93010 ELECTROCARDIOGRAM REPORT: CPT | Performed by: INTERNAL MEDICINE

## 2020-05-18 PROCEDURE — 2580000003 HC RX 258: Performed by: STUDENT IN AN ORGANIZED HEALTH CARE EDUCATION/TRAINING PROGRAM

## 2020-05-18 PROCEDURE — 94640 AIRWAY INHALATION TREATMENT: CPT

## 2020-05-18 PROCEDURE — 84484 ASSAY OF TROPONIN QUANT: CPT

## 2020-05-18 PROCEDURE — 93005 ELECTROCARDIOGRAM TRACING: CPT | Performed by: STUDENT IN AN ORGANIZED HEALTH CARE EDUCATION/TRAINING PROGRAM

## 2020-05-18 RX ORDER — ACETAMINOPHEN 325 MG/1
650 TABLET ORAL EVERY 4 HOURS PRN
Status: DISCONTINUED | OUTPATIENT
Start: 2020-05-18 | End: 2020-05-18 | Stop reason: HOSPADM

## 2020-05-18 RX ORDER — PANTOPRAZOLE SODIUM 40 MG/1
40 TABLET, DELAYED RELEASE ORAL
Status: DISCONTINUED | OUTPATIENT
Start: 2020-05-18 | End: 2020-05-18 | Stop reason: HOSPADM

## 2020-05-18 RX ORDER — BUMETANIDE 1 MG/1
1 TABLET ORAL 2 TIMES DAILY
Status: DISCONTINUED | OUTPATIENT
Start: 2020-05-18 | End: 2020-05-18 | Stop reason: HOSPADM

## 2020-05-18 RX ORDER — OMEPRAZOLE 20 MG/1
20 CAPSULE, DELAYED RELEASE ORAL EVERY MORNING
Status: DISCONTINUED | OUTPATIENT
Start: 2020-05-18 | End: 2020-05-18

## 2020-05-18 RX ORDER — ATORVASTATIN CALCIUM 40 MG/1
40 TABLET, FILM COATED ORAL NIGHTLY
Status: DISCONTINUED | OUTPATIENT
Start: 2020-05-18 | End: 2020-05-18 | Stop reason: HOSPADM

## 2020-05-18 RX ORDER — SENNA AND DOCUSATE SODIUM 50; 8.6 MG/1; MG/1
1 TABLET, FILM COATED ORAL DAILY
Status: DISCONTINUED | OUTPATIENT
Start: 2020-05-18 | End: 2020-05-18 | Stop reason: HOSPADM

## 2020-05-18 RX ORDER — SODIUM CHLORIDE 0.9 % (FLUSH) 0.9 %
10 SYRINGE (ML) INJECTION EVERY 12 HOURS SCHEDULED
Status: DISCONTINUED | OUTPATIENT
Start: 2020-05-18 | End: 2020-05-18 | Stop reason: HOSPADM

## 2020-05-18 RX ORDER — HYDROCODONE BITARTRATE AND ACETAMINOPHEN 5; 325 MG/1; MG/1
1 TABLET ORAL EVERY 4 HOURS PRN
Status: DISCONTINUED | OUTPATIENT
Start: 2020-05-18 | End: 2020-05-18 | Stop reason: HOSPADM

## 2020-05-18 RX ORDER — AMIODARONE HYDROCHLORIDE 200 MG/1
200 TABLET ORAL 2 TIMES DAILY
Status: DISCONTINUED | OUTPATIENT
Start: 2020-05-18 | End: 2020-05-18 | Stop reason: HOSPADM

## 2020-05-18 RX ORDER — B12/LEVOMEFOLATE CALCIUM/B-6 2-1.13-25
1 TABLET ORAL DAILY
Status: DISCONTINUED | OUTPATIENT
Start: 2020-05-18 | End: 2020-05-18 | Stop reason: HOSPADM

## 2020-05-18 RX ORDER — ONDANSETRON 2 MG/ML
4 INJECTION INTRAMUSCULAR; INTRAVENOUS EVERY 8 HOURS PRN
Status: DISCONTINUED | OUTPATIENT
Start: 2020-05-18 | End: 2020-05-18 | Stop reason: HOSPADM

## 2020-05-18 RX ORDER — METOPROLOL SUCCINATE 25 MG/1
37.5 TABLET, EXTENDED RELEASE ORAL 2 TIMES DAILY
Status: DISCONTINUED | OUTPATIENT
Start: 2020-05-18 | End: 2020-05-18 | Stop reason: HOSPADM

## 2020-05-18 RX ORDER — SPIRONOLACTONE 25 MG/1
25 TABLET ORAL DAILY
Status: DISCONTINUED | OUTPATIENT
Start: 2020-05-18 | End: 2020-05-18 | Stop reason: HOSPADM

## 2020-05-18 RX ORDER — METOPROLOL SUCCINATE 25 MG/1
25 TABLET, EXTENDED RELEASE ORAL 2 TIMES DAILY
Status: DISCONTINUED | OUTPATIENT
Start: 2020-05-18 | End: 2020-05-18

## 2020-05-18 RX ORDER — HYDROCODONE BITARTRATE AND ACETAMINOPHEN 5; 325 MG/1; MG/1
2 TABLET ORAL EVERY 4 HOURS PRN
Status: DISCONTINUED | OUTPATIENT
Start: 2020-05-18 | End: 2020-05-18 | Stop reason: HOSPADM

## 2020-05-18 RX ORDER — AMITRIPTYLINE HYDROCHLORIDE 25 MG/1
25 TABLET, FILM COATED ORAL NIGHTLY
Status: DISCONTINUED | OUTPATIENT
Start: 2020-05-18 | End: 2020-05-18 | Stop reason: HOSPADM

## 2020-05-18 RX ORDER — SODIUM CHLORIDE 0.9 % (FLUSH) 0.9 %
10 SYRINGE (ML) INJECTION PRN
Status: DISCONTINUED | OUTPATIENT
Start: 2020-05-18 | End: 2020-05-18 | Stop reason: HOSPADM

## 2020-05-18 RX ORDER — B12/LEVOMEFOLATE CALCIUM/B-6 2-1.13-25
1 TABLET ORAL DAILY
Qty: 30 TABLET | Refills: 0 | Status: SHIPPED | OUTPATIENT
Start: 2020-05-19 | End: 2020-06-15 | Stop reason: SDUPTHER

## 2020-05-18 RX ORDER — METOPROLOL SUCCINATE 25 MG/1
37.5 TABLET, EXTENDED RELEASE ORAL 2 TIMES DAILY
Qty: 90 TABLET | Refills: 0 | Status: SHIPPED | OUTPATIENT
Start: 2020-05-18 | End: 2020-06-15 | Stop reason: ALTCHOICE

## 2020-05-18 RX ADMIN — Medication 10 ML: at 10:59

## 2020-05-18 RX ADMIN — TIOTROPIUM BROMIDE INHALATION SPRAY 2 PUFF: 3.12 SPRAY, METERED RESPIRATORY (INHALATION) at 09:53

## 2020-05-18 RX ADMIN — STANDARDIZED SENNA CONCENTRATE AND DOCUSATE SODIUM 1 TABLET: 8.6; 5 TABLET ORAL at 10:58

## 2020-05-18 RX ADMIN — SACUBITRIL AND VALSARTAN 1 TABLET: 24; 26 TABLET, FILM COATED ORAL at 12:07

## 2020-05-18 RX ADMIN — HYDROCODONE BITARTRATE AND ACETAMINOPHEN 2 TABLET: 5; 325 TABLET ORAL at 11:05

## 2020-05-18 RX ADMIN — Medication 1 TABLET: at 10:58

## 2020-05-18 RX ADMIN — BUMETANIDE 1 MG: 1 TABLET ORAL at 10:58

## 2020-05-18 RX ADMIN — AMIODARONE HYDROCHLORIDE 200 MG: 200 TABLET ORAL at 10:58

## 2020-05-18 RX ADMIN — SPIRONOLACTONE 25 MG: 25 TABLET ORAL at 09:00

## 2020-05-18 RX ADMIN — MAGNESIUM GLUCONATE 500 MG ORAL TABLET 400 MG: 500 TABLET ORAL at 10:58

## 2020-05-18 RX ADMIN — PANTOPRAZOLE SODIUM 40 MG: 40 TABLET, DELAYED RELEASE ORAL at 11:02

## 2020-05-18 ASSESSMENT — PAIN SCALES - GENERAL: PAINLEVEL_OUTOF10: 8

## 2020-05-18 NOTE — CARE COORDINATION
[address] Trans[facility] Initial Follow Up Call    Call within 2 business days of discharge: Yes     Patient: Nixon Vazquez Patient : 1965 MRN: [id number]    Last Discharge [address] [address]       Complaint Diagnosis Description Type Department Provider    20 Shortness of Breath Dyspnea, unspecified type . .. ED to Hosp-Admission (Discharged) (ADMITTED) Martha Rivera MD; Cristy Montalvo. .. Discharge date 2020   4:00 pm    RARS: Readmission Risk Score: 23       Spoke with: Shivam Samaniego reports she feels tired and is short of breath with use of stairs in the home. She reports she plans to remain at home and limit activities for the next several days. She requests social work assistance for housing resources    Discharge department/facility: [facility]    Non-face-to-face services provided:  Obtained and reviewed discharge summary and/or continuity of care documents     To schedule follow up appointment with PCP and cardiologist on 2020    Follow Up  Future Appointments   Date Time Provider Yanelis Alfaro   2020 10:00 AM [facility] CHF CLINIC [facility]F [facility]     She has blister packaged meds in the home and was not able to accurately review medications with RN on the phone. Garrett Layne reports she took medications at hospital today prior to discharge. She receives medication blister pack meds and will need pharmacy intervention on 2020 for dose adjustments with bumex and metoprolol medications. She is awaiting prior authorization to initiate  [facility] script. Request made to  Medication Management, Linda Pollock to provide assistance with CMR and Transitional care call on 2020. [facility] RN to visit on 2020 for post hospital assessment and medication assessment and teaching.     Gloria Deleon, RN, BSN  [employer]

## 2020-05-18 NOTE — ED PROVIDER NOTES
[facility] ED  Emergency Department Encounter  EmergencyMedicine Resident     Pt Name:Stacia Vega  MRN: [id number]  [place] 1965  Date of evaluation: 5/17/20  PCP:  Pilar Shaver MD    [address] [place] [address]       Chief Complaint   Patient presents with    Shortness of Breath       HISTORY OF PRESENT ILLNESS  (Location/Symptom, Timing/Onset, Context/Setting, Quality, Duration, Modifying Factors, Severity.)      Ashley Manning is a 47 y.o. female who presents with EMS for complaint of shortness of breath. Patient notes that she took her home nebulizer treatment earlier today with little relief in symptoms. Patient additionally notes that she was around family who started arguing and caused her anxiety to increase which also perpetuated her shortness of breath. Patient notes occasional left shoulder/arm pain but is currently asymptomatic from that. Patient denies any fevers chills recent travel nausea vomiting diarrhea. PAST MEDICAL / SURGICAL / SOCIAL / FAMILY HISTORY      has a past medical history of Asthma, CAD (coronary artery disease), Cardiomegaly, CHF (congestive heart failure) ([facility]), COPD (chronic obstructive pulmonary disease) ([facility] [address] Depression, Hypertension, Lesion of right native kidney, MI (myocardial infarction) ([facility] [address] and Unspecified diseases of blood and blood-forming organs. has a past surgical history that includes Cardiac defibrillator placement (09/2005); Pacemaker insertion; Tubal ligation; Cardiac defibrillator placement; and Uterine fibroid surgery (malucretia 2015).     Social History     Socioeconomic History    Marital status: Single     Spouse name: Not on file    Number of children: Not on file    Years of education: Not on file    Highest education level: Not on file   Occupational History    Not on file   Social Needs    Financial resource strain: Not on file    Food insecurity     Worry: Not on file     Inability: Not on file   Leigha Yi (TOPROL XL) 25 MG extended release tablet Take 1 tablet by mouth 2 times daily 4/21/20   Lesley Pond MD   amitriptyline (ELAVIL) 25 MG tablet TAKE 1 TABLET BY MOUTH EVERY NIGHT AT BEDTIME 4/17/20   Lesley Pond MD   bumetanide (BUMEX) 1 MG tablet TAKE 1 TABLET BY MOUTH 2 TIMES A DAY 4/17/20   Lesley Pond MD   albuterol sulfate HFA (VENTOLIN HFA) 108 (90 Base) MCG/ACT inhaler INHALE 2 PUFFS INTO THE LUNGS EVERY 6 HOURS AS NEEDED FOR WHEEZING 4/1/20   Bjorn Cardona MD   ibuprofen (ADVIL;MOTRIN) 400 MG tablet Take 1 tablet by mouth 2 times daily as needed for Pain  Patient not taking: Reported on 3/24/2020 3/3/20   Hailey Sawant MD   magnesium oxide (MAG-OX) 400 (241.3 Mg) MG TABS tablet Take 1 tablet by mouth daily 2/29/20   Mbonu Romona Harada, MD   ipratropium-albuterol (DUONEB) 0.5-2.5 (3) MG/3ML SOLN nebulizer solution Inhale 3 mLs into the lungs 2 times daily as needed for Shortness of Breath 1/20/20   Bjorn Cardona MD   Umeclidinium Bromide (INCRUSE ELLIPTA) 62.5 MCG/INH AEPB INHALE 1 PUFF INTO THE LUNGS DAILY STOP SPIRIVA  Patient not taking: Reported on 3/24/2020 11/1/19   Joey Hogue MD   fluticasone Jeaneen Ports) 50 MCG/ACT nasal spray 1 spray by Nasal route daily 11/1/19   Joey Hogue MD   dextromethorphan-guaiFENesin (ROBITUSSIN-DM)  MG/5ML syrup Take 10 mLs by mouth every 4 hours as needed for Cough  Patient not taking: Reported on 3/24/2020 11/1/19   Joey Hogue MD   atorvastatin (LIPITOR) 40 MG tablet Take 1 tablet by mouth nightly 6/14/19   ANGY Gardner - CRISTINA   sennosides-docusate sodium (SENOKOT-S) 8.6-50 MG tablet Take 1 tablet by mouth daily 12/23/18   Erum Farr MD   Spacer/Aero-Holding Chambers (E-Z SPACER) ELVER 1 Device by Does not apply route daily 11/25/18   Jacquie Vazquez MD   folic acid-pyridoxine-cyanocobalamine (FOLTX) 2.5-25-1 MG TABS tablet Take 1 tablet by mouth daily  Patient not taking: Reported on 4/2/2020 11/25/18   Jacquie Vazquez MD Mono # 0.64 0.1 - 0.8 k/uL    Absolute Eos # 0.14 0.0 - 0.4 k/uL    Basophils Absolute 0.00 0.0 - 0.2 k/uL    Morphology ANISOCYTOSIS PRESENT    Basic Metabolic Panel   Result Value Ref Range    Glucose 94 70 - 99 mg/dL    BUN 16 6 - 20 mg/dL    CREATININE 0.92 (H) 0.50 - 0.90 mg/dL    Bun/Cre Ratio NOT REPORTED 9 - 20    Calcium 9.5 8.6 - 10.4 mg/dL    Sodium 138 135 - 144 mmol/L    Potassium 3.6 (L) 3.7 - 5.3 mmol/L    Chloride 101 98 - 107 mmol/L    CO2 20 20 - 31 mmol/L    Anion Gap 17 9 - 17 mmol/L    GFR Non-African American >60 >60 mL/min    GFR African American >60 >60 mL/min    GFR Comment          GFR Staging NOT REPORTED    Brain Natriuretic Peptide   Result Value Ref Range    Pro-BNP 4,531 (H) <300 pg/mL    BNP Interpretation Pro-BNP Reference Range:    D-DIMER, QUANTITATIVE   Result Value Ref Range    D-Dimer, Quant 0.50 mg/L FEU   Immature Platelet Fraction   Result Value Ref Range    Platelet, Immature Fraction 10.7 (H) 1.1 - 10.3 %    Platelet, Fluorescence 133 (L) 138 - 453 k/uL   SPECIMEN REJECTION   Result Value Ref Range    Specimen Source . BLOOD     Ordered Test TROPI     Reason for Rejection Unable to perform testing: Specimen hemolyzed. - NOT REPORTED        IMPRESSION: Patient is a 59-year-old female who presents with shortness of breath, anxiety, generalized fatigue. Obtain CBC BMP EKG troponin chest x-ray BNP give home dose of medications and reevaluate patient. RADIOLOGY:  Xr Chest Portable    Result Date: 5/17/2020  EXAMINATION: ONE XRAY VIEW OF THE CHEST 5/17/2020 7:16 pm COMPARISON: Prior studies including 02/26/2020 HISTORY: ORDERING SYSTEM PROVIDED HISTORY: SOB, left shoulder pain hx CHF TECHNOLOGIST PROVIDED HISTORY: SOB, left shoulder pain hx CHF Reason for Exam: sob, hx chf Acuity: Chronic FINDINGS: Unchanged moderate cardiomegaly with globular configuration. There is an unchanged 3 lead left subclavian AICD. The lungs are clear. No pneumothorax or pleural fluid.   No

## 2020-05-18 NOTE — PROGRESS NOTES
[facility]  CDU / OBSERVATION eNCOUnter  Attending NOte       I performed a history and physical examination of the patient and discussed management with the resident. I reviewed the residents note and agree with the documented findings and plan of care. Any areas of disagreement are noted on the chart. I was personally present for the key portions of any procedures. I have documented in the chart those procedures where I was not present during the key portions. I have reviewed the nurses notes. I agree with the chief complaint, past medical history, past surgical history, allergies, medications, social and family history as documented unless otherwise noted below. The Family history, social history, and ROS are effectively unchanged since admission unless noted elsewhere in the chart. Patient admitted for CHF exacerbation and cardiology evaluation. Patient feeling much better this morning. Patient to be started on new medications based on inability of previous regimen to control patient's symptoms. Patient currently improved after intervention here but needing ongoing treatment as an outpatient. Discussed with case management and pharmacy. Patient will need precertification for new medications. Patient's old regimen has been ineffective. Patient has had multiple medications that have not worked for her. Patient will need meds per cardiology recommendations and precertification completed. Please see resident note as well. Patient is comfortable enough for discharge at this time but will need ongoing treatment as an outpatient and follow-up with cardiology closely.   Patient will need new regimen recommended by specialist.    Max Trinh MD  Attending Emergency  Physician

## 2020-05-18 NOTE — DISCHARGE SUMMARY
CDU Discharge Summary        Patient:  Senthil Goff  YOB: 1965    MRN: [id number]   Acct: [de-identified]    Primary Care Physician: Clare Yanez MD    Admit date:  5/17/2020  6:47 PM  Discharge date: 5/18/2020  4:10 PM     Discharge Diagnoses:     Acute CHF exacerbation due to suboptimal medication management  Improved with diuretics, rest, increasing metoprolol dosing, and adding Entresto    Follow-up:  Call today/tomorrow for a follow up appointment with Clare Yanez MD , follow-up with cardiology, or return to the Emergency Room with worsening symptoms    Stressed to patient the importance of following up with primary care doctor for further workup/management of symptoms. Pt verbalizes understanding and agrees with plan. Discharge Medications:  Changes to medications increase metoprolol ER to 50 mg in the morning, start Entresto twice daily.   Add additional 1 mg Bumex in a.m. until Amina Gutierrez is certified        Sidney Mayorga   Home Medication Instructions KORINA:[id number]    Printed on:05/18/20 4828   Medication Information                      albuterol sulfate HFA (VENTOLIN HFA) 108 (90 Base) MCG/ACT inhaler  INHALE 2 PUFFS INTO THE LUNGS EVERY 6 HOURS AS NEEDED FOR WHEEZING             amiodarone (CORDARONE) 200 MG tablet  Take 1 tablet by mouth 2 times daily             amitriptyline (ELAVIL) 25 MG tablet  TAKE 1 TABLET BY MOUTH EVERY NIGHT AT BEDTIME             atorvastatin (LIPITOR) 40 MG tablet  Take 1 tablet by mouth nightly             bumetanide (BUMEX) 1 MG tablet  TAKE 1 TABLET BY MOUTH 2 TIMES A DAY             fluticasone (FLONASE) 50 MCG/ACT nasal spray  1 spray by Nasal route daily             folic acid-pyridoxine-cyancobalamin (FOLTX) 1.13-25-2 MG TABS  Take 1 tablet by mouth daily             ipratropium-albuterol (DUONEB) 0.5-2.5 (3) MG/3ML SOLN nebulizer solution  Inhale 3 mLs into the lungs 2 times daily as needed for Shortness of Breath Lymphocytes 30 24 - 44 %    Monocytes 9 (H) 1 - 7 %    Eosinophils % 2 1 - 4 %    Basophils 0 0 - 2 %    Absolute Immature Granulocyte 0.00 0.00 - 0.30 k/uL    Segs Absolute 4.19 1.8 - 7.7 k/uL    Absolute Lymph # 2.13 1.0 - 4.8 k/uL    Absolute Mono # 0.64 0.1 - 0.8 k/uL    Absolute Eos # 0.14 0.0 - 0.4 k/uL    Basophils Absolute 0.00 0.0 - 0.2 k/uL    Morphology ANISOCYTOSIS PRESENT    Basic Metabolic Panel   Result Value Ref Range    Glucose 94 70 - 99 mg/dL    BUN 16 6 - 20 mg/dL    CREATININE 0.92 (H) 0.50 - 0.90 mg/dL    Bun/Cre Ratio NOT REPORTED 9 - 20    Calcium 9.5 8.6 - 10.4 mg/dL    Sodium 138 135 - 144 mmol/L    Potassium 3.6 (L) 3.7 - 5.3 mmol/L    Chloride 101 98 - 107 mmol/L    CO2 20 20 - 31 mmol/L    Anion Gap 17 9 - 17 mmol/L    GFR Non-African American >60 >60 mL/min    GFR African American >60 >60 mL/min    GFR Comment          GFR Staging NOT REPORTED    Brain Natriuretic Peptide   Result Value Ref Range    Pro-BNP 4,531 (H) <300 pg/mL    BNP Interpretation Pro-BNP Reference Range:    D-DIMER, QUANTITATIVE   Result Value Ref Range    D-Dimer, Quant 0.50 mg/L FEU   Immature Platelet Fraction   Result Value Ref Range    Platelet, Immature Fraction 10.7 (H) 1.1 - 10.3 %    Platelet, Fluorescence 133 (L) 138 - 453 k/uL   SPECIMEN REJECTION   Result Value Ref Range    Specimen Source . BLOOD     Ordered Test TROPI     Reason for Rejection Unable to perform testing: Specimen hemolyzed.      - NOT REPORTED    Troponin   Result Value Ref Range    Troponin, High Sensitivity 22 (H) 0 - 14 ng/L    Troponin T NOT REPORTED <0.03 ng/mL    Troponin Interp NOT REPORTED    Troponin   Result Value Ref Range    Troponin, High Sensitivity 23 (H) 0 - 14 ng/L    Troponin T NOT REPORTED <0.03 ng/mL    Troponin Interp NOT REPORTED    BASIC METABOLIC PANEL   Result Value Ref Range    Glucose 77 70 - 99 mg/dL    BUN 17 6 - 20 mg/dL    CREATININE 0.99 (H) 0.50 - 0.90 mg/dL    Bun/Cre Ratio NOT REPORTED 9 - 20    Calcium 10.0 8.6 - 10.4 mg/dL    Sodium 141 135 - 144 mmol/L    Potassium 4.2 3.7 - 5.3 mmol/L    Chloride 99 98 - 107 mmol/L    CO2 24 20 - 31 mmol/L    Anion Gap 18 (H) 9 - 17 mmol/L    GFR Non-African American 58 (L) >60 mL/min    GFR African American >60 >60 mL/min    GFR Comment          GFR Staging NOT REPORTED    Troponin   Result Value Ref Range    Troponin, High Sensitivity 20 (H) 0 - 14 ng/L    Troponin T NOT REPORTED <0.03 ng/mL    Troponin Interp NOT REPORTED    EKG 12 Lead   Result Value Ref Range    Ventricular Rate 106 BPM    Atrial Rate 106 BPM    P-R Interval 174 ms    QRS Duration 130 ms    Q-T Interval 366 ms    QTc Calculation (Bazett) 486 ms    P Axis 64 degrees    R Axis -11 degrees    T Axis 82 degrees   EKG 12 Lead   Result Value Ref Range    Ventricular Rate 82 BPM    Atrial Rate 82 BPM    P-R Interval 192 ms    QRS Duration 136 ms    Q-T Interval 470 ms    QTc Calculation (Bazett) 549 ms    P Axis 52 degrees    R Axis -15 degrees    T Axis 36 degrees     Xr Chest Portable    Result Date: 5/17/2020  EXAMINATION: ONE XRAY VIEW OF THE CHEST 5/17/2020 7:16 pm COMPARISON: Prior studies including 02/26/2020 HISTORY: ORDERING SYSTEM PROVIDED HISTORY: SOB, left shoulder pain hx CHF TECHNOLOGIST PROVIDED HISTORY: SOB, left shoulder pain hx CHF Reason for Exam: sob, hx chf Acuity: Chronic FINDINGS: Unchanged moderate cardiomegaly with globular configuration. There is an unchanged 3 lead left subclavian AICD. The lungs are clear. No pneumothorax or pleural fluid. No acute bone finding. Stable chest x-ray. No acute disease. Physical Exam:    General appearance - NAD, AOx 3   Lungs -CTAB, no R/R/R  Heart - RRR, no M/R/G  Abdomen - Soft, NT/ND  Neurological:  MAEx4, No focal motor deficit, sensory loss  Extremities - Cap refil <2 sec in all ext., no edema  Skin -warm, dry      Hospital Course:  Clinical course has improved, labs and imaging reviewed.      Ina venegas

## 2020-05-18 NOTE — H&P
[facility]  CDU / OBSERVATION eNCOUnter  Resident Note     Pt Name: Iván Adkins  MRN: [id number]  [place] 1965  Date of evaluation: 5/18/20  Patient's PCP is :  Jl Turner MD    [address] [place] [address]       Chief Complaint   Patient presents with    Shortness of Breath         HISTORY OF PRESENT ILLNESS    Iván Adkins is a 47 y.o. female who presents with acute on chronic shortness of breath progressively worsening over the last day. History of CAD, CHF with AICD, COPD, and hypertension. Used her home medications for COPD without improvement in symptoms. Elevated BNP consistent with acute on chronic CHF exacerbation. Admitted to observation for symptomatic management cardiology evaluation. Location/Symptom: Acute onset shortness of breath  Timing/Onset: 1 day  Provocation: Worse with exertion  Quality: Constant  Radiation: None  Severity: Moderate to severe  Timing/Duration: Acute onset over 1 day  Modifying Factors: Worse with exertion, improves with rest    REVIEW OF SYSTEMS       General ROS - No fevers, No malaise   Ophthalmic ROS - No discharge, No changes in vision  ENT ROS -  No sore throat, No rhinorrhea,   Respiratory ROS -exertional shortness of breath, no cough, no  wheezing  Cardiovascular ROS - No chest pain, no dyspnea on exertion  Gastrointestinal ROS - No abdominal pain, no nausea or vomiting, no change in bowel habits, no black or bloody stools  Genito-Urinary ROS - No dysuria, trouble voiding, or hematuria  Musculoskeletal ROS - No myalgias, No arthalgias, no lower extremity edema  Neurological ROS - No headache, no dizziness/lightheadedness, No focal weakness, no loss of sensation  Dermatological ROS - No lesions, No rash     (PQRS) Advance directives on face sheet per hospital policy.  No change unless specifically mentioned in chart    PAST MEDICAL HISTORY    has a past medical history of Asthma, CAD (coronary artery disease), Cardiomegaly, CHF (congestive heart failure) (HCC), COPD (chronic obstructive pulmonary disease) ([facility] [address] Depression, Hypertension, Lesion of right native kidney, MI (myocardial infarction) ([facility] [address] and Unspecified diseases of blood and blood-forming organs. I have reviewed the past medical history with the patient and it is pertinent to this complaint. SURGICAL HISTORY      has a past surgical history that includes Cardiac defibrillator placement (09/2005); Pacemaker insertion; Tubal ligation; Cardiac defibrillator placement; and Uterine fibroid surgery ([facility] 2015). I have reviewed and agree with Surgical History entered and it is pertinent to this complaint. CURRENT MEDICATIONS     folic acid-pyridoxine-cyancobalamin (FOLTX) 1.13-25-2 MG TABS 1 tablet, Daily  sennosides-docusate sodium (SENOKOT-S) 8.6-50 MG tablet 1 tablet, Daily  atorvastatin (LIPITOR) tablet 40 mg, Nightly  tiotropium (SPIRIVA RESPIMAT) 2.5 MCG/ACT inhaler 2 puff, Daily  magnesium oxide (MAG-OX) tablet 400 mg, Daily  amitriptyline (ELAVIL) tablet 25 mg, Nightly  bumetanide (BUMEX) tablet 1 mg, BID  amiodarone (CORDARONE) tablet 200 mg, BID  spironolactone (ALDACTONE) tablet 25 mg, Daily  sodium chloride flush 0.9 % injection 10 mL, 2 times per day  sodium chloride flush 0.9 % injection 10 mL, PRN  acetaminophen (TYLENOL) tablet 650 mg, Q4H PRN  enoxaparin (LOVENOX) injection 40 mg, Daily  HYDROcodone-acetaminophen (NORCO) 5-325 MG per tablet 1 tablet, Q4H PRN    Or  HYDROcodone-acetaminophen (NORCO) 5-325 MG per tablet 2 tablet, Q4H PRN  ondansetron (ZOFRAN) injection 4 mg, Q8H PRN  pantoprazole (PROTONIX) tablet 40 mg, QAM AC  metoprolol succinate (TOPROL XL) extended release tablet 37.5 mg, BID  sacubitril-valsartan (ENTRESTO) 24-26 MG per tablet 1 tablet, BID  amiodarone (CORDARONE) tablet 200 mg, Once  metoprolol tartrate (LOPRESSOR) tablet 25 mg, Once        All medication charted and reviewed.     ALLERGIES     is allergic to iv contrast evaluate for COPD exacerbation (home O2, PNA, hospitalization), asthma (past intubation, hospitalization, tobacco history), Pneumothorax, anaphylaxis, anxiety, PE (FH, OCP, tobacco use, BMI, immobilization, recent surgery, cancer, past DVT/ PE), pericardial effusion, CHF, ACS/MI, atelectasis, lower airway obstruction, aspiration, sudden onset, fever, cough, leg swelling. DIAGNOSTIC RESULTS     EKG: All EKG's are interpreted by the Observation Physician who either signs or Co-signs this chart in the absence of a cardiologist.    EKG Interpretation    Interpreted by observation physician    Atrial sensed ventricular paced rhythm with a rate of 82, prolonged TN, QRS, and QTc consistent with pacing. Left axis, without ST or T wave abnormalities. Nonspecific EKG. When compared with previous days EKG tachycardia has resolved. Nyla Pérez MD        RADIOLOGY:   I directly visualized the following  images and reviewed the radiologist interpretations:    Xr Chest Portable    Result Date: 5/17/2020  EXAMINATION: ONE XRAY VIEW OF THE CHEST 5/17/2020 7:16 pm COMPARISON: Prior studies including 02/26/2020 HISTORY: ORDERING SYSTEM PROVIDED HISTORY: SOB, left shoulder pain hx CHF TECHNOLOGIST PROVIDED HISTORY: SOB, left shoulder pain hx CHF Reason for Exam: sob, hx chf Acuity: Chronic FINDINGS: Unchanged moderate cardiomegaly with globular configuration. There is an unchanged 3 lead left subclavian AICD. The lungs are clear. No pneumothorax or pleural fluid. No acute bone finding. Stable chest x-ray. No acute disease. LABS:  I have reviewed and interpreted all available lab results.   Labs Reviewed   TROPONIN - Abnormal; Notable for the following components:       Result Value    Troponin, High Sensitivity 20 (*)     All other components within normal limits   CBC WITH AUTO DIFFERENTIAL - Abnormal; Notable for the following components:    RDW 16.2 (*)     Monocytes 9 (*)     All other components within normal limits   BASIC METABOLIC PANEL - Abnormal; Notable for the following components:    CREATININE 0.92 (*)     Potassium 3.6 (*)     All other components within normal limits   BRAIN NATRIURETIC PEPTIDE - Abnormal; Notable for the following components:    Pro-BNP 4,531 (*)     All other components within normal limits   IMMATURE PLATELET FRACTION - Abnormal; Notable for the following components:    Platelet, Immature Fraction 10.7 (*)     Platelet, Fluorescence 133 (*)     All other components within normal limits   TROPONIN - Abnormal; Notable for the following components:    Troponin, High Sensitivity 22 (*)     All other components within normal limits   TROPONIN - Abnormal; Notable for the following components:    Troponin, High Sensitivity 23 (*)     All other components within normal limits   BASIC METABOLIC PANEL - Abnormal; Notable for the following components:    CREATININE 0.99 (*)     Anion Gap 18 (*)     GFR Non- 58 (*)     All other components within normal limits   TROPONIN - Abnormal; Notable for the following components:    Troponin, High Sensitivity 20 (*)     All other components within normal limits   D-DIMER, QUANTITATIVE   SPECIMEN REJECTION   TROPONIN   PREVIOUS SPECIMEN       SCREENING TOOLS:    HEART Risk Score for Chest Pain Patients   History and Physical Exam Suspicion Level  (Nausea, Vomiting, Diaphoresis, Radiation, Exertion)   Slightly Suspicious (0 pts)   Moderately Suspicious (1 pt)   Highly Suspicious (2 pts)   EKG Interpretation   Normal (0 pts)   Non-Specific Repolarization Disturbance (1 pt)   Significant ST-Depression (2 pts)   Age of Patient (in years)   = 39 (0 pts)   46-64 (1 pt)   = 65 (2 pts)   Risk Factors   No Risk Factors (0 pts)   1-2 Risk Factors (1 pt)   = 3 Risk Factors (2 pts)   Risk Factors Include:   Hypercholesterolemia   Hypertension   Diabetes Mellitus   Cigarette smoking   Positive family history   Obesity   CAD   (SLE, CKDz, HIV, Cocaine abuse)   Troponin Levels   = Normal Limit (0 pts)   1-3 Times Normal Limit (1 pt)   > 3 Times Normal Limit (2 pts)  TOTAL: 6    Percent Risk for Major Adverse Cardiac Event (MACE)  0-3 pts indicates low risk for MACE   2.5% (DISCHARGE)   4-7 pts indicates moderate risk for MACE  20.3% (OBS)  8-10 pts indicates high risk for MACE  72.7% (EARLY INVASIVE [place])    CDU IMPRESSION / PLAN      Rosi Martinez is a 47 y.o. female who presents with     1. Acute on chronic CHF exacerbation  - NT proBNP 4531  -No signs of fluid overload such as lower extremity edema or pulmonary edema on chest x-ray  -Cardiology evaluation recommends increasing metoprolol ER to 37.5 mg twice daily however insurance will not cover 1-1/2 pills. Will prescribe as 50 mg in the morning and 25 mg in the evening.  - Cardiology initiated Entresto twice daily, awaiting pre-CERT for this medication. Will use Bumex 2 mg in the morning and 1 mg in the evening while awaiting start of the medication. When she starts Entresto at home will return to Bumex 1 mg in the morning and 1 mg in the evening.  - Cardiology interrogation of AICD shows device looks okay  - Follow-up outpatient, okay for discharge per cardiology      · Continue home medications and pain control  · Monitor vitals, labs, and imaging  · DISPO: pending consults and clinical improvement    CONSULTS:    IP CONSULT TO CARDIOLOGY    PROCEDURES:  Not indicated       PATIENT REFERRED TO:    Shemar Hwang MD  [address], [address] [place] [address] [address]  [place] [id number]  [phone]      Cardiology -- Friday, June 12 at 9:15 at [facility]. office      --  Moni Ibanez MD   Emergency Medicine Resident     This dictation was generated by voice recognition computer software. Although all attempts are made to edit the dictation for accuracy, there may be errors in the transcription that are not intended.

## 2020-05-18 NOTE — ED PROVIDER NOTES
[facility] ED  Emergency Department  Emergency Medicine Resident Sign-out     Care of Chery Mason was assumed from Dr. Gemma Malik and is being seen for Shortness of Breath  . The patient's initial evaluation and plan have been discussed with the prior provider who initially evaluated the patient.      EMERGENCY DEPARTMENT COURSE / MEDICAL DECISION MAKING:       MEDICATIONS GIVEN:  Orders Placed This Encounter   Medications    amiodarone (CORDARONE) tablet 200 mg    bumetanide (BUMEX) injection 1 mg    metoprolol tartrate (LOPRESSOR) tablet 25 mg       LABS / RADIOLOGY:     Labs Reviewed   TROPONIN - Abnormal; Notable for the following components:       Result Value    Troponin, High Sensitivity 20 (*)     All other components within normal limits   CBC WITH AUTO DIFFERENTIAL - Abnormal; Notable for the following components:    RDW 16.2 (*)     Monocytes 9 (*)     All other components within normal limits   BASIC METABOLIC PANEL - Abnormal; Notable for the following components:    CREATININE 0.92 (*)     Potassium 3.6 (*)     All other components within normal limits   BRAIN NATRIURETIC PEPTIDE - Abnormal; Notable for the following components:    Pro-BNP 4,531 (*)     All other components within normal limits   IMMATURE PLATELET FRACTION - Abnormal; Notable for the following components:    Platelet, Immature Fraction 10.7 (*)     Platelet, Fluorescence 133 (*)     All other components within normal limits   TROPONIN - Abnormal; Notable for the following components:    Troponin, High Sensitivity 22 (*)     All other components within normal limits   D-DIMER, QUANTITATIVE   SPECIMEN REJECTION   TROPONIN   PREVIOUS SPECIMEN       Xr Chest Portable    Result Date: 5/17/2020  EXAMINATION: ONE XRAY VIEW OF THE CHEST 5/17/2020 7:16 pm COMPARISON: Prior studies including 02/26/2020 HISTORY: ORDERING SYSTEM PROVIDED HISTORY: SOB, left shoulder pain hx CHF TECHNOLOGIST PROVIDED HISTORY: SOB, left shoulder pain hx CHF Reason for Exam: sob, hx chf Acuity: Chronic FINDINGS: Unchanged moderate cardiomegaly with globular configuration. There is an unchanged 3 lead left subclavian AICD. The lungs are clear. No pneumothorax or pleural fluid. No acute bone finding. Stable chest x-ray. No acute disease. RECENT VITALS:     Temp: 98.5 °F (36.9 °C),  Pulse: 91, Resp: 24, BP: (!) 134/90, SpO2: 97 %      This patient is a 47 y.o. Female with CHF, SOB, age adjusted d dimer. Bumex improved pressure. Admitted to ETU for cards. ED Course as of May 18 0005   Sun May 17, 2020   1936 D-Dimer, Quant: 0.50 [BL]   1947 CBC Auto Differential(!):    WBC 7.1   RBC 4.44   Hemoglobin Quant 12.7   Hematocrit 39.2   MCV 88.3   MCH 28.6   MCHC 32.4   RDW 16.2(!)   Platelet Count See Reflexed IPF Result   MPV NOT REPORTED   NRBC Automated 0.0   Differential Type NOT REPORTED   Seg Neutrophils PENDING   Lymphocytes PENDING   Monocytes PENDING   Eosinophils % PENDING   Basophils PENDING   Immature Granulocytes PENDING   Segs Absolute PENDING   Absolute Lymph # PENDING   Absolute Mono # PENDING   Absolute Eos # PENDING   Basophils Absolute . .. [BL]   2011 XR CHEST PORTABLE [BL]   2043 Patient reevaluated resting comfortably in bed no acute distress. Patient notes that she is breathing back at her baseline oxygen saturation remaining above 95%. [BL]   2232 Patient was reevaluated noted worsening shortness of breath after ambulating to the bathroom. Will admit patient the observation unit for cardiology evaluation and continued monitoring given left-sided shoulder pain/heaviness as well as history of CHF with AICD    [BL]      ED Course User Index  [BL] Kalpana Dennison DO       OUTSTANDING TASKS / RECOMMENDATIONS:    1. Awaiting transfer to floor     FINAL IMPRESSION:     1. Dyspnea, unspecified type    2.  Congestive heart failure, unspecified HF chronicity, unspecified heart failure type ([facility] [address]        DISPOSITION:

## 2020-05-18 NOTE — CONSULTS
Prachi Sullivan MD   bumetanide (BUMEX) 1 MG tablet TAKE 1 TABLET BY MOUTH 2 TIMES A DAY 4/17/20  Yes Sonal Lane MD   albuterol sulfate HFA (VENTOLIN HFA) 108 (90 Base) MCG/ACT inhaler INHALE 2 PUFFS INTO THE LUNGS EVERY 6 HOURS AS NEEDED FOR WHEEZING 4/1/20  Yes Surya Clark MD   magnesium oxide (MAG-OX) 400 (241.3 Mg) MG TABS tablet Take 1 tablet by mouth daily 2/29/20  Yes Chavez Jaeger MD   ipratropium-albuterol (DUONEB) 0.5-2.5 (3) MG/3ML SOLN nebulizer solution Inhale 3 mLs into the lungs 2 times daily as needed for Shortness of Breath 1/20/20  Yes Surya Clark MD   Umeclidinium Bromide (INCRUSE ELLIPTA) 62.5 MCG/INH AEPB INHALE 1 PUFF INTO THE LUNGS DAILY STOP SPIRIVA 11/1/19  Yes Elis Cherry MD   atorvastatin (LIPITOR) 40 MG tablet Take 1 tablet by mouth nightly 6/14/19  Yes Mckenna Shirley APRN - CNP   Multiple Vitamin (MULTIVITAMIN) tablet TAKE ONE TABLET BY MOUTH ONE TIME A DAY 6/18/18  Yes Cheryl Saunders MD   amiodarone (CORDARONE) 200 MG tablet Take 1 tablet by mouth 2 times daily 4/21/20   Sonal Lane MD   fluticasone (FLONASE) 50 MCG/ACT nasal spray 1 spray by Nasal route daily 11/1/19   Elis Cherry MD   sennosides-docusate sodium (SENOKOT-S) 8.6-50 MG tablet Take 1 tablet by mouth daily 12/23/18   Madelin Murdock MD   Spacer/Aero-Holding Chambers (E-Z SPACER) ELVER 1 Device by Does not apply route daily 11/25/18   Yodit Gutierrez MD       Current Medications: Scheduled Meds:   folic acid-pyridoxine-cyancobalamin  1 tablet Oral Daily    sennosides-docusate sodium  1 tablet Oral Daily    atorvastatin  40 mg Oral Nightly    tiotropium  2 puff Inhalation Daily    magnesium oxide  400 mg Oral Daily    amitriptyline  25 mg Oral Nightly    bumetanide  1 mg Oral BID    amiodarone  200 mg Oral BID    spironolactone  25 mg Oral Daily    sodium chloride flush  10 mL Intravenous 2 times per day    enoxaparin  40 mg Subcutaneous Daily    pantoprazole  40 mg Oral QAM AC    elevated myocardial infarction) ([facility] [address]    CAD (coronary artery disease)    Intraparenchymal hematoma of left side of brain due to trauma (HCC)    Chronic combined systolic and diastolic congestive heart failure ([facility] [address]    AICD discharge    RIP (acute kidney injury) ([facility] [address]    Hypomagnesemia    V-tach (HCC)    Other heart failure (HCC)           RECOMMENDATIONS:  CHF , HFrF , nonischemic cardia myopathy, normal coronary arteries  As asked patient why she is not on ACE inhibitor denies any allergy reaction to ACE inhibitor  Will start Entresto  There is no signs of any volume overload no edema chest is clear will continue home dose of diuretic  Quite high blood pressure since patient is in the hospital increase Metoprolol XL with parameters  Moderate MR will follow, patient just had an echocardiogram done no  need to repeat  Device interrogation   Device looks okay blood pressure improved patient can be discharged and follow in the office    Discussed with patient and nursing.     Katiuska Rosen [facility]        [phone]

## 2020-05-19 ENCOUNTER — TELEPHONE (OUTPATIENT)
Dept: PHARMACY | Age: 55
End: 2020-05-19

## 2020-05-19 ENCOUNTER — CARE COORDINATION (OUTPATIENT)
Dept: CASE MANAGEMENT | Age: 55
End: 2020-05-19

## 2020-05-19 LAB
EKG ATRIAL RATE: 106 BPM
EKG P AXIS: 64 DEGREES
EKG P-R INTERVAL: 174 MS
EKG Q-T INTERVAL: 366 MS
EKG QRS DURATION: 130 MS
EKG QTC CALCULATION (BAZETT): 486 MS
EKG R AXIS: -11 DEGREES
EKG T AXIS: 82 DEGREES
EKG VENTRICULAR RATE: 106 BPM

## 2020-05-19 PROCEDURE — 93010 ELECTROCARDIOGRAM REPORT: CPT | Performed by: INTERNAL MEDICINE

## 2020-05-19 NOTE — TELEPHONE ENCOUNTER
Medication Management Service  [facility]  [phone]     CLINICAL PHARMACY NOTE:      Spoke with González Cline, DOMINIQUE with [facility] program.  Patient discharged from [facility] on 05/18/2020. [facility] RN completed JODIE visit with patient yesterday afternoon and is requesting assistance as to which medications patient should be taking. AVS Medication list, discharged note, MAR reviewed. [facility]. Phone call to [facility]. Spoke with [facility]. The following medication discrepancies noted and shared with RN. 1.  Hortencia Agent was completed. Approval has been received for Folbic. Folbic needed to be ordered by the pharmacy with delivery expected on Wednesday morning (05/20/2020). Once received medication will be placed in adherence packaging. 2.  Entrestro-PA needed and has been started with [place] identified by [facility] as being [id number]. [facility] RN will follow up with cardiology for completion of PA.    3.  Bumetanide-Per Discharge note, bumetanide morning dose is to be increased by an additional 1mg daily until [place] started. [facility] does not have new Rx indicating this change of dose nor does the AVS reflect this change. Per pharmacy records, patient's bumetanide dose is 1mg by mouth twice daily which also matches the discharge AVS. [facility] RN to follow up with cardiology regarding dose increase. 4.  Metoprolol succinate-Dose increased from 25mg twice daily to 50 mg in the morning and 25 mg in evening. Per [facility] medication was scheduled to be filled through the [facility] 2 [facility] program.  Patient discharged before receiving new dose of metoprolol succinate. Plan to include new dose of metoprolol succinate in adherence packaging. Patient should have enough metoprolol in current blister packs in the home to be able to take new regimen.  [facility] RN will follow up with medications in the

## 2020-05-19 NOTE — CARE COORDINATION
[address] Transitions Initial Follow Up Call    Call within 2 business days of discharge: Yes    Patient: Sabra Salinas Patient : 1965   MRN: [id number]  Reason for Admission: CHF, COVID 19 Monitoring  Discharge Date: 20 RARS: Readmission Risk Score: 19      Last Discharge [facility]       Complaint Diagnosis Description Type Department Provider    20 Shortness of Breath Dyspnea, unspecified type . .. ED to Hosp-Admission (Discharged) (ADMITTED) Bonita Esquivel MD; Valery Hsu. .. Spoke with: [facility] [address]: [facility]    Non-face-to-face services provided:  Obtained and reviewed discharge summary and/or continuity of care documents     Spoke with patient who states she is feeling ok today. She denies any fever, chills, or cough but says she has swelling to her bilateral LE and abdomen and gets short of breath when going up stairs. I attempted to review medications however she said that her [facility] is undergoing a prior authorization and her meds are bubble packed through [facility] pharmacy. She has been referred to pharmacist for  Assistance with med management and teaching by [facility] yesterday. Yudy Toledo to see the patient this afternoon around 12:30 today per patient and will assist with getting appointment with PCP. Patient denies any needs at this time from CTN. COVID 19 assessment done. Patient contacted regarding Zaina Jara. Care Transition Nurse/ Ambulatory Care Manager contacted the patient by telephone to perform post discharge assessment. Verified name and  with patient as identifiers. Provided introduction to self, and explanation of the CTN/ACM role, and reason for call due to risk factors for infection and/or exposure to COVID-19. Symptoms reviewed with patient who verbalized the following symptoms: shortness of breath and no new symptoms.       Due to no new or worsening symptoms encounter was not routed to provider for escalation. Patient has following risk factors of: heart failure. CTN/ACM reviewed discharge instructions, medical action plan and red flags such as increased shortness of breath, increasing fever and signs of decompensation with patient who verbalized understanding. Discussed exposure protocols and quarantine with CDC Guidelines What to do if you are sick with coronavirus disease 2019.  Patient was given an opportunity for questions and concerns. The patient agrees to contact the [employer] exposure line [phone], local [facility] department [place] Department of Health: ([phone]) and PCP office for questions related to their healthcare. CTN/ACM provided contact information for future needs. Reviewed and educated patient on any new and changed medications related to discharge diagnosis     Patient/family/caregiver given information for GetWell Loop and agrees to enroll no    Based on Loop alert triggers, patient will be contacted by nurse care manager for worsening symptoms. Plan for follow-up call in 5-7 days based on severity of symptoms and risk factors. Care Transitions 24 Hour Call    Do you have any ongoing symptoms?:  Yes  Patient-reported symptoms:  Other (Comment: swelling to bilateral LE)  Do you have a copy of your discharge instructions?:  Yes  Do you have all of your prescriptions and are they filled?:  No (Comment: Entresto in process of prior authorization, medications bubble packed per [facility] pharmacy)  Have you been contacted by a [facility] Pharmacist?:  No  Have you scheduled your follow up appointment?:  No (Comment: [employer] seeing patient today to assist with scheduling appointments)  Were you discharged with any Home Care or Post Acute Services:  Yes  Post Acute Services:   Outpatient/Community Services (Comment: Yudy Montilla [employer] - goes to patient's home)  Do you feel like you have everything you need to keep you well at home?:  Yes  Care Transitions

## 2020-05-21 ENCOUNTER — CARE COORDINATION (OUTPATIENT)
Dept: FAMILY MEDICINE CLINIC | Age: 55
End: 2020-05-21

## 2020-05-21 NOTE — CARE COORDINATION
Post Acute Care Discharge In Home visit Assessment     Review purpose of telephone call with: Derick Estes  Date: 20    - To evaluate the client after hospital discharge  - To ensure the client has received and understands self care instructions  - To identify and prevent potential adverse events  - To provide additional education and initiate post acute services       Rosi Martinez   : 1965 Phone #: [phone] (home)    1. Hospital Information    Discharged from: [facility]    Discharge to home  Discharge Date: 2020   Admission Date: 2020    Non-face-to-face services provided:  Scheduled appointment with CHERYL ATKINSON on May 26 at 2 pm  Obtained and reviewed discharge summary and/or continuity of care documents    Hospital records: obtained and reviewed    Was instructed to follow up in: 7-10 days  Hospital Diagnosis:   Acute CHF exacerbation      Hospital Consultants:  Cardiology, Internal Medicine    Initial contact Date: 2020  Type of Contact:  in person      2. Assessment of Current Condition      Questions: How have you felt since discharge from the hospital:     better    Did you receive a discharge summary from the hospital? yes    Is there any lingering or new fever? No    Are you eating and drinking OK? yes    Are there any new complaints of pain? No    If you have a wound - is the dressing clean, dry, and intact? N/A    Reinforce Education    Does the patient know -   1. What to do if her symptoms worsen? yes   2. For what symptoms she should call the doctor?  yes   3. What symptoms she should get help with right away? Yes    Reinforced recognition of initial signs and symptoms of CHF and to contact PCP or cardiologist at onset of symptoms   4. Does she know how to contact her physician?  yes    Are there any questions about the patients medications? Yes.     Stacia's  oral medications are blister packaged by her pharmacy and she had not obtained entresto 24-26 and increased dose of metoprolol 37.5 mg 2 x day until today 5/21/2020. She started taking entresto and increased dose of metoprolol at visit today. She was instructed on medication schedule during visit and verbalized understanding. She does not take Encruse Elipta inhaler   Does the patient have a list of all the medications that were prescribed to be taken after discharge? yes   Does the patient have all the medications that were prescribed?  yes   Is the patient taking all the prescribed medications? yes   Does the patient understand what all the medications are taken for? CMR performed.   She verbalized understanding of medication schedule and medication changes with metoprolol dosing and addition of entresto 2 x day      ( Update medication list and refresh medication smartlinks below)        All Active Meds in Chart - (keep all current active meds, add hospital meds)  Current Outpatient Medications   Medication Sig Dispense Refill    sacubitril-valsartan (ENTRESTO) 24-26 MG per tablet Take 1 tablet by mouth 2 times daily 60 tablet 0    folic acid-pyridoxine-cyancobalamin (FOLTX) 1.13-25-2 MG TABS Take 1 tablet by mouth daily 30 tablet 0    metoprolol succinate (TOPROL XL) 25 MG extended release tablet Take 1.5 tablets by mouth 2 times daily 90 tablet 0    spironolactone (ALDACTONE) 25 MG tablet TAKE 1 TABLET BY MOUTH ONE TIME A DAY 28 tablet 2    omeprazole (PRILOSEC) 20 MG delayed release capsule TAKE 1 CAPSULE BY MOUTH ONE TIME A DAY 30 capsule 2    amiodarone (CORDARONE) 200 MG tablet Take 1 tablet by mouth 2 times daily 60 tablet 3    amitriptyline (ELAVIL) 25 MG tablet TAKE 1 TABLET BY MOUTH EVERY NIGHT AT BEDTIME 30 tablet 3    bumetanide (BUMEX) 1 MG tablet TAKE 1 TABLET BY MOUTH 2 TIMES A DAY 60 tablet 3    albuterol sulfate HFA (VENTOLIN HFA) 108 (90 Base) MCG/ACT inhaler INHALE 2 PUFFS INTO THE LUNGS EVERY 6 HOURS AS NEEDED FOR WHEEZING 18 g 3    magnesium oxide (MAG-OX) 400 (241.3 Mg) MG TABS tablet Take 1 tablet by mouth daily 30 tablet 3    ipratropium-albuterol (DUONEB) 0.5-2.5 (3) MG/3ML SOLN nebulizer solution Inhale 3 mLs into the lungs 2 times daily as needed for Shortness of Breath 360 mL 3    fluticasone (FLONASE) 50 MCG/ACT nasal spray 1 spray by Nasal route daily 1 Bottle 3    atorvastatin (LIPITOR) 40 MG tablet Take 1 tablet by mouth nightly 30 tablet 3    sennosides-docusate sodium (SENOKOT-S) 8.6-50 MG tablet Take 1 tablet by mouth daily 20 tablet 0    Multiple Vitamin (MULTIVITAMIN) tablet TAKE ONE TABLET BY MOUTH ONE TIME A DAY 30 tablet 3    Umeclidinium Bromide (INCRUSE ELLIPTA) 62.5 MCG/INH AEPB INHALE 1 PUFF INTO THE LUNGS DAILY STOP SPIRIVA (Patient not taking: Reported on 5/18/2020) 1 each 3    Spacer/Aero-Holding Chambers (E-Z SPACER) ELVER 1 Device by Does not apply route daily (Patient not taking: Reported on 5/21/2020) 1 Device 0     No current facility-administered medications for this visit.           Current Medications (record all meds as 'taken' or 'not taken' in home med activity)  Outpatient Medications Marked as Taking for the 5/21/20 encounter (Care Coordination) with Arvind Rodrigues RN   Medication Sig Dispense Refill    sacubitril-valsartan (ENTRESTO) 24-26 MG per tablet Take 1 tablet by mouth 2 times daily 60 tablet 0    folic acid-pyridoxine-cyancobalamin (FOLTX) 1.13-25-2 MG TABS Take 1 tablet by mouth daily 30 tablet 0    metoprolol succinate (TOPROL XL) 25 MG extended release tablet Take 1.5 tablets by mouth 2 times daily 90 tablet 0    spironolactone (ALDACTONE) 25 MG tablet TAKE 1 TABLET BY MOUTH ONE TIME A DAY 28 tablet 2    omeprazole (PRILOSEC) 20 MG delayed release capsule TAKE 1 CAPSULE BY MOUTH ONE TIME A DAY 30 capsule 2    amiodarone (CORDARONE) 200 MG tablet Take 1 tablet by mouth 2 times daily 60 tablet 3    amitriptyline (ELAVIL) 25 MG tablet TAKE 1 TABLET BY MOUTH EVERY NIGHT AT BEDTIME 30 tablet 3    bumetanide (BUMEX) 1 MG tablet TAKE 1 TABLET BY MOUTH 2 TIMES A DAY 60 tablet 3    albuterol sulfate HFA (VENTOLIN HFA) 108 (90 Base) MCG/ACT inhaler INHALE 2 PUFFS INTO THE LUNGS EVERY 6 HOURS AS NEEDED FOR WHEEZING 18 g 3    magnesium oxide (MAG-OX) 400 (241.3 Mg) MG TABS tablet Take 1 tablet by mouth daily 30 tablet 3    ipratropium-albuterol (DUONEB) 0.5-2.5 (3) MG/3ML SOLN nebulizer solution Inhale 3 mLs into the lungs 2 times daily as needed for Shortness of Breath 360 mL 3    fluticasone (FLONASE) 50 MCG/ACT nasal spray 1 spray by Nasal route daily 1 Bottle 3    atorvastatin (LIPITOR) 40 MG tablet Take 1 tablet by mouth nightly 30 tablet 3    sennosides-docusate sodium (SENOKOT-S) 8.6-50 MG tablet Take 1 tablet by mouth daily 20 tablet 0    Multiple Vitamin (MULTIVITAMIN) tablet TAKE ONE TABLET BY MOUTH ONE TIME A DAY 30 tablet 3         Are there any questions about how the patient should take care of herself? No   Does the patient understand her diet regimen? yes   Does the patient understand his or her activity level? yes   Does the patient understand how to care for her wound? N/A   Does the patient understand how to monitor her weight?  yes   Does the patient understand what to do if she becomes short of breath? yes   Does the patient understand what to do if she has increased edema? yes      Is the patient having any trouble with ADL's? She reports improvement in activity tolerance   Any problems showering or bathing? No   Does the patient have trouble eating or feeding themselves? No   Is the patient having trouble getting out of bed or going to the toilet? Bathroom is on the second floor of the house. She reports difficulty with use of stairs and has limited activities/ using stairs once a day. Is the patient able to move around as expected? She ambulates slowly with no assistive devices.   Tolerates ambulation for short distances- walks for 1-2 minutes      Home care services initiated: no     Services provided: CHF Nurse and [facility]      Does that patient have equipment needs? No   Does the patient have the appropriate equipment? Yes   Can the patient use the equipment properly and safely? Yes    Reinforce Follow-Up  - Does patient have an appointment with her PCP? no -  She was instructed to call and schedule appt with PCP  - Does patient have an appointment with her specialist physicians? Yes      Care Coordination  Is patient assigned ambulatory care coordinator for chronic condition management? Yes        Follow up appointment date: (7 days for more severe illness, 14 days for others)  Future Appointments   Date Time Provider Yanelis Alfaro   6/16/2020 10:00 AM [facility] RM 1 [facility] CHF [facility]   5/26/2020  [facility]     [place] [address]  [address]  2 pm      Other Interventions or Actions taken as result of  Assessment  - assessment   Skin warm and dry. She reports modest dyspnea with use of stairs, minimal dyspnea with slow ambulation. Respirations easy at rest.   Lungs clear in all lobes. She denies cough. No lower leg edema. Abdomen soft and nondistended. She denies abdominal bloating. Reinforced sodium and fluid restrictions with CHF. B/P 154/76    Pulse  84   resp 16 and easy at rest.     02 saturation 97% at rest on room air.      Cab transportation arranged for cardiology appointment on 5/26/2020      Nury Montanez RN, [address]

## 2020-05-27 ENCOUNTER — CARE COORDINATION (OUTPATIENT)
Dept: CASE MANAGEMENT | Age: 55
End: 2020-05-27

## 2020-05-28 ENCOUNTER — CARE COORDINATION (OUTPATIENT)
Dept: CASE MANAGEMENT | Age: 55
End: 2020-05-28

## 2020-05-28 NOTE — CARE COORDINATION
[address] Transitions Follow Up Call    2020    Patient: Kameron Proctor  Patient : 1965   MRN: [id number]  Reason for Admission: COVID 19 monitoring   Discharge Date: 20 RARS: Readmission Risk Score: 19       Attempted to reach patient for subsequent transitional call. VM left to return call to [phone]. 2nd attempt to reach with no call back. Episode resolved.          Aristides Higgins RN

## 2020-06-01 ENCOUNTER — CARE COORDINATION (OUTPATIENT)
Dept: FAMILY MEDICINE CLINIC | Age: 55
End: 2020-06-01

## 2020-06-02 RX ORDER — AMIODARONE HYDROCHLORIDE 200 MG/1
200 TABLET ORAL DAILY
COMMUNITY
End: 2022-04-04 | Stop reason: SDUPTHER

## 2020-06-10 ENCOUNTER — TELEPHONE (OUTPATIENT)
Dept: FAMILY MEDICINE CLINIC | Age: 55
End: 2020-06-10

## 2020-06-11 ENCOUNTER — TELEPHONE (OUTPATIENT)
Dept: FAMILY MEDICINE CLINIC | Age: 55
End: 2020-06-11

## 2020-06-15 ENCOUNTER — CARE COORDINATION (OUTPATIENT)
Dept: FAMILY MEDICINE CLINIC | Age: 55
End: 2020-06-15

## 2020-06-15 RX ORDER — METOPROLOL SUCCINATE 25 MG/1
25 TABLET, EXTENDED RELEASE ORAL EVERY EVENING
Qty: 28 TABLET | Refills: 3 | Status: SHIPPED | OUTPATIENT
Start: 2020-06-15 | End: 2020-09-26

## 2020-06-15 RX ORDER — B12/LEVOMEFOLATE CALCIUM/B-6 2-1.13-25
1 TABLET ORAL DAILY
Qty: 28 TABLET | Refills: 3 | Status: SHIPPED | OUTPATIENT
Start: 2020-06-15 | End: 2020-09-26

## 2020-06-15 RX ORDER — METOPROLOL SUCCINATE 50 MG/1
50 TABLET, EXTENDED RELEASE ORAL EVERY MORNING
Qty: 28 TABLET | Refills: 3 | Status: SHIPPED | OUTPATIENT
Start: 2020-06-15 | End: 2020-09-26

## 2020-06-16 ENCOUNTER — TELEPHONE (OUTPATIENT)
Dept: FAMILY MEDICINE CLINIC | Age: 55
End: 2020-06-16

## 2020-06-16 NOTE — TELEPHONE ENCOUNTER
Phone call to patient for follow up. Left message for patient to contact this  at her earliest convenience.

## 2020-06-16 NOTE — CARE COORDINATION
Spiritual  No needs at this time    [address] 41 and  to assist with housing resources, community supports and resources. She was encouraged to reach out to supportive family and friends, to maintain self care practices for physical and emotional health and seek counseling for coping with losses. Instructed on availability of crisis care call centers. Revisit in one week.     Arvind Rodrigues RN, BSN  [employer]
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
## 2020-07-02 ENCOUNTER — CARE COORDINATION (OUTPATIENT)
Dept: CARE COORDINATION | Age: 55
End: 2020-07-02
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
## 2020-07-13 ENCOUNTER — CARE COORDINATION (OUTPATIENT)
Dept: FAMILY MEDICINE CLINIC | Age: 55
End: 2020-07-13
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
## 2020-07-28 ENCOUNTER — HOSPITAL ENCOUNTER (OUTPATIENT)
Dept: OTHER | Age: 55
Discharge: HOME | End: 2020-07-28
Payer: MEDICAID

## 2020-07-28 VITALS
TEMPERATURE: 98.2 F | HEART RATE: 70 BPM | OXYGEN SATURATION: 98 % | BODY MASS INDEX: 25.68 KG/M2 | WEIGHT: 140.4 LBS | DIASTOLIC BLOOD PRESSURE: 60 MMHG | RESPIRATION RATE: 20 BRPM | SYSTOLIC BLOOD PRESSURE: 98 MMHG

## 2020-07-28 PROCEDURE — 99212 OFFICE O/P EST SF 10 MIN: CPT

## 2020-07-28 ASSESSMENT — PAIN DESCRIPTION - DESCRIPTORS: DESCRIPTORS: ACHING

## 2020-07-28 ASSESSMENT — PAIN DESCRIPTION - LOCATION: LOCATION: BACK

## 2020-07-28 ASSESSMENT — PAIN DESCRIPTION - FREQUENCY: FREQUENCY: INTERMITTENT

## 2020-07-28 ASSESSMENT — PAIN DESCRIPTION - PAIN TYPE: TYPE: CHRONIC PAIN

## 2020-07-28 ASSESSMENT — PAIN DESCRIPTION - ORIENTATION: ORIENTATION: RIGHT;LEFT;LOWER

## 2020-07-28 ASSESSMENT — PAIN DESCRIPTION - PROGRESSION: CLINICAL_PROGRESSION: NOT CHANGED

## 2020-07-28 ASSESSMENT — PAIN DESCRIPTION - ONSET: ONSET: ON-GOING

## 2020-08-04 ENCOUNTER — OFFICE VISIT (OUTPATIENT)
Dept: FAMILY MEDICINE CLINIC | Age: 55
End: 2020-08-04
Payer: MEDICAID

## 2020-08-04 VITALS
WEIGHT: 140 LBS | HEART RATE: 61 BPM | DIASTOLIC BLOOD PRESSURE: 62 MMHG | BODY MASS INDEX: 25.76 KG/M2 | HEIGHT: 62 IN | TEMPERATURE: 97.2 F | SYSTOLIC BLOOD PRESSURE: 110 MMHG

## 2020-08-04 PROCEDURE — 3017F COLORECTAL CA SCREEN DOC REV: CPT | Performed by: STUDENT IN AN ORGANIZED HEALTH CARE EDUCATION/TRAINING PROGRAM

## 2020-08-04 PROCEDURE — G8427 DOCREV CUR MEDS BY ELIG CLIN: HCPCS | Performed by: STUDENT IN AN ORGANIZED HEALTH CARE EDUCATION/TRAINING PROGRAM

## 2020-08-04 PROCEDURE — G8926 SPIRO NO PERF OR DOC: HCPCS | Performed by: STUDENT IN AN ORGANIZED HEALTH CARE EDUCATION/TRAINING PROGRAM

## 2020-08-04 PROCEDURE — 3023F SPIROM DOC REV: CPT | Performed by: STUDENT IN AN ORGANIZED HEALTH CARE EDUCATION/TRAINING PROGRAM

## 2020-08-04 PROCEDURE — G8417 CALC BMI ABV UP PARAM F/U: HCPCS | Performed by: STUDENT IN AN ORGANIZED HEALTH CARE EDUCATION/TRAINING PROGRAM

## 2020-08-04 PROCEDURE — 99213 OFFICE O/P EST LOW 20 MIN: CPT | Performed by: STUDENT IN AN ORGANIZED HEALTH CARE EDUCATION/TRAINING PROGRAM

## 2020-08-04 PROCEDURE — 1036F TOBACCO NON-USER: CPT | Performed by: STUDENT IN AN ORGANIZED HEALTH CARE EDUCATION/TRAINING PROGRAM

## 2020-08-04 ASSESSMENT — ENCOUNTER SYMPTOMS
ABDOMINAL PAIN: 0
NAUSEA: 0
SHORTNESS OF BREATH: 0
VOMITING: 0
DIARRHEA: 0

## 2020-08-04 NOTE — PROGRESS NOTES
Attending Physician Statement  I have discussed the care of Hetal Bernabe, including pertinent history and exam findings,  with the resident. I have reviewed the key elements of all parts of the encounter with the resident. I agree with the assessment, plan and orders as documented by the resident.   (GE Modifier)    Jean Laboy

## 2020-08-04 NOTE — PROGRESS NOTES
Subjective:    Olivia Richardson is a 54 y.o. female with  has a past medical history of Asthma, CAD (coronary artery disease), Cardiomegaly, CHF (congestive heart failure) ([address] COPD (chronic obstructive pulmonary disease) ([facility] Depression, Hypertension, Lesion of right native kidney, MI (myocardial infarction) ([facility] and Unspecified diseases of blood and blood-forming organs. Presented to the office today for:  Chief Complaint   Patient presents with    Other       HPI    54year old female presented today for COPD follow up. Patient stated she is doing well. Patient stated her COPD has been stable however has been using her inhalers 2 times a day usually. Currently denied any SOB, sputum production, cough. Patient would benefit from a nebulizer for duonebs. No other acute concerns at this time. Olivia Richardson was evaluated today and a DME order was entered for a nebulizer compressor in order to administer Albuterol and Ipratropium due to the diagnosis of COPD. The need for this equipment and treatment was discussed with the patient and she understands and is in agreement. Review of Systems   Constitutional: Negative for chills and fever. Respiratory: Negative for shortness of breath. Cardiovascular: Negative for chest pain. Gastrointestinal: Negative for abdominal pain, diarrhea, nausea and vomiting. The patient has a   Family History   Problem Relation Age of Onset    Diabetes Mother     High Blood Pressure Mother     Heart Disease Mother     Diabetes Father     Heart Disease Brother        Objective:    /62 (Site: Left Upper Arm, Position: Sitting, Cuff Size: Medium Adult)   Pulse 61   Temp 97.2 °F (36.2 °C) (Temporal)   Ht 5' 2\" (1.575 m)   Wt 140 lb (63.5 kg)   LMP 04/07/2016   BMI 25.61 kg/m²    BP Readings from Last 3 Encounters:   08/04/20 110/62   07/28/20 98/60   05/18/20 (!) 129/94       Physical Exam  Vitals signs reviewed.    HENT: Return in about 3 months (around 11/4/2020). Disclaimer: Some orall of this note was transcribed using voice-recognition software. This may cause typographical errors occasionally. Although all effort is made to fix these errors, please do not hesitate to contact our office if there Javier Oar concern with the understanding of this note.

## 2020-09-08 ENCOUNTER — CARE COORDINATION (OUTPATIENT)
Dept: FAMILY MEDICINE CLINIC | Age: 55
End: 2020-09-08

## 2020-09-09 NOTE — CARE COORDINATION
[address] [facility] home vs      Emotional/coping  Alert, oriented x 3, engaged and cooperative. Affect -Full    Thought processes - Logical       Housing  No changes  She met with writer today at her cousins home in [facility]. Financial  No needs at this time. Reports she is able to afford housing expenses. Medication and transportation expenses are covered through her medical insurance. Socialization/family  Reports family and friend relationships are supportive. Medications  She reports taking medications that are blister packaged. Unable to review medication blister pack for adherence. She did not have medications with her during visit and reports taking medications daily. . She reports she received her nebulizer unit but has not been taking aerosol treatments because the nebulizer unit is new and she did not know how to set up new nebulizer for use. Medical  Skin warm and dry   Respirations easy at rest.   Lungs clear, diminished in lower lobes. No lower leg edema. Abdomen slightly distended and soft. She denies chest pain, cough, shortness of breath. B/P 110/58 sitting   119/79 standing  Pulse 72 and irregular   resp 16-18/min, unlabored. 02 Saturation 97% at rest   Temp 97.2 F  Diet hx reviewed. She reports eating foods high in sodium over the holiday weekend. Reviewed and re-instructed low sodium diet with CHF. Instructed on foods that are high in sodium and low sodium food choices. Re-instructed on  Fluid restrictions with CHF    Plan  Follow up visit made to patient home at 5:30 pm  upon completion of today's home vs to instruct on use of nebulizer unit. She verbalized understanding of nebulizer use. Medication blister pack meds reviewed for adherence and she demonstrates adherence with taking oral meds. She was instructed to schedule CHF clinic visit  - last vs was in July. Instructed on importance of cardiac follow up and assessment with CHF.      Revisit scheduled in 2-3 weeks for reassessment.           Celina Anne RN, BSN  [employer]

## 2020-09-16 ENCOUNTER — TELEPHONE (OUTPATIENT)
Dept: FAMILY MEDICINE CLINIC | Age: 55
End: 2020-09-16

## 2020-09-21 ENCOUNTER — CARE COORDINATION (OUTPATIENT)
Dept: FAMILY MEDICINE CLINIC | Age: 55
End: 2020-09-21

## 2020-09-21 ENCOUNTER — TELEPHONE (OUTPATIENT)
Dept: PHARMACY | Age: 55
End: 2020-09-21
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
## 2020-09-26 RX ORDER — METOPROLOL SUCCINATE 25 MG/1
TABLET, EXTENDED RELEASE ORAL
Qty: 28 TABLET | Refills: 3 | Status: SHIPPED | OUTPATIENT
Start: 2020-09-26 | End: 2020-10-30

## 2020-09-26 RX ORDER — SACUBITRIL AND VALSARTAN 24; 26 MG/1; MG/1
TABLET, FILM COATED ORAL
Qty: 56 TABLET | Refills: 3 | Status: SHIPPED | OUTPATIENT
Start: 2020-09-26 | End: 2021-01-22

## 2020-09-26 RX ORDER — METOPROLOL SUCCINATE 50 MG/1
TABLET, EXTENDED RELEASE ORAL
Qty: 28 TABLET | Refills: 3 | Status: SHIPPED | OUTPATIENT
Start: 2020-09-26 | End: 2021-03-22

## 2020-09-26 RX ORDER — B12/LEVOMEFOLATE CALCIUM/B-6 2-1.13-25
TABLET ORAL
Qty: 28 TABLET | Refills: 3 | Status: SHIPPED | OUTPATIENT
Start: 2020-09-26 | End: 2021-01-22

## 2020-09-30 ENCOUNTER — TELEPHONE (OUTPATIENT)
Dept: FAMILY MEDICINE CLINIC | Age: 55
End: 2020-09-30
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
## 2020-10-12 ENCOUNTER — CARE COORDINATION (OUTPATIENT)
Dept: FAMILY MEDICINE CLINIC | Age: 55
End: 2020-10-12

## 2020-10-12 NOTE — CARE COORDINATION
[address] [facility] home vs    Emotional/coping  Alert, oriented x 3, engaged and cooperative. Affect  Full  Thought processes- logical   She denies thoughts/feelings of depression. Reports she is grieving the loss of her aunt who recently . Denies suicidal thoughts    Housing  She lives in 1 story duplex with 4 stair entrance. Lives with her boyfriend. Home is well furnished. Financial  Reports she was unable to pay her car note this month due to travel expenses to travel to [place] for her aunt's .   She has medical insurance coverage for her medications and has adequate food in the home. Reports her housing expenses are paid    Socialization   Reports relationships with family and friends is supportive. Social activities include family visits, watching television and listening to music. Medications   Medication schedule reviewed with patient. She has all oral meds in the home and received medication refills from her pharmacy earlier today. Medication discrepancies  She reports not taking brio elipta inhaler and takes flonase intermittently. States she has not needed albuterol inhaler in the past week. She reports her cardiologist advised he was increasing the dose of one of her medications.  [address] phoned to clarify medication orders. Pharmacist, Alan Mueller reports no new medication orders were received in the past month. [facility] office phoned. Spoke with Michael Riojas at the [facility] office. She reviewed the last office note. Physician did not change medication orders but refilled several meds. Patient was given script for labs- Mg level and BMP. She states she has not had lab work completed.    Current Outpatient Medications   Medication Instructions    albuterol sulfate HFA (VENTOLIN HFA) 108 (90 Base) MCG/ACT inhaler INHALE 2 PUFFS INTO THE LUNGS EVERY 6 HOURS AS NEEDED FOR WHEEZING    amiodarone (CORDARONE) 200 mg, Oral, DAILY, Dose reduction initiated 5/26/2020 by cardiology     amitriptyline (ELAVIL) 25 MG tablet TAKE 1 TABLET BY MOUTH EVERY NIGHT AT BEDTIME    atorvastatin (LIPITOR) 40 mg, Oral, NIGHTLY    bumetanide (BUMEX) 1 MG tablet TAKE 1 TABLET BY MOUTH 2 TIMES A DAY    ENTRESTO 24-26 MG per tablet TAKE 1 TABLET BY MOUTH 2 TIMES A DAY    fluticasone (FLONASE) 50 MCG/ACT nasal spray 1 spray, Nasal, DAILY    ipratropium-albuterol (DUONEB) 0.5-2.5 (3) MG/3ML SOLN nebulizer solution 3 mLs, Inhalation, 2 TIMES DAILY PRN    L-Methylfolate-B6-B12 (FOLBIC RF) 1.13-25-2 MG TABS TAKE 1 TABLET BY MOUTH ONE TIME A DAY    magnesium oxide (MAG-OX) 400 (241.3 Mg) MG TABS tablet TAKE 1 TABLET BY MOUTH ONE TIME A DAY    metoprolol succinate (TOPROL XL) 25 MG extended release tablet TAKE ONE TABLET BY MOUTH EVERY EVENING    metoprolol succinate (TOPROL XL) 50 MG extended release tablet TAKE ONE TABLET BY MOUTH EVERY MORNING    Multiple Vitamin (MULTIVITAMIN) tablet TAKE ONE TABLET BY MOUTH ONE TIME A DAY    Nebulizers (COMPRESSOR/NEBULIZER) MISC 1 Device, Does not apply, 4 TIMES DAILY    omeprazole (PRILOSEC) 20 MG delayed release capsule TAKE 1 CAPSULE BY MOUTH ONE TIME A DAY    sennosides-docusate sodium (SENOKOT-S) 8.6-50 MG tablet 1 tablet, Oral, DAILY    Spacer/Aero-Holding Chambers (E-Z SPACER) ELVER 1 Device, Does not apply, DAILY    spironolactone (ALDACTONE) 25 MG tablet TAKE 1 TABLET BY MOUTH ONE TIME A DAY    Umeclidinium Bromide (INCRUSE ELLIPTA) 62.5 MCG/INH AEPB INHALE 1 PUFF INTO THE LUNGS DAILY STOP SPIRIVA     Medical   Skin warm and dry. She denies chest pain, cough, shortness of breath, fever. Reports feeling tired easily and states she naps in the afternoon. Lungs clear in all lobes. No lower leg edema. Denies AICD shock in the past 2 weeks. B/P 116/74  Pulse 62  resp 18-20/min and easy at rest and with slow ambulation. 02 sat 96% on room air.      Spiritual  Reports spiritual needs are met through personal prayer and meditation.     Plan  Continue monthly tamiko e management follow up for assessment of medication adherence, assess for self management of CHF, COPD    Alexandre Morel RN, [address]

## 2020-10-28 NOTE — TELEPHONE ENCOUNTER
Last Visit Date:  8-4-20  Next Visit Date:  Visit date not found    Hemoglobin A1C (%)   Date Value   11/06/2019 5.7   07/16/2018 6.4 (H)   02/03/2017 5.9             ( goal A1C is < 7)   No results found for: LABMICR  LDL Cholesterol (mg/dL)   Date Value   11/01/2019 131 (H)       (goal LDL is <100)   AST (U/L)   Date Value   02/26/2020 25     ALT (U/L)   Date Value   02/26/2020 13     BUN (mg/dL)   Date Value   05/18/2020 17     BP Readings from Last 3 Encounters:   08/04/20 110/62   07/28/20 98/60   05/18/20 (!) 129/94          (goal 120/80)        Patient Active Problem List:     COPD (chronic obstructive pulmonary disease) (HCC)     Fibroids     Syncope     Lung nodule < 6cm on CT     Chronic systolic heart failure (HCC) s/p AICD     Mild intermittent asthma     Essential hypertension     Chest pain     QT prolongation     Asthma with COPD with exacerbation (HCC)     Non-ischemic cardiomyopathy ([address]     Lesion of right native kidney     NSTEMI (non-ST elevated myocardial infarction) ([address]     CAD (coronary artery disease)     Intraparenchymal hematoma of left side of brain due to trauma ([address]     Chronic combined systolic and diastolic congestive heart failure ([address]     AICD discharge     RIP (acute kidney injury) ([address]     Hypomagnesemia     V-tach ([address]     Other heart failure ([address]      ----Kade Pro

## 2020-10-30 RX ORDER — METOPROLOL SUCCINATE 25 MG/1
TABLET, EXTENDED RELEASE ORAL
Qty: 28 TABLET | Refills: 3 | Status: SHIPPED | OUTPATIENT
Start: 2020-10-30 | End: 2021-01-18 | Stop reason: DRUGHIGH

## 2020-11-03 ENCOUNTER — TELEPHONE (OUTPATIENT)
Dept: FAMILY MEDICINE CLINIC | Age: 55
End: 2020-11-03

## 2020-11-05 ENCOUNTER — TELEPHONE (OUTPATIENT)
Dept: FAMILY MEDICINE CLINIC | Age: 55
End: 2020-11-05

## 2020-11-10 ENCOUNTER — TELEPHONE (OUTPATIENT)
Dept: FAMILY MEDICINE CLINIC | Age: 55
End: 2020-11-10

## 2020-11-11 ENCOUNTER — TELEPHONE (OUTPATIENT)
Dept: FAMILY MEDICINE CLINIC | Age: 55
End: 2020-11-11

## 2020-11-13 NOTE — TELEPHONE ENCOUNTER
Phone call reminder to patient regarding today's visit to complete change of address application for JFS. Patient stated that she could not meet due to learning of the death of a relative. Condolences were given. Will follow up Tuesday.

## 2020-11-13 NOTE — TELEPHONE ENCOUNTER
Phone call to patient regarding food stamps and change of address. Patient admitted that she has not changed her address with [facility].  to assist.  Visit scheduled for Thursday.

## 2020-11-13 NOTE — TELEPHONE ENCOUNTER
Attempted visit with patient. A young man answered the door stating \"she just left\". Attempted to call patient but phone went to voicemail. Detailed message left.

## 2020-11-20 RX ORDER — AMITRIPTYLINE HYDROCHLORIDE 25 MG/1
TABLET, FILM COATED ORAL
Qty: 30 TABLET | Refills: 5 | Status: SHIPPED | OUTPATIENT
Start: 2020-11-20 | End: 2021-05-13

## 2020-11-20 RX ORDER — SPIRONOLACTONE 25 MG/1
TABLET ORAL
Qty: 30 TABLET | Refills: 5 | Status: SHIPPED | OUTPATIENT
Start: 2020-11-20 | End: 2021-05-13

## 2020-11-20 RX ORDER — OMEPRAZOLE 20 MG/1
CAPSULE, DELAYED RELEASE ORAL
Qty: 30 CAPSULE | Refills: 1 | Status: SHIPPED | OUTPATIENT
Start: 2020-11-20 | End: 2021-01-22

## 2020-11-20 RX ORDER — BUMETANIDE 1 MG/1
TABLET ORAL
Qty: 60 TABLET | Refills: 5 | Status: SHIPPED | OUTPATIENT
Start: 2020-11-20 | End: 2021-05-13

## 2020-11-20 NOTE — TELEPHONE ENCOUNTER
Pt swapna VV 11/25/2020     Please address the medication refill and close the encounter. If I can be of assistance, please route to the applicable pool. Thank you. Last visit: 08/04/2020  Last Med refill: 08/04/2020 amitriptyline    08/04/20 bumetanide    08/04/2020 spironolactone    08/04/20 omeprazole    Does patient have enough medication for 72 hours: No:     Next Visit Date:  No future appointments.     Health Maintenance   Topic Date Due    Shingles Vaccine (1 of 2) 06/13/2015    Cervical cancer screen  12/28/2018    Breast cancer screen  02/03/2019    Flu vaccine (1) 09/01/2020    TSH testing  11/01/2020    A1C test (Diabetic or Prediabetic)  11/06/2020    Lipid screen  11/01/2020    Potassium monitoring  05/18/2021    Creatinine monitoring  05/18/2021    Colon cancer screen fecal DNA test (Cologuard)  12/04/2022    DTaP/Tdap/Td vaccine (2 - Td) 06/09/2029    Pneumococcal 0-64 years Vaccine  Completed    Hepatitis C screen  Completed    HIV screen  Completed    Hepatitis A vaccine  Aged Out    Hepatitis B vaccine  Aged Out    Hib vaccine  Aged Out    Meningococcal (ACWY) vaccine  Aged Out       Hemoglobin A1C (%)   Date Value   11/06/2019 5.7   07/16/2018 6.4 (H)   02/03/2017 5.9             ( goal A1C is < 7)   No results found for: LABMICR  LDL Cholesterol (mg/dL)   Date Value   11/01/2019 131 (H)   11/25/2018 128       (goal LDL is <100)   AST (U/L)   Date Value   02/26/2020 25     ALT (U/L)   Date Value   02/26/2020 13     BUN (mg/dL)   Date Value   05/18/2020 17     BP Readings from Last 3 Encounters:   08/04/20 110/62   07/28/20 98/60   05/18/20 (!) 129/94          (goal 120/80)    All Future Testing planned in [facility]  Lab Frequency Next Occurrence   Basic Metabolic Panel Once 96/27/1905   Magnesium Once 03/03/2021               Patient Active Problem List:     COPD (chronic obstructive pulmonary disease) (HCC)     Fibroids     Syncope     Lung nodule < 6cm on CT     Chronic systolic heart failure (HCC) s/p AICD     Mild intermittent asthma     Essential hypertension     Chest pain     QT prolongation     Asthma with COPD with exacerbation (HCC)     Non-ischemic cardiomyopathy (HCC)     Lesion of right native kidney     NSTEMI (non-ST elevated myocardial infarction) ([address]     CAD (coronary artery disease)     Intraparenchymal hematoma of left side of brain due to trauma [facility])     Chronic combined systolic and diastolic congestive heart failure ([address]     AICD discharge     RIP (acute kidney injury) ([address]     Hypomagnesemia     V-tach ([address]     Other heart failure ([address]

## 2020-11-24 ENCOUNTER — CARE COORDINATION (OUTPATIENT)
Dept: FAMILY MEDICINE CLINIC | Age: 55
End: 2020-11-24

## 2020-12-22 NOTE — CARE COORDINATION
Case Management Initial Discharge Plan  Carlene Amaury,         Readmission Risk              Readmission Risk:        23.75       Age 72 or Greater:  0    Admitted from SNF or Requires Paid or Family Care:  0    Currently has CHF,COPD,ARF,CRI,or is on dialysis:  4    Takes more than 5 Prescription Medications:  4    Takes Digoxin,Insulin,Anticoagulants,Narcotics or ASA/Plavix:  [address] in Past 12 Months:  10    On Disability:  0    Patient Considers own Health:  3.75            Met with:patient to discuss discharge plans. Information verified: address, contacts, phone number, , insurance Yes  PCP: Sven Brenner MD  Date of last visit: 2 months ago     Insurance Provider: [facility]     Discharge Planning  Current Residence:  Private Residence  Living Arrangements:  ALEXIS Zarate has 2 stories/flight stairs to climb  Support Systems:  Friends/Neighbors, Family Members  Current Services PTA:  None Supplier: none   Patient able to perform ADL's:Assisted  DME used to aid ambulation prior to admission: none /during admission none     Potential Assistance Needed:  N/A    Pharmacy: [address] [place] [address] [employer] Medications:  No  Does patient want to participate in local refill/ meds to beds program?  No    Patient agreeable to home care: Yes  Freedom of choice provided:  yes      Type of Home Care Services:  Nursing Services  Patient expects to be discharged to:  home    Prior SNF/Rehab Placement and Facility: no   Agreeable to SNF/Rehab: No  [place] of choice provided: n/a   Evaluation: n/a    Expected Discharge date:  17  Follow Up Appointment: Best Day/ Time: Friday AM    Transportation provider: family can provide   Transportation arrangements needed for discharge: No    Discharge Plan: Patient states she has a insurance RN that comes in once monthly but feels she needs more help. Agrees to home care with preference. Referral to [facility].  Has follow up care.          Electronically signed by Ivone Little RN on 11/9/17 at 9:01 AM Consent Type: Consent 1 (Standard)

## 2020-12-29 ENCOUNTER — TELEPHONE (OUTPATIENT)
Dept: FAMILY MEDICINE CLINIC | Age: 55
End: 2020-12-29

## 2020-12-30 NOTE — TELEPHONE ENCOUNTER
Phone call to patient. Patient very pleasant. Stated that she is doing well. Patient stated that she is having some muscular pain but went on to state that it is typical for her. Patient reminded about upcoming PCP appointment. Patient stated that she needed assistance with linking with JFS. Stated that her food stamp amount has drastically decreased. Urged patient to have copies of her rent and utility receipts to submit to [address] [place] [address]. Will follow up with patient in about a week.

## 2021-01-05 ENCOUNTER — TELEPHONE (OUTPATIENT)
Dept: FAMILY MEDICINE CLINIC | Age: 56
End: 2021-01-05

## 2021-01-06 ENCOUNTER — CARE COORDINATION (OUTPATIENT)
Dept: FAMILY MEDICINE CLINIC | Age: 56
End: 2021-01-06

## 2021-01-06 NOTE — CARE COORDINATION
Yudy Beth  1/6/2021               [address]    Met with Dianna Estrada to follow up about her self care management of CHF, COPD    Emotional/coping  Alert, oriented x 3, engaged and cooperative. Affect  Anxious   Thought processes  Logical    Reports her youngest son was recently hospitalized and she is caring for him at her home. States she has not been able to rest, relax for past several days. She was instructed on caregiver self care practices and use of coping strategies. She verbalized understanding of instructions     Housing  No changes    Socialization/family  Reports family relationships are supportive. She engages in activities in the home - watches television, social media on telephone, talks with friends on the phone daily. Medications  Medication schedule reviewed. Oral meds are blister packaged. Review of med blister pack reveals adherence with medication use.   Current Outpatient Medications   Medication Instructions    albuterol sulfate HFA (VENTOLIN HFA) 108 (90 Base) MCG/ACT inhaler INHALE 2 PUFFS INTO THE LUNGS EVERY 6 HOURS AS NEEDED FOR WHEEZING    amiodarone (CORDARONE) 200 mg, Oral, DAILY, Dose reduction initiated 5/26/2020 by cardiology     amitriptyline (ELAVIL) 25 MG tablet TAKE 1 TABLET BY MOUTH EVERY NIGHT AT BEDTIME    atorvastatin (LIPITOR) 40 mg, Oral, NIGHTLY    bumetanide (BUMEX) 1 MG tablet TAKE 1 TABLET BY MOUTH 2 TIMES A DAY    ENTRESTO 24-26 MG per tablet TAKE 1 TABLET BY MOUTH 2 TIMES A DAY    fluticasone (FLONASE) 50 MCG/ACT nasal spray 1 spray, Nasal, DAILY    ipratropium-albuterol (DUONEB) 0.5-2.5 (3) MG/3ML SOLN nebulizer solution 3 mLs, Inhalation, 2 TIMES DAILY PRN    L-Methylfolate-B6-B12 (FOLBIC RF) 1.13-25-2 MG TABS TAKE 1 TABLET BY MOUTH ONE TIME A DAY    magnesium oxide (MAG-OX) 400 (241.3 Mg) MG TABS tablet TAKE 1 TABLET BY MOUTH ONE TIME A DAY  metoprolol succinate (TOPROL XL) 25 MG extended release tablet TAKE ONE TABLET BY MOUTH EVERY EVENING    metoprolol succinate (TOPROL XL) 50 MG extended release tablet TAKE ONE TABLET BY MOUTH EVERY MORNING    Multiple Vitamin (MULTIVITAMIN) tablet TAKE ONE TABLET BY MOUTH ONE TIME A DAY    Nebulizers (COMPRESSOR/NEBULIZER) MISC 1 Device, Does not apply, 4 TIMES DAILY    omeprazole (PRILOSEC) 20 MG delayed release capsule TAKE 1 CAPSULE BY MOUTH ONE TIME A DAY    sennosides-docusate sodium (SENOKOT-S) 8.6-50 MG tablet 1 tablet, Oral, DAILY    Spacer/Aero-Holding Chambers (E-Z SPACER) ELVER 1 Device, Does not apply, DAILY    spironolactone (ALDACTONE) 25 MG tablet TAKE 1 TABLET BY MOUTH ONE TIME A DAY    Umeclidinium Bromide (INCRUSE ELLIPTA) 62.5 MCG/INH AEPB INHALE 1 PUFF INTO THE LUNGS DAILY STOP SPIRIVA   . Medical  Skin warm and dry. Reports she is able to performs ADL, light housekeeping without dyspnea, fatigue. She denies fever, chills, nausea, chest pain. Lungs clear in all lobes. No lower leg edema. Abdomen soft and slightly distended. Reviewed dietary restrictions with CHF- low sodium diet and fluid restrictions. She verbalizes understanding and reports she limits fluid intake to less than 2 quarts fluid/day and eats low salt meals. B/P  113/69  Pulse 62  resp 16/min and easy at rest   02 saturation 97% at rest.  Temp 96.6 F (temporal)       Spiritual  Reports spiritual needs are met through personal prayer, meditation.   Prayer requested per pt and given    Plan:  - PCP  Virtual visit rescheduled on Jan 18 at 9:30 am with PCP  She was instructed to schedule CHF clinic appt in next 2-3 weeks  Revisit on 1/18/2021 for coordination care with virtual visit    Darby Horner RN, [address]

## 2021-01-07 ENCOUNTER — TELEPHONE (OUTPATIENT)
Dept: FAMILY MEDICINE CLINIC | Age: 56
End: 2021-01-07

## 2021-01-07 NOTE — TELEPHONE ENCOUNTER
Phone call to patient. Patient stated that she was in the process of leaving her home to  items for her son. Patient stated that she has a headache at the moment. Patient admitted to feeling symptoms of stress due to family issues. Patient understands the importance of self care to avoid burnout. Patient asked this  to follow up with her again by the end of the week.

## 2021-01-14 NOTE — TELEPHONE ENCOUNTER
Phone call to patient. Patient stated that she is doing much better. Patient admitted that she still has a slight lingering headache but otherwise, well. Patient stated that she is coping much better with her stress and grateful for all of the help she received for her son. Patient wants to follow up next week to discuss JFS snap benefits.

## 2021-01-18 ENCOUNTER — VIRTUAL VISIT (OUTPATIENT)
Dept: FAMILY MEDICINE CLINIC | Age: 56
End: 2021-01-18
Payer: MEDICAID

## 2021-01-18 DIAGNOSIS — M54.50 ACUTE MIDLINE LOW BACK PAIN WITHOUT SCIATICA: Primary | ICD-10-CM

## 2021-01-18 PROCEDURE — 99213 OFFICE O/P EST LOW 20 MIN: CPT | Performed by: STUDENT IN AN ORGANIZED HEALTH CARE EDUCATION/TRAINING PROGRAM

## 2021-01-18 RX ORDER — TIZANIDINE 4 MG/1
4 TABLET ORAL NIGHTLY PRN
Qty: 10 TABLET | Refills: 0 | Status: SHIPPED | OUTPATIENT
Start: 2021-01-18 | End: 2021-06-30

## 2021-01-18 NOTE — PROGRESS NOTES
Virtual home vs with nursing in the home    Medication schedule reviewed. Cyndy Lees has blister pack meds and demonstrates adherence with taking oral meds. She reports taking albuterol inhaler less than weekly and takes albuterol nebulizer 1-2 x week for cough. B/P 101/67 left arm sitting  Re check  110/66    Pulse 65   resp 16/min  Temp 97.0 F (temporal)    Lungs clear. She denies cough, shortness of breath. No lower leg edema   Appetite good.        Enoch Cullen RN, BSN  [place]
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
## 2021-01-19 ENCOUNTER — TELEPHONE (OUTPATIENT)
Dept: FAMILY MEDICINE CLINIC | Age: 56
End: 2021-01-19

## 2021-01-20 ENCOUNTER — HOSPITAL ENCOUNTER (OUTPATIENT)
Dept: OTHER | Age: 56
Discharge: HOME | End: 2021-01-20
Payer: MEDICAID

## 2021-01-20 VITALS
SYSTOLIC BLOOD PRESSURE: 100 MMHG | WEIGHT: 146 LBS | DIASTOLIC BLOOD PRESSURE: 70 MMHG | HEART RATE: 63 BPM | BODY MASS INDEX: 26.7 KG/M2 | RESPIRATION RATE: 16 BRPM | OXYGEN SATURATION: 97 %

## 2021-01-20 PROCEDURE — 99212 OFFICE O/P EST SF 10 MIN: CPT

## 2021-01-20 ASSESSMENT — PAIN SCALES - GENERAL: PAINLEVEL_OUTOF10: 0

## 2021-01-20 NOTE — TELEPHONE ENCOUNTER
Phone call to patient. Patient stated that she was well and did not have any complaints or concerns at this time. Patient stated that she was aware that she has an appointment tomorrow with the CHF clinic and plans on attending. Visit scheduled 1/21 around noon to complete change of address application for [place].

## 2021-01-20 NOTE — PROGRESS NOTES
Date:  2021  Time:  10:55 AM    CHF Clinic at [facility]    Office: [phone] Fax: [phone]    Re:  Sarah Michelle   Patient : 1965    Vital Signs: /70   Pulse 63   Resp 16   Wt 146 lb (66.2 kg)   LMP 2016   SpO2 97%   BMI 26.70 kg/m²                                                   No results for input(s): CBC, HGB, HCT, WBC, PLATELET, NA, K, CL, CO2, BUN, CREATININE, GLUCOSE, BNP, INR in the last 72 hours. Respiratory:    Assessment  Charting Type: Reassessment    Breath Sounds  Right Upper Lobe: Clear  Right Middle Lobe: Clear  Right Lower Lobe: Clear  Left Upper Lobe: Clear  Left Lower Lobe: Clear    Cough/Sputum  Cough: None         Peripheral Vascular  RLE Edema: None  LLE Edema: None      Complaints: No new compliants    Physician Orders No new orders    Comment : Patient has not been seen in CHF clinic since 2020 had virtual appt 2 days ago with [address] [place] [address] no new medication changes per patient. Did see CRISTINA Khan in September the cardiology group . I called [facility] they wanted labs done BMP and MG level gave patient slip for lab work and made her appt for  on 2/15/2021 She has no SOB and denies chest pain. Has [employer]. Reviewed in detail low sodium diet of 2000mg patient seems to follow her diet fairly well weight was up 6 pounds from July visit but no pedal edema noted. Also reviewed in detail fluid restriction of 1500ml to 2000ml per day. We will see in 5 weeks after her cardiology appt. 2021. She continues on Bumex 1mg PO bid and Aldactone 25mg PO q day.      Electronically signed by Michell Hernandez RN on 2021 at 10:55 AM

## 2021-01-21 ENCOUNTER — TELEPHONE (OUTPATIENT)
Dept: FAMILY MEDICINE CLINIC | Age: 56
End: 2021-01-21

## 2021-01-22 NOTE — TELEPHONE ENCOUNTER
E-scribe request for . Please review and e-scribe if applicable.      Last Visit Date:    Next Visit Date:  Visit date not found    Hemoglobin A1C (%)   Date Value   11/06/2019 5.7   07/16/2018 6.4 (H)   02/03/2017 5.9             ( goal A1C is < 7)   No results found for: LABMICR  LDL Cholesterol (mg/dL)   Date Value   11/01/2019 131 (H)       (goal LDL is <100)   AST (U/L)   Date Value   02/26/2020 25     ALT (U/L)   Date Value   02/26/2020 13     BUN (mg/dL)   Date Value   05/18/2020 17     BP Readings from Last 3 Encounters:   01/20/21 100/70   08/04/20 110/62   07/28/20 98/60          (goal 120/80)        Patient Active Problem List:     COPD (chronic obstructive pulmonary disease) (HCC)     Fibroids     Syncope     Lung nodule < 6cm on CT     Chronic systolic heart failure (HCC) s/p AICD     Mild intermittent asthma     Essential hypertension     Chest pain     QT prolongation     Asthma with COPD with exacerbation ([facility])     Non-ischemic cardiomyopathy ([address]     Lesion of right native kidney     NSTEMI (non-ST elevated myocardial infarction) ([address]     CAD (coronary artery disease)     Intraparenchymal hematoma of left side of brain due to trauma ([address]     Chronic combined systolic and diastolic congestive heart failure ([address]     AICD discharge     RIP (acute kidney injury) ([address]     Hypomagnesemia     V-tach ([address]     Other heart failure ([address]      ----Felisa Bustillos

## 2021-01-25 RX ORDER — SACUBITRIL AND VALSARTAN 24; 26 MG/1; MG/1
TABLET, FILM COATED ORAL
Qty: 60 TABLET | Refills: 3 | Status: SHIPPED | OUTPATIENT
Start: 2021-01-25 | End: 2021-02-24

## 2021-01-25 RX ORDER — OMEPRAZOLE 20 MG/1
CAPSULE, DELAYED RELEASE ORAL
Qty: 30 CAPSULE | Refills: 3 | Status: SHIPPED | OUTPATIENT
Start: 2021-01-25 | End: 2021-05-13

## 2021-01-25 RX ORDER — B12/LEVOMEFOLATE CALCIUM/B-6 2-1.13-25
TABLET ORAL
Qty: 30 TABLET | Refills: 3 | Status: SHIPPED | OUTPATIENT
Start: 2021-01-25 | End: 2021-05-13

## 2021-02-02 NOTE — TELEPHONE ENCOUNTER
Attempted visit with patient. No answer at door. Called patient, patient stated that she was busy with her family and would be back around 3pm.     came back to patient's home around 3pm.  No answer at the door. Will attempt follow up with patient at another time.

## 2021-02-17 ENCOUNTER — TELEPHONE (OUTPATIENT)
Dept: FAMILY MEDICINE CLINIC | Age: 56
End: 2021-02-17

## 2021-02-24 NOTE — TELEPHONE ENCOUNTER
Phone call to patient. Patient was grocery shopping at this time and stated that she was doing well. Patient stated that she still needs assistance with updating JFS regarding her change of address and utilities. Visit scheduled for tomorrow at noon.  will call patient approximately 30 minutes before scheduled visit to ensure patient will be home and available.

## 2021-03-08 ENCOUNTER — HOSPITAL ENCOUNTER (OUTPATIENT)
Dept: OTHER | Age: 56
Discharge: HOME | End: 2021-03-08
Payer: MEDICAID

## 2021-03-08 VITALS
WEIGHT: 147 LBS | SYSTOLIC BLOOD PRESSURE: 120 MMHG | HEART RATE: 68 BPM | RESPIRATION RATE: 20 BRPM | OXYGEN SATURATION: 100 % | DIASTOLIC BLOOD PRESSURE: 70 MMHG | BODY MASS INDEX: 26.89 KG/M2

## 2021-03-08 PROCEDURE — 99212 OFFICE O/P EST SF 10 MIN: CPT

## 2021-03-08 ASSESSMENT — PAIN DESCRIPTION - FREQUENCY: FREQUENCY: INTERMITTENT

## 2021-03-08 NOTE — PROGRESS NOTES
Date:  3/8/2021  Time:  12:04 PM    CHF Clinic at [facility] [facility]    Office: [phone] Fax: [phone]    Re:  Oskar Rainey   Patient : 1965    Vital Signs: /70   Pulse 68   Resp 20   Wt 147 lb (66.7 kg)   LMP 2016   SpO2 100%   BMI 26.89 kg/m²                       O2 Device: None (Room air)                           No results for input(s): CBC, HGB, HCT, WBC, PLATELET, NA, K, CL, CO2, BUN, CREATININE, GLUCOSE, BNP, INR in the last 72 hours. Respiratory:         Breath Sounds  Right Upper Lobe: Clear  Right Middle Lobe: Clear  Right Lower Lobe: Clear  Left Upper Lobe: Clear  Left Lower Lobe: Clear    Cough/Sputum  Cough: Non-productive  Frequency: Infrequent         Peripheral Vascular  RLE Edema: None  LLE Edema: None      Complaints: no new complaints      Comment : Weight is up 1 lb in a month. Pt feels good. She denies increased shortness of breath, chest pain or dizziness. No acute S/S of CHF noted today. Medication list reviewed with pt. She brought bubble packed medications with her. States she's compliant with all of her medications. Taking and tolerating Entresto 24/26 mg BID. Current diuretics: Bumex 1 mg BID and Aldactone 25 mg once daily. Reminded pt to follow a low sodium diet and fluid limits of 2 liters daily. Next f/u here in one month.     Electronically signed by Leonarda Mcmahon RN on 3/8/2021 at 12:04 PM

## 2021-03-22 RX ORDER — METOPROLOL SUCCINATE 50 MG/1
TABLET, EXTENDED RELEASE ORAL
Qty: 90 TABLET | Refills: 1 | Status: SHIPPED | OUTPATIENT
Start: 2021-03-22 | End: 2022-04-04 | Stop reason: SDUPTHER

## 2021-03-22 NOTE — TELEPHONE ENCOUNTER
([address]     Fibroids     Syncope     Lung nodule < 6cm on CT     Chronic systolic heart failure (HCC) s/p AICD     Mild intermittent asthma     Essential hypertension     Chest pain     QT prolongation     Asthma with COPD with exacerbation (HCC)     Non-ischemic cardiomyopathy (HCC)     Lesion of right native kidney     NSTEMI (non-ST elevated myocardial infarction) ([address]     CAD (coronary artery disease)     Intraparenchymal hematoma of left side of brain due to trauma [facility])     Chronic combined systolic and diastolic congestive heart failure ([address]     AICD discharge     RIP (acute kidney injury) ([address]     Hypomagnesemia     V-tach ([address]     Other heart failure ([address]

## 2021-04-12 ENCOUNTER — CARE COORDINATION (OUTPATIENT)
Dept: FAMILY MEDICINE CLINIC | Age: 56
End: 2021-04-12

## 2021-04-16 NOTE — CARE COORDINATION
Delmus Levels  4/12/2021               [facility] Outreach Nurse Telephone Note    Spoke with Dewain Holter to follow up about her self care management of CHF. Dewain Holter reports she has visiting relatives out of state for the past several weeks. States she returned to [place] over the weekend. Emotional/coping  Reports she feels less anxious and has experienced less stress since visiting with family in [facility]. She denies feelings of depression. Socialization/family  Reports family relationships are supportive. Her youngest son is living in the home with her and assists with housecleaning and errands. Housing    No changes reported    Financial  Her medication expenses are covered by her medical insurance. States she is able to afford housing and utility expenses. Reports she has adequate food in the home. Medications  Medication schedule reviewed. States she obtained her prescriptions from her pharmacy and reports she is taking oral meds that are blister packaged. States she has not needed to take aerosol treatments daily.     Current Outpatient Medications   Medication Instructions    albuterol sulfate HFA (VENTOLIN HFA) 108 (90 Base) MCG/ACT inhaler INHALE 2 PUFFS INTO THE LUNGS EVERY 6 HOURS AS NEEDED FOR WHEEZING    amiodarone (CORDARONE) 200 mg, Oral, DAILY, Dose reduction initiated 5/26/2020 by cardiology     amitriptyline (ELAVIL) 25 MG tablet TAKE 1 TABLET BY MOUTH EVERY NIGHT AT BEDTIME    atorvastatin (LIPITOR) 40 mg, Oral, NIGHTLY    bumetanide (BUMEX) 1 MG tablet TAKE 1 TABLET BY MOUTH 2 TIMES A DAY    fluticasone (FLONASE) 50 MCG/ACT nasal spray 1 spray, Nasal, DAILY    ipratropium-albuterol (DUONEB) 0.5-2.5 (3) MG/3ML SOLN nebulizer solution 3 mLs, Inhalation, 2 TIMES DAILY PRN    L-Methylfolate-B6-B12 (FOLBIC RF) 1.13-25-2 MG TABS TAKE 1 TABLET BY MOUTH ONE TIME A DAY    magnesium oxide (MAG-OX) 400 (241.3 Mg) MG TABS tablet TAKE 1 TABLET BY MOUTH ONE TIME A DAY   

## 2021-04-23 ENCOUNTER — HOSPITAL ENCOUNTER (OUTPATIENT)
Dept: OTHER | Age: 56
Discharge: HOME | End: 2021-04-23
Payer: MEDICAID

## 2021-04-23 VITALS
RESPIRATION RATE: 20 BRPM | DIASTOLIC BLOOD PRESSURE: 70 MMHG | OXYGEN SATURATION: 100 % | HEART RATE: 88 BPM | SYSTOLIC BLOOD PRESSURE: 120 MMHG | BODY MASS INDEX: 26.56 KG/M2 | WEIGHT: 145.2 LBS

## 2021-04-23 PROCEDURE — 99212 OFFICE O/P EST SF 10 MIN: CPT

## 2021-04-23 NOTE — PROGRESS NOTES
Date:  2021  Time:  1:34 PM    CHF Clinic at [facility]    Office: [phone] Fax: [phone]    Re:  Jhon Suazo   Patient : 1965    Vital Signs: /70   Pulse 88   Resp 20   Wt 145 lb 3.2 oz (65.9 kg)   LMP 2016   SpO2 100%   BMI 26.56 kg/m²                       O2 Device: None (Room air)                           No results for input(s): CBC, HGB, HCT, WBC, PLATELET, NA, K, CL, CO2, BUN, CREATININE, GLUCOSE, BNP, INR in the last 72 hours. Respiratory:         Breath Sounds  Right Upper Lobe: Clear  Right Middle Lobe: Clear  Right Lower Lobe: Clear  Left Upper Lobe: Clear  Left Lower Lobe: Clear    Cough/Sputum  Cough: None         Peripheral Vascular  RLE Edema: None  LLE Edema: None      Complaints: Denies Complaints. Comment : Weight is down 1.8 lbs in 6 weeks. Pt denies increased shortness of breath, chest pain or any dizziness. Pt feeling stressed as her mom is currently admitted at [address] [place] [address] with a \" serious \" case of  Covid 19. Pt very  tearful. States she is trying to be strong for everyone. Emotional support given to pt. Pt has no acute S/S of CHF noted today on assessment. Reviewed fluid restriction of 2 liters daily and low sodium diet with pt. She states she is following diet and fluid restriction as best she can and is  taking all medications as ordered. Current diuretic: Bumex 1 mg BID. Pt has a f/u with Dr Maggie Tracy , cardiology, on 5/10/21 at 1015 a.m. Her next f/u here in one month.     Electronically signed by Jemima Valdez RN on 2021 at 1:34 PM

## 2021-05-05 ENCOUNTER — CARE COORDINATION (OUTPATIENT)
Dept: FAMILY MEDICINE CLINIC | Age: 56
End: 2021-05-05

## 2021-05-07 ENCOUNTER — CARE COORDINATION (OUTPATIENT)
Dept: FAMILY MEDICINE CLINIC | Age: 56
End: 2021-05-07

## 2021-05-07 NOTE — CARE COORDINATION
Post Acute Care Discharge Phone Assessment     Review purpose of telephone call with: Liz Ayala  Date: 2021    - To evaluate the client after hospital discharge  - To ensure the client has received and understands self care instructions  - To identify and prevent potential adverse events  - To provide additional education and initiate post acute services       Josi Mckya   : 1965 Phone #: [phone] (home)    1. Hospital Information    Discharged from: [facility]   Discharge to home  Discharge Date: 2021  Admission Date: 2021    Non-face-to-face services provided:  Scheduled appointment with [facility] 5/10/21 10:15 am virtual vs  Communication with specialists who will assume or re-assume care of the patient's system-specific problems-[facility]  Dr Kana Howe and support for treatment adherence and medication management-Discussed changes in med schedule with [facility] office and with medication management pharmacist [facility] records: obtained and reviewed    Was instructed to follow up in: 7-10 days    Hospital Diagnosis:  Acute Kidney Injury (Primary)   [address] Consultants:  Internal Medicine, Emergency Medicine    Initial contact Date: 2021  Type of Contact:  by phone      2. Assessment of Current Condition      Questions: How have you felt since discharge from the hospital:     better    Did you receive a discharge summary from the hospital? yes    Is there any lingering or new fever? No    Are you eating and drinking OK? yes    Are there any new complaints of pain? No    If you have a wound - is the dressing clean, dry, and intact? N/A    Reinforce Education    Does the patient know -   1. What to do if her symptoms worsen? yes   2. For what symptoms she should call the doctor?  yes   3. What symptoms she should get help with right away?  Yes   Instructed to call PCP or cardiologist if she experiences cough, shortness of breath, recurrent nausea and diarrhea, low urine output. She verbalized understanding   4. Does she know how to contact her physician?  yes    Are there any questions about the patients medications? Yes Discharge medications from [facility] instructed Orestes Mcclellan to stop taking spironolactone, lasix, and to reduce dose of metoprolol to 25 mg daily. She was previously not taking lasix and was taking bumex 1 mg 2 x day. Orestes Mcclellan reports she resumed taking medications that were in blister packs from her pharmacy after hospital discharge. She reports taking bumex 1 mg 2 x day, spironolactone daily, entresto 2 x day and metoprolol 50 mg tab   Does the patient have a list of all the medications that were prescribed to be taken after discharge? She reports she has the discharge instructions from the hospital but reports she was unaware of the changes in her medications. Does the patient have all the medications that were prescribed? She resumed taking medications that were ordered prior to the hospitalization. Is the patient taking all the prescribed medications? Medication discrepancies noted   She continues to take bumex 1 mg 2 x day, metoprolol 50 mg daily, spironolactone 25 mg daily and entresto 24-26 mg 2 x day. Does the patient understand what all the medications are taken for?  She reports she takes the medications that the pharmacy provides and voices understanding of actions of meds      ( Update medication list and refresh medication smartlinks below)        All Active Meds in Chart - (keep all current active meds, add hospital meds)  Current Outpatient Medications   Medication Sig Dispense Refill    metoprolol succinate (TOPROL XL) 50 MG extended release tablet TAKE ONE TABLET BY MOUTH EVERY MORNING 90 tablet 1    sacubitril-valsartan (ENTRESTO) 24-26 MG per tablet Take 1 tablet by mouth 2 times daily      L-Methylfolate-B6-B12 (FOLBIC RF) 1.13-25-2 MG TABS TAKE 1 TABLET BY MOUTH ONE TIME A DAY 30 tablet 3    omeprazole (PRILOSEC) 20 MG delayed release capsule TAKE 1 CAPSULE BY MOUTH ONE TIME A DAY 30 capsule 3    magnesium oxide (MAG-OX) 400 (241.3 Mg) MG TABS tablet TAKE 1 TABLET BY MOUTH ONE TIME A DAY 30 tablet 3    spironolactone (ALDACTONE) 25 MG tablet TAKE 1 TABLET BY MOUTH ONE TIME A DAY 30 tablet 5    bumetanide (BUMEX) 1 MG tablet TAKE 1 TABLET BY MOUTH 2 TIMES A DAY 60 tablet 5    amitriptyline (ELAVIL) 25 MG tablet TAKE 1 TABLET BY MOUTH EVERY NIGHT AT BEDTIME 30 tablet 5    amiodarone (CORDARONE) 200 MG tablet Take 200 mg by mouth daily Dose reduction initiated 5/26/2020 by cardiology      albuterol sulfate HFA (VENTOLIN HFA) 108 (90 Base) MCG/ACT inhaler INHALE 2 PUFFS INTO THE LUNGS EVERY 6 HOURS AS NEEDED FOR WHEEZING 18 g 3    ipratropium-albuterol (DUONEB) 0.5-2.5 (3) MG/3ML SOLN nebulizer solution Inhale 3 mLs into the lungs 2 times daily as needed for Shortness of Breath (Patient taking differently: Inhale 3 mLs into the lungs 2 times daily as needed for Shortness of Breath 3 times daily prn) 360 mL 3    fluticasone (FLONASE) 50 MCG/ACT nasal spray 1 spray by Nasal route daily 1 Bottle 3    atorvastatin (LIPITOR) 40 MG tablet Take 1 tablet by mouth nightly 30 tablet 3    sennosides-docusate sodium (SENOKOT-S) 8.6-50 MG tablet Take 1 tablet by mouth daily 20 tablet 0    Multiple Vitamin (MULTIVITAMIN) tablet TAKE ONE TABLET BY MOUTH ONE TIME A DAY 30 tablet 3    tiZANidine (ZANAFLEX) 4 MG tablet Take 1 tablet by mouth nightly as needed (as needed for back pain) (Patient not taking: Reported on 5/7/2021) 10 tablet 0    Nebulizers (COMPRESSOR/NEBULIZER) MISC 1 Device by Does not apply route 4 times daily 1 each 0    Umeclidinium Bromide (INCRUSE ELLIPTA) 62.5 MCG/INH AEPB INHALE 1 PUFF INTO THE LUNGS DAILY STOP SPIRIVA (Patient not taking: Reported on 4/16/2021) 1 each 3    Spacer/Aero-Holding Chambers (E-Z SPACER) ELVER 1 Device by Does not apply route daily (Patient not taking: Reported on 9/21/2020) 1 Device 0     No current facility-administered medications for this visit.           Current Medications (record all meds as 'taken' or 'not taken' in home med activity)  Outpatient Medications Marked as Taking for the 5/5/21 encounter (Care Coordination) with Burke Alcantar RN   Medication Sig Dispense Refill    metoprolol succinate (TOPROL XL) 50 MG extended release tablet TAKE ONE TABLET BY MOUTH EVERY MORNING 90 tablet 1    sacubitril-valsartan (ENTRESTO) 24-26 MG per tablet Take 1 tablet by mouth 2 times daily      L-Methylfolate-B6-B12 (FOLBIC RF) 1.13-25-2 MG TABS TAKE 1 TABLET BY MOUTH ONE TIME A DAY 30 tablet 3    omeprazole (PRILOSEC) 20 MG delayed release capsule TAKE 1 CAPSULE BY MOUTH ONE TIME A DAY 30 capsule 3    magnesium oxide (MAG-OX) 400 (241.3 Mg) MG TABS tablet TAKE 1 TABLET BY MOUTH ONE TIME A DAY 30 tablet 3    spironolactone (ALDACTONE) 25 MG tablet TAKE 1 TABLET BY MOUTH ONE TIME A DAY 30 tablet 5    bumetanide (BUMEX) 1 MG tablet TAKE 1 TABLET BY MOUTH 2 TIMES A DAY 60 tablet 5    amitriptyline (ELAVIL) 25 MG tablet TAKE 1 TABLET BY MOUTH EVERY NIGHT AT BEDTIME 30 tablet 5    amiodarone (CORDARONE) 200 MG tablet Take 200 mg by mouth daily Dose reduction initiated 5/26/2020 by cardiology      albuterol sulfate HFA (VENTOLIN HFA) 108 (90 Base) MCG/ACT inhaler INHALE 2 PUFFS INTO THE LUNGS EVERY 6 HOURS AS NEEDED FOR WHEEZING 18 g 3    ipratropium-albuterol (DUONEB) 0.5-2.5 (3) MG/3ML SOLN nebulizer solution Inhale 3 mLs into the lungs 2 times daily as needed for Shortness of Breath (Patient taking differently: Inhale 3 mLs into the lungs 2 times daily as needed for Shortness of Breath 3 times daily prn) 360 mL 3    fluticasone (FLONASE) 50 MCG/ACT nasal spray 1 spray by Nasal route daily 1 Bottle 3    atorvastatin (LIPITOR) 40 MG tablet Take 1 tablet by mouth nightly 30 tablet 3    sennosides-docusate sodium (SENOKOT-S) 8.6-50 MG tablet Take 1 tablet by mouth daily 20 tablet 0    Multiple Vitamin (MULTIVITAMIN) tablet TAKE ONE TABLET BY MOUTH ONE TIME A DAY 30 tablet 3         Are there any questions about how the patient should take care of herself? She verbalized understanding of when to call the doctor, symptoms to report to her PCP and to return to the hospital if nausea and diarrhea return or if urinary output decreases. She was instructed to obtain blood work ordered at hospital discharge. Does the patient understand her diet regimen? yes   Does the patient understand his or her activity level? yes   Does the patient understand how to care for her wound? N/A   Does the patient understand how to monitor her weight?  yes   Does the patient understand what to do if she becomes short of breath? yes   Does the patient understand what to do if she has increased edema? yes      Is the patient having any trouble with ADL's? No   Any problems showering or bathing? No   Does the patient have trouble eating or feeding themselves? No   Is the patient having trouble getting out of bed or going to the toilet? No   Is the patient able to move around as expected? Yes   She denies lightheadedness or dizziness with ambulation. Home care services initiated: no     Services provided: CHF Nurse and [facility] and social work      Does that patient have equipment needs? No   Does the patient have the appropriate equipment? Yes   Can the patient use the equipment properly and safely? Yes    Reinforce Follow-Up  - Does patient have an appointment with her PCP? No   To be scheduled  - Does patient have an appointment with her specialist physicians? Yes, virtual visit scheduled with [facility] 5/10/21 at 10:15 am      Care Coordination  Is patient assigned ambulatory care coordinator for chronic condition management?  Yes      Follow up appointment date: (7 days for more severe illness, 14 days for others)  Future Appointments   Date Time Provider Yanelis Davalosi   5/21/2021  1:00 PM [facility] RM [facility] [address] [facility],  Dr Genny Mejias           May 10, 2021   10:15 am    Other Interventions or Actions taken as result of  [facility] Consultant office phoned and clinical update given. Request made for review of medication schedule and for clarification of medication orders.       Larita Kayser, RN, BSN  [employer]

## 2021-05-13 ENCOUNTER — CARE COORDINATION (OUTPATIENT)
Dept: FAMILY MEDICINE CLINIC | Age: 56
End: 2021-05-13

## 2021-05-13 DIAGNOSIS — I50.22 CHRONIC SYSTOLIC HEART FAILURE (HCC): ICD-10-CM

## 2021-05-13 DIAGNOSIS — G43.809 OTHER MIGRAINE WITHOUT STATUS MIGRAINOSUS, NOT INTRACTABLE: ICD-10-CM

## 2021-05-13 RX ORDER — BUMETANIDE 1 MG/1
TABLET ORAL
Qty: 60 TABLET | Refills: 5 | Status: SHIPPED | OUTPATIENT
Start: 2021-05-13 | End: 2021-05-14 | Stop reason: SDUPTHER

## 2021-05-13 RX ORDER — SPIRONOLACTONE 25 MG/1
TABLET ORAL
Qty: 30 TABLET | Refills: 5 | Status: SHIPPED | OUTPATIENT
Start: 2021-05-13 | End: 2021-05-14 | Stop reason: SDUPTHER

## 2021-05-13 RX ORDER — SACUBITRIL AND VALSARTAN 24; 26 MG/1; MG/1
TABLET, FILM COATED ORAL
Qty: 60 TABLET | Refills: 3 | Status: SHIPPED | OUTPATIENT
Start: 2021-05-13 | End: 2021-05-14 | Stop reason: SDUPTHER

## 2021-05-13 RX ORDER — OMEPRAZOLE 20 MG/1
CAPSULE, DELAYED RELEASE ORAL
Qty: 30 CAPSULE | Refills: 3 | Status: SHIPPED | OUTPATIENT
Start: 2021-05-13 | End: 2021-05-14 | Stop reason: SDUPTHER

## 2021-05-13 RX ORDER — AMITRIPTYLINE HYDROCHLORIDE 25 MG/1
TABLET, FILM COATED ORAL
Qty: 30 TABLET | Refills: 5 | Status: SHIPPED | OUTPATIENT
Start: 2021-05-13 | End: 2021-05-14 | Stop reason: SDUPTHER

## 2021-05-13 RX ORDER — B12/LEVOMEFOLATE CALCIUM/B-6 2-1.13-25
TABLET ORAL
Qty: 30 TABLET | Refills: 3 | Status: SHIPPED | OUTPATIENT
Start: 2021-05-13 | End: 2021-05-14 | Stop reason: SDUPTHER

## 2021-05-13 NOTE — CARE COORDINATION
Chris Saliva  5/13/2021               [facility]  Nurse Telephone Note    Spoke with Kati Cox to follow up about her home management post hospitalization. States she has been assisting family with cleaning her mothers home and feels fatigued today. She reports gradual improvement in energy level and is taking naps as needed throughout the day. Reports she is eating full diet and tolerates without nausea, emesis or diarrhea. .  Denies lightheadedness, dizziness   She reports voiding adequate amount several times a day/evening. Denies chest pain, cough, shortness of breath. She reports she did not have virtual follow up visit with cardiology. She has CHF clinic appointment scheduled on 5/21/2021. She reports wearing mask at home and when she is in contact with family members. Medication schedule reviewed-   States she is taking medications from med blister packs and is taking medications that were prescribed prior to hospitalization. States she has all medications in the home.           Plan:  - PCP follow up scheduled on May 17 at 2:30 pm with post hospital lab work to be obtained at that time.     Fabio Garcia RN, BSN  [employer]

## 2021-05-13 NOTE — TELEPHONE ENCOUNTER
E-scribe request for All pended medications. Please review and e-scribe if applicable.      Last Visit Date: 1/18/2021  Next Visit Date:  5/17/2021    Hemoglobin A1C (%)   Date Value   11/06/2019 5.7   07/16/2018 6.4 (H)   02/03/2017 5.9             ( goal A1C is < 7)   No results found for: LABMICR  LDL Cholesterol (mg/dL)   Date Value   11/01/2019 131 (H)       (goal LDL is <100)   AST (U/L)   Date Value   02/26/2020 25     ALT (U/L)   Date Value   02/26/2020 13     BUN (mg/dL)   Date Value   05/18/2020 17     BP Readings from Last 3 Encounters:   04/23/21 120/70   03/08/21 120/70   01/20/21 100/70          (goal 120/80)        Patient Active Problem List:     COPD (chronic obstructive pulmonary disease) (HCC)     Fibroids     Syncope     Lung nodule < 6cm on CT     Chronic systolic heart failure (HCC) s/p AICD     Mild intermittent asthma     Essential hypertension     Chest pain     QT prolongation     Asthma with COPD with exacerbation (HCC)     Non-ischemic cardiomyopathy (HCC)     Lesion of right native kidney     NSTEMI (non-ST elevated myocardial infarction) ([address]     CAD (coronary artery disease)     Intraparenchymal hematoma of left side of brain due to trauma ([address]     Chronic combined systolic and diastolic congestive heart failure ([address]     AICD discharge     RIP (acute kidney injury) ([address]     Hypomagnesemia     V-tach ([address]     Other heart failure ([address]

## 2021-05-14 RX ORDER — AMITRIPTYLINE HYDROCHLORIDE 25 MG/1
TABLET, FILM COATED ORAL
Qty: 30 TABLET | Refills: 5 | Status: SHIPPED | OUTPATIENT
Start: 2021-05-14 | End: 2021-12-06 | Stop reason: SDUPTHER

## 2021-05-14 RX ORDER — BUMETANIDE 1 MG/1
TABLET ORAL
Qty: 60 TABLET | Refills: 5 | Status: SHIPPED | OUTPATIENT
Start: 2021-05-14 | End: 2021-12-06 | Stop reason: SDUPTHER

## 2021-05-14 RX ORDER — B12/LEVOMEFOLATE CALCIUM/B-6 2-1.13-25
TABLET ORAL
Qty: 30 TABLET | Refills: 3 | Status: SHIPPED | OUTPATIENT
Start: 2021-05-14

## 2021-05-14 RX ORDER — SACUBITRIL AND VALSARTAN 24; 26 MG/1; MG/1
TABLET, FILM COATED ORAL
Qty: 60 TABLET | Refills: 5 | Status: SHIPPED | OUTPATIENT
Start: 2021-05-14 | End: 2022-03-17 | Stop reason: SDUPTHER

## 2021-05-14 RX ORDER — OMEPRAZOLE 20 MG/1
CAPSULE, DELAYED RELEASE ORAL
Qty: 30 CAPSULE | Refills: 3 | Status: SHIPPED | OUTPATIENT
Start: 2021-05-14 | End: 2022-01-20 | Stop reason: SDUPTHER

## 2021-05-14 RX ORDER — SPIRONOLACTONE 25 MG/1
TABLET ORAL
Qty: 30 TABLET | Refills: 5 | Status: SHIPPED | OUTPATIENT
Start: 2021-05-14 | End: 2021-12-06 | Stop reason: SDUPTHER

## 2021-05-17 ENCOUNTER — HOSPITAL ENCOUNTER (OUTPATIENT)
Age: 56
Setting detail: SPECIMEN
Discharge: HOME | End: 2021-05-17
Payer: MEDICAID

## 2021-05-17 ENCOUNTER — OFFICE VISIT (OUTPATIENT)
Dept: FAMILY MEDICINE CLINIC | Age: 56
End: 2021-05-17
Payer: MEDICAID

## 2021-05-17 ENCOUNTER — TELEPHONE (OUTPATIENT)
Dept: FAMILY MEDICINE CLINIC | Age: 56
End: 2021-05-17

## 2021-05-17 VITALS
WEIGHT: 142.6 LBS | SYSTOLIC BLOOD PRESSURE: 105 MMHG | BODY MASS INDEX: 26.08 KG/M2 | HEART RATE: 74 BPM | DIASTOLIC BLOOD PRESSURE: 65 MMHG

## 2021-05-17 DIAGNOSIS — Z86.16 HISTORY OF SEVERE ACUTE RESPIRATORY SYNDROME CORONAVIRUS 2 (SARS-COV-2) DISEASE: ICD-10-CM

## 2021-05-17 DIAGNOSIS — J44.9 CHRONIC OBSTRUCTIVE PULMONARY DISEASE, UNSPECIFIED COPD TYPE (HCC): ICD-10-CM

## 2021-05-17 DIAGNOSIS — I50.89 OTHER HEART FAILURE (HCC): ICD-10-CM

## 2021-05-17 DIAGNOSIS — Z86.16 HISTORY OF SEVERE ACUTE RESPIRATORY SYNDROME CORONAVIRUS 2 (SARS-COV-2) DISEASE: Primary | ICD-10-CM

## 2021-05-17 PROBLEM — I47.20 V-TACH (HCC): Status: RESOLVED | Noted: 2020-02-28 | Resolved: 2021-05-17

## 2021-05-17 LAB
ABSOLUTE EOS #: 0.06 K/UL (ref 0–0.44)
ABSOLUTE IMMATURE GRANULOCYTE: <0.03 K/UL (ref 0–0.3)
ABSOLUTE LYMPH #: 1.96 K/UL (ref 1.1–3.7)
ABSOLUTE MONO #: 0.66 K/UL (ref 0.1–1.2)
ANION GAP SERPL CALCULATED.3IONS-SCNC: 13 MMOL/L (ref 9–17)
BASOPHILS # BLD: 1 % (ref 0–2)
BASOPHILS ABSOLUTE: 0.04 K/UL (ref 0–0.2)
BUN BLDV-MCNC: 25 MG/DL (ref 6–20)
BUN/CREAT BLD: ABNORMAL (ref 9–20)
CALCIUM SERPL-MCNC: 10 MG/DL (ref 8.6–10.4)
CHLORIDE BLD-SCNC: 102 MMOL/L (ref 98–107)
CO2: 26 MMOL/L (ref 20–31)
CREAT SERPL-MCNC: 1.34 MG/DL (ref 0.5–0.9)
DIFFERENTIAL TYPE: ABNORMAL
EOSINOPHILS RELATIVE PERCENT: 1 % (ref 1–4)
GFR AFRICAN AMERICAN: 50 ML/MIN
GFR NON-AFRICAN AMERICAN: 41 ML/MIN
GFR SERPL CREATININE-BSD FRML MDRD: ABNORMAL ML/MIN/{1.73_M2}
GFR SERPL CREATININE-BSD FRML MDRD: ABNORMAL ML/MIN/{1.73_M2}
GLUCOSE BLD-MCNC: 103 MG/DL (ref 70–99)
HCT VFR BLD CALC: 36.5 % (ref 36.3–47.1)
HEMOGLOBIN: 11.4 G/DL (ref 11.9–15.1)
IMMATURE GRANULOCYTES: 0 %
LYMPHOCYTES # BLD: 35 % (ref 24–43)
MCH RBC QN AUTO: 29.1 PG (ref 25.2–33.5)
MCHC RBC AUTO-ENTMCNC: 31.2 G/DL (ref 28.4–34.8)
MCV RBC AUTO: 93.1 FL (ref 82.6–102.9)
MONOCYTES # BLD: 12 % (ref 3–12)
NRBC AUTOMATED: 0 PER 100 WBC
PDW BLD-RTO: 14.9 % (ref 11.8–14.4)
PLATELET # BLD: ABNORMAL K/UL (ref 138–453)
PLATELET ESTIMATE: ABNORMAL
PLATELET, FLUORESCENCE: 184 K/UL (ref 138–453)
PLATELET, IMMATURE FRACTION: 8.7 % (ref 1.1–10.3)
PMV BLD AUTO: ABNORMAL FL (ref 8.1–13.5)
POTASSIUM SERPL-SCNC: 4 MMOL/L (ref 3.7–5.3)
RBC # BLD: 3.92 M/UL (ref 3.95–5.11)
RBC # BLD: ABNORMAL 10*6/UL
SEG NEUTROPHILS: 51 % (ref 36–65)
SEGMENTED NEUTROPHILS ABSOLUTE COUNT: 2.83 K/UL (ref 1.5–8.1)
SODIUM BLD-SCNC: 141 MMOL/L (ref 135–144)
WBC # BLD: 5.6 K/UL (ref 3.5–11.3)
WBC # BLD: ABNORMAL 10*3/UL

## 2021-05-17 PROCEDURE — 99213 OFFICE O/P EST LOW 20 MIN: CPT | Performed by: STUDENT IN AN ORGANIZED HEALTH CARE EDUCATION/TRAINING PROGRAM

## 2021-05-17 PROCEDURE — G8427 DOCREV CUR MEDS BY ELIG CLIN: HCPCS | Performed by: STUDENT IN AN ORGANIZED HEALTH CARE EDUCATION/TRAINING PROGRAM

## 2021-05-17 PROCEDURE — G8926 SPIRO NO PERF OR DOC: HCPCS | Performed by: STUDENT IN AN ORGANIZED HEALTH CARE EDUCATION/TRAINING PROGRAM

## 2021-05-17 PROCEDURE — 1111F DSCHRG MED/CURRENT MED MERGE: CPT | Performed by: STUDENT IN AN ORGANIZED HEALTH CARE EDUCATION/TRAINING PROGRAM

## 2021-05-17 PROCEDURE — 3023F SPIROM DOC REV: CPT | Performed by: STUDENT IN AN ORGANIZED HEALTH CARE EDUCATION/TRAINING PROGRAM

## 2021-05-17 PROCEDURE — 1036F TOBACCO NON-USER: CPT | Performed by: STUDENT IN AN ORGANIZED HEALTH CARE EDUCATION/TRAINING PROGRAM

## 2021-05-17 PROCEDURE — 3017F COLORECTAL CA SCREEN DOC REV: CPT | Performed by: STUDENT IN AN ORGANIZED HEALTH CARE EDUCATION/TRAINING PROGRAM

## 2021-05-17 PROCEDURE — G8417 CALC BMI ABV UP PARAM F/U: HCPCS | Performed by: STUDENT IN AN ORGANIZED HEALTH CARE EDUCATION/TRAINING PROGRAM

## 2021-05-17 RX ORDER — B12/LEVOMEFOLATE CALCIUM/B-6 2-1.13-25
TABLET ORAL
COMMUNITY
End: 2021-11-18

## 2021-05-17 ASSESSMENT — PATIENT HEALTH QUESTIONNAIRE - PHQ9: SUM OF ALL RESPONSES TO PHQ9 QUESTIONS 1 & 2: 0

## 2021-05-17 NOTE — TELEPHONE ENCOUNTER
Pharmacy calling office because pt's medication needs ICD 10 Code for insurance.  Please provide code for L-Methylfolate-B6-B12 (FOLBIC RF) 1.13-25-2 MG TABS

## 2021-05-17 NOTE — PROGRESS NOTES
Visit Information    Have you changed or started any medications since your last visit including any over-the-counter medicines, vitamins, or herbal medicines? no   Have you stopped taking any of your medications? Is so, why? -  yes   Are you having any side effects from any of your medications? - no    Have you seen any other physician or provider since your last visit? yes    Have you had any other diagnostic tests since your last visit? yes    Have you been seen in the emergency room and/or had an admission in a hospital since we last saw you?  yes    Have you had your routine dental cleaning in the past 6 months?  no     Do you have an active MyChart account? If no, what is the barrier?   Yes    Patient Care Team:  Rachel Thomas MD as PCP - General (Family Medicine)  Audelia Interiano MD as PCP - [facility] Provider  Brian Nichole RN as     Medical History Review  Past Medical, Family, and Social History reviewed and does not contribute to the patient presenting condition    Health Maintenance   Topic Date Due    COVID-19 Vaccine (1) Never done    Shingles Vaccine (1 of 2) Never done    Cervical cancer screen  12/28/2018    Breast cancer screen  02/03/2019    Lipid screen  11/01/2020    TSH testing  11/01/2020    A1C test (Diabetic or Prediabetic)  11/06/2020    Potassium monitoring  05/18/2021    Creatinine monitoring  05/18/2021    Flu vaccine (Season Ended) 09/01/2021    Colon cancer screen fecal DNA test (Cologuard)  12/04/2022    DTaP/Tdap/Td vaccine (2 - Td) 06/09/2029    Pneumococcal 0-64 years Vaccine (2 of 2) 06/13/2030    Hepatitis C screen  Completed    HIV screen  Completed    Hepatitis A vaccine  Aged Out    Hepatitis B vaccine  Aged Out    Hib vaccine  Aged Out    Meningococcal (ACWY) vaccine  Aged Out

## 2021-05-17 NOTE — PROGRESS NOTES
Post-Discharge Transitional Care Management Services or Hospital Follow Up      Mika Morgan   YOB: 1965    Date of Office Visit:  5/17/2021  Date of Hospital Admission: 5/17/20  Date of Hospital Discharge: 5/18/20  Risk of hospital readmission (high >=14%. Medium >=10%) :No data recorded    Care management risk score Rising risk (score 2-5) and Complex Care (Scores >=6): 7     Non face to face  following discharge, date last encounter closed (first attempt may have been earlier): *No documented post hospital discharge outreach found in the last 14 days    Call initiated 2 business days of discharge: *No response recorded in the last 14 days    Patient Active Problem List   Diagnosis    [facility] disease) [facility]    Fibroids    Syncope    Lung nodule < 6cm on CT    Chronic systolic heart failure (HCC) s/p AICD    Mild intermittent asthma    Essential hypertension    Chest pain    QT prolongation    Asthma with COPD with exacerbation ([address]    Non-ischemic cardiomyopathy ([address]    Lesion of right native kidney    NSTEMI (non-ST elevated myocardial infarction) ([address]    CAD (coronary artery disease)    Intraparenchymal hematoma of left side of brain due to trauma ([address]    Chronic combined systolic and diastolic congestive heart failure ([address]    AICD discharge    RIP (acute kidney injury) ([address]    Hypomagnesemia    Other heart failure ([facility])       Allergies   Allergen Reactions    Iv Contrast [Iodides] Nausea And Vomiting     Nausea/vomit.  NO ALLERGY OR ANAPHYLAXIS       Medications listed as ordered at the time of discharge from [facility] Medication Instructions [id number]:[id number]    Printed on:05/17/21 1513   Medication Information                      albuterol sulfate HFA (VENTOLIN HFA) 108 (90 Base) MCG/ACT inhaler  INHALE 2 PUFFS INTO THE LUNGS EVERY 6 HOURS AS NEEDED FOR WHEEZING             amiodarone (CORDARONE) 200 \"taking\" at this time  Outpatient Medications Marked as Taking for the 5/17/21 encounter (Office Visit) with Dave Hudson MD   Medication Sig Dispense Refill    L-Methylfolate-B6-B12 (FOLBIC RF) 1.13-25-2 MG TABS Take by mouth      amitriptyline (ELAVIL) 25 MG tablet TAKE 1 TABLET BY MOUTH EVERY NIGHT AT BEDTIME 30 tablet 5    bumetanide (BUMEX) 1 MG tablet TAKE 1 TABLET BY MOUTH 2 TIMES A DAY 60 tablet 5    sacubitril-valsartan (ENTRESTO) 24-26 MG per tablet TAKE 1 TABLET BY MOUTH 2 TIMES A DAY 60 tablet 5    L-Methylfolate-B6-B12 (FOLBIC RF) 1.13-25-2 MG TABS TAKE 1 TABLET BY MOUTH ONE TIME A DAY 30 tablet 3    magnesium oxide (MAG-OX) 400 (241.3 Mg) MG TABS tablet TAKE 1 TABLET BY MOUTH ONE TIME A DAY 30 tablet 3    amiodarone (CORDARONE) 200 MG tablet Take 200 mg by mouth daily Dose reduction initiated 5/26/2020 by cardiology      atorvastatin (LIPITOR) 40 MG tablet Take 1 tablet by mouth nightly 30 tablet 3        Medications patient taking as of now reconciled against medications ordered at time of hospital discharge: Yes    Chief Complaint   Patient presents with    Follow-Up from [address] [place] [address] 5/7       History of Present illness - Follow up of Hospital diagnosis(es):     1. Hx of COVID  2. RIP  3. Systolic CHF  4. COPD    Inpatient course: Discharge summary reviewed- see chart. Interval history/Current status:     Pt was at [address] Pt was discharged 4 days later and now asymptomatic, not requiring any oxygen.   -Pt to follow up with cardiology for HFrEF sp AICD  -Pt to follow up with Pulmonologist for COPD  -Will order f/u labs    A comprehensive review of systems was negative except for what was noted in the HPI. Vitals:    05/17/21 1447   BP: 105/65   Pulse: 74   Weight: 142 lb 9.6 oz (64.7 kg)     Body mass index is 26.08 kg/m².    Wt Readings from Last 3 Encounters:   05/17/21 142 lb 9.6 oz (64.7 kg)   04/23/21 145 lb 3.2 oz (65.9 kg)   03/08/21 147 lb (66.7 kg)     BP Readings from Last 3 Encounters:   05/17/21 105/65   04/23/21 120/70   03/08/21 120/70        Physical Exam:  General Appearance: alert and oriented to person, place and time, well developed and well- nourished, in no acute distress  Skin: warm and dry, no rash or erythema  Head: normocephalic and atraumatic  Eyes: pupils equal, round, and reactive to light, extraocular eye movements intact, conjunctivae normal  ENT: tympanic membrane, external ear and ear canal normal bilaterally, nose without deformity, nasal mucosa and turbinates normal without polyps  Neck: supple and non-tender without mass, no thyromegaly or thyroid nodules, no cervical lymphadenopathy  Pulmonary/Chest: clear to auscultation bilaterally- no wheezes, rales or rhonchi, normal air movement, no respiratory distress  Cardiovascular: normal rate, regular rhythm, normal S1 and S2, no murmurs, rubs, clicks, or gallops, distal pulses intact, no carotid bruits  Abdomen: soft, non-tender, non-distended, normal bowel sounds, no masses or organomegaly  Extremities: no cyanosis, clubbing or edema  Musculoskeletal: normal range of motion, no joint swelling, deformity or tenderness  Neurologic: reflexes normal and symmetric, no cranial nerve deficit, gait, coordination and speech normal  General Appearance: alert and oriented to person, place and time, well-developed and well-nourished, in no acute distress  Skin: warm and dry, no rash or erythema  Pulmonary/Chest: clear to auscultation bilaterally- no wheezes, rales or rhonchi, normal air movement, no respiratory distress  Cardiovascular: ; AICD placement in tact and patent.  normal rate, normal S1 and S2, no gallops, intact distal pulses and no carotid bruits  Abdomen: soft, non-tender, non-distended, normal bowel sounds, no masses or organomegaly  Extremities: no cyanosis and no clubbing  Musculoskeletal: normal range of motion, no joint swelling, deformity or tenderness Assessment/Plan:  1. History of severe acute respiratory syndrome coronavirus 2 (SARS-CoV-2) disease  - WI DISCHARGE MEDS RECONCILED W/ CURRENT OUTPATIENT MED LIST  - Basic Metabolic Panel; Future  - CBC With Auto Differential; Future    2. Other heart failure ([facility]  -appointment for device check on Tuesday at 1pm with [facility] cardiology group    3.  Chronic obstructive pulmonary disease, unspecified COPD type ([facility]  - Aisha Robbins MD, Pulmonology, [facility] [place] Decision Making: moderate complexity

## 2021-05-17 NOTE — PROGRESS NOTES
Attending Physician Statement  I  have discussed the care of Portia Wadsworth including pertinent history and exam findings with the resident. I agree with the assessment, plan and orders as documented by the resident. /65   Pulse 74   Wt 142 lb 9.6 oz (64.7 kg)   LMP 04/07/2016   BMI 26.08 kg/m²    BP Readings from Last 3 Encounters:   05/17/21 105/65   04/23/21 120/70   03/08/21 120/70     Wt Readings from Last 3 Encounters:   05/17/21 142 lb 9.6 oz (64.7 kg)   04/23/21 145 lb 3.2 oz (65.9 kg)   03/08/21 147 lb (66.7 kg)          Diagnosis Orders   1. History of severe acute respiratory syndrome coronavirus 2 (SARS-CoV-2) disease  MD DISCHARGE MEDS RECONCILED W/ CURRENT OUTPATIENT MED LIST    Basic Metabolic Panel    CBC With Auto Differential   2. Other heart failure ([facility]     3.  Chronic obstructive pulmonary disease, unspecified COPD type [facility])  Maria Luz Berger MD, Pulmonology, [place], [place] 5/17/2021 4:19 PM

## 2021-05-18 ENCOUNTER — CARE COORDINATION (OUTPATIENT)
Dept: FAMILY MEDICINE CLINIC | Age: 56
End: 2021-05-18

## 2021-05-18 ENCOUNTER — TELEPHONE (OUTPATIENT)
Dept: FAMILY MEDICINE CLINIC | Age: 56
End: 2021-05-18

## 2021-05-18 NOTE — CARE COORDINATION
Message left on patient voice mail to contact [facility], office of Dr Raymundo Shannan regarding follow up orders for lab work and reminder given regarding equipment check for medtronic device today at [facility] office at 1 pm.  She was given reminder for CHF clinic appt on Friday May 21 at 1 pm.    Message left with nursing at [place] [address] office regarding lab results- Creat 1.34 and BUN 25 on 5/17/21. Request for review and consideration for adjustment of medication therapy related to elevated creatinine level. Brian Nichole RN, BSN  [facility] note- 5/21/2021  Call received from [facility] office. Patient to follow up with [facility] CHF clinic on Friday May 21-  [facility] office will order lab work to be repeated in 2-3 weeks.   Patient scheduled for cardiology visit with Dr Moy Quigley 6/15/21 at 9:15 am    [address]

## 2021-05-18 NOTE — TELEPHONE ENCOUNTER
Writer is doing a PA on the pt L-Methylfolate-B6-B12 (Folbic RF) 1.13-25-2 mg Tabs. What is her diagnosis  for this medication so I can finish the patient Prior  Authorization.  Thank you

## 2021-06-02 ENCOUNTER — TELEPHONE (OUTPATIENT)
Dept: FAMILY MEDICINE CLINIC | Age: 56
End: 2021-06-02

## 2021-06-18 DIAGNOSIS — I50.89 OTHER HEART FAILURE (HCC): ICD-10-CM

## 2021-06-18 DIAGNOSIS — I50.42 CHRONIC COMBINED SYSTOLIC AND DIASTOLIC CONGESTIVE HEART FAILURE (HCC): Primary | ICD-10-CM

## 2021-06-29 ENCOUNTER — TELEPHONE (OUTPATIENT)
Dept: FAMILY MEDICINE CLINIC | Age: 56
End: 2021-06-29

## 2021-06-30 ENCOUNTER — VIRTUAL VISIT (OUTPATIENT)
Dept: FAMILY MEDICINE CLINIC | Age: 56
End: 2021-06-30
Payer: MEDICAID

## 2021-06-30 DIAGNOSIS — J42 CHRONIC BRONCHITIS, UNSPECIFIED CHRONIC BRONCHITIS TYPE (HCC): ICD-10-CM

## 2021-06-30 DIAGNOSIS — I50.22 CHRONIC SYSTOLIC HEART FAILURE (HCC): Primary | ICD-10-CM

## 2021-06-30 PROCEDURE — 99213 OFFICE O/P EST LOW 20 MIN: CPT | Performed by: STUDENT IN AN ORGANIZED HEALTH CARE EDUCATION/TRAINING PROGRAM

## 2021-06-30 NOTE — PATIENT INSTRUCTIONS
Thank you for letting us take care of you today. We hope all your questions were addressed. If a question was overlooked or something else comes to mind after you return home, please contact a member of your Care Team listed below. Your Care Team at [place] [address] is Team #2  An Roque DO (Faculty)  Dayami Coy (Faculty)  Reche Schlatter, MD (Resident)  Imtiaz Jarrell MD (Resident)  Stephanie Morales MD (Resident)  Alan Elizondo MD (Resident)  Mike Jimenez MD (Resident)  BRYANT Sears. ,HARSH KELLER [facility] ([facility] office)  Doc Overall, [address] (Clinical Practice Manager)  Chelsey Sharma [facility] (Clinical Pharmacist)     Office phone number: [phone]    If you need to get in right away due to illness, please be advised we have \"Same Day\" appointments available Monday-Friday. Please call us at [phone] option #3 to schedule your \"Same Day\" appointment.

## 2021-07-06 NOTE — TELEPHONE ENCOUNTER
Phone call to patient. Patient informed this  that she needed to move from her current home. Patient stated that she has several family members from [place] currently staying with her and she needs space. Patient admitted that she is experiencing grief from the death of her mother. Discussed with patient options that are available to help with coping with her grief and dealing with her relatives during this time. Patient declined offer and stated that she only wants to work on moving at this time. Will continue to follow up with patient and provide assistance where needed.

## 2021-07-09 ENCOUNTER — HOSPITAL ENCOUNTER (OUTPATIENT)
Age: 56
Setting detail: SPECIMEN
Discharge: HOME | End: 2021-07-09
Payer: MEDICAID

## 2021-07-09 ENCOUNTER — OFFICE VISIT (OUTPATIENT)
Dept: FAMILY MEDICINE CLINIC | Age: 56
End: 2021-07-09
Payer: MEDICAID

## 2021-07-09 VITALS
HEIGHT: 62 IN | BODY MASS INDEX: 26.57 KG/M2 | SYSTOLIC BLOOD PRESSURE: 102 MMHG | DIASTOLIC BLOOD PRESSURE: 64 MMHG | WEIGHT: 144.4 LBS | HEART RATE: 84 BPM

## 2021-07-09 DIAGNOSIS — N89.8 VAGINAL DISCHARGE: ICD-10-CM

## 2021-07-09 DIAGNOSIS — R05.9 COUGH: ICD-10-CM

## 2021-07-09 DIAGNOSIS — Z13.220 LIPID SCREENING: ICD-10-CM

## 2021-07-09 DIAGNOSIS — Z12.4 PAP SMEAR FOR CERVICAL CANCER SCREENING: ICD-10-CM

## 2021-07-09 DIAGNOSIS — R53.83 FATIGUE, UNSPECIFIED TYPE: ICD-10-CM

## 2021-07-09 DIAGNOSIS — Z83.3 FAMILY HISTORY OF DIABETES MELLITUS: ICD-10-CM

## 2021-07-09 DIAGNOSIS — Z12.31 ENCOUNTER FOR SCREENING MAMMOGRAM FOR MALIGNANT NEOPLASM OF BREAST: Primary | ICD-10-CM

## 2021-07-09 LAB
DIRECT EXAM: ABNORMAL
Lab: ABNORMAL
SPECIMEN DESCRIPTION: ABNORMAL

## 2021-07-09 PROCEDURE — G8417 CALC BMI ABV UP PARAM F/U: HCPCS

## 2021-07-09 PROCEDURE — G8427 DOCREV CUR MEDS BY ELIG CLIN: HCPCS

## 2021-07-09 PROCEDURE — 3017F COLORECTAL CA SCREEN DOC REV: CPT

## 2021-07-09 PROCEDURE — 1036F TOBACCO NON-USER: CPT

## 2021-07-09 PROCEDURE — 99213 OFFICE O/P EST LOW 20 MIN: CPT

## 2021-07-09 RX ORDER — BENZONATATE 100 MG/1
100 CAPSULE ORAL 2 TIMES DAILY PRN
Qty: 20 CAPSULE | Refills: 0 | Status: SHIPPED | OUTPATIENT
Start: 2021-07-09 | End: 2021-07-16

## 2021-07-09 ASSESSMENT — ENCOUNTER SYMPTOMS
COUGH: 1
SHORTNESS OF BREATH: 0

## 2021-07-09 NOTE — PROGRESS NOTES
Visit Information    Have you changed or started any medications since your last visit including any over-the-counter medicines, vitamins, or herbal medicines? no   Are you having any side effects from any of your medications? -  no  Have you stopped taking any of your medications? Is so, why? -  no    Have you seen any other physician or provider since your last visit? No  Have you had any other diagnostic tests since your last visit? No  Have you been seen in the emergency room and/or had an admission to a hospital since we last saw you? No  Have you had your routine dental cleaning in the past 6 months? no    Have you activated your [employer] account? If not, what are your barriers?  Yes     Patient Care Team:  Nadine Bain MD as PCP - General (Family Medicine)  Yariel Lobato RN as     Medical History Review  Past Medical, Family, and Social History reviewed and does not contribute to the patient presenting condition    Health Maintenance   Topic Date Due    COVID-19 Vaccine (1) Never done    Shingles Vaccine (1 of 2) Never done    Cervical cancer screen  12/28/2018    Breast cancer screen  02/03/2019    Lipid screen  11/01/2020    TSH testing  11/01/2020    A1C test (Diabetic or Prediabetic)  11/06/2020    Flu vaccine (1) 09/01/2021    Potassium monitoring  05/17/2022    Creatinine monitoring  05/17/2022    Colon cancer screen fecal DNA test (Cologuard)  12/04/2022    DTaP/Tdap/Td vaccine (2 - Td or Tdap) 06/09/2029    Pneumococcal 0-64 years Vaccine (2 of 2 - PPSV23) 06/13/2030    Hepatitis C screen  Completed    HIV screen  Completed    Hepatitis A vaccine  Aged Out    Hepatitis B vaccine  Aged Out    Hib vaccine  Aged Out    Meningococcal (ACWY) vaccine  Aged Out

## 2021-07-09 NOTE — PROGRESS NOTES
Subjective:    Candy Hamilton is a 64 y.o. female with  has a past medical history of AICD discharge, RIP (acute kidney injury) ([address] Asthma, Asthma with COPD with exacerbation ([address] CAD (coronary artery disease), Cardiomegaly, Chest pain, CHF (congestive heart failure) ([address] Chronic systolic heart failure ([address] s/p AICD, COPD (chronic obstructive pulmonary disease) ([address] Depression, Fibroids, Hypertension, Hypomagnesemia, Intraparenchymal hematoma of left side of brain due to trauma [facility]), Lesion of right native kidney, Lung nodule < 6cm on CT, MI (myocardial infarction) ([address] Mild intermittent asthma, Non-ischemic cardiomyopathy ([address] NSTEMI (non-ST elevated myocardial infarction) ([facility] QT prolongation, Syncope, and Unspecified diseases of blood and blood-forming organs. Presented to the office today for:  Chief Complaint   Patient presents with    Routine Pap Outreach    Cough     would like something for cough       HPI    26-year-old female came to the clinic to get a Pap smear done  Patient also needs to get TSH, hemoglobin A1c, mammogram and lipid panel done    Patient today is complaining of some vaginal discharge for some time and states that she is sexually active does not use any barrier contraception and that she has been noticing some heavier than usual discharge which is malodorous associated with vaginal itching  No dysuria hematuria or bleeding from the vaginal reported    Patient had common cold last week and is having some cough which is not getting better. No chest pain, fever or chills reported. Patient has no other questions concerns at this visit. Review of Systems   Constitutional: Negative for chills and fever. Respiratory: Positive for cough. Negative for shortness of breath. Genitourinary: Positive for vaginal discharge. Negative for dysuria, frequency and hematuria.         Vaginal itching                  The patient has a   Family History   Problem Relation ALYX DIGITAL SCREEN W OR WO CAD BILATERAL; Future    2. Family history of diabetes mellitus  - Hemoglobin A1C; Future    3. Lipid screening  - Lipid Panel; Future    4. Fatigue, unspecified type  - TSH; Future    5. Vaginal discharge  - Vaginitis DNA Probe; Future    6. Cough  - benzonatate (TESSALON) 100 MG capsule; Take 1 capsule by mouth 2 times daily as needed for Cough  Dispense: 20 capsule; Refill: 0  -Post viral cough likely without fever or chills or chest pain           Requested Prescriptions     Signed Prescriptions Disp Refills    benzonatate (TESSALON) 100 MG capsule 20 capsule 0     Sig: Take 1 capsule by mouth 2 times daily as needed for Cough       There are no discontinued medications. Raul Floyd received counseling on the following healthy behaviors: nutrition, exercise and medication adherence    Discussed use,benefit, and side effects of prescribed medications. Barriers to medication compliance addressed. All patient questions answered. Pt voiced understanding. No follow-ups on file. Disclaimer: Some orall of this note was transcribed using voice-recognition software. This may cause typographical errors occasionally. Although all effort is made to fix these errors, please do not hesitate to contact our office if there Kendy Glass concern with the understanding of this note.

## 2021-07-10 DIAGNOSIS — N76.0 BV (BACTERIAL VAGINOSIS): Primary | ICD-10-CM

## 2021-07-10 DIAGNOSIS — B96.89 BV (BACTERIAL VAGINOSIS): Primary | ICD-10-CM

## 2021-07-10 RX ORDER — METRONIDAZOLE 500 MG/1
500 TABLET ORAL 2 TIMES DAILY
Qty: 14 TABLET | Refills: 0 | Status: SHIPPED | OUTPATIENT
Start: 2021-07-10 | End: 2021-07-17

## 2021-07-12 ENCOUNTER — TELEPHONE (OUTPATIENT)
Dept: FAMILY MEDICINE CLINIC | Age: 56
End: 2021-07-12

## 2021-07-12 NOTE — TELEPHONE ENCOUNTER
----- Message from Aydee Benson MD sent at 7/10/2021 11:16 PM EDT -----  Patient positive for [facility]. Ordered Flagyl. Please let her know thank you. Medication sent at [facility]

## 2021-07-14 LAB — CYTOLOGY REPORT: NORMAL

## 2021-07-27 NOTE — PROGRESS NOTES
Attending Physician Statement    Wt Readings from Last 3 Encounters:   07/09/21 144 lb 6.4 oz (65.5 kg)   05/17/21 142 lb 9.6 oz (64.7 kg)   04/23/21 145 lb 3.2 oz (65.9 kg)     Temp Readings from Last 3 Encounters:   08/04/20 97.2 °F (36.2 °C) (Temporal)   07/28/20 98.2 °F (36.8 °C) (Temporal)   05/18/20 98.2 °F (36.8 °C) (Oral)     BP Readings from Last 3 Encounters:   07/09/21 102/64   05/17/21 105/65   04/23/21 120/70     Pulse Readings from Last 3 Encounters:   07/09/21 84   05/17/21 74   04/23/21 88         I have discussed the care of Steffany Goldsmith, including pertinent history and exam findings with the resident. I have reviewed the key elements of all parts of the encounter with the resident. I agree with the assessment, plan and orders as documented by the resident.   (GE Modifier)

## 2021-09-01 ENCOUNTER — CARE COORDINATION (OUTPATIENT)
Dept: FAMILY MEDICINE CLINIC | Age: 56
End: 2021-09-01

## 2021-09-01 NOTE — CARE COORDINATION
Barbara Contreras  9/1/2021              [address] [facility] Outreach Nurse Telephone Note    Spoke with Seamus Man to follow up about her self care management of CHF and medical follow up    Emotional/coping   Seamus More denies anxiety/depression   She reports sleeping well at night. Housing   No changes    Socialization/family   She lives with significant other. Reports relationships with significant other and with adult children is supportive. She engages with family daily    Medications    Medication schedule reviewed with Seamus Man. She reports taking all meds except Incruse Elipta inhaler.   Oral meds are blister packaged by pharmacy    Current Outpatient Medications:     L-Methylfolate-B6-B12 (FOLBIC RF) 1.13-25-2 MG TABS, Take by mouth, Disp: , Rfl:     amitriptyline (ELAVIL) 25 MG tablet, TAKE 1 TABLET BY MOUTH EVERY NIGHT AT BEDTIME, Disp: 30 tablet, Rfl: 5    bumetanide (BUMEX) 1 MG tablet, TAKE 1 TABLET BY MOUTH 2 TIMES A DAY, Disp: 60 tablet, Rfl: 5    sacubitril-valsartan (ENTRESTO) 24-26 MG per tablet, TAKE 1 TABLET BY MOUTH 2 TIMES A DAY, Disp: 60 tablet, Rfl: 5    L-Methylfolate-B6-B12 (FOLBIC RF) 1.13-25-2 MG TABS, TAKE 1 TABLET BY MOUTH ONE TIME A DAY, Disp: 30 tablet, Rfl: 3    magnesium oxide (MAG-OX) 400 (241.3 Mg) MG TABS tablet, TAKE 1 TABLET BY MOUTH ONE TIME A DAY, Disp: 30 tablet, Rfl: 3    omeprazole (PRILOSEC) 20 MG delayed release capsule, TAKE 1 CAPSULE BY MOUTH ONE TIME A DAY, Disp: 30 capsule, Rfl: 3    spironolactone (ALDACTONE) 25 MG tablet, TAKE 1 TABLET BY MOUTH ONE TIME A DAY, Disp: 30 tablet, Rfl: 5    metoprolol succinate (TOPROL XL) 50 MG extended release tablet, TAKE ONE TABLET BY MOUTH EVERY MORNING, Disp: 90 tablet, Rfl: 1    Nebulizers (COMPRESSOR/NEBULIZER) MISC, 1 Device by Does not apply route 4 times daily, Disp: 1 each, Rfl: 0    amiodarone (CORDARONE) 200 MG tablet, Take 200 mg by mouth daily Dose reduction initiated 5/26/2020 by cardiology, Disp: , Rfl:    albuterol sulfate HFA (VENTOLIN HFA) 108 (90 Base) MCG/ACT inhaler, INHALE 2 PUFFS INTO THE LUNGS EVERY 6 HOURS AS NEEDED FOR WHEEZING, Disp: 18 g, Rfl: 3    ipratropium-albuterol (DUONEB) 0.5-2.5 (3) MG/3ML SOLN nebulizer solution, Inhale 3 mLs into the lungs 2 times daily as needed for Shortness of Breath, Disp: 360 mL, Rfl: 3    Umeclidinium Bromide (INCRUSE ELLIPTA) 62.5 MCG/INH AEPB, INHALE 1 PUFF INTO THE LUNGS DAILY STOP SPIRIVA, Disp: 1 each, Rfl: 3    fluticasone (FLONASE) 50 MCG/ACT nasal spray, 1 spray by Nasal route daily, Disp: 1 Bottle, Rfl: 3    atorvastatin (LIPITOR) 40 MG tablet, Take 1 tablet by mouth nightly, Disp: 30 tablet, Rfl: 3    sennosides-docusate sodium (SENOKOT-S) 8.6-50 MG tablet, Take 1 tablet by mouth daily, Disp: 20 tablet, Rfl: 0    Spacer/Aero-Holding Chambers (E-Z SPACER) ELVER, 1 Device by Does not apply route daily, Disp: 1 Device, Rfl: 0    Multiple Vitamin (MULTIVITAMIN) tablet, TAKE ONE TABLET BY MOUTH ONE TIME A DAY, Disp: 30 tablet, Rfl: 3. Medical    Reports improvement in exercise tolerance in the past several months. She denies chest pain, shortness of breath, fatigue, cough. Denies abdominal bloating, denies leg edema. Last PCP appointment 7/9/21. Gogo Brito has not showed for CHF clinic appt in May, June, July. Action:  - Discussed importance of CHF clinic follow up and strongly encouraged to follow up with CHF clinic for re evaluation    Plan:  - nurse home vs in 2 weeks.   Schedule cardiology follow up at CHF clinic and with cardiology     Lisa Naidu RN, [place]

## 2021-09-13 ENCOUNTER — OFFICE VISIT (OUTPATIENT)
Dept: PULMONOLOGY | Age: 56
End: 2021-09-13
Payer: MEDICAID

## 2021-09-13 VITALS
SYSTOLIC BLOOD PRESSURE: 113 MMHG | HEIGHT: 63 IN | BODY MASS INDEX: 25.45 KG/M2 | OXYGEN SATURATION: 100 % | RESPIRATION RATE: 18 BRPM | WEIGHT: 143.6 LBS | DIASTOLIC BLOOD PRESSURE: 71 MMHG | TEMPERATURE: 97.1 F | HEART RATE: 66 BPM

## 2021-09-13 DIAGNOSIS — J44.9 COPD, SEVERITY TO BE DETERMINED (HCC): Primary | ICD-10-CM

## 2021-09-13 PROCEDURE — G8926 SPIRO NO PERF OR DOC: HCPCS | Performed by: INTERNAL MEDICINE

## 2021-09-13 PROCEDURE — G8417 CALC BMI ABV UP PARAM F/U: HCPCS | Performed by: INTERNAL MEDICINE

## 2021-09-13 PROCEDURE — 99203 OFFICE O/P NEW LOW 30 MIN: CPT | Performed by: INTERNAL MEDICINE

## 2021-09-13 PROCEDURE — 1036F TOBACCO NON-USER: CPT | Performed by: INTERNAL MEDICINE

## 2021-09-13 PROCEDURE — G8427 DOCREV CUR MEDS BY ELIG CLIN: HCPCS | Performed by: INTERNAL MEDICINE

## 2021-09-13 PROCEDURE — 3017F COLORECTAL CA SCREEN DOC REV: CPT | Performed by: INTERNAL MEDICINE

## 2021-09-13 PROCEDURE — 3023F SPIROM DOC REV: CPT | Performed by: INTERNAL MEDICINE

## 2021-09-13 NOTE — PROGRESS NOTES
REASON FOR THE CONSULTATION:  Sob   HISTORY OF PRESENT ILLNESS:    Ania Grover is a 64y.o. year old female   Patient has chronic grade 2 dyspnea , which has been progressively getting worse withwith more than one and half block walking ,,Yes exertion ,  Complains of No Cough , Nosputum production   , No  hemoptysis , Yes Associated with wheezing . No home oxygen  .    Uses albuterol prn                     Immunization   Immunization History   Administered Date(s) Administered    Influenza Virus Vaccine 10/05/2015    Influenza, Valentino Jacks, IM, (6 mo and older Fluzone, Flulaval, Fluarix and 3 yrs and older Afluria) 10/01/2019    Pneumococcal Polysaccharide ([id number]) 05/20/2016    Tdap (Boostrix, Adacel) 06/09/2019        Pneumococcal Vaccine     [] Up to date    [] Indicated   [] Refused  [] Contraindicated       Influenza Vaccine   [] Up to date    [] Indicated   [] Refused  [] Contraindicated        Pulmonary Rehab   [] Completed   [] Indicated   [] Refused  [] Contraindicated   PAST MEDICAL HISTORY:       Diagnosis Date    AICD discharge 2/26/2020    RIP (acute kidney injury) ([address] 2/26/2020    Asthma     Asthma with COPD with exacerbation ([address] 2/3/2019    CAD (coronary artery disease)     ICD    Cardiomegaly     Chest pain 4/23/2018    CHF (congestive heart failure) ([address]     Chronic systolic heart failure ([address] s/p AICD 10/15/2015    COPD (chronic obstructive pulmonary disease) ([address]     Depression     Fibroids 11/25/2014    Uterine artery embolization planned by Interventional Rad 12-3-14     Hypertension     Hypomagnesemia 2/26/2020    Intraparenchymal hematoma of left side of brain due to trauma [facility])     Lesion of right native kidney 2/3/2019    Lung nodule < 6cm on CT 10/4/2015    MI (myocardial infarction) ([address]     Mild intermittent asthma 5/20/2016    Non-ischemic cardiomyopathy ([address] 2/3/2019    NSTEMI (non-ST elevated myocardial infarction) ([address] 6/5/2019    QT prolongation 2/3/2019    Syncope 7/30/2015    Unspecified diseases of blood and blood-forming organs     anemia,          Family History:       Problem Relation Age of Onset    Diabetes Mother     High Blood Pressure Mother     Heart Disease Mother     Diabetes Father     Heart Disease Brother        SURGICAL HISTORY:   Past Surgical History:   Procedure Laterality Date    CARDIAC DEFIBRILLATOR PLACEMENT  09/2005    [facility]    CARDIAC DEFIBRILLATOR PLACEMENT      PACEMAKER INSERTION      TUBAL LIGATION      UTERINE FIBROID SURGERY  maarch 2015           SOCIAL AND OCCUPATIONAL HEALTH:      There  No history of TB or TB exposure. There  No asbestos ,[place]ilica dust exposure. The patient reports  No coal mine, [place], [place], [facility] exposure. History of travel outside the country No  There  is use of   marijuana Yes   VapingYes  IV heroinNo  Crack cocaineNo  There  Nohot tub exposure. 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 Her smoking use included cigarettes. She has a 7.50 pack-year smoking history. She has never used smokeless tobacco.  ETOH:   reports current alcohol use of about 1.0 standard drinks of alcohol per week. ALLERGIES:      Allergies   Allergen Reactions    Iv Contrast [Iodides] Nausea And Vomiting     Nausea/vomit. NO ALLERGY OR ANAPHYLAXIS         Home Meds:   Prior to Admission medications    Medication Sig Start Date End Date Taking?  Authorizing Provider   L-Methylfolate-B6-B12 (FOLBIC RF) 1.13-25-2 MG TABS Take by mouth   Yes Historical Provider, MD   amitriptyline (ELAVIL) 25 MG tablet TAKE 1 TABLET BY MOUTH EVERY NIGHT AT BEDTIME 5/14/21  Yes Soham Haines MD   bumetanide (BUMEX) 1 MG tablet TAKE 1 TABLET BY MOUTH 2 TIMES A DAY 5/14/21  Yes Soham Haines MD   sacubitril-valsartan (ENTRESTO) 24-26 MG per tablet TAKE 1 TABLET BY MOUTH 2 TIMES A DAY 5/14/21 Yes Raina Larry MD   S-Kovatucmgfil-H4-B12 (FOLBIC RF) 1.13-25-2 MG TABS TAKE 1 TABLET BY MOUTH ONE TIME A DAY 5/14/21  Yes Raina Larry MD   magnesium oxide (MAG-OX) 400 (241.3 Mg) MG TABS tablet TAKE 1 TABLET BY MOUTH ONE TIME A DAY 5/14/21  Yes Raina Larry MD   omeprazole (PRILOSEC) 20 MG delayed release capsule TAKE 1 CAPSULE BY MOUTH ONE TIME A DAY 5/14/21  Yes Raina Larry MD   spironolactone (ALDACTONE) 25 MG tablet TAKE 1 TABLET BY MOUTH ONE TIME A DAY 5/14/21  Yes Raina Larry MD   metoprolol succinate (TOPROL XL) 50 MG extended release tablet TAKE ONE TABLET BY MOUTH EVERY MORNING 3/22/21  Yes Prashanth Blevins MD   Nebulizers (COMPRESSOR/NEBULIZER) MISC 1 Device by Does not apply route 4 times daily 7/15/20  Yes Raina Larry MD   amiodarone (CORDARONE) 200 MG tablet Take 200 mg by mouth daily Dose reduction initiated 5/26/2020 by cardiology   Yes Daniela Lewis MD   albuterol sulfate HFA (VENTOLIN HFA) 108 (90 Base) MCG/ACT inhaler INHALE 2 PUFFS INTO THE LUNGS EVERY 6 HOURS AS NEEDED FOR WHEEZING 4/1/20  Yes San Hamman, MD   ipratropium-albuterol (DUONEB) 0.5-2.5 (3) MG/3ML SOLN nebulizer solution Inhale 3 mLs into the lungs 2 times daily as needed for Shortness of Breath 1/20/20  Yes San Hamman, MD   fluticasone (FLONASE) 50 MCG/ACT nasal spray 1 spray by Nasal route daily 11/1/19  Yes Baylee Tipton MD   atorvastatin (LIPITOR) 40 MG tablet Take 1 tablet by mouth nightly 6/14/19  Yes ANGY Velarde - CRISTINA   sennosides-docusate sodium (SENOKOT-S) 8.6-50 MG tablet Take 1 tablet by mouth daily 12/23/18  Yes Vivek Bowen MD   Spacer/Aero-Holding Chambers (E-Z SPACER) ELVER 1 Device by Does not apply route daily 11/25/18  Yes Eze Clifton MD   Multiple Vitamin (MULTIVITAMIN) tablet TAKE ONE TABLET BY MOUTH ONE TIME A DAY 6/18/18  Yes Jennifer Goetz MD              REVIEW OF SYSTEMS:    CONSTITUTIONAL:  negative for  fevers, chills, sweats, fatigue, malaise, anorexia and symmetrical, trachea midline, no adenopathy;     thyroid:  no enlargement/tenderness/nodules; no carotid    bruit , noJVD   Back:     Symmetric, no curvature, ROM normal, no CVA tenderness   Lungs:     Prolonged exp , no wheeze no rales    Chest Wall:    No tenderness or deformity      Heart:    Regular rate and rhythm, S1 and S2 normal, no murmur, rub        or gallop no rvh                           Abdomen:                                                 Pulses:                              Skin:                  Lymph nodes:                    Neurologic:                  Soft, non-tender, bowel sounds active all four quadrants,     no masses, no organomegaly         2+ and symmetric all extremities     Skin color, texture, turgor normal, no rashes or lesions       Cervical, supraclavicular not enlarged or matted or tender      CNII-XII intact, normal strength 5/5 . Clubbing No  Lower ext edema No  Upper ext edema No                       IMPRESSION:     Diagnosis Orders   1. COPD, severity to be determined ([facility]  Full PFT Study With Bronchodilator    XR CHEST (2 VW)        :                PLAN:       continue duo neb prn   Will obtain  complete PFT and chest xray   Follow up in 3 months         Requested Prescriptions      No prescriptions requested or ordered in this encounter       Medications Discontinued During This Encounter   Medication Reason    Umeclidinium Bromide (INCRUSE ELLIPTA) 62.5 MCG/INH AEPB LIST CLEANUP       Esaw Lauriner received counseling on the following healthy behaviors: nutrition, exercise and medication adherence    Patient given educational materials : see patient instruction       Discussed use, benefit, and side effects of prescribed medications. Barriers to medication compliance addressed. All patient questions answered. Pt voiced understanding. I hope this updates you on my evaluation and clinical thinking. Thank you for allowing me to participate in his care. Sincerely,      Electronically signed by Crescencio Jamil MD on   9/13/21 at 11:29 AM EDT       Please note that this chart was generated using voice recognition Dragon dictation software. Although every effort was made to ensure the accuracy of this automated transcription, some errors in transcription may have occurred.

## 2021-09-13 NOTE — PATIENT INSTRUCTIONS
CXR prior to appt  Bring Covid Vaccination card with you to your follow up appointment- office will need for your PFT.     LS

## 2021-09-23 ENCOUNTER — TELEPHONE (OUTPATIENT)
Dept: FAMILY MEDICINE CLINIC | Age: 56
End: 2021-09-23

## 2021-10-06 ENCOUNTER — TELEPHONE (OUTPATIENT)
Dept: FAMILY MEDICINE CLINIC | Age: 56
End: 2021-10-06

## 2021-10-06 NOTE — TELEPHONE ENCOUNTER
Phone call to patient to inform about housing through [employer]. Patient was given information and stated that she will attend during the designated time. Patient stated that she has been doing \"great\". Patient stated that she was informed that she would have to have a covid vaccine before having cardiac procedure. Patient stated that [facility] RN is aware and has provided assistance in this area. Will follow up with patient next week.

## 2021-10-06 NOTE — TELEPHONE ENCOUNTER
Phone call from patient. Patient inquired on status of housing application. Plans discussed to submit additional applications for housing. Will follow up with patient in 1-2 weeks.

## 2021-10-12 ENCOUNTER — TELEPHONE (OUTPATIENT)
Dept: FAMILY MEDICINE CLINIC | Age: 56
End: 2021-10-12

## 2021-10-26 NOTE — TELEPHONE ENCOUNTER
Received phone call from patient yesterday. Patient stated that she would not be able to go to [employer] tomorrow to complete application as her significant other will not be able to transport. Patient requesting assistance with options. Returned patient's call today. Patient stated she would be okay with taking a cab to [employer] to complete application. Will call patient next week.

## 2021-11-14 ENCOUNTER — APPOINTMENT (OUTPATIENT)
Dept: GENERAL RADIOLOGY | Age: 56
End: 2021-11-14
Payer: MEDICAID

## 2021-11-14 ENCOUNTER — HOSPITAL ENCOUNTER (OUTPATIENT)
Age: 56
Setting detail: OBSERVATION
Discharge: HOME | End: 2021-11-15
Attending: EMERGENCY MEDICINE | Admitting: EMERGENCY MEDICINE
Payer: MEDICAID

## 2021-11-14 DIAGNOSIS — R07.89 ATYPICAL CHEST PAIN: Primary | ICD-10-CM

## 2021-11-14 LAB
ABSOLUTE EOS #: 0.06 K/UL (ref 0–0.44)
ABSOLUTE IMMATURE GRANULOCYTE: <0.03 K/UL (ref 0–0.3)
ABSOLUTE LYMPH #: 1.87 K/UL (ref 1.1–3.7)
ABSOLUTE MONO #: 0.54 K/UL (ref 0.1–1.2)
ANION GAP SERPL CALCULATED.3IONS-SCNC: 14 MMOL/L (ref 9–17)
BASOPHILS # BLD: 1 % (ref 0–2)
BASOPHILS ABSOLUTE: 0.03 K/UL (ref 0–0.2)
BNP INTERPRETATION: ABNORMAL
BUN BLDV-MCNC: 16 MG/DL (ref 6–20)
BUN/CREAT BLD: ABNORMAL (ref 9–20)
CALCIUM SERPL-MCNC: 10.2 MG/DL (ref 8.6–10.4)
CHLORIDE BLD-SCNC: 102 MMOL/L (ref 98–107)
CO2: 23 MMOL/L (ref 20–31)
CREAT SERPL-MCNC: 1.15 MG/DL (ref 0.5–0.9)
DIFFERENTIAL TYPE: ABNORMAL
EOSINOPHILS RELATIVE PERCENT: 1 % (ref 1–4)
GFR AFRICAN AMERICAN: 59 ML/MIN
GFR NON-AFRICAN AMERICAN: 49 ML/MIN
GFR SERPL CREATININE-BSD FRML MDRD: ABNORMAL ML/MIN/{1.73_M2}
GFR SERPL CREATININE-BSD FRML MDRD: ABNORMAL ML/MIN/{1.73_M2}
GLUCOSE BLD-MCNC: 68 MG/DL (ref 70–99)
HCT VFR BLD CALC: 40 % (ref 36.3–47.1)
HEMOGLOBIN: 13 G/DL (ref 11.9–15.1)
IMMATURE GRANULOCYTES: 0 %
LYMPHOCYTES # BLD: 34 % (ref 24–43)
MCH RBC QN AUTO: 29 PG (ref 25.2–33.5)
MCHC RBC AUTO-ENTMCNC: 32.5 G/DL (ref 28.4–34.8)
MCV RBC AUTO: 89.3 FL (ref 82.6–102.9)
MONOCYTES # BLD: 10 % (ref 3–12)
NRBC AUTOMATED: 0 PER 100 WBC
PDW BLD-RTO: 15 % (ref 11.8–14.4)
PLATELET # BLD: ABNORMAL K/UL (ref 138–453)
PLATELET ESTIMATE: ABNORMAL
PLATELET, FLUORESCENCE: 140 K/UL (ref 138–453)
PLATELET, IMMATURE FRACTION: 11 % (ref 1.1–10.3)
PMV BLD AUTO: ABNORMAL FL (ref 8.1–13.5)
POTASSIUM SERPL-SCNC: 3.9 MMOL/L (ref 3.7–5.3)
PRO-BNP: 810 PG/ML
RBC # BLD: 4.48 M/UL (ref 3.95–5.11)
RBC # BLD: ABNORMAL 10*6/UL
SARS-COV-2, RAPID: NOT DETECTED
SEG NEUTROPHILS: 54 % (ref 36–65)
SEGMENTED NEUTROPHILS ABSOLUTE COUNT: 2.94 K/UL (ref 1.5–8.1)
SODIUM BLD-SCNC: 139 MMOL/L (ref 135–144)
SPECIMEN DESCRIPTION: NORMAL
TROPONIN INTERP: NORMAL
TROPONIN T: NORMAL NG/ML
TROPONIN, HIGH SENSITIVITY: 10 NG/L (ref 0–14)
TROPONIN, HIGH SENSITIVITY: 12 NG/L (ref 0–14)
TROPONIN, HIGH SENSITIVITY: 12 NG/L (ref 0–14)
WBC # BLD: 5.5 K/UL (ref 3.5–11.3)
WBC # BLD: ABNORMAL 10*3/UL

## 2021-11-14 PROCEDURE — 85025 COMPLETE CBC W/AUTO DIFF WBC: CPT

## 2021-11-14 PROCEDURE — 87635 SARS-COV-2 COVID-19 AMP PRB: CPT

## 2021-11-14 PROCEDURE — 83880 ASSAY OF NATRIURETIC PEPTIDE: CPT

## 2021-11-14 PROCEDURE — 85055 RETICULATED PLATELET ASSAY: CPT

## 2021-11-14 PROCEDURE — 71045 X-RAY EXAM CHEST 1 VIEW: CPT

## 2021-11-14 PROCEDURE — 93005 ELECTROCARDIOGRAM TRACING: CPT | Performed by: STUDENT IN AN ORGANIZED HEALTH CARE EDUCATION/TRAINING PROGRAM

## 2021-11-14 PROCEDURE — 80048 BASIC METABOLIC PNL TOTAL CA: CPT

## 2021-11-14 PROCEDURE — G0378 HOSPITAL OBSERVATION PER HR: HCPCS

## 2021-11-14 PROCEDURE — 94761 N-INVAS EAR/PLS OXIMETRY MLT: CPT

## 2021-11-14 PROCEDURE — 2580000003 HC RX 258: Performed by: STUDENT IN AN ORGANIZED HEALTH CARE EDUCATION/TRAINING PROGRAM

## 2021-11-14 PROCEDURE — 99285 EMERGENCY DEPT VISIT HI MDM: CPT

## 2021-11-14 PROCEDURE — 6370000000 HC RX 637 (ALT 250 FOR IP): Performed by: STUDENT IN AN ORGANIZED HEALTH CARE EDUCATION/TRAINING PROGRAM

## 2021-11-14 PROCEDURE — 84484 ASSAY OF TROPONIN QUANT: CPT

## 2021-11-14 RX ORDER — SPIRONOLACTONE 25 MG/1
25 TABLET ORAL DAILY
Status: DISCONTINUED | OUTPATIENT
Start: 2021-11-14 | End: 2021-11-15 | Stop reason: HOSPADM

## 2021-11-14 RX ORDER — SODIUM CHLORIDE 0.9 % (FLUSH) 0.9 %
5-40 SYRINGE (ML) INJECTION PRN
Status: DISCONTINUED | OUTPATIENT
Start: 2021-11-14 | End: 2021-11-15 | Stop reason: HOSPADM

## 2021-11-14 RX ORDER — AMITRIPTYLINE HYDROCHLORIDE 25 MG/1
25 TABLET, FILM COATED ORAL NIGHTLY
Status: DISCONTINUED | OUTPATIENT
Start: 2021-11-14 | End: 2021-11-15 | Stop reason: HOSPADM

## 2021-11-14 RX ORDER — SODIUM CHLORIDE 0.9 % (FLUSH) 0.9 %
5-40 SYRINGE (ML) INJECTION EVERY 12 HOURS SCHEDULED
Status: DISCONTINUED | OUTPATIENT
Start: 2021-11-14 | End: 2021-11-15 | Stop reason: HOSPADM

## 2021-11-14 RX ORDER — ATORVASTATIN CALCIUM 80 MG/1
40 TABLET, FILM COATED ORAL NIGHTLY
Status: DISCONTINUED | OUTPATIENT
Start: 2021-11-14 | End: 2021-11-15 | Stop reason: HOSPADM

## 2021-11-14 RX ORDER — ALBUTEROL SULFATE 90 UG/1
2 AEROSOL, METERED RESPIRATORY (INHALATION) EVERY 4 HOURS PRN
Status: DISCONTINUED | OUTPATIENT
Start: 2021-11-14 | End: 2021-11-15 | Stop reason: HOSPADM

## 2021-11-14 RX ORDER — ACETAMINOPHEN 500 MG
1000 TABLET ORAL ONCE
Status: COMPLETED | OUTPATIENT
Start: 2021-11-14 | End: 2021-11-14

## 2021-11-14 RX ORDER — SODIUM CHLORIDE 9 MG/ML
25 INJECTION, SOLUTION INTRAVENOUS PRN
Status: DISCONTINUED | OUTPATIENT
Start: 2021-11-14 | End: 2021-11-15 | Stop reason: HOSPADM

## 2021-11-14 RX ORDER — PANTOPRAZOLE SODIUM 20 MG/1
20 TABLET, DELAYED RELEASE ORAL
Status: DISCONTINUED | OUTPATIENT
Start: 2021-11-15 | End: 2021-11-15 | Stop reason: HOSPADM

## 2021-11-14 RX ORDER — IBUPROFEN 800 MG/1
800 TABLET ORAL EVERY 6 HOURS PRN
Status: DISCONTINUED | OUTPATIENT
Start: 2021-11-14 | End: 2021-11-15 | Stop reason: HOSPADM

## 2021-11-14 RX ORDER — METOPROLOL SUCCINATE 50 MG/1
50 TABLET, EXTENDED RELEASE ORAL DAILY
Status: DISCONTINUED | OUTPATIENT
Start: 2021-11-15 | End: 2021-11-15 | Stop reason: HOSPADM

## 2021-11-14 RX ORDER — BUMETANIDE 1 MG/1
1 TABLET ORAL 2 TIMES DAILY
Status: DISCONTINUED | OUTPATIENT
Start: 2021-11-14 | End: 2021-11-15 | Stop reason: HOSPADM

## 2021-11-14 RX ORDER — ACETAMINOPHEN 325 MG/1
650 TABLET ORAL EVERY 6 HOURS PRN
Status: DISCONTINUED | OUTPATIENT
Start: 2021-11-14 | End: 2021-11-15 | Stop reason: HOSPADM

## 2021-11-14 RX ADMIN — ACETAMINOPHEN 1000 MG: 500 TABLET ORAL at 17:33

## 2021-11-14 RX ADMIN — SODIUM CHLORIDE, PRESERVATIVE FREE 10 ML: 5 INJECTION INTRAVENOUS at 21:33

## 2021-11-14 ASSESSMENT — ENCOUNTER SYMPTOMS
DIARRHEA: 0
NAUSEA: 0
VOMITING: 0
ABDOMINAL PAIN: 0
RHINORRHEA: 0
SHORTNESS OF BREATH: 0
COUGH: 0

## 2021-11-14 ASSESSMENT — PAIN DESCRIPTION - PAIN TYPE
TYPE: ACUTE PAIN
TYPE: ACUTE PAIN

## 2021-11-14 ASSESSMENT — PAIN DESCRIPTION - LOCATION
LOCATION: CHEST
LOCATION: ABDOMEN;BREAST

## 2021-11-14 ASSESSMENT — PAIN DESCRIPTION - ORIENTATION: ORIENTATION: LEFT

## 2021-11-14 ASSESSMENT — PAIN SCALES - GENERAL
PAINLEVEL_OUTOF10: 0
PAINLEVEL_OUTOF10: 9
PAINLEVEL_OUTOF10: 9
PAINLEVEL_OUTOF10: 7

## 2021-11-14 NOTE — ED NOTES
The following labs labeled with pt sticker and tubed to lab:     [x] Blue     [x] Lavender   [] on ice  [x] Green/yellow  [x] Green/black [] on ice  [x] Yellow  [] Red  [] Pink      [] COVID-19 swab    [] Rapid  [] PCR  [] Peds Viral Panel     [] Urine Sample  [] Pelvic Cultures  [] Blood Cultures     Paco Rowell RN  11/14/21 [id number]

## 2021-11-14 NOTE — ED NOTES
The following labs labeled with pt sticker and tubed to lab:     [] Blue     [] Lavender   [] on ice  [] Green/yellow  [] Green/black [] on ice  [] Yellow  [] Red  [] Pink      [x] COVID-19 swab    [x] Rapid  [] PCR  [] Peds Viral Panel     [] Urine Sample  [] Pelvic Cultures  [] Blood Cultures     Reece [facility]  11/14/21 [id number]

## 2021-11-14 NOTE — ED NOTES
The following labs labeled with pt sticker and tubed to lab:     [] Blue     [] Lavender   [] on ice  [x] Green/yellow  [] Green/black [] on ice  [] Yellow  [] Red  [] Pink      [] COVID-19 swab    [] Rapid  [] PCR  [] Peds Viral Panel     [] Urine Sample  [] Pelvic Cultures  [] Blood Cultures      Loraine Waters RN  11/14/21 [id number]

## 2021-11-14 NOTE — ED PROVIDER NOTES
[facility]     Emergency Department     Faculty Attestation    I performed a history and physical examination of the patient and discussed management with the resident. I have reviewed and agree with the residents findings including all diagnostic interpretations, and treatment plans as written at the time of my review. Any areas of disagreement are noted on the chart. I was personally present for the key portions of any procedures. I have documented in the chart those procedures where I was not present during the key portions. For Physician Assistant/ Nurse Practitioner cases/documentation I have personally evaluated this patient and have completed at least one if not all key elements of the E/M (history, physical exam, and MDM). Additional findings are as noted. This patient was evaluated in the Emergency Department for symptoms described in the history of present illness. The patient was evaluated in the context of the global COVID-19 pandemic, which necessitated consideration that the patient might be at risk for infection with the SARS-CoV-2 virus that causes COVID-19. Institutional protocols and algorithms that pertain to the evaluation of patients at risk for COVID-19 are in a state of rapid change based on information released by regulatory bodies including the CDC and federal and state organizations. These policies and algorithms were followed during the patient's care in the ED. Primary Care Physician: Philip Rey MD    History: This is a 64 y.o. female who presents to the Emergency Department with complaint of AICD firing. Patient states she felt a sharp pain in her chest concerned that her AICD fired. She had some palpitations after the event. Prior to that she had no palpitations chest pain lightheadedness or dizziness. Physical:   height is 5' 2\" (1.575 m) and weight is 147 lb (66.7 kg).  Her oral temperature is 97.5 °F (36.4 °C). Her blood pressure is 128/72 and her pulse is 79. Her respiration is 20 and oxygen saturation is 99%. Awake alert nontoxic-appearing no distress heart regular rate and rhythm,    Impression: Sharp chest pain, concerns with AICD firing    Plan: Interrogate AICD, chest x-ray, EKG, CBC, BMP, troponin    Care of the patient turned over to Dr Joaquin Fischer. EKG Interpretation    Interpreted by me  Atrial sensed ventricular paced rhythm and a ventricular rate of 80, biventricular pacemaker  Compared EKG of May 18, 2020, no significant change was noted      (Please note that portions of this note were completed with a voice recognition program.  Efforts were made to edit the dictations but occasionally words are mis-transcribed.)    Ulisses Quivers.  Karyl Babinski, MD, [facility]  Attending Emergency Medicine Physician        Uma Mora MD  11/15/21 [id number]

## 2021-11-14 NOTE — ED PROVIDER NOTES
[facility]  FACULTY HANDOFF       Handoff taken on the following patient from prior Attending Physician:  Pt Name: Gustavo Marquez  PCP:  Gabrielle Joya MD    Attestation  I was available and discussed any additional care issues that arose and coordinated the management plans with the resident(s) caring for the patient during my duty period. Any areas of disagreement with resident's documentation of care or procedures are noted on the chart. I was personally present for the key portions of any/all procedures during my duty period. I have documented in the chart those procedures where I was not present during the key portions.          CHIEF COMPLAINT       Chief Complaint   Patient presents with    AICD Problem         CURRENT MEDICATIONS     Previous Medications  Previous Medications    ALBUTEROL SULFATE HFA (VENTOLIN HFA) 108 (90 BASE) MCG/ACT INHALER    INHALE 2 PUFFS INTO THE LUNGS EVERY 6 HOURS AS NEEDED FOR WHEEZING    AMIODARONE (CORDARONE) 200 MG TABLET    Take 200 mg by mouth daily Dose reduction initiated 5/26/2020 by cardiology    AMITRIPTYLINE (ELAVIL) 25 MG TABLET    TAKE 1 TABLET BY MOUTH EVERY NIGHT AT BEDTIME    ATORVASTATIN (LIPITOR) 40 MG TABLET    Take 1 tablet by mouth nightly    BUMETANIDE (BUMEX) 1 MG TABLET    TAKE 1 TABLET BY MOUTH 2 TIMES A DAY    FLUTICASONE (FLONASE) 50 MCG/ACT NASAL SPRAY    1 spray by Nasal route daily    IPRATROPIUM-ALBUTEROL (DUONEB) 0.5-2.5 (3) MG/3ML SOLN NEBULIZER SOLUTION    Inhale 3 mLs into the lungs 2 times daily as needed for Shortness of Breath    L-METHYLFOLATE-B6-B12 (FOLBIC RF) 1.13-25-2 MG TABS    TAKE 1 TABLET BY MOUTH ONE TIME A DAY    L-METHYLFOLATE-B6-B12 (FOLBIC RF) 1.13-25-2 MG TABS    Take by mouth    MAGNESIUM OXIDE (MAG-OX) 400 (241.3 MG) MG TABS TABLET    TAKE 1 TABLET BY MOUTH ONE TIME A DAY    METOPROLOL SUCCINATE (TOPROL XL) 50 MG EXTENDED RELEASE TABLET    TAKE ONE TABLET BY MOUTH EVERY MORNING    MULTIPLE VITAMIN (MULTIVITAMIN) TABLET    TAKE ONE TABLET BY MOUTH ONE TIME A DAY    NEBULIZERS (COMPRESSOR/NEBULIZER) MISC    1 Device by Does not apply route 4 times daily    OMEPRAZOLE (PRILOSEC) 20 MG DELAYED RELEASE CAPSULE    TAKE 1 CAPSULE BY MOUTH ONE TIME A DAY    SACUBITRIL-VALSARTAN (ENTRESTO) 24-26 MG PER TABLET    TAKE 1 TABLET BY MOUTH 2 TIMES A DAY    SENNOSIDES-DOCUSATE SODIUM (SENOKOT-S) 8.6-50 MG TABLET    Take 1 tablet by mouth daily    SPACER/AERO-HOLDING CHAMBERS (E-Z SPACER) ELVER    1 Device by Does not apply route daily    SPIRONOLACTONE (ALDACTONE) 25 MG TABLET    TAKE 1 TABLET BY MOUTH ONE TIME A DAY       Encounter Medications  No orders of the defined types were placed in this encounter. ALLERGIES     is allergic to iv contrast [iodides].       RECENT VITALS:   Temp: 98.1 °F (36.7 °C),  Pulse: 91, Resp: 18, BP: 127/87    RADIOLOGY:   XR CHEST PORTABLE   Final Result   No acute cardiopulmonary disease      Stable cardiomegaly             LABS:  Labs Reviewed   BRAIN NATRIURETIC PEPTIDE - Abnormal; Notable for the following components:       Result Value    Pro- (*)     All other components within normal limits   CBC WITH AUTO DIFFERENTIAL - Abnormal; Notable for the following components:    RDW 15.0 (*)     All other components within normal limits   BASIC METABOLIC PANEL W/ REFLEX TO MG FOR LOW K - Abnormal; Notable for the following components:    Glucose 68 (*)     CREATININE 1.15 (*)     GFR Non- 49 (*)     GFR  59 (*)     All other components within normal limits   IMMATURE PLATELET FRACTION - Abnormal; Notable for the following components:    Platelet, Immature Fraction 11.0 (*)     All other components within normal limits   TROPONIN   TROPONIN   TROPONIN     AICD shock, labs pending  Patient is reluctant to be admitted again, requesting to be discharged      PLAN/ TASKS OUTSTANDING     Cardiac monitor, interrogate pacer, laboratory studies, shared decision making and for disposition      (Please note that portions of this note were completed with a voice recognition program.  Efforts were made to edit the dictations but occasionally words are mis-transcribed. )    Viviane Bearden MD,, MD, F.A.C.E.P.   Attending Emergency Physician        Viviane Bearden MD  11/14/21 0967

## 2021-11-14 NOTE — ED PROVIDER NOTES
[facility] ED  Emergency Department  Emergency Medicine Resident Sign-out     Care of Wilfrid Horowitz was assumed from Dr. Audi Pineda and is being seen for AICD Problem  . The patient's initial evaluation and plan have been discussed with the prior provider who initially evaluated the patient. EMERGENCY DEPARTMENT COURSE / MEDICAL DECISION MAKING:       MEDICATIONS GIVEN:  Orders Placed This Encounter   Medications    acetaminophen (TYLENOL) tablet 1,000 mg    sodium chloride flush 0.9 % injection 5-40 mL    sodium chloride flush 0.9 % injection 5-40 mL    0.9 % sodium chloride infusion    acetaminophen (TYLENOL) tablet 650 mg    ibuprofen (ADVIL;MOTRIN) tablet 800 mg       LABS / RADIOLOGY:     Labs Reviewed   BRAIN NATRIURETIC PEPTIDE - Abnormal; Notable for the following components:       Result Value    Pro- (*)     All other components within normal limits   CBC WITH AUTO DIFFERENTIAL - Abnormal; Notable for the following components:    RDW 15.0 (*)     All other components within normal limits   BASIC METABOLIC PANEL W/ REFLEX TO MG FOR LOW K - Abnormal; Notable for the following components:    Glucose 68 (*)     CREATININE 1.15 (*)     GFR Non- 49 (*)     GFR  59 (*)     All other components within normal limits   IMMATURE PLATELET FRACTION - Abnormal; Notable for the following components:    Platelet, Immature Fraction 11.0 (*)     All other components within normal limits   COVID-19, RAPID   TROPONIN   TROPONIN   TROPONIN       XR CHEST PORTABLE    Result Date: 11/14/2021  EXAMINATION: ONE XRAY VIEW OF THE CHEST 11/14/2021 11:49 am COMPARISON: 05/17/2020 HISTORY: ORDERING SYSTEM PROVIDED HISTORY: chest pain TECHNOLOGIST PROVIDED HISTORY: chest pain Reason for Exam: upright port Acuity: Unknown Type of Exam: Unknown FINDINGS: Stable ICD leads. The cardiac size is mildly enlarged. No acute infiltrates or pleural effusions are seen. Pulmonary vascularity appears normal. .  . No acute bony abnormalities. The hilar structures are normal.     No acute cardiopulmonary disease Stable cardiomegaly       RECENT VITALS:     Temp: 98.1 °F (36.7 °C),  Pulse: 91, Resp: 18, BP: 127/87, SpO2: 96 %    This patient is a 64 y.o. Female with chest pain, thought her AICD fired. Interrogation was negative for firing, last one fired in 2020. Trops negative thus far. Admitted to observation as cardiology would like to evaluate patient. ED Course as of 21 [address] 2021   1440 Pro-BNP(!): 810  Elevated BNP at baseline, but previous in 4000s. [CP]   6014 Troponin, High Sensitivity: 12  Troponins not acutely elevated [CP]   1600 Glucose(!): 68  Patient given boxed lunch [CP]   1023 No discharges seen on interrogation report. Last delivered shock reported on 2020. [CP]   4418 Patient with some chest discomfort at this time [CP]      ED Course User Index  [CP] Sherri Archer MD       OUTSTANDING TASKS / RECOMMENDATIONS:    1. Transfer to floor     FINAL IMPRESSION:     1. Atypical chest pain        DISPOSITION:         DISPOSITION:  []  Discharge   []  Transfer -    [x]  Admission -   obs   []  Against Medical Advice   []  Eloped   FOLLOW-UP: No follow-up provider specified.    DISCHARGE MEDICATIONS: Current Discharge Medication List              Rita Apgar, MD  Emergency Medicine Resident  [facility]       Rita Apgar, MD  11/15/21 9184

## 2021-11-15 VITALS
TEMPERATURE: 97.5 F | BODY MASS INDEX: 27.05 KG/M2 | DIASTOLIC BLOOD PRESSURE: 72 MMHG | WEIGHT: 147 LBS | HEIGHT: 62 IN | HEART RATE: 79 BPM | RESPIRATION RATE: 20 BRPM | SYSTOLIC BLOOD PRESSURE: 128 MMHG | OXYGEN SATURATION: 99 %

## 2021-11-15 LAB
EKG ATRIAL RATE: 68 BPM
EKG ATRIAL RATE: 80 BPM
EKG P AXIS: 45 DEGREES
EKG P AXIS: 65 DEGREES
EKG P-R INTERVAL: 182 MS
EKG P-R INTERVAL: 188 MS
EKG Q-T INTERVAL: 442 MS
EKG Q-T INTERVAL: 486 MS
EKG QRS DURATION: 134 MS
EKG QRS DURATION: 136 MS
EKG QTC CALCULATION (BAZETT): 509 MS
EKG QTC CALCULATION (BAZETT): 516 MS
EKG R AXIS: -5 DEGREES
EKG R AXIS: 26 DEGREES
EKG T AXIS: 17 DEGREES
EKG T AXIS: 59 DEGREES
EKG VENTRICULAR RATE: 68 BPM
EKG VENTRICULAR RATE: 80 BPM

## 2021-11-15 PROCEDURE — 2500000003 HC RX 250 WO HCPCS

## 2021-11-15 PROCEDURE — 2580000003 HC RX 258: Performed by: STUDENT IN AN ORGANIZED HEALTH CARE EDUCATION/TRAINING PROGRAM

## 2021-11-15 PROCEDURE — 93010 ELECTROCARDIOGRAM REPORT: CPT | Performed by: INTERNAL MEDICINE

## 2021-11-15 PROCEDURE — 93005 ELECTROCARDIOGRAM TRACING: CPT | Performed by: STUDENT IN AN ORGANIZED HEALTH CARE EDUCATION/TRAINING PROGRAM

## 2021-11-15 PROCEDURE — G0378 HOSPITAL OBSERVATION PER HR: HCPCS

## 2021-11-15 PROCEDURE — 6370000000 HC RX 637 (ALT 250 FOR IP): Performed by: STUDENT IN AN ORGANIZED HEALTH CARE EDUCATION/TRAINING PROGRAM

## 2021-11-15 RX ORDER — DEXTROSE, SODIUM CHLORIDE, AND POTASSIUM CHLORIDE 5; .45; .15 G/100ML; G/100ML; G/100ML
INJECTION INTRAVENOUS CONTINUOUS
Status: DISCONTINUED | OUTPATIENT
Start: 2021-11-15 | End: 2021-11-15 | Stop reason: HOSPADM

## 2021-11-15 RX ADMIN — ACETAMINOPHEN 650 MG: 325 TABLET ORAL at 08:47

## 2021-11-15 RX ADMIN — SPIRONOLACTONE 25 MG: 25 TABLET ORAL at 08:47

## 2021-11-15 RX ADMIN — SACUBITRIL AND VALSARTAN 1 TABLET: 24; 26 TABLET, FILM COATED ORAL at 08:47

## 2021-11-15 RX ADMIN — PANTOPRAZOLE SODIUM 20 MG: 20 TABLET, DELAYED RELEASE ORAL at 08:47

## 2021-11-15 RX ADMIN — SODIUM CHLORIDE, PRESERVATIVE FREE 10 ML: 5 INJECTION INTRAVENOUS at 08:47

## 2021-11-15 RX ADMIN — IBUPROFEN 800 MG: 800 TABLET, FILM COATED ORAL at 08:48

## 2021-11-15 RX ADMIN — POTASSIUM CHLORIDE, DEXTROSE MONOHYDRATE AND SODIUM CHLORIDE: 150; 5; 450 INJECTION, SOLUTION INTRAVENOUS at 08:47

## 2021-11-15 RX ADMIN — BUMETANIDE 1 MG: 1 TABLET ORAL at 08:48

## 2021-11-15 ASSESSMENT — PAIN SCALES - GENERAL
PAINLEVEL_OUTOF10: 7
PAINLEVEL_OUTOF10: 7

## 2021-11-15 ASSESSMENT — PAIN DESCRIPTION - PAIN TYPE: TYPE: ACUTE PAIN

## 2021-11-15 ASSESSMENT — PAIN DESCRIPTION - LOCATION: LOCATION: CHEST

## 2021-11-15 NOTE — PROGRESS NOTES
[facility]  Documentation Note                Admission Dx: Atypical chest pain [R07.89]    Past Medical History:   has a past medical history of AICD discharge, RIP (acute kidney injury) ([facility] Asthma, Ast[facility] with COPD with exacerbation ([facility] CAD (coronary artery disease), Cardiomegaly, Chest pain, CHF (congestive heart failure) ([facility] Chronic systolic heart failure ([facility] s/p AICD, COPD (chronic obstructive pulmonary disease) ([facility] Depression, Fibroids, Hypertension, Hypomagnesemia, Intraparenchymal hematoma of left side of brain due to trauma [facility]), Lesion of right native kidney, Lung nodule < 6cm on CT, MI (myocardial infarction) ([facility] Mild intermittent asthma, Non-ischemic cardiomyopathy ([facility] NSTEMI (non-ST elevated myocardial infarction) ([facility] QT prolongation, Syncope, and Unspecified diseases of blood and blood-forming organs. Previous Testing:     ECHO 2/27/2020: EF 20%, mild LVH, severe global hypokinesis, grade III DD, NORMA, moderate MR, possibly severe. Moderate to severe TR, small PCE. CATH 6/11/19: Normal coronaries. EP eval for VFib.      ICD CHANGE OUT 6/9/19: Done by Dr. Vidya Steiner due to battery at BILL.      ECHO 4/24/18: EF 10-15%. Lead noted in right sided chambers. Severe MR. Mod TR. RVSP 48.      STRESS 10/10/14: Mod size fixed defect involving the apical to mid inferior wall of the LV. EF 42%    Previous office/hospital visit:   Irving Dumont NP 9/25/2020:   1. VF  2. AICD  3. ECHO 4/2/18 EF 10-15%, Leaded noted in right chamber. Severe MR, Mod TR, RVSP 48  4. ICD change out due to battery BILL  5. Cardiac cath 8/11/19 Normal cors  6. ECHO 2/27/20 EF 22%, Mild LVH, Severe Global hypokinesis, Grade III DD, At least mod MR, Mod to severe TR, Small pericardial effusion    Plan --   1. AICD shock documented on Device check. Will order BMP and Magnesium, ECG intrinsic recording SR without ischemia noted. Will increase BB.  Patient instructed to go to ER with future shocks. 2. HFpEF stable Not fluid overload. Continue BB, Entresto, Bumex, Aldactone. 3. Follow up with lab results in 2-3 months with Physician.     Mychal , [facility]

## 2021-11-15 NOTE — H&P
[facility]  CDU / OBSERVATION eNCOUnter  Resident Note     Pt Name: Jillian Luciano  MRN: [id number]  [place] 1965  Date of evaluation: 11/15/21  Patient's PCP is :  Dany Payan MD    CHIEF COMPLAINT       Chief Complaint   Patient presents with    AICD Problem         HISTORY OF PRESENT ILLNESS    Jillian Luciano is a 64 y.o. female who presents to ED with complaints of sharp chest pain that started when she was cooking and concern for AICD firing. Patient notes she did feel some palpitations shortly after but those are now improved. No discharges were seen on interrogation report with last shock in 9/20/2020 per ED documentation. Tropes negative x3. Pt was admitted for cardiology consult. Patient denies any chest pain this morning and notes she mainly just came in for pacemaker interrogation. Denies any shortness of breath, lightheadedness, shortness of breath, dizziness, nausea, vomiting, diarrhea. REVIEW OF SYSTEMS       General ROS - No fevers, No malaise   Ophthalmic ROS - No discharge, No changes in vision  ENT ROS -  No sore throat, No rhinorrhea,   Respiratory ROS - no shortness of breath, no cough, no  wheezing  Cardiovascular ROS - + chest pain, no dyspnea on exertion  Gastrointestinal ROS - No abdominal pain, no nausea or vomiting, no change in bowel habits, no black or bloody stools  Genito-Urinary ROS - No dysuria, trouble voiding, or hematuria  Musculoskeletal ROS - No myalgias, No arthalgias  Neurological ROS - No headache, no dizziness/lightheadedness, No focal weakness, no loss of sensation  Dermatological ROS - No lesions, No rash     (PQRS) Advance directives on face sheet per hospital policy.  No change unless specifically mentioned in chart    PAST MEDICAL HISTORY    has a past medical history of AICD discharge, RIP (acute kidney injury) ([address] Asthma, Asthma with COPD with exacerbation ([address] CAD (coronary artery disease), Cardiomegaly, that her maternal grandmother is . She indicated that her maternal grandfather is . She indicated that her paternal grandmother is . She indicated that her paternal grandfather is . family history includes Diabetes in her father and mother; Heart Disease in her brother and mother; High Blood Pressure in her mother. The patient denies any pertinent family history. I have reviewed and agree with the family history entered. I have reviewed the Family History and it is not significant to the case    SOCIAL HISTORY      reports that she quit smoking about 20 months ago. Her smoking use included cigarettes. She has a 7.50 pack-year smoking history. She has never used smokeless tobacco. She reports current alcohol use of about 1.0 standard drink of alcohol per week. She reports that she does not use drugs. I have reviewed and agree with all Social.  There are no concerns for substance abuse/use. PHYSICAL EXAM     INITIAL VITALS:  height is 5' 2\" (1.575 m) and weight is 147 lb (66.7 kg). Her oral temperature is 97.2 °F (36.2 °C). Her blood pressure is 104/64 and her pulse is 95. Her respiration is 16 and oxygen saturation is 98%.       CONSTITUTIONAL: AOx4, no apparent distress, appears stated age    HEAD: normocephalic, atraumatic   EYES: PERRLA, EOMI    ENT: moist mucous membranes, uvula midline   NECK: supple, symmetric   BACK: symmetric   LUNGS: clear to auscultation bilaterally   CARDIOVASCULAR: regular rate and rhythm, no murmurs, rubs or gallops   ABDOMEN: soft, non-tender, non-distended with normal active bowel sounds   NEUROLOGIC:  MAEx4, no focal sensory or motor deficits   MUSCULOSKELETAL: no clubbing, cyanosis or edema   SKIN: no rash or wounds       DIFFERENTIAL DIAGNOSIS/MDM:     Chest Pain:  DDX: Emergent: ACS/NSTEMI/STEMI/angina, arrhythmia, trauma, aortic dissection,  PE, PNA, pneumothroax, esophageal rupture, tamponade, Cocaine use  Nonemergent: pneumonia, pericarditis, GERD, MSK, Endocarditis, anxiety  Evaluated for: diaphoresis, present chest pain, tachypnea, BP both arms, heart sounds, JVD, tender chest wall, wheezing      DIAGNOSTIC RESULTS     EKG: All EKG's are interpreted by the Observation Physician who either signs or Co-signs this chart in the absence of a cardiologist.    EKG Interpretation    Interpreted by observation physician    Atrial sensed ventricular paced rhythm. 80bpm. NJ normal at 188ms. Wide QRS. No acute ST elevation or depression. No T wave inversions. Alanna Hall MD    RADIOLOGY:   I directly visualized the following  images and reviewed the radiologist interpretations:    XR CHEST PORTABLE    Result Date: 11/14/2021  EXAMINATION: ONE XRAY VIEW OF THE CHEST 11/14/2021 11:49 am COMPARISON: 05/17/2020 HISTORY: ORDERING SYSTEM PROVIDED HISTORY: chest pain TECHNOLOGIST PROVIDED HISTORY: chest pain Reason for Exam: upright port Acuity: Unknown Type of Exam: Unknown FINDINGS: Stable ICD leads. The cardiac size is mildly enlarged. No acute infiltrates or pleural effusions are seen. Pulmonary vascularity appears normal. .  . No acute bony abnormalities. The hilar structures are normal.     No acute cardiopulmonary disease Stable cardiomegaly       LABS:  I have reviewed and interpreted all available lab results.   Labs Reviewed   BRAIN NATRIURETIC PEPTIDE - Abnormal; Notable for the following components:       Result Value    Pro- (*)     All other components within normal limits   CBC WITH AUTO DIFFERENTIAL - Abnormal; Notable for the following components:    RDW 15.0 (*)     All other components within normal limits   BASIC METABOLIC PANEL W/ REFLEX TO MG FOR LOW K - Abnormal; Notable for the following components:    Glucose 68 (*)     CREATININE 1.15 (*)     GFR Non- 49 (*)     GFR  59 (*)     All other components within normal limits   IMMATURE PLATELET FRACTION - Abnormal; Notable for the following components:    Platelet, Immature Fraction 11.0 (*)     All other components within normal limits   COVID-19, RAPID   TROPONIN   TROPONIN   TROPONIN   BASIC METABOLIC PANEL   MAGNESIUM   CBC WITH AUTO DIFFERENTIAL   TROPONIN       SCREENING TOOLS:    HEART Risk Score for Chest Pain Patients   History and Physical Exam Suspicion Level  (Nausea, Vomiting, Diaphoresis, Radiation, Exertion)   Slightly Suspicious (0 pts)   Moderately Suspicious (1 pt)   Highly Suspicious (2 pts)   EKG Interpretation   Normal (0 pts)   Non-Specific Repolarization Disturbance (1 pt)   Significant ST-Depression (2 pts)   Age of Patient (in years)   = 39 (0 pts)   46-64 (1 pt)   = 65 (2 pts)   Risk Factors   No Risk Factors (0 pts)   1-2 Risk Factors (1 pt)   = 3 Risk Factors (2 pts)   Risk Factors Include:   Hypercholesterolemia   Hypertension   Diabetes Mellitus   Cigarette smoking   Positive family history   Obesity   CAD   (SLE, CKDz, HIV, Cocaine abuse)   Troponin Levels   = Normal Limit (0 pts)   1-3 Times Normal Limit (1 pt)   > 3 Times Normal Limit (2 pts)  TOTAL:    Percent Risk for Major Adverse Cardiac Event (MACE)  0-3 pts indicates low risk for MACE   2.5% (DISCHARGE)   4-7 pts indicates moderate risk for MACE  20.3% (OBS)  8-10 pts indicates high risk for MACE  72.7% (EARLY INVASIVE TX)    CDU IMPRESSION / Sonalimabel Burns is a 64 y.o. female who presents with complaints of sharp chest pain started when she was cooking, now improved. Patient with AICD and concern for it firing yesterday. In ED had ICD interrogation that was negative for firing. Was admitted for cardiology consult.     · Cardiology consulted, appreciate recommendations  · Continue home medications and pain control  · Monitor vitals, labs, and imaging  · DISPO: pending consults and clinical improvement    CONSULTS:    IP CONSULT TO CARDIOLOGY  IP CONSULT TO CARDIOLOGY    PROCEDURES:  Not indicated       PATIENT REFERRED TO:    No follow-up provider specified. --  Deuce Al MD   Emergency Medicine Resident     This dictation was generated by voice recognition computer software. Although all attempts are made to edit the dictation for accuracy, there may be errors in the transcription that are not intended.

## 2021-11-15 NOTE — CONSULTS
Attending Physician Statement:    I have discussed the care of  Page Castro , including pertinent history and exam findings, with the Cardiology fellow/resident. I have seen and examined the patient and the key elements of all parts of the encounter have been performed by me. I agree with the assessment, plan and orders as documented by the fellow/resident, after I modified exam findings and plan of treatments, and the final version is my approved version of the assessment. Additional Comments:   Sharp chest pain lasted for seconds , Atypical , doubt ACS   Had Extensive non-ischemic CM , AICD , interogation s no shock   No further cardiac work up . Pt already swapna to see in our office   Dr. Peyton Moreno Cardiology Cardiology    Consult / H&P               Today's Date: 11/15/2021  Patient Name: Page Castro  Date of admission: 11/14/2021  1:48 PM  Patient's age: 64 y.o., 1965  Admission Dx: Atypical chest pain [R07.89]    Reason for Consult:  Cardiac evaluation    Requesting Physician: Nate Humphreys MD    CHIEF COMPLAINT:  Chest pain    History Obtained From:  patient, electronic medical record    HISTORY OF PRESENT ILLNESS:      The patient is a 64 y.o.  female who is admitted to the hospital for Chest Wall Pain  Patient presents for evaluation of chest wall pain. Onset was 1 day ago. Symptoms have been improving since that time. The patient describes the pain as sharp in the costal margin: on the left. Associated symptoms are: none. Aggravating factors are: palpation of chest. Alleviating factors are: none. Mechanism of injury:  laughing while doing the dishes . Previous visits for this problem: none. Evaluation to date: chest x-ray which was normal and lab work which was abnormal  proBNP 810 . Treatment to date: OTC analgesics: Yes: APAP, which has been somewhat effective. .    Past Medical History:   has a past medical history of AICD discharge, RIP (acute kidney injury) ([facility] Asthma, Asthma with COPD with exacerbation ([facility] CAD (coronary artery disease), Cardiomegaly, Chest pain, CHF (congestive heart failure) ([facility] Chronic systolic heart failure ([facility] s/p AICD, COPD (chronic obstructive pulmonary disease) ([facility] Depression, Fibroids, Hypertension, Hypomagnesemia, Intraparenchymal hematoma of left side of brain due to trauma [facility]), Lesion of right native kidney, Lung nodule < 6cm on CT, MI (myocardial infarction) ([facility] Mild intermittent asthma, Non-ischemic cardiomyopathy ([facility] NSTEMI (non-ST elevated myocardial infarction) ([facility] QT prolongation, Syncope, and Unspecified diseases of blood and blood-forming organs. Past Surgical History:   has a past surgical history that includes Cardiac defibrillator placement (09/2005); Pacemaker insertion; Tubal ligation; Cardiac defibrillator placement; and Uterine fibroid surgery (maarch 2015). Home Medications:    Prior to Admission medications    Medication Sig Start Date End Date Taking?  Authorizing Provider   L-Methylfolate-B6-B12 (FOLBIC RF) 1.13-25-2 MG TABS Take by mouth   Yes Historical Provider, MD   amitriptyline (ELAVIL) 25 MG tablet TAKE 1 TABLET BY MOUTH EVERY NIGHT AT BEDTIME 5/14/21  Yes Harvinder Levine MD   bumetanide (BUMEX) 1 MG tablet TAKE 1 TABLET BY MOUTH 2 TIMES A DAY 5/14/21  Yes Harvinder Levine MD   sacubitril-valsartan (ENTRESTO) 24-26 MG per tablet TAKE 1 TABLET BY MOUTH 2 TIMES A DAY 5/14/21  Yes Harvinder Levine MD   magnesium oxide (MAG-OX) 400 (241.3 Mg) MG TABS tablet TAKE 1 TABLET BY MOUTH ONE TIME A DAY 5/14/21  Yes Harvinder Levine MD   omeprazole (PRILOSEC) 20 MG delayed release capsule TAKE 1 CAPSULE BY MOUTH ONE TIME A DAY 5/14/21  Yes Harvinder Levine MD   spironolactone (ALDACTONE) 25 MG tablet TAKE 1 TABLET BY MOUTH ONE TIME A DAY 5/14/21  Yes Harvinder Levine MD   metoprolol succinate (TOPROL XL) 50 MG extended release tablet TAKE ONE TABLET BY MOUTH EVERY MORNING 3/22/21  Yes Santos Yi MD   Nebulizers (COMPRESSOR/NEBULIZER) MISC 1 Device by Does not apply route 4 times daily 7/15/20  Yes Celine Amanda MD   amiodarone (CORDARONE) 200 MG tablet Take 200 mg by mouth daily Dose reduction initiated 5/26/2020 by cardiology   Yes Hector Fatima MD   albuterol sulfate HFA (VENTOLIN HFA) 108 (90 Base) MCG/ACT inhaler INHALE 2 PUFFS INTO THE LUNGS EVERY 6 HOURS AS NEEDED FOR WHEEZING 4/1/20  Yes Deshawn Burrows MD   ipratropium-albuterol (DUONEB) 0.5-2.5 (3) MG/3ML SOLN nebulizer solution Inhale 3 mLs into the lungs 2 times daily as needed for Shortness of Breath 1/20/20  Yes Deshawn Burrows MD   fluticasone (FLONASE) 50 MCG/ACT nasal spray 1 spray by Nasal route daily 11/1/19  Yes Bill Oscar MD   atorvastatin (LIPITOR) 40 MG tablet Take 1 tablet by mouth nightly 6/14/19  Yes ANGY Bo - CNP   sennosides-docusate sodium (SENOKOT-S) 8.6-50 MG tablet Take 1 tablet by mouth daily 12/23/18  Yes Destiny Chacon MD   Spacer/Aero-Holding Chambers (E-Z SPACER) ELVER 1 Device by Does not apply route daily 11/25/18  Yes Nadine Rudolph MD   Multiple Vitamin (MULTIVITAMIN) tablet TAKE ONE TABLET BY MOUTH ONE TIME A DAY 6/18/18  Yes Elvis Becker MD   L-Methylfolate-B6-B12 (FOLBIC RF) 1.13-25-2 MG TABS TAKE 1 TABLET BY MOUTH ONE TIME A DAY 5/14/21   Celine Amanda MD      Current Facility-Administered Medications: dextrose 5 % and 0.45 % NaCl with KCl 20 mEq infusion, , IntraVENous, Continuous  sodium chloride flush 0.9 % injection 5-40 mL, 5-40 mL, IntraVENous, 2 times per day  sodium chloride flush 0.9 % injection 5-40 mL, 5-40 mL, IntraVENous, PRN  0.9 % sodium chloride infusion, 25 mL, IntraVENous, PRN  acetaminophen (TYLENOL) tablet 650 mg, 650 mg, Oral, Q6H PRN  ibuprofen (ADVIL;MOTRIN) tablet 800 mg, 800 mg, Oral, Q6H PRN  albuterol sulfate  (90 Base) MCG/ACT inhaler 2 puff, 2 puff, Inhalation, Q4H PRN  amitriptyline (ELAVIL) tablet 25 mg, 25 mg, Oral, Nightly  atorvastatin (LIPITOR) tablet 40 mg, 40 mg, Oral, Nightly  bumetanide (BUMEX) tablet 1 mg, 1 mg, Oral, BID  [Held by provider] metoprolol succinate (TOPROL XL) extended release tablet 50 mg, 50 mg, Oral, Daily  pantoprazole (PROTONIX) tablet 20 mg, 20 mg, Oral, QAM AC  sacubitril-valsartan (ENTRESTO) 24-26 MG per tablet 1 tablet, 1 tablet, Oral, BID  spironolactone (ALDACTONE) tablet 25 mg, 25 mg, Oral, Daily    Allergies: Iv contrast [iodides]    Social History:   reports that she quit smoking about 20 months ago. Her smoking use included cigarettes. She has a 7.50 pack-year smoking history. She has never used smokeless tobacco. She reports current alcohol use of about 1.0 standard drink of alcohol per week. She reports that she does not use drugs. Family History: family history includes Diabetes in her father and mother; Heart Disease in her brother and mother; High Blood Pressure in her mother. No h/o sudden cardiac death. No for premature CAD    REVIEW OF SYSTEMS:    · Constitutional: there has been no unanticipated weight loss. There's been No change in energy level, No change in activity level. · Eyes: No visual changes or diplopia. No scleral icterus. · ENT: No Headaches  · Cardiovascular: No cardiac history  · Respiratory: No previous pulmonary problems, No cough  · Gastrointestinal: No abdominal pain. No change in bowel or bladder habits. · Genitourinary: No dysuria, trouble voiding, or hematuria. · Musculoskeletal:  No gait disturbance, No weakness or joint complaints. · Integumentary: No rash or pruritis. · Neurological: No headache, diplopia, change in muscle strength, numbness or tingling. No change in gait, balance, coordination, mood, affect, memory, mentation, behavior. · Psychiatric: No anxiety, or depression. · Endocrine: No temperature intolerance. No excessive thirst, fluid intake, or urination. No tremor.   · Hematologic/Lymphatic: No abnormal bruising or bleeding, blood clots or swollen lymph nodes. · Allergic/Immunologic: No nasal congestion or hives. PHYSICAL EXAM:      /64   Pulse 95   Temp 97.2 °F (36.2 °C) (Oral)   Resp 16   Ht 5' 2\" (1.575 m)   Wt 147 lb (66.7 kg)   LMP 2016   SpO2 98%   BMI 26.89 kg/m²    Constitutional and General Appearance: alert, cooperative, no distress and appears stated age  [de-identified]: PERRL, no cervical lymphadenopathy. No masses palpable. Normal oral mucosa  Respiratory:  · Normal excursion and expansion without use of accessory muscles  · Resp Auscultation: Good respiratory effort. No for increased work of breathing. On auscultation: clear to auscultation bilaterally  Cardiovascular:  · The apical impulse is not displaced  · Heart tones are crisp and normal. regular S1 and S2.  · Jugular venous pulsation Normal  · The carotid upstroke is normal in amplitude and contour without delay or bruit  · Peripheral pulses are symmetrical and full   Abdomen:   · No masses or tenderness  · Bowel sounds present  Extremities:  ·  No Cyanosis or Clubbing  ·  Lower extremity edema: No  ·  Skin: Warm and dry  Neurological:  · Alert and oriented. · Moves all extremities well  · No abnormalities of mood, affect, memory, mentation, or behavior are noted    DATA:    Diagnostics:      [id number]  Atrial-sensed ventricular-paced rhythm  Abnormal ECG  When compared with ECG of 2021 13:59,  Vent. rate has decreased BY  12 BPM    ECHO:   2020  Summary  Left ventricle is enlarged. Global left ventricular systolic function is  severely reduced. Estimated ejection fraction is 20 % . Calculated EF via heart model is 22 %. Mild left ventricular hypertrophy. Severe global hypokinesis. Grade III (severe) left ventricular diastolic dysfunction. Biatrial enlargement. Right ventricular dilatation with reduced systolic function.   At least moderate mitral valve regurgitation, possiibly severe  Moderate to severe tricuspid regurgitation. Small pericardial effusion. Stress Test:   1010/2014  IMPRESSION:     Abnormal study. Moderate-sized area of mild fixed defect involving the apical to mid inferior wall the left ventricle suggesting infarct. Within the periphery of this fixed defect is a small area of mild reversible defect within the apical to mid inferior wall of the left ventricle suggesting ischemia. Mild hypokinesia and decreased myocardial thickening involving the inferior wall. Calculated left ventricular ejection fraction of 42%. Cardiac Angiography:   6/11/2019  Conclusions   Procedure Summary   Normal coronary arteries   Known severe LV dysfunction   Recommendations    Medical treatments   EP evaluation   Angiographic Findings      Cardiac Arteries and Lesion Findings  LMCA: Normal 0% stenosis. LAD: Normal 0% stenosis. LCx: Normal 0% stenosis. RCA: Normal 0% stenosis. Coronary Tree Dominance: Right    Labs:     CBC:   Recent Labs     11/14/21  1421   WBC 5.5   HGB 13.0   HCT 40.0   PLT See Reflexed IPF Result     BMP:   Recent Labs     11/14/21  1421      K 3.9   CO2 23   BUN 16   CREATININE 1.15*   LABGLOM 49*   GLUCOSE 68*     CARDIAC ENZYMES: troponin, high sens: 12 > 12 > 10; proBNP: 810. Lab Results   Component Value Date    HDL 59 11/01/2019    TRIG 95 11/01/2019     LIVER PROFILE:No results for input(s): AST, ALT, LABALBU in the last 72 hours. IMPRESSION:    1. Non-cardiac chest pain  2. HTN  3. CHF  4. EF 42%  5.  Grade III diastolic dysfuunction    Patient Active Problem List   Diagnosis    COPD (chronic obstructive pulmonary disease) ([address]    Fibroids    Syncope    Lung nodule < 6cm on CT    Chronic systolic heart failure (HCC) s/p AICD    Mild intermittent asthma    Essential hypertension    Chest pain    QT prolongation    Asthma with COPD with exacerbation ([address]    Non-ischemic cardiomyopathy ([address]    Lesion of right native kidney    NSTEMI (non-ST elevated myocardial infarction) ([facility]    CAD (coronary artery disease)    Intraparenchymal hematoma of left side of brain due to trauma (HCC)    Chronic combined systolic and diastolic congestive heart failure ([facility]    AICD discharge    RIP (acute kidney injury) ([facility]    Hypomagnesemia    Other heart failure (HCC)    Atypical chest pain       RECOMMENDATIONS:  1. Follow up on AICD interrogation  2. Maintain K > 4, Mg > 2.  3. Remainder of care as per primary team.    Will discuss with rounding attending  for final recommendations.     Chilango Hensley MD  PGY-1, Internal Medicine Resident  [facility]         11/15/2021, 9:58 AM

## 2021-11-15 NOTE — PROGRESS NOTES
[address]  CDU / OBSERVATION ENCOUNTER  ATTENDING NOTE       I performed a history and physical examination of the patient and discussed management with the resident or midlevel provider. I reviewed the resident or midlevel provider's note and agree with the documented findings and plan of care. Any areas of disagreement are noted on the chart. I was personally present for the key portions of any procedures. I have documented in the chart those procedures where I was not present during the key portions. I have reviewed the nurses notes. I agree with the chief complaint, past medical history, past surgical history, allergies, medications, social and family history as documented unless otherwise noted below. The Family history, social history, and ROS are effectively unchanged since admission unless noted elsewhere in the chart. Patient is 20 minutes of sharp pain in the chest while cooking. Patient was not lightheaded at the time. Patient denied dizziness. She did not have chest pain or palpitations at the time. Patient had pacemaker interrogated in the emergency department. Per device interrogation the last shocking been delivered on 9/28/2020. Patient had work-up in the emergency department which showed a BNP of 810 with previous BMPs in the 4000 range. Patient had a troponin of 12 and a glucose of 68. Patient admitted to observation unit for cardiac evaluation.     Adelso Buck MD  Attending Emergency  Physician

## 2021-11-15 NOTE — DISCHARGE SUMMARY
CDU Discharge Summary        Patient:  April Celestin  YOB: 1965    MRN: [id number]   Acct: [de-identified]    Primary Care Physician: Samreen Rodriguez MD    Admit date:  11/14/2021  1:48 PM  Discharge date: 11/15/2021 12:15 PM     Discharge Diagnoses:     Acute chest pain, unknown etiology  Improved with rest, pain medication    Follow-up:  Call today/tomorrow for a follow up appointment with Samreen Rodriguez MD , or return to the Emergency Room with worsening symptoms    Stressed to patient the importance of following up with primary care doctor for further workup/management of symptoms. Pt verbalizes understanding and agrees with plan. Discharge Medications:  Changes to medications       @DISCHARGEMEDSLIST(<NOROUTINE> error)@    Diet:  ADULT DIET; Regular , Advance as tolerated     Activity:  As tolerated    Consultants: IP CONSULT TO CARDIOLOGY  IP CONSULT TO CARDIOLOGY    Procedures:  Not indicated     Diagnostic Test:   Results for orders placed or performed during the hospital encounter of 11/14/21   COVID-19, Rapid    Specimen: Nasopharyngeal Swab   Result Value Ref Range    Specimen Description . NASOPHARYNGEAL SWAB     SARS-CoV-2, Rapid Not Detected Not Detected   Troponin   Result Value Ref Range    Troponin, High Sensitivity 12 0 - 14 ng/L    Troponin T NOT REPORTED <0.03 ng/mL    Troponin Interp NOT REPORTED    Troponin   Result Value Ref Range    Troponin, High Sensitivity 12 0 - 14 ng/L    Troponin T NOT REPORTED <0.03 ng/mL    Troponin Interp NOT REPORTED    Troponin   Result Value Ref Range    Troponin, High Sensitivity 10 0 - 14 ng/L    Troponin T NOT REPORTED <0.03 ng/mL    Troponin Interp NOT REPORTED    Brain Natriuretic Peptide   Result Value Ref Range    Pro- (H) <300 pg/mL    BNP Interpretation NOT REPORTED    CBC WITH AUTO DIFFERENTIAL   Result Value Ref Range    WBC 5.5 3.5 - 11.3 k/uL    RBC 4.48 3.95 - 5.11 m/uL    Hemoglobin 13.0 11.9 - 15.1 g/dL    Hematocrit 40.0 36.3 - 47.1 %    MCV 89.3 82.6 - 102.9 fL    MCH 29.0 25.2 - 33.5 pg    MCHC 32.5 28.4 - 34.8 g/dL    RDW 15.0 (H) 11.8 - 14.4 %    Platelets See Reflexed IPF Result 138 - 453 k/uL    MPV NOT REPORTED 8.1 - 13.5 fL    NRBC Automated 0.0 0.0 per 100 WBC    Differential Type NOT REPORTED     WBC Morphology NOT REPORTED     RBC Morphology ANISOCYTOSIS PRESENT     Platelet Estimate NOT REPORTED     Seg Neutrophils 54 36 - 65 %    Lymphocytes 34 24 - 43 %    Monocytes 10 3 - 12 %    Eosinophils % 1 1 - 4 %    Basophils 1 0 - 2 %    Immature Granulocytes 0 0 %    Segs Absolute 2.94 1.50 - 8.10 k/uL    Absolute Lymph # 1.87 1.10 - 3.70 k/uL    Absolute Mono # 0.54 0.10 - 1.20 k/uL    Absolute Eos # 0.06 0.00 - 0.44 k/uL    Basophils Absolute 0.03 0.00 - 0.20 k/uL    Absolute Immature Granulocyte <0.03 0.00 - 0.30 k/uL   Basic Metabolic Panel w/ Reflex to MG   Result Value Ref Range    Glucose 68 (L) 70 - 99 mg/dL    BUN 16 6 - 20 mg/dL    CREATININE 1.15 (H) 0.50 - 0.90 mg/dL    Bun/Cre Ratio NOT REPORTED 9 - 20    Calcium 10.2 8.6 - 10.4 mg/dL    Sodium 139 135 - 144 mmol/L    Potassium 3.9 3.7 - 5.3 mmol/L    Chloride 102 98 - 107 mmol/L    CO2 23 20 - 31 mmol/L    Anion Gap 14 9 - 17 mmol/L    GFR Non-African American 49 (L) >60 mL/min    GFR  59 (L) >60 mL/min    GFR Comment          GFR Staging NOT REPORTED    Immature Platelet Fraction   Result Value Ref Range    Platelet, Immature Fraction 11.0 (H) 1.1 - 10.3 %    Platelet, Fluorescence 140 138 - 453 k/uL   EKG 12 Lead   Result Value Ref Range    Ventricular Rate 80 BPM    Atrial Rate 80 BPM    P-R Interval 188 ms    QRS Duration 134 ms    Q-T Interval 442 ms    QTc Calculation (Bazett) 509 ms    P Axis 45 degrees    R Axis -5 degrees    T Axis 59 degrees   EKG 12 Lead   Result Value Ref Range    Ventricular Rate 68 BPM    Atrial Rate 68 BPM    P-R Interval 182 ms    QRS Duration 136 ms    Q-T Interval 486 ms    QTc Calculation (Bazett) 516 ms    P Axis 65 degrees    R Axis 26 degrees    T Axis 17 degrees     XR CHEST PORTABLE    Result Date: 11/14/2021  EXAMINATION: ONE XRAY VIEW OF THE CHEST 11/14/2021 11:49 am COMPARISON: 05/17/2020 HISTORY: ORDERING SYSTEM PROVIDED HISTORY: chest pain TECHNOLOGIST PROVIDED HISTORY: chest pain Reason for Exam: upright port Acuity: Unknown Type of Exam: Unknown FINDINGS: Stable ICD leads. The cardiac size is mildly enlarged. No acute infiltrates or pleural effusions are seen. Pulmonary vascularity appears normal. .  . No acute bony abnormalities. The hilar structures are normal.     No acute cardiopulmonary disease Stable cardiomegaly           Physical Exam:    General appearance - NAD, AOx 3   Lungs -CTAB, no R/R/R  Heart - RRR, no M/R/G  Abdomen - Soft, NT/ND  Neurological:  MAEx4, No focal motor deficit, sensory loss  Extremities - Cap refil <2 sec in all ext., no edema  Skin -warm, dry      Hospital Course:  Clinical course has improved, labs and imaging reviewed. Sully Mejia originally presented to the hospital on 11/14/2021  1:48 PM. with chest pain, sharp, improved with rest and pain medication. At that time it was determined that She required further observation and cardiology consult. Pt has AICD that was interrogated without evidence of firing with chest pain episode. She was admitted and labs and imaging were followed daily. Imaging results as above. She is medically stable to be discharged. Disposition: Home    Patient stated that they will not drive themselves home from the hospital if they have gotten pain killers/ narcotics earlier that day and that they will arrange for transportation on their own or work with the  for a ride. Patient counseled NOT to drive while under the influence of narcotics/ pain killers. Condition: Good    Patient stable and ready for discharge home. I have discussed plan of care with patient and they are in understanding.  They were instructed to read discharge paperwork. All of their questions and concerns were addressed. Time Spent: 0 day      --  Montserrat De La Torre MD  Emergency Medicine Resident Physician    This dictation was generated by voice recognition computer software. Although all attempts are made to edit the dictation for accuracy, there may be errors in the transcription that are not intended.

## 2021-11-17 ENCOUNTER — CARE COORDINATION (OUTPATIENT)
Dept: FAMILY MEDICINE CLINIC | Age: 56
End: 2021-11-17

## 2021-11-17 ENCOUNTER — TELEPHONE (OUTPATIENT)
Dept: FAMILY MEDICINE CLINIC | Age: 56
End: 2021-11-17

## 2021-11-18 DIAGNOSIS — I50.22 CHRONIC SYSTOLIC HEART FAILURE (HCC): ICD-10-CM

## 2021-11-18 RX ORDER — SPIRONOLACTONE 25 MG/1
TABLET ORAL
Qty: 30 TABLET | Refills: 5 | Status: CANCELLED | OUTPATIENT
Start: 2021-11-18

## 2021-11-18 RX ORDER — BUMETANIDE 1 MG/1
TABLET ORAL
Qty: 60 TABLET | Refills: 5 | Status: CANCELLED | OUTPATIENT
Start: 2021-11-18

## 2021-11-18 NOTE — CARE COORDINATION
[address] Transitions Initial Follow Up Call    Call within 2 business days of discharge: Yes     Patient: Brayan Bai Patient : 1965 MRN: [id number]    Last Discharge [address] [address]       Complaint Diagnosis Description Type Department Provider    21 AICD Problem Atypical chest pain ED to Hosp-Admission (Discharged) (ADMITTED) [facility] 3[facility] Pryor Kawasaki, MD; [employer] .. RARS: No data recorded     Spoke with: Jimbo Jay     Discharge department/facility: [address] [place] [address]    Non-face-to-face services provided:  Obtained and reviewed discharge summary and/or continuity of care documents  Establishment or re-establishment of referrals-Scheduled PCP appointment 21 10 am Dr Jodeane Schwab   Date Time Provider Yanelis Alfaro   2021 10:00 AM Aspen Shows, [address] [place] [address],[address] [facility]   1/10/2022  9:30 AM Tea Gold MD [address] [place] [address] Acute Care Discharge Home Assessment     Review purpose of telephone call with: Jimbo Jay  Date: 21    - To evaluate the client after hospital discharge  - To ensure the client has received and understands self care instructions  - To identify and prevent potential adverse events  - To provide additional education and initiate post acute services       Brayan Bai   : 1965 Phone #: [phone] (home)    1. Hospital Information    Discharged from: [facility]   Discharge to home  Discharge Date: 11/15/2021   Admission Date: 2021    Non-face-to-face services provided:  Obtained and reviewed discharge summary and/or continuity of care documents    Hospital records: obtained and reviewed    Was instructed to follow up in: 7-10 days    Hospital Diagnosis: Atypical Chest Pain     Hospital Consultants:  Cardiology    Initial contact Date: 2021 Type of Contact:  in person      2. Assessment of Current Condition      Questions:   How have you felt since discharge from the hospital:     better    Did you receive a discharge summary from the hospital? yes    Is there any lingering or new fever? No    Are you eating and drinking OK? yes    Are there any new complaints of pain? No    If you have a wound - is the dressing clean, dry, and intact? N/A    Reinforce Education    Does the patient know -   1. What to do if her symptoms worsen? yes   2. For what symptoms she should call the doctor?  yes   3. What symptoms she should get help with right away? Yes    4. Does she know how to contact her physician?  yes    Are there any questions about the patients medications? Yes   She does not have script for cardorone and needs refill   Does the patient have a list of all the medications that were prescribed to be taken after discharge? yes   Does the patient have all the medications that were prescribed?  no    Is the patient taking all the prescribed medications?    She needs refill for pacerone and Vitamin B 6,12    Does the patient understand what all the medications are taken for? yes      ( Update medication list and refresh medication smartlinks below)        All Active Meds in Chart - (keep all current active meds, add hospital meds)  Current Outpatient Medications   Medication Sig Dispense Refill    amitriptyline (ELAVIL) 25 MG tablet TAKE 1 TABLET BY MOUTH EVERY NIGHT AT BEDTIME 30 tablet 5    bumetanide (BUMEX) 1 MG tablet TAKE 1 TABLET BY MOUTH 2 TIMES A DAY 60 tablet 5    sacubitril-valsartan (ENTRESTO) 24-26 MG per tablet TAKE 1 TABLET BY MOUTH 2 TIMES A DAY 60 tablet 5    magnesium oxide (MAG-OX) 400 (241.3 Mg) MG TABS tablet TAKE 1 TABLET BY MOUTH ONE TIME A DAY 30 tablet 3    omeprazole (PRILOSEC) 20 MG delayed release capsule TAKE 1 CAPSULE BY MOUTH ONE TIME A DAY 30 capsule 3    spironolactone (ALDACTONE) 25 MG tablet TAKE 1 TABLET BY MOUTH ONE TIME A DAY 30 tablet 5    metoprolol succinate (TOPROL XL) 50 MG extended release tablet TAKE ONE TABLET BY MOUTH EVERY MORNING 90 tablet 1    Nebulizers (COMPRESSOR/NEBULIZER) MISC 1 Device by Does not apply route 4 times daily 1 each 0    albuterol sulfate HFA (VENTOLIN HFA) 108 (90 Base) MCG/ACT inhaler INHALE 2 PUFFS INTO THE LUNGS EVERY 6 HOURS AS NEEDED FOR WHEEZING 18 g 3    ipratropium-albuterol (DUONEB) 0.5-2.5 (3) MG/3ML SOLN nebulizer solution Inhale 3 mLs into the lungs 2 times daily as needed for Shortness of Breath 360 mL 3    fluticasone (FLONASE) 50 MCG/ACT nasal spray 1 spray by Nasal route daily 1 Bottle 3    atorvastatin (LIPITOR) 40 MG tablet Take 1 tablet by mouth nightly 30 tablet 3    sennosides-docusate sodium (SENOKOT-S) 8.6-50 MG tablet Take 1 tablet by mouth daily 20 tablet 0    Multiple Vitamin (MULTIVITAMIN) tablet TAKE ONE TABLET BY MOUTH ONE TIME A DAY 30 tablet 3    L-Methylfolate-B6-B12 (FOLBIC RF) 1.13-25-2 MG TABS TAKE 1 TABLET BY MOUTH ONE TIME A DAY (Patient not taking: Reported on 11/18/2021) 30 tablet 3    amiodarone (CORDARONE) 200 MG tablet Take 200 mg by mouth daily Dose reduction initiated 5/26/2020 by cardiology (Patient not taking: Reported on 11/18/2021)      Spacer/Aero-Holding Chambers (E-Z SPACER) ELVER 1 Device by Does not apply route daily 1 Device 0     No current facility-administered medications for this visit.          Current Medications (record all meds as 'taken' or 'not taken' in home med activity)  Outpatient Medications Marked as Taking for the 11/17/21 encounter (Care Coordination) with Felisha Mederos, RN   Medication Sig Dispense Refill    amitriptyline (ELAVIL) 25 MG tablet TAKE 1 TABLET BY MOUTH EVERY NIGHT AT BEDTIME 30 tablet 5    bumetanide (BUMEX) 1 MG tablet TAKE 1 TABLET BY MOUTH 2 TIMES A DAY 60 tablet 5    sacubitril-valsartan (ENTRESTO) 24-26 MG per tablet TAKE 1 TABLET BY MOUTH 2 TIMES A DAY 60 tablet 5    magnesium oxide (MAG-OX) 400 (241.3 Mg) MG TABS tablet TAKE 1 TABLET BY MOUTH ONE TIME A DAY 30 tablet 3    omeprazole (PRILOSEC) 20 MG delayed release capsule TAKE 1 CAPSULE BY MOUTH ONE TIME A DAY 30 capsule 3    spironolactone (ALDACTONE) 25 MG tablet TAKE 1 TABLET BY MOUTH ONE TIME A DAY 30 tablet 5    metoprolol succinate (TOPROL XL) 50 MG extended release tablet TAKE ONE TABLET BY MOUTH EVERY MORNING 90 tablet 1    Nebulizers (COMPRESSOR/NEBULIZER) MISC 1 Device by Does not apply route 4 times daily 1 each 0    albuterol sulfate HFA (VENTOLIN HFA) 108 (90 Base) MCG/ACT inhaler INHALE 2 PUFFS INTO THE LUNGS EVERY 6 HOURS AS NEEDED FOR WHEEZING 18 g 3    ipratropium-albuterol (DUONEB) 0.5-2.5 (3) MG/3ML SOLN nebulizer solution Inhale 3 mLs into the lungs 2 times daily as needed for Shortness of Breath 360 mL 3    fluticasone (FLONASE) 50 MCG/ACT nasal spray 1 spray by Nasal route daily 1 Bottle 3    atorvastatin (LIPITOR) 40 MG tablet Take 1 tablet by mouth nightly 30 tablet 3    sennosides-docusate sodium (SENOKOT-S) 8.6-50 MG tablet Take 1 tablet by mouth daily 20 tablet 0    Multiple Vitamin (MULTIVITAMIN) tablet TAKE ONE TABLET BY MOUTH ONE TIME A DAY 30 tablet 3         Are there any questions about how the patient should take care of herself? No   Does the patient understand her diet regimen? yes   Does the patient understand his or her activity level? yes   Does the patient understand how to care for her wound? N/A   Does the patient understand how to monitor her weight?  yes   Does the patient understand what to do if she becomes short of breath? yes   Does the patient understand what to do if she has increased edema? yes      Is the patient having any trouble with ADL's? No   Any problems showering or bathing? No   Does the patient have trouble eating or feeding themselves? No   Is the patient having trouble getting out of bed or going to the toilet? No   Is the patient able to move around as expected? She has difficulty with use of stairs   This is unchanged from previous hx.        Home care services initiated: no     Services provided: CHF Nurse and [employer]       Does that patient have equipment needs? No   Does the patient have the appropriate equipment? Yes   Can the patient use the equipment properly and safely? Yes    Reinforce Follow-Up  - Does patient have an appointment with her PCP? yes  - Does patient have an appointment with her specialist physicians? Yes      Care Coordination  Is patient assigned ambulatory care coordinator for chronic condition management? Yes           Follow up appointment date: (7 days for more severe illness, 14 days for others)  Future Appointments   Date Time Provider Yanelis Alfaro   11/19/2021 10:00 AM Aspen Murphy, [address] [facility],[address] [facility]   1/10/2022  9:30 AM Tea Gold MD Resp Spec [id number]   Dec 15 at 1:15 pm  Appt with [facility]      Other Interventions or Actions taken as result of  Assessment  - Jimbo Jay denies chest pain episodes since hospital discharge   She describes fleeting pain in left chest at rest and with activity that has occurred over past 1-2 months and is not accompanied by shortness of breath, sweating and nausea. She denies lightheadedness. Respirations nonlabored. Lungs clear in all lobes. No cough. No lower leg edema.   Reviewed and reinstructed on signs and symptoms of heart attack and when to call doctor  B/P 102/70  Pulse 72  resp 18/min   02 sat 97% at rest   Temp 97.3C (temporal)  Medical transportation arranged for PCP visit on 11/19/21        Broderick Flores RN, [address] [place] [address]

## 2021-11-18 NOTE — TELEPHONE ENCOUNTER
E-scribe request for bumetanide. Please review and e-scribe if applicable.      Last Visit Date:  07/09/2021  Next Visit Date:  11/19/2021    Hemoglobin A1C (%)   Date Value   11/06/2019 5.7   07/16/2018 6.4 (H)   02/03/2017 5.9             ( goal A1C is < 7)   No results found for: LABMICR  LDL Cholesterol (mg/dL)   Date Value   11/01/2019 131 (H)       (goal LDL is <100)   AST (U/L)   Date Value   02/26/2020 25     ALT (U/L)   Date Value   02/26/2020 13     BUN (mg/dL)   Date Value   11/14/2021 16     BP Readings from Last 3 Encounters:   11/15/21 128/72   09/13/21 113/71   07/09/21 102/64          (goal 120/80)        Patient Active Problem List:     COPD (chronic obstructive pulmonary disease) (HCC)     Fibroids     Syncope     Lung nodule < 6cm on CT     Chronic systolic heart failure (HCC) s/p AICD     Mild intermittent asthma     Essential hypertension     Chest pain     QT prolongation     Asthma with COPD with exacerbation (HCC)     Non-ischemic cardiomyopathy ([address]     Lesion of right native kidney     NSTEMI (non-ST elevated myocardial infarction) ([address]     CAD (coronary artery disease)     Intraparenchymal hematoma of left side of brain due to trauma ([address]     Chronic combined systolic and diastolic congestive heart failure ([address]     AICD discharge     RIP (acute kidney injury) ([address]     Hypomagnesemia     Other heart failure ([address]     Atypical chest pain      ----Wendie Rios

## 2021-11-24 DIAGNOSIS — G43.809 OTHER MIGRAINE WITHOUT STATUS MIGRAINOSUS, NOT INTRACTABLE: ICD-10-CM

## 2021-11-24 RX ORDER — AMITRIPTYLINE HYDROCHLORIDE 25 MG/1
TABLET, FILM COATED ORAL
Qty: 30 TABLET | Refills: 5 | Status: CANCELLED | OUTPATIENT
Start: 2021-11-24

## 2021-11-24 NOTE — TELEPHONE ENCOUNTER
Followed up with patient. Informed patient that I have the account numbers for electric and gas. Will assist patient with completing an application for [employer] to assist with outstanding utility bills. Reminded patient about goals regarding housing. Informed patient about [employer]. Patient stated that she would like to submit an application. Will follow up with patient to submit application for [employer].

## 2021-11-24 NOTE — TELEPHONE ENCOUNTER
Last visit:   Last Med refill:   Does patient have enough medication for 72 hours: No:     Next Visit Date:  Future Appointments   Date Time Provider Yanelis Angelina   11/26/2021 10:15 AM MD Nida Rogel  [facility]   1/10/2022  9:30 AM Emily Johnson  [address] Maintenance   Topic Date Due    COVID-19 Vaccine (1) Never done    Shingles Vaccine (1 of 2) Never done    Breast cancer screen  02/03/2019    Lipid screen  11/01/2020    TSH testing  11/01/2020    A1C test (Diabetic or Prediabetic)  11/06/2020    Flu vaccine (1) 09/01/2021    Potassium monitoring  11/14/2022    Creatinine monitoring  11/14/2022    Colon cancer screen fecal DNA test (Cologuard)  12/04/2022    Cervical cancer screen  07/09/2024    DTaP/Tdap/Td vaccine (2 - Td or Tdap) 06/09/2029    Pneumococcal 0-64 years Vaccine (2 of 2 - PPSV23) 06/13/2030    Hepatitis C screen  Completed    HIV screen  Completed    Hepatitis A vaccine  Aged Out    Hepatitis B vaccine  Aged Out    Hib vaccine  Aged Out    Meningococcal (ACWY) vaccine  Aged Out       Hemoglobin A1C (%)   Date Value   11/06/2019 5.7   07/16/2018 6.4 (H)   02/03/2017 5.9             ( goal A1C is < 7)   No results found for: LABMICR  LDL Cholesterol (mg/dL)   Date Value   11/01/2019 131 (H)   11/25/2018 128       (goal LDL is <100)   AST (U/L)   Date Value   02/26/2020 25     ALT (U/L)   Date Value   02/26/2020 13     BUN (mg/dL)   Date Value   11/14/2021 16     BP Readings from Last 3 Encounters:   11/15/21 128/72   09/13/21 113/71   07/09/21 102/64          (goal 120/80)    All Future Testing planned in [facility]  Lab Frequency Next Occurrence   TSH Once 07/09/2022   Lipid Panel Once 07/09/2022   ALYX DIGITAL SCREEN W OR WO CAD BILATERAL Once 09/09/2022   Hemoglobin A1C Once 07/09/2022   PAP Smear Once 07/09/2022   Full PFT Study With Bronchodilator Once 12/13/2021   XR CHEST (2 VW) Once 12/13/2021               Patient Active Problem List: COPD (chronic obstructive pulmonary disease) (HCC)     Fibroids     Syncope     Lung nodule < 6cm on CT     Chronic systolic heart failure (HCC) s/p AICD     Mild intermittent asthma     Essential hypertension     Chest pain     QT prolongation     Asthma with COPD with exacerbation (HCC)     Non-ischemic cardiomyopathy (HCC)     Lesion of right native kidney     NSTEMI (non-ST elevated myocardial infarction) ([address]     CAD (coronary artery disease)     Intraparenchymal hematoma of left side of brain due to trauma ([address]     Chronic combined systolic and diastolic congestive heart failure ([address]     AICD discharge     RIP (acute kidney injury) ([address]     Hypomagnesemia     Other heart failure ([address]     Atypical chest pain           Please address the medication refill and close the encounter. If I can be of assistance, please route to the applicable pool. Thank you.
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
## 2021-11-25 ENCOUNTER — TELEPHONE (OUTPATIENT)
Dept: FAMILY MEDICINE CLINIC | Age: 56
End: 2021-11-25

## 2021-11-30 DIAGNOSIS — G43.809 OTHER MIGRAINE WITHOUT STATUS MIGRAINOSUS, NOT INTRACTABLE: ICD-10-CM

## 2021-11-30 DIAGNOSIS — I50.22 CHRONIC SYSTOLIC HEART FAILURE (HCC): ICD-10-CM

## 2021-12-02 RX ORDER — SPIRONOLACTONE 25 MG/1
TABLET ORAL
Qty: 30 TABLET | Refills: 5 | OUTPATIENT
Start: 2021-12-02

## 2021-12-02 RX ORDER — AMITRIPTYLINE HYDROCHLORIDE 25 MG/1
TABLET, FILM COATED ORAL
Qty: 30 TABLET | Refills: 5 | OUTPATIENT
Start: 2021-12-02

## 2021-12-02 RX ORDER — BUMETANIDE 1 MG/1
TABLET ORAL
Qty: 60 TABLET | Refills: 5 | OUTPATIENT
Start: 2021-12-02

## 2021-12-06 DIAGNOSIS — G43.809 OTHER MIGRAINE WITHOUT STATUS MIGRAINOSUS, NOT INTRACTABLE: ICD-10-CM

## 2021-12-06 DIAGNOSIS — I50.22 CHRONIC SYSTOLIC HEART FAILURE (HCC): ICD-10-CM

## 2021-12-06 RX ORDER — BUMETANIDE 1 MG/1
TABLET ORAL
Qty: 60 TABLET | Refills: 5 | Status: SHIPPED | OUTPATIENT
Start: 2021-12-06 | End: 2022-04-04 | Stop reason: SDUPTHER

## 2021-12-06 RX ORDER — SPIRONOLACTONE 25 MG/1
TABLET ORAL
Qty: 30 TABLET | Refills: 5 | Status: SHIPPED | OUTPATIENT
Start: 2021-12-06 | End: 2022-04-04 | Stop reason: SDUPTHER

## 2021-12-06 RX ORDER — AMITRIPTYLINE HYDROCHLORIDE 25 MG/1
TABLET, FILM COATED ORAL
Qty: 30 TABLET | Refills: 3 | Status: SHIPPED | OUTPATIENT
Start: 2021-12-06 | End: 2022-03-15

## 2021-12-06 NOTE — TELEPHONE ENCOUNTER
Samira Request for pending medication.     Last Visit Date: 7/9/21  Next Visit Date:  Future Appointments   Date Time Provider Yanelis Alfaro   1/10/2022  9:30 AM Stephani Bain MD Resp Spec [address] Maintenance   Topic Date Due    COVID-19 Vaccine (1) Never done    Shingles Vaccine (1 of 2) Never done    Breast cancer screen  02/03/2019    Lipid screen  11/01/2020    TSH testing  11/01/2020    A1C test (Diabetic or Prediabetic)  11/06/2020    Flu vaccine (1) 09/01/2021    Potassium monitoring  11/14/2022    Creatinine monitoring  11/14/2022    Colon cancer screen fecal DNA test (Cologuard)  12/04/2022    Cervical cancer screen  07/09/2024    DTaP/Tdap/Td vaccine (2 - Td or Tdap) 06/09/2029    Pneumococcal 0-64 years Vaccine (2 of 2 - PPSV23) 06/13/2030    Hepatitis C screen  Completed    HIV screen  Completed    Hepatitis A vaccine  Aged Out    Hepatitis B vaccine  Aged Out    Hib vaccine  Aged Out    Meningococcal (ACWY) vaccine  Aged Out       Hemoglobin A1C (%)   Date Value   11/06/2019 5.7   07/16/2018 6.4 (H)   02/03/2017 5.9             ( goal A1C is < 7)   No results found for: LABMICR  LDL Cholesterol (mg/dL)   Date Value   11/01/2019 131 (H)       (goal LDL is <100)   AST (U/L)   Date Value   02/26/2020 25     ALT (U/L)   Date Value   02/26/2020 13     BUN (mg/dL)   Date Value   11/14/2021 16     BP Readings from Last 3 Encounters:   11/15/21 128/72   09/13/21 113/71   07/09/21 102/64          (goal 120/80)    All Future Testing planned in [facility]  Lab Frequency Next Occurrence   TSH Once 07/09/2022   Lipid Panel Once 07/09/2022   ALYX DIGITAL SCREEN W OR WO CAD BILATERAL Once 09/09/2022   Hemoglobin A1C Once 07/09/2022   PAP Smear Once 07/09/2022   Full PFT Study With Bronchodilator Once 12/13/2021   XR CHEST (2 VW) Once 12/13/2021       Next Visit Date:  Future Appointments   Date Time Provider Yanelis Alfaro   1/10/2022  9:30 AM Stephani Bain MD Resp Spec [facility] Patient Active Problem List:     COPD (chronic obstructive pulmonary disease) ([address]     Fibroids     Syncope     Lung nodule < 6cm on CT     Chronic systolic heart failure (HCC) s/p AICD     Mild intermittent asthma     Essential hypertension     Chest pain     QT prolongation     Asthma with COPD with exacerbation ([facility])     Non-ischemic cardiomyopathy ([address]     Lesion of right native kidney     NSTEMI (non-ST elevated myocardial infarction) ([address]     CAD (coronary artery disease)     Intraparenchymal hematoma of left side of brain due to trauma [facility])     Chronic combined systolic and diastolic congestive heart failure ([address]     AICD discharge     RIP (acute kidney injury) ([address]     Hypomagnesemia     Other heart failure ([address]     Atypical chest pain

## 2021-12-06 NOTE — TELEPHONE ENCOUNTER
Patient Active Problem List:     COPD (chronic obstructive pulmonary disease) ([address]     Fibroids     Syncope     Lung nodule < 6cm on CT     Chronic systolic heart failure (HCC) s/p AICD     Mild intermittent asthma     Essential hypertension     Chest pain     QT prolongation     Asthma with COPD with exacerbation ([facility])     Non-ischemic cardiomyopathy ([address]     Lesion of right native kidney     NSTEMI (non-ST elevated myocardial infarction) ([address]     CAD (coronary artery disease)     Intraparenchymal hematoma of left side of brain due to trauma [facility])     Chronic combined systolic and diastolic congestive heart failure ([address]     AICD discharge     RIP (acute kidney injury) ([address]     Hypomagnesemia     Other heart failure ([address]     Atypical chest pain

## 2021-12-28 ENCOUNTER — HOSPITAL ENCOUNTER (EMERGENCY)
Age: 56
Discharge: HOME | End: 2021-12-28
Attending: EMERGENCY MEDICINE
Payer: MEDICAID

## 2021-12-28 VITALS
OXYGEN SATURATION: 99 % | TEMPERATURE: 98.6 F | HEART RATE: 83 BPM | SYSTOLIC BLOOD PRESSURE: 136 MMHG | RESPIRATION RATE: 17 BRPM | DIASTOLIC BLOOD PRESSURE: 81 MMHG

## 2021-12-28 DIAGNOSIS — R09.81 NASAL CONGESTION: Primary | ICD-10-CM

## 2021-12-28 LAB
SARS-COV-2, RAPID: NOT DETECTED
SPECIMEN DESCRIPTION: NORMAL

## 2021-12-28 PROCEDURE — 99282 EMERGENCY DEPT VISIT SF MDM: CPT

## 2021-12-28 PROCEDURE — 87635 SARS-COV-2 COVID-19 AMP PRB: CPT

## 2021-12-28 ASSESSMENT — ENCOUNTER SYMPTOMS
ABDOMINAL PAIN: 0
SHORTNESS OF BREATH: 0
BACK PAIN: 0
SINUS PRESSURE: 1
PHOTOPHOBIA: 0
RHINORRHEA: 0
SORE THROAT: 0
VOMITING: 0
NAUSEA: 0
DIARRHEA: 0
COUGH: 0

## 2021-12-28 NOTE — ED PROVIDER NOTES
[address]  ED  eMERGENCY dEPARTMENT eNCOUnter   Attending Attestation     Pt Name: Cassius Escoto  MRN: [id number]  [place] 1965  Date of evaluation: 12/28/21       Cassius Escoto is a 64 y.o. female who presents with No chief complaint on file. History: Patient presents with some sinus congestion. Patient has been around her  who has Covid symptoms. Patient requesting a test.    Exam: Heart rate and rhythm are regular. Lungs are clear to station bilaterally. Abdomen is soft, nontender. Patient awake alert acting appropriately. Plan for Covid PCR discharge. I performed a history and physical examination of the patient and discussed management with the resident. I reviewed the residents note and agree with the documented findings and plan of care. Any areas of disagreement are noted on the chart. I was personally present for the key portions of any procedures. I have documented in the chart those procedures where I was not present during the key portions. I have personally reviewed all images and agree with the resident's interpretation. I have reviewed the emergency nurses triage note. I agree with the chief complaint, past medical history, past surgical history, allergies, medications, social and family history as documented unless otherwise noted below. Documentation of the HPI, Physical Exam and Medical Decision Making performed by medical students or scribes is based on my personal performance of the HPI, PE and MDM. For Phys Assistant/ Nurse Practitioner cases/documentation I have had a face to face evaluation of this patient and have completed at least one if not all key elements of the E/M (history, physical exam, and MDM). Additional findings are as noted. For APC cases I have personally evaluated and examined the patient in conjunction with the APC and agree with the treatment plan and disposition of the patient as recorded by the APC.     Salorix Marlena Soriano MD  Attending Emergency  Physician       Chanel Johansen MD  12/28/21 [id number]

## 2021-12-28 NOTE — ED PROVIDER NOTES
[facility] ED  Emergency Department Encounter  EmergencyMedicine Resident     Pt Name:Stacia Harman  MRN: [id number]  [place] 1965  Date of evaluation: 12/28/21  PCP:  Marshal Clements MD    This patient was evaluated in the Emergency Department for symptoms described in the history of present illness. The patient was evaluated in the context of the global COVID-19 pandemic, which necessitated consideration that the patient might be at risk for infection with the SARS-CoV-2 virus that causes COVID-19. Institutional protocols and algorithms that pertain to the evaluation of patients at risk for COVID-19 are in a state of rapid change based on information released by regulatory bodies including the CDC and federal and state organizations. These policies and algorithms were followed during the patient's care in the ED. CHIEF COMPLAINT       Chief Complaint   Patient presents with    Concern For COVID-19       HISTORY OF PRESENT ILLNESS  (Location/Symptom, Timing/Onset, Context/Setting, Quality, Duration, Modifying Factors, Severity.)      Nadia Carrera is a 64 y.o. female who presents with congestion and concern for yury COVID-19 because her significant other has been having symptoms of Covid for the last 3 days. Patient has not vaccinated against influenza, is not vaccinated against Covid. She denies any fevers any chills any chest pain cough shortness of breath myalgias abdominal pain nausea vomiting constipation diarrhea or dysuria.     PAST MEDICAL / SURGICAL / SOCIAL / FAMILY HISTORY      has a past medical history of AICD discharge, RIP (acute kidney injury) ([address] Asthma, Asthma with COPD with exacerbation ([address] CAD (coronary artery disease), Cardiomegaly, Chest pain, CHF (congestive heart failure) ([address] Chronic systolic heart failure ([address] s/p AICD, COPD (chronic obstructive pulmonary disease) ([address] Depression, Fibroids, Hypertension, Hypomagnesemia, Intraparenchymal hematoma of left side of brain due to trauma [facility]), Lesion of right native kidney, Lung nodule < 6cm on CT, MI (myocardial infarction) ([facility] Mild intermittent asthma, Non-ischemic cardiomyopathy ([facility] NSTEMI (non-ST elevated myocardial infarction) ([facility] QT prolongation, Syncope, and Unspecified diseases of blood and blood-forming organs. has a past surgical history that includes Cardiac defibrillator placement (2005); Pacemaker insertion; Tubal ligation; Cardiac defibrillator placement; and Uterine fibroid surgery ([facility] ). Social History     Socioeconomic History    Marital status: Single     Spouse name: Partner- Haley Rurome Number of children: Not on file    Years of education: 15    Highest education level: Not on file   Occupational History    Occupation: disabled   Tobacco Use    Smoking status: Former Smoker     Packs/day: 0.25     Years: 30.00     Pack years: 7.50     Types: Cigarettes     Quit date: 3/4/2020     Years since quittin.8    Smokeless tobacco: Never Used    Tobacco comment: resumed smoking  3-4 cigarettes/day   Vaping Use    Vaping Use: Never used   Substance and Sexual Activity    Alcohol use: Yes     Alcohol/week: 1.0 standard drink     Types: 1 Cans of beer per week    Drug use: No    Sexual activity: Yes     Partners: Male     Birth control/protection: Post-menopausal   Other Topics Concern    Not on file   Social History Narrative    Not on file     Social Determinants of Health     Financial Resource Strain:     Difficulty of Paying Living Expenses: Not on file   Food Insecurity:     Worried About Running Out of Food in the Last Year: Not on file    Suzanne of Food in the Last Year: Not on file   Transportation Needs:     Lack of Transportation (Medical): Not on file    Lack of Transportation (Non-Medical):  Not on file   Physical Activity:     Days of Exercise per Week: Not on file    Minutes of Exercise per Session: Not on file Stress:     Feeling of Stress : Not on file   Social Connections:     Frequency of Communication with Friends and Family: Not on file    Frequency of Social Gatherings with Friends and Family: Not on file    Attends [employer] Services: Not on file    Active Member of Clubs or Organizations: Not on file    Attends Club or Organization Meetings: Not on file    Marital Status: Not on file   Intimate Partner Violence:     Fear of Current or Ex-Partner: Not on file    Emotionally Abused: Not on file    Physically Abused: Not on file    Sexually Abused: Not on file   Housing Stability:     Unable to Pay for Housing in the Last Year: Not on file    Number of [place] in the Last Year: Not on file    Unstable Housing in the Last Year: Not on file       Family History   Problem Relation Age of Onset    Diabetes Mother     High Blood Pressure Mother     Heart Disease Mother     Diabetes Father     Heart Disease Brother        Allergies: Iv contrast [iodides]    Home Medications:  Prior to Admission medications    Medication Sig Start Date End Date Taking?  Authorizing Provider   amitriptyline (ELAVIL) 25 MG tablet TAKE 1 TABLET BY MOUTH EVERY NIGHT AT BEDTIME 12/6/21   Yodit Rainey MD   spironolactone (ALDACTONE) 25 MG tablet TAKE 1 TABLET BY MOUTH ONE TIME A DAY 12/6/21   Yodit Rainey MD   bumetanide (BUMEX) 1 MG tablet TAKE 1 TABLET BY MOUTH 2 TIMES A DAY 12/6/21   Yodit Rainey MD   sacubitril-valsartan (ENTRESTO) 24-26 MG per tablet TAKE 1 TABLET BY MOUTH 2 TIMES A DAY 5/14/21   Shai Bartholomew MD   C-Rrizlulafbcd-S7-B12 (FOLBIC RF) 1.13-25-2 MG TABS TAKE 1 TABLET BY MOUTH ONE TIME A DAY  Patient not taking: Reported on 11/18/2021 5/14/21   Shai Bartholomew MD   magnesium oxide (MAG-OX) 400 (241.3 Mg) MG TABS tablet TAKE 1 TABLET BY MOUTH ONE TIME A DAY 5/14/21   Shai Bartholomew MD   omeprazole (PRILOSEC) 20 MG delayed release capsule TAKE 1 CAPSULE BY MOUTH ONE TIME A DAY 5/14/21   Juan Tovar MD   metoprolol succinate (TOPROL XL) 50 MG extended release tablet TAKE ONE TABLET BY MOUTH EVERY MORNING 3/22/21   Reinaldo Gonzalez MD   Nebulizers (COMPRESSOR/NEBULIZER) MISC 1 Device by Does not apply route 4 times daily 7/15/20   Juan Tovar MD   amiodarone (CORDARONE) 200 MG tablet Take 200 mg by mouth daily Dose reduction initiated 5/26/2020 by cardiology  Patient not taking: Reported on 11/18/2021    Christina Estrada MD   albuterol sulfate HFA (VENTOLIN HFA) 108 (90 Base) MCG/ACT inhaler INHALE 2 PUFFS INTO THE LUNGS EVERY 6 HOURS AS NEEDED FOR WHEEZING 4/1/20   Brijesh Bhatt MD   ipratropium-albuterol (DUONEB) 0.5-2.5 (3) MG/3ML SOLN nebulizer solution Inhale 3 mLs into the lungs 2 times daily as needed for Shortness of Breath 1/20/20   Brijesh Bhatt MD   fluticasone (FLONASE) 50 MCG/ACT nasal spray 1 spray by Nasal route daily 11/1/19   Ida Shafer MD   atorvastatin (LIPITOR) 40 MG tablet Take 1 tablet by mouth nightly 6/14/19   ANGY Mccann - CNP   sennosides-docusate sodium (SENOKOT-S) 8.6-50 MG tablet Take 1 tablet by mouth daily 12/23/18   Felix Larry MD   Spacer/Aero-Holding Chambers (E-Z SPACER) ELVER 1 Device by Does not apply route daily 11/25/18   Maggy Ye MD   Multiple Vitamin (MULTIVITAMIN) tablet TAKE ONE TABLET BY MOUTH ONE TIME A DAY 6/18/18   Steve Isabel MD       REVIEW OF SYSTEMS    (2-9 systems for level 4, 10 or more for level 5)      Review of Systems   Constitutional: Negative for fever. HENT: Positive for congestion and sinus pressure. Negative for rhinorrhea and sore throat. Eyes: Negative for photophobia. Respiratory: Negative for cough and shortness of breath. Cardiovascular: Negative for chest pain. Gastrointestinal: Negative for abdominal pain, diarrhea, nausea and vomiting. Genitourinary: Negative for dysuria and flank pain.    Musculoskeletal: Negative for back pain, gait problem and LAB RESULTS:  No results found for this visit on 12/28/21. IMPRESSION: Patient is an alert oriented nontoxic 80year-old female complaining of sinus congestion with recent sick contacts she is concern for yury COVID-19 and was not vaccinated against the same. Afebrile tolerating p.o. no shortness of breath or respiratory distress lungs are clear to auscultation abdomen soft nontender no chest pain pain of the PCR Covid test discharged with outpatient follow-up    RADIOLOGY:      EKG      All EKG's are interpreted by the Emergency Department Physician who either signs or Co-signs this chart in the absence of a cardiologist.    EMERGENCY DEPARTMENT COURSE:  Patient was seen and evaluated history physical examination signs and symptoms suggest possible viral illness patient had a PCR Covid test performed and the results will be available on my chart patient instructed to follow-up these results and follow-up with her primary care physician    PROCEDURES:      CONSULTS:  None    CRITICAL CARE:      FINAL IMPRESSION      1. Nasal congestion          DISPOSITION / PLAN     DISPOSITION   dc    PATIENT REFERRED TO:  Clari Lu MD  [address]   [address]  [address]  [phone]    Call today  for followup and reevaluation in 1-2 days    [facility] ED  [address] [facility] [address]  [phone]  Go to   If symptoms worsen, As needed      DISCHARGE MEDICATIONS:  Discharge Medication List as of 12/28/2021 12:17 PM          Saskia Yoon DO  Emergency Medicine Resident    (Please note that portions of thisnote were completed with a voice recognition program.  Efforts were made to edit the dictations but occasionally words are mis-transcribed.)        Saskia Yoon DO  Resident  12/28/21 [address] [facility]  Resident  12/28/21 1300

## 2021-12-28 NOTE — ED TRIAGE NOTES
Pt presents to ED a&ox4, RR even and unlabored, NAD noted. Pt expressed concerns for COVID d/t  having similar s/s since Friday. Pt denies any CP, SOB, loss of taste or smell at this time. Pt's main complaint is cough and fatigue. COVID-19 swab obtained at this time. Pt tolerated well. VSS. Personal items and call light within reach,  at bedside. Denies any other needs. Will con't to monitor.

## 2022-01-14 ENCOUNTER — HOSPITAL ENCOUNTER (EMERGENCY)
Age: 57
Discharge: HOME | End: 2022-01-14
Attending: EMERGENCY MEDICINE
Payer: MEDICAID

## 2022-01-14 ENCOUNTER — APPOINTMENT (OUTPATIENT)
Dept: GENERAL RADIOLOGY | Age: 57
End: 2022-01-14
Payer: MEDICAID

## 2022-01-14 VITALS
RESPIRATION RATE: 14 BRPM | BODY MASS INDEX: 23.78 KG/M2 | HEART RATE: 96 BPM | DIASTOLIC BLOOD PRESSURE: 86 MMHG | OXYGEN SATURATION: 100 % | WEIGHT: 130 LBS | TEMPERATURE: 98.1 F | SYSTOLIC BLOOD PRESSURE: 129 MMHG

## 2022-01-14 DIAGNOSIS — M10.9 PODAGRA: Primary | ICD-10-CM

## 2022-01-14 PROCEDURE — 73630 X-RAY EXAM OF FOOT: CPT

## 2022-01-14 PROCEDURE — 99283 EMERGENCY DEPT VISIT LOW MDM: CPT

## 2022-01-14 PROCEDURE — 6370000000 HC RX 637 (ALT 250 FOR IP): Performed by: STUDENT IN AN ORGANIZED HEALTH CARE EDUCATION/TRAINING PROGRAM

## 2022-01-14 RX ORDER — IBUPROFEN 800 MG/1
800 TABLET ORAL EVERY 8 HOURS PRN
Qty: 30 TABLET | Refills: 1 | Status: SHIPPED | OUTPATIENT
Start: 2022-01-14

## 2022-01-14 RX ORDER — IBUPROFEN 400 MG/1
600 TABLET ORAL ONCE
Status: COMPLETED | OUTPATIENT
Start: 2022-01-14 | End: 2022-01-14

## 2022-01-14 RX ORDER — ACETAMINOPHEN 500 MG
1000 TABLET ORAL ONCE
Status: COMPLETED | OUTPATIENT
Start: 2022-01-14 | End: 2022-01-14

## 2022-01-14 RX ORDER — PREDNISONE 10 MG/1
TABLET ORAL
Qty: 20 TABLET | Refills: 0 | Status: SHIPPED | OUTPATIENT
Start: 2022-01-14 | End: 2022-01-24

## 2022-01-14 RX ORDER — PREDNISONE 20 MG/1
60 TABLET ORAL ONCE
Status: COMPLETED | OUTPATIENT
Start: 2022-01-14 | End: 2022-01-14

## 2022-01-14 RX ADMIN — PREDNISONE 60 MG: 20 TABLET ORAL at 18:16

## 2022-01-14 RX ADMIN — ACETAMINOPHEN 1000 MG: 500 TABLET ORAL at 18:16

## 2022-01-14 RX ADMIN — IBUPROFEN 600 MG: 400 TABLET, FILM COATED ORAL at 18:16

## 2022-01-14 ASSESSMENT — ENCOUNTER SYMPTOMS
EYE PAIN: 0
DIARRHEA: 0
VOMITING: 0
COUGH: 0
SHORTNESS OF BREATH: 0
SINUS PAIN: 0
NAUSEA: 0
SORE THROAT: 0
ABDOMINAL PAIN: 0

## 2022-01-14 ASSESSMENT — PAIN DESCRIPTION - PAIN TYPE: TYPE: ACUTE PAIN;CHRONIC PAIN

## 2022-01-14 ASSESSMENT — PAIN DESCRIPTION - FREQUENCY: FREQUENCY: CONTINUOUS

## 2022-01-14 ASSESSMENT — PAIN DESCRIPTION - LOCATION: LOCATION: FOOT

## 2022-01-14 ASSESSMENT — PAIN SCALES - GENERAL: PAINLEVEL_OUTOF10: 10

## 2022-01-14 ASSESSMENT — PAIN DESCRIPTION - ORIENTATION: ORIENTATION: LEFT

## 2022-01-14 ASSESSMENT — PAIN DESCRIPTION - DESCRIPTORS: DESCRIPTORS: ACHING

## 2022-01-14 NOTE — ED PROVIDER NOTES
[facility]  Emergency Department  Faculty Attestation     I performed a history and physical examination of the patient and discussed management with the resident. I reviewed the residents note and agree with the documented findings and plan of care. Any areas of disagreement are noted on the chart. I was personally present for the key portions of any procedures. I have documented in the chart those procedures where I was not present during the key portions. I have reviewed the emergency nurses triage note. I agree with the chief complaint, past medical history, past surgical history, allergies, medications, social and family history as documented unless otherwise noted below. For Physician Assistant/ Nurse Practitioner cases/documentation I have personally evaluated this patient and have completed at least one if not all key elements of the E/M (history, physical exam, and MDM). Additional findings are as noted. Primary Care Physician:  Seth Morales MD    Screenings:  [unfilled]    CHIEF COMPLAINT       Chief Complaint   Patient presents with    Foot Pain     left foot, woke up with pain to left foot       RECENT VITALS:   Temp: 98.1 °F (36.7 °C),  Pulse: 96, Resp: 14, BP: 129/86    LABS:  Labs Reviewed - No data to display    Radiology  XR FOOT LEFT (MIN 3 VIEWS)    (Results Pending)         Attending Physician Additional  Notes    1 day of severe left great toe MTP pain and swelling and sensitivity to light touch. No history of gout there is a family history of this. No use of hydrochlorothiazide. No fevers chills or sweats. No recent dental work or other skin infection or discharge or dyspareunia. On exam she is in severe distress secondary pain vital signs are normal.  She is afebrile. Lungs are clear. Card exam without murmur. Left great toe MTP is slightly edematous, slightly erythematous, not warm, limited range of movement secondary to pain. Impression is gout. Plan is analgesics, steroids, early follow-up, return precautions. Isabela Mojica.  Nadia Nicolas MD, [address]  Attending Emergency  Physician               Rosenda Wilcox MD  01/14/22 [id number]

## 2022-01-14 NOTE — ED NOTES
Pt ambulatory to room with slow gait, c/o left foot pain since waking up this morning. Pt states he has bunions but feels more painful. Pt denies hx of gout, states she took tylenol with no relief. Pt alert and oriented x 4 with NAD noted, mild edema noted to top of foot. Dr Kendell Chisholm at bedside.       Tayler Freitas, RN  01/14/22 [id number]

## 2022-01-15 NOTE — ED NOTES
Dr. Mica Vargas at bedside going over diagnosis and ordered prescriptions with pt.       Celeste Murray RN  01/14/22 2016

## 2022-01-15 NOTE — ED NOTES
Pt and significant other came into the hallway and stated she is ready to go and tired of waiting, especially in a room that does not have a tv. Dr. Aicha Rodas notified.        Adali Bailey RN  01/14/22 2009

## 2022-01-15 NOTE — ED PROVIDER NOTES
[facility] ED  Emergency Department Encounter  EmergencyMedicineResident     This patient was seen during the COVID-19 crisis. There were limited resources and those resources we did have had to be conserved for the sickest of patients. Pt Name: Danae Moran  MRN: [id number]  [place] 1965  Date of evaluation: 1/14/22  PCP: Cali Arenas MD    [address] [place] [address]       Chief Complaint   Patient presents with    Foot Pain     left foot, woke up with pain to left foot       HISTORY OF PRESENT ILLNESS  (Location/Symptom, Timing/Onset, Context/Setting, Quality, Duration, Modifying Factors, Severity.)      Danae Moran is a 64 y.o. female who presents evaluation of left foot pain. Patient states started acutely over the last 24 hours. She has not had pain like this previously. She primarily states that it is in the ball of her left foot. She denies injury or trauma. She denies fever or chills. She has not take anything for the pain thus far. She does report to drinking alcohol and eating red meat. PAST MEDICAL / SURGICAL /SOCIAL / FAMILY HISTORY      has a past medical history of AICD discharge, RIP (acute kidney injury) ([address] Asthma, Asthma with COPD with exacerbation ([address] CAD (coronary artery disease), Cardiomegaly, Chest pain, CHF (congestive heart failure) ([address] Chronic systolic heart failure ([address] s/p AICD, COPD (chronic obstructive pulmonary disease) ([address] Depression, Fibroids, Hypertension, Hypomagnesemia, Intraparenchymal hematoma of left side of brain due to trauma [facility]), Lesion of right native kidney, Lung nodule < 6cm on CT, MI (myocardial infarction) ([address] Mild intermittent asthma, Non-ischemic cardiomyopathy ([address] NSTEMI (non-ST elevated myocardial infarction) ([address] QT prolongation, Syncope, and Unspecified diseases of blood and blood-forming organs.        has a past surgical history that includes Cardiac defibrillator placement (09/2005); Pacemaker insertion; Tubal ligation; Cardiac defibrillator placement; and Uterine fibroid surgery (maarch 2015). Social History     Socioeconomic History    Marital status: Single     Spouse name: Partner- Dora Echavarria Number of children: Not on file    Years of education: 15    Highest education level: Not on file   Occupational History    Occupation: disabled   Tobacco Use    Smoking status: Former Smoker     Packs/day: 0.25     Years: 30.00     Pack years: 7.50     Types: Cigarettes     Quit date: 3/4/2020     Years since quittin.8    Smokeless tobacco: Never Used    Tobacco comment: resumed smoking  3-4 cigarettes/day   Vaping Use    Vaping Use: Never used   Substance and Sexual Activity    Alcohol use: Yes     Alcohol/week: 1.0 standard drink     Types: 1 Cans of beer per week     Comment: socially    Drug use: Yes     Types: Marijuana [place])    Sexual activity: Yes     Partners: Male     Birth control/protection: Post-menopausal   Other Topics Concern    Not on file   Social History Narrative    Not on file     Social Determinants of Health     Financial Resource Strain:     Difficulty of Paying Living Expenses: Not on file   Food Insecurity:     Worried About Running Out of Food in the Last Year: Not on file    Suzanne of Food in the Last Year: Not on file   Transportation Needs:     Lack of Transportation (Medical): Not on file    Lack of Transportation (Non-Medical):  Not on file   Physical Activity:     Days of Exercise per Week: Not on file    Minutes of Exercise per Session: Not on file   Stress:     Feeling of Stress : Not on file   Social Connections:     Frequency of Communication with Friends and Family: Not on file    Frequency of Social Gatherings with Friends and Family: Not on file    Attends Jain Services: Not on file    Active Member of Clubs or Organizations: Not on file    Attends Club or Organization Meetings: Not on file    Marital Status: Not on file EVERY MORNING 3/22/21   Merlinda Sartorius, MD   Nebulizers (COMPRESSOR/NEBULIZER) MISC 1 Device by Does not apply route 4 times daily 7/15/20   Neeta Nieves MD   amiodarone (CORDARONE) 200 MG tablet Take 200 mg by mouth daily Dose reduction initiated 5/26/2020 by cardiology  Patient not taking: Reported on 11/18/2021    Clark Ramirez MD   albuterol sulfate HFA (VENTOLIN HFA) 108 (90 Base) MCG/ACT inhaler INHALE 2 PUFFS INTO THE LUNGS EVERY 6 HOURS AS NEEDED FOR WHEEZING 4/1/20   Eula Cage MD   ipratropium-albuterol (DUONEB) 0.5-2.5 (3) MG/3ML SOLN nebulizer solution Inhale 3 mLs into the lungs 2 times daily as needed for Shortness of Breath 1/20/20   Eula Cage MD   fluticasone (FLONASE) 50 MCG/ACT nasal spray 1 spray by Nasal route daily 11/1/19   Yumi Santiago MD   atorvastatin (LIPITOR) 40 MG tablet Take 1 tablet by mouth nightly 6/14/19   Amado Jones APRN - CNP   sennosides-docusate sodium (SENOKOT-S) 8.6-50 MG tablet Take 1 tablet by mouth daily 12/23/18   Aristides Rivera MD   Spacer/Aero-Holding Chambers (E-Z SPACER) ELVER 1 Device by Does not apply route daily 11/25/18   Lincoln Hillman MD   Multiple Vitamin (MULTIVITAMIN) tablet TAKE ONE TABLET BY MOUTH ONE TIME A DAY 6/18/18   Nasreen Beebe MD       REVIEW OF SYSTEMS    (2-9 systems for level 4, 10 or more forlevel 5)      Review of Systems   Constitutional: Negative for activity change, chills and fever. HENT: Negative for congestion, sinus pain and sore throat. Eyes: Negative for pain and visual disturbance. Respiratory: Negative for cough and shortness of breath. Cardiovascular: Negative for chest pain. Gastrointestinal: Negative for abdominal pain, diarrhea, nausea and vomiting. Genitourinary: Negative for difficulty urinating, dysuria and hematuria. Musculoskeletal:        Left foot pain   Skin: Negative for rash and wound. Neurological: Negative for dizziness, light-headedness and headaches.    Psychiatric/Behavioral: Negative for agitation and confusion. PHYSICAL EXAM   (up to 7 for level 4, 8 or more forlevel 5)      ED TRIAGE VITALS BP: 129/86, Temp: 98.1 °F (36.7 °C), Pulse: 96, Resp: 14, SpO2: 100 %    Vitals:    01/14/22 1803   BP: 129/86   Pulse: 96   Resp: 14   Temp: 98.1 °F (36.7 °C)   SpO2: 100%   Weight: 130 lb (59 kg)         Physical Exam  Vitals and nursing note reviewed. Constitutional:       Appearance: Normal appearance. HENT:      Head: Normocephalic and atraumatic. Nose: Nose normal.      Mouth/Throat:      Mouth: Mucous membranes are moist.   Eyes:      Extraocular Movements: Extraocular movements intact. Pupils: Pupils are equal, round, and reactive to light. Cardiovascular:      Rate and Rhythm: Normal rate and regular rhythm. Pulses: Normal pulses. Heart sounds: Normal heart sounds. Pulmonary:      Effort: Pulmonary effort is normal.      Breath sounds: Normal breath sounds. Abdominal:      General: Abdomen is flat. Palpations: Abdomen is soft. Musculoskeletal:      Cervical back: Normal range of motion. Comments: Tenderness of the left first MCP, podagra noted, tender, antalgic gait   Skin:     General: Skin is warm and dry. Capillary Refill: Capillary refill takes less than 2 seconds. Neurological:      General: No focal deficit present. Mental Status: She is alert and oriented to person, place, and time.    Psychiatric:         Mood and Affect: Mood normal.         Behavior: Behavior normal.           DIFFERENTIAL  DIAGNOSIS     PLAN (LABS / IMAGING / EKG):  Orders Placed This Encounter   Procedures    XR FOOT LEFT (MIN 3 VIEWS)       MEDICATIONS ORDERED:  ED Medication Orders (From admission, onward)    Start Ordered     Status Ordering Provider    01/14/22 1815 01/14/22 1812  predniSONE (DELTASONE) tablet 60 mg  ONCE         Last MAR action: Given - by Tg Rapp on 01/14/22 at [facility], [facility]    01/14/22 1815 01/14/22 1812 ibuprofen (ADVIL;MOTRIN) tablet 600 mg  ONCE         Last MAR action: Given - by Joycelyn Galicia on 01/14/22 at [facility], [facility]    01/14/22 1815 01/14/22 1812  acetaminophen (TYLENOL) tablet 1,000 mg  ONCE         Last MAR action: Given - by Joycelyn Galicia on 01/14/22 at [facility], [facility]          DDX: Gout    DIAGNOSTIC RESULTS / [facility]     IMPRESSION & INITIAL PLAN:  59-year-old female presenting with classic signs of gout. Podagra noted on exam.  Drinks alcohol and eats red meat. X-ray ordered to rule out fracture which was negative. Patient started on prednisone and NSAIDs and will be discharged home on them. Podiatry referral placed. LABS:  No results found for this visit on 01/14/22. RADIOLOGY:  XR FOOT LEFT (MIN 3 VIEWS)   Final Result   Degenerative change in the great toe MTP joint without acute osseous   abnormality. CONSULTS:  None    CRITICAL CARE:  See attending physician note    FINAL IMPRESSION      1. Podagra          DISPOSITION / PLAN     DISPOSITION Decision To Discharge 01/14/2022 08:08:41 PM      PATIENT REFERRED TO:  [facility]  [address]  [address] [facility] [address] [facility]  [phone]  Schedule an appointment as soon as possible for a visit in 1 week      [facility] ED  [address]    If symptoms worsen    Tory Blanc MD  [address]   [address]  [address]  [phone]    In 1 day        DISCHARGE MEDICATIONS:  Discharge Medication List as of 1/14/2022  8:09 PM      START taking these medications    Details   ibuprofen (ADVIL;MOTRIN) 800 MG tablet Take 1 tablet by mouth every 8 hours as needed for Pain, Disp-30 tablet, R-1Print           Discharge Medication List as of 1/14/2022  8:09 PM           Ayla Paulino MD  Emergency Medicine Resident    (Please note that portions of this note were completed with a voice recognition program.  Efforts were made to edit the dictations but occasionally words are mis-transcribed.)       Edgar Streeter MD  Resident  01/14/22 3000

## 2022-01-17 ENCOUNTER — CARE COORDINATION (OUTPATIENT)
Dept: FAMILY MEDICINE CLINIC | Age: 57
End: 2022-01-17

## 2022-01-17 NOTE — CARE COORDINATION
Elizabeth Acosta  2022               [facility] Outreach nurse vs  [address] [facility] ED care 22    Met with Chandu Coughlin to follow up post ED visit on 22 for foot pain. Emotional/coping  Alert, oriented x 3, engaged and cooperative   Affect- anxious   Thought processes  Logical   She reports experiencing feelings of grief/loss of mother who  earlier last year and additional grief related to recent death of family member in [facility]   No change in housing. She is sleeping on portable bed in the front room of the home until the pain in her left foot resolves    Socialization/family  No change    Medical  Chandu Born reports pain in left great toe and foot has decreased in severity with current medication treatment. Reports she is walking short distances in the home and limiting ambulation to minimize pain. No swelling of left toe or foot. Left great toe is moderately painful with palpation. She has not scheduled follow up with PCP post ED visit and was advised to schedule follow up in next 2 weeks  Lungs clear. She denies cough and reports slight increase in shortness of breath in the past 2 days. Reports increase in abdominal fullness. No lower leg edema. B/P 33880   Pulse 65   resp 18 and non labored  02 sat 96% at rest.      Medications   Medication schedule reviewed  She has all medications in the home and verbalizes taking.      Current Outpatient Medications   Medication Instructions    albuterol sulfate HFA (VENTOLIN HFA) 108 (90 Base) MCG/ACT inhaler INHALE 2 PUFFS INTO THE LUNGS EVERY 6 HOURS AS NEEDED FOR WHEEZING    amiodarone (CORDARONE) 200 mg, DAILY    amitriptyline (ELAVIL) 25 MG tablet TAKE 1 TABLET BY MOUTH EVERY NIGHT AT BEDTIME    atorvastatin (LIPITOR) 40 mg, Oral, NIGHTLY    bumetanide (BUMEX) 1 MG tablet TAKE 1 TABLET BY MOUTH 2 TIMES A DAY    fluticasone (FLONASE) 50 MCG/ACT nasal spray 1 spray, Nasal, DAILY    ibuprofen (ADVIL;MOTRIN) 800 mg, Oral,

## 2022-01-20 RX ORDER — OMEPRAZOLE 20 MG/1
CAPSULE, DELAYED RELEASE ORAL
Qty: 30 CAPSULE | Refills: 0 | Status: SHIPPED | OUTPATIENT
Start: 2022-01-20 | End: 2022-02-17

## 2022-01-20 NOTE — TELEPHONE ENCOUNTER
Last visit: 7/9/2021  Last Med refill: 8/14/2021  Does patient have enough medication for 72 hours: No:     Next Visit Date:  Future Appointments   Date Time Provider Yanelis Alfaro   1/26/2022  1:15 PM Alexa Trinidad DPM [facility]   1/28/2022 10:00 AM STV CHF CLINIC RM 1 [facility] CHF CLI [facility]   1/31/2022 11:00 AM SCHEDULE, STCZ COVID SCREENING [facility] COV [place]   2/4/2022 11:45 AM STC RESP THERAPY  STCZ PFT [address] Maintenance   Topic Date Due    COVID-19 Vaccine (1) Never done    Shingles Vaccine (1 of 2) Never done    Breast cancer screen  02/03/2019    Lipid screen  11/01/2020    TSH testing  11/01/2020    A1C test (Diabetic or Prediabetic)  11/06/2020    Flu vaccine (1) 09/01/2021    Depression Screen  05/17/2022    Potassium monitoring  11/14/2022    Creatinine monitoring  11/14/2022    Colon cancer screen fecal DNA test (Cologuard)  12/04/2022    Cervical cancer screen  07/09/2024    DTaP/Tdap/Td vaccine (2 - Td or Tdap) 06/09/2029    Pneumococcal 0-64 years Vaccine (2 of 2 - PPSV23) 06/13/2030    Hepatitis C screen  Completed    HIV screen  Completed    Hepatitis A vaccine  Aged Out    Hepatitis B vaccine  Aged Out    Hib vaccine  Aged Out    Meningococcal (ACWY) vaccine  Aged Out       Hemoglobin A1C (%)   Date Value   11/06/2019 5.7   07/16/2018 6.4 (H)   02/03/2017 5.9             ( goal A1C is < 7)   No results found for: LABMICR  LDL Cholesterol (mg/dL)   Date Value   11/01/2019 131 (H)   11/25/2018 128       (goal LDL is <100)   AST (U/L)   Date Value   02/26/2020 25     ALT (U/L)   Date Value   02/26/2020 13     BUN (mg/dL)   Date Value   11/14/2021 16     BP Readings from Last 3 Encounters:   01/14/22 129/86   12/28/21 136/81   11/15/21 128/72          (goal 120/80)    All Future Testing planned in [facility]  Lab Frequency Next Occurrence   TSH Once 07/09/2022   Lipid Panel Once 07/09/2022   ALYX DIGITAL SCREEN W OR WO CAD BILATERAL Once 09/09/2022   Hemoglobin A1C Once 07/09/2022   PAP Smear Once 07/09/2022   Full PFT Study With Bronchodilator Once 12/13/2021   XR CHEST (2 VW) Once 01/31/2022               Patient Active Problem List:     COPD (chronic obstructive pulmonary disease) ([address]     Fibroids     Syncope     Lung nodule < 6cm on CT     Chronic systolic heart failure (HCC) s/p AICD     Mild intermittent asthma     Essential hypertension     Chest pain     QT prolongation     Asthma with COPD with exacerbation ([facility])     Non-ischemic cardiomyopathy ([address]     Lesion of right native kidney     NSTEMI (non-ST elevated myocardial infarction) ([address]     CAD (coronary artery disease)     Intraparenchymal hematoma of left side of brain due to trauma ([address]     Chronic combined systolic and diastolic congestive heart failure ([address]     AICD discharge     RIP (acute kidney injury) ([address]     Hypomagnesemia     Other heart failure ([address]     Atypical chest pain

## 2022-01-20 NOTE — TELEPHONE ENCOUNTER
Last visit: 7/9/2021  Last Med refill: 8/14/2021  Does patient have enough medication for 72 hours: No:     Next Visit Date:  Future Appointments   Date Time Provider Yanelis Alfaro   1/26/2022  1:15 PM Leandra Hernandez DPM [facility]   1/28/2022 10:00 AM STV CHF CLINIC RM 1 [facility]F [facility]   1/31/2022 11:00 AM SCHEDULE, STCZ COVID SCREENING STCZ COV [place]   2/4/2022 11:45 AM STC RESP THERAPY  STCZ PFT [address] Maintenance   Topic Date Due    COVID-19 Vaccine (1) Never done    Shingles Vaccine (1 of 2) Never done    Breast cancer screen  02/03/2019    Lipid screen  11/01/2020    TSH testing  11/01/2020    A1C test (Diabetic or Prediabetic)  11/06/2020    Flu vaccine (1) 09/01/2021    Depression Screen  05/17/2022    Potassium monitoring  11/14/2022    Creatinine monitoring  11/14/2022    Colon cancer screen fecal DNA test (Cologuard)  12/04/2022    Cervical cancer screen  07/09/2024    DTaP/Tdap/Td vaccine (2 - Td or Tdap) 06/09/2029    Pneumococcal 0-64 years Vaccine (2 of 2 - PPSV23) 06/13/2030    Hepatitis C screen  Completed    HIV screen  Completed    Hepatitis A vaccine  Aged Out    Hepatitis B vaccine  Aged Out    Hib vaccine  Aged Out    Meningococcal (ACWY) vaccine  Aged Out       Hemoglobin A1C (%)   Date Value   11/06/2019 5.7   07/16/2018 6.4 (H)   02/03/2017 5.9             ( goal A1C is < 7)   No results found for: LABMICR  LDL Cholesterol (mg/dL)   Date Value   11/01/2019 131 (H)   11/25/2018 128       (goal LDL is <100)   AST (U/L)   Date Value   02/26/2020 25     ALT (U/L)   Date Value   02/26/2020 13     BUN (mg/dL)   Date Value   11/14/2021 16     BP Readings from Last 3 Encounters:   01/14/22 129/86   12/28/21 136/81   11/15/21 128/72          (goal 120/80)    All Future Testing planned in [facility]  Lab Frequency Next Occurrence   TSH Once 07/09/2022   Lipid Panel Once 07/09/2022   ALYX DIGITAL SCREEN W OR WO CAD BILATERAL Once 09/09/2022   Hemoglobin A1C Once 07/09/2022   PAP Smear Once 07/09/2022   Full PFT Study With Bronchodilator Once 12/13/2021   XR CHEST (2 VW) Once 01/31/2022               Patient Active Problem List:     COPD (chronic obstructive pulmonary disease) ([address]     Fibroids     Syncope     Lung nodule < 6cm on CT     Chronic systolic heart failure (HCC) s/p AICD     Mild intermittent asthma     Essential hypertension     Chest pain     QT prolongation     Asthma with COPD with exacerbation ([facility])     Non-ischemic cardiomyopathy ([address]     Lesion of right native kidney     NSTEMI (non-ST elevated myocardial infarction) ([address]     CAD (coronary artery disease)     Intraparenchymal hematoma of left side of brain due to trauma ([address]     Chronic combined systolic and diastolic congestive heart failure ([address]     AICD discharge     RIP (acute kidney injury) ([address]     Hypomagnesemia     Other heart failure ([address]     Atypical chest pain

## 2022-01-25 ENCOUNTER — TELEPHONE (OUTPATIENT)
Dept: FAMILY MEDICINE CLINIC | Age: 57
End: 2022-01-25

## 2022-01-27 ENCOUNTER — HOSPITAL ENCOUNTER (OUTPATIENT)
Age: 57
Setting detail: SPECIMEN
Discharge: HOME | End: 2022-01-27

## 2022-01-27 ENCOUNTER — OFFICE VISIT (OUTPATIENT)
Dept: FAMILY MEDICINE CLINIC | Age: 57
End: 2022-01-27
Payer: MEDICAID

## 2022-01-27 ENCOUNTER — TELEPHONE (OUTPATIENT)
Dept: FAMILY MEDICINE CLINIC | Age: 57
End: 2022-01-27

## 2022-01-27 VITALS
WEIGHT: 130 LBS | BODY MASS INDEX: 23.78 KG/M2 | DIASTOLIC BLOOD PRESSURE: 60 MMHG | SYSTOLIC BLOOD PRESSURE: 103 MMHG | HEART RATE: 60 BPM

## 2022-01-27 DIAGNOSIS — M25.561 ACUTE PAIN OF RIGHT KNEE: ICD-10-CM

## 2022-01-27 DIAGNOSIS — M10.9 GOUT, UNSPECIFIED CAUSE, UNSPECIFIED CHRONICITY, UNSPECIFIED SITE: ICD-10-CM

## 2022-01-27 DIAGNOSIS — M25.561 ACUTE PAIN OF RIGHT KNEE: Primary | ICD-10-CM

## 2022-01-27 LAB — URIC ACID: 8.9 MG/DL (ref 2.4–5.7)

## 2022-01-27 PROCEDURE — 1036F TOBACCO NON-USER: CPT

## 2022-01-27 PROCEDURE — 3017F COLORECTAL CA SCREEN DOC REV: CPT

## 2022-01-27 PROCEDURE — G8484 FLU IMMUNIZE NO ADMIN: HCPCS

## 2022-01-27 PROCEDURE — G8427 DOCREV CUR MEDS BY ELIG CLIN: HCPCS

## 2022-01-27 PROCEDURE — 99213 OFFICE O/P EST LOW 20 MIN: CPT

## 2022-01-27 PROCEDURE — G8420 CALC BMI NORM PARAMETERS: HCPCS

## 2022-01-27 RX ORDER — COLCHICINE 0.6 MG/1
CAPSULE ORAL
Qty: 30 CAPSULE | Refills: 0 | Status: SHIPPED | OUTPATIENT
Start: 2022-01-27 | End: 2022-01-27

## 2022-01-27 RX ORDER — COLCHICINE 0.6 MG/1
0.6 TABLET ORAL DAILY
Qty: 30 TABLET | Refills: 0 | Status: SHIPPED | OUTPATIENT
Start: 2022-01-27 | End: 2022-04-04 | Stop reason: SDUPTHER

## 2022-01-27 SDOH — ECONOMIC STABILITY: FOOD INSECURITY: WITHIN THE PAST 12 MONTHS, THE FOOD YOU BOUGHT JUST DIDN'T LAST AND YOU DIDN'T HAVE MONEY TO GET MORE.: NEVER TRUE

## 2022-01-27 SDOH — ECONOMIC STABILITY: FOOD INSECURITY: WITHIN THE PAST 12 MONTHS, YOU WORRIED THAT YOUR FOOD WOULD RUN OUT BEFORE YOU GOT MONEY TO BUY MORE.: NEVER TRUE

## 2022-01-27 ASSESSMENT — ENCOUNTER SYMPTOMS
ABDOMINAL DISTENTION: 0
DIARRHEA: 0
NAUSEA: 0
SORE THROAT: 0
SHORTNESS OF BREATH: 0
WHEEZING: 0
RHINORRHEA: 0
COUGH: 0
ABDOMINAL PAIN: 0
VOMITING: 0

## 2022-01-27 ASSESSMENT — SOCIAL DETERMINANTS OF HEALTH (SDOH): HOW HARD IS IT FOR YOU TO PAY FOR THE VERY BASICS LIKE FOOD, HOUSING, MEDICAL CARE, AND HEATING?: NOT HARD AT ALL

## 2022-01-27 NOTE — PROGRESS NOTES
Medication Management Service ([address] [place] [address])  [facility]  [phone]     CLINICAL PHARMACY NOTE:      Dr. Miladis Pacheco MD requested information regarding therapy interaction between colchicine and amiodarone. Estimated Creatinine Clearance: 43 mL/min (A) (based on SCr of 1.15 mg/dL (H)). According to [employer] interaction analysis: Category D interaction (consider therapy modification)  · P-glycoprotein/ABCB1 inhibitors (amiodarone) may increase the serum concentration of colchicine  · In patients with normal renal and hepatic function using the [employer] branded product, dose reductions are required when combined with, or when used within 14 days of, P-glycoprotein inhibitors (amiodarone)  · For gout flare treatment: reduce initial dose to 0.6 mg x 1 dose, with next dose no sooner than 3 days later. · For patients with CrCl 30-80 mL/min no dosage adjustment necessary  · For gout flare prophylaxis: if target dose would otherwise be 0.6 mg daily, change to 0.3 mg every other day, and if target dose would otherwise be 0.6 mg twice daily, change to 0.3 mg daily.   · Contraindications:  · Colchicine use is contraindicated with P-glycoprotein inhibitors in patients with impaired hepatic function or renal function  · Coadministration of colchicine ER tablets and P-glycoprotein/ABCB1 inhibitors is contraindicated according to [place] labeling, but is not included in [address], PharmD  PGY2 Ambulatory Care Pharmacy Resident    1/27/2022 11:51 AM    =================================================================================    For Pharmacy [id number] [place] [address] in place:  No   Recommendation Provided To: Provider: 1 via Note to Provider and Verbally to provider   Intervention Detail: Dose Adjustment: 1, reason: Interaction, Renal Adjustment   Gap Closed?: No    Intervention Accepted By: Provider: 1   Time Spent (min): 20

## 2022-01-27 NOTE — PROGRESS NOTES
Subjective:    Hart Leyden is a 64 y.o. female with  has a past medical history of AICD discharge, RIP (acute kidney injury) ([address] Asthma, Asthma with COPD with exacerbation ([address] CAD (coronary artery disease), Cardiomegaly, Chest pain, CHF (congestive heart failure) ([address] Chronic systolic heart failure ([address] s/p AICD, COPD (chronic obstructive pulmonary disease) ([address] Depression, Fibroids, Hypertension, Hypomagnesemia, Intraparenchymal hematoma of left side of brain due to trauma [facility]), Lesion of right native kidney, Lung nodule < 6cm on CT, MI (myocardial infarction) ([facility] [address] Mild intermittent asthma, Non-ischemic cardiomyopathy ([address] NSTEMI (non-ST elevated myocardial infarction) ([facility] [address] QT prolongation, Syncope, and Unspecified diseases of blood and blood-forming organs. Presented to the office today for:  Chief Complaint   Patient presents with    Gout     went to ED was dx with gout and arthritis       HPI  This is a 64years old female with Hx of alcohol abuse, essential hypertension, CHF, and COPD presented to the clinic today with right knee pain of 2 days duration, pressure-like, continuous, 7/10 in severity, does not radiate, affecting her activities of daily living. She was recently diagnosed with gout 2 weeks back, when she presented to the ED with podagra of the left foot, she was discharged on 5 days of prednisone and ibuprofen. She reported marked improvement in her symptoms after taking prednisone, though she was not taking ibuprofen because of unwanted GI side effects. She denies chest pain, shortness of breath, headache, eye pain, nausea or vomiting, fever, chills, abdominal pain, changes in urinary or bowel habitus, or pain at any other joints. Patient requested a cane since her symptoms are affecting her ambulation. Review of Systems   Constitutional: Negative for activity change, appetite change, chills, diaphoresis, fatigue, fever and unexpected weight change.    HENT: Negative for congestion, rhinorrhea and sore throat. Respiratory: Negative for cough, shortness of breath and wheezing. Cardiovascular: Negative for chest pain, palpitations and leg swelling. Gastrointestinal: Negative for abdominal distention, abdominal pain, diarrhea, nausea and vomiting. Genitourinary: Negative for decreased urine volume, difficulty urinating, flank pain and urgency. Musculoskeletal:        Right knee pain   Skin: Negative for rash. Neurological: Negative for tremors, seizures, weakness, light-headedness, numbness and headaches. Psychiatric/Behavioral: Negative for confusion. The patient has a   Family History   Problem Relation Age of Onset    Diabetes Mother     High Blood Pressure Mother     Heart Disease Mother     Diabetes Father     Heart Disease Brother        Objective:    /60   Pulse 60   Wt 130 lb (59 kg)   LMP 04/07/2016   BMI 23.78 kg/m²    BP Readings from Last 3 Encounters:   01/27/22 103/60   01/14/22 129/86   12/28/21 136/81       Physical Exam  Constitutional:       Appearance: Normal appearance. HENT:      Head: Normocephalic and atraumatic. Mouth/Throat:      Mouth: Mucous membranes are moist.   Cardiovascular:      Rate and Rhythm: Normal rate and regular rhythm. Pulses: Normal pulses. Heart sounds: Normal heart sounds. Pulmonary:      Effort: Pulmonary effort is normal.      Breath sounds: Normal breath sounds. Abdominal:      General: Bowel sounds are normal.      Palpations: Abdomen is soft. Musculoskeletal:      Comments: Examination of the right knee:  Positive for tenderness upon palpation of the right knee, no erythema, no warmth. No swelling was noted  Restricted passive and active ROM secondary to pain. Neurological:      Mental Status: She is alert.          Lab Results   Component Value Date    WBC 5.5 11/14/2021    HGB 13.0 11/14/2021    HCT 40.0 11/14/2021    PLT See Reflexed IPF Result 11/14/2021    CHOL 209 (H) 11/01/2019    TRIG 95 11/01/2019    HDL 59 11/01/2019    ALT 13 02/26/2020    AST 25 02/26/2020     11/14/2021    K 3.9 11/14/2021     11/14/2021    CREATININE 1.15 (H) 11/14/2021    BUN 16 11/14/2021    CO2 23 11/14/2021    TSH 0.30 11/01/2019    INR 1.0 02/26/2020    LABA1C 5.7 11/06/2019     Lab Results   Component Value Date    CALCIUM 10.2 11/14/2021    PHOS 3.4 11/08/2017     Lab Results   Component Value Date    LDLCHOLESTEROL 131 (H) 11/01/2019       Assessment and Plan:    1. Acute pain of right knee  - XR KNEE RIGHT (1-2 VIEWS); Future  - Uric Acid; Future  - DME Order for [employer] as OP    2. Gout, unspecified cause, unspecified chronicity, unspecified site  -We will send the lab for uric acid to confirm the diagnosis of gout.  -We will follow with the patient in 1 week if her symptoms does not improve. Discussed with the patient plan of care, patient was counseled about quitting alcohol and avoid red meat and eat healthy food. She was also consulted to uncomfortable shoes and pants. Patient voices understanding through teach back and in agreement to follow-up. Requested Prescriptions     Signed Prescriptions Disp Refills    colchicine (MITIGARE) 0.6 MG capsule 30 capsule 0     Sig: Take two tablets on day 1 and then 1 tablet daily until symptoms resolve       There are no discontinued medications. Judah Oppenheim received counseling on the following healthy behaviors: nutrition, exercise and medication adherence    Discussed use,benefit, and side effects of prescribed medications. Barriers to medication compliance addressed. All patient questions answered. Pt voiced understanding. Return in about 1 week (around 2/3/2022), or if symptoms worsen or fail to improve. Disclaimer: Some orall of this note was transcribed using voice-recognition software. This may cause typographical errors occasionally.  Although all effort is made to fix these errors, please do not hesitate to contact our office if there Eri Perez concern with the understanding of this note.

## 2022-01-27 NOTE — PATIENT INSTRUCTIONS
Patient Education        Purine-Restricted Diet: Care Instructions  Your Care Instructions     Purines are substances that are found in some foods. Your body turns purines into uric acid. High levels of uric acid can cause gout, which is a form of arthritis that causes pain and inflammation in joints. You may be able to help control the amount of uric acid in your body by limiting high-purine foods in your diet. Follow-up care is a key part of your treatment and safety. Be sure to make and go to all appointments, and call your doctor if you are having problems. It's also a good idea to know your test results and keep a list of the medicines you take. How can you care for yourself at home? · Plan your meals and snacks around foods that are low in purines and are safe for you to eat. These foods include:  ? Green vegetables and tomatoes. ? Fruits. ? Whole-grain breads, rice, and cereals. ? Eggs, peanut butter, and nuts. ? Low-fat milk, cheese, and other milk products. ? Popcorn. ? Gelatin desserts, chocolate, cocoa, and cakes and sweets, in small amounts. · You can eat certain foods that are medium-high in purines, but eat them only once in a while. These foods include:  ? Legumes, such as dried beans and dried peas. You can have 1 cup cooked legumes each day. ? Asparagus, cauliflower, spinach, mushrooms, and green peas. ? Fish and seafood (other than very high-purine seafood). ? Oatmeal, wheat bran, and wheat germ. · Limit very high-purine foods, including:  ? Organ meats, such as liver, kidneys, sweetbreads, and brains. ? Meats, including solis, beef, pork, and lamb. ? Game meats and any other meats in large amounts. ? Anchovies, sardines, herring, mackerel, and scallops. ? Gravy. ? Beer. Where can you learn more? Go to [email or web address] and sign in to your [employer] account.  Enter F448 in the [facility] box to learn more about \"Purine-Restricted Diet: Care Instructions. \"     If you do not have an account, please click on the \"Sign Up Now\" link. Current as of: September 8, 2021               Content Version: 13.1  © 2618-6782 Healthwise, Incorporated. Care instructions adapted under license by [facility] ([facility]). If you have questions about a medical condition or this instruction, always ask your healthcare professional. [address] any warranty or liability for your use of this information.

## 2022-01-27 NOTE — PROGRESS NOTES
Attending Physician Statement  I have discussed the care of Kameron Oropeza 64 y.o. female, including pertinent history and exam findings, with the resident Dr. Loreto Smith MD.    History and Exam:   Chief Complaint   Patient presents with    Gout     went to ED was dx with gout and arthritis       Past Medical History:   Diagnosis Date    AICD discharge 2/26/2020    RIP (acute kidney injury) ([address] 2/26/2020    Asthma     Asthma with COPD with exacerbation ([address] 2/3/2019    CAD (coronary artery disease)     ICD    Cardiomegaly     Chest pain 4/23/2018    CHF (congestive heart failure) ([address]     Chronic systolic heart failure ([address] s/p AICD 10/15/2015    COPD (chronic obstructive pulmonary disease) ([address]     Depression     Fibroids 11/25/2014    Uterine artery embolization planned by Interventional Rad 12-3-14     Hypertension     Hypomagnesemia 2/26/2020    Intraparenchymal hematoma of left side of brain due to trauma ([address]     Lesion of right native kidney 2/3/2019    Lung nodule < 6cm on CT 10/4/2015    MI (myocardial infarction) ([address]     Mild intermittent asthma 5/20/2016    Non-ischemic cardiomyopathy ([address] 2/3/2019    NSTEMI (non-ST elevated myocardial infarction) ([address] 6/5/2019    QT prolongation 2/3/2019    Syncope 7/30/2015    Unspecified diseases of blood and blood-forming organs     anemia,      Allergies   Allergen Reactions    Iv Contrast [Iodides] Nausea And Vomiting     Nausea/vomit. NO ALLERGY OR ANAPHYLAXIS      I have seen and examined the patient and the key elements of the encounter have been performed by me.   BP Readings from Last 3 Encounters:   01/27/22 103/60   01/14/22 129/86   12/28/21 136/81     /60   Pulse 60   Wt 130 lb (59 kg)   LMP 04/07/2016   BMI 23.78 kg/m²   Lab Results   Component Value Date    WBC 5.5 11/14/2021    HGB 13.0 11/14/2021    HCT 40.0 11/14/2021    PLT See Reflexed IPF Result 11/14/2021    CHOL 209 (H) 11/01/2019    TRIG 95 11/01/2019    HDL 59 11/01/2019    ALT 13 02/26/2020    AST 25 02/26/2020     11/14/2021    K 3.9 11/14/2021     11/14/2021    CREATININE 1.15 (H) 11/14/2021    BUN 16 11/14/2021    CO2 23 11/14/2021    TSH 0.30 11/01/2019    INR 1.0 02/26/2020    LABA1C 5.7 11/06/2019     Lab Results   Component Value Date    LABALBU 3.8 02/26/2020     Lab Results   Component Value Date    IRON 18 (L) 07/26/2014    TIBC 373 07/26/2014    FERRITIN 8 (L) 07/26/2014     Lab Results   Component Value Date    LDLCHOLESTEROL 131 (H) 11/01/2019     I agree with the assessment, plan and the diagnosis of    Diagnosis Orders   1. Acute pain of right knee  XR KNEE RIGHT (1-2 VIEWS)    Uric Acid    DME Order for [employer] as OP   2. Gout, unspecified cause, unspecified chronicity, unspecified site      . I agree with orders as documented by the resident. More than 25 minutes spent  in face to face encounter with the patient and more than half in counseling. Patient's questions were answered. Patient Voiced understanding to the counseling. Return in about 1 week (around 2/3/2022), or if symptoms worsen or fail to improve.    (GC Modifier)-Dr. Abi Cleary MD

## 2022-01-28 ENCOUNTER — HOSPITAL ENCOUNTER (OUTPATIENT)
Dept: OTHER | Age: 57
Discharge: HOME | End: 2022-01-28
Payer: MEDICAID

## 2022-01-28 VITALS
BODY MASS INDEX: 27.44 KG/M2 | WEIGHT: 150 LBS | HEART RATE: 90 BPM | DIASTOLIC BLOOD PRESSURE: 70 MMHG | RESPIRATION RATE: 16 BRPM | SYSTOLIC BLOOD PRESSURE: 120 MMHG | OXYGEN SATURATION: 100 %

## 2022-01-28 PROCEDURE — 99212 OFFICE O/P EST SF 10 MIN: CPT

## 2022-01-28 ASSESSMENT — PAIN DESCRIPTION - ORIENTATION: ORIENTATION: RIGHT

## 2022-01-28 ASSESSMENT — PAIN DESCRIPTION - PAIN TYPE: TYPE: CHRONIC PAIN

## 2022-01-28 ASSESSMENT — PAIN DESCRIPTION - LOCATION: LOCATION: KNEE

## 2022-01-28 ASSESSMENT — PAIN SCALES - GENERAL: PAINLEVEL_OUTOF10: 10

## 2022-01-28 ASSESSMENT — PAIN DESCRIPTION - DESCRIPTORS: DESCRIPTORS: ACHING;CONSTANT

## 2022-01-28 NOTE — PROGRESS NOTES
Date:  2022  Time:  10:42 AM    CHF Clinic at [address] [facility]    Office: [phone] Fax: [phone]    Re:  Deshaun Vaca   Patient : 1965    Vital Signs: /70   Pulse 90   Resp 16   Wt 150 lb (68 kg)   LMP 2016   SpO2 100%   BMI 27.44 kg/m²                                                   No results for input(s): CBC, HGB, HCT, WBC, PLATELET, NA, K, CL, CO2, BUN, CREATININE, GLUCOSE, BNP, INR in the last 72 hours. Respiratory:    Assessment  Charting Type: Reassessment    Breath Sounds  Right Upper Lobe: Clear  Right Middle Lobe: Clear  Right Lower Lobe: Clear  Left Upper Lobe: Clear  Left Lower Lobe: Clear    Cough/Sputum  Cough: None         Peripheral Vascular  RLE Edema: None  LLE Edema: None      Complaints: No new orders     Physician Orders No new orders     Comment : Patient has not been to CHF clinic since 2021 weight was up 5 pounds but it has been9 months. Has a Veebow defib. Has not had it checked since 2021 made appt for her at [address] [place] [address] [employer] 3/17/2022. We discussed in detail low sodium diet of 2000mg per day and fluid restriction of 64 ounces per day. Currently has gout on right knee has a lot of pain from that on cholcine daily states helping a little. From a cardiac standpoint denies any SOB or chest pain has no pedal edema. We will see back in 1 month 2022.     Electronically signed by Abiola Huntley RN on 2022 at 10:42 AM

## 2022-02-03 NOTE — TELEPHONE ENCOUNTER
Phone call to patient. Patient stated that she has been having a difficult time and experiencing stress. Patient informed this  that she and her significant other are no longer together. Inquired if his leaving would leave her with financial strain. Patient stated that she has enough resources to pay her bills and purchase food. Patient informed this  that her food stamps are no longer active and needs assistance with reapplying. Inquired if she has received follow up from [employer] for PIPP/HEAP. Patient admitted that she has not been paying attention to the mail and is unaware if she has received any correspondence. Patient stated that she attempted to drop off her application for [facility] and was met with a challenge as she was informed she would not qualify even though she has a disability and is over 55. Patient will obtain a doctor's note and  will assist as needed. Patient stated that she is experiencing excruciating pain due to gout and feels as if the medications aren't providing relief. Will relay concerns to [facility]. Will assist patient with reapplying for food stamps and will assess needs as needed.

## 2022-02-03 NOTE — TELEPHONE ENCOUNTER
Met with at [facility] to complete application for food stamps. Patient was given a script for colchicine, an order for xray for knee and an order for blood work. Patient was urged to follow up with PCP if pain hadn't improved in a week. Patient completed lab work. Reminded patient about tomorrow's CHF clinic appointment and encouraged her to get her xrays done at that time. Patient stated that she will be picking up the colchicine today. Assisted patient with application for food stamps and submitted on her behalf. Discussed housing options. Patient stated that she is interested in applying for [employer]. Patient has no other concerns at this time. Patient stated that she has food in her home and receives vouchers from [place] weekly for food. Will follow up in 1-2 weeks.

## 2022-02-04 ENCOUNTER — CARE COORDINATION (OUTPATIENT)
Dept: FAMILY MEDICINE CLINIC | Age: 57
End: 2022-02-04

## 2022-02-04 NOTE — CARE COORDINATION
Fernando Hector  2/4/2022               [facility] Outreach Nurse Telephone Note    Spoke with Elian Ang to follow up about her response to treatment with colchicine for treatment of gout symptoms. Elian Ang reports that she has been taking colchicine 0.6 mg every 3 days  She reports that the pain in she was experiencing in her feet has slowly improved. She reports that the pain in her feet is minimal and she has experienced continued pain in her right knee   She additionally reports that she fell yesterday on her right knee and exacerbated the pain in her knee. Action:  - Elian Ang was instructed to limit ambulation and pressure on the knee and to apply cold compresses for comfort. She was instructed to schedule follow up appointment with PCP next week and to obtain X ray of right knee that was previously ordered. Plan:  - Continue current medications and schedule follow up appointment with PCP next week. Elian Ang reports she will obtain x ray of right knee on Monday Feb 7th.     RN to navigate at next PCP appointment with patient and coordinate care with Medication Management Pharmacist Danish Burrows, PGY-2 Pharmacist    Kenny Lewis RN, BSN    [facility]

## 2022-02-17 RX ORDER — OMEPRAZOLE 20 MG/1
CAPSULE, DELAYED RELEASE ORAL
Qty: 30 CAPSULE | Refills: 0 | Status: SHIPPED | OUTPATIENT
Start: 2022-02-17 | End: 2022-03-15 | Stop reason: SDUPTHER

## 2022-02-17 NOTE — TELEPHONE ENCOUNTER
E-scribe request for med refill. Please review and e-scribe if applicable.      Last Visit Date:  01/27/2022  Next Visit Date:  Visit date not found    Hemoglobin A1C (%)   Date Value   11/06/2019 5.7   07/16/2018 6.4 (H)   02/03/2017 5.9             ( goal A1C is < 7)   No results found for: LABMICR  LDL Cholesterol (mg/dL)   Date Value   11/01/2019 131 (H)       (goal LDL is <100)   AST (U/L)   Date Value   02/26/2020 25     ALT (U/L)   Date Value   02/26/2020 13     BUN (mg/dL)   Date Value   11/14/2021 16     BP Readings from Last 3 Encounters:   01/28/22 120/70   01/27/22 103/60   01/14/22 129/86          (goal 120/80)        Patient Active Problem List:     COPD (chronic obstructive pulmonary disease) (HCC)     Fibroids     Syncope     Lung nodule < 6cm on CT     Chronic systolic heart failure (HCC) s/p AICD     Mild intermittent asthma     Essential hypertension     Chest pain     QT prolongation     Asthma with COPD with exacerbation ([facility])     Non-ischemic cardiomyopathy ([address]     Lesion of right native kidney     NSTEMI (non-ST elevated myocardial infarction) ([address]     CAD (coronary artery disease)     Intraparenchymal hematoma of left side of brain due to trauma ([address]     Chronic combined systolic and diastolic congestive heart failure ([address]     AICD discharge     RIP (acute kidney injury) ([address]     Hypomagnesemia     Other heart failure ([address]     Atypical chest pain      ----Thomas Norwood

## 2022-03-04 ENCOUNTER — HOSPITAL ENCOUNTER (OUTPATIENT)
Dept: LAB | Age: 57
Setting detail: SPECIMEN
Discharge: HOME | End: 2022-03-04

## 2022-03-04 DIAGNOSIS — Z20.822 COVID-19 RULED OUT BY LABORATORY TESTING: Primary | ICD-10-CM

## 2022-03-15 DIAGNOSIS — G43.809 OTHER MIGRAINE WITHOUT STATUS MIGRAINOSUS, NOT INTRACTABLE: ICD-10-CM

## 2022-03-15 RX ORDER — OMEPRAZOLE 20 MG/1
CAPSULE, DELAYED RELEASE ORAL
Qty: 30 CAPSULE | Refills: 0 | OUTPATIENT
Start: 2022-03-15

## 2022-03-15 RX ORDER — OMEPRAZOLE 20 MG/1
CAPSULE, DELAYED RELEASE ORAL
Qty: 30 CAPSULE | Refills: 3 | Status: SHIPPED | OUTPATIENT
Start: 2022-03-15 | End: 2022-07-11

## 2022-03-15 RX ORDER — AMITRIPTYLINE HYDROCHLORIDE 25 MG/1
TABLET, FILM COATED ORAL
Qty: 30 TABLET | Refills: 3 | Status: SHIPPED | OUTPATIENT
Start: 2022-03-15 | End: 2022-04-04 | Stop reason: SDUPTHER

## 2022-03-15 NOTE — TELEPHONE ENCOUNTER
Last visit: 1/27/2022  Last Med refill: 2/17/2022  Does patient have enough medication for 72 hours: Yes    Next Visit Date:  No future appointments.     Health Maintenance   Topic Date Due    COVID-19 Vaccine (1) Never done    Shingles Vaccine (1 of 2) Never done    Breast cancer screen  02/03/2019    Lipid screen  11/01/2020    TSH testing  11/01/2020    A1C test (Diabetic or Prediabetic)  11/06/2020    Flu vaccine (1) 09/01/2021    Depression Screen  05/17/2022    Potassium monitoring  11/14/2022    Creatinine monitoring  11/14/2022    Colorectal Cancer Screen  12/04/2022    Cervical cancer screen  07/09/2024    DTaP/Tdap/Td vaccine (2 - Td or Tdap) 06/09/2029    Pneumococcal 0-64 years Vaccine (2 of 2 - PPSV23) 06/13/2030    Hepatitis C screen  Completed    HIV screen  Completed    Hepatitis A vaccine  Aged Out    Hepatitis B vaccine  Aged Out    Hib vaccine  Aged Out    Meningococcal (ACWY) vaccine  Aged Out       Hemoglobin A1C (%)   Date Value   11/06/2019 5.7   07/16/2018 6.4 (H)   02/03/2017 5.9             ( goal A1C is < 7)   No results found for: LABMICR  LDL Cholesterol (mg/dL)   Date Value   11/01/2019 131 (H)   11/25/2018 128       (goal LDL is <100)   AST (U/L)   Date Value   02/26/2020 25     ALT (U/L)   Date Value   02/26/2020 13     BUN (mg/dL)   Date Value   11/14/2021 16     BP Readings from Last 3 Encounters:   01/28/22 120/70   01/27/22 103/60   01/14/22 129/86          (goal 120/80)    All Future Testing planned in [facility]  Lab Frequency Next Occurrence   TSH Once 07/09/2022   Lipid Panel Once 07/09/2022   ALYX DIGITAL SCREEN W OR WO CAD BILATERAL Once 09/09/2022   Hemoglobin A1C Once 07/09/2022   PAP Smear Once 07/09/2022   Full PFT Study With Bronchodilator Once 12/13/2021   XR CHEST (2 VW) Once 03/08/2022   XR KNEE RIGHT (1-2 VIEWS) Once 01/27/2023   COVID-19 Once 01/31/2022   COVID-19 Once 03/03/2022               Patient Active Problem List:     COPD (chronic obstructive pulmonary disease) (HCC)     Fibroids     Syncope     Lung nodule < 6cm on CT     Chronic systolic heart failure (HCC) s/p AICD     Mild intermittent asthma     Essential hypertension     Chest pain     QT prolongation     Asthma with COPD with exacerbation (HCC)     Non-ischemic cardiomyopathy (HCC)     Lesion of right native kidney     NSTEMI (non-ST elevated myocardial infarction) ([address]     CAD (coronary artery disease)     Intraparenchymal hematoma of left side of brain due to trauma [facility])     Chronic combined systolic and diastolic congestive heart failure ([address]     AICD discharge     RIP (acute kidney injury) ([address]     Hypomagnesemia     Other heart failure ([address]     Atypical chest pain

## 2022-03-17 RX ORDER — SACUBITRIL AND VALSARTAN 24; 26 MG/1; MG/1
TABLET, FILM COATED ORAL
Qty: 60 TABLET | Refills: 5 | Status: SHIPPED | OUTPATIENT
Start: 2022-03-17 | End: 2022-04-04 | Stop reason: SDUPTHER

## 2022-03-17 NOTE — TELEPHONE ENCOUNTER
Last visit:01/27/2022  Last Med refill:   Does patient have enough medication for 72 hours: No:     Next Visit Date:  No future appointments.     Health Maintenance   Topic Date Due    COVID-19 Vaccine (1) Never done    Shingles Vaccine (1 of 2) Never done    Breast cancer screen  02/03/2019    Lipid screen  11/01/2020    TSH testing  11/01/2020    A1C test (Diabetic or Prediabetic)  11/06/2020    Flu vaccine (1) 09/01/2021    Depression Screen  05/17/2022    Potassium monitoring  11/14/2022    Creatinine monitoring  11/14/2022    Colorectal Cancer Screen  12/04/2022    Cervical cancer screen  07/09/2024    DTaP/Tdap/Td vaccine (2 - Td or Tdap) 06/09/2029    Pneumococcal 0-64 years Vaccine (2 of 2 - PPSV23) 06/13/2030    Hepatitis C screen  Completed    HIV screen  Completed    Hepatitis A vaccine  Aged Out    Hepatitis B vaccine  Aged Out    Hib vaccine  Aged Out    Meningococcal (ACWY) vaccine  Aged Out       Hemoglobin A1C (%)   Date Value   11/06/2019 5.7   07/16/2018 6.4 (H)   02/03/2017 5.9             ( goal A1C is < 7)   No results found for: LABMICR  LDL Cholesterol (mg/dL)   Date Value   11/01/2019 131 (H)   11/25/2018 128       (goal LDL is <100)   AST (U/L)   Date Value   02/26/2020 25     ALT (U/L)   Date Value   02/26/2020 13     BUN (mg/dL)   Date Value   11/14/2021 16     BP Readings from Last 3 Encounters:   01/28/22 120/70   01/27/22 103/60   01/14/22 129/86          (goal 120/80)    All Future Testing planned in [facility]  Lab Frequency Next Occurrence   TSH Once 07/09/2022   Lipid Panel Once 07/09/2022   ALYX DIGITAL SCREEN W OR WO CAD BILATERAL Once 09/09/2022   Hemoglobin A1C Once 07/09/2022   PAP Smear Once 07/09/2022   Full PFT Study With Bronchodilator Once 12/13/2021   XR CHEST (2 VW) Once 03/08/2022   XR KNEE RIGHT (1-2 VIEWS) Once 01/27/2023   COVID-19 Once 01/31/2022   COVID-19 Once 03/03/2022               Patient Active Problem List:     COPD (chronic obstructive pulmonary disease) ([address]     Fibroids     Syncope     Lung nodule < 6cm on CT     Chronic systolic heart failure (HCC) s/p AICD     Mild intermittent asthma     Essential hypertension     Chest pain     QT prolongation     Asthma with COPD with exacerbation (HCC)     Non-ischemic cardiomyopathy (HCC)     Lesion of right native kidney     NSTEMI (non-ST elevated myocardial infarction) ([address]     CAD (coronary artery disease)     Intraparenchymal hematoma of left side of brain due to trauma [facility])     Chronic combined systolic and diastolic congestive heart failure ([address]     AICD discharge     RIP (acute kidney injury) ([address]     Hypomagnesemia     Other heart failure ([address]     Atypical chest pain

## 2022-04-04 ENCOUNTER — HOSPITAL ENCOUNTER (OUTPATIENT)
Age: 57
Setting detail: SPECIMEN
Discharge: HOME | End: 2022-04-04

## 2022-04-04 ENCOUNTER — OFFICE VISIT (OUTPATIENT)
Dept: FAMILY MEDICINE CLINIC | Age: 57
End: 2022-04-04
Payer: MEDICAID

## 2022-04-04 VITALS
DIASTOLIC BLOOD PRESSURE: 68 MMHG | TEMPERATURE: 96.6 F | HEART RATE: 99 BPM | SYSTOLIC BLOOD PRESSURE: 112 MMHG | HEIGHT: 62 IN | WEIGHT: 152 LBS | OXYGEN SATURATION: 98 % | BODY MASS INDEX: 27.97 KG/M2

## 2022-04-04 DIAGNOSIS — Z12.31 ENCOUNTER FOR SCREENING MAMMOGRAM FOR MALIGNANT NEOPLASM OF BREAST: ICD-10-CM

## 2022-04-04 DIAGNOSIS — I50.22 CHRONIC SYSTOLIC HEART FAILURE (HCC): Primary | ICD-10-CM

## 2022-04-04 DIAGNOSIS — Z79.899 ON AMIODARONE THERAPY: ICD-10-CM

## 2022-04-04 DIAGNOSIS — R73.03 PREDIABETES: ICD-10-CM

## 2022-04-04 DIAGNOSIS — Z13.220 LIPID SCREENING: ICD-10-CM

## 2022-04-04 DIAGNOSIS — M10.9 GOUT, UNSPECIFIED CAUSE, UNSPECIFIED CHRONICITY, UNSPECIFIED SITE: ICD-10-CM

## 2022-04-04 DIAGNOSIS — G43.809 OTHER MIGRAINE WITHOUT STATUS MIGRAINOSUS, NOT INTRACTABLE: ICD-10-CM

## 2022-04-04 DIAGNOSIS — J44.9 CHRONIC OBSTRUCTIVE PULMONARY DISEASE, UNSPECIFIED COPD TYPE (HCC): ICD-10-CM

## 2022-04-04 LAB — TSH SERPL DL<=0.05 MIU/L-ACNC: 0.6 UIU/ML (ref 0.3–5)

## 2022-04-04 PROCEDURE — G8417 CALC BMI ABV UP PARAM F/U: HCPCS

## 2022-04-04 PROCEDURE — 3017F COLORECTAL CA SCREEN DOC REV: CPT

## 2022-04-04 PROCEDURE — G8427 DOCREV CUR MEDS BY ELIG CLIN: HCPCS

## 2022-04-04 PROCEDURE — 3023F SPIROM DOC REV: CPT

## 2022-04-04 PROCEDURE — 1036F TOBACCO NON-USER: CPT

## 2022-04-04 PROCEDURE — 99213 OFFICE O/P EST LOW 20 MIN: CPT

## 2022-04-04 RX ORDER — COLCHICINE 0.6 MG/1
0.6 TABLET ORAL DAILY
Qty: 30 TABLET | Refills: 0 | Status: SHIPPED | OUTPATIENT
Start: 2022-04-04

## 2022-04-04 RX ORDER — SPIRONOLACTONE 25 MG/1
TABLET ORAL
Qty: 30 TABLET | Refills: 5 | Status: SHIPPED | OUTPATIENT
Start: 2022-04-04

## 2022-04-04 RX ORDER — ATORVASTATIN CALCIUM 40 MG/1
40 TABLET, FILM COATED ORAL NIGHTLY
Qty: 30 TABLET | Refills: 3 | Status: SHIPPED | OUTPATIENT
Start: 2022-04-04

## 2022-04-04 RX ORDER — METOPROLOL SUCCINATE 50 MG/1
TABLET, EXTENDED RELEASE ORAL
Qty: 90 TABLET | Refills: 1 | Status: SHIPPED | OUTPATIENT
Start: 2022-04-04

## 2022-04-04 RX ORDER — BUMETANIDE 1 MG/1
TABLET ORAL
Qty: 60 TABLET | Refills: 5 | Status: SHIPPED | OUTPATIENT
Start: 2022-04-04

## 2022-04-04 RX ORDER — AMIODARONE HYDROCHLORIDE 200 MG/1
200 TABLET ORAL DAILY
Qty: 30 TABLET | Refills: 2 | Status: SHIPPED | OUTPATIENT
Start: 2022-04-04 | End: 2022-07-11 | Stop reason: SDUPTHER

## 2022-04-04 RX ORDER — IPRATROPIUM BROMIDE AND ALBUTEROL SULFATE 2.5; .5 MG/3ML; MG/3ML
3 SOLUTION RESPIRATORY (INHALATION) 2 TIMES DAILY PRN
Qty: 360 ML | Refills: 3 | Status: SHIPPED | OUTPATIENT
Start: 2022-04-04

## 2022-04-04 RX ORDER — ALBUTEROL SULFATE 90 UG/1
AEROSOL, METERED RESPIRATORY (INHALATION)
Qty: 18 G | Refills: 3 | Status: SHIPPED | OUTPATIENT
Start: 2022-04-04

## 2022-04-04 RX ORDER — AMITRIPTYLINE HYDROCHLORIDE 25 MG/1
TABLET, FILM COATED ORAL
Qty: 30 TABLET | Refills: 2 | Status: SHIPPED | OUTPATIENT
Start: 2022-04-04 | End: 2022-10-10

## 2022-04-04 RX ORDER — SACUBITRIL AND VALSARTAN 24; 26 MG/1; MG/1
TABLET, FILM COATED ORAL
Qty: 60 TABLET | Refills: 5 | Status: SHIPPED | OUTPATIENT
Start: 2022-04-04

## 2022-04-04 ASSESSMENT — ENCOUNTER SYMPTOMS
NAUSEA: 0
CONSTIPATION: 0
SHORTNESS OF BREATH: 1
DIARRHEA: 0
VOMITING: 0
ABDOMINAL PAIN: 0

## 2022-04-04 NOTE — PATIENT INSTRUCTIONS
Thank you for letting us take care of you today. We hope all your questions were addressed. If a question was overlooked or something else comes to mind after you return home, please contact a member of your Care Team listed below. Your Care Team at [place] [address] is Team #4  Elzbieta Sewell MD (Faculty)  Abhi Aguilar MD (Resident)  Cecilio Saleem MD (Resident)  Alma Samson MD (Resident)  Joseph Echols MD (Resident)  KILO Swann., Jayme Sherwood., [facility] office)  Leah Stanakul, [address] (Clinical Practice Manager)  Ganesh Santiago, [address] [place] [address] (Clinical Pharmacist)       Office phone number: [phone]    If you need to get in right away due to illness, please be advised we have \"Same Day\" appointments available Monday-Friday. Please call us at [phone] option #3 to schedule your \"Same Day\" appointment.

## 2022-04-04 NOTE — PROGRESS NOTES
Individual Psychotherapy (PhD/LCSW)    6/20/2019    Site:  [facility]         Therapeutic Intervention: Met with patient.  Outpatient - Behavior modifying psychotherapy 45 min - CPT code 56678 and Outpatient - Interactive psychotherapy 45 min - CPT code 68923    Chief complaint/reason for encounter: behavior     Review of Systems   Psychiatric/Behavioral: The patient is hyperactive.      Interval history and content of current session:  Patient presented twenty minutes late for the follow up session.  His mother brought him to the session.   read a children's book about lying and we discussed how he lies occasionally.  Completed an honesty exercise.  He stated that he lies more with his parents than with others.  Patient was cooperative but distracted during the session.  He was able to take direction and interact cooperatively.    Treatment plan:  · Target symptoms: behavior  · Why chosen therapy is appropriate versus another modality: relevant to diagnosis  · Outcome monitoring methods: self-report, observation, teacher report, feedback from family  · Therapeutic intervention type: behavior modifying psychotherapy, interactive psychotherapy    Risk parameters:  Patient reports no suicidal ideation  Patient reports no homicidal ideation  Patient reports no self-injurious behavior  Patient reports no violent behavior    Verbal deficits: None    Patient's response to intervention:  The patient's response to intervention is accepting.    Progress toward goals and other mental status changes:  The patient's progress toward goals is fair .    Diagnosis:     ICD-10-CM ICD-9-CM   1. Adjustment disorder with disturbance of conduct F43.24 309.3       Plan:  individual psychotherapy and family psychotherapy, Pt to go to ED or call 911 if symptoms worsen or if he has thoughts of harming self and/or others. Pt verbalized understanding.     Return to clinic: Follow up in about 3 weeks (around  Subjective:    Isabel May is a 64 y.o. female with  has a past medical history of AICD discharge, RIP (acute kidney injury) ([facility] Asthma, Asthma with COPD with exacerbation ([facility] [address] CAD (coronary artery disease), Cardiomegaly, Chest pain, CHF (congestive heart failure) ([address] Chronic systolic heart failure ([address] s/p AICD, COPD (chronic obstructive pulmonary disease) ([facility] [address] Depression, Fibroids, Hypertension, Hypomagnesemia, Intraparenchymal hematoma of left side of brain due to trauma [facility]), Lesion of right native kidney, Lung nodule < 6cm on CT, MI (myocardial infarction) ([facility] [address] Mild intermittent asthma, Non-ischemic cardiomyopathy ([facility] [address] NSTEMI (non-ST elevated myocardial infarction) ([facility] [address] QT prolongation, Syncope, and Unspecified diseases of blood and blood-forming organs. Presented to the office today for:  Chief Complaint   Patient presents with    Hypertension     Check up - would like to discuss her GOUT     COPD     Follow up       HPI     79-year-old female came to the clinic to follow-up for her shortness of breath and knee pain    CAD  Systolic CHF s/p AICD  Sees Cardio at [facility]  Needs to follow up ASAP  No chest pain reported  No dizziness or falls reported    HTN  Controlled today  Vacations refilled    COPD/Asthma   Unclear if she has appropriate diagnosis for this  PFT needed/ordered and pending  Nonsmoker   Only takes albuterol  She does have an order for DuoNeb's at home not sure if she still takes it or not    Right knee pain is same as last time  In order for x-ray of the right knee was done but patient did not get that  However her last uric acid was 8.9  Patient was started on colchicine      Review of Systems   Constitutional: Negative for activity change. Respiratory: Positive for shortness of breath. Complain of shortness breath when walking up the stairs   Gastrointestinal: Negative for abdominal pain, constipation, diarrhea, nausea and vomiting. 7/9/2019).    Length of Service (minutes): 45   Musculoskeletal: Positive for arthralgias. Neurological: Negative for dizziness and weakness. Psychiatric/Behavioral: Negative for agitation. The patient has a   Family History   Problem Relation Age of Onset    Diabetes Mother     High Blood Pressure Mother     Heart Disease Mother     Diabetes Father     Heart Disease Brother        Objective:    /68 (Site: Right Upper Arm, Position: Sitting, Cuff Size: Medium Adult) Comment: machine  Pulse 99   Temp 96.6 °F (35.9 °C) (Temporal)   Ht 5' 2.01\" (1.575 m)   Wt 152 lb (68.9 kg)   LMP 04/07/2016   SpO2 98%   BMI 27.79 kg/m²    BP Readings from Last 3 Encounters:   04/04/22 112/68   01/28/22 120/70   01/27/22 103/60       Physical Exam  Constitutional:       General: She is not in acute distress. Appearance: She is not ill-appearing, toxic-appearing or diaphoretic. Cardiovascular:      Rate and Rhythm: Normal rate and regular rhythm. Pulses: Normal pulses. Heart sounds: No murmur heard. Pulmonary:      Effort: Pulmonary effort is normal.      Breath sounds: Normal breath sounds. No wheezing. Abdominal:      Tenderness: There is no abdominal tenderness. There is no guarding. Musculoskeletal:         General: Tenderness present. No swelling. Right lower leg: No edema. Left lower leg: No edema. Comments: Tenderness of the right knee    Skin:     General: Skin is warm. Coloration: Skin is not jaundiced. Findings: No erythema. Neurological:      Mental Status: She is alert and oriented to person, place, and time. Motor: No weakness. Psychiatric:         Mood and Affect: Mood normal.         Behavior: Behavior normal.         Thought Content:  Thought content normal.         Judgment: Judgment normal.         Lab Results   Component Value Date    WBC 5.5 11/14/2021    HGB 13.0 11/14/2021    HCT 40.0 11/14/2021    PLT See Reflexed IPF Result 11/14/2021    CHOL 209 (H) 11/01/2019 TRIG 95 11/01/2019    HDL 59 11/01/2019    ALT 13 02/26/2020    AST 25 02/26/2020     11/14/2021    K 3.9 11/14/2021     11/14/2021    CREATININE 1.15 (H) 11/14/2021    BUN 16 11/14/2021    CO2 23 11/14/2021    TSH 0.30 11/01/2019    INR 1.0 02/26/2020    LABA1C 5.7 11/06/2019     Lab Results   Component Value Date    CALCIUM 10.2 11/14/2021    PHOS 3.4 11/08/2017     Lab Results   Component Value Date    LDLCHOLESTEROL 131 (H) 11/01/2019       Assessment and Plan:    1. Other migraine without status migrainosus, not intractable  - amitriptyline (ELAVIL) 25 MG tablet; TAKE 1 TABLET BY MOUTH EVERY NIGHT AT BEDTIME  Dispense: 30 tablet; Refill: 2    2. Chronic systolic heart failure (HCC) s/p AICD  - bumetanide (BUMEX) 1 MG tablet; TAKE 1 TABLET BY MOUTH 2 TIMES A DAY  Dispense: 60 tablet; Refill: 5  - metoprolol succinate (TOPROL XL) 50 MG extended release tablet; TAKE ONE TABLET BY MOUTH EVERY MORNING  Dispense: 90 tablet; Refill: 1  - sacubitril-valsartan (ENTRESTO) 24-26 MG per tablet; TAKE 1 TABLET BY MOUTH 2 TIMES A DAY  Dispense: 60 tablet; Refill: 5  - spironolactone (ALDACTONE) 25 MG tablet; TAKE 1 TABLET BY MOUTH ONE TIME A DAY  Dispense: 30 tablet; Refill: 5  - amiodarone (CORDARONE) 200 MG tablet; Take 1 tablet by mouth daily Dose reduction initiated 5/26/2020 by cardiologyTake 200 mg by mouth daily Dose reduction initiated 5/26/2020 by cardiology  Dispense: 30 tablet; Refill: 2    3. Encounter for screening mammogram for malignant neoplasm of breast    4. Prediabetes  Hemoglobin A1c pending    5. Lipid screening  - atorvastatin (LIPITOR) 40 MG tablet; Take 1 tablet by mouth nightly  Dispense: 30 tablet; Refill: 3    6. On amiodarone therapy  - TSH; Future    7.  Chronic obstructive pulmonary disease, unspecified COPD type (HCC)  - albuterol sulfate HFA (VENTOLIN HFA) 108 (90 Base) MCG/ACT inhaler; INHALE 2 PUFFS INTO THE LUNGS EVERY 6 HOURS AS NEEDED FOR WHEEZING  Dispense: 18 g; Refill: 3  - ipratropium-albuterol (DUONEB) 0.5-2.5 (3) MG/3ML SOLN nebulizer solution; Inhale 3 mLs into the lungs 2 times daily as needed for Shortness of Breath  Dispense: 360 mL; Refill: 3    8. Gout, unspecified cause, unspecified chronicity, unspecified site  - colchicine (COLCRYS) 0.6 MG tablet; Take 1 tablet by mouth daily Take 1 tablet day 1  Then 1 tablet every 3 days  Dispense: 30 tablet;  Refill: 0          Requested Prescriptions     Signed Prescriptions Disp Refills    albuterol sulfate HFA (VENTOLIN HFA) 108 (90 Base) MCG/ACT inhaler 18 g 3     Sig: INHALE 2 PUFFS INTO THE LUNGS EVERY 6 HOURS AS NEEDED FOR WHEEZING    amitriptyline (ELAVIL) 25 MG tablet 30 tablet 2     Sig: TAKE 1 TABLET BY MOUTH EVERY NIGHT AT BEDTIME    atorvastatin (LIPITOR) 40 MG tablet 30 tablet 3     Sig: Take 1 tablet by mouth nightly    bumetanide (BUMEX) 1 MG tablet 60 tablet 5     Sig: TAKE 1 TABLET BY MOUTH 2 TIMES A DAY    colchicine (COLCRYS) 0.6 MG tablet 30 tablet 0     Sig: Take 1 tablet by mouth daily Take 1 tablet day 1  Then 1 tablet every 3 days    ipratropium-albuterol (DUONEB) 0.5-2.5 (3) MG/3ML SOLN nebulizer solution 360 mL 3     Sig: Inhale 3 mLs into the lungs 2 times daily as needed for Shortness of Breath    metoprolol succinate (TOPROL XL) 50 MG extended release tablet 90 tablet 1     Sig: TAKE ONE TABLET BY MOUTH EVERY MORNING    sacubitril-valsartan (ENTRESTO) 24-26 MG per tablet 60 tablet 5     Sig: TAKE 1 TABLET BY MOUTH 2 TIMES A DAY    spironolactone (ALDACTONE) 25 MG tablet 30 tablet 5     Sig: TAKE 1 TABLET BY MOUTH ONE TIME A DAY    amiodarone (CORDARONE) 200 MG tablet 30 tablet 2     Sig: Take 1 tablet by mouth daily Dose reduction initiated 5/26/2020 by cardiologyTake 200 mg by mouth daily Dose reduction initiated 5/26/2020 by cardiology       Medications Discontinued During This Encounter   Medication Reason    atorvastatin (LIPITOR) 40 MG tablet REORDER    ipratropium-albuterol (Trista Marx) 0.5-2.5 (3) MG/3ML SOLN nebulizer solution REORDER    albuterol sulfate HFA (VENTOLIN HFA) 108 (90 Base) MCG/ACT inhaler REORDER    amiodarone (CORDARONE) 200 MG tablet REORDER    metoprolol succinate (TOPROL XL) 50 MG extended release tablet REORDER    spironolactone (ALDACTONE) 25 MG tablet REORDER    bumetanide (BUMEX) 1 MG tablet REORDER    colchicine (COLCRYS) 0.6 MG tablet REORDER    amitriptyline (ELAVIL) 25 MG tablet REORDER    sacubitril-valsartan (ENTRESTO) 24-26 MG per tablet Tate Haas received counseling on the following healthy behaviors: nutrition, exercise and medication adherence    Discussed use,benefit, and side effects of prescribed medications. Barriers to medication compliance addressed. All patient questions answered. Pt voiced understanding. Return in about 1 month (around 5/4/2022). Disclaimer: Some orall of this note was transcribed using voice-recognition software. This may cause typographical errors occasionally. Although all effort is made to fix these errors, please do not hesitate to contact our office if there Connie Isabel concern with the understanding of this note.

## 2022-04-04 NOTE — PROGRESS NOTES
Attending Physician Statement  I have discussed the care of Shaye Viera 64 y.o. female, including pertinent history and exam findings, with the resident Dr. Wilfrid Yang MD.    History and Exam:   Chief Complaint   Patient presents with    Hypertension     Check up - would like to discuss her GOUT     COPD     Follow up       Past Medical History:   Diagnosis Date    AICD discharge 2/26/2020    RIP (acute kidney injury) ([address] 2/26/2020    Asthma     Asthma with COPD with exacerbation ([address] 2/3/2019    CAD (coronary artery disease)     ICD    Cardiomegaly     Chest pain 4/23/2018    CHF (congestive heart failure) ([address]     Chronic systolic heart failure ([address] s/p AICD 10/15/2015    COPD (chronic obstructive pulmonary disease) ([address]     Depression     Fibroids 11/25/2014    Uterine artery embolization planned by Interventional Rad 12-3-14     Hypertension     Hypomagnesemia 2/26/2020    Intraparenchymal hematoma of left side of brain due to trauma ([address]     Lesion of right native kidney 2/3/2019    Lung nodule < 6cm on CT 10/4/2015    MI (myocardial infarction) ([address]     Mild intermittent asthma 5/20/2016    Non-ischemic cardiomyopathy ([address] 2/3/2019    NSTEMI (non-ST elevated myocardial infarction) ([address] 6/5/2019    QT prolongation 2/3/2019    Syncope 7/30/2015    Unspecified diseases of blood and blood-forming organs     anemia,      Allergies   Allergen Reactions    Iv Contrast [Iodides] Nausea And Vomiting     Nausea/vomit. NO ALLERGY OR ANAPHYLAXIS      I have seen and examined the patient and the key elements of the encounter have been performed by me.   BP Readings from Last 3 Encounters:   04/04/22 112/68   01/28/22 120/70   01/27/22 103/60     /68 (Site: Right Upper Arm, Position: Sitting, Cuff Size: Medium Adult) Comment: machine  Pulse 99   Temp 96.6 °F (35.9 °C) (Temporal)   Ht 5' 2.01\" (1.575 m)   Wt 152 lb (68.9 kg)   LMP 04/07/2016   SpO2 98%   BMI 27.79 kg/m²   Lab Results   Component Value Date    WBC 5.5 11/14/2021    HGB 13.0 11/14/2021    HCT 40.0 11/14/2021    PLT See Reflexed IPF Result 11/14/2021    CHOL 209 (H) 11/01/2019    TRIG 95 11/01/2019    HDL 59 11/01/2019    ALT 13 02/26/2020    AST 25 02/26/2020     11/14/2021    K 3.9 11/14/2021     11/14/2021    CREATININE 1.15 (H) 11/14/2021    BUN 16 11/14/2021    CO2 23 11/14/2021    TSH 0.30 11/01/2019    INR 1.0 02/26/2020    LABA1C 5.7 11/06/2019     Lab Results   Component Value Date    LABALBU 3.8 02/26/2020     Lab Results   Component Value Date    IRON 18 (L) 07/26/2014    TIBC 373 07/26/2014    FERRITIN 8 (L) 07/26/2014     Lab Results   Component Value Date    LDLCHOLESTEROL 131 (H) 11/01/2019     I agree with the assessment, plan and the diagnosis of    Diagnosis Orders   1. Chronic systolic heart failure (HCC) s/p AICD  bumetanide (BUMEX) 1 MG tablet    metoprolol succinate (TOPROL XL) 50 MG extended release tablet    sacubitril-valsartan (ENTRESTO) 24-26 MG per tablet    spironolactone (ALDACTONE) 25 MG tablet    amiodarone (CORDARONE) 200 MG tablet   2. Other migraine without status migrainosus, not intractable  amitriptyline (ELAVIL) 25 MG tablet   3. Encounter for screening mammogram for malignant neoplasm of breast     4. Prediabetes     5. Lipid screening  atorvastatin (LIPITOR) 40 MG tablet   6. On amiodarone therapy  TSH   7. Chronic obstructive pulmonary disease, unspecified COPD type (HCC)  albuterol sulfate HFA (VENTOLIN HFA) 108 (90 Base) MCG/ACT inhaler    ipratropium-albuterol (DUONEB) 0.5-2.5 (3) MG/3ML SOLN nebulizer solution   8. Gout, unspecified cause, unspecified chronicity, unspecified site  colchicine (COLCRYS) 0.6 MG tablet    . I agree with orders as documented by the resident. More than 25 minutes spent  in face to face encounter with the patient and more than half in counseling. Patient's questions were answered.    Patient Voiced understanding to the counseling. Return in about 1 month (around 5/4/2022).    (GC Modifier)-Dr. Claudene Pointer, MD

## 2022-04-04 NOTE — PROGRESS NOTES
HYPERTENSION visit     BP Readings from Last 3 Encounters:   01/28/22 120/70   01/27/22 103/60   01/14/22 129/86       LDL Cholesterol (mg/dL)   Date Value   11/01/2019 131 (H)     HDL (mg/dL)   Date Value   11/01/2019 59     BUN (mg/dL)   Date Value   11/14/2021 16     CREATININE (mg/dL)   Date Value   11/14/2021 1.15 (H)     Glucose (mg/dL)   Date Value   11/14/2021 68 (L)              Have you changed or started any medications since your last visit including any over-the-counter medicines, vitamins, or herbal medicines? no   Have you stopped taking any of your medications? Is so, why? -  no  Are you having any side effects from any of your medications? - no  How often do you miss doses of your medication? rare      Have you seen any other physician or provider since your last visit?  no   Have you had any other diagnostic tests since your last visit? yes - Labs   Have you been seen in the emergency room and/or had an admission in a hospital since we last saw you?  no   Have you had your routine dental cleaning in the past 6 months?  no     Do you have an active MyChart account? If no, what is the barrier?   Yes    Patient Care Team:  Alida San MD as PCP - General (Family Medicine)  Carmen Ozuna RN as   Lm Ortiz MD as Consulting Physician (Pulmonary Disease)    Medical History Review  Past Medical, Family, and Social History reviewed and does contribute to the patient presenting condition    Health Maintenance   Topic Date Due    COVID-19 Vaccine (1) Never done    Shingles Vaccine (1 of 2) Never done    Breast cancer screen  02/03/2019    Lipid screen  11/01/2020    TSH testing  11/01/2020    A1C test (Diabetic or Prediabetic)  11/06/2020    Depression Screen  05/17/2022    Flu vaccine (Season Ended) 09/01/2022    Potassium monitoring  11/14/2022    Creatinine monitoring  11/14/2022    Colorectal Cancer Screen  12/04/2022    Cervical cancer screen  07/09/2024    DTaP/Tdap/Td vaccine (2 - Td or Tdap) 06/09/2029    Pneumococcal 0-64 years Vaccine (2 of 2 - PPSV23) 06/13/2030    Hepatitis C screen  Completed    HIV screen  Completed    Hepatitis A vaccine  Aged Out    Hepatitis B vaccine  Aged Out    Hib vaccine  Aged Out    Meningococcal (ACWY) vaccine  Aged Out

## 2022-04-20 ENCOUNTER — OFFICE VISIT (OUTPATIENT)
Dept: FAMILY MEDICINE CLINIC | Age: 57
End: 2022-04-20
Payer: MEDICAID

## 2022-04-20 VITALS
HEART RATE: 74 BPM | BODY MASS INDEX: 27.98 KG/M2 | WEIGHT: 153 LBS | TEMPERATURE: 97.9 F | DIASTOLIC BLOOD PRESSURE: 63 MMHG | SYSTOLIC BLOOD PRESSURE: 108 MMHG

## 2022-04-20 DIAGNOSIS — K04.7 DENTAL INFECTION: Primary | ICD-10-CM

## 2022-04-20 PROCEDURE — 1036F TOBACCO NON-USER: CPT | Performed by: STUDENT IN AN ORGANIZED HEALTH CARE EDUCATION/TRAINING PROGRAM

## 2022-04-20 PROCEDURE — 99213 OFFICE O/P EST LOW 20 MIN: CPT | Performed by: STUDENT IN AN ORGANIZED HEALTH CARE EDUCATION/TRAINING PROGRAM

## 2022-04-20 PROCEDURE — G8427 DOCREV CUR MEDS BY ELIG CLIN: HCPCS | Performed by: STUDENT IN AN ORGANIZED HEALTH CARE EDUCATION/TRAINING PROGRAM

## 2022-04-20 PROCEDURE — G8417 CALC BMI ABV UP PARAM F/U: HCPCS | Performed by: STUDENT IN AN ORGANIZED HEALTH CARE EDUCATION/TRAINING PROGRAM

## 2022-04-20 PROCEDURE — 3017F COLORECTAL CA SCREEN DOC REV: CPT | Performed by: STUDENT IN AN ORGANIZED HEALTH CARE EDUCATION/TRAINING PROGRAM

## 2022-04-20 PROCEDURE — 99211 OFF/OP EST MAY X REQ PHY/QHP: CPT | Performed by: STUDENT IN AN ORGANIZED HEALTH CARE EDUCATION/TRAINING PROGRAM

## 2022-04-20 RX ORDER — AMOXICILLIN AND CLAVULANATE POTASSIUM 875; 125 MG/1; MG/1
1 TABLET, FILM COATED ORAL 2 TIMES DAILY
Qty: 14 TABLET | Refills: 0 | Status: SHIPPED | OUTPATIENT
Start: 2022-04-20 | End: 2022-04-27

## 2022-04-20 ASSESSMENT — ENCOUNTER SYMPTOMS: SHORTNESS OF BREATH: 0

## 2022-04-20 NOTE — PROGRESS NOTES
Visit Information    Have you changed or started any medications since your last visit including any over-the-counter medicines, vitamins, or herbal medicines? no   Have you stopped taking any of your medications? Is so, why? -  no  Are you having any side effects from any of your medications? - no    Have you seen any other physician or provider since your last visit?  no   Have you had any other diagnostic tests since your last visit?  no   Have you been seen in the emergency room and/or had an admission in a hospital since we last saw you?  no   Have you had your routine dental cleaning in the past 6 months?  no     Do you have an active MyChart account? If no, what is the barrier?   No:     Patient Care Team:  Lara Patel MD as PCP - General (Family Medicine)  Miguel Jimenez RN as   Jhon Lindsay MD as Consulting Physician (Pulmonary Disease)    Medical History Review  Past Medical, Family, and Social History reviewed and does not contribute to the patient presenting condition    Health Maintenance   Topic Date Due    COVID-19 Vaccine (1) Never done    Shingles Vaccine (1 of 2) Never done    Pneumococcal 0-64 years Vaccine (2 - PCV) 05/20/2017    Breast cancer screen  02/03/2019    Lipids  11/01/2020    A1C test (Diabetic or Prediabetic)  11/06/2020    Depression Screen  05/17/2022    Flu vaccine (Season Ended) 09/01/2022    Potassium  11/14/2022    Creatinine  11/14/2022    Colorectal Cancer Screen  12/04/2022    TSH  04/04/2023    Cervical cancer screen  07/09/2024    DTaP/Tdap/Td vaccine (2 - Td or Tdap) 06/09/2029    Hepatitis C screen  Completed    HIV screen  Completed    Hepatitis A vaccine  Aged Out    Hepatitis B vaccine  Aged Out    Hib vaccine  Aged Out    Meningococcal (ACWY) vaccine  Aged Out

## 2022-04-20 NOTE — PROGRESS NOTES
Attending Physician Statement  I  have discussed the care of Murphy Callaway including pertinent history and exam findings with the resident. I agree with the assessment, plan and orders as documented by the resident. /63 (Site: Left Upper Arm, Position: Sitting, Cuff Size: Medium Adult)   Pulse 74   Temp 97.9 °F (36.6 °C) (Temporal)   Wt 153 lb (69.4 kg)   LMP 04/07/2016   BMI 27.98 kg/m²    BP Readings from Last 3 Encounters:   04/20/22 108/63   04/04/22 112/68   01/28/22 120/70     Wt Readings from Last 3 Encounters:   04/20/22 153 lb (69.4 kg)   04/04/22 152 lb (68.9 kg)   01/28/22 150 lb (68 kg)          Diagnosis Orders   1.  Dental infection  amoxicillin-clavulanate (AUGMENTIN) 875-125 MG per tablet       Pardeep Pham DO 4/20/2022 4:27 PM

## 2022-04-20 NOTE — PATIENT INSTRUCTIONS
Thank you for letting us take care of you today. We hope all your questions were addressed. If a question was overlooked or something else comes to mind after you return home, please contact a member of your Care Team listed below. Your Care Team at [place] [address] is Team #3  Geo Hobbs MD (Faculty)  Meg Juan MD (Faculty  Mozjina Lechuga MD (Resident)  Margarita Chaudhry (Resident)   Yousuf Augustine MD (Resident)  BRYANT James., KILO Holden., KILO Grider ([facility])  Lemuel Cea, [address] [facility] ([address] [facility])    Tamia BrewsterDallas, [address] [place] [address] (Clinical Pharmacist)     Office phone number: [phone]    If you need to get in right away due to illness, please be advised we have \"Same Day\" appointments available Monday-Friday. Please call us at [phone] option #3 to schedule your \"Same Day\" appointment. Schedule a Vaccine  When you qualify to receive the vaccine, call the [facility]) COVID-19 Vaccination Hotline to schedule your appointment or to get additional information about the [facility]) locations which are offering the COVID-19 vaccine. To be 94% effective, it's important that you receive two doses of one of the COVID-19 vaccines. -If you are receiving the Meyers Peter vaccine, your second shot will be scheduled as close to 21 days after the first shot as possible. -If you are receiving the Moderna vaccine, your second shot will be scheduled as close to 28 days after the first shot as possible. [facility]) COVID-19 Vaccination Hotline: 458.229.4684    Links to [facility]) website and [facility] website:    [employer]arlon[employer]paraz[employer]. [employer]/[facility]-[email or web address]    [email or web address]

## 2022-04-20 NOTE — PROGRESS NOTES
Subjective:    Isabel May is a 64 y.o. female with  has a past medical history of AICD discharge, RIP (acute kidney injury) ([facility] [address] Asthma, Asthma with COPD with exacerbation ([facility] [address] CAD (coronary artery disease), Cardiomegaly, Chest pain, CHF (congestive heart failure) ([address] Chronic systolic heart failure ([address] s/p AICD, COPD (chronic obstructive pulmonary disease) ([address] Depression, Fibroids, Hypertension, Hypomagnesemia, Intraparenchymal hematoma of left side of brain due to trauma [facility]), Lesion of right native kidney, Lung nodule < 6cm on CT, MI (myocardial infarction) ([facility] [address] Mild intermittent asthma, Non-ischemic cardiomyopathy ([facility] [address] NSTEMI (non-ST elevated myocardial infarction) ([facility] [address] QT prolongation, Syncope, and Unspecified diseases of blood and blood-forming organs. Family History   Problem Relation Age of Onset    Diabetes Mother     High Blood Pressure Mother     Heart Disease Mother     Diabetes Father     Heart Disease Brother        Presented tot office today for:  Chief Complaint   Patient presents with    Dental Pain     patient is having mouth pain for 2 week 7/7       HPI 64year old female with past medical history of hypertension, CAD, HFrEF who is here today for dental infection. Dental Infection  - Has appt with dentist in 1 week  - Pain started 2 weeks ago  - Has significant dental caries  - Pain is worsening, cannot sleep because of the pain  - Dentist advised her to go to her doctor for antibiotics  - Pain is 10/10 in intensity, throbbing pain radiating to the ear    Review of Systems   Constitutional: Negative for fever. HENT: Positive for dental problem and ear pain. Respiratory: Negative for shortness of breath. Cardiovascular: Negative for chest pain. Neurological: Negative for weakness. All other systems reviewed and are negative.     Objective:    /63 (Site: Left Upper Arm, Position: Sitting, Cuff Size: Medium Adult)   Pulse 74   Temp 97.9 °F (36.6 °C) (Temporal)   Wt 153 lb (69.4 kg)   LMP 04/07/2016   BMI 27.98 kg/m²    BP Readings from Last 3 Encounters:   04/20/22 108/63   04/04/22 112/68   01/28/22 120/70     Physical Exam  Vitals reviewed. Constitutional:       General: She is awake. HENT:      Mouth/Throat:     Cardiovascular:      Rate and Rhythm: Normal rate and regular rhythm. Heart sounds: Normal heart sounds. Neurological:      Mental Status: She is alert. Psychiatric:         Behavior: Behavior is cooperative. Lab Results   Component Value Date    WBC 5.5 11/14/2021    HGB 13.0 11/14/2021    HCT 40.0 11/14/2021    PLT See Reflexed IPF Result 11/14/2021    CHOL 209 (H) 11/01/2019    TRIG 95 11/01/2019    HDL 59 11/01/2019    ALT 13 02/26/2020    AST 25 02/26/2020     11/14/2021    K 3.9 11/14/2021     11/14/2021    CREATININE 1.15 (H) 11/14/2021    BUN 16 11/14/2021    CO2 23 11/14/2021    TSH 0.60 04/04/2022    INR 1.0 02/26/2020    LABA1C 5.7 11/06/2019     Lab Results   Component Value Date    CALCIUM 10.2 11/14/2021    PHOS 3.4 11/08/2017     Lab Results   Component Value Date    LDLCHOLESTEROL 131 (H) 11/01/2019       Assessment and Plan:    1. Dental infection  - Follow up with dentist in 7 days  - Will give Augmentin for 7 days  - Advised to use Tylenol/Ibuprofen alternating for pain      Requested Prescriptions     Pending Prescriptions Disp Refills    amoxicillin-clavulanate (AUGMENTIN) 875-125 MG per tablet 14 tablet 0     Sig: Take 1 tablet by mouth 2 times daily for 7 days       There are no discontinued medications. Return in about 2 weeks (around 5/4/2022) for FU Dental Pain, CHF with PCP.

## 2022-05-12 RX ORDER — LANOLIN ALCOHOL/MO/W.PET/CERES
CREAM (GRAM) TOPICAL
Qty: 30 TABLET | Refills: 3 | Status: SHIPPED | OUTPATIENT
Start: 2022-05-12 | End: 2022-09-16

## 2022-05-12 NOTE — TELEPHONE ENCOUNTER
E-scribe request for med refill. Please review and e-scribe if applicable.      Last Visit Date:  04/20/2022  Next Visit Date:  5/16/2022    Hemoglobin A1C (%)   Date Value   11/06/2019 5.7   07/16/2018 6.4 (H)   02/03/2017 5.9             ( goal A1C is < 7)   No results found for: LABMICR  LDL Cholesterol (mg/dL)   Date Value   11/01/2019 131 (H)       (goal LDL is <100)   AST (U/L)   Date Value   02/26/2020 25     ALT (U/L)   Date Value   02/26/2020 13     BUN (mg/dL)   Date Value   11/14/2021 16     BP Readings from Last 3 Encounters:   04/20/22 108/63   04/04/22 112/68   01/28/22 120/70          (goal 120/80)        Patient Active Problem List:     COPD (chronic obstructive pulmonary disease) (HCC)     Fibroids     Syncope     Lung nodule < 6cm on CT     Chronic systolic heart failure (HCC) s/p AICD     Mild intermittent asthma     Essential hypertension     Chest pain     QT prolongation     Asthma with COPD with exacerbation ([facility])     Non-ischemic cardiomyopathy ([address]     Lesion of right native kidney     NSTEMI (non-ST elevated myocardial infarction) ([address]     CAD (coronary artery disease)     Intraparenchymal hematoma of left side of brain due to trauma ([address]     Chronic combined systolic and diastolic congestive heart failure ([address]     AICD discharge     RIP (acute kidney injury) ([address]     Hypomagnesemia     Other heart failure ([address]     Atypical chest pain      ----Matthew Boop

## 2022-05-26 ENCOUNTER — OFFICE VISIT (OUTPATIENT)
Dept: FAMILY MEDICINE CLINIC | Age: 57
End: 2022-05-26
Payer: MEDICAID

## 2022-05-26 VITALS
BODY MASS INDEX: 27.14 KG/M2 | DIASTOLIC BLOOD PRESSURE: 69 MMHG | HEART RATE: 73 BPM | SYSTOLIC BLOOD PRESSURE: 106 MMHG | WEIGHT: 148.4 LBS

## 2022-05-26 DIAGNOSIS — M10.9 GOUT INVOLVING TOE OF LEFT FOOT, UNSPECIFIED CAUSE, UNSPECIFIED CHRONICITY: ICD-10-CM

## 2022-05-26 DIAGNOSIS — H00.015 HORDEOLUM EXTERNUM OF LEFT LOWER EYELID: ICD-10-CM

## 2022-05-26 DIAGNOSIS — I10 ESSENTIAL HYPERTENSION: Primary | ICD-10-CM

## 2022-05-26 LAB — HBA1C MFR BLD: 5.7 %

## 2022-05-26 PROCEDURE — G8417 CALC BMI ABV UP PARAM F/U: HCPCS

## 2022-05-26 PROCEDURE — 3017F COLORECTAL CA SCREEN DOC REV: CPT

## 2022-05-26 PROCEDURE — 99213 OFFICE O/P EST LOW 20 MIN: CPT

## 2022-05-26 PROCEDURE — 83036 HEMOGLOBIN GLYCOSYLATED A1C: CPT

## 2022-05-26 PROCEDURE — G8427 DOCREV CUR MEDS BY ELIG CLIN: HCPCS

## 2022-05-26 PROCEDURE — 1036F TOBACCO NON-USER: CPT

## 2022-05-26 ASSESSMENT — ENCOUNTER SYMPTOMS
COUGH: 0
EYE DISCHARGE: 0
COLOR CHANGE: 0
EYE REDNESS: 0
SHORTNESS OF BREATH: 0
EYE ITCHING: 0
EYE PAIN: 0

## 2022-05-26 ASSESSMENT — PATIENT HEALTH QUESTIONNAIRE - PHQ9
SUM OF ALL RESPONSES TO PHQ QUESTIONS 1-9: 0
SUM OF ALL RESPONSES TO PHQ QUESTIONS 1-9: 0
1. LITTLE INTEREST OR PLEASURE IN DOING THINGS: 0
2. FEELING DOWN, DEPRESSED OR HOPELESS: 0
SUM OF ALL RESPONSES TO PHQ9 QUESTIONS 1 & 2: 0
SUM OF ALL RESPONSES TO PHQ QUESTIONS 1-9: 0
SUM OF ALL RESPONSES TO PHQ QUESTIONS 1-9: 0

## 2022-05-26 NOTE — PATIENT INSTRUCTIONS
Thank you for letting us take care of you today. We hope all your questions were addressed. If a question was overlooked or something else comes to mind after you return home, please contact a member of your Care Team listed below. Your Care Team at [place] [address] is Team #4  Sam Mclean MD (Faculty)  Philomena Mendoza MD (Resident)  Ángel Ramos MD (Resident)  Eduin Handy MD (Resident)  Yared Rodriguez MD (Resident)  KILO Swann, Dionicio Sauer, Tank Schmitt [facility] office)  Ting De La Garza [place] (Clinical Practice Manager)  Cresencio Higuera [facility] (Clinical Pharmacist)       Office phone number: [phone]    If you need to get in right away due to illness, please be advised we have \"Same Day\" appointments available Monday-Friday. Please call us at [phone] option #3 to schedule your \"Same Day\" appointment.

## 2022-05-26 NOTE — PROGRESS NOTES
Visit Information    Have you changed or started any medications since your last visit including any over-the-counter medicines, vitamins, or herbal medicines? no   Are you having any side effects from any of your medications? -  no  Have you stopped taking any of your medications? Is so, why? -  no    Have you seen any other physician or provider since your last visit? No  Have you had any other diagnostic tests since your last visit? No  Have you been seen in the emergency room and/or had an admission to a hospital since we last saw you? No  Have you had your routine dental cleaning in the past 6 months? no    Have you activated your [employer] account? If not, what are your barriers?  Yes     Patient Care Team:  Halley Clark MD as PCP - General (Family Medicine)  Miky Omer RN as   Antwon Mendez MD as Consulting Physician (Pulmonary Disease)    Medical History Review  Past Medical, Family, and Social History reviewed and does not contribute to the patient presenting condition    Health Maintenance   Topic Date Due    COVID-19 Vaccine (1) Never done    Shingles vaccine (1 of 2) Never done    Pneumococcal 0-64 years Vaccine (2 - PCV) 05/20/2017    Breast cancer screen  02/03/2019    Lipids  11/01/2020    A1C test (Diabetic or Prediabetic)  11/06/2020    Depression Screen  05/17/2022    Flu vaccine (Season Ended) 09/01/2022    Colorectal Cancer Screen  12/04/2022    Cervical cancer screen  07/09/2024    DTaP/Tdap/Td vaccine (2 - Td or Tdap) 06/09/2029    Hepatitis C screen  Completed    HIV screen  Completed    Hepatitis A vaccine  Aged Out    Hepatitis B vaccine  Aged Out    Hib vaccine  Aged Out    Meningococcal (ACWY) vaccine  Aged Out

## 2022-05-26 NOTE — PROGRESS NOTES
Subjective:    Vishal Graham is a 64 y.o. female with  has a past medical history of AICD discharge, RIP (acute kidney injury) ([facility] [address] Asthma, Asthma with COPD with exacerbation ([facility] [address] CAD (coronary artery disease), Cardiomegaly, Chest pain, CHF (congestive heart failure) ([facility] [address] Chronic systolic heart failure ([facility] [address] s/p AICD, COPD (chronic obstructive pulmonary disease) ([facility] [address] Depression, Fibroids, Hypertension, Hypomagnesemia, Intraparenchymal hematoma of left side of brain due to trauma [facility]), Lesion of right native kidney, Lung nodule < 6cm on CT, MI (myocardial infarction) ([facility] [address] Mild intermittent asthma, Non-ischemic cardiomyopathy ([facility] [address] NSTEMI (non-ST elevated myocardial infarction) ([facility] [address] QT prolongation, Syncope, and Unspecified diseases of blood and blood-forming organs. Presented to the office today for:  Chief Complaint   Patient presents with   [facility]     corner of left eye    Gout     follow up       HPI    Prtediabetes  hba1c 5.7  Highest was 6.4    CAD  Systolic CHF s/p AICD  Se[facility] Cardio [facility]  Needs to follow up ASAP  No chest pain reported  No dizziness or falls reported     HTN  Controlled today     Right toe pain from Gout  However her last uric acid was 8.9  Patient was started on colchicine     Patient has a left-sided stye that is developing      Review of Systems   Constitutional: Negative for activity change. Eyes: Negative for pain, discharge, redness and itching. Respiratory: Negative for cough and shortness of breath. Cardiovascular: Negative for chest pain. Musculoskeletal: Positive for arthralgias. Negative for joint swelling. Skin: Negative for color change. Neurological: Negative for dizziness and weakness. Psychiatric/Behavioral: Negative for agitation.                  The patient has a   Family History   Problem Relation Age of Onset    Diabetes Mother     High Blood Pressure Mother     Heart Disease Mother     Diabetes Father     Heart Disease Brother        Objective:    /69 (Site: Left Upper Arm, Position: Sitting, Cuff Size: Medium Adult)   Pulse 73   Wt 148 lb 6.4 oz (67.3 kg)   LMP 04/07/2016   BMI 27.14 kg/m²    BP Readings from Last 3 Encounters:   05/26/22 106/69   04/20/22 108/63   04/04/22 112/68       Physical Exam  Constitutional:       General: She is not in acute distress. Appearance: She is not ill-appearing, toxic-appearing or diaphoretic. Eyes:      General: No scleral icterus. Conjunctiva/sclera: Conjunctivae normal.      Comments: Left-sided small stye noted on the inferior lid   Cardiovascular:      Rate and Rhythm: Normal rate and regular rhythm. Pulses: Normal pulses. Heart sounds: No murmur heard. Pulmonary:      Effort: Pulmonary effort is normal.      Breath sounds: No wheezing. Musculoskeletal:         General: No swelling or signs of injury. Normal range of motion. Right lower leg: No edema. Left lower leg: No edema. Skin:     General: Skin is warm. Findings: No erythema. Neurological:      Mental Status: She is alert and oriented to person, place, and time. Motor: No weakness. Lab Results   Component Value Date    WBC 5.5 11/14/2021    HGB 13.0 11/14/2021    HCT 40.0 11/14/2021    PLT See Reflexed IPF Result 11/14/2021    CHOL 209 (H) 11/01/2019    TRIG 95 11/01/2019    HDL 59 11/01/2019    ALT 13 02/26/2020    AST 25 02/26/2020     11/14/2021    K 3.9 11/14/2021     11/14/2021    CREATININE 1.15 (H) 11/14/2021    BUN 16 11/14/2021    CO2 23 11/14/2021    TSH 0.60 04/04/2022    INR 1.0 02/26/2020    LABA1C 5.7 05/26/2022     Lab Results   Component Value Date    CALCIUM 10.2 11/14/2021    PHOS 3.4 11/08/2017     Lab Results   Component Value Date    LDLCHOLESTEROL 131 (H) 11/01/2019       Assessment and Plan:    1. Essential hypertension  Blood pressure stable  106/69 no signs and symptoms of uncontrolled hypertension noted    2.  Gout involving toe

## 2022-06-10 ENCOUNTER — HOSPITAL ENCOUNTER (EMERGENCY)
Age: 57
Discharge: HOME | End: 2022-06-10
Attending: EMERGENCY MEDICINE
Payer: MEDICAID

## 2022-06-10 VITALS
RESPIRATION RATE: 18 BRPM | TEMPERATURE: 99 F | BODY MASS INDEX: 26.13 KG/M2 | SYSTOLIC BLOOD PRESSURE: 97 MMHG | HEIGHT: 62 IN | DIASTOLIC BLOOD PRESSURE: 58 MMHG | OXYGEN SATURATION: 97 % | WEIGHT: 142 LBS | HEART RATE: 67 BPM

## 2022-06-10 DIAGNOSIS — J34.89 SINUS PAIN: ICD-10-CM

## 2022-06-10 DIAGNOSIS — R09.81 CONGESTION OF NASAL SINUS: Primary | ICD-10-CM

## 2022-06-10 LAB
SARS-COV-2, RAPID: NOT DETECTED
SPECIMEN DESCRIPTION: NORMAL

## 2022-06-10 PROCEDURE — 87635 SARS-COV-2 COVID-19 AMP PRB: CPT

## 2022-06-10 PROCEDURE — 99283 EMERGENCY DEPT VISIT LOW MDM: CPT

## 2022-06-10 PROCEDURE — 6370000000 HC RX 637 (ALT 250 FOR IP): Performed by: STUDENT IN AN ORGANIZED HEALTH CARE EDUCATION/TRAINING PROGRAM

## 2022-06-10 RX ORDER — LORATADINE 10 MG/1
10 TABLET ORAL DAILY
Qty: 30 TABLET | Refills: 0 | Status: SHIPPED | OUTPATIENT
Start: 2022-06-10 | End: 2022-07-10

## 2022-06-10 RX ORDER — IBUPROFEN 400 MG/1
400 TABLET ORAL ONCE
Status: COMPLETED | OUTPATIENT
Start: 2022-06-10 | End: 2022-06-10

## 2022-06-10 RX ORDER — FLUTICASONE PROPIONATE 50 MCG
2 SPRAY, SUSPENSION (ML) NASAL DAILY
Qty: 16 G | Refills: 0 | Status: SHIPPED | OUTPATIENT
Start: 2022-06-10

## 2022-06-10 RX ORDER — ACETAMINOPHEN 500 MG
1000 TABLET ORAL ONCE
Status: COMPLETED | OUTPATIENT
Start: 2022-06-10 | End: 2022-06-10

## 2022-06-10 RX ADMIN — IBUPROFEN 400 MG: 400 TABLET, FILM COATED ORAL at 19:49

## 2022-06-10 RX ADMIN — ACETAMINOPHEN 1000 MG: 500 TABLET ORAL at 19:49

## 2022-06-10 ASSESSMENT — PAIN DESCRIPTION - ORIENTATION: ORIENTATION: LEFT

## 2022-06-10 ASSESSMENT — ENCOUNTER SYMPTOMS
COUGH: 0
VOMITING: 0
ABDOMINAL PAIN: 0
RHINORRHEA: 1
NAUSEA: 0

## 2022-06-10 ASSESSMENT — PAIN DESCRIPTION - LOCATION: LOCATION: FACE

## 2022-06-10 ASSESSMENT — PAIN SCALES - GENERAL: PAINLEVEL_OUTOF10: 9

## 2022-06-10 ASSESSMENT — PAIN - FUNCTIONAL ASSESSMENT: PAIN_FUNCTIONAL_ASSESSMENT: 0-10

## 2022-06-10 NOTE — PROGRESS NOTES
I have reviewed and discussed key elements of Claude Li with the resident including plan of care and follow up and agree with the care jesse plan. Diagnosis Orders   1. Essential hypertension  POCT glycosylated hemoglobin (Hb A1C)   2. Gout involving toe of left foot, unspecified cause, unspecified chronicity     3.  Hordeolum externum of left lower eyelid

## 2022-06-11 NOTE — ED PROVIDER NOTES
[facility]  ED  Emergency Department Encounter  Emergency Medicine Resident     Pt Name: Emeli Espinoza  MRN: [id number]  [place] 1965  Date of evaluation: 6/10/22  PCP:  Yandel Sanders MD    [address] [place] [address]       Chief Complaint   Patient presents with    Facial Pain     Left side sinus pain, pt states she had the flu a week ago and blew her nose too much       HISTORY OFPRESENT ILLNESS  (Location/Symptom, Timing/Onset, Context/Setting, Quality, Duration, Modifying Factors,Severity.)      Emeli Espinoza is a 64 y.o. female with past medical history of asthma, COPD, CHF, AICD in place, hypertension who presents with complaints of left-sided maxillary sinus pain. Patient states she has had congestion and rhinorrhea ongoing for the last 1 week, no known sick contacts. Patient states she was blowing her nose this morning and felt acute onset of left-sided neck/sinus pain. Patient states she did take some ibuprofen at home which helped her symptoms somewhat. Pain is persisted so patient presented for evaluation. No fevers or chills, no cough, shortness of breath.   She does admit to myalgias and chills    PAST MEDICAL / SURGICAL / SOCIAL / FAMILY HISTORY      has a past medical history of AICD discharge, RIP (acute kidney injury) ([address] Asthma, Asthma with COPD with exacerbation ([address] CAD (coronary artery disease), Cardiomegaly, Chest pain, CHF (congestive heart failure) ([address] Chronic systolic heart failure ([address] s/p AICD, COPD (chronic obstructive pulmonary disease) ([address] Depression, Fibroids, Hypertension, Hypomagnesemia, Intraparenchymal hematoma of left side of brain due to trauma [facility]), Lesion of right native kidney, Lung nodule < 6cm on CT, MI (myocardial infarction) ([address] Mild intermittent asthma, Non-ischemic cardiomyopathy ([address] NSTEMI (non-ST elevated myocardial infarction) ([address] QT prolongation, Syncope, and Unspecified diseases of blood and blood-forming organs. has a past surgical history that includes Cardiac defibrillator placement (2005); Pacemaker insertion; Tubal ligation; Cardiac defibrillator placement; and Uterine fibroid surgery (maarch ). Social History     Socioeconomic History    Marital status: Single     Spouse name: Partner- Maria Esther Sharp Number of children: Not on file    Years of education: 15    Highest education level: Not on file   Occupational History    Occupation: disabled   Tobacco Use    Smoking status: Former Smoker     Packs/day: 0.25     Years: 30.00     Pack years: 7.50     Types: Cigarettes     Quit date: 3/4/2020     Years since quittin.2    Smokeless tobacco: Never Used    Tobacco comment: resumed smoking  3-4 cigarettes/day   Vaping Use    Vaping Use: Never used   Substance and Sexual Activity    Alcohol use: Yes     Alcohol/week: 1.0 standard drink     Types: 1 Cans of beer per week     Comment: socially    Drug use: Yes     Types: Marijuana Lillie Ing)    Sexual activity: Yes     Partners: Male     Birth control/protection: Post-menopausal   Other Topics Concern    Not on file   Social History Narrative    Not on file     Social Determinants of Health     Financial Resource Strain: Low Risk     Difficulty of Paying Living Expenses: Not hard at all   Food Insecurity: No Food Insecurity    Worried About [address] in the Last Year: Never true    [address]  in the Last Year: Never true   Transportation Needs:     Lack of Transportation (Medical): Not on file    Lack of Transportation (Non-Medical):  Not on file   Physical Activity:     Days of Exercise per Week: Not on file    Minutes of Exercise per Session: Not on file   Stress:     Feeling of Stress : Not on file   Social Connections:     Frequency of Communication with Friends and Family: Not on file    Frequency of Social Gatherings with Friends and Family: Not on file    Attends Druze Services: Not on file   [employer] or Organizations: Not on file    Attends Club or Organization Meetings: Not on file    Marital Status: Not on file   Intimate Partner Violence:     Fear of Current or Ex-Partner: Not on file    Emotionally Abused: Not on file    Physically Abused: Not on file    Sexually Abused: Not on file   Housing Stability:     Unable to Pay for Housing in the Last Year: Not on file    Number of [place] in the Last Year: Not on file    Unstable Housing in the Last Year: Not on file       Family History   Problem Relation Age of Onset    Diabetes Mother     High Blood Pressure Mother     Heart Disease Mother     Diabetes Father     Heart Disease Brother        Allergies: Iv contrast [iodides]    Home Medications:  Prior to Admission medications    Medication Sig Start Date End Date Taking?  Authorizing Provider   fluticasone (FLONASE) 50 MCG/ACT nasal spray 2 sprays by Each Nostril route daily 6/10/22  Yes Aleatha Blizzard, DO   loratadine (CLARITIN) 10 MG tablet Take 1 tablet by mouth daily 6/10/22 7/10/22 Yes Aleatha Blizzard, DO   magnesium oxide (MAG-OX) 400 (240 Mg) MG tablet TAKE 1 TABLET BY MOUTH ONE TIME A DAY 5/12/22   Wei Santana MD   albuterol sulfate HFA (VENTOLIN HFA) 108 (90 Base) MCG/ACT inhaler INHALE 2 PUFFS INTO THE LUNGS EVERY 6 HOURS AS NEEDED FOR WHEEZING 4/4/22   Tatyana Richey MD   amitriptyline (ELAVIL) 25 MG tablet TAKE 1 TABLET BY MOUTH EVERY NIGHT AT BEDTIME 4/4/22   Tatyana Richey MD   atorvastatin (LIPITOR) 40 MG tablet Take 1 tablet by mouth nightly 4/4/22   Tatyana Richey MD   bumetanide (BUMEX) 1 MG tablet TAKE 1 TABLET BY MOUTH 2 TIMES A DAY 4/4/22   Tatyana Richey MD   colchicine (COLCRYS) 0.6 MG tablet Take 1 tablet by mouth daily Take 1 tablet day 1  Then 1 tablet every 3 days 4/4/22   Tatyana Richey MD   ipratropium-albuterol (DUONEB) 0.5-2.5 (3) MG/3ML SOLN nebulizer solution Inhale 3 mLs into the lungs 2 times daily as needed for Shortness of Breath 4/4/22   Tatyana Richey MD metoprolol succinate (TOPROL XL) 50 MG extended release tablet TAKE ONE TABLET BY MOUTH EVERY MORNING 4/4/22   Matt Lopez MD   sacubitril-valsartan (ENTRESTO) 24-26 MG per tablet TAKE 1 TABLET BY MOUTH 2 TIMES A DAY 4/4/22   Matt Lopez MD   spironolactone (ALDACTONE) 25 MG tablet TAKE 1 TABLET BY MOUTH ONE TIME A DAY 4/4/22   Matt Lopez MD   amiodarone (CORDARONE) 200 MG tablet Take 1 tablet by mouth daily Dose reduction initiated 5/26/2020 by cardiologyTake 200 mg by mouth daily Dose reduction initiated 5/26/2020 by cardiology 4/4/22   Matt Lopez MD   omeprazole (PRILOSEC) 20 MG delayed release capsule TAKE 1 CAPSULE BY MOUTH ONE TIME A DAY 3/15/22   Vanessa Goodrich MD   ibuprofen (ADVIL;MOTRIN) 800 MG tablet Take 1 tablet by mouth every 8 hours as needed for Pain 1/14/22   David Johnson MD   L-Methylfolate-B6-B12 [facility] RF) 1.13-25-2 MG TABS TAKE 1 TABLET BY MOUTH ONE TIME A DAY 5/14/21   Mary Holt MD   Nebulizers (COMPRESSOR/NEBULIZER) MISC 1 Device by Does not apply route 4 times daily 7/15/20   Mary Holt MD   sennosides-docusate sodium (SENOKOT-S) 8.6-50 MG tablet Take 1 tablet by mouth daily 12/23/18   Ignacio Nixon MD   Spacer/Aero-Holding Chambers (E-Z SPACER) ELVER 1 Device by Does not apply route daily 11/25/18   Alma Echavarria MD   Multiple Vitamin (MULTIVITAMIN) tablet TAKE ONE TABLET BY MOUTH ONE TIME A DAY 6/18/18   Venice Vargas MD       REVIEW OF SYSTEMS    (2-9 systems for level 4, 10 or more for level 5)      Review of Systems   Constitutional: Positive for chills. Negative for fever. HENT: Positive for congestion and rhinorrhea. Respiratory: Negative for cough. Cardiovascular: Negative for chest pain. Gastrointestinal: Negative for abdominal pain, nausea and vomiting. Genitourinary: Negative for dysuria. Musculoskeletal: Positive for myalgias. Skin: Negative for rash. Neurological: Negative for headaches.        PHYSICAL EXAM   (up to 7 for level 4, 8 or more for level 5)     INITIAL VITALS:    height is 5' 2\" (1.575 m) and weight is 142 lb (64.4 kg). Her oral temperature is 99 °F (37.2 °C). Her blood pressure is 97/58 (abnormal) and her pulse is 67. Her respiration is 18 and oxygen saturation is 97%. Physical Exam  Constitutional:       General: She is not in acute distress. Appearance: Normal appearance. She is not ill-appearing or toxic-appearing. HENT:      Right Ear: Tympanic membrane, ear canal and external ear normal.      Left Ear: Tympanic membrane, ear canal and external ear normal.      Nose: No congestion or rhinorrhea. Eyes:      Extraocular Movements: Extraocular movements intact. Pupils: Pupils are equal, round, and reactive to light. Cardiovascular:      Rate and Rhythm: Normal rate and regular rhythm. Pulmonary:      Effort: Pulmonary effort is normal. No respiratory distress. Breath sounds: Normal breath sounds. Abdominal:      General: Abdomen is flat. Palpations: Abdomen is soft. Tenderness: There is no abdominal tenderness. Lymphadenopathy:      Cervical: No cervical adenopathy. Skin:     General: Skin is warm and dry. Findings: No rash. Neurological:      Mental Status: She is alert.    Psychiatric:         Mood and Affect: Mood normal.         Behavior: Behavior normal.         DIFFERENTIAL  DIAGNOSIS     PLAN (LABS / IMAGING / EKG):  Orders Placed This Encounter   Procedures    COVID-19, Rapid       MEDICATIONS ORDERED:  Orders Placed This Encounter   Medications    acetaminophen (TYLENOL) tablet 1,000 mg    ibuprofen (ADVIL;MOTRIN) tablet 400 mg    fluticasone (FLONASE) 50 MCG/ACT nasal spray     Si sprays by Each Nostril route daily     Dispense:  16 g     Refill:  0    loratadine (CLARITIN) 10 MG tablet     Sig: Take 1 tablet by mouth daily     Dispense:  30 tablet     Refill:  0       DDX: Viral illness, rhinitis, allergies, sinusitis, COVID    Initial MDM/Plan: 64 y.o. female who presents with rhinorrhea, congestion as well as chills for 1 week. Patient blew her nose at home and felt acute onset of pain in her left neck. Seen and examined, no acute distress, vitals are unremarkable. Patient is well-appearing. Speaking full sentences, no respiratory distress. There is tenderness palpation of the left maxillary sinus, pupils are equal and reactive, bilateral TMs unremarkable, there is congestion and rhinorrhea oromucosa appears normal with no erythema uvular deviation. No submandibular swelling, no cervical adenopathy palpated. Impression is most likely viral illness, possible barotrauma from patient blowing her nose at home today. We will perform COVID-19 test to rule out COVID-19, discharged home with Flonase, loratadine. Will give symptomatic control Tylenol and Motrin. DIAGNOSTIC RESULTS / EMERGENCY DEPARTMENT COURSE / MDM     LABS:  Labs Reviewed   COVID-19, RAPID         RADIOLOGY:  No results found. EMERGENCY DEPARTMENT COURSE:     COVID test negative, give symptomatic control. Reassessed and reported improvement in her symptoms. Discharged home with antihistamine and Flonase, return precautions discussed. PROCEDURES:  None    CONSULTS:  None    CRITICAL CARE:  Please see attending note    FINAL IMPRESSION      1. Congestion of nasal sinus    2. Sinus pain          DISPOSITION / PLAN     DISPOSITION Decision To Discharge 06/10/2022 08:35:41 PM        PATIENTREFERRED TO:  Soren Em MD  [id number]   [facility] [address]  [phone]    Call   ER follow up      DISCHARGE MEDICATIONS:  Discharge Medication List as of 6/10/2022  8:40 PM      START taking these medications    Details   fluticasone (FLONASE) 50 MCG/ACT nasal spray 2 sprays by Each Nostril route daily, Disp-16 g, R-0Print      loratadine (CLARITIN) 10 MG tablet Take 1 tablet by mouth daily, Disp-30 tablet, R-0Print             Samuel Brandon, DO  EmergencyMedicine Resident    (Please note that portions of this note were completed with a voice recognition program.  Efforts were made to edit the dictations but occasionally words are mis-transcribed.)        Aurea Dos Santos DO  Resident  06/10/22 [id number]

## 2022-06-11 NOTE — ED NOTES
This patient was assessed by the doctor only. Nurse processed and completed the orders from the doctor. I.E. labs, meds, and or EKG.        Salina Dangelo RN  06/10/22 2121

## 2022-06-11 NOTE — ED PROVIDER NOTES
[facility]     Emergency Department     Faculty Attestation    I performed a history and physical examination of the patient and discussed management with the resident. I reviewed the resident´s note and agree with the documented findings and plan of care. Any areas of disagreement are noted on the chart. I was personally present for the key portions of any procedures. I have documented in the chart those procedures where I was not present during the key portions. I have reviewed the emergency nurses triage note. I agree with the chief complaint, past medical history, past surgical history, allergies, medications, social and family history as documented unless otherwise noted below. For Physician Assistant/ Nurse Practitioner cases/documentation I have personally evaluated this patient and have completed at least one if not all key elements of the E/M (history, physical exam, and MDM). Additional findings are as noted. Pain over left maxillary sinus, no facial swelling, both maxillary sinuses transilluminate normally.   Extraocular wounds intact, anterior chambers and corneas clear, pupils equal round and reactive, posterior pharynx normal, voice is normal.     Joana Dewitt MD  06/10/22 2004

## 2022-07-11 DIAGNOSIS — I50.22 CHRONIC SYSTOLIC HEART FAILURE (HCC): ICD-10-CM

## 2022-07-11 RX ORDER — AMIODARONE HYDROCHLORIDE 200 MG/1
200 TABLET ORAL DAILY
Qty: 30 TABLET | Refills: 2 | Status: SHIPPED | OUTPATIENT
Start: 2022-07-11 | End: 2022-10-10

## 2022-07-11 RX ORDER — OMEPRAZOLE 20 MG/1
CAPSULE, DELAYED RELEASE ORAL
Qty: 30 CAPSULE | Refills: 3 | Status: SHIPPED | OUTPATIENT
Start: 2022-07-11

## 2022-07-11 RX ORDER — AMIODARONE HYDROCHLORIDE 200 MG/1
TABLET ORAL
Qty: 30 TABLET | Refills: 2 | OUTPATIENT
Start: 2022-07-11

## 2022-07-11 NOTE — TELEPHONE ENCOUNTER
E-scribe request for med refills. Please review and e-scribe if applicable.      Last Visit Date:  05/26/2022  Next Visit Date:  Visit date not found    Hemoglobin A1C (%)   Date Value   05/26/2022 5.7   11/06/2019 5.7   07/16/2018 6.4 (H)             ( goal A1C is < 7)   No results found for: LABMICR  LDL Cholesterol (mg/dL)   Date Value   11/01/2019 131 (H)       (goal LDL is <100)   AST (U/L)   Date Value   02/26/2020 25     ALT (U/L)   Date Value   02/26/2020 13     BUN (mg/dL)   Date Value   11/14/2021 16     BP Readings from Last 3 Encounters:   06/10/22 (!) 97/58   05/26/22 106/69   04/20/22 108/63          (goal 120/80)        Patient Active Problem List:     COPD (chronic obstructive pulmonary disease) (HCC)     Fibroids     Syncope     Lung nodule < 6cm on CT     Chronic systolic heart failure (HCC) s/p AICD     Mild intermittent asthma     Essential hypertension     Chest pain     QT prolongation     Asthma with COPD with exacerbation (HCC)     Non-ischemic cardiomyopathy ([address]     Lesion of right native kidney     NSTEMI (non-ST elevated myocardial infarction) ([address]     CAD (coronary artery disease)     Intraparenchymal hematoma of left side of brain due to trauma ([address]     Chronic combined systolic and diastolic congestive heart failure ([address]     AICD discharge     RIP (acute kidney injury) ([address]     Hypomagnesemia     Other heart failure ([address]     Atypical chest pain      ----[place]

## 2022-08-03 ENCOUNTER — CARE COORDINATION (OUTPATIENT)
Dept: FAMILY MEDICINE CLINIC | Age: 57
End: 2022-08-03

## 2022-08-04 NOTE — CARE COORDINATION
Chandler Berman  8/3/2022                [facility] Outreach Nurse vs- reassessment     Met with Michelle Valencia at the home of her sister in [place] to assess self care management of CHF, COPD    Emotional/coping  Alert, oriented x 3, engaged and cooperative   Affect-full   Thought processes- logical  Michelle Valencia reports she has felt increasingly stressed, anxious regarding housing instability. She is in process of locating a new housing  She is not engaged with behavioral health for counseling and supportive services at this time. Socialization/family   Reports family relationships with her brother and one sister are supportive   She acknowledges instability in relationships with adult children that is burdensome and at times provoking anxiety and emotional distress. Anastasiia Abdalla is temporarily living at the home of her brother or sister in [place] while she pursues new housing. She reports that the home she had been living was uninhabitable and  infested with bugs and rats. She is currently working with [employer] for housing application and [employer]. Medications   She reports she picked up medication blister packages from her pharmacy earlier today and has been taking oral and inhaled medications as prescribed.   She did not have medication blister packages available to review during vs.      Current Outpatient Medications   Medication Instructions    albuterol sulfate HFA (VENTOLIN HFA) 108 (90 Base) MCG/ACT inhaler INHALE 2 PUFFS INTO THE LUNGS EVERY 6 HOURS AS NEEDED FOR WHEEZING    amiodarone (CORDARONE) 200 mg, Oral, DAILY, Dose reduction initiated 5/26/2020 by cardiologyTake 200 mg by mouth daily Dose reduction initiated 5/26/2020 by cardiology     amitriptyline (ELAVIL) 25 MG tablet TAKE 1 TABLET BY MOUTH EVERY NIGHT AT BEDTIME    atorvastatin (LIPITOR) 40 mg, Oral, NIGHTLY    bumetanide (BUMEX) 1 MG tablet TAKE 1 TABLET BY MOUTH 2 TIMES A DAY    colchicine (COLCRYS) 0.6 mg, Oral, DAILY, Take 1 tablet day 1<BR>Then 1 tablet every 3 days    fluticasone (FLONASE) 50 MCG/ACT nasal spray 2 sprays, Each Nostril, DAILY    ibuprofen (ADVIL;MOTRIN) 800 mg, Oral, EVERY 8 HOURS PRN    ipratropium-albuterol (DUONEB) 0.5-2.5 (3) MG/3ML SOLN nebulizer solution 3 mLs, Inhalation, 2 TIMES DAILY PRN    L-Methylfolate-B6-B12 (FOLBIC RF) 1.13-25-2 MG TABS TAKE 1 TABLET BY MOUTH ONE TIME A DAY    magnesium oxide (MAG-OX) 400 (240 Mg) MG tablet TAKE 1 TABLET BY MOUTH ONE TIME A DAY    metoprolol succinate (TOPROL XL) 50 MG extended release tablet TAKE ONE TABLET BY MOUTH EVERY MORNING    Multiple Vitamin (MULTIVITAMIN) tablet TAKE ONE TABLET BY MOUTH ONE TIME A DAY    Nebulizers (COMPRESSOR/NEBULIZER) MISC 1 Device, Does not apply, 4 TIMES DAILY    omeprazole (PRILOSEC) 20 MG delayed release capsule TAKE 1 CAPSULE BY MOUTH ONE TIME A DAY    sacubitril-valsartan (ENTRESTO) 24-26 MG per tablet TAKE 1 TABLET BY MOUTH 2 TIMES A DAY    sennosides-docusate sodium (SENOKOT-S) 8.6-50 MG tablet 1 tablet, Oral, DAILY    Spacer/Aero-Holding Chambers (E-Z SPACER) ELVER 1 Device, Does not apply, DAILY    spironolactone (ALDACTONE) 25 MG tablet TAKE 1 TABLET BY MOUTH ONE TIME A DAY        Medical  Skin warm and dry  Nail beds pink. Respirations non labored and easy at rest.  Lungs clear in all lobes. No lower leg edema  Rosemarie Lara denies abdominal bloating  Abdomen soft and non distended. She reports intermittent chest discomfort she describes as a tightness in her chest and left arm discomfort that occurs both at rest and when she is active. She reports chest discomfort relieves with rest.    She reports that she has not needed to take NTG for pain. She reports no distress today. B/P 120/78 right upper arm sitting, pulse 71  resp 16/min and non labored.   02 Sat 96% at rest   Temp 97.8 F (temporal)  Rosemarie Lara has not shown for multiple medical follow up appointments in the past year and was strongly advised to make follow up appointments for the care of CHF and COPD. Deisi Torres has missed numerous medical appt with cardiology- Dr Nixon Castro and has not been seen at [facility] CHF clinic in over 8 months. She was agreeable to rescheduling appt with Dr Nixon Castro and CHF clinic and states she will attend scheduled appt. 8/4/2022-  Medication schedule verified with [facility]   Follow up appt scheduled with Dr Nixon Castro at [facility] on Oct 13 at 1:45 pm    Phone call placed to [facility] to schedule follow up appt. Revisit in 2-3 weeks and encourage initiation of behavioral health counseling.     Demond Berrios RN, BSN    [place]

## 2022-08-24 ENCOUNTER — HOSPITAL ENCOUNTER (OUTPATIENT)
Dept: OTHER | Age: 57
Discharge: HOME | End: 2022-08-24
Payer: MEDICAID

## 2022-08-24 VITALS
HEART RATE: 73 BPM | SYSTOLIC BLOOD PRESSURE: 108 MMHG | DIASTOLIC BLOOD PRESSURE: 68 MMHG | OXYGEN SATURATION: 99 % | WEIGHT: 139.8 LBS | BODY MASS INDEX: 25.57 KG/M2

## 2022-08-24 PROCEDURE — 99212 OFFICE O/P EST SF 10 MIN: CPT | Performed by: NURSE PRACTITIONER

## 2022-08-24 NOTE — PROGRESS NOTES
Date:  2022  Time:  3:03 PM    CHF Clinic at [facility]    Office: [phone] Fax: [phone]    Re:  Lencho Ruth   Patient : 1965    Vital Signs: /68   Pulse 73   Wt 139 lb 12.8 oz (63.4 kg)   LMP 2016   SpO2 99%   BMI 25.57 kg/m²                       O2 Device: None (Room air)                           No results for input(s): CBC, HGB, HCT, WBC, PLATELET, NA, K, CL, CO2, BUN, CREATININE, GLUCOSE, BNP, INR in the last 72 hours. Respiratory:       Breath Sounds  Right Upper Lobe: Clear  Right Middle Lobe: Clear  Right Lower Lobe: Clear  Left Upper Lobe: Clear  Left Lower Lobe: Clear    Cough/Sputum  Cough: Non-productive  Frequency: Infrequent       Peripheral Vascular  RLE Edema: None  LLE Edema: None      Complaints: Denies SOB, chest pain, leg swelling, and weight gain. Reports she has lost weight with busy schedule and moving. Reports left lateral axillary poking sensation. Comment : Patient ambulated to CHF Clinic and  was present. Denies acute s/s of CHF. Weight decreased 10 pounds since appt 2022. No edema noted. Patient has a [employer]ce[employer] device and follows with [facility]. Yudy with senior resources to schedule next [facility] appt. No acute s/s of CHF. Current diuretic Bumex 2 mg twice a day. Reports medications delivered in a bubble pack. Follow up appt in CHF Clinic scheduled 2022. Patient to call with questions/concerns.      Electronically signed by Michelle Marshall RN on 2022 at 3:03 PM

## 2022-09-07 NOTE — PATIENT INSTRUCTIONS
Thank you for letting us take care of you today. We hope all your questions were addressed. If a question was overlooked or something else comes to mind after you return home, please contact a member of your Care Team listed below. Please make sure you have a routine office visit set up to follow-up on [address]. Your Care Team at [place] [address] is Team #2  Pauly Ralph DO (Faculty)  Isabel Jenkins MD (Resident)  Katja Man MD (Resident)  Shaina Matos MD (Resident)  Angel Patel MD (Resident)  Stephanie Beauchamp MD (Resident)  Vega Harman, LPJEROD King., [place], [address] ([facility])  Calin Bui RN, ([address] [facility])  Jacquelin Quiñonez, Ph.D., (Behavioral Services)  Carson Gomez [facility] (Clinical Pharmacist)     Office phone number: [phone]    If you need to get in right away due to illness, please be advised we have \"Same Day\" appointments available Monday-Friday. Please call us at [phone] option #3 to schedule your \"Same Day\" appointment. Patient Education        Heart-Healthy Diet: Care Instructions  Your Care Instructions    A heart-healthy diet has lots of vegetables, fruits, nuts, beans, and whole grains, and is low in salt. It limits foods that are high in saturated fat, such as meats, cheeses, and fried foods. It may be hard to change your diet, but even small changes can lower your risk of heart attack and heart disease. Follow-up care is a key part of your treatment and safety. Be sure to make and go to all appointments, and call your doctor if you are having problems. It's also a good idea to know your test results and keep a list of the medicines you take. How can you care for yourself at home? Watch your portions  · Learn what a serving is. A \"serving\" and a \"portion\" are not always the same thing. Make sure that you are not eating larger portions than are recommended. For example, a serving of pasta is ½ cup.  A fiber  · Eat a variety of grain products every day. Include whole-grain foods that have lots of fiber and nutrients. Examples of whole-grain foods include oats, whole wheat bread, and brown rice. · Buy whole-grain breads and cereals, instead of white bread or pastries. Limit salt and sodium  · Limit how much salt and sodium you eat to help lower your blood pressure. · Taste food before you salt it. Add only a little salt when you think you need it. With time, your taste buds will adjust to less salt. · Eat fewer snack items, fast foods, and other high-salt, processed foods. Check food labels for the amount of sodium in packaged foods. · Choose low-sodium versions of canned goods (such as soups, vegetables, and beans). Limit sugar  · Limit drinks and foods with added sugar. These include candy, desserts, and soda pop. Limit alcohol  · Limit alcohol to no more than 2 drinks a day for men and 1 drink a day for women. Too much alcohol can cause health problems. When should you call for help? Watch closely for changes in your health, and be sure to contact your doctor if:    · You would like help planning heart-healthy meals. Where can you learn more? Go to https://[email or web address] Home.com Entertainment. org and sign in to your [employer] account. Enter V137 in the [facility] box to learn more about \"Heart-Healthy Diet: Care Instructions. \"     If you do not have an account, please click on the \"Sign Up Now\" link. Current as of: December 6, 2017  Content Version: 11.6  © 3957-7321 [employer]. Care instructions adapted under license by [facility] ([facility]). If you have questions about a medical condition or this instruction, always ask your healthcare professional. [place] [address] any warranty or liability for your use of this information. [FreeTextEntry1] : 08/29/2022 - FIRST OTV - Today he has completed  810 cGy out of 7020 cGy and notes he is feeling well. Pt c/o no appreciable symptomatology related to his definitive radiation therapy.  Specifically, he notes baseline urine function with nocturia x 3, while not requiring alpha blocker medication. Patient on Orgovyx since 4/2022 and states he usually has a good stream but the stream is not so strong after starting RT. He also complains of urinary frequency.  patient encouraged to decrease his fluid intake at night.He denies urinary urgency, dysuria, leakage or incontinence.  He denies blood in the urine.  He has no blood or mucous in the stool and denies rectal pain. he has regular bowel movement  x 2 per day which are well formed. Patient is tolerating radiation treatment well. Continue with radiation treatment as scheduled, follow-up next week as scheduled or earlier if issues develop. Scheduled to see  on 8/31/22 for latest PSA level.\par \par Mr. Oscar Reyna is an 80 year-old male referred by Dr. 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  He presents today to again review his definitive treatment options.\par \par Oncologic History:\par He has a had a rising PSA as follows:\par 6/3/19 - 6.52 ng/ml\par 12/9/19 - 6.63 ng/ml\par 8/17/20 - 7.05 ng/ml\par 3/22/21 - 7.81 ng/ml\par 9/20/21 - 9.90 ng/ml\par 2/07/22 - 11.40 ng/ml\par STARTED ORGOVYX ON 4/2022\par 5/23/22 - 2.95 ng/ml\par \par 10/7/21 MRI Prostate ([facility]): \par - Volume 61 cc\par - Lesion in the lateral/posterolateral right peripheral zone at the mid gland/apex with evidence of extracapsular extension - PI-RADS 5\par - No seminal vesicle invasion, pelvic lymphadenopathy, or bony metastases.\par \par 12/14/21 MRI fusion biopsy \par Final Diagnosis \par 1. Prostate, left anterior apex, biopsy\par -Benign prostate tissue.\par 2. Prostate, left anterior base, biopsy\par -Benign prostate tissue.\par 3. Prostate, right anterior apex, biopsy\par -Benign fibromuscular tissue.\par -Prostatic glands are not identified.\par 4. Prostate, right anterior base, biopsy\par -Benign prostate tissue.\par 5. Prostate, midline apex, biopsy\par -Benign prostate tissue.\par 6. Prostate, midline base, biopsy\par -Benign prostate tissue.\par 7. Prostate, left posterior apex, biopsy\par -Benign prostate tissue.\par 8. Prostate, left posterior base, biopsy\par -Benign prostate tissue.\par 9. Prostate, right posterior apex, biopsy\par -Benign prostate tissue.\par 10. Prostate, right posterior base, biopsy\par -Adenocarcinoma of the prostate, Prognostic Grade Group 4 ([place]\par score 4+4=8) involving 60% (7 mm in length) of 1 of 1 core(s).\par 11. Prostate, left lateral, biopsy\par -Benign prostate tissue.\par 12. Prostate, right lateral, biopsy\par -Benign prostate tissue.\par 13. Prostate, right PZ, biopsy\par -Adenocarcinoma of the prostate, Prognostic Grade Group 4 (Desi\par score 4+4=8) involving 60%, 50% and 40% (6 mm, 5.5 mm and 5 mm in length)\par of 3 of 3 core(s).\par Note: Cribriform [place] pattern 4 is present.\par \par 1/25/22- Whole Body Bone Scan\par IMPRESSION:\par \par 1. Abnormal uptake diffusely involving the L4 vertebral body is nonspecific and warrants anatomic correlation with either radiographic or CT imaging. \par \par 2. Likely degenerative changes in the rest of the spine and extremities, which could be also be confirmed with anatomic imaging.\par \par 3. Presumed periodontal inflammatory changes in the horizontal ramus of the right hemimandible. Correlation with dedicated dental evaluation is suggested.\par \par \par 2/7/22- PSA 11.4 ng/mL\par \par \par 3/2/22- PET/ CT PSMA\par Impression:\par 1. Since 10/7/2021, the prostate is again enlarged and contains a focus of pilflufolastat avidity in the right (identified on prior MR prostate) posterior transitional zone toward the apex, consistent with known prostate malignancy.\par \par 2. There is a 0.9 cm intensely pilflufolastat avid (SUV 24.6) perirectal lymph node and 0.5 mm mildly avid lymph node slightly superiorly, consistent with local metastasis.\par \par 3. Adjacent to the right ureter is an unusual pattern of activity suggesting a duplication.  Unfortunately this possibility cannot be confirmed on the accompanying non-contrast CT and no definite adenopathy is present.\par \par Therefore, a contrast CT of the abdomen and pelvis is suggested.\par \par 3/21/22-  F/U with Dr. Fleming and started on Orgovyx\par \par 5/23/22- PSA 2.95 ng/mL w/ Testosterone 5.9 ng/dL\par \par 6/3/22- Colonoscopy- \par \par Today he presents for consideration of radiation therapy to the prostate, referred by Dr. Fleming. He denies a history of radiation.  He notes moderate baseline urinary function, with frequency, urgency, nocturia x2, and strong urine stream since he started Orgovyx.  He does take Saw Palmetto.  He denies a history of baseline hemorrhoids.  His last colonoscopy was 6/3/22 with no polyps removed and diverticulosis of large intestine. He notes he is unable to have erections at this time. He declines sperm banking. \par

## 2022-09-15 NOTE — TELEPHONE ENCOUNTER
Last visit: 5/26/22  Last Med refill:   Does patient have enough medication for 72 hours: Yes    Next Visit Date:  Future Appointments   Date Time Provider Yanelis Alfaro   9/20/2022  1:30 PM [facility] RM 2 [facility] CLI [facility]       Health Maintenance   Topic Date Due    COVID-19 Vaccine (1) Never done    Shingles vaccine (1 of 2) Never done    Pneumococcal 0-64 years Vaccine (2 - PCV) 05/20/2017    Breast cancer screen  02/03/2019    Lipids  11/01/2020    Flu vaccine (1) 09/01/2022    Colorectal Cancer Screen  12/04/2022    A1C test (Diabetic or Prediabetic)  05/26/2023    Depression Screen  05/26/2023    Cervical cancer screen  07/09/2024    DTaP/Tdap/Td vaccine (2 - Td or Tdap) 06/09/2029    Hepatitis C screen  Completed    HIV screen  Completed    Hepatitis A vaccine  Aged Out    Hepatitis B vaccine  Aged Out    Hib vaccine  Aged Out    Meningococcal (ACWY) vaccine  Aged Out       Hemoglobin A1C (%)   Date Value   05/26/2022 5.7   11/06/2019 5.7   07/16/2018 6.4 (H)             ( goal A1C is < 7)   No results found for: LABMICR  LDL Cholesterol (mg/dL)   Date Value   11/01/2019 131 (H)   11/25/2018 128       (goal LDL is <100)   AST (U/L)   Date Value   02/26/2020 25     ALT (U/L)   Date Value   02/26/2020 13     BUN (mg/dL)   Date Value   11/14/2021 16     BP Readings from Last 3 Encounters:   08/24/22 108/68   06/10/22 (!) 97/58   05/26/22 106/69          (goal 120/80)    All Future Testing planned in [facility]  Lab Frequency Next Occurrence   XR KNEE RIGHT (1-2 VIEWS) Once 01/27/2023   COVID-19 Once 01/31/2022   COVID-19 Once 03/03/2022               Patient Active Problem List:     COPD (chronic obstructive pulmonary disease) (HCC)     Fibroids     Syncope     Lung nodule < 6cm on CT     Chronic systolic heart failure (HCC) s/p AICD     Mild intermittent asthma     Essential hypertension     Chest pain     QT prolongation     Asthma with COPD with exacerbation ([address]     Non-ischemic cardiomyopathy ([address]     Lesion of right native kidney     NSTEMI (non-ST elevated myocardial infarction) ([address]     CAD (coronary artery disease)     Intraparenchymal hematoma of left side of brain due to trauma [facility])     Chronic combined systolic and diastolic congestive heart failure ([address]     AICD discharge     RIP (acute kidney injury) ([address]     Hypomagnesemia     Other heart failure ([address]     Atypical chest pain

## 2022-09-16 RX ORDER — LANOLIN ALCOHOL/MO/W.PET/CERES
CREAM (GRAM) TOPICAL
Qty: 30 TABLET | Refills: 0 | Status: SHIPPED | OUTPATIENT
Start: 2022-09-16 | End: 2022-10-10

## 2022-10-10 DIAGNOSIS — G43.809 OTHER MIGRAINE WITHOUT STATUS MIGRAINOSUS, NOT INTRACTABLE: ICD-10-CM

## 2022-10-10 DIAGNOSIS — I50.22 CHRONIC SYSTOLIC HEART FAILURE (HCC): ICD-10-CM

## 2022-10-10 RX ORDER — LANOLIN ALCOHOL/MO/W.PET/CERES
CREAM (GRAM) TOPICAL
Qty: 30 TABLET | Refills: 0 | Status: SHIPPED | OUTPATIENT
Start: 2022-10-10

## 2022-10-10 RX ORDER — AMIODARONE HYDROCHLORIDE 200 MG/1
TABLET ORAL
Qty: 30 TABLET | Refills: 2 | Status: SHIPPED | OUTPATIENT
Start: 2022-10-10

## 2022-10-10 RX ORDER — AMITRIPTYLINE HYDROCHLORIDE 25 MG/1
TABLET, FILM COATED ORAL
Qty: 30 TABLET | Refills: 2 | Status: SHIPPED | OUTPATIENT
Start: 2022-10-10

## 2022-10-10 NOTE — TELEPHONE ENCOUNTER
Elavil and magnesium pending for refill     Health Maintenance   Topic Date Due    COVID-19 Vaccine (1) Never done    Shingles vaccine (1 of 2) Never done    Breast cancer screen  02/03/2019    Lipids  11/01/2020    Flu vaccine (1) 08/01/2022    Colorectal Cancer Screen  12/04/2022    A1C test (Diabetic or Prediabetic)  05/26/2023    Depression Screen  05/26/2023    Cervical cancer screen  07/09/2024    DTaP/Tdap/Td vaccine (2 - Td or Tdap) 06/09/2029    Pneumococcal 0-64 years Vaccine  Completed    Hepatitis C screen  Completed    HIV screen  Completed    Hepatitis A vaccine  Aged Out    Hib vaccine  Aged Out    Meningococcal (ACWY) vaccine  Aged Out             (applicable per patient's age: Cancer Screenings, Depression Screening, Fall Risk Screening, Immunizations)    Hemoglobin A1C (%)   Date Value   05/26/2022 5.7   11/06/2019 5.7   07/16/2018 6.4 (H)     LDL Cholesterol (mg/dL)   Date Value   11/01/2019 131 (H)     AST (U/L)   Date Value   02/26/2020 25     ALT (U/L)   Date Value   02/26/2020 13     BUN (mg/dL)   Date Value   11/14/2021 16      (goal A1C is < 7)   (goal LDL is <100) need 30-50% reduction from baseline     BP Readings from Last 3 Encounters:   08/24/22 108/68   06/10/22 (!) 97/58   05/26/22 106/69    (goal /80)      All Future Testing planned in [facility]:  Lab Frequency Next Occurrence   XR KNEE RIGHT (1-2 VIEWS) Once 01/27/2023   COVID-19 Once 01/31/2022   COVID-19 Once 03/03/2022       Next Visit Date:  No future appointments.          Patient Active Problem List:     COPD (chronic obstructive pulmonary disease) ([address]     Fibroids     Syncope     Lung nodule < 6cm on CT     Chronic systolic heart failure (HCC) s/p AICD     Mild intermittent asthma     Essential hypertension     Chest pain     QT prolongation     Asthma with COPD with exacerbation (HCC)     Non-ischemic cardiomyopathy ([address]     Lesion of right native kidney     NSTEMI (non-ST elevated myocardial infarction) ([address]     CAD (coronary artery disease)     Intraparenchymal hematoma of left side of brain due to trauma     Chronic combined systolic and diastolic congestive heart failure (HCC)     AICD discharge     RIP (acute kidney injury) ([address]     Hypomagnesemia     Other heart failure ([address]     Atypical chest pain

## 2022-10-10 NOTE — TELEPHONE ENCOUNTER
Cordarone pending for refill   Health Maintenance   Topic Date Due    COVID-19 Vaccine (1) Never done    Shingles vaccine (1 of 2) Never done    Breast cancer screen  02/03/2019    Lipids  11/01/2020    Flu vaccine (1) 08/01/2022    Colorectal Cancer Screen  12/04/2022    A1C test (Diabetic or Prediabetic)  05/26/2023    Depression Screen  05/26/2023    Cervical cancer screen  07/09/2024    DTaP/Tdap/Td vaccine (2 - Td or Tdap) 06/09/2029    Pneumococcal 0-64 years Vaccine  Completed    Hepatitis C screen  Completed    HIV screen  Completed    Hepatitis A vaccine  Aged Out    Hib vaccine  Aged Out    Meningococcal (ACWY) vaccine  Aged Out             (applicable per patient's age: Cancer Screenings, Depression Screening, Fall Risk Screening, Immunizations)    Hemoglobin A1C (%)   Date Value   05/26/2022 5.7   11/06/2019 5.7   07/16/2018 6.4 (H)     LDL Cholesterol (mg/dL)   Date Value   11/01/2019 131 (H)     AST (U/L)   Date Value   02/26/2020 25     ALT (U/L)   Date Value   02/26/2020 13     BUN (mg/dL)   Date Value   11/14/2021 16      (goal A1C is < 7)   (goal LDL is <100) need 30-50% reduction from baseline     BP Readings from Last 3 Encounters:   08/24/22 108/68   06/10/22 (!) 97/58   05/26/22 106/69    (goal /80)      All Future Testing planned in [facility]:  Lab Frequency Next Occurrence   XR KNEE RIGHT (1-2 VIEWS) Once 01/27/2023   COVID-19 Once 01/31/2022   COVID-19 Once 03/03/2022       Next Visit Date:  No future appointments.          Patient Active Problem List:     COPD (chronic obstructive pulmonary disease) ([facility]     Fibroids     Syncope     Lung nodule < 6cm on CT     Chronic systolic heart failure (HCC) s/p AICD     Mild intermittent asthma     Essential hypertension     Chest pain     QT prolongation     Asthma with COPD with exacerbation (HCC)     Non-ischemic cardiomyopathy ([facility]     Lesion of right native kidney     NSTEMI (non-ST elevated myocardial infarction) ([facility]     CAD (coronary artery disease)     Intraparenchymal hematoma of left side of brain due to trauma     Chronic combined systolic and diastolic congestive heart failure (HCC)     AICD discharge     RIP (acute kidney injury) ([address]     Hypomagnesemia     Other heart failure ([address]     Atypical chest pain

## 2022-10-20 DIAGNOSIS — G43.809 OTHER MIGRAINE WITHOUT STATUS MIGRAINOSUS, NOT INTRACTABLE: ICD-10-CM

## 2022-10-20 DIAGNOSIS — I50.22 CHRONIC SYSTOLIC HEART FAILURE (HCC): ICD-10-CM

## 2022-10-20 RX ORDER — LANOLIN ALCOHOL/MO/W.PET/CERES
CREAM (GRAM) TOPICAL
Qty: 30 TABLET | Refills: 0 | OUTPATIENT
Start: 2022-10-20

## 2022-10-20 RX ORDER — AMIODARONE HYDROCHLORIDE 200 MG/1
TABLET ORAL
Qty: 30 TABLET | Refills: 2 | OUTPATIENT
Start: 2022-10-20

## 2022-10-20 RX ORDER — AMITRIPTYLINE HYDROCHLORIDE 25 MG/1
TABLET, FILM COATED ORAL
Qty: 30 TABLET | Refills: 2 | OUTPATIENT
Start: 2022-10-20

## 2022-11-16 DIAGNOSIS — I50.22 CHRONIC SYSTOLIC HEART FAILURE (HCC): ICD-10-CM

## 2022-11-16 RX ORDER — OMEPRAZOLE 20 MG/1
CAPSULE, DELAYED RELEASE ORAL
Qty: 30 CAPSULE | Refills: 3 | Status: SHIPPED | OUTPATIENT
Start: 2022-11-16

## 2022-11-16 NOTE — TELEPHONE ENCOUNTER
Last visit:   Last Med refill:   Does patient have enough medication for 72 hours: No:     Next Visit Date:  Future Appointments   Date Time Provider Yanelis Angelina   12/1/2022 11:00 AM Maritza Chow MD [address] [place] [address] Maintenance   Topic Date Due    COVID-19 Vaccine (1) Never done    Shingles vaccine (1 of 2) Never done    Breast cancer screen  02/03/2019    Lipids  11/01/2020    Flu vaccine (1) 08/01/2022    Colorectal Cancer Screen  12/04/2022    A1C test (Diabetic or Prediabetic)  05/26/2023    Depression Screen  05/26/2023    Cervical cancer screen  07/09/2024    DTaP/Tdap/Td vaccine (2 - Td or Tdap) 06/09/2029    Pneumococcal 0-64 years Vaccine  Completed    Hepatitis C screen  Completed    HIV screen  Completed    Hepatitis A vaccine  Aged Out    Hib vaccine  Aged Out    Meningococcal (ACWY) vaccine  Aged Out       Hemoglobin A1C (%)   Date Value   05/26/2022 5.7   11/06/2019 5.7   07/16/2018 6.4 (H)             ( goal A1C is < 7)   No results found for: LABMICR  LDL Cholesterol (mg/dL)   Date Value   11/01/2019 131 (H)   11/25/2018 128       (goal LDL is <100)   AST (U/L)   Date Value   02/26/2020 25     ALT (U/L)   Date Value   02/26/2020 13     BUN (mg/dL)   Date Value   11/14/2021 16     BP Readings from Last 3 Encounters:   08/24/22 108/68   06/10/22 (!) 97/58   05/26/22 106/69          (goal 120/80)    All Future Testing planned in [facility]  Lab Frequency Next Occurrence   XR KNEE RIGHT (1-2 VIEWS) Once 01/27/2023   COVID-19 Once 01/31/2022   COVID-19 Once 03/03/2022               Patient Active Problem List:     COPD (chronic obstructive pulmonary disease) ([address]     Fibroids     Syncope     Lung nodule < 6cm on CT     Chronic systolic heart failure (HCC) s/p AICD     Mild intermittent asthma     Essential hypertension     Chest pain     QT prolongation     Asthma with COPD with exacerbation (HCC)     Non-ischemic cardiomyopathy ([address]     Lesion of right native kidney     NSTEMI (non-ST elevated myocardial infarction) [facility]     CAD (coronary artery disease)     Intraparenchymal hematoma of left side of brain due to trauma     Chronic combined systolic and diastolic congestive heart failure (HCC)     AICD discharge     RIP (acute kidney injury) ([facility]     Hypomagnesemia     Other heart failure ([facility]     Atypical chest pain           Please address the medication refill and close the encounter. If I can be of assistance, please route to the applicable pool. Thank you.

## 2022-11-16 NOTE — TELEPHONE ENCOUNTER
Last visit:   Last Med refill:   Does patient have enough medication for 72 hours: No:     Next Visit Date:  Future Appointments   Date Time Provider Yanelis Alfaro   12/1/2022 11:00 AM Marylin Barber MD [address] [place] [address] Maintenance   Topic Date Due    COVID-19 Vaccine (1) Never done    Shingles vaccine (1 of 2) Never done    Breast cancer screen  02/03/2019    Lipids  11/01/2020    Flu vaccine (1) 08/01/2022    Colorectal Cancer Screen  12/04/2022    A1C test (Diabetic or Prediabetic)  05/26/2023    Depression Screen  05/26/2023    Cervical cancer screen  07/09/2024    DTaP/Tdap/Td vaccine (2 - Td or Tdap) 06/09/2029    Pneumococcal 0-64 years Vaccine  Completed    Hepatitis C screen  Completed    HIV screen  Completed    Hepatitis A vaccine  Aged Out    Hib vaccine  Aged Out    Meningococcal (ACWY) vaccine  Aged Out       Hemoglobin A1C (%)   Date Value   05/26/2022 5.7   11/06/2019 5.7   07/16/2018 6.4 (H)             ( goal A1C is < 7)   No results found for: LABMICR  LDL Cholesterol (mg/dL)   Date Value   11/01/2019 131 (H)   11/25/2018 128       (goal LDL is <100)   AST (U/L)   Date Value   02/26/2020 25     ALT (U/L)   Date Value   02/26/2020 13     BUN (mg/dL)   Date Value   11/14/2021 16     BP Readings from Last 3 Encounters:   08/24/22 108/68   06/10/22 (!) 97/58   05/26/22 106/69          (goal 120/80)    All Future Testing planned in [facility]  Lab Frequency Next Occurrence   XR KNEE RIGHT (1-2 VIEWS) Once 01/27/2023   COVID-19 Once 01/31/2022   COVID-19 Once 03/03/2022               Patient Active Problem List:     COPD (chronic obstructive pulmonary disease) ([address]     Fibroids     Syncope     Lung nodule < 6cm on CT     Chronic systolic heart failure (HCC) s/p AICD     Mild intermittent asthma     Essential hypertension     Chest pain     QT prolongation     Asthma with COPD with exacerbation (HCC)     Non-ischemic cardiomyopathy ([address]     Lesion of right native kidney     NSTEMI (non-ST elevated myocardial infarction) ([facility]     CAD (coronary artery disease)     Intraparenchymal hematoma of left side of brain due to trauma     Chronic combined systolic and diastolic congestive heart failure (HCC)     AICD discharge     RIP (acute kidney injury) ([facility]     Hypomagnesemia     Other heart failure ([facility]     Atypical chest pain           Please address the medication refill and close the encounter. If I can be of assistance, please route to the applicable pool. Thank you.

## 2022-11-17 RX ORDER — SPIRONOLACTONE 25 MG/1
TABLET ORAL
Qty: 30 TABLET | Refills: 5 | Status: SHIPPED | OUTPATIENT
Start: 2022-11-17

## 2022-11-17 RX ORDER — BUMETANIDE 1 MG/1
TABLET ORAL
Qty: 60 TABLET | Refills: 5 | Status: SHIPPED | OUTPATIENT
Start: 2022-11-17

## 2022-11-17 RX ORDER — LANOLIN ALCOHOL/MO/W.PET/CERES
CREAM (GRAM) TOPICAL
Qty: 30 TABLET | Refills: 0 | Status: SHIPPED | OUTPATIENT
Start: 2022-11-17

## 2022-11-21 NOTE — TELEPHONE ENCOUNTER
Last visit:   Last Med refill:   Does patient have enough medication for 72 hours: No:     Next Visit Date:  Future Appointments   Date Time Provider Yanelis Davalosi   12/1/2022 11:00 AM Roderick Guo MD [address] [place] [address] Maintenance   Topic Date Due    COVID-19 Vaccine (1) Never done    Shingles vaccine (1 of 2) Never done    Breast cancer screen  02/03/2019    Lipids  11/01/2020    Flu vaccine (1) 08/01/2022    Colorectal Cancer Screen  12/04/2022    A1C test (Diabetic or Prediabetic)  05/26/2023    Depression Screen  05/26/2023    Cervical cancer screen  07/09/2024    DTaP/Tdap/Td vaccine (2 - Td or Tdap) 06/09/2029    Pneumococcal 0-64 years Vaccine  Completed    Hepatitis C screen  Completed    HIV screen  Completed    Hepatitis A vaccine  Aged Out    Hib vaccine  Aged Out    Meningococcal (ACWY) vaccine  Aged Out       Hemoglobin A1C (%)   Date Value   05/26/2022 5.7   11/06/2019 5.7   07/16/2018 6.4 (H)             ( goal A1C is < 7)   No results found for: LABMICR  LDL Cholesterol (mg/dL)   Date Value   11/01/2019 131 (H)   11/25/2018 128       (goal LDL is <100)   AST (U/L)   Date Value   02/26/2020 25     ALT (U/L)   Date Value   02/26/2020 13     BUN (mg/dL)   Date Value   11/14/2021 16     BP Readings from Last 3 Encounters:   08/24/22 108/68   06/10/22 (!) 97/58   05/26/22 106/69          (goal 120/80)    All Future Testing planned in [facility]  Lab Frequency Next Occurrence   XR KNEE RIGHT (1-2 VIEWS) Once 01/27/2023   COVID-19 Once 01/31/2022   COVID-19 Once 03/03/2022               Patient Active Problem List:     COPD (chronic obstructive pulmonary disease) ([address]     Fibroids     Syncope     Lung nodule < 6cm on CT     Chronic systolic heart failure (HCC) s/p AICD     Mild intermittent asthma     Essential hypertension     Chest pain     QT prolongation     Asthma with COPD with exacerbation (HCC)     Non-ischemic cardiomyopathy ([address]     Lesion of right native kidney     NSTEMI (non-ST elevated myocardial infarction) ([facility]     CAD (coronary artery disease)     Intraparenchymal hematoma of left side of brain due to trauma     Chronic combined systolic and diastolic congestive heart failure (HCC)     AICD discharge     RIP (acute kidney injury) ([facility]     Hypomagnesemia     Other heart failure ([facility]     Atypical chest pain         Please address the medication refill and close the encounter. If I can be of assistance, please route to the applicable pool. Thank you.

## 2022-11-27 ENCOUNTER — APPOINTMENT (OUTPATIENT)
Dept: GENERAL RADIOLOGY | Age: 57
End: 2022-11-27
Payer: MEDICAID

## 2022-11-27 ENCOUNTER — HOSPITAL ENCOUNTER (OUTPATIENT)
Age: 57
Setting detail: OBSERVATION
Discharge: HOME | End: 2022-11-28
Attending: EMERGENCY MEDICINE | Admitting: EMERGENCY MEDICINE
Payer: MEDICAID

## 2022-11-27 DIAGNOSIS — R07.9 CHEST PAIN, UNSPECIFIED TYPE: Primary | ICD-10-CM

## 2022-11-27 DIAGNOSIS — R07.89 ATYPICAL CHEST PAIN: ICD-10-CM

## 2022-11-27 LAB
ALBUMIN SERPL-MCNC: 4.4 G/DL (ref 3.5–5.2)
ALBUMIN/GLOBULIN RATIO: 1.3 (ref 1–2.5)
ALP BLD-CCNC: 81 U/L (ref 35–104)
ALT SERPL-CCNC: 16 U/L (ref 5–33)
ANION GAP SERPL CALCULATED.3IONS-SCNC: 14 MMOL/L (ref 9–17)
AST SERPL-CCNC: 22 U/L
BACTERIA: ABNORMAL
BILIRUB SERPL-MCNC: 0.3 MG/DL (ref 0.3–1.2)
BILIRUBIN URINE: NEGATIVE
BUN BLDV-MCNC: 22 MG/DL (ref 6–20)
CALCIUM SERPL-MCNC: 9.5 MG/DL (ref 8.6–10.4)
CASTS UA: ABNORMAL /LPF (ref 0–8)
CHLORIDE BLD-SCNC: 102 MMOL/L (ref 98–107)
CO2: 24 MMOL/L (ref 20–31)
COLOR: YELLOW
CREAT SERPL-MCNC: 1.14 MG/DL (ref 0.5–0.9)
EPITHELIAL CELLS UA: ABNORMAL /HPF (ref 0–5)
GFR SERPL CREATININE-BSD FRML MDRD: 56 ML/MIN/1.73M2
GLUCOSE BLD-MCNC: 111 MG/DL (ref 70–99)
GLUCOSE URINE: NEGATIVE
HCT VFR BLD CALC: 38.2 % (ref 36.3–47.1)
HEMOGLOBIN: 12.6 G/DL (ref 11.9–15.1)
KETONES, URINE: ABNORMAL
LEUKOCYTE ESTERASE, URINE: NEGATIVE
MCH RBC QN AUTO: 28.8 PG (ref 25.2–33.5)
MCHC RBC AUTO-ENTMCNC: 33 G/DL (ref 28.4–34.8)
MCV RBC AUTO: 87.2 FL (ref 82.6–102.9)
NITRITE, URINE: NEGATIVE
NRBC AUTOMATED: 0 PER 100 WBC
PDW BLD-RTO: 15.9 % (ref 11.8–14.4)
PH UA: 5 (ref 5–8)
PLATELET # BLD: 160 K/UL (ref 138–453)
PMV BLD AUTO: 12.4 FL (ref 8.1–13.5)
POTASSIUM SERPL-SCNC: 3.9 MMOL/L (ref 3.7–5.3)
PRO-BNP: 375 PG/ML
PROTEIN UA: NEGATIVE
RBC # BLD: 4.38 M/UL (ref 3.95–5.11)
SARS-COV-2, RAPID: NOT DETECTED
SODIUM BLD-SCNC: 140 MMOL/L (ref 135–144)
SPECIFIC GRAVITY UA: 1.02 (ref 1–1.03)
SPECIMEN DESCRIPTION: NORMAL
TOTAL PROTEIN: 7.8 G/DL (ref 6.4–8.3)
TROPONIN, HIGH SENSITIVITY: 15 NG/L (ref 0–14)
TROPONIN, HIGH SENSITIVITY: 15 NG/L (ref 0–14)
TROPONIN, HIGH SENSITIVITY: 16 NG/L (ref 0–14)
TURBIDITY: ABNORMAL
URINE HGB: NEGATIVE
UROBILINOGEN, URINE: NORMAL
WBC # BLD: 4.7 K/UL (ref 3.5–11.3)
WBC UA: ABNORMAL /HPF (ref 0–5)

## 2022-11-27 PROCEDURE — 96375 TX/PRO/DX INJ NEW DRUG ADDON: CPT

## 2022-11-27 PROCEDURE — 6360000002 HC RX W HCPCS

## 2022-11-27 PROCEDURE — G0378 HOSPITAL OBSERVATION PER HR: HCPCS

## 2022-11-27 PROCEDURE — 6360000002 HC RX W HCPCS: Performed by: EMERGENCY MEDICINE

## 2022-11-27 PROCEDURE — 80053 COMPREHEN METABOLIC PANEL: CPT

## 2022-11-27 PROCEDURE — 93005 ELECTROCARDIOGRAM TRACING: CPT | Performed by: EMERGENCY MEDICINE

## 2022-11-27 PROCEDURE — 83880 ASSAY OF NATRIURETIC PEPTIDE: CPT

## 2022-11-27 PROCEDURE — 87635 SARS-COV-2 COVID-19 AMP PRB: CPT

## 2022-11-27 PROCEDURE — 81001 URINALYSIS AUTO W/SCOPE: CPT

## 2022-11-27 PROCEDURE — 36415 COLL VENOUS BLD VENIPUNCTURE: CPT

## 2022-11-27 PROCEDURE — 71045 X-RAY EXAM CHEST 1 VIEW: CPT

## 2022-11-27 PROCEDURE — 84484 ASSAY OF TROPONIN QUANT: CPT

## 2022-11-27 PROCEDURE — 85027 COMPLETE CBC AUTOMATED: CPT

## 2022-11-27 PROCEDURE — 87086 URINE CULTURE/COLONY COUNT: CPT

## 2022-11-27 PROCEDURE — 96374 THER/PROPH/DIAG INJ IV PUSH: CPT

## 2022-11-27 PROCEDURE — 6370000000 HC RX 637 (ALT 250 FOR IP): Performed by: EMERGENCY MEDICINE

## 2022-11-27 PROCEDURE — 2580000003 HC RX 258: Performed by: EMERGENCY MEDICINE

## 2022-11-27 PROCEDURE — 99285 EMERGENCY DEPT VISIT HI MDM: CPT

## 2022-11-27 PROCEDURE — 6370000000 HC RX 637 (ALT 250 FOR IP)

## 2022-11-27 RX ORDER — OXYCODONE HYDROCHLORIDE 5 MG/1
5 TABLET ORAL ONCE
Status: DISCONTINUED | OUTPATIENT
Start: 2022-11-28 | End: 2022-11-28

## 2022-11-27 RX ORDER — ENOXAPARIN SODIUM 100 MG/ML
40 INJECTION SUBCUTANEOUS DAILY
Status: DISCONTINUED | OUTPATIENT
Start: 2022-11-27 | End: 2022-11-28 | Stop reason: HOSPADM

## 2022-11-27 RX ORDER — CHOLECALCIFEROL (VITAMIN D3) 125 MCG
5 CAPSULE ORAL NIGHTLY
Status: DISCONTINUED | OUTPATIENT
Start: 2022-11-28 | End: 2022-11-28 | Stop reason: HOSPADM

## 2022-11-27 RX ORDER — POTASSIUM CHLORIDE 7.45 MG/ML
10 INJECTION INTRAVENOUS PRN
Status: DISCONTINUED | OUTPATIENT
Start: 2022-11-27 | End: 2022-11-28 | Stop reason: HOSPADM

## 2022-11-27 RX ORDER — ACETAMINOPHEN 650 MG/1
650 SUPPOSITORY RECTAL EVERY 6 HOURS PRN
Status: DISCONTINUED | OUTPATIENT
Start: 2022-11-27 | End: 2022-11-28 | Stop reason: HOSPADM

## 2022-11-27 RX ORDER — FENTANYL CITRATE 50 UG/ML
25 INJECTION, SOLUTION INTRAMUSCULAR; INTRAVENOUS ONCE
Status: COMPLETED | OUTPATIENT
Start: 2022-11-27 | End: 2022-11-27

## 2022-11-27 RX ORDER — SODIUM CHLORIDE 0.9 % (FLUSH) 0.9 %
5-40 SYRINGE (ML) INJECTION EVERY 12 HOURS SCHEDULED
Status: DISCONTINUED | OUTPATIENT
Start: 2022-11-27 | End: 2022-11-28 | Stop reason: HOSPADM

## 2022-11-27 RX ORDER — POLYETHYLENE GLYCOL 3350 17 G/17G
17 POWDER, FOR SOLUTION ORAL DAILY PRN
Status: DISCONTINUED | OUTPATIENT
Start: 2022-11-27 | End: 2022-11-28 | Stop reason: HOSPADM

## 2022-11-27 RX ORDER — SODIUM CHLORIDE 9 MG/ML
25 INJECTION, SOLUTION INTRAVENOUS PRN
Status: DISCONTINUED | OUTPATIENT
Start: 2022-11-27 | End: 2022-11-28 | Stop reason: HOSPADM

## 2022-11-27 RX ORDER — ACETAMINOPHEN 325 MG/1
650 TABLET ORAL EVERY 6 HOURS PRN
Status: DISCONTINUED | OUTPATIENT
Start: 2022-11-27 | End: 2022-11-28 | Stop reason: HOSPADM

## 2022-11-27 RX ORDER — ONDANSETRON 2 MG/ML
4 INJECTION INTRAMUSCULAR; INTRAVENOUS EVERY 4 HOURS PRN
Status: DISCONTINUED | OUTPATIENT
Start: 2022-11-27 | End: 2022-11-28 | Stop reason: HOSPADM

## 2022-11-27 RX ORDER — ACETAMINOPHEN 500 MG
1000 TABLET ORAL
Status: COMPLETED | OUTPATIENT
Start: 2022-11-27 | End: 2022-11-27

## 2022-11-27 RX ORDER — POTASSIUM CHLORIDE 20 MEQ/1
40 TABLET, EXTENDED RELEASE ORAL PRN
Status: DISCONTINUED | OUTPATIENT
Start: 2022-11-27 | End: 2022-11-28 | Stop reason: HOSPADM

## 2022-11-27 RX ORDER — SODIUM CHLORIDE 0.9 % (FLUSH) 0.9 %
5-40 SYRINGE (ML) INJECTION PRN
Status: DISCONTINUED | OUTPATIENT
Start: 2022-11-27 | End: 2022-11-28 | Stop reason: HOSPADM

## 2022-11-27 RX ADMIN — FENTANYL CITRATE 25 MCG: 50 INJECTION, SOLUTION INTRAMUSCULAR; INTRAVENOUS at 13:25

## 2022-11-27 RX ADMIN — ACETAMINOPHEN 1000 MG: 500 TABLET ORAL at 10:16

## 2022-11-27 RX ADMIN — ONDANSETRON 4 MG: 2 INJECTION INTRAMUSCULAR; INTRAVENOUS at 21:27

## 2022-11-27 RX ADMIN — ACETAMINOPHEN 650 MG: 325 TABLET ORAL at 21:26

## 2022-11-27 RX ADMIN — SODIUM CHLORIDE, PRESERVATIVE FREE 10 ML: 5 INJECTION INTRAVENOUS at 21:30

## 2022-11-27 ASSESSMENT — PAIN SCALES - WONG BAKER
WONGBAKER_NUMERICALRESPONSE: 0
WONGBAKER_NUMERICALRESPONSE: 0
WONGBAKER_NUMERICALRESPONSE: 2
WONGBAKER_NUMERICALRESPONSE: 0

## 2022-11-27 ASSESSMENT — ENCOUNTER SYMPTOMS
DIARRHEA: 0
PHOTOPHOBIA: 0
ABDOMINAL PAIN: 0
CHEST TIGHTNESS: 1
SHORTNESS OF BREATH: 1
CONSTIPATION: 0
BACK PAIN: 0
COUGH: 0

## 2022-11-27 ASSESSMENT — PAIN DESCRIPTION - FREQUENCY: FREQUENCY: CONTINUOUS

## 2022-11-27 ASSESSMENT — PAIN - FUNCTIONAL ASSESSMENT
PAIN_FUNCTIONAL_ASSESSMENT: PREVENTS OR INTERFERES SOME ACTIVE ACTIVITIES AND ADLS
PAIN_FUNCTIONAL_ASSESSMENT: 0-10

## 2022-11-27 ASSESSMENT — PAIN SCALES - GENERAL
PAINLEVEL_OUTOF10: 0
PAINLEVEL_OUTOF10: 7
PAINLEVEL_OUTOF10: 9
PAINLEVEL_OUTOF10: 8
PAINLEVEL_OUTOF10: 0
PAINLEVEL_OUTOF10: 7
PAINLEVEL_OUTOF10: 9
PAINLEVEL_OUTOF10: 0

## 2022-11-27 ASSESSMENT — PAIN DESCRIPTION - DESCRIPTORS: DESCRIPTORS: PRESSURE

## 2022-11-27 ASSESSMENT — PAIN DESCRIPTION - PAIN TYPE: TYPE: ACUTE PAIN

## 2022-11-27 ASSESSMENT — PAIN DESCRIPTION - ORIENTATION: ORIENTATION: LEFT

## 2022-11-27 ASSESSMENT — PAIN DESCRIPTION - ONSET: ONSET: AWAKENED FROM SLEEP

## 2022-11-27 ASSESSMENT — PAIN DESCRIPTION - LOCATION: LOCATION: SHOULDER

## 2022-11-27 NOTE — ED PROVIDER NOTES
[address] ED     Emergency Department     Faculty Attestation        I performed a history and physical examination of the patient and discussed management with the resident. I reviewed the residents note and agree with the documented findings and plan of care. Any areas of disagreement are noted on the chart. I was personally present for the key portions of any procedures. I have documented in the chart those procedures where I was not present during the key portions. I have reviewed the emergency nurses triage note. I agree with the chief complaint, past medical history, past surgical history, allergies, medications, social and family history as documented unless otherwise noted below. For mid-level providers such as nurse practitioners as well as physicians assistants:    I have personally seen and evaluated the patient. I find the patient's history and physical exam are consistent with NP/PA documentation. I agree with the care provided, treatment rendered, disposition, & follow-up plan. Additional findings are as noted. Vital Signs: /73   Pulse 96   Temp 97.2 °F (36.2 °C) (Oral)   Resp 20   LMP 04/07/2016   SpO2 100%   PCP:  Gabriela Navarro MD (Inactive)    Pertinent Comments:     Originally presented the emergency department for evaluation of left breast mass she states has been there for \"quite some time\". Bladder and discussing and talking with her is appears that she has had left-sided chest pressure not over her mastitis rating to her shoulder and arm. She is has a history of a nonischemic cardiomyopathy has a pacemaker defibrillator in. Her AICD has not discharged. Last cath was in 2019 which showed normal coronary arteries. She has numbness tingling or any stroke symptoms.   She is resting comfortably no acute distress no discharge noted from the nipple no signs to suggest infectious etiology of the breast.  Given her history will change EKG CBC BMP troponin chest x-ray BMP potential admission    EKG interpretation: atrial sensed ventricular paced rhythm          Brendan Sarabia MD    Attending Emergency Medicine Physician            Aimee Moreno MD  11/27/22 1007

## 2022-11-27 NOTE — ED NOTES
Gold Meléndez, transport taking patient by wheelchair to room 339.      Cole Blount RN  11/27/22 9675

## 2022-11-27 NOTE — ED NOTES
Writer receives call from pharmacy  Need weight, primary RN notified      Milagro Hampton, RN  11/27/22 7521

## 2022-11-27 NOTE — ED PROVIDER NOTES
[facility] [address]  ED  Emergency Department Encounter  Emergency Medicine Resident     Pt Name:Stacia Nathan  MRN: [id number]  [place] 1965  Date of evaluation: 11/27/22  PCP:  Sary Taylor MD (Inactive)      [address] [place] [address]       Chief Complaint   Patient presents with    Breast Mass     Left side    Chest Pain    Shoulder Pain    Shortness of Breath       HISTORY OF PRESENT ILLNESS  (Location/Symptom, Timing/Onset, Context/Setting, Quality, Duration, Modifying Factors, Severity.)      Qi Calderón is a 62 y.o. female who presents with Left tender breast mass of 2 days duration, no discharge. On further questioning , pt endorses left sided chest pain of heaviness in nature with left shoulder pain and tingling. Pt states pain started since yesterday and been getting worse. Patient denies any nausea vomiting or headache palpitations. Patient has a ACID inserted to the left side of the chest patient denies having any discharges since yesterday. Patient endorses shortness of breath. Denies any fever, chills or cough. Pt states did not have to use inhalers for COPD in the last 3 days. Patient has a history of asthma, COPD, cardiomegaly, coronary artery disease and NSTEMI. Patient does not take nitroglycerin sublingual.   Patient is a poor historian unclear if compliant to medications. Patient indicates has an appointment with PCP later this week. In the ED patient looks comfortable a little agitated while talking but not in any acute distress.   O2 sat is 98% blood pressure is 124/84  PAST MEDICAL / SURGICAL / SOCIAL / FAMILY HISTORY      has a past medical history of AICD discharge, RIP (acute kidney injury) ([address] Asthma, Asthma with COPD with exacerbation ([address] CAD (coronary artery disease), Cardiomegaly, Chest pain, CHF (congestive heart failure) ([address] Chronic systolic heart failure ([address] s/p AICD, COPD (chronic obstructive pulmonary disease) ([address] Depression, Fibroids, Hypertension, Hypomagnesemia, Intraparenchymal hematoma of left side of brain due to trauma, Lesion of right native kidney, Lung nodule < 6cm on CT, MI (myocardial infarction) ([facility] Mild intermittent asthma, Non-ischemic cardiomyopathy ([facility] NSTEMI (non-ST elevated myocardial infarction) ([facility] QT prolongation, Syncope, and Unspecified diseases of blood and blood-forming organs. has a past surgical history that includes Cardiac defibrillator placement (2005); Pacemaker insertion; Tubal ligation; Cardiac defibrillator placement; and Uterine fibroid surgery ([facility] Social History     Socioeconomic History    Marital status: Single     Spouse name: Partner- Ana María Ghee    Number of children: Not on file    Years of education: 12    Highest education level: Not on file   Occupational History    Occupation: disabled   Tobacco Use    Smoking status: Former     Packs/day: 0.25     Years: 30.00     Pack years: 7.50     Types: Cigarettes     Quit date: 3/4/2020     Years since quittin.7    Smokeless tobacco: Never    Tobacco comments:     resumed smoking  3-4 cigarettes/day   Vaping Use    Vaping Use: Never used   Substance and Sexual Activity    Alcohol use:  Yes     Alcohol/week: 1.0 standard drink     Types: 1 Cans of beer per week     Comment: socially    Drug use: Yes     Types: Marijuana Seabron Barban)    Sexual activity: Yes     Partners: Male     Birth control/protection: Post-menopausal   Other Topics Concern    Not on file   Social History Narrative    Not on file     Social Determinants of Health     Financial Resource Strain: Low Risk     Difficulty of Paying Living Expenses: Not hard at all   Food Insecurity: No Food Insecurity    Worried About Running Out of Food in the Last Year: Never true    Ran Out of Food in the Last Year: Never true   Transportation Needs: Not on file   Physical Activity: Not on file   Stress: Not on file   Social Connections: Not on file   Intimate Partner Violence: Not on file   Housing Stability: Not on file       Family History   Problem Relation Age of Onset    Diabetes Mother     High Blood Pressure Mother     Heart Disease Mother     Diabetes Father     Heart Disease Brother        Allergies: Iv contrast [iodides]    Home Medications:  Prior to Admission medications    Medication Sig Start Date End Date Taking?  Authorizing Provider   spironolactone (ALDACTONE) 25 MG tablet TAKE 1 TABLET BY MOUTH ONE TIME A DAY 11/17/22   Tommy Larsen MD   bumetanide (BUMEX) 1 MG tablet TAKE 1 TABLET BY MOUTH 2 TIMES A DAY 11/17/22   Tommy Larsen MD   magnesium oxide (MAG-OX) 400 (240 Mg) MG tablet TAKE ONE TABLET BY MOUTH ONE TIME A DAY 11/17/22   Tommy Larsen MD   omeprazole (PRILOSEC) 20 MG delayed release capsule TAKE 1 CAPSULE BY MOUTH ONE TIME A DAY 11/16/22   Era Lynn DO   amiodarone (CORDARONE) 200 MG tablet TAKE 1 TABLET BY MOUTH ONE TIME A DAY 10/10/22   Tommy Larsen MD   amitriptyline (ELAVIL) 25 MG tablet TAKE 1 TABLET BY MOUTH EVERY NIGHT AT BEDTIME 10/10/22   Tommy Larsen MD   fluticasone [facility]) 50 MCG/ACT nasal spray 2 sprays by Each Nostril route daily  Patient not taking: Reported on 8/4/2022 6/10/22   Bhavana Hamilton DO   albuterol sulfate HFA (VENTOLIN HFA) 108 (90 Base) MCG/ACT inhaler INHALE 2 PUFFS INTO THE LUNGS EVERY 6 HOURS AS NEEDED FOR WHEEZING 4/4/22   Tommy Larsen MD   atorvastatin (LIPITOR) 40 MG tablet Take 1 tablet by mouth nightly 4/4/22   Tommy Larsen MD   colchicine (COLCRYS) 0.6 MG tablet Take 1 tablet by mouth daily Take 1 tablet day 1  Then 1 tablet every 3 days  Patient not taking: Reported on 8/4/2022 4/4/22   Tommy Larsen MD   ipratropium-albuterol (DUONEB) 0.5-2.5 (3) MG/3ML SOLN nebulizer solution Inhale 3 mLs into the lungs 2 times daily as needed for Shortness of Breath 4/4/22   Tommy Larsen MD   metoprolol succinate (TOPROL XL) 50 MG extended release tablet TAKE ONE TABLET BY MOUTH EVERY MORNING 4/4/22   Tommy Larsen MD sacubitril-valsartan (ENTRESTO) 24-26 MG per tablet TAKE 1 TABLET BY MOUTH 2 TIMES A DAY 4/4/22   Stephanie Mejias MD   ibuprofen (ADVIL;MOTRIN) 800 MG tablet Take 1 tablet by mouth every 8 hours as needed for Pain 1/14/22   Yuniel Cedeño MD   D-Jbtxreuagtzs-A5-B12 (FOLBIC RF) 1.13-25-2 MG TABS TAKE 1 TABLET BY MOUTH ONE TIME A DAY  Patient not taking: Reported on 8/4/2022 5/14/21   Fatemeh Brian MD   Nebulizers (COMPRESSOR/NEBULIZER) MISC 1 Device by Does not apply route 4 times daily 7/15/20   Fatemeh Brian MD   sennosides-docusate sodium (SENOKOT-S) 8.6-50 MG tablet Take 1 tablet by mouth daily  Patient not taking: Reported on 8/4/2022 12/23/18   Rashida Aguirre MD   Spacer/Aero-Holding Chambers (E-Z SPACER) ELVER 1 Device by Does not apply route daily 11/25/18   Max Viera MD   Multiple Vitamin (MULTIVITAMIN) tablet TAKE ONE TABLET BY MOUTH ONE TIME A DAY  Patient not taking: Reported on 8/4/2022 6/18/18   Malika Kingsley MD       REVIEW OF SYSTEMS    (2-9 systems for level 4, 10 or more for level 5)      Review of Systems   Constitutional:  Negative for chills, fatigue and fever. HENT:  Negative for congestion and ear pain. Eyes:  Positive for visual disturbance. Negative for photophobia. Respiratory:  Positive for chest tightness and shortness of breath. Negative for cough. Cardiovascular:  Positive for chest pain. Negative for palpitations and leg swelling. Gastrointestinal:  Negative for abdominal pain, constipation and diarrhea. Genitourinary:  Negative for dysuria, flank pain and hematuria. Musculoskeletal:  Negative for back pain. Skin:         Left breat lump   Neurological:  Positive for headaches. Negative for facial asymmetry, speech difficulty, weakness and light-headedness. Psychiatric/Behavioral:  Positive for agitation. Negative for confusion and dysphoric mood.       PHYSICAL EXAM   (up to 7 for level 4, 8 or more for level 5)      INITIAL VITALS:   /83 Pulse 66   Temp 97.2 °F (36.2 °C) (Oral)   Resp 14   Wt 135 lb (61.2 kg)   LMP 04/07/2016   SpO2 96%   BMI 24.69 kg/m²     Physical Exam  Constitutional:       General: She is not in acute distress. Appearance: Normal appearance. She is not diaphoretic. HENT:      Mouth/Throat:      Mouth: Mucous membranes are moist.   Cardiovascular:      Rate and Rhythm: Normal rate and regular rhythm. Pulmonary:      Effort: Pulmonary effort is normal.      Breath sounds: Normal breath sounds. Abdominal:      General: Abdomen is flat. Palpations: Abdomen is soft. Musculoskeletal:      Cervical back: Normal range of motion. Comments: Left shoulder Nl ROM   Skin:     General: Skin is warm. Coloration: Skin is not pale. Comments: Left inner lower breast tenderness,  No lump could be appreciated  No axillary LN enlargement   Left upper outer breast small lump visualized (chronic per pt)  Rt breast exam non remarkable   Nipples inverted bilaterally (chronic per pt)   Neurological:      General: No focal deficit present. Mental Status: She is alert and oriented to person, place, and time. Cranial Nerves: No cranial nerve deficit. Motor: No weakness. Deep Tendon Reflexes: Reflexes normal.   Psychiatric:         Mood and Affect: Mood normal.         Behavior: Behavior normal.       DIFFERENTIAL  DIAGNOSIS     PLAN (LABS / IMAGING / EKG):  Orders Placed This Encounter   Procedures    Culture, Urine    COVID-19, Rapid    XR CHEST PORTABLE    CBC    Comprehensive Metabolic Panel    Troponin    Brain Natriuretic Peptide    Urinalysis with Microscopic    Basic Metabolic Panel w/ Reflex to MG    Comprehensive Metabolic Panel w/ Reflex to MG    Hepatic function panel    Potassium w/ Reflex to Magnesium    Lactic acid, plasma    Diet NPO    ADULT DIET;  Regular    Cardiac Monitor    Pulse Oximetry    Vital signs per unit routine    Telemetry monitoring - 72 hour duration    Notify physician    Up as tolerated    Full Code    Consult to Cardiology    OT eval and treat    PT evaluation and treat    Initiate Oxygen Therapy Protocol    EKG 12 Lead    EKG 12 lead    Place in Observation Service       MEDICATIONS ORDERED:  Orders Placed This Encounter   Medications    acetaminophen (TYLENOL) tablet 1,000 mg    fentaNYL (SUBLIMAZE) injection 25 mcg    sodium chloride flush 0.9 % injection 5-40 mL    sodium chloride flush 0.9 % injection 5-40 mL    0.9 % sodium chloride infusion    OR Linked Order Group     potassium chloride (KLOR-CON M) extended release tablet 40 mEq     potassium bicarb-citric acid (EFFER-K) effervescent tablet 40 mEq     potassium chloride 10 mEq/100 mL IVPB (Peripheral Line)    enoxaparin (LOVENOX) injection 40 mg     Order Specific Question:   Indication of Use     Answer:   Prophylaxis-DVT/PE    ondansetron (ZOFRAN) injection 4 mg    polyethylene glycol (GLYCOLAX) packet 17 g    OR Linked Order Group     acetaminophen (TYLENOL) tablet 650 mg     acetaminophen (TYLENOL) suppository 650 mg         DDX: ACS  COPD exacerbation     DIAGNOSTIC RESULTS / EMERGENCY DEPARTMENT COURSE / MDM   LAB RESULTS:  Results for orders placed or performed during the hospital encounter of 11/27/22   CBC   Result Value Ref Range    WBC 4.7 3.5 - 11.3 k/uL    RBC 4.38 3.95 - 5.11 m/uL    Hemoglobin 12.6 11.9 - 15.1 g/dL    Hematocrit 38.2 36.3 - 47.1 %    MCV 87.2 82.6 - 102.9 fL    MCH 28.8 25.2 - 33.5 pg    MCHC 33.0 28.4 - 34.8 g/dL    RDW 15.9 (H) 11.8 - 14.4 %    Platelets 770 104 - 752 k/uL    MPV 12.4 8.1 - 13.5 fL    NRBC Automated 0.0 0.0 per 100 WBC   Comprehensive Metabolic Panel   Result Value Ref Range    Glucose 111 (H) 70 - 99 mg/dL    BUN 22 (H) 6 - 20 mg/dL    Creatinine 1.14 (H) 0.50 - 0.90 mg/dL    Est, Glom Filt Rate 56 (L) >60 mL/min/1.73m2    Calcium 9.5 8.6 - 10.4 mg/dL    Sodium 140 135 - 144 mmol/L    Potassium 3.9 3.7 - 5.3 mmol/L    Chloride 102 98 - 107 mmol/L    CO2 24 20 - 31 mmol/L    Anion Gap 14 9 - 17 mmol/L    Alkaline Phosphatase 81 35 - 104 U/L    ALT 16 5 - 33 U/L    AST 22 <32 U/L    Total Bilirubin 0.3 0.3 - 1.2 mg/dL    Total Protein 7.8 6.4 - 8.3 g/dL    Albumin 4.4 3.5 - 5.2 g/dL    Albumin/Globulin Ratio 1.3 1.0 - 2.5   Troponin   Result Value Ref Range    Troponin, High Sensitivity 15 (H) 0 - 14 ng/L   Troponin   Result Value Ref Range    Troponin, High Sensitivity 16 (H) 0 - 14 ng/L   Brain Natriuretic Peptide   Result Value Ref Range    Pro- (H) <300 pg/mL   Urinalysis with Microscopic   Result Value Ref Range    Color, UA Yellow Yellow    Turbidity UA Cloudy (A) Clear    Glucose, Ur NEGATIVE NEGATIVE    Bilirubin Urine NEGATIVE NEGATIVE    Ketones, Urine TRACE (A) NEGATIVE    Specific Gravity, UA 1.022 1.005 - 1.030    Urine Hgb NEGATIVE NEGATIVE    pH, UA 5.0 5.0 - 8.0    Protein, UA NEGATIVE NEGATIVE    Urobilinogen, Urine Normal Normal    Nitrite, Urine NEGATIVE NEGATIVE    Leukocyte Esterase, Urine NEGATIVE NEGATIVE    WBC, UA 0 TO 2 0 - 5 /HPF    Casts UA  0 - 8 /LPF     10 TO 20 HYALINE Reference range defined for non-centrifuged specimen. Epithelial Cells UA 5 TO 10 0 - 5 /HPF    Bacteria, UA MODERATE (A) None       IMPRESSION: a 61 yo f with chronic hx of cardiomegaly and ACID. Presented with left breast tenderness, endorsed left chest heaviness and left shoulder pain. R/o cardiopulmonary causes, for f/u with PCP on the breast mass concern. RADIOLOGY:  XR CHEST PORTABLE   Preliminary Result   No evidence of acute process.                EKG  Atrial sensed ventricular paced Rhythm     All EKG's are interpreted by the Emergency Department Physician who either signs or Co-signs this chart in the absence of a cardiologist.    EMERGENCY DEPARTMENT COURSE:      ED Course as of 11/27/22 1512   Sun Nov 27, 2022   0935 Troponin, High Sensitivity(!): 15 [FS]   1037 Pro-BNP(!): 375 [FS]   5856 Will trend troponin and reassess [FS]   1100 Comprehensive Metabolic Panel(!):    Glucose, Random 111(!)   BUN,BUNPL 22(!)   Creatinine 1.14(!)   Est, Glom Filt Rate 56(!)   CALCIUM, SERUM, 613017 9.5   Sodium 140   Potassium 3.9   Chloride 102   CO2 24   Anion Gap 14   Alk Phos 81   ALT 16   AST 22   Bilirubin 0.3   Total Protein 7.8   Albumin 4.4   ALBUMIN/GLOBULIN RATIO 1.3 [FS]   1100 Tylenol 1000mg PO given @ 7374  Pt still complaining of left shoulder heaviness  [FS]   1128 Troponin, High Sensitivity(!): 16 [FS]   2622 Pt still complaining of left chest and upper abd pain, pos left CVA tenderness. No urinary symptoms [FS]   1323 Fentanyl 25mcg given @1325 [FS]      ED Course User Index  [FS] Jany Quiles MD       No notes of [facility] Admission Criteria type on file. FINAL IMPRESSION      1. Chest pain, unspecified type      - Pt will be admitted to Obs unit for further work-up and Troponin trending   - Cardio will be consulted for potential Stress test     DISPOSITION / [address] Admitted 11/27/2022 02:50:56 PM      PATIENT REFERRED TO:  No follow-up provider specified.     DISCHARGE MEDICATIONS:  New Prescriptions    No medications on file       Jany Quiles MD  Family Medicine Resident, PGY1    (Please note that portions of thisnote were completed with a voice recognition program.  Efforts were made to edit the dictations but occasionally words are mis-transcribed.)      Jany Quiles MD  Resident  11/27/22 1256

## 2022-11-27 NOTE — CARE COORDINATION
11/27/22 1816   Service Assessment   Patient Orientation Alert and Oriented   Cognition Alert   History Provided By Patient   Primary Caregiver Self   Support Systems Spouse/Significant Other;Family Members   PCP Verified by CM Yes   Last Visit to PCP Within last 3 months   Prior Functional Level Independent in ADLs/IADLs   Current Functional Level Independent in ADLs/IADLs   Can patient return to prior living arrangement Yes   Ability to make needs known: Good   Social/Functional History   Lives With Family   Type of [address] Two level   Home Access Stairs to enter with rails   Entrance Stairs - Number of Steps 4   Entrance Stairs - Rails Both   Bathroom Shower/Tub Tub/Shower unit   Bathroom Toilet Standard   ADL Assistance Independent   Homemaking Assistance Independent   Homemaking Responsibilities Yes   Ambulation Assistance Independent   Transfer Assistance Independent   Active  No   Patient's  Info significant other drives   Mode of Transportation Car   Occupation Unemployed   Discharge Planning   Type of [facility] Family Members   Current Services Prior To Admission [address] Medications No   Type of [facility]   Patient expects to be discharged to: House   One/Two Story Residence Two story, live on first floor   [address] Discharge None   Mode of Transport at Discharge Self   Confirm Follow Up Transport Self   Condition of Participation: Discharge [address] for Transition of Care is related to the following treatment goals: Chest pain resolved   The Patient and/or Patient Representative was provided with a Choice of Provider? Patient   The Patient and/Or Patient Representative agree with the Discharge Plan?  Yes

## 2022-11-27 NOTE — ED TRIAGE NOTES
Patient ambulated to room 06 from triage with c/o of having a left sided breast mass. Patient states it is painful. The mass is hard. Patient states it has been there for several weeks but recently started becoming painful. Pt respirations are even and unlabored, pt is oriented X 4, speaking in complete sentences, bed is in the lowest position, call light is within reach.

## 2022-11-28 ENCOUNTER — APPOINTMENT (OUTPATIENT)
Dept: ULTRASOUND IMAGING | Age: 57
End: 2022-11-28
Payer: MEDICAID

## 2022-11-28 VITALS
BODY MASS INDEX: 27.14 KG/M2 | DIASTOLIC BLOOD PRESSURE: 76 MMHG | SYSTOLIC BLOOD PRESSURE: 117 MMHG | WEIGHT: 147.5 LBS | TEMPERATURE: 97.9 F | RESPIRATION RATE: 16 BRPM | HEART RATE: 61 BPM | HEIGHT: 62 IN | OXYGEN SATURATION: 99 %

## 2022-11-28 LAB
CULTURE: NORMAL
EKG ATRIAL RATE: 56 BPM
EKG ATRIAL RATE: 66 BPM
EKG P AXIS: 47 DEGREES
EKG P AXIS: 63 DEGREES
EKG P-R INTERVAL: 180 MS
EKG P-R INTERVAL: 180 MS
EKG Q-T INTERVAL: 514 MS
EKG Q-T INTERVAL: 572 MS
EKG QRS DURATION: 138 MS
EKG QRS DURATION: 144 MS
EKG QTC CALCULATION (BAZETT): 538 MS
EKG QTC CALCULATION (BAZETT): 551 MS
EKG R AXIS: 38 DEGREES
EKG R AXIS: 59 DEGREES
EKG T AXIS: -23 DEGREES
EKG T AXIS: 18 DEGREES
EKG VENTRICULAR RATE: 56 BPM
EKG VENTRICULAR RATE: 66 BPM
SPECIMEN DESCRIPTION: NORMAL

## 2022-11-28 PROCEDURE — G0378 HOSPITAL OBSERVATION PER HR: HCPCS

## 2022-11-28 PROCEDURE — 6360000002 HC RX W HCPCS: Performed by: EMERGENCY MEDICINE

## 2022-11-28 PROCEDURE — 97166 OT EVAL MOD COMPLEX 45 MIN: CPT

## 2022-11-28 PROCEDURE — 93005 ELECTROCARDIOGRAM TRACING: CPT | Performed by: EMERGENCY MEDICINE

## 2022-11-28 PROCEDURE — 97535 SELF CARE MNGMENT TRAINING: CPT

## 2022-11-28 PROCEDURE — 76642 ULTRASOUND BREAST LIMITED: CPT

## 2022-11-28 PROCEDURE — 6370000000 HC RX 637 (ALT 250 FOR IP): Performed by: EMERGENCY MEDICINE

## 2022-11-28 PROCEDURE — 97162 PT EVAL MOD COMPLEX 30 MIN: CPT

## 2022-11-28 PROCEDURE — 2580000003 HC RX 258: Performed by: EMERGENCY MEDICINE

## 2022-11-28 PROCEDURE — 97530 THERAPEUTIC ACTIVITIES: CPT

## 2022-11-28 RX ORDER — FLUTICASONE PROPIONATE 50 MCG
2 SPRAY, SUSPENSION (ML) NASAL DAILY
Qty: 16 G | Refills: 0 | Status: SHIPPED | OUTPATIENT
Start: 2022-11-28

## 2022-11-28 RX ORDER — KETOROLAC TROMETHAMINE 15 MG/ML
15 INJECTION, SOLUTION INTRAMUSCULAR; INTRAVENOUS ONCE
Status: COMPLETED | OUTPATIENT
Start: 2022-11-28 | End: 2022-11-28

## 2022-11-28 RX ORDER — OXYCODONE HYDROCHLORIDE 5 MG/1
5 TABLET ORAL EVERY 4 HOURS PRN
Status: DISCONTINUED | OUTPATIENT
Start: 2022-11-28 | End: 2022-11-28 | Stop reason: HOSPADM

## 2022-11-28 RX ORDER — OXYCODONE HYDROCHLORIDE 5 MG/1
5 TABLET ORAL EVERY 6 HOURS PRN
Qty: 12 TABLET | Refills: 0 | Status: SHIPPED | OUTPATIENT
Start: 2022-11-28 | End: 2022-12-01

## 2022-11-28 RX ORDER — DIAZEPAM 2 MG/1
2 TABLET ORAL EVERY 6 HOURS PRN
Status: DISCONTINUED | OUTPATIENT
Start: 2022-11-28 | End: 2022-11-28 | Stop reason: HOSPADM

## 2022-11-28 RX ADMIN — SODIUM CHLORIDE, PRESERVATIVE FREE 10 ML: 5 INJECTION INTRAVENOUS at 08:02

## 2022-11-28 RX ADMIN — KETOROLAC TROMETHAMINE 15 MG: 15 INJECTION, SOLUTION INTRAMUSCULAR; INTRAVENOUS at 12:04

## 2022-11-28 RX ADMIN — OXYCODONE 5 MG: 5 TABLET ORAL at 14:52

## 2022-11-28 RX ADMIN — ACETAMINOPHEN 650 MG: 325 TABLET ORAL at 06:38

## 2022-11-28 ASSESSMENT — ENCOUNTER SYMPTOMS
DIARRHEA: 0
VOMITING: 0
NAUSEA: 0
SHORTNESS OF BREATH: 0
ABDOMINAL PAIN: 0
COUGH: 0
SORE THROAT: 0
CONSTIPATION: 0
RHINORRHEA: 0

## 2022-11-28 ASSESSMENT — PAIN SCALES - GENERAL
PAINLEVEL_OUTOF10: 6
PAINLEVEL_OUTOF10: 0
PAINLEVEL_OUTOF10: 2
PAINLEVEL_OUTOF10: 0
PAINLEVEL_OUTOF10: 0
PAINLEVEL_OUTOF10: 9

## 2022-11-28 ASSESSMENT — PAIN SCALES - WONG BAKER
WONGBAKER_NUMERICALRESPONSE: 0

## 2022-11-28 ASSESSMENT — PAIN DESCRIPTION - ORIENTATION
ORIENTATION: LEFT

## 2022-11-28 ASSESSMENT — PAIN DESCRIPTION - LOCATION
LOCATION: BREAST;ARM;SHOULDER
LOCATION: SHOULDER
LOCATION: CHEST

## 2022-11-28 ASSESSMENT — PAIN DESCRIPTION - DESCRIPTORS: DESCRIPTORS: ACHING

## 2022-11-28 ASSESSMENT — PAIN - FUNCTIONAL ASSESSMENT: PAIN_FUNCTIONAL_ASSESSMENT: ACTIVITIES ARE NOT PREVENTED

## 2022-11-28 NOTE — PROGRESS NOTES
Occupational Therapy  Facility/Department: [address] OBSERVATION  Occupational Therapy Initial Assessment    Name: Radha Wylie  : 1965  MRN: [id number]  Date of Service: 2022    Chief Complaint   Patient presents with    Breast Mass     Left side    Chest Pain    Shoulder Pain    Shortness of Breath       Discharge Recommendations:   No therapy recommended at discharge. OT Equipment Recommendations  Equipment Needed: Yes  Mobility Devices: Chang Art; ADL Assistive Devices  Walker: Rolling  ADL Assistive Devices: Shower Chair with back       Patient Diagnosis(es): The encounter diagnosis was Chest pain, unspecified type. Past Medical History:  has a past medical history of AICD discharge, RIP (acute kidney injury) ([facility] [address] Asthma, Asthma with COPD with exacerbation ([facility] [address] CAD (coronary artery disease), Cardiomegaly, Chest pain, CHF (congestive heart failure) ([address] Chronic systolic heart failure ([address] s/p AICD, COPD (chronic obstructive pulmonary disease) ([address] Depression, Fibroids, Hypertension, Hypomagnesemia, Intraparenchymal hematoma of left side of brain due to trauma, Lesion of right native kidney, Lung nodule < 6cm on CT, MI (myocardial infarction) ([address] Mild intermittent asthma, Non-ischemic cardiomyopathy ([address] NSTEMI (non-ST elevated myocardial infarction) ([address] QT prolongation, Syncope, and Unspecified diseases of blood and blood-forming organs. Past Surgical History:  has a past surgical history that includes Cardiac defibrillator placement (2005); Pacemaker insertion; Tubal ligation; Cardiac defibrillator placement; and Uterine fibroid surgery (maarch ). Assessment   Performance deficits / Impairments: Decreased functional mobility ; Decreased ADL status; Decreased high-level IADLs;Decreased balance  Assessment: Pt agreeable to OT eval this date. Pt engaged in bed mobility independently.  Pt completed functional transfers and functional mobility with SBA-CGA, demonstrating improvement in balance with use of RW. Pt exhibits decreased balance with functional mobility and ADLs/IADLs that require dynamic standing at this time. Pt will benefit from continued OT services to maximize safety and independence with ADLs/IADLs and functional mobility. Prognosis: Good  Decision Making: Medium Complexity  REQUIRES OT FOLLOW-UP: Yes  Activity Tolerance  Activity Tolerance: Patient Tolerated treatment well        Plan   Occupational Therapy Plan  Times Per Week: 1-3 x/wk  Current Treatment Recommendations: Balance training, Functional mobility training, Safety education & training, Patient/Caregiver education & training, Equipment evaluation, education, & procurement, Self-Care / ADL, Home management training     Restrictions  Restrictions/Precautions  Restrictions/Precautions: Up as Tolerated  Required Braces or Orthoses?: No    Subjective   General  Patient assessed for rehabilitation services?: Yes  Family / Caregiver Present: No  General Comment  Comments: RN ok'd pt for OT eval this date. Pt agreeable to session and pleasant/cooperative throughout. Pt reports pain of 8/10 to L shoulder/scapula however able to continue with therapy session.     Social/Functional History  Social/Functional History  Lives With: Family (Brother)  Type of Home: House  Home Layout: Two level, Performs ADL's on one level, Able to Live on Main level with bedroom/bathroom  Home Access: Stairs to enter with rails  Entrance Stairs - Number of Steps: 4  Entrance Stairs - Rails: Left  Bathroom Shower/Tub: Tub/Shower unit  Bathroom Toilet: Standard  Home Equipment:  (no DME use at baseline)  ADL Assistance: [address]: Independent  Homemaking Responsibilities: Yes (shared with brother)  Ambulation Assistance: Independent  Transfer Assistance: Independent  Active : No  Patient's  Info: brother or boyfriend  Mode of Transportation: Car  Occupation: Unemployed  Leisure & Hobbies: go to [employer]  Additional Comments: Pt reports brother works during day however has support in area if needed       Objective   Safety Devices  Type of Devices: Left in bed;Call light within reach;Gait belt;Nurse notified  Restraints  Restraints Initially in Place: No    Bed Mobility Training  Bed Mobility Training: Yes  Overall Level of Assistance: Modified independent (HOB elevated ~30 degrees)  Supine to Sit: Modified independent  Sit to Supine:  (pt remained seated at EOB upon OT exit)  Balance  Sitting: Intact (pt engaged in static and dynamic sitting ~10 minutes with no balance deficits noted)  Standing: With support (SBA for static and CGA required for dynamic d/t slight balance deficits)  Transfer Training  Transfer Training: Yes  Overall Level of Assistance: Contact-guard assistance  Sit to Stand: Contact-guard assistance  Stand to Sit: Contact-guard assistance  Gait  Overall Level of Assistance: Contact-guard assistance; Adaptive equipment (pt engaged in functional mobility within hospital room and hallway this date, initially utilizing no device and requiring CGA d/t balance deficits however was then provided with RW and balance improved as pt required SBA with device use)  Interventions: Safety awareness training    AROM: Within functional limits  Strength: Within functional limits (grossly 4+/5)  Coordination: Within functional limits  Tone: Normal  Sensation: Impaired (intermittent numbness/tingling to B hands and B feet; chronic)    ADL  Feeding: Modified independent ;Setup  Grooming: Modified independent ;Setup  UE Bathing: Modified independent ;Setup  LE Bathing: Modified independent ;Setup  UE Dressing: Modified independent ;Setup  UE Dressing Skilled Clinical Factors: pt donned gown independently following setup  LE Dressing: Setup;Contact guard assistance  LE Dressing Skilled Clinical Factors: pt donned socks while seated at EOB with SUP for safety during dynamic sitting/reaching.  CGA expected for standing portions that require uni/bilateral hand release from support d/t balance impairments  Toileting: Setup;Contact guard assistance          Vision  Vision: Impaired  Vision Exceptions: Wears glasses at all times  Hearing  Hearing: Within functional limits  Cognition  Overall Cognitive Status: [facility]  Orientation  Overall Orientation Status: Within Functional Limits                 Education Provided Comments: Pt ed on OT role, OT POC, safety awareness, DME use, and fall prevention. Good return noted.     LUE AROM (degrees)  LUE AROM : WFL  Left Hand AROM (degrees)  Left Hand AROM: WFL  RUE AROM (degrees)  RUE AROM : WFL  Right Hand AROM (degrees)  Right Hand AROM: WFL       Hand Dominance  Hand Dominance: Right            AM-PAC Score        AM-PAC Inpatient Daily Activity Raw Score: 22 (11/28/22 1606)  AM-PAC Inpatient ADL T-Scale Score : 47.1 (11/28/22 1606)  ADL Inpatient CMS 0-100% Score: 25.8 (11/28/22 1606)  ADL Inpatient CMS G-Code Modifier : CJ (11/28/22 1606)    Goals  Short Term Goals  Time Frame for Short Term Goals: By discharge, pt will:  Short Term Goal 1: Demo Mod I for functional transfers and functional mobility with use of LRD PRN for engagement in ADLs/IADLs  Short Term Goal 2: Demo Mod I with all ADLs with use of adaptive tech PRN  Short Term Goal 3: Demo 10+ min dynamic standing and reaching outside MICHA with uni/bilateral hand release independently for improved ADL/IADL performance  Short Term Goal 4: Demo good safety awareness throughout therapy session with 0 VCs       Therapy Time   Individual Concurrent Group Co-treatment   Time In 0926         Time Out 0948         Minutes 22         Timed Code Treatment Minutes: 8 Minutes       Kisha Chua OTR/L

## 2022-11-28 NOTE — PROGRESS NOTES
Physical Therapy  Facility/Department: [address] OBSERVATION  Physical Therapy Initial Assessment    Name: Danielito Stauffer  : 1965  MRN: [id number]  Date of Service: 2022  Chief Complaint   Patient presents with    Breast Mass     Left side    Chest Pain    Shoulder Pain    Shortness of Breath      Discharge Recommendations:  No therapy recommended at discharge      Patient Diagnosis(es): The encounter diagnosis was Chest pain, unspecified type. Past Medical History:  has a past medical history of AICD discharge, RIP (acute kidney injury) ([address] Asthma, Asthma with COPD with exacerbation ([address] CAD (coronary artery disease), Cardiomegaly, Chest pain, CHF (congestive heart failure) ([address] Chronic systolic heart failure ([address] s/p AICD, COPD (chronic obstructive pulmonary disease) ([address] Depression, Fibroids, Hypertension, Hypomagnesemia, Intraparenchymal hematoma of left side of brain due to trauma, Lesion of right native kidney, Lung nodule < 6cm on CT, MI (myocardial infarction) ([address] Mild intermittent asthma, Non-ischemic cardiomyopathy ([address] NSTEMI (non-ST elevated myocardial infarction) ([address] QT prolongation, Syncope, and Unspecified diseases of blood and blood-forming organs. Past Surgical History:  has a past surgical history that includes Cardiac defibrillator placement (2005); Pacemaker insertion; Tubal ligation; Cardiac defibrillator placement; and Uterine fibroid surgery (2015). Assessment   Assessment: The pt ambulated 150 ft with a RW x SBA. The use of a RW improved her gait quality.  She has no further need for any additional PT at this time  Therapy Prognosis: Good  Decision Making: Medium Complexity  Requires PT Follow-Up: No  Activity Tolerance  Activity Tolerance: Patient tolerated evaluation without incident     Plan   Physcial Therapy Plan  General Plan: Discharge with evaluation only  Safety Devices  Type of Devices: Left in bed, Call light within reach, Gait belt Restrictions  Restrictions/Precautions  Restrictions/Precautions: Up as Tolerated  Required Braces or Orthoses?: No     Subjective   General  Patient assessed for rehabilitation services?: Yes  Response To Previous Treatment: Not applicable  Family / Caregiver Present: No  Follows Commands: Within Functional Limits  Subjective  Subjective: Pt c/o L shld and back pain of 8/10         Social/Functional History  Social/Functional History  Lives With: Family (Brother)  Type of Home: House  Home Layout: Two level, Performs ADL's on one level, Able to Live on Main level with bedroom/bathroom  Home Access: Stairs to enter with rails  Entrance Stairs - Number of Steps: 4  Entrance Stairs - Rails: Left  Bathroom Shower/Tub: Tub/Shower unit  Bathroom Toilet: Standard  Home Equipment:  (no DME use at baseline)  ADL Assistance: [address]: Independent  Homemaking Responsibilities: Yes (shared with brother)  Ambulation Assistance: Independent  Transfer Assistance: Independent  Active : No  Patient's  Info: brother or boyfriend  Mode of Transportation: Car  Occupation: Unemployed  Leisure & Hobbies: go to [employer]  Additional Comments: Pt reports brother works during day however has support in area if needed  Vision/Hearing  Vision  Vision: Impaired  Vision Exceptions: Wears glasses at all times  Hearing  Hearing: Within functional limits    Cognition   Orientation  Overall Orientation Status: Within Functional Limits  Cognition  Overall Cognitive Status: WFL     Objective   Heart Rate: 61  BP: 117/76  MAP (Calculated): 90  Resp: 16  SpO2: 99 %     AROM RLE (degrees)  RLE AROM: WNL  AROM LLE (degrees)  LLE AROM : WNL  AROM RUE (degrees)  RUE AROM : WFL  AROM LUE (degrees)  LUE AROM : WFL  Strength RLE  Strength RLE: WNL  Strength LLE  Strength LLE: WNL  Strength RUE  Strength RUE: WNL  Strength LUE  Strength LUE: WNL        Bed mobility  Supine to Sit: Stand by assistance  Sit to Supine: Stand by assistance  Scooting: Stand by assistance  Transfers  Sit to Stand: Stand by assistance  Stand to Sit: Stand by assistance  Ambulation  Surface: Level tile  Device: Rolling Walker  Assistance: Stand by assistance  Distance: amb 150 without a device x SBA, amb 150 ft with a RW x SBA  Comments: the pt ambulated with a limp without the walker     Balance  Posture: Good  Sitting - Static: Good  Sitting - Dynamic: Good  Standing - Static: Good  Standing - Dynamic: Good     OutComes Score     AM-PAC Score  AM-PAC Inpatient Mobility Raw Score : 23 (11/28/22 1138)  AM-PAC Inpatient T-Scale Score : 56.93 (11/28/22 1138)  Mobility Inpatient CMS 0-100% Score: 11.2 (11/28/22 1138)  Mobility Inpatient CMS G-Code Modifier : CI (11/28/22 1138)     Tinneti Score     Goals  Short Term Goals  Time Frame for Short Term Goals: no PT needs at this time     DC   PT     Education  Patient Education  Education Given To: Patient  Education Provided: Role of Therapy;Plan of Care  Education Method: Verbal  Barriers to Learning: None  Education Outcome: Verbalized understanding      Therapy Time   Individual Concurrent Group Co-treatment   Time In [facility]         Time Out 0950         Minutes [address] [facility], PT

## 2022-11-28 NOTE — DISCHARGE INSTRUCTIONS
You were assessed in the emergency room and hospital for your chest pain, breast mass, and discomfort in your shoulder. You were seen by the cardiologist who feels that there is no need for any acute assessment right now. The ultrasound of your breast mass was negative. It is recommended that you follow-up with your mammogram as you are due for 1 at this time. We will send you home with a prescription for oxycodone which she may take as needed for pain. Please use caution when using this medication as it is a strong narcotic pain medication and can make you tired. Please take this only if you are not having significant pain. Please return the emergency department if you develop worsening chest pain, shortness of breath, headaches, vision changes, or any other concerning symptoms.

## 2022-11-28 NOTE — DISCHARGE SUMMARY
CDU Discharge Summary        Patient:  Alen Arroyo  YOB: 1965    MRN: [id number]   Acct: [de-identified]    Primary Care Physician: Rj Donis MD (Inactive)    Admit date:  11/27/2022  9:32 AM  Discharge date: 11/28/2022  4:51 PM    Discharge Diagnoses:     Acute chest pain, breast pain, shoulder pain, shortness of breath due to unspecified etiology  Improved with analgesics    Follow-up:  Call today/tomorrow for a follow up appointment with Rj Donis MD (Inactive) , or return to the Emergency Room with worsening symptoms    Stressed to patient the importance of following up with primary care doctor for further workup/management of symptoms. Pt verbalizes understanding and agrees with plan. Discharge Medications:  Changes to medications send patient home with 3 days worth of oxycodone to take as needed for pain, additionally sent the patient home with a rolling walker as recommended by physical therapy. Medication List        START taking these medications      oxyCODONE 5 MG immediate release tablet  Commonly known as: ROXICODONE  Take 1 tablet by mouth every 6 hours as needed for Pain for up to 3 days.             CONTINUE taking these medications      albuterol sulfate  (90 Base) MCG/ACT inhaler  Commonly known as: Ventolin HFA  INHALE 2 PUFFS INTO THE LUNGS EVERY 6 HOURS AS NEEDED FOR WHEEZING     amiodarone 200 MG tablet  Commonly known as: CORDARONE  TAKE 1 TABLET BY MOUTH ONE TIME A DAY     amitriptyline 25 MG tablet  Commonly known as: ELAVIL  TAKE 1 TABLET BY MOUTH EVERY NIGHT AT BEDTIME     atorvastatin 40 MG tablet  Commonly known as: LIPITOR  Take 1 tablet by mouth nightly     bumetanide 1 MG tablet  Commonly known as: BUMEX  TAKE 1 TABLET BY MOUTH 2 TIMES A DAY     Compressor/Nebulizer Misc  1 Device by Does not apply route 4 times daily     E-Z Spacer Rosibel  1 Device by Does not apply route daily     Entresto 24-26 MG per tablet  Generic drug: sacubitril-valsartan  TAKE 1 TABLET BY MOUTH 2 TIMES A DAY     fluticasone 50 MCG/ACT nasal spray  Commonly known as: FLONASE  2 sprays by Each Nostril route daily     ibuprofen 800 MG tablet  Commonly known as: ADVIL;MOTRIN  Take 1 tablet by mouth every 8 hours as needed for Pain     ipratropium-albuterol 0.5-2.5 (3) MG/3ML Soln nebulizer solution  Commonly known as: DUONEB  Inhale 3 mLs into the lungs 2 times daily as needed for Shortness of Breath     magnesium oxide 400 (240 Mg) MG tablet  Commonly known as: MAG-OX  TAKE ONE TABLET BY MOUTH ONE TIME A DAY     metoprolol succinate 50 MG extended release tablet  Commonly known as: TOPROL XL  TAKE ONE TABLET BY MOUTH EVERY MORNING     omeprazole 20 MG delayed release capsule  Commonly known as: PRILOSEC  TAKE 1 CAPSULE BY MOUTH ONE TIME A DAY     spironolactone 25 MG tablet  Commonly known as: ALDACTONE  TAKE 1 TABLET BY MOUTH ONE TIME A DAY            STOP taking these medications      sennosides-docusate sodium 8.6-50 MG tablet  Commonly known as: SENOKOT-S            ASK your doctor about these medications      colchicine 0.6 MG tablet  Commonly known as: Colcrys  Take 1 tablet by mouth daily Take 1 tablet day 1  Then 1 tablet every 3 days     Folbic RF 1.13-25-2 MG Tabs  TAKE 1 TABLET BY MOUTH ONE TIME A DAY     multivitamin tablet  TAKE ONE TABLET BY MOUTH ONE TIME A DAY               Where to Get Your Medications        These medications were sent to [facility] [address] [facility], [address] [place] [address], [facility] [address]      Phone: [phone]   fluticasone 50 MCG/ACT nasal spray       You can get these medications from any pharmacy    Bring a paper prescription for each of these medications  oxyCODONE 5 MG immediate release tablet         Diet:  ADULT DIET;  Regular , Advance as tolerated     Activity:  As tolerated    Consultants: IP CONSULT TO CARDIOLOGY    Procedures:  Not indicated     Diagnostic Test:   Results for orders placed or performed during the hospital encounter of 11/27/22   Culture, Urine    Specimen: Urine, clean catch   Result Value Ref Range    Specimen Description . CLEAN CATCH URINE     Culture NO SIGNIFICANT GROWTH    COVID-19, Rapid    Specimen: Nasopharyngeal Swab   Result Value Ref Range    Specimen Description . NASOPHARYNGEAL SWAB     SARS-CoV-2, Rapid Not Detected Not Detected   CBC   Result Value Ref Range    WBC 4.7 3.5 - 11.3 k/uL    RBC 4.38 3.95 - 5.11 m/uL    Hemoglobin 12.6 11.9 - 15.1 g/dL    Hematocrit 38.2 36.3 - 47.1 %    MCV 87.2 82.6 - 102.9 fL    MCH 28.8 25.2 - 33.5 pg    MCHC 33.0 28.4 - 34.8 g/dL    RDW 15.9 (H) 11.8 - 14.4 %    Platelets 989 247 - 818 k/uL    MPV 12.4 8.1 - 13.5 fL    NRBC Automated 0.0 0.0 per 100 WBC   Comprehensive Metabolic Panel   Result Value Ref Range    Glucose 111 (H) 70 - 99 mg/dL    BUN 22 (H) 6 - 20 mg/dL    Creatinine 1.14 (H) 0.50 - 0.90 mg/dL    Est, Glom Filt Rate 56 (L) >60 mL/min/1.73m2    Calcium 9.5 8.6 - 10.4 mg/dL    Sodium 140 135 - 144 mmol/L    Potassium 3.9 3.7 - 5.3 mmol/L    Chloride 102 98 - 107 mmol/L    CO2 24 20 - 31 mmol/L    Anion Gap 14 9 - 17 mmol/L    Alkaline Phosphatase 81 35 - 104 U/L    ALT 16 5 - 33 U/L    AST 22 <32 U/L    Total Bilirubin 0.3 0.3 - 1.2 mg/dL    Total Protein 7.8 6.4 - 8.3 g/dL    Albumin 4.4 3.5 - 5.2 g/dL    Albumin/Globulin Ratio 1.3 1.0 - 2.5   Troponin   Result Value Ref Range    Troponin, High Sensitivity 15 (H) 0 - 14 ng/L   Troponin   Result Value Ref Range    Troponin, High Sensitivity 16 (H) 0 - 14 ng/L   Brain Natriuretic Peptide   Result Value Ref Range    Pro- (H) <300 pg/mL   Urinalysis with Microscopic   Result Value Ref Range    Color, UA Yellow Yellow    Turbidity UA Cloudy (A) Clear    Glucose, Ur NEGATIVE NEGATIVE    Bilirubin Urine NEGATIVE NEGATIVE    Ketones, Urine TRACE (A) NEGATIVE    Specific Gravity, UA 1.022 1.005 - 1.030    Urine Hgb NEGATIVE NEGATIVE    pH, UA 5.0 5.0 - 8.0    Protein, UA NEGATIVE NEGATIVE    Urobilinogen, Urine Normal Normal    Nitrite, Urine NEGATIVE NEGATIVE    Leukocyte Esterase, Urine NEGATIVE NEGATIVE    WBC, UA 0 TO 2 0 - 5 /HPF    Casts UA  0 - 8 /LPF     10 TO 20 HYALINE Reference range defined for non-centrifuged specimen. Epithelial Cells UA 5 TO 10 0 - 5 /HPF    Bacteria, UA MODERATE (A) None   Troponin   Result Value Ref Range    Troponin, High Sensitivity 15 (H) 0 - 14 ng/L   EKG 12 Lead   Result Value Ref Range    Ventricular Rate 66 BPM    Atrial Rate 66 BPM    P-R Interval 180 ms    QRS Duration 138 ms    Q-T Interval 514 ms    QTc Calculation (Bazett) 538 ms    P Axis 47 degrees    R Axis 59 degrees    T Axis -23 degrees   EKG 12 lead   Result Value Ref Range    Ventricular Rate 56 BPM    Atrial Rate 56 BPM    P-R Interval 180 ms    QRS Duration 144 ms    Q-T Interval 572 ms    QTc Calculation (Bazett) 551 ms    P Axis 63 degrees    R Axis 38 degrees    T Axis 18 degrees     XR CHEST PORTABLE    Result Date: 11/27/2022  EXAMINATION: ONE XRAY VIEW OF THE CHEST 11/27/2022 10:30 am COMPARISON: 11/14/2021 HISTORY: ORDERING SYSTEM PROVIDED HISTORY: Chest pain/SOB TECHNOLOGIST PROVIDED HISTORY: Chest pain/SOB Reason for Exam: chest pain/ shortness f breath/  AP erect/ port. FINDINGS: Cardiac pacemaker device is noted. Mild cardiomegaly is unchanged. No significant vascular congestion. No focal airspace consolidation, pneumothorax, or pleural effusion. No free air beneath the diaphragm. No evidence of acute process. US BREAST LIMITED LEFT    Result Date: 11/28/2022  EXAMINATION: TARGETED ULTRASOUND OF THE LEFT BREAST 11/28/2022 COMPARISON: Prior comparison mammogram dated 02/03/2017. HISTORY: ORDERING SYSTEM PROVIDED HISTORY: ; r/o abscess TECHNOLOGIST PROVIDED HISTORY: ; r/o abscess FINDINGS: Targeted ultrasound of the left breast performed.  Left breast: No suspicious sonographic correlate for the area of concern in the left breast 8 o'clock axis. There are benign fibroglandular breast tissue without evidence of solid mass or suspicious sonographic abnormalities. No evidence of fluid collection. No sonographic evidence of malignancy or abscess. Note this is a limited evaluation of the area of concern. The patient would benefit from a full workup as outpatient including diagnostic mammogram and possible ultrasound. Patient also due for annual mammogram as the last bilateral mammogram in the system dated February 3, 2017. BI-RADS 2 BIRADS: BIRADS - CATEGORY 2 Benign Findings. Clinical follow-up. Patient due for annual mammogram. OVERALL ASSESSMENT - BENIGN A letter of notification will be sent to the patient regarding the results. The [employer] recommends annual mammograms for women 40 years and older. Physical Exam:    General appearance - NAD, AOx 3   Lungs -CTAB, no R/R/R  Heart - RRR, no M/R/G  Abdomen - Soft, NT/ND  Neurological:  MAEx4, No focal motor deficit, sensory loss  Extremities - Cap refil <2 sec in all ext., no edema  Skin -warm, dry      Hospital Course:  Clinical course has improved, labs and imaging reviewed. Leora Sever originally presented to the hospital on 11/27/2022  9:32 AM. with chest pain. At that time it was determined that She required further observation and cardiology consultation. She was admitted and labs and imaging were followed daily. Imaging results as above. She is medically stable to be discharged. Disposition: Home    Patient stated that they will not drive themselves home from the hospital if they have gotten pain killers/ narcotics earlier that day and that they will arrange for transportation on their own or work with the  for a ride. Patient counseled NOT to drive while under the influence of narcotics/ pain killers. Condition: Good    Patient stable and ready for discharge home.  I have discussed plan of care with patient and they are in understanding. They were instructed to read discharge paperwork. All of their questions and concerns were addressed. Time Spent: 0 day      --  Bhavana Xie DO  Emergency Medicine Resident Physician    This dictation was generated by voice recognition computer software. Although all attempts are made to edit the dictation for accuracy, there may be errors in the transcription that are not intended.

## 2022-11-28 NOTE — CONSULTS
Attestation signed by      Attending Physician Statement:    I have discussed the care of  Bhaskar Abrams , including pertinent history and exam findings, with the Cardiology fellow/resident. I have seen and examined the patient and the key elements of all parts of the encounter have been performed by me. I agree with the assessment, plan and orders as documented by the fellow/resident, after I modified exam findings and plan of treatments, and the final version is my approved version of the assessment. Additional Comments:   Chest pain, atypical. ECG and [place] no acute changes. H/O dilated cardiomyopathy. Previous cath in 2019 normal CA. No signs of acute CHF. No further cardiac work up needed. [facility] Cardiology Cardiology    Consult / H&P               Today's Date: 11/28/2022  Patient Name: Bhaskar Abrams  Date of admission: 11/27/2022  9:32 AM  Patient's age: 62 y.o., 1965  Admission Dx: Chest pain [R07.9]  Chest pain, unspecified type [R07.9]    Reason for Consult:  Cardiac evaluation    Requesting Physician: Gina Zamora MD    CHIEF COMPLAINT: Chest pain, shortness of breath    History Obtained From:  patient, electronic medical record    HISTORY OF PRESENT ILLNESS:      The patient is a 62 y.o. female with nonischemic cardiomyopathy s/p AICD in place, essential hypertension ,asthma/COPD,who came with left breast tenderness for 3 days. Complained of left-sided chest pain and heaviness radiating to left shoulder and right breast with tingling in left arm, patient stated that since yesterday it has been getting worse, no relief with nitro, no other worsening or alleviating factors,  associated with shortness of breath ,denied any palpitations. States that she has been feeling dizzy for about 1 month. Denied any AICD discharge, is concerned about the loose leads in her defibrillator which she was told about in the office recently.     Vitals stable  Troponins 15, 16, 15  proBNP 375  EKG showed paced rhythm    Echo 2/27/2020  Summary  Left ventricle is enlarged. Global left ventricular systolic function is  severely reduced. Estimated ejection fraction is 20 % . Calculated EF via heart model is 22 %. Mild left ventricular hypertrophy. Severe global hypokinesis. Grade III (severe) left ventricular diastolic dysfunction. Biatrial enlargement. Right ventricular dilatation with reduced systolic function. At least moderate mitral valve regurgitation, possiibly severe  Moderate to severe tricuspid regurgitation. Small pericardial effusion. Echo 4/24/2018  Summary  Dilated left ventricle with severely reduced systolic function. Increased left ventricular wall thickness. Estimated ejection fraction is 10-15%. ( 10% on the the of 2015)  Borderline dilated right ventricle with mildly reduced function. Pacemaker / ICD lead seen in right sided chambers. Severe mitral regurgitation. ( vena contracta .6cm) Mitral regurgitation was  graded at severe in 2015. Moderate tricuspid regurgitation. Estimated right ventricular systolic pressure is 48 mmHg. No significant pericardial effusion is seen.     Cardiac cath 6/11/2019  Procedure Summary   Normal coronary arteries   Known severe LV dysfunction    Past Medical History:   has a past medical history of AICD discharge, RIP (acute kidney injury) ([address] Asthma, Asthma with COPD with exacerbation ([address] CAD (coronary artery disease), Cardiomegaly, Chest pain, CHF (congestive heart failure) ([address] Chronic systolic heart failure ([address] s/p AICD, COPD (chronic obstructive pulmonary disease) ([address] Depression, Fibroids, Hypertension, Hypomagnesemia, Intraparenchymal hematoma of left side of brain due to trauma, Lesion of right native kidney, Lung nodule < 6cm on CT, MI (myocardial infarction) ([address] Mild intermittent asthma, Non-ischemic cardiomyopathy ([address] NSTEMI (non-ST elevated myocardial infarction) ([address] QT prolongation, Syncope, and Unspecified diseases of blood and blood-forming organs. Past Surgical History:   has a past surgical history that includes Cardiac defibrillator placement (09/2005); Pacemaker insertion; Tubal ligation; Cardiac defibrillator placement; and Uterine fibroid surgery ([facility] 2015). Home Medications:    Prior to Admission medications    Medication Sig Start Date End Date Taking?  Authorizing Provider   spironolactone (ALDACTONE) 25 MG tablet TAKE 1 TABLET BY MOUTH ONE TIME A DAY 11/17/22   Mila Grimm MD   bumetanide (BUMEX) 1 MG tablet TAKE 1 TABLET BY MOUTH 2 TIMES A DAY 11/17/22   Mila Grimm MD   magnesium oxide (MAG-OX) 400 (240 Mg) MG tablet TAKE ONE TABLET BY MOUTH ONE TIME A DAY 11/17/22   Mila Grimm MD   omeprazole (PRILOSEC) 20 MG delayed release capsule TAKE 1 CAPSULE BY MOUTH ONE TIME A DAY 11/16/22   Era Lynn DO   amiodarone (CORDARONE) 200 MG tablet TAKE 1 TABLET BY MOUTH ONE TIME A DAY 10/10/22   Mila Grimm MD   amitriptyline (ELAVIL) 25 MG tablet TAKE 1 TABLET BY MOUTH EVERY NIGHT AT BEDTIME 10/10/22   Mila Grimm MD   fluticasone [place] Angelica) 50 MCG/ACT nasal spray 2 sprays by Each Nostril route daily  Patient not taking: No sig reported 6/10/22   Buster Lewis DO   albuterol sulfate HFA (VENTOLIN HFA) 108 (90 Base) MCG/ACT inhaler INHALE 2 PUFFS INTO THE LUNGS EVERY 6 HOURS AS NEEDED FOR WHEEZING 4/4/22   Mila Grimm MD   atorvastatin (LIPITOR) 40 MG tablet Take 1 tablet by mouth nightly 4/4/22   Mila Grimm MD   colchicine (COLCRYS) 0.6 MG tablet Take 1 tablet by mouth daily Take 1 tablet day 1  Then 1 tablet every 3 days  Patient not taking: No sig reported 4/4/22   Mila Grimm MD   ipratropium-albuterol (DUONEB) 0.5-2.5 (3) MG/3ML SOLN nebulizer solution Inhale 3 mLs into the lungs 2 times daily as needed for Shortness of Breath 4/4/22   Mila Grimm MD   metoprolol succinate (TOPROL XL) 50 MG extended release tablet TAKE ONE TABLET BY MOUTH EVERY MORNING 4/4/22 Becky Rai MD   sacubitril-valsartan (ENTRESTO) 24-26 MG per tablet TAKE 1 TABLET BY MOUTH 2 TIMES A DAY 4/4/22   Becky Rai MD   ibuprofen (ADVIL;MOTRIN) 800 MG tablet Take 1 tablet by mouth every 8 hours as needed for Pain 1/14/22   Rob Betancur MD   L-Methylfolate-B6-B12 (FOLBIC RF) 1.13-25-2 MG TABS TAKE 1 TABLET BY MOUTH ONE TIME A DAY  Patient not taking: No sig reported 5/14/21   Chantal Branch MD   Nebulizers (COMPRESSOR/NEBULIZER) MISC 1 Device by Does not apply route 4 times daily 7/15/20   Chantal Branch MD   sennosides-docusate sodium (SENOKOT-S) 8.6-50 MG tablet Take 1 tablet by mouth daily  Patient not taking: Reported on 8/4/2022 12/23/18   Barbie Mancini MD   Spacer/Aero-Holding Chambers (E-Z SPACER) ELVER 1 Device by Does not apply route daily 11/25/18   Angela Alonzo MD   Multiple Vitamin (MULTIVITAMIN) tablet TAKE ONE TABLET BY MOUTH ONE TIME A DAY  Patient not taking: No sig reported 6/18/18   Valerie Barry MD      Current Facility-Administered Medications: sodium chloride flush 0.9 % injection 5-40 mL, 5-40 mL, IntraVENous, 2 times per day  sodium chloride flush 0.9 % injection 5-40 mL, 5-40 mL, IntraVENous, PRN  0.9 % sodium chloride infusion, 25 mL, IntraVENous, PRN  potassium chloride (KLOR-CON M) extended release tablet 40 mEq, 40 mEq, Oral, PRN **OR** potassium bicarb-citric acid (EFFER-K) effervescent tablet 40 mEq, 40 mEq, Oral, PRN **OR** potassium chloride 10 mEq/100 mL IVPB (Peripheral Line), 10 mEq, IntraVENous, PRN  enoxaparin (LOVENOX) injection 40 mg, 40 mg, SubCUTAneous, Daily  ondansetron (ZOFRAN) injection 4 mg, 4 mg, IntraVENous, Q4H PRN  polyethylene glycol (GLYCOLAX) packet 17 g, 17 g, Oral, Daily PRN  acetaminophen (TYLENOL) tablet 650 mg, 650 mg, Oral, Q6H PRN **OR** acetaminophen (TYLENOL) suppository 650 mg, 650 mg, Rectal, Q6H PRN  melatonin tablet 5 mg, 5 mg, Oral, Nightly  oxyCODONE (ROXICODONE) immediate release tablet 5 mg, 5 mg, Oral, Once    Allergies: Iv contrast [iodides]    Social History:   reports that she quit smoking about 2 years ago. Her smoking use included cigarettes. She has a 7.50 pack-year smoking history. She has never used smokeless tobacco. She reports current alcohol use of about 1.0 standard drink per week. She reports current drug use. Drug: Marijuana Shimon Hudson). Family History: family history includes Diabetes in her father and mother; Heart Disease in her brother and mother; High Blood Pressure in her mother. No h/o sudden cardiac death. No for premature CAD    REVIEW OF SYSTEMS:    Constitutional: there has been no unanticipated weight loss. There's been No change in energy level, No change in activity level. Eyes: No visual changes or diplopia. No scleral icterus. ENT: No Headaches  Cardiovascular: cardiac history as above  Respiratory: No previous pulmonary problems, No cough  Gastrointestinal: No abdominal pain. No change in bowel or bladder habits. Genitourinary: No dysuria, trouble voiding, or hematuria. Musculoskeletal:  No gait disturbance, No weakness or joint complaints. Integumentary: No rash or pruritis. Neurological: No headache, diplopia, change in muscle strength, numbness or tingling. No change in gait, balance, coordination, mood, affect, memory, mentation, behavior. Psychiatric: No anxiety, or depression. Endocrine: No temperature intolerance. No excessive thirst, fluid intake, or urination. No tremor. Hematologic/Lymphatic: No abnormal bruising or bleeding, blood clots or swollen lymph nodes. Allergic/Immunologic: No nasal congestion or hives. PHYSICAL EXAM:      /76   Pulse 61   Temp 97.9 °F (36.6 °C) (Oral)   Resp 16   Ht 5' 2\" (1.575 m)   Wt 147 lb 8 oz (66.9 kg)   LMP 04/07/2016   SpO2 99%   BMI 26.98 kg/m²    Constitutional and General Appearance: alert, cooperative, no distress and appears stated age  [de-identified]: PERRL, no cervical lymphadenopathy. No masses palpable. Normal oral mucosa  Respiratory:  Normal excursion and expansion without use of accessory muscles  Resp Auscultation: Good respiratory effort. No for increased work of breathing. On auscultation: clear to auscultation bilaterally  Cardiovascular: Localized tenderness in the left breast  The apical impulse is not displaced  Heart tones are crisp and normal. regular S1 and S2.  Jugular venous pulsation Normal  The carotid upstroke is normal in amplitude and contour without delay or bruit  Peripheral pulses are symmetrical and full   Abdomen:   No masses or tenderness  Bowel sounds present  Extremities:   No Cyanosis or Clubbing   Lower extremity edema: No   Skin: Warm and dry  Neurological:  Alert and oriented. Moves all extremities well  No abnormalities of mood, affect, memory, mentation, or behavior are noted        Labs:     CBC:   Recent Labs     11/27/22  1005   WBC 4.7   HGB 12.6   HCT 38.2        BMP:   Recent Labs     11/27/22  1005      K 3.9   CO2 24   BUN 22*   CREATININE 1.14*   LABGLOM 56*   GLUCOSE 111*     BNP: No results for input(s): BNP in the last 72 hours. PT/INR: No results for input(s): PROTIME, INR in the last 72 hours. APTT:No results for input(s): APTT in the last 72 hours. CARDIAC ENZYMES:No results for input(s): CKTOTAL, CKMB, CKMBINDEX, TROPONINI in the last 72 hours.   FASTING LIPID PANEL:  Lab Results   Component Value Date/Time    HDL 59 11/01/2019 05:46 AM    TRIG 95 11/01/2019 05:46 AM     LIVER PROFILE:  Recent Labs     11/27/22  1005   AST 22   ALT 16   LABALBU 4.4       IMPRESSION:    Patient Active Problem List   Diagnosis    COPD (chronic obstructive pulmonary disease) (HCC)    Fibroids    Syncope    Lung nodule < 6cm on CT    Chronic systolic heart failure (HCC) s/p AICD    Mild intermittent asthma    Essential hypertension    Chest pain    QT prolongation    Asthma with COPD with exacerbation ([address]    Non-ischemic cardiomyopathy ([address]    Lesion of right native kidney    NSTEMI (non-ST elevated myocardial infarction) [facility]    CAD (coronary artery disease)    Intraparenchymal hematoma of left side of brain due to trauma    Chronic combined systolic and diastolic congestive heart failure ([facility]    AICD discharge    RIP (acute kidney injury) ([facility]    Hypomagnesemia    Other heart failure (HCC)    Atypical chest pain     Assessment  -Atypical chest pain  -Essential hypertension  -Nonischemic cardiomyopathy/HFrEF (20 to 22% ) s/p biventricular AICD for CRT-D  -Grade 3 diastolic dysfunction  -Moderate to severe MR, TR  -Normal coronaries per cardiac cath in 2019  -History of VT/VF, on amiodarone  -History of asthma/COPD    RECOMMENDATIONS:  Atypical breast/chest pain , no ischemic work-up at this point , continue medical management, beta-blocker, Entresto, Aldactone, diuretics, amiodarone  Work-up of mitral regurgitation as outpatient. Discussed with patient and Nurse.     Electronically signed by Jordan Scruggs MD on 11/28/2022 at 7:53 AM

## 2022-11-28 NOTE — PROGRESS NOTES
Congestive Heart Failure Education completed and charted. CHF booklet given. Patient was receptive to education. Discussed the  importance of medication compliance. Discussed the importance of a heart healthy diet. Discussed 2000 mg sodium-restricted daily diet. Patient instructed to limit fluid intake to  1.5 to 2 liters per day. Patient instructed to weigh self at the same time of each day each morning, reinforced teaching to monitor for 3-5 lb weight increase over 1-2 days notify physician if change noted. Signs and symptoms of CHF discussed with patient, such as feeling more tired than normal, feeling short of breath, coughing that increases when lying down, sudden weight gain, swelling of the feet, legs or belly. Patient verbalized understanding to notify physician office if these symptoms occur. EF 20%  Grade III Diastolic Dysfunction  Pt is established with  CHF Clinic  Appt will be made for pt next week following this admission.

## 2022-11-28 NOTE — PROGRESS NOTES
[address]  CDU / OBSERVATION ENCOUNTER  ATTENDING NOTE       I performed a history and physical examination of the patient and discussed management with the resident or midlevel provider. I reviewed the resident or midlevel provider's note and agree with the documented findings and plan of care. Any areas of disagreement are noted on the chart. I was personally present for the key portions of any procedures. I have documented in the chart those procedures where I was not present during the key portions. I have reviewed the nurses notes. I agree with the chief complaint, past medical history, past surgical history, allergies, medications, social and family history as documented unless otherwise noted below. The Family history, social history, and ROS are effectively unchanged since admission unless noted elsewhere in the chart. Patient evaluated by cardiology. Patient with chest pain but pain is much more consistent with pleuritic breast pain. Patient with new breast mass with tenderness at this site. This is not anginal in nature. Patient was evaluated by cardiology. [place] that her discomfort was primarily due to chest wall discomfort. Patient does have AICD in. This is a device we will interrogate. Patient does not need further ischemic work-up. Patient needs a pacemaker interrogation. Also with breast mass being new and tender we will get ultrasound rule out abscess. Nitza Arguelles was evaluated today and a DME order was entered for a wheeled walker because she requires this to successfully complete daily living tasks of ambulating. A wheeled walker is necessary due to the patient's unsteady gait, upper body weakness, and inability to  an ambulation device; and she can ambulate only by pushing a walker instead of a lesser assistive device such as a cane, crutch, or standard walker.   The need for this equipment was discussed with the patient and she understands and is in agreement.      Rodrigo Harris MD  Attending Emergency  Physician

## 2022-11-28 NOTE — PLAN OF CARE
Problem: Chronic Conditions and Co-morbidities  Goal: Patient's chronic conditions and co-morbidity symptoms are monitored and maintained or improved  11/28/2022 0329 by Cammie Campa RN  Outcome: Progressing  11/27/2022 1629 by Roque Sutherland RN  Outcome: Progressing     Problem: Pain  Goal: Verbalizes/displays adequate comfort level or baseline comfort level  11/28/2022 0329 by Cammie Campa RN  Outcome: Progressing  11/27/2022 1629 by Roque Sutherland RN  Outcome: Progressing     Problem: Safety - Adult  Goal: Free from fall injury  11/28/2022 0329 by Cammie Campa RN  Outcome: Progressing  11/27/2022 1629 by Roque Sutherland RN  Outcome: Progressing

## 2022-11-28 NOTE — H&P
[address]  CDU / OBSERVATION ENCOUNTER  RESIDENT NOTE     Pt Name: Lorraine Lopez  MRN: [id number]  [place] 1965  Date of evaluation: 11/28/22  Patient's PCP is :  Da Hampton MD (Inactive)    CHIEF COMPLAINT       Chief Complaint   Patient presents with    Breast Mass     Left side    Chest Pain    Shoulder Pain    Shortness of Breath         HISTORY OF PRESENT ILLNESS    Lorraine Lopez is a 62 y.o. female who presents with a left-sided breast mass as well as chest pain, shortness of breath, and shoulder pain. On initial presentation, the patient had a tender mass on her left breast that had started 2 days prior to arrival.  She denies any discharge or drainage from this mass. She also endorses left-sided chest pain she describes as a heaviness with associated shoulder discomfort, and tingling. She states this has been ongoing for 1 day. She states it is progressively worsening. Patient does have a history of asthma, COPD, cardiomegaly, CAD, and NSTEMI. The patient has an AICD in place. Patient denies having any discharges of her AICD recently. Patient states she feels short of breath. She denies any fever, chills, cough, no new sputum production. She reports persistent chest discomfort that is more related to her chest wall as there is discomfort with the mass. She also reports persistent left shoulder pain with discomfort on her posterior left-sided thoracic spine. She denies any shortness of breath this morning, nausea, vomiting, or difficulty with urination or bowels. Location/Symptom: Chest pain, left breast mass  Timing/Onset: Acute onset 3 days ago  Provocation: None appreciated  Quality: Tenderness to the chest wall, tight pain around the right shoulder  Radiation: Chest to her shoulder  Severity: 8/10  Timing/Duration: Constant  Modifying Factors: None    REVIEW OF SYSTEMS       Review of Systems   Constitutional:  Negative for chills and fever. HENT:  Negative for congestion, rhinorrhea and sore throat. Eyes:  Negative for visual disturbance. Respiratory:  Negative for cough and shortness of breath. Cardiovascular:  Positive for chest pain. Negative for leg swelling. Chest wall tenderness, breast mass   Gastrointestinal:  Negative for abdominal pain, constipation, diarrhea, nausea and vomiting. Endocrine: Negative for polyuria. Genitourinary:  Negative for dysuria and hematuria. Musculoskeletal:  Negative for arthralgias and myalgias. Left shoulder discomfort   Skin:  Negative for rash. Neurological:  Negative for light-headedness and headaches. Psychiatric/Behavioral:  Negative for behavioral problems. (PQRS) Advance directives on face sheet per hospital policy. No change unless specifically mentioned in chart    PAST MEDICAL HISTORY    has a past medical history of AICD discharge, RIP (acute kidney injury) ([address] Asthma, Asthma with COPD with exacerbation ([address] CAD (coronary artery disease), Cardiomegaly, Chest pain, CHF (congestive heart failure) ([address] Chronic systolic heart failure ([address] s/p AICD, COPD (chronic obstructive pulmonary disease) ([address] Depression, Fibroids, Hypertension, Hypomagnesemia, Intraparenchymal hematoma of left side of brain due to trauma, Lesion of right native kidney, Lung nodule < 6cm on CT, MI (myocardial infarction) ([address] Mild intermittent asthma, Non-ischemic cardiomyopathy ([address] NSTEMI (non-ST elevated myocardial infarction) ([address] QT prolongation, Syncope, and Unspecified diseases of blood and blood-forming organs. I have reviewed the past medical history with the patient and it is pertinent to this complaint. SURGICAL HISTORY      has a past surgical history that includes Cardiac defibrillator placement (09/2005); Pacemaker insertion; Tubal ligation; Cardiac defibrillator placement; and Uterine fibroid surgery (malucretia 2015).   I have reviewed and agree with Surgical History entered and it is pertinent to this complaint. CURRENT MEDICATIONS     sodium chloride flush 0.9 % injection 5-40 mL, 2 times per day  sodium chloride flush 0.9 % injection 5-40 mL, PRN  0.9 % sodium chloride infusion, PRN  potassium chloride (KLOR-CON M) extended release tablet 40 mEq, PRN   Or  potassium bicarb-citric acid (EFFER-K) effervescent tablet 40 mEq, PRN   Or  potassium chloride 10 mEq/100 mL IVPB (Peripheral Line), PRN  enoxaparin (LOVENOX) injection 40 mg, Daily  ondansetron (ZOFRAN) injection 4 mg, Q4H PRN  polyethylene glycol (GLYCOLAX) packet 17 g, Daily PRN  acetaminophen (TYLENOL) tablet 650 mg, Q6H PRN   Or  acetaminophen (TYLENOL) suppository 650 mg, Q6H PRN  melatonin tablet 5 mg, Nightly  oxyCODONE (ROXICODONE) immediate release tablet 5 mg, Once        All medication charted and reviewed. ALLERGIES     is allergic to iv contrast [iodides]. FAMILY HISTORY     She indicated that her mother is alive. She indicated that her father is . She indicated that her brother is alive. She indicated that her maternal grandmother is . She indicated that her maternal grandfather is . She indicated that her paternal grandmother is . She indicated that her paternal grandfather is . family history includes Diabetes in her father and mother; Heart Disease in her brother and mother; High Blood Pressure in her mother. The patient denies any pertinent family history. I have reviewed and agree with the family history entered. I have reviewed the Family History and it is not significant to the case    SOCIAL HISTORY      reports that she quit smoking about 2 years ago. Her smoking use included cigarettes. She has a 7.50 pack-year smoking history. She has never used smokeless tobacco. She reports current alcohol use of about 1.0 standard drink per week. She reports current drug use. Drug: Marijuana Burnell Goldberg).   I have reviewed and agree with all Social. There are no concerns for substance abuse/use. PHYSICAL EXAM     INITIAL VITALS:  height is 5' 2\" (1.575 m) and weight is 147 lb 8 oz (66.9 kg). Her oral temperature is 97.9 °F (36.6 °C). Her blood pressure is 117/76 and her pulse is 61. Her respiration is 16 and oxygen saturation is 99%. Physical Exam  Constitutional:       General: She is not in acute distress. Appearance: She is not toxic-appearing. HENT:      Head: Normocephalic. Nose: No rhinorrhea. Mouth/Throat:      Mouth: Mucous membranes are moist.   Eyes:      Conjunctiva/sclera: Conjunctivae normal.      Pupils: Pupils are equal, round, and reactive to light. Cardiovascular:      Rate and Rhythm: Normal rate and regular rhythm. Pulses: Normal pulses. Comments: There is a well-healing scar over the left chest wall and a palpable AICD in place. Pulmonary:      Effort: Pulmonary effort is normal. No respiratory distress. Breath sounds: Normal breath sounds. No wheezing. Abdominal:      General: Bowel sounds are normal. There is no distension. Palpations: Abdomen is soft. Tenderness: There is no abdominal tenderness. There is no rebound. Musculoskeletal:      Right lower leg: No edema. Left lower leg: No edema. Comments: Hypertonicity to the trapezius and left side paraspinal muscles in the region of the cervical and thoracic spine. Tenderness to palpation of the supraspinatus. Skin:     General: Skin is warm and dry. Neurological:      Mental Status: She is alert and oriented to person, place, and time.    Psychiatric:         Mood and Affect: Mood normal.         Behavior: Behavior normal.         Judgment: Judgment normal.           DIFFERENTIAL DIAGNOSIS/MDM:     DDx: breast abscess, ACS, chest wall pain, musculoskeletal shoulder pain    DIAGNOSTIC RESULTS     EKG: All EKG's are interpreted by the Observation Physician who either signs or Co-signs this chart in the absence of a cardiologist.    EKG Interpretation    Interpreted by observation physician      Rhythm: paced  Rate: 56  Axis: normal  Ectopy: none  Conduction: normal  ST Segments: no acute change  T Waves: no acute change  Q Waves: none    Clinical Impression: no acute changes    Korey Ziegler DO      RADIOLOGY:   I directly visualized the following  images and reviewed the radiologist interpretations:    XR CHEST PORTABLE    Result Date: 11/27/2022  EXAMINATION: ONE XRAY VIEW OF THE CHEST 11/27/2022 10:30 am COMPARISON: 11/14/2021 HISTORY: ORDERING SYSTEM PROVIDED HISTORY: Chest pain/SOB TECHNOLOGIST PROVIDED HISTORY: Chest pain/SOB Reason for Exam: chest pain/ shortness f breath/  AP erect/ port. FINDINGS: Cardiac pacemaker device is noted. Mild cardiomegaly is unchanged. No significant vascular congestion. No focal airspace consolidation, pneumothorax, or pleural effusion. No free air beneath the diaphragm. No evidence of acute process. LABS:  I have reviewed and interpreted all available lab results.   Labs Reviewed   CBC - Abnormal; Notable for the following components:       Result Value    RDW 15.9 (*)     All other components within normal limits   COMPREHENSIVE METABOLIC PANEL - Abnormal; Notable for the following components:    Glucose 111 (*)     BUN 22 (*)     Creatinine 1.14 (*)     Est, Glom Filt Rate 56 (*)     All other components within normal limits   TROPONIN - Abnormal; Notable for the following components:    Troponin, High Sensitivity 15 (*)     All other components within normal limits   TROPONIN - Abnormal; Notable for the following components:    Troponin, High Sensitivity 16 (*)     All other components within normal limits   BRAIN NATRIURETIC PEPTIDE - Abnormal; Notable for the following components:    Pro- (*)     All other components within normal limits   URINALYSIS WITH MICROSCOPIC - Abnormal; Notable for the following components:    Turbidity UA Cloudy (*)     Ketones, Urine TRACE (*)     Bacteria, UA MODERATE (*)     All other components within normal limits   TROPONIN - Abnormal; Notable for the following components:    Troponin, High Sensitivity 15 (*)     All other components within normal limits   COVID-19, RAPID   CULTURE, URINE       SCREENING TOOLS:    HEART Risk Score for Chest Pain Patients  History and Physical Exam Suspicion Level  (Nausea, Vomiting, Diaphoresis, Radiation, Exertion)  Slightly Suspicious (0 pts)  Moderately Suspicious (1 pt)  Highly Suspicious (2 pts)  EKG Interpretation  Normal (0 pts)  Non-Specific Repolarization Disturbance (1 pt)  Significant ST-Depression (2 pts)  Age of Patient (in years)  = 39 (0 pts)  46-64 (1 pt)  = 65 (2 pts)  Risk Factors  No Risk Factors (0 pts)  1-2 Risk Factors (1 pt)  = 3 Risk Factors (2 pts)  Risk Factors Include:  Hypercholesterolemia  Hypertension  Diabetes Mellitus  Cigarette smoking  Positive family history  Obesity  CAD  (SLE, CKDz, HIV, Cocaine abuse)  Troponin Levels  = Normal Limit (0 pts)  1-3 Times Normal Limit (1 pt)  > 3 Times Normal Limit (2 pts)  TOTAL:    Percent Risk for Major Adverse Cardiac Event (MACE)  0-3 pts indicates low risk for MACE   2.5% (DISCHARGE)   4-7 pts indicates moderate risk for MACE  20.3% (OBS)  8-10 pts indicates high risk for MACE  72.7% (EARLY INVASIVE [place])    CDU IMPRESSION / Angela Strong is a 62 y.o. female who presents with chest pain, shortness of breath, breast mass     Chest pain  Plan for consultation to cardiology, appreciate recommendations  Patient reports not having active pain in her chest, just having chest wall pain/tenderness  Patient has AICD in place, will interrogate pacemaker    2. Chest wall pain/tenderness and breast mass  Patient has pain/discomfort in the chest wall with palpation, no palpable area of fluctuance noted on exam however the is notable tenderness. Plan for breast ultrasound to rule out abscess and assess for mass.      3. Shortness of breath   Patient reports these symptoms have resolved at the time of my assessment. Plan for cardiology evaluation for chest pain and may assess for shortness of breath as well. Continue home medications and pain control  Monitor vitals, labs, and imaging  DISPO: pending consults and clinical improvement    CONSULTS:    IP CONSULT TO CARDIOLOGY    PROCEDURES:  Not indicated       PATIENT REFERRED TO:    No follow-up provider specified. --  Cookie López DO   Emergency Medicine Resident     This dictation was generated by voice recognition computer software. Although all attempts are made to edit the dictation for accuracy, there may be errors in the transcription that are not intended.

## 2022-11-29 ENCOUNTER — CARE COORDINATION (OUTPATIENT)
Dept: FAMILY MEDICINE CLINIC | Age: 57
End: 2022-11-29

## 2022-11-29 NOTE — CARE COORDINATION
Care Transitions Initial Follow Up Call     Call within 2 business days of discharge: Yes     Patient: Taylor Mata Patient : 1965 MRN: Isis Wasserman Discharge [address]       Date Complaint Diagnosis Description Type Department Provider    22 Breast Mass; Chest Pain; Shoulder Pain; Shortness of Breath Chest pain, unspecified type . .. ED to Hosp-Admission (Discharged) (ADMITTED) [address] Amanuel Hoyt MD; Sheryl Montalvo .. RARS: No data recorded     Spoke with: Chasity Daley    Discharge department/facility: [address]      Non-face-to-face services provided:  Obtained and reviewed discharge summary and/or continuity of care documents      Spoke with Chasity [place] to follow up about her post hospital follow up care . She denies chest pain. Reports no increase in shortness of breath. Medication schedule reviewed with patient over the phone   She reports taking all medications and has adherence packages in the home. She verbalizes understanding of when to call the doctor for chest pain, signs and symptoms of CHF and voices ability to call her physician for follow up care.      Current Outpatient Medications   Medication Instructions    albuterol sulfate HFA (VENTOLIN HFA) 108 (90 Base) MCG/ACT inhaler INHALE 2 PUFFS INTO THE LUNGS EVERY 6 HOURS AS NEEDED FOR WHEEZING    amiodarone (CORDARONE) 200 MG tablet TAKE 1 TABLET BY MOUTH ONE TIME A DAY    amitriptyline (ELAVIL) 25 MG tablet TAKE 1 TABLET BY MOUTH EVERY NIGHT AT BEDTIME    atorvastatin (LIPITOR) 40 mg, Oral, NIGHTLY    bumetanide (BUMEX) 1 MG tablet TAKE 1 TABLET BY MOUTH 2 TIMES A DAY    colchicine (COLCRYS) 0.6 mg, Oral, DAILY, Take 1 tablet day 1<BR>Then 1 tablet every 3 days    fluticasone (FLONASE) 50 MCG/ACT nasal spray 2 sprays, Each Nostril, DAILY    ibuprofen (ADVIL;MOTRIN) 800 mg, Oral, EVERY 8 HOURS PRN    ipratropium-albuterol (DUONEB) 0.5-2.5 (3) MG/3ML SOLN nebulizer solution 3 mLs, Inhalation, 2 TIMES DAILY PRN    L-Methylfolate-B6-B12 (FOLBIC RF) 1.13-25-2 MG TABS TAKE 1 TABLET BY MOUTH ONE TIME A DAY    magnesium oxide (MAG-OX) 400 (240 Mg) MG tablet TAKE ONE TABLET BY MOUTH ONE TIME A DAY    metoprolol succinate (TOPROL XL) 50 MG extended release tablet TAKE ONE TABLET BY MOUTH EVERY MORNING    Multiple Vitamin (MULTIVITAMIN) tablet TAKE ONE TABLET BY MOUTH ONE TIME A DAY    Nebulizers (COMPRESSOR/NEBULIZER) MISC 1 Device, Does not apply, 4 TIMES DAILY    omeprazole (PRILOSEC) 20 MG delayed release capsule TAKE 1 CAPSULE BY MOUTH ONE TIME A DAY    oxyCODONE (ROXICODONE) 5 mg, Oral, EVERY 6 HOURS PRN    sacubitril-valsartan (ENTRESTO) 24-26 MG per tablet TAKE 1 TABLET BY MOUTH 2 TIMES A DAY    Spacer/Aero-Holding Chambers (E-Z SPACER) ELVER 1 Device, Does not apply, DAILY    spironolactone (ALDACTONE) 25 MG tablet TAKE 1 TABLET BY MOUTH ONE TIME A DAY        Action:  - Nany Carvajal has transportation arranged for PCP follow up on 12/1/2022    Plan:  - Navigate at PCP appt on 12/1/2022 and continue coordination of care with OP providers and home vs every 3-4 weeks for evaluation of self management of CHF.      Follow Up  Future Appointments   Date Time Provider Yanelis lAfaro   12/1/2022 11:00 AM Tommy Larsen MD [facility]   12/12/2022 10:30 AM [facility]  [address] [facility] [address] Manuel Newman, RN , BSN  [employer]

## 2022-12-01 ENCOUNTER — OFFICE VISIT (OUTPATIENT)
Dept: FAMILY MEDICINE CLINIC | Age: 57
End: 2022-12-01
Payer: MEDICAID

## 2022-12-01 VITALS
WEIGHT: 141 LBS | HEART RATE: 88 BPM | SYSTOLIC BLOOD PRESSURE: 135 MMHG | DIASTOLIC BLOOD PRESSURE: 87 MMHG | BODY MASS INDEX: 25.79 KG/M2

## 2022-12-01 DIAGNOSIS — Z12.11 SCREENING FOR COLORECTAL CANCER: ICD-10-CM

## 2022-12-01 DIAGNOSIS — J44.9 CHRONIC OBSTRUCTIVE PULMONARY DISEASE, UNSPECIFIED COPD TYPE (HCC): ICD-10-CM

## 2022-12-01 DIAGNOSIS — Z12.31 ENCOUNTER FOR SCREENING MAMMOGRAM FOR BREAST CANCER: ICD-10-CM

## 2022-12-01 DIAGNOSIS — I25.10 CORONARY ARTERY DISEASE INVOLVING NATIVE HEART WITHOUT ANGINA PECTORIS, UNSPECIFIED VESSEL OR LESION TYPE: Primary | ICD-10-CM

## 2022-12-01 DIAGNOSIS — Z12.12 SCREENING FOR COLORECTAL CANCER: ICD-10-CM

## 2022-12-01 DIAGNOSIS — R94.31 QT PROLONGATION: ICD-10-CM

## 2022-12-01 DIAGNOSIS — Z13.220 SCREENING FOR HYPERLIPIDEMIA: ICD-10-CM

## 2022-12-01 DIAGNOSIS — I50.22 CHRONIC SYSTOLIC HEART FAILURE (HCC): ICD-10-CM

## 2022-12-01 DIAGNOSIS — I50.42 CHRONIC COMBINED SYSTOLIC AND DIASTOLIC CONGESTIVE HEART FAILURE (HCC): ICD-10-CM

## 2022-12-01 DIAGNOSIS — M62.838 NECK MUSCLE SPASM: ICD-10-CM

## 2022-12-01 PROCEDURE — 3023F SPIROM DOC REV: CPT | Performed by: STUDENT IN AN ORGANIZED HEALTH CARE EDUCATION/TRAINING PROGRAM

## 2022-12-01 PROCEDURE — 3017F COLORECTAL CA SCREEN DOC REV: CPT | Performed by: STUDENT IN AN ORGANIZED HEALTH CARE EDUCATION/TRAINING PROGRAM

## 2022-12-01 PROCEDURE — G8427 DOCREV CUR MEDS BY ELIG CLIN: HCPCS | Performed by: STUDENT IN AN ORGANIZED HEALTH CARE EDUCATION/TRAINING PROGRAM

## 2022-12-01 PROCEDURE — G8484 FLU IMMUNIZE NO ADMIN: HCPCS | Performed by: STUDENT IN AN ORGANIZED HEALTH CARE EDUCATION/TRAINING PROGRAM

## 2022-12-01 PROCEDURE — 99211 OFF/OP EST MAY X REQ PHY/QHP: CPT | Performed by: STUDENT IN AN ORGANIZED HEALTH CARE EDUCATION/TRAINING PROGRAM

## 2022-12-01 PROCEDURE — 3078F DIAST BP <80 MM HG: CPT | Performed by: STUDENT IN AN ORGANIZED HEALTH CARE EDUCATION/TRAINING PROGRAM

## 2022-12-01 PROCEDURE — 3074F SYST BP LT 130 MM HG: CPT | Performed by: STUDENT IN AN ORGANIZED HEALTH CARE EDUCATION/TRAINING PROGRAM

## 2022-12-01 PROCEDURE — 1036F TOBACCO NON-USER: CPT | Performed by: STUDENT IN AN ORGANIZED HEALTH CARE EDUCATION/TRAINING PROGRAM

## 2022-12-01 PROCEDURE — G8417 CALC BMI ABV UP PARAM F/U: HCPCS | Performed by: STUDENT IN AN ORGANIZED HEALTH CARE EDUCATION/TRAINING PROGRAM

## 2022-12-01 PROCEDURE — 99213 OFFICE O/P EST LOW 20 MIN: CPT | Performed by: STUDENT IN AN ORGANIZED HEALTH CARE EDUCATION/TRAINING PROGRAM

## 2022-12-01 RX ORDER — LIDOCAINE 4 G/G
1 PATCH TOPICAL DAILY
Qty: 30 PATCH | Refills: 0 | Status: SHIPPED | OUTPATIENT
Start: 2022-12-01 | End: 2022-12-31

## 2022-12-01 RX ORDER — ALBUTEROL SULFATE 90 UG/1
AEROSOL, METERED RESPIRATORY (INHALATION)
Qty: 18 G | Refills: 3 | Status: SHIPPED | OUTPATIENT
Start: 2022-12-01

## 2022-12-01 RX ORDER — ASPIRIN 81 MG/1
81 TABLET ORAL DAILY
Qty: 90 TABLET | Refills: 1 | Status: SHIPPED | OUTPATIENT
Start: 2022-12-01

## 2022-12-01 ASSESSMENT — ENCOUNTER SYMPTOMS
WHEEZING: 0
CONSTIPATION: 0
BLOOD IN STOOL: 0
VOMITING: 0
DIARRHEA: 0
ABDOMINAL PAIN: 0
SHORTNESS OF BREATH: 0

## 2022-12-01 NOTE — PROGRESS NOTES
Visit Information    Have you changed or started any medications since your last visit including any over-the-counter medicines, vitamins, or herbal medicines? no   Have you stopped taking any of your medications? Is so, why? -  no  Are you having any side effects from any of your medications? - no    Have you seen any other physician or provider since your last visit?  no   Have you had any other diagnostic tests since your last visit?  no   Have you been seen in the emergency room and/or had an admission in a hospital since we last saw you?  no   Have you had your routine dental cleaning in the past 6 months?  no     Do you have an active MyChart account? If no, what is the barrier?   Yes    Patient Care Team:  Maritza Chow MD (Inactive) as PCP - General (Family Medicine)  Russ King RN as   Rose Lloyd MD as Consulting Physician (Pulmonary Disease)    Medical History Review  Past Medical, Family, and Social History reviewed and does not contribute to the patient presenting condition    Health Maintenance   Topic Date Due    COVID-19 Vaccine (1) Never done    Shingles vaccine (1 of 2) Never done    Breast cancer screen  02/03/2019    Lipids  11/01/2020    Flu vaccine (1) 08/01/2022    Colorectal Cancer Screen  12/04/2022    A1C test (Diabetic or Prediabetic)  05/26/2023    Depression Screen  05/26/2023    Cervical cancer screen  07/09/2024    DTaP/Tdap/Td vaccine (2 - Td or Tdap) 06/09/2029    Pneumococcal 0-64 years Vaccine  Completed    Hepatitis C screen  Completed    HIV screen  Completed    Hepatitis A vaccine  Aged Out    Hib vaccine  Aged Out    Meningococcal (ACWY) vaccine  Aged Out

## 2022-12-01 NOTE — PROGRESS NOTES
Subjective:    Alen Arroyo is a 62 y.o. female with  has a past medical history of AICD discharge, RIP (acute kidney injury) ([facility] Asthma, Asthma with COPD with exacerbation ([facility] CAD (coronary artery disease), Cardiomegaly, Chest pain, CHF (congestive heart failure) ([facility] Chronic systolic heart failure ([facility] s/p AICD, COPD (chronic obstructive pulmonary disease) ([address] Depression, Fibroids, Hypertension, Hypomagnesemia, Intraparenchymal hematoma of left side of brain due to trauma, Lesion of right native kidney, Lung nodule < 6cm on CT, MI (myocardial infarction) ([address] Mild intermittent asthma, Non-ischemic cardiomyopathy ([facility] NSTEMI (non-ST elevated myocardial infarction) ([facility] QT prolongation, Syncope, and Unspecified diseases of blood and blood-forming organs. Presented to the office today for:  Chief Complaint   Patient presents with    Congestive Heart Failure     Follow up       HPI      Patient is here after hospital visit  Patient was admitted in the observation unit for chest pain    On review of her labs and EKG she has had some  Prolonged QT  Has a pacemaker  Patient is currently on on Amiodarone was started about a year ago by cardiology and although this medication can prolong QT I will have her discuss this over with cardiology to see if this can be discontinued  Patient does have an appointment coming up in February but she will be reaching out to them sooner than that  I discontinued amitriptyline and colchicine today due to risk of prolonged QT    Patient has had history of systolic congestive heart failure with an AICD in place  Currently on  Entresto  Aldactone  Metoprolol  Bumex  Lipitor  Patient not on aspirin and I will start that today    Prediabetes  Last Hba1c 5.7    We will order  Mammogram  Lipids  Colorectals creening with cologuard      Review of Systems   Constitutional:  Negative for activity change. Respiratory:  Negative for shortness of breath and wheezing. Cardiovascular:  Negative for chest pain. Gastrointestinal:  Negative for abdominal pain, blood in stool, constipation, diarrhea and vomiting. Neurological:  Negative for dizziness and weakness. The patient has a   Family History   Problem Relation Age of Onset    Diabetes Mother     High Blood Pressure Mother     Heart Disease Mother     Diabetes Father     Heart Disease Brother        Objective:    /87 (Site: Right Upper Arm, Position: Sitting, Cuff Size: Medium Adult)   Pulse 88   Wt 141 lb (64 kg)   LMP 04/07/2016   BMI 25.79 kg/m²    BP Readings from Last 3 Encounters:   12/01/22 135/87   11/28/22 117/76   08/24/22 108/68       Physical Exam  Constitutional:       General: She is not in acute distress. Appearance: She is not ill-appearing, toxic-appearing or diaphoretic. Cardiovascular:      Rate and Rhythm: Normal rate and regular rhythm. Pulses: Normal pulses. Heart sounds: Normal heart sounds. No murmur heard. Pulmonary:      Effort: Pulmonary effort is normal.      Breath sounds: Normal breath sounds. No wheezing. Musculoskeletal:         General: No swelling. Right lower leg: No edema. Left lower leg: No edema. Skin:     General: Skin is warm. Neurological:      Mental Status: She is alert and oriented to person, place, and time. Motor: No weakness.    Psychiatric:         Mood and Affect: Mood normal.       Lab Results   Component Value Date    WBC 4.7 11/27/2022    HGB 12.6 11/27/2022    HCT 38.2 11/27/2022     11/27/2022    CHOL 209 (H) 11/01/2019    TRIG 95 11/01/2019    HDL 59 11/01/2019    ALT 16 11/27/2022    AST 22 11/27/2022     11/27/2022    K 3.9 11/27/2022     11/27/2022    CREATININE 1.14 (H) 11/27/2022    BUN 22 (H) 11/27/2022    CO2 24 11/27/2022    TSH 0.60 04/04/2022    INR 1.0 02/26/2020    LABA1C 5.7 05/26/2022     Lab Results   Component Value Date    CALCIUM 9.5 11/27/2022    PHOS 3.4 11/08/2017 Lab Results   Component Value Date    LDLCHOLESTEROL 131 (H) 11/01/2019       Assessment and Plan:    1. Encounter for screening mammogram for breast cancer  - ALYX Digital Screen Bilateral; Future    2. Screening for hyperlipidemia  - Lipid Panel; Future    3. Chronic systolic heart failure (HCC) s/p AICD  AICD in place  Currently on  Entresto  Aldactone  Metoprolol  Bumex  Lipitor  Patient's AICD was interrogated recently in the hospital and 2 of the leads need fixing    4. QT prolongation  Discontinued amitriptyline and colchicine today  Health discussion about discontinuing amiodarone but we will have cardiology take care of that    5. Coronary artery disease involving native heart without angina pectoris, unspecified vessel or lesion type  - aspirin EC 81 MG EC tablet; Take 1 tablet by mouth daily  Dispense: 90 tablet; Refill: 1    7. Chronic obstructive pulmonary disease, unspecified COPD type (HCC)  - albuterol sulfate HFA (VENTOLIN HFA) 108 (90 Base) MCG/ACT inhaler; INHALE 2 PUFFS INTO THE LUNGS EVERY 6 HOURS AS NEEDED FOR WHEEZING  Dispense: 18 g; Refill: 3    8. Neck muscle spasm  - lidocaine 4 % external patch; Place 1 patch onto the skin daily  Dispense: 30 patch; Refill: 0    9.  Screening for colorectal cancer  - FIT-DNA ([place])          Requested Prescriptions     Signed Prescriptions Disp Refills    aspirin EC 81 MG EC tablet 90 tablet 1     Sig: Take 1 tablet by mouth daily    albuterol sulfate HFA (VENTOLIN HFA) 108 (90 Base) MCG/ACT inhaler 18 g 3     Sig: INHALE 2 PUFFS INTO THE LUNGS EVERY 6 HOURS AS NEEDED FOR WHEEZING    lidocaine 4 % external patch 30 patch 0     Sig: Place 1 patch onto the skin daily       Medications Discontinued During This Encounter   Medication Reason    amitriptyline (ELAVIL) 25 MG tablet Side effects    colchicine (COLCRYS) 0.6 MG tablet Side effects    albuterol sulfate HFA (VENTOLIN HFA) 108 (90 Base) MCG/ACT inhaler REORDER       Vahid Kaushal received counseling on the following healthy behaviors: nutrition, exercise and medication adherence    Discussed use,benefit, and side effects of prescribed medications. Barriers to medication compliance addressed. All patient questions answered. Pt voiced understanding. Return in about 3 months (around 3/1/2023) for prediabetes. Disclaimer: Some orall of this note was transcribed using voice-recognition software. This may cause typographical errors occasionally. Although all effort is made to fix these errors, please do not hesitate to contact our office if there [place] concern with the understanding of this note.

## 2022-12-02 LAB
EKG ATRIAL RATE: 56 BPM
EKG ATRIAL RATE: 66 BPM
EKG P AXIS: 47 DEGREES
EKG P AXIS: 63 DEGREES
EKG P-R INTERVAL: 180 MS
EKG P-R INTERVAL: 180 MS
EKG Q-T INTERVAL: 514 MS
EKG Q-T INTERVAL: 572 MS
EKG QRS DURATION: 138 MS
EKG QRS DURATION: 144 MS
EKG QTC CALCULATION (BAZETT): 538 MS
EKG QTC CALCULATION (BAZETT): 551 MS
EKG R AXIS: 38 DEGREES
EKG R AXIS: 59 DEGREES
EKG T AXIS: -23 DEGREES
EKG T AXIS: 18 DEGREES
EKG VENTRICULAR RATE: 56 BPM
EKG VENTRICULAR RATE: 66 BPM

## 2022-12-12 ENCOUNTER — HOSPITAL ENCOUNTER (OUTPATIENT)
Dept: OTHER | Age: 57
Discharge: HOME | End: 2022-12-12
Payer: MEDICAID

## 2022-12-12 VITALS
HEART RATE: 143 BPM | OXYGEN SATURATION: 97 % | RESPIRATION RATE: 16 BRPM | WEIGHT: 143.2 LBS | BODY MASS INDEX: 26.19 KG/M2

## 2022-12-12 PROCEDURE — 99212 OFFICE O/P EST SF 10 MIN: CPT

## 2022-12-12 RX ORDER — AMITRIPTYLINE HYDROCHLORIDE 25 MG/1
25 TABLET, FILM COATED ORAL NIGHTLY
COMMUNITY

## 2022-12-12 NOTE — PROGRESS NOTES
Date:  2022  Time:  9:50 AM    CHF Clinic at [facility]    Office: [phone] Fax: [phone]    Re:  Fiordaliza Guo   Patient : 1965    Vital Signs: Pulse (!) 143   Resp 16   Wt 143 lb 3.2 oz (65 kg)   LMP 2016   SpO2 97%   BMI 26.19 kg/m²                                                   No results for input(s): CBC, HGB, HCT, WBC, PLATELET, NA, K, CL, CO2, BUN, CREATININE, GLUCOSE, BNP, INR in the last 72 hours. Respiratory:    Assessment  Charting Type: Reassessment    Breath Sounds  Right Upper Lobe: Clear  Right Middle Lobe: Clear  Right Lower Lobe: Clear  Left Upper Lobe: Clear  Left Lower Lobe: Clear              Peripheral Vascular  RLE Edema: None  LLE Edema: None      Complaints: No new complaints     Physician Orders No new orders     Comment : Weight is up 3.4 pounds from last visit denies any SOB or chest pain. Has no pedal edema. Discussed in detail low sodium diet 2000mg per day and 64 ounces of fluid per day. We will see in 1 month 2022.     Electronically signed by Robert Bustamante RN on 2022 at 9:50 AM

## 2022-12-13 DIAGNOSIS — I50.22 CHRONIC SYSTOLIC HEART FAILURE (HCC): ICD-10-CM

## 2022-12-13 RX ORDER — METOPROLOL SUCCINATE 50 MG/1
TABLET, EXTENDED RELEASE ORAL
Qty: 90 TABLET | Refills: 1 | Status: SHIPPED | OUTPATIENT
Start: 2022-12-13

## 2022-12-13 RX ORDER — LANOLIN ALCOHOL/MO/W.PET/CERES
CREAM (GRAM) TOPICAL
Qty: 30 TABLET | Refills: 0 | Status: SHIPPED | OUTPATIENT
Start: 2022-12-13

## 2022-12-13 NOTE — TELEPHONE ENCOUNTER
Magnesium oxide and metoprolol pending for refill     Health Maintenance   Topic Date Due    COVID-19 Vaccine (1) Never done    Shingles vaccine (1 of 2) Never done    Breast cancer screen  02/03/2019    Lipids  11/01/2020    Flu vaccine (1) 08/01/2022    Colorectal Cancer Screen  12/04/2022    A1C test (Diabetic or Prediabetic)  05/26/2023    Depression Screen  05/26/2023    Cervical cancer screen  07/09/2024    DTaP/Tdap/Td vaccine (2 - Td or Tdap) 06/09/2029    Pneumococcal 0-64 years Vaccine  Completed    Hepatitis C screen  Completed    HIV screen  Completed    Hepatitis A vaccine  Aged Out    Hib vaccine  Aged Out    Meningococcal (ACWY) vaccine  Aged Out             (applicable per patient's age: Cancer Screenings, Depression Screening, Fall Risk Screening, Immunizations)    Hemoglobin A1C (%)   Date Value   05/26/2022 5.7   11/06/2019 5.7   07/16/2018 6.4 (H)     LDL Cholesterol (mg/dL)   Date Value   11/01/2019 131 (H)     AST (U/L)   Date Value   11/27/2022 22     ALT (U/L)   Date Value   11/27/2022 16     BUN (mg/dL)   Date Value   11/27/2022 22 (H)      (goal A1C is < 7)   (goal LDL is <100) need 30-50% reduction from baseline     BP Readings from Last 3 Encounters:   12/01/22 135/87   11/28/22 117/76   08/24/22 108/68    (goal /80)      All Future Testing planned in [facility]:  Lab Frequency Next Occurrence   XR KNEE RIGHT (1-2 VIEWS) Once 01/27/2023   COVID-19 Once 01/31/2022   COVID-19 Once 03/03/2022   ALYX Digital Screen Bilateral Once 12/01/2022   Lipid Panel Once 01/01/2023       Next Visit Date:  Future Appointments   Date Time Provider Yanelis Alfaro   1/9/2023 11:00 AM [facility]IN[facility] RM [facility]            Patient Active Problem List:     COPD (chronic obstructive pulmonary disease) ([address]     Fibroids     Syncope     Lung nodule < 6cm on CT     Chronic systolic heart failure (HCC) s/p AICD     Mild intermittent asthma     Essential hypertension     Chest pain     QT prolongation     Asthma with COPD with exacerbation (HCC)     Non-ischemic cardiomyopathy (HCC)     Lesion of right native kidney     NSTEMI (non-ST elevated myocardial infarction) ([address]     CAD (coronary artery disease)     Intraparenchymal hematoma of left side of brain due to trauma     Chronic combined systolic and diastolic congestive heart failure (HCC)     AICD discharge     RIP (acute kidney injury) ([address]     Hypomagnesemia     Other heart failure ([address]     Atypical chest pain

## 2023-01-03 ENCOUNTER — TELEPHONE (OUTPATIENT)
Dept: FAMILY MEDICINE CLINIC | Age: 58
End: 2023-01-03

## 2023-01-03 DIAGNOSIS — I50.22 CHRONIC SYSTOLIC HEART FAILURE (HCC): ICD-10-CM

## 2023-01-04 RX ORDER — AMIODARONE HYDROCHLORIDE 200 MG/1
TABLET ORAL
Qty: 30 TABLET | Refills: 2 | Status: SHIPPED | OUTPATIENT
Start: 2023-01-04

## 2023-01-04 RX ORDER — LANOLIN ALCOHOL/MO/W.PET/CERES
CREAM (GRAM) TOPICAL
Qty: 30 TABLET | Refills: 0 | Status: SHIPPED | OUTPATIENT
Start: 2023-01-04

## 2023-01-04 NOTE — TELEPHONE ENCOUNTER
Last visit: 12/01/22  Last Med refill: 10/10/22  Does patient have enough medication for 72 hours: No:     Next Visit Date:  Future Appointments   Date Time Provider Yanelis Alfaro   1/9/2023 11:00 AM [facility]IN[facility]   Topic Date Due    COVID-19 Vaccine (1) Never done    Shingles vaccine (1 of 2) Never done    Breast cancer screen  02/03/2019    Lipids  11/01/2020    Flu vaccine (1) 08/01/2022    A1C test (Diabetic or Prediabetic)  05/26/2023    Depression Screen  05/26/2023    GFR test (Diabetes, CKD 3-4, OR last GFR 15-59)  11/27/2023    Cervical cancer screen  07/09/2024    Colorectal Cancer Screen  12/14/2025    DTaP/Tdap/Td vaccine (2 - Td or Tdap) 06/09/2029    Pneumococcal 0-64 years Vaccine  Completed    Hepatitis C screen  Completed    HIV screen  Completed    Hepatitis A vaccine  Aged Out    Hib vaccine  Aged Out    Meningococcal (ACWY) vaccine  Aged Out       Hemoglobin A1C (%)   Date Value   05/26/2022 5.7   11/06/2019 5.7   07/16/2018 6.4 (H)             ( goal A1C is < 7)   No results found for: LABMICR  LDL Cholesterol (mg/dL)   Date Value   11/01/2019 131 (H)   11/25/2018 128       (goal LDL is <100)   AST (U/L)   Date Value   11/27/2022 22     ALT (U/L)   Date Value   11/27/2022 16     BUN (mg/dL)   Date Value   11/27/2022 22 (H)     BP Readings from Last 3 Encounters:   12/01/22 135/87   11/28/22 117/76   08/24/22 108/68          (goal 120/80)    All Future Testing planned in [facility]  Lab Frequency Next Occurrence   XR KNEE RIGHT (1-2 VIEWS) Once 01/27/2023   COVID-19 Once 01/31/2022   COVID-19 Once 03/03/2022   ALYX Digital Screen Bilateral Once 12/01/2022   Lipid Panel Once 01/01/2023               Patient Active Problem List:     COPD (chronic obstructive pulmonary disease) ([facility]     Fibroids     Syncope     Lung nodule < 6cm on CT     Chronic systolic heart failure ([facility] s/p AICD     Mild intermittent asthma     Essential hypertension Chest pain     QT prolongation     Asthma with COPD with exacerbation (HCC)     Non-ischemic cardiomyopathy (HCC)     Lesion of right native kidney     NSTEMI (non-ST elevated myocardial infarction) ([address]     CAD (coronary artery disease)     Intraparenchymal hematoma of left side of brain due to trauma     Chronic combined systolic and diastolic congestive heart failure (HCC)     AICD discharge     RIP (acute kidney injury) ([address]     Hypomagnesemia     Other heart failure ([address]     Atypical chest pain

## 2023-01-09 ENCOUNTER — HOSPITAL ENCOUNTER (OUTPATIENT)
Dept: OTHER | Age: 58
Discharge: HOME | End: 2023-01-09
Payer: MEDICAID

## 2023-01-09 VITALS
BODY MASS INDEX: 26.05 KG/M2 | HEART RATE: 60 BPM | OXYGEN SATURATION: 100 % | WEIGHT: 142.4 LBS | RESPIRATION RATE: 20 BRPM | SYSTOLIC BLOOD PRESSURE: 120 MMHG | DIASTOLIC BLOOD PRESSURE: 72 MMHG

## 2023-01-09 PROCEDURE — 99212 OFFICE O/P EST SF 10 MIN: CPT

## 2023-01-10 ENCOUNTER — TELEPHONE (OUTPATIENT)
Dept: FAMILY MEDICINE CLINIC | Age: 58
End: 2023-01-10

## 2023-01-11 ENCOUNTER — TELEPHONE (OUTPATIENT)
Dept: FAMILY MEDICINE CLINIC | Age: 58
End: 2023-01-11

## 2023-01-11 NOTE — TELEPHONE ENCOUNTER
Phone call from patient. Patient stated that she was feeling well but was concerned about the results from her Cologuard test.  Patient stated that she received a letter stating her results are ready to she as unsure how to retrieve. Encouraged patient to contact her PCPs office. Patient stated that she is still concerned about a painful lump near her armpit and is requesting assistance with scheduling a mammogram.  Will follow up with supervisor for guidance. Informed patient about upcoming appointments. No additional concerns at this time.

## 2023-01-11 NOTE — TELEPHONE ENCOUNTER
Received phone call from patient. Patient stated that her chf clinic appointment went well. Patient stated [facility] and has an [facility] scheduled for today at 4pm.  Encouraged patient to keep an open mind as she is wishes to live in a house or duplex.

## 2023-01-25 NOTE — TELEPHONE ENCOUNTER
Phone call from patient. Patient stated that she took a tour of the [facility] [employer] apartments but decided to decline their offer. Patient stated that she did not like the neighborhood and would prefer a different environment. Reminded patient that she is still on a few housing waiting lists. Patient has no other concerns at the moment.

## 2023-02-06 ENCOUNTER — TELEPHONE (OUTPATIENT)
Dept: OTHER | Age: 58
End: 2023-02-06

## 2023-02-06 DIAGNOSIS — N64.4 BREAST PAIN: Primary | ICD-10-CM

## 2023-02-06 NOTE — PROGRESS NOTES
Patient no show for appt today. Has had numerous no shows in the past. Phoned patient, no answer and unable to leave message.

## 2023-02-07 RX ORDER — LANOLIN ALCOHOL/MO/W.PET/CERES
CREAM (GRAM) TOPICAL
Qty: 30 TABLET | Refills: 0 | Status: SHIPPED | OUTPATIENT
Start: 2023-02-07

## 2023-02-07 NOTE — TELEPHONE ENCOUNTER
Magnesium oxide pending refill      Health Maintenance   Topic Date Due    COVID-19 Vaccine (1) Never done    Shingles vaccine (1 of 2) Never done    Breast cancer screen  02/03/2019    Lipids  11/01/2020    Flu vaccine (1) 08/01/2022    A1C test (Diabetic or Prediabetic)  05/26/2023    Depression Screen  05/26/2023    GFR test (Diabetes, CKD 3-4, OR last GFR 15-59)  11/27/2023    Cervical cancer screen  07/09/2024    Colorectal Cancer Screen  12/14/2025    DTaP/Tdap/Td vaccine (2 - Td or Tdap) 06/09/2029    Pneumococcal 0-64 years Vaccine  Completed    Hepatitis C screen  Completed    HIV screen  Completed    Hepatitis A vaccine  Aged Out    Hib vaccine  Aged Out    Meningococcal (ACWY) vaccine  Aged Out             (applicable per patient's age: Cancer Screenings, Depression Screening, Fall Risk Screening, Immunizations)    Hemoglobin A1C (%)   Date Value   05/26/2022 5.7   11/06/2019 5.7   07/16/2018 6.4 (H)     LDL Cholesterol (mg/dL)   Date Value   11/01/2019 131 (H)     AST (U/L)   Date Value   11/27/2022 22     ALT (U/L)   Date Value   11/27/2022 16     BUN (mg/dL)   Date Value   11/27/2022 22 (H)      (goal A1C is < 7)   (goal LDL is <100) need 30-50% reduction from baseline     BP Readings from Last 3 Encounters:   01/09/23 120/72   12/01/22 135/87   11/28/22 117/76    (goal /80)      All Future Testing planned in [facility]:  Lab Frequency Next Occurrence   COVID-19 Once 03/03/2022   ALYX Digital Screen Bilateral Once 12/01/2022   Lipid Panel Once 01/01/2023   ALYX DIGITAL DIAGNOSTIC W OR WO CAD BILATERAL Once 02/06/2023       Next Visit Date:  No future appointments.          Patient Active Problem List:     COPD (chronic obstructive pulmonary disease) ([facility]     Fibroids     Syncope     Lung nodule < 6cm on CT     Chronic systolic heart failure (HCC) s/p AICD     Mild intermittent asthma     Essential hypertension     Chest pain     QT prolongation     Asthma with COPD with exacerbation ([address] Non-ischemic cardiomyopathy (HCC)     Lesion of right native kidney     NSTEMI (non-ST elevated myocardial infarction) ([address]     CAD (coronary artery disease)     Intraparenchymal hematoma of left side of brain due to trauma     Chronic combined systolic and diastolic congestive heart failure (HCC)     AICD discharge     RIP (acute kidney injury) ([facility]     Hypomagnesemia     Other heart failure ([facility]     Atypical chest pain

## 2023-02-20 ENCOUNTER — HOSPITAL ENCOUNTER (OUTPATIENT)
Dept: WOMENS IMAGING | Age: 58
Discharge: HOME | End: 2023-02-22
Payer: MEDICAID

## 2023-02-20 DIAGNOSIS — N64.4 BREAST PAIN: ICD-10-CM

## 2023-02-20 DIAGNOSIS — N64.4 PAIN IN BREAST: ICD-10-CM

## 2023-02-20 PROCEDURE — 76642 ULTRASOUND BREAST LIMITED: CPT

## 2023-02-20 PROCEDURE — 77066 DX MAMMO INCL CAD BI: CPT

## 2023-03-06 DIAGNOSIS — Z13.220 LIPID SCREENING: ICD-10-CM

## 2023-03-06 DIAGNOSIS — J44.9 CHRONIC OBSTRUCTIVE PULMONARY DISEASE, UNSPECIFIED COPD TYPE (HCC): ICD-10-CM

## 2023-03-06 DIAGNOSIS — I50.22 CHRONIC SYSTOLIC HEART FAILURE (HCC): ICD-10-CM

## 2023-03-06 RX ORDER — ATORVASTATIN CALCIUM 40 MG/1
TABLET, FILM COATED ORAL
Qty: 30 TABLET | Refills: 11 | OUTPATIENT
Start: 2023-03-06

## 2023-03-07 RX ORDER — OMEPRAZOLE 20 MG/1
CAPSULE, DELAYED RELEASE ORAL
Qty: 30 CAPSULE | Refills: 3 | OUTPATIENT
Start: 2023-03-07

## 2023-03-07 NOTE — TELEPHONE ENCOUNTER
E-scribe request for med refill. Please review and e-scribe if applicable.      Last Visit Date:  12/01/2022  Next Visit Date:  3/6/2023    Hemoglobin A1C (%)   Date Value   05/26/2022 5.7   11/06/2019 5.7   07/16/2018 6.4 (H)             ( goal A1C is < 7)   No results found for: LABMICR  LDL Cholesterol (mg/dL)   Date Value   11/01/2019 131 (H)       (goal LDL is <100)   AST (U/L)   Date Value   11/27/2022 22     ALT (U/L)   Date Value   11/27/2022 16     BUN (mg/dL)   Date Value   11/27/2022 22 (H)     BP Readings from Last 3 Encounters:   01/09/23 120/72   12/01/22 135/87   11/28/22 117/76          (goal 120/80)        Patient Active Problem List:     COPD (chronic obstructive pulmonary disease) ([facility])     Fibroids     Syncope     Lung nodule < 6cm on CT     Chronic systolic heart failure (HCC) s/p AICD     Mild intermittent asthma     Essential hypertension     Chest pain     QT prolongation     Asthma with COPD with exacerbation ([facility])     Non-ischemic cardiomyopathy ([address]     Lesion of right native kidney     NSTEMI (non-ST elevated myocardial infarction) ([address]     CAD (coronary artery disease)     Intraparenchymal hematoma of left side of brain due to trauma     Chronic combined systolic and diastolic congestive heart failure (HCC)     AICD discharge     RIP (acute kidney injury) ([address]     Hypomagnesemia     Other heart failure ([address]     Atypical chest pain      ----Mikie Harley

## 2023-03-09 RX ORDER — SACUBITRIL AND VALSARTAN 24; 26 MG/1; MG/1
TABLET, FILM COATED ORAL
Qty: 60 TABLET | Refills: 5 | Status: SHIPPED | OUTPATIENT
Start: 2023-03-09

## 2023-03-09 RX ORDER — ATORVASTATIN CALCIUM 40 MG/1
40 TABLET, FILM COATED ORAL NIGHTLY
Qty: 30 TABLET | Refills: 3 | Status: SHIPPED | OUTPATIENT
Start: 2023-03-09

## 2023-03-09 RX ORDER — LANOLIN ALCOHOL/MO/W.PET/CERES
CREAM (GRAM) TOPICAL
Qty: 30 TABLET | Refills: 0 | Status: SHIPPED | OUTPATIENT
Start: 2023-03-09

## 2023-03-09 RX ORDER — ALBUTEROL SULFATE 90 UG/1
AEROSOL, METERED RESPIRATORY (INHALATION)
Qty: 18 G | Refills: 3 | Status: SHIPPED | OUTPATIENT
Start: 2023-03-09

## 2023-03-09 RX ORDER — OMEPRAZOLE 20 MG/1
CAPSULE, DELAYED RELEASE ORAL
Qty: 30 CAPSULE | Refills: 0 | Status: SHIPPED | OUTPATIENT
Start: 2023-03-09

## 2023-04-05 DIAGNOSIS — I50.22 CHRONIC SYSTOLIC HEART FAILURE (HCC): ICD-10-CM

## 2023-04-05 NOTE — TELEPHONE ENCOUNTER
E-scribe request for PENDED MEDICATIONS. Please review and e-scribe if applicable.      Last Visit Date:  12/1/2022  Next Visit Date:  Visit date not found    Hemoglobin A1C (%)   Date Value   05/26/2022 5.7   11/06/2019 5.7   07/16/2018 6.4 (H)             ( goal A1C is < 7)   No results found for: LABMICR  LDL Cholesterol (mg/dL)   Date Value   11/01/2019 131 (H)       (goal LDL is <100)   AST (U/L)   Date Value   11/27/2022 22     ALT (U/L)   Date Value   11/27/2022 16     BUN (mg/dL)   Date Value   11/27/2022 22 (H)     BP Readings from Last 3 Encounters:   01/09/23 120/72   12/01/22 135/87   11/28/22 117/76          (goal 120/80)        Patient Active Problem List:     COPD (chronic obstructive pulmonary disease) (HCC)     Fibroids     Syncope     Lung nodule < 6cm on CT     Chronic systolic heart failure (HCC) s/p AICD     Mild intermittent asthma     Essential hypertension     Chest pain     QT prolongation     Asthma with COPD with exacerbation (HCC)     Non-ischemic cardiomyopathy (HCC)     Lesion of right native kidney     NSTEMI (non-ST elevated myocardial infarction) ([address]     CAD (coronary artery disease)     Intraparenchymal hematoma of left side of brain due to trauma ([address]     Chronic combined systolic and diastolic congestive heart failure ([address]     AICD discharge     RIP (acute kidney injury) ([address]     Hypomagnesemia     Other heart failure ([address]     Atypical chest pain      ----JF

## 2023-04-06 RX ORDER — OMEPRAZOLE 20 MG/1
CAPSULE, DELAYED RELEASE ORAL
Qty: 30 CAPSULE | Refills: 0 | Status: SHIPPED | OUTPATIENT
Start: 2023-04-06

## 2023-04-06 RX ORDER — AMIODARONE HYDROCHLORIDE 200 MG/1
TABLET ORAL
Qty: 30 TABLET | Refills: 2 | Status: SHIPPED | OUTPATIENT
Start: 2023-04-06

## 2023-04-06 RX ORDER — LANOLIN ALCOHOL/MO/W.PET/CERES
CREAM (GRAM) TOPICAL
Qty: 30 TABLET | Refills: 0 | Status: SHIPPED | OUTPATIENT
Start: 2023-04-06

## 2023-05-02 DIAGNOSIS — I50.22 CHRONIC SYSTOLIC HEART FAILURE (HCC): ICD-10-CM

## 2023-05-03 NOTE — TELEPHONE ENCOUNTER
Last visit: 12/01/2022  Last Med refill: 04/06/2023  Does patient have enough medication for 72 hours: No:     Next Visit Date:  Future Appointments   Date Time Provider Yanelis Alfaro   8/3/2023  1:15 PM Pretty Jane MD [facility]       Health Maintenance   Topic Date Due    COVID-19 Vaccine (1) Never done    Shingles vaccine (1 of 2) Never done    Lipids  11/01/2020    A1C test (Diabetic or Prediabetic)  05/26/2023    Depression Screen  05/26/2023    Flu vaccine (Season Ended) 08/01/2023    GFR test (Diabetes, CKD 3-4, OR last GFR 15-59)  11/27/2023    Cervical cancer screen  07/09/2024    Breast cancer screen  02/20/2025    Colorectal Cancer Screen  12/14/2025    DTaP/Tdap/Td vaccine (2 - Td or Tdap) 06/09/2029    Pneumococcal 0-64 years Vaccine  Completed    Hepatitis C screen  Completed    HIV screen  Completed    Hepatitis A vaccine  Aged Out    Hib vaccine  Aged Out    Meningococcal (ACWY) vaccine  Aged Out       Hemoglobin A1C (%)   Date Value   05/26/2022 5.7   11/06/2019 5.7   07/16/2018 6.4 (H)             ( goal A1C is < 7)   No results found for: LABMICR  LDL Cholesterol (mg/dL)   Date Value   11/01/2019 131 (H)   11/25/2018 128       (goal LDL is <100)   AST (U/L)   Date Value   11/27/2022 22     ALT (U/L)   Date Value   11/27/2022 16     BUN (mg/dL)   Date Value   11/27/2022 22 (H)     BP Readings from Last 3 Encounters:   01/09/23 120/72   12/01/22 135/87   11/28/22 117/76          (goal 120/80)    All Future Testing planned in [facility]  Lab Frequency Next Occurrence   Lipid Panel Once 01/01/2023               Patient Active Problem List:     COPD (chronic obstructive pulmonary disease) ([facility]     Fibroids     Syncope     Lung nodule < 6cm on CT     Chronic systolic heart failure (HCC) s/p AICD     Mild intermittent asthma     Essential hypertension     Chest pain     QT prolongation     Asthma with COPD with exacerbation (HCC)     Non-ischemic cardiomyopathy ([facility]     Lesion of

## 2023-05-04 RX ORDER — BUMETANIDE 1 MG/1
TABLET ORAL
Qty: 60 TABLET | Refills: 5 | Status: SHIPPED | OUTPATIENT
Start: 2023-05-04

## 2023-05-04 RX ORDER — LANOLIN ALCOHOL/MO/W.PET/CERES
CREAM (GRAM) TOPICAL
Qty: 30 TABLET | Refills: 0 | Status: SHIPPED | OUTPATIENT
Start: 2023-05-04

## 2023-05-04 RX ORDER — SPIRONOLACTONE 25 MG/1
TABLET ORAL
Qty: 30 TABLET | Refills: 5 | Status: SHIPPED | OUTPATIENT
Start: 2023-05-04

## 2023-05-04 RX ORDER — OMEPRAZOLE 20 MG/1
CAPSULE, DELAYED RELEASE ORAL
Qty: 30 CAPSULE | Refills: 0 | Status: SHIPPED | OUTPATIENT
Start: 2023-05-04

## 2023-05-17 ENCOUNTER — CARE COORDINATION (OUTPATIENT)
Dept: FAMILY MEDICINE CLINIC | Age: 58
End: 2023-05-17

## 2023-06-21 ENCOUNTER — OFFICE VISIT (OUTPATIENT)
Dept: FAMILY MEDICINE CLINIC | Age: 58
End: 2023-06-21
Payer: MEDICAID

## 2023-06-21 VITALS
HEIGHT: 62 IN | SYSTOLIC BLOOD PRESSURE: 110 MMHG | HEART RATE: 53 BPM | BODY MASS INDEX: 25.51 KG/M2 | WEIGHT: 138.6 LBS | DIASTOLIC BLOOD PRESSURE: 77 MMHG

## 2023-06-21 DIAGNOSIS — Z13.220 SCREENING FOR HYPERLIPIDEMIA: ICD-10-CM

## 2023-06-21 DIAGNOSIS — R05.8 PRODUCTIVE COUGH: Primary | ICD-10-CM

## 2023-06-21 DIAGNOSIS — Z13.1 SCREENING FOR DIABETES MELLITUS: ICD-10-CM

## 2023-06-21 LAB — HBA1C MFR BLD: 5.9 %

## 2023-06-21 PROCEDURE — 1036F TOBACCO NON-USER: CPT

## 2023-06-21 PROCEDURE — 3074F SYST BP LT 130 MM HG: CPT

## 2023-06-21 PROCEDURE — 83037 HB GLYCOSYLATED A1C HOME DEV: CPT

## 2023-06-21 PROCEDURE — G8427 DOCREV CUR MEDS BY ELIG CLIN: HCPCS

## 2023-06-21 PROCEDURE — 3078F DIAST BP <80 MM HG: CPT

## 2023-06-21 PROCEDURE — G8417 CALC BMI ABV UP PARAM F/U: HCPCS

## 2023-06-21 PROCEDURE — 3017F COLORECTAL CA SCREEN DOC REV: CPT

## 2023-06-21 PROCEDURE — 99213 OFFICE O/P EST LOW 20 MIN: CPT

## 2023-06-21 RX ORDER — GUAIFENESIN 600 MG/1
600 TABLET, EXTENDED RELEASE ORAL 2 TIMES DAILY
Qty: 30 TABLET | Refills: 0 | Status: SHIPPED | OUTPATIENT
Start: 2023-06-21 | End: 2023-07-06

## 2023-06-21 ASSESSMENT — PATIENT HEALTH QUESTIONNAIRE - PHQ9
DEPRESSION UNABLE TO ASSESS: PT REFUSES
SUM OF ALL RESPONSES TO PHQ QUESTIONS 1-9: 0
1. LITTLE INTEREST OR PLEASURE IN DOING THINGS: 0
SUM OF ALL RESPONSES TO PHQ QUESTIONS 1-9: 0
SUM OF ALL RESPONSES TO PHQ QUESTIONS 1-9: 0
2. FEELING DOWN, DEPRESSED OR HOPELESS: 0
SUM OF ALL RESPONSES TO PHQ9 QUESTIONS 1 & 2: 0
SUM OF ALL RESPONSES TO PHQ QUESTIONS 1-9: 0

## 2023-06-21 ASSESSMENT — ENCOUNTER SYMPTOMS
SHORTNESS OF BREATH: 0
COUGH: 1
DIARRHEA: 0
CONSTIPATION: 0
ABDOMINAL PAIN: 0
VOMITING: 0
NAUSEA: 0

## 2023-06-21 NOTE — PROGRESS NOTES
Subjective:    Irina Auguste is a 62 y.o. female with  has a past medical history of AICD discharge, RIP (acute kidney injury) ([facility] Asthma, Asthma with COPD with exacerbation ([address] CAD (coronary artery disease), Cardiomegaly, Chest pain, CHF (congestive heart failure) ([address] Chronic systolic heart failure ([address] s/p AICD, COPD (chronic obstructive pulmonary disease) ([address] Depression, Fibroids, Hypertension, Hypomagnesemia, Intraparenchymal hematoma of left side of brain due to trauma [facility]), Lesion of right native kidney, Lung nodule < 6cm on CT, MI (myocardial infarction) ([facility] Mild intermittent asthma, Non-ischemic cardiomyopathy ([facility] NSTEMI (non-ST elevated myocardial infarction) ([facility] QT prolongation, Syncope, and Unspecified diseases of blood and blood-forming organs. Presented to the office today for:  Chief Complaint   Patient presents with    Sinusitis     Has some drainage in throat started in May 28, starting to get better but will not go away      Chest Pain     Its not chest pain, its pain under the left breast area        Sinusitis  Associated symptoms include coughing. Pertinent negatives include no chills, headaches or shortness of breath. Chest Pain   Associated symptoms include a cough. Pertinent negatives include no abdominal pain, dizziness, headaches, nausea, shortness of breath or vomiting. Patient is a 59-year-old female with history of heart failure with previous ejection fraction, COPD presented to the clinic today for follow-up on URI symptoms and persistent cough. Patient states end of May she had an episode of fever, myalgias, productive cough. Patient states multiple symptoms have resolved however she still has a persistent cough. Patient denies any chest pain, shortness of breath, any lower extremity edema. Cough is productive in nature.     Patient follows up with cardiology regarding her heart failure, she is medically optimized with Entresto, Bumex,

## 2023-06-21 NOTE — PROGRESS NOTES
Visit Information    Have you changed or started any medications since your last visit including any over-the-counter medicines, vitamins, or herbal medicines? no   Have you stopped taking any of your medications? Is so, why? -  no  Are you having any side effects from any of your medications? - no    Have you seen any other physician or provider since your last visit?  no   Have you had any other diagnostic tests since your last visit?  no   Have you been seen in the emergency room and/or had an admission in a hospital since we last saw you?  no   Have you had your routine dental cleaning in the past 6 months?  no     Do you have an active MyChart account? If no, what is the barrier?   Yes    Patient Care Team:  Drake Astudillo MD as PCP - General (Family Medicine)  Drake Astudillo MD as PCP - Empaneled Provider  Elisabeth Ayoub RN as   Endy Cali MD as Consulting Physician (Pulmonary Disease)    Medical History Review  Past Medical, Family, and Social History reviewed and does not contribute to the patient presenting condition    Health Maintenance   Topic Date Due    COVID-19 Vaccine (1) Never done    Shingles vaccine (1 of 2) Never done    Lipids  11/01/2020    A1C test (Diabetic or Prediabetic)  05/26/2023    Depression Screen  05/26/2023    Flu vaccine (Season Ended) 08/01/2023    GFR test (Diabetes, CKD 3-4, OR last GFR 15-59)  11/27/2023    Cervical cancer screen  07/09/2024    Breast cancer screen  02/20/2025    Colorectal Cancer Screen  12/14/2025    DTaP/Tdap/Td vaccine (2 - Td or Tdap) 06/09/2029    Pneumococcal 0-64 years Vaccine  Completed    Hepatitis C screen  Completed    HIV screen  Completed    Hepatitis A vaccine  Aged Out    Hib vaccine  Aged Out    Meningococcal (ACWY) vaccine  Aged Out

## 2023-06-21 NOTE — PROGRESS NOTES
Attending Physician Statement  I have discussed the care of Miller Soto, 62 y.o. female,including pertinent history and exam findings,  with the resident Dr. Trina Grigsby MD.  History:  Chief Complaint   Patient presents with    Sinusitis     Has some drainage in throat started in May 28, starting to get better but will not go away      Chest Pain     Its not chest pain, its pain under the left breast area      I have reviewed the key elements of the encounter with the resident. Examination was done by resident as documented in residents note. BP Readings from Last 3 Encounters:   06/21/23 110/77   01/09/23 120/72   12/01/22 135/87     /77 (Site: Right Upper Arm, Position: Sitting, Cuff Size: Medium Adult)   Pulse 53   Ht 5' 2.01\" (1.575 m)   Wt 138 lb 9.6 oz (62.9 kg)   LMP 04/07/2016   BMI 25.34 kg/m²   Lab Results   Component Value Date    WBC 4.7 11/27/2022    HGB 12.6 11/27/2022    HCT 38.2 11/27/2022     11/27/2022    CHOL 209 (H) 11/01/2019    TRIG 95 11/01/2019    HDL 59 11/01/2019    ALT 16 11/27/2022    AST 22 11/27/2022     11/27/2022    K 3.9 11/27/2022     11/27/2022    CREATININE 1.14 (H) 11/27/2022    BUN 22 (H) 11/27/2022    CO2 24 11/27/2022    TSH 0.60 04/04/2022    INR 1.0 02/26/2020    LABA1C 5.9 06/21/2023     Lab Results   Component Value Date    CALCIUM 9.5 11/27/2022    PHOS 3.4 11/08/2017     Lab Results   Component Value Date    LDLCHOLESTEROL 131 (H) 11/01/2019     I agree with the assessment, plan and diagnosis of    Diagnosis Orders   1. Productive cough  guaiFENesin (MUCINEX) 600 MG extended release tablet      2. Screening for hyperlipidemia  Lipid Panel      3. Screening for diabetes mellitus  POCT glycosylated hemoglobin (Hb A1C)        I agree with  orders as documented by the resident. Recommendations: Agree with resident A&P. Return in about 3 months (around 9/21/2023) for f/u on diabetes.    (Joellen Rey ) Dr. Elva Rodriguez MD

## 2023-06-21 NOTE — PATIENT INSTRUCTIONS
Thank you for letting us take care of you today. We hope all your questions were addressed. If a question was overlooked or something else comes to mind after you return home, please contact a member of your Care Team listed below. Your Care Team at [place] [id number] is Team #  Robert Fernando, (Faculty)  Liliana Hernandez (Resident)  Juan Burroughs (Resident)  Naveed Hummel (Resident)   Richy Becerra (Resident)  Sidney Wallis (Resident)  [facility] A  Geoff Hidalgo., [address], [address], [address], A  Dimitris Rosas, [facility]  Val Gary, [facility]  Darren Mir, A  Felipe Beth) Vic Fontana (Clinical Practice Manager)  Dalila Hardin [facility] (Clinical Pharmacist)     Office phone number: [phone]    If you need to get in right away due to illness, please be advised we have \"Same Day\" appointments available Monday-Friday. Please call us at [phone] option #3 to schedule your \"Same Day\" appointment.

## 2023-06-28 DIAGNOSIS — I50.22 CHRONIC SYSTOLIC HEART FAILURE (HCC): ICD-10-CM

## 2023-06-28 DIAGNOSIS — I25.10 CORONARY ARTERY DISEASE INVOLVING NATIVE HEART WITHOUT ANGINA PECTORIS, UNSPECIFIED VESSEL OR LESION TYPE: ICD-10-CM

## 2023-06-28 RX ORDER — OMEPRAZOLE 20 MG/1
CAPSULE, DELAYED RELEASE ORAL
Qty: 30 CAPSULE | Refills: 0 | Status: SHIPPED | OUTPATIENT
Start: 2023-06-28

## 2023-06-28 RX ORDER — METOPROLOL SUCCINATE 50 MG/1
TABLET, EXTENDED RELEASE ORAL
Qty: 90 TABLET | Refills: 1 | Status: SHIPPED | OUTPATIENT
Start: 2023-06-28

## 2023-06-28 RX ORDER — LANOLIN ALCOHOL/MO/W.PET/CERES
CREAM (GRAM) TOPICAL
Qty: 30 TABLET | Refills: 0 | Status: SHIPPED | OUTPATIENT
Start: 2023-06-28

## 2023-06-28 RX ORDER — ASPIRIN 81 MG/1
TABLET, COATED ORAL
Qty: 90 TABLET | Refills: 1 | Status: SHIPPED | OUTPATIENT
Start: 2023-06-28

## 2023-07-20 DIAGNOSIS — I50.22 CHRONIC SYSTOLIC HEART FAILURE (HCC): ICD-10-CM

## 2023-07-20 DIAGNOSIS — Z13.220 LIPID SCREENING: ICD-10-CM

## 2023-07-20 NOTE — TELEPHONE ENCOUNTER
E-scribe request for PENDED MEDICATIONS. Please review and e-scribe if applicable.      Last Visit Date:  6/21/2023  Next Visit Date:  Visit date not found    Hemoglobin A1C (%)   Date Value   06/21/2023 5.9   05/26/2022 5.7   11/06/2019 5.7             ( goal A1C is < 7)   No results found for: LABMICR  LDL Cholesterol (mg/dL)   Date Value   11/01/2019 131 (H)       (goal LDL is <100)   AST (U/L)   Date Value   11/27/2022 22     ALT (U/L)   Date Value   11/27/2022 16     BUN (mg/dL)   Date Value   11/27/2022 22 (H)     BP Readings from Last 3 Encounters:   06/21/23 110/77   01/09/23 120/72   12/01/22 135/87          (goal 120/80)        Patient Active Problem List:     COPD (chronic obstructive pulmonary disease) (HCC)     Fibroids     Syncope     Lung nodule < 6cm on CT     Chronic systolic heart failure (HCC) s/p AICD     Mild intermittent asthma     Essential hypertension     Chest pain     QT prolongation     Asthma with COPD with exacerbation (HCC)     Non-ischemic cardiomyopathy (HCC)     Lesion of right native kidney     NSTEMI (non-ST elevated myocardial infarction) ([address])     CAD (coronary artery disease)     Intraparenchymal hematoma of left side of brain due to trauma ([address])     Chronic combined systolic and diastolic congestive heart failure ([address])     AICD discharge     RIP (acute kidney injury) ([address])     Hypomagnesemia     Other heart failure ([address])     Atypical chest pain      ----JF

## 2023-07-21 RX ORDER — AMIODARONE HYDROCHLORIDE 200 MG/1
TABLET ORAL
Qty: 30 TABLET | Refills: 2 | OUTPATIENT
Start: 2023-07-21

## 2023-07-21 RX ORDER — OMEPRAZOLE 20 MG/1
CAPSULE, DELAYED RELEASE ORAL
Qty: 30 CAPSULE | Refills: 0 | Status: SHIPPED | OUTPATIENT
Start: 2023-07-21

## 2023-07-21 RX ORDER — ATORVASTATIN CALCIUM 40 MG/1
TABLET, FILM COATED ORAL
Qty: 30 TABLET | Refills: 3 | Status: SHIPPED | OUTPATIENT
Start: 2023-07-21

## 2023-07-21 RX ORDER — LANOLIN ALCOHOL/MO/W.PET/CERES
CREAM (GRAM) TOPICAL
Qty: 30 TABLET | Refills: 0 | Status: SHIPPED | OUTPATIENT
Start: 2023-07-21

## 2023-07-24 NOTE — TELEPHONE ENCOUNTER
Writer spoke to patient informed pt that medication amiodarone was not refilled by PCP would need to reach out to cardiologist to discuss refill on this medication. Pt voiced understanding.

## 2023-07-24 NOTE — TELEPHONE ENCOUNTER
PC to pt to let know of refills and need to contact Cardiology regarding her Amiodarone prescription. Rang through to VM, writer LM to contact office back regarding message from provider at [phone].

## 2023-07-26 DIAGNOSIS — I50.22 CHRONIC SYSTOLIC HEART FAILURE (HCC): ICD-10-CM

## 2023-07-27 RX ORDER — AMIODARONE HYDROCHLORIDE 200 MG/1
TABLET ORAL
Qty: 30 TABLET | Refills: 2 | OUTPATIENT
Start: 2023-07-27

## 2023-08-03 ENCOUNTER — TELEPHONE (OUTPATIENT)
Dept: FAMILY MEDICINE CLINIC | Age: 58
End: 2023-08-03

## 2023-08-17 RX ORDER — OMEPRAZOLE 20 MG/1
CAPSULE, DELAYED RELEASE ORAL
Qty: 30 CAPSULE | Refills: 0 | Status: SHIPPED | OUTPATIENT
Start: 2023-08-17

## 2023-08-17 RX ORDER — LANOLIN ALCOHOL/MO/W.PET/CERES
CREAM (GRAM) TOPICAL
Qty: 30 TABLET | Refills: 0 | Status: SHIPPED | OUTPATIENT
Start: 2023-08-17

## 2023-08-17 NOTE — TELEPHONE ENCOUNTER
obstructive pulmonary disease) (HCC)     Fibroids     Syncope     Lung nodule < 6cm on CT     Chronic systolic heart failure (HCC) s/p AICD     Mild intermittent asthma     Essential hypertension     Chest pain     QT prolongation     Asthma with COPD with exacerbation (HCC)     Non-ischemic cardiomyopathy (HCC)     Lesion of right native kidney     NSTEMI (non-ST elevated myocardial infarction) ([address])     CAD (coronary artery disease)     Intraparenchymal hematoma of left side of brain due to trauma [facility])     Chronic combined systolic and diastolic congestive heart failure ([address])     AICD discharge     RIP (acute kidney injury) ([address])     Hypomagnesemia     Other heart failure ([address])     Atypical chest pain

## 2023-08-23 ENCOUNTER — TELEPHONE (OUTPATIENT)
Dept: FAMILY MEDICINE CLINIC | Age: 58
End: 2023-08-23

## 2023-08-24 ENCOUNTER — CARE COORDINATION (OUTPATIENT)
Dept: FAMILY MEDICINE CLINIC | Age: 58
End: 2023-08-24

## 2023-08-24 DIAGNOSIS — J44.9 CHRONIC OBSTRUCTIVE PULMONARY DISEASE, UNSPECIFIED COPD TYPE (HCC): ICD-10-CM

## 2023-08-24 NOTE — TELEPHONE ENCOUNTER
Please address the medication refill and close the encounter. If I can be of assistance, please route to the applicable pool. Thank you.       Last visit: 6-21-23  Last Med refill: 3-9-23  Does patient have enough medication for 72 hours: No:     Next Visit Date:  Future Appointments   Date Time Provider [address]   8/30/2023  9:00 AM SCHEDULE, [facility] ECHO [facility]   9/5/2023 12:45 PM SCHEDULE, [facility] NUCLEAR MED [facility]   10/5/2023 10:00 AM Ricardo Callahan MD [facility]       Health Maintenance   Topic Date Due    COVID-19 Vaccine (1) Never done    Shingles vaccine (1 of 2) Never done    Lipids  11/01/2020    Flu vaccine (1) 08/01/2023    GFR test (Diabetes, CKD 3-4, OR last GFR 15-59)  11/27/2023    A1C test (Diabetic or Prediabetic)  06/21/2024    Depression Screen  06/21/2024    Cervical cancer screen  07/09/2024    Breast cancer screen  02/20/2025    Colorectal Cancer Screen  12/14/2025    DTaP/Tdap/Td vaccine (2 - Td or Tdap) 06/09/2029    Pneumococcal 0-64 years Vaccine  Completed    Hepatitis C screen  Completed    HIV screen  Completed    Hepatitis A vaccine  Aged Out    Hib vaccine  Aged Out    Meningococcal (ACWY) vaccine  Aged Out       Hemoglobin A1C (%)   Date Value   06/21/2023 5.9   05/26/2022 5.7   11/06/2019 5.7             ( goal A1C is < 7)   No components found for: LABMICR  LDL Cholesterol (mg/dL)   Date Value   11/01/2019 131 (H)   11/25/2018 128       (goal LDL is <100)   AST (U/L)   Date Value   11/27/2022 22     ALT (U/L)   Date Value   11/27/2022 16     BUN (mg/dL)   Date Value   11/27/2022 22 (H)     BP Readings from Last 3 Encounters:   08/10/23 118/72   06/21/23 110/77   01/09/23 120/72          (goal 120/80)    All Future Testing planned in [facility]  Lab Frequency Next Occurrence   Lipid Panel Once 06/21/2023   Nuclear stress test with myocardial perfusion Once 09/10/2023   Transthoracic echocardiogram

## 2023-08-28 RX ORDER — ALBUTEROL SULFATE 90 UG/1
AEROSOL, METERED RESPIRATORY (INHALATION)
Qty: 18 G | Refills: 3 | Status: SHIPPED | OUTPATIENT
Start: 2023-08-28

## 2023-08-29 ENCOUNTER — TELEPHONE (OUTPATIENT)
Dept: FAMILY MEDICINE CLINIC | Age: 58
End: 2023-08-29

## 2023-08-30 ENCOUNTER — TELEPHONE (OUTPATIENT)
Dept: FAMILY MEDICINE CLINIC | Age: 58
End: 2023-08-30

## 2023-09-05 ENCOUNTER — TELEPHONE (OUTPATIENT)
Dept: FAMILY MEDICINE CLINIC | Age: 58
End: 2023-09-05

## 2023-09-05 NOTE — CARE COORDINATION
Margaritamabel Galarza  8/24/2023                [facility] Outreach nurse vs    Met  with Emilee Umanzor at her home to re assess self care management of CHF, COPD    Emotional/coping   Alert, oriented x 3, engaged and cooperative   Affect  full     thought processes- logical    She denies thoughts/feelings of depression  Reports feelings of anxiety are minimal and less than daily    Socialization/family  She lives with brother and family members and reports family and social relationships are supportive. States she will be visiting family in [place] later this month. Housing  no changes in housing    Medications   Medication schedule reviewed   Emilee Umanzor received medication adherence packages from her pharmacy today  Reviewed medication adherence packages. She reports taking medications daily and reports she does not skip taking doses. Reports she does not take flonase daily and uses nebulizer with duo neb aerosols less than daily   She reports she morales not smoke cigarettes and deines feeling short of breath. Current Outpatient Medications   Medication Instructions    albuterol sulfate HFA (VENTOLIN HFA) 108 (90 Base) MCG/ACT inhaler INHALE 2 PUFFS INTO THE LUNGS EVERY 6 HOURS AS NEEDED FOR WHEEZING.     amiodarone (CORDARONE) 200 mg, Oral, DAILY    amitriptyline (ELAVIL) 25 mg, Oral, NIGHTLY    ASPIRIN LOW DOSE 81 MG EC tablet TAKE ONE TABLET BY MOUTH ONE TIME A DAY    atorvastatin (LIPITOR) 40 MG tablet TAKE ONE TABLET BY MOUTH EVERY NIGHT    bumetanide (BUMEX) 1 MG tablet TAKE 1 TABLET BY MOUTH 2 TIMES A DAY    ENTRESTO 24-26 MG per tablet TAKE 1 TABLET BY MOUTH 2 TIMES A DAY    fluticasone (FLONASE) 50 MCG/ACT nasal spray 2 sprays, Each Nostril, DAILY    ipratropium-albuterol (DUONEB) 0.5-2.5 (3) MG/3ML SOLN nebulizer solution 3 mLs, Inhalation, 2 TIMES DAILY PRN    magnesium oxide (MAG-OX) 400 (240 Mg) MG tablet TAKE ONE TABLET BY MOUTH ONCE A DAY    metoprolol succinate (TOPROL XL) 50 MG extended

## 2023-09-06 ENCOUNTER — TELEPHONE (OUTPATIENT)
Dept: FAMILY MEDICINE CLINIC | Age: 58
End: 2023-09-06

## 2023-09-07 NOTE — TELEPHONE ENCOUNTER
Phone call to patient. Patient received a call from [facility] and was told an apartment was secured for her. Patient stated that the apartments are near [facility] and named \"[address]\". Patient admitted that this is not her desired neighborhood but the location is very close to her loved ones. Patient decided that she is going to take the apartment and was told that she has the option to move to another location after one year.

## 2023-09-07 NOTE — TELEPHONE ENCOUNTER
Met with patient at [facility]. Procedure completed without complications. Discussed housing. Patient stated that she was informed that by [facility] that her apartment will be ready soon. Patient is excited about moving and wanting her own space but apprehensive about moving into an apartment without the amenities she desires. Encouraged patient to keep an open mind. Met with patient later in the day to  document to deliver to [facility].

## 2023-09-07 NOTE — TELEPHONE ENCOUNTER
Phone call from patient requesting assistance. Patient stated that she will need another proof of income statement from the [address] [place] [address] and her  's pension. Gave patient phone numbers to request documents. Patient will return call once request is completed.

## 2023-09-07 NOTE — TELEPHONE ENCOUNTER
Phone call from patient. Patient asked if she had any medical procedures scheduled for today. Nothing listed in Epic. Patient is waiting for follow up from [facility].

## 2023-09-07 NOTE — TELEPHONE ENCOUNTER
Phone call from patient. Patient arrived to [facility] Cardiology appt but stated that she had yet to be seen. Encouraged patience. Patient called back approximately an hour later stating her appointment was rescheduled for next week to due staffing issues.

## 2023-09-07 NOTE — TELEPHONE ENCOUNTER
Phone call from patient. Patient apprehensive regarding upcoming ECHO appt at [facility]. Patient requested support during appointment. Will meet with patient tomorrow at her [facility] appointment.

## 2023-09-07 NOTE — TELEPHONE ENCOUNTER
Received document to send on patient's behalf to [facility]. Document hand delivered to [facility].

## 2023-09-19 DIAGNOSIS — I50.22 CHRONIC SYSTOLIC HEART FAILURE (HCC): ICD-10-CM

## 2023-09-19 NOTE — TELEPHONE ENCOUNTER
E-scribe request for multiple medications. Please review and e-scribe if applicable.      Last Visit Date:  06/2023  Next Visit Date:  Visit date not found    Hemoglobin A1C (%)   Date Value   06/21/2023 5.9   05/26/2022 5.7   11/06/2019 5.7             ( goal A1C is < 7)   No components found for: \"LABMICR\"  LDL Cholesterol (mg/dL)   Date Value   11/01/2019 131 (H)       (goal LDL is <100)   AST (U/L)   Date Value   11/27/2022 22     ALT (U/L)   Date Value   11/27/2022 16     BUN (mg/dL)   Date Value   11/27/2022 22 (H)     BP Readings from Last 3 Encounters:   08/30/23 118/72   08/10/23 118/72   06/21/23 110/77          (goal 120/80)        Patient Active Problem List:     COPD (chronic obstructive pulmonary disease) (HCC)     Fibroids     Syncope     Lung nodule < 6cm on CT     Chronic systolic heart failure (HCC) s/p AICD     Mild intermittent asthma     Essential hypertension     Chest pain     QT prolongation     Asthma with COPD with exacerbation (HCC)     Non-ischemic cardiomyopathy (HCC)     Lesion of right native kidney     NSTEMI (non-ST elevated myocardial infarction) ([address])     CAD (coronary artery disease)     Intraparenchymal hematoma of left side of brain due to trauma ([address])     Chronic combined systolic and diastolic congestive heart failure ([address])     AICD discharge     RIP (acute kidney injury) ([address])     Hypomagnesemia     Other heart failure ([address])     Atypical chest pain

## 2023-09-20 ENCOUNTER — TELEPHONE (OUTPATIENT)
Dept: FAMILY MEDICINE CLINIC | Age: 58
End: 2023-09-20

## 2023-09-25 RX ORDER — LANOLIN ALCOHOL/MO/W.PET/CERES
CREAM (GRAM) TOPICAL
Qty: 30 TABLET | Refills: 0 | Status: SHIPPED | OUTPATIENT
Start: 2023-09-25

## 2023-09-25 RX ORDER — OMEPRAZOLE 20 MG/1
CAPSULE, DELAYED RELEASE ORAL
Qty: 30 CAPSULE | Refills: 0 | Status: SHIPPED | OUTPATIENT
Start: 2023-09-25

## 2023-09-25 RX ORDER — SACUBITRIL AND VALSARTAN 24; 26 MG/1; MG/1
TABLET, FILM COATED ORAL
Qty: 60 TABLET | Refills: 5 | Status: SHIPPED | OUTPATIENT
Start: 2023-09-25

## 2023-10-04 ENCOUNTER — TELEPHONE (OUTPATIENT)
Dept: FAMILY MEDICINE CLINIC | Age: 58
End: 2023-10-04

## 2023-10-05 ENCOUNTER — TELEPHONE (OUTPATIENT)
Dept: FAMILY MEDICINE CLINIC | Age: 58
End: 2023-10-05

## 2023-10-10 ENCOUNTER — TELEPHONE (OUTPATIENT)
Dept: FAMILY MEDICINE CLINIC | Age: 58
End: 2023-10-10

## 2023-10-18 NOTE — ED PROVIDER NOTES
[facility]  ED  Emergency Department Encounter  Emergency Medicine Resident     Pt Name: Laury Cline  MRN: [id number]  [place] 1965  Date of evaluation: 11/14/21  PCP:  Werner Patrick MD    [address] [place] [address]       Chief Complaint   Patient presents with    AICD Problem     HISTORY OFPRESENT ILLNESS  (Location/Symptom, Timing/Onset, Context/Setting, Quality, Duration, Modifying Factors,Severity.)      Laury Cline is a 64 y.o. female who presents with 20 minutes after a sharp pain in her chest while cooking on the stove top. She believes her pacer fired. Prior to this sharp pain, she did not feel lightheaded, or dizzy and she also did not have any chest pain or palpitations. Initially after the AICD fired, she did feel some heart palpitations, however currently she denies any chest pain, palpitations, dizziness, lightheadedness, headache. She does endorse feeling warm and diaphoretic. Intermittent warmth and diaphoresis for the last several months which the patient attributes to menopause. Patient denies any fevers, vision changes, shortness of breath, cough, congestion, runny nose, abdominal pain, nausea, vomiting, diarrhea, hematuria, dysuria or blood in stool.     PAST MEDICAL / SURGICAL / SOCIAL / FAMILY HISTORY      has a past medical history of AICD discharge, RIP (acute kidney injury) ([address] Asthma, Asthma with COPD with exacerbation ([address] CAD (coronary artery disease), Cardiomegaly, Chest pain, CHF (congestive heart failure) ([address] Chronic systolic heart failure ([address] s/p AICD, COPD (chronic obstructive pulmonary disease) ([address] Depression, Fibroids, Hypertension, Hypomagnesemia, Intraparenchymal hematoma of left side of brain due to trauma [facility]), Lesion of right native kidney, Lung nodule < 6cm on CT, MI (myocardial infarction) ([address] Mild intermittent asthma, Non-ischemic cardiomyopathy ([address] NSTEMI (non-ST elevated myocardial infarction) ([address] QT prolongation, Attending attestation:   I saw and evaluated the patient. I personally obtained the key and critical portions of the history and physical exam or was physically present for key and critical portions performed by the MARLO. I reviewed the resident's documentation and discussed the patient with the MARLO. I agree with the MARLO's medical decision making as documented in the note.     MD DANILO Spring Attending   ---------------------    Triage History and Physical    Chief Complaint: Contractions    Assessment/Plan      Fang Carvalho is a 32 y.o.  at 33w6d presenting for r/o PTL and DFM.     R/o PTL  - Cervix: 1.5/50/-3--> unchanged on recheck   - TOCO: frequent irreg ctx   - No evidence for PTL at this time.   - S/sx of labor reviewed, given return precautions   - Recommended tylenol, warm showers, hot packs for discomfort     Decreased Fetal Movement  - Feeling movement in triage  - NST reactive  - Patient expresses feeling reassured of fetal welling  - Discussed fetal kick counts, given strict return precautions      IUP at 33.6  - NST reactive  - Good fetal movement  - Continue routine prenatal care   - Precautions to return discussed    Maternal Well-being  - Vital signs stable and WNL  - Emotional support and reassurance provided  - All questions and concerns addressed     D/w MD Holly Spring PA-C     Active Problems:  There are no active Hospital Problems.      Pregnancy Problems (from 23 to present)       Problem Noted Resolved    High risk pregnancy, antepartum 10/12/2023 by Farzana Sims MD No    Priority:  Medium      Hypothyroidism during pregnancy, antepartum 10/12/2023 by Farzana Sims MD No    Priority:  Medium      Vaginitis affecting pregnancy in third trimester, antepartum 10/12/2023 by Farzana Sims MD No    Priority:  Medium            Subjective   Fang is here complaining of q5-10 min contractions and decreased fetal movement.   Denies vaginal bleeding., Denies leaking of fluid.  She reports abdominal pain x2 days. She feels tightening at the top of her uterus- it was constant yesterday and today it comes and goes. She reports no n/v, heartburn or abnl bowel movements. No recent falls or abd trauma. She reports dFM this morning but upon arrival to triage feeling lots of movement from baby.        Obstetrical History   OB History    Para Term  AB Living   1 0 0 0 0 0   SAB IAB Ectopic Multiple Live Births   0 0 0 0 0      # Outcome Date GA Lbr Uziel/2nd Weight Sex Delivery Anes PTL Lv   1 Current                Past Medical History  Past Medical History:   Diagnosis Date    Ankylosing spondylitis (CMS/[facility])     Bipolar disorder, unspecified (CMS/[facility]) 2023    Bipolar 1 disorder    Long term (current) use of immunosuppressive biologic 2015    Adalimumab (Humira) long-term use    Personal history of other diseases of the musculoskeletal system and connective tissue 2023    History of ankylosing spondylitis    Polycystic ovarian syndrome 2023    PCOS (polycystic ovarian syndrome)    Raynaud's syndrome without gangrene     Raynaud's phenomenon without gangrene        Past Surgical History   Past Surgical History:   Procedure Laterality Date    OTHER SURGICAL HISTORY  2023    Femur fracture repair       Social History  Social History     Tobacco Use    Smoking status: Never    Smokeless tobacco: Never   Substance Use Topics    Alcohol use: Not Currently     Substance and Sexual Activity   Drug Use Never       Allergies  Biaxin [clarithromycin]     Medications  Medications Prior to Admission   Medication Sig Dispense Refill Last Dose    cariprazine (Vraylar) 3 mg capsule Take 1 capsule (3 mg) by mouth once daily.   10/17/2023    famotidine (Pepcid) 20 mg tablet Take 1 tablet (20 mg) by mouth once daily.   10/17/2023    [] fluconazole (Diflucan) 150 mg tablet Take 1 tablet (150 mg) by mouth 1 time for  Syncope, and Unspecified diseases of blood and blood-forming organs. has a past surgical history that includes Cardiac defibrillator placement (2005); Pacemaker insertion; Tubal ligation; Cardiac defibrillator placement; and Uterine fibroid surgery (maarch ). Social History     Socioeconomic History    Marital status: Single     Spouse name: Partner- Inell Rm Number of children: Not on file    Years of education: 15    Highest education level: Not on file   Occupational History    Occupation: disabled   Tobacco Use    Smoking status: Former Smoker     Packs/day: 0.25     Years: 30.00     Pack years: 7.50     Types: Cigarettes     Quit date: 3/4/2020     Years since quittin.6    Smokeless tobacco: Never Used    Tobacco comment: resumed smoking  3-4 cigarettes/day   Vaping Use    Vaping Use: Never used   Substance and Sexual Activity    Alcohol use: Yes     Alcohol/week: 1.0 standard drink     Types: 1 Cans of beer per week    Drug use: No    Sexual activity: Yes     Partners: Male     Birth control/protection: Post-menopausal   Other Topics Concern    Not on file   Social History Narrative    Not on file     Social Determinants of Health     Financial Resource Strain:     Difficulty of Paying Living Expenses: Not on file   Food Insecurity:     Worried About Running Out of Food in the Last Year: Not on file    Suzanne of Food in the Last Year: Not on file   Transportation Needs:     Lack of Transportation (Medical): Not on file    Lack of Transportation (Non-Medical):  Not on file   Physical Activity:     Days of Exercise per Week: Not on file    Minutes of Exercise per Session: Not on file   Stress:     Feeling of Stress : Not on file   Social Connections:     Frequency of Communication with Friends and Family: Not on file    Frequency of Social Gatherings with Friends and Family: Not on file    Attends [employer] Services: Not on file   [employer] or 1 dose. Repeat in 7 days if symptoms persist. 1 tablet 0     prenatal vitamin, iron-folic, (Prenatal Vitamin) 27 mg iron-800 mcg folic acid tablet Take 1 tablet by mouth once daily.   10/17/2023    Simponi 100 mg/mL syringe Inject 1 mL (100 mg) under the skin every 30 (thirty) days.   Past Month       Objective    Last Vitals  Temp Pulse Resp BP MAP O2 Sat   36.7 °C (98.1 °F) 95 (Simultaneous filing. User may not have seen previous data.) 16 121/75 (Simultaneous filing. User may not have seen previous data.)   98 % (Simultaneous filing. User may not have seen previous data.)     Physical Examination  GENERAL: Examination reveals a well developed, well nourished, gravid female in no acute distress. She is alert and cooperative.  ABDOMEN: soft, gravid, nontender, nondistended, no abnormal masses, no epigastric pain  FHR is 135, moderate, +accels, -decels  [place] readinq15, irritability  CERVIX: 1.5 cm dilated, 50 % effaced, -3 station  EXTREMITIES: no redness or tenderness in the calves or thighs, no edema  SKIN: normal coloration and turgor, no rashes  NEUROLOGICAL: alert, oriented, normal speech, no focal findings or movement disorder noted  PSYCHOLOGICAL: awake and alert; oriented to person, place, and time    Lab Review  Labs in chart were reviewed.       Organizations: Not on file    Attends Club or Organization Meetings: Not on file    Marital Status: Not on file   Intimate Partner Violence:     Fear of Current or Ex-Partner: Not on file    Emotionally Abused: Not on file    Physically Abused: Not on file    Sexually Abused: Not on file   Housing Stability:     Unable to Pay for Housing in the Last Year: Not on file    Number of [place] in the Last Year: Not on file    Unstable Housing in the Last Year: Not on file     Family History   Problem Relation Age of Onset    Diabetes Mother     High Blood Pressure Mother     Heart Disease Mother     Diabetes Father     Heart Disease Brother      Allergies: Iv contrast [iodides]    Home Medications:  Prior to Admission medications    Medication Sig Start Date End Date Taking?  Authorizing Provider   L-Methylfolate-B6-B12 (FOLBIC RF) 1.13-25-2 MG TABS Take by mouth    Historical Provider, MD   amitriptyline (ELAVIL) 25 MG tablet TAKE 1 TABLET BY MOUTH EVERY NIGHT AT BEDTIME 5/14/21   Diego yAala MD   bumetanide (BUMEX) 1 MG tablet TAKE 1 TABLET BY MOUTH 2 TIMES A DAY 5/14/21   Diego Ayala MD   sacubitril-valsartan (ENTRESTO) 24-26 MG per tablet TAKE 1 TABLET BY MOUTH 2 TIMES A DAY 5/14/21   Diego Ayala MD   L-Methylfolate-B6-B12 (FOLBIC RF) 1.13-25-2 MG TABS TAKE 1 TABLET BY MOUTH ONE TIME A DAY 5/14/21   Diego Ayala MD   magnesium oxide (MAG-OX) 400 (241.3 Mg) MG TABS tablet TAKE 1 TABLET BY MOUTH ONE TIME A DAY 5/14/21   Diego Ayala MD   omeprazole (PRILOSEC) 20 MG delayed release capsule TAKE 1 CAPSULE BY MOUTH ONE TIME A DAY 5/14/21   Diego Ayala MD   spironolactone (ALDACTONE) 25 MG tablet TAKE 1 TABLET BY MOUTH ONE TIME A DAY 5/14/21   Diego Ayala MD   metoprolol succinate (TOPROL XL) 50 MG extended release tablet TAKE ONE TABLET BY MOUTH EVERY MORNING 3/22/21   Jo Mclaughlin MD   Nebulizers (COMPRESSOR/NEBULIZER) MISC 1 Device by Does not apply route 4 times daily 7/15/20   Alexandre Levy MD   amiodarone (CORDARONE) 200 MG tablet Take 200 mg by mouth daily Dose reduction initiated 5/26/2020 by cardiology    Shirin Lopez MD   albuterol sulfate HFA (VENTOLIN HFA) 108 (90 Base) MCG/ACT inhaler INHALE 2 PUFFS INTO THE LUNGS EVERY 6 HOURS AS NEEDED FOR WHEEZING 4/1/20   Alexia Garcia MD   ipratropium-albuterol (DUONEB) 0.5-2.5 (3) MG/3ML SOLN nebulizer solution Inhale 3 mLs into the lungs 2 times daily as needed for Shortness of Breath 1/20/20   Alexia Garcia MD   fluticasone [facility]) 50 MCG/ACT nasal spray 1 spray by Nasal route daily 11/1/19   Ansley Kim MD   atorvastatin (LIPITOR) 40 MG tablet Take 1 tablet by mouth nightly 6/14/19   ANGY Holland - CNP   sennosides-docusate sodium (SENOKOT-S) 8.6-50 MG tablet Take 1 tablet by mouth daily 12/23/18   Diego Wright MD   Spacer/Aero-Holding Chambers (E-Z SPACER) ELVER 1 Device by Does not apply route daily 11/25/18   Eula Lowry MD   Multiple Vitamin (MULTIVITAMIN) tablet TAKE ONE TABLET BY MOUTH ONE TIME A DAY 6/18/18   Salina Petit MD       REVIEW OF SYSTEMS    (2-9 systems for level 4, 10 or more for level 5)      Review of Systems   Constitutional: Positive for diaphoresis. Negative for activity change and fever. HENT: Negative for congestion and rhinorrhea. Eyes: Negative for visual disturbance. Respiratory: Negative for cough and shortness of breath. Cardiovascular: Positive for chest pain and palpitations. Gastrointestinal: Negative for abdominal pain, diarrhea, nausea and vomiting. Genitourinary: Negative for dysuria and hematuria. Musculoskeletal: Negative for myalgias. Skin: Negative for wound. Neurological: Negative for syncope, light-headedness and headaches. PHYSICAL EXAM   (up to 7 for level 4, 8 or more for level 5)     INITIAL VITALS:    height is 5' 3\" (1.6 m) and weight is 145 lb (65.8 kg). Her oral temperature is 98.1 °F (36.7 °C).  Her blood pressure is 127/87 and her pulse is 91. Her respiration is 18 and oxygen saturation is 96%. Physical Exam  Constitutional:       General: She is not in acute distress. Appearance: Normal appearance. She is diaphoretic. She is not ill-appearing. HENT:      Head: Normocephalic and atraumatic. Mouth/Throat:      Mouth: Mucous membranes are moist.   Eyes:      Extraocular Movements: Extraocular movements intact. Pupils: Pupils are equal, round, and reactive to light. Cardiovascular:      Rate and Rhythm: Normal rate and regular rhythm. Pulses: Normal pulses. Heart sounds: Normal heart sounds. No murmur heard. Pulmonary:      Effort: Pulmonary effort is normal. No respiratory distress. Breath sounds: No wheezing or rhonchi. Chest:      Chest wall: No tenderness. Abdominal:      General: There is no distension. Palpations: Abdomen is soft. Tenderness: There is no abdominal tenderness. There is no guarding. Musculoskeletal:         General: No tenderness. Normal range of motion. Cervical back: Normal range of motion. No tenderness. Right lower leg: No edema. Left lower leg: No edema. Lymphadenopathy:      Cervical: No cervical adenopathy. Skin:     General: Skin is warm. Findings: No lesion or rash. Neurological:      Mental Status: She is alert and oriented to person, place, and time. Cranial Nerves: No cranial nerve deficit. Sensory: No sensory deficit. Motor: No weakness. Gait: Gait normal.       DIFFERENTIAL  DIAGNOSIS     PLAN (LABS / IMAGING / EKG):  Orders Placed This Encounter   Procedures    COVID-19, Rapid    XR CHEST PORTABLE    Troponin    Brain Natriuretic Peptide    CBC WITH AUTO DIFFERENTIAL    Basic Metabolic Panel w/ Reflex to MG    Immature Platelet Fraction    ADULT DIET; Regular;  No Caffeine    Telemetry monitoring - 24 hour duration    Vital signs per unit routine    Notify physician    Up as tolerated    Full Code    Inpatient consult to Cardiology    Initiate Oxygen Therapy Protocol    Pacer Interrogate    EKG 12 Lead    PATIENT STATUS (FROM ED OR OR/PROCEDURAL) Observation     MEDICATIONS ORDERED:  Orders Placed This Encounter   Medications    acetaminophen (TYLENOL) tablet 1,000 mg    sodium chloride flush 0.9 % injection 5-40 mL    sodium chloride flush 0.9 % injection 5-40 mL    0.9 % sodium chloride infusion    acetaminophen (TYLENOL) tablet 650 mg    ibuprofen (ADVIL;MOTRIN) tablet 800 mg     DDX: AICD discharge, arrhythmia, NSTEMI    Initial MDM/Plan: 64 y.o. female who presents with sharp discomfort in chest which she believes was her AICD discharging. No symptoms prior to discharge. Will interrogate pacer and monitor serial trop x3. interrogation did not show any discharge since 9/20/2020. Patient with some chest discomfort at this time, controlled with tylenol. Spoke with cardiology who recommended Observation admission for evaluation in the morning. Consider ECHO in the morning.      DIAGNOSTIC RESULTS / EMERGENCY DEPARTMENT COURSE / MDM     LABS:  Labs Reviewed   BRAIN NATRIURETIC PEPTIDE - Abnormal; Notable for the following components:       Result Value    Pro- (*)     All other components within normal limits   CBC WITH AUTO DIFFERENTIAL - Abnormal; Notable for the following components:    RDW 15.0 (*)     All other components within normal limits   BASIC METABOLIC PANEL W/ REFLEX TO MG FOR LOW K - Abnormal; Notable for the following components:    Glucose 68 (*)     CREATININE 1.15 (*)     GFR Non- 49 (*)     GFR  59 (*)     All other components within normal limits   IMMATURE PLATELET FRACTION - Abnormal; Notable for the following components:    Platelet, Immature Fraction 11.0 (*)     All other components within normal limits   COVID-19, RAPID   TROPONIN   TROPONIN   TROPONIN     RADIOLOGY:  XR CHEST PORTABLE    Result Date: 11/14/2021  EXAMINATION: ONE XRAY VIEW OF THE CHEST 11/14/2021 11:49 am COMPARISON: 05/17/2020 HISTORY: ORDERING SYSTEM PROVIDED HISTORY: chest pain TECHNOLOGIST PROVIDED HISTORY: chest pain Reason for Exam: upright port Acuity: Unknown Type of Exam: Unknown FINDINGS: Stable ICD leads. The cardiac size is mildly enlarged. No acute infiltrates or pleural effusions are seen. Pulmonary vascularity appears normal. .  . No acute bony abnormalities. The hilar structures are normal.     No acute cardiopulmonary disease Stable cardiomegaly     EKG  Atrial sensed ventricular paced rhythm. 80bpm. NV normal at 188ms. Wide QRS. No acute ST elevation or depression. No T wave inversions. All EKG's are interpreted by the Emergency Department Physician who either signs or Co-signs this chart in the absence of a cardiologist.    EMERGENCY DEPARTMENT COURSE:  ED Course as of 11/14/21 1818   Sun Nov 14, 2021   1440 Pro-BNP(!): 810  Elevated BNP at baseline, but previous in 4000s. [CP]   5880 Troponin, High Sensitivity: 12  Troponins not acutely elevated [CP]   1600 Glucose(!): 68  Patient given boxed lunch [CP]   3895 No discharges seen on interrogation report. Last delivered shock reported on 9/20/2020. [CP]   3046 Patient with some chest discomfort at this time [CP]      ED Course User Index  [CP] Codey Tam MD     PROCEDURES:  None    CONSULTS:  IP CONSULT TO CARDIOLOGY    CRITICAL CARE:  Please see attending note    FINAL IMPRESSION      1. Atypical chest pain        DISPOSITION / PLAN     DISPOSITION      Admit to Observation    PATIENTREFERRED TO:  No follow-up provider specified.     DISCHARGE MEDICATIONS:  Current Discharge Medication List        Filiberto Garcia MD  Emergency Medicine Resident    (Please note that portions of this note were completed with a voice recognition program.  Efforts were made to edit the dictations but occasionally words are mis-transcribed.)        Codey Tam MD  Resident  11/14/21 [id number]

## 2023-10-19 NOTE — TELEPHONE ENCOUNTER
Last visit: 6/21/23  Last Med refill: 9/25/23  Does patient have enough medication for 72 hours: No:     Next Visit Date:  No future appointments.     Health Maintenance   Topic Date Due    Hepatitis B vaccine (1 of 3 - 3-dose series) Never done    COVID-19 Vaccine (1) Never done    Shingles vaccine (1 of 2) Never done    Pneumococcal 0-64 years Vaccine (2 - PCV) 05/20/2017    Lipids  11/01/2020    Flu vaccine (1) 08/01/2023    GFR test (Diabetes, CKD 3-4, OR last GFR 15-59)  11/27/2023    A1C test (Diabetic or Prediabetic)  06/21/2024    Depression Screen  06/21/2024    Cervical cancer screen  07/09/2024    Breast cancer screen  02/20/2025    Colorectal Cancer Screen  12/14/2025    DTaP/Tdap/Td vaccine (2 - Td or Tdap) 06/09/2029    Hepatitis C screen  Completed    HIV screen  Completed    Hepatitis A vaccine  Aged Out    Hib vaccine  Aged Out    Meningococcal (ACWY) vaccine  Aged Out       Hemoglobin A1C (%)   Date Value   06/21/2023 5.9   05/26/2022 5.7   11/06/2019 5.7             ( goal A1C is < 7)   No components found for: \"LABMICR\"  LDL Cholesterol (mg/dL)   Date Value   11/01/2019 131 (H)   11/25/2018 128       (goal LDL is <100)   AST (U/L)   Date Value   11/27/2022 22     ALT (U/L)   Date Value   11/27/2022 16     BUN (mg/dL)   Date Value   11/27/2022 22 (H)     BP Readings from Last 3 Encounters:   08/30/23 118/72   08/10/23 118/72   06/21/23 110/77          (goal 120/80)    All Future Testing planned in [facility]  Lab Frequency Next Occurrence   Lipid Panel Once 06/21/2023               Patient Active Problem List:     COPD (chronic obstructive pulmonary disease) ([address])     Fibroids     Syncope     Lung nodule < 6cm on CT     Chronic systolic heart failure (HCC) s/p AICD     Mild intermittent asthma     Essential hypertension     Chest pain     QT prolongation     Asthma with COPD with exacerbation (HCC)     Non-ischemic cardiomyopathy ([address])     Lesion of right native kidney     NSTEMI (non-ST elevated

## 2023-10-19 NOTE — TELEPHONE ENCOUNTER
Follow up with patient regarding upcoming dental appointment. Appointment scheduled w/ [address] 9/26th @ 10am.  Stressed the importance of patient attending scheduled appointment as many dental providers are not accepting medicaid recipients at this time or appointments are booked until Spring 2024. Patient stated that she understood. Patient stated that she does not need transportation as her brother or significant other will be driving her to the appointment.

## 2023-10-19 NOTE — TELEPHONE ENCOUNTER
Phone call from patient. Patient stated that she needed assistance with obtaining transportation to sign her lease at her new apartment. Patient stated that her significant other was experiencing transportation issues. Patient called back stating that another friend transported her. Patient was excited as she signed the lease and received her keys. Patient expects to move her belongings in within the next few days. Inquired about patient's dental appointment. Patient admitted that she didn't go as the location was too far. Patient stated that she is still experiencing tooth pain.

## 2023-10-19 NOTE — TELEPHONE ENCOUNTER
Followed up with patient. Patient stated that [place] will be providing financial assistance for rent, utilities, and furniture. Patient requested assistance with scheduling an eye doctor appointment.
Name band;

## 2023-10-19 NOTE — TELEPHONE ENCOUNTER
Phone call from patient. Patient upset as she learned she needs to have the electricity in her apartment switched over to her account. Patient learned that she has an outstanding balance on her account that will need to be paid before transferring services. Patient stated that she will be contacted [place] for financial assistance with utilities and rent. Hand delivered a copy of patient's [facility] account information. Also assisted patient with scheduling an appointment with [employer] for utility assistance.

## 2023-10-26 ENCOUNTER — TELEPHONE (OUTPATIENT)
Dept: FAMILY MEDICINE CLINIC | Age: 58
End: 2023-10-26

## 2023-10-27 ENCOUNTER — CARE COORDINATION (OUTPATIENT)
Dept: FAMILY MEDICINE CLINIC | Age: 58
End: 2023-10-27

## 2023-10-30 ASSESSMENT — PATIENT HEALTH QUESTIONNAIRE - PHQ9
2. FEELING DOWN, DEPRESSED OR HOPELESS: 0
SUM OF ALL RESPONSES TO PHQ9 QUESTIONS 1 & 2: 0
SUM OF ALL RESPONSES TO PHQ QUESTIONS 1-9: 0
1. LITTLE INTEREST OR PLEASURE IN DOING THINGS: 0
SUM OF ALL RESPONSES TO PHQ QUESTIONS 1-9: 0

## 2023-10-30 NOTE — CARE COORDINATION
DOSE 81 MG EC tablet TAKE ONE TABLET BY MOUTH ONE TIME A DAY    atorvastatin (LIPITOR) 40 MG tablet TAKE ONE TABLET BY MOUTH EVERY NIGHT    bumetanide (BUMEX) 1 MG tablet TAKE 1 TABLET BY MOUTH 2 TIMES A DAY    fluticasone (FLONASE) 50 MCG/ACT nasal spray 2 sprays, Each Nostril, DAILY    ipratropium-albuterol (DUONEB) 0.5-2.5 (3) MG/3ML SOLN nebulizer solution 3 mLs, Inhalation, 2 TIMES DAILY PRN    magnesium oxide (MAG-OX) 400 (240 Mg) MG tablet TAKE ONE TABLET BY MOUTH ONCE A DAY    metoprolol succinate (TOPROL XL) 50 MG extended release tablet TAKE ONE TABLET BY MOUTH EVERY MORNING    Multiple Vitamin (MULTIVITAMIN) tablet TAKE ONE TABLET BY MOUTH ONE TIME A DAY    Nebulizers (COMPRESSOR/NEBULIZER) MISC 1 Device, Does not apply, 4 TIMES DAILY RESP    omeprazole (PRILOSEC) 20 MG delayed release capsule TAKE ONE CAPSULE BY MOUTH ONCE A DAY    sacubitril-valsartan (ENTRESTO) 24-26 MG per tablet TAKE ONE TABLET BY MOUTH TWICE A DAY    Spacer/Aero-Holding Chambers (E-Z SPACER) ELVER 1 Device, Does not apply, DAILY    spironolactone (ALDACTONE) 25 MG tablet TAKE ONE TABLET BY MOUTH ONE TIME A DAY    . Spiritual   Reports spiritual needs are met through personal prayer    Action:  Request pharmacy review and recommendations of medications   Concern addressed with pharmacy and with cardiology office regarding low pulse, low B/P  Requesting review of dosing for metoprolol 50 mg daily, amiodarone 200 mg daily- patient has been taking for 2 yrs, and review of Magnesium dose. - PCP appt scheduled on 10/31/23  Cardiology follow up requested and appt scheduled on 11/15/2023 with CNP for medication review     Future Appointments   Date Time Provider [address]   10/31/2023 11:00 AM Robert Asher MD [address] [place] [address]   11/15/2023  1:45 PM Katrina Auguste APRN - NP [facility]      Plan:  - Navigate at PCP and cardiology appt   Revisit for in home nurse vs in 1 month.     Jael Buck RN,

## 2023-10-31 ENCOUNTER — OFFICE VISIT (OUTPATIENT)
Dept: FAMILY MEDICINE CLINIC | Age: 58
End: 2023-10-31
Payer: MEDICAID

## 2023-10-31 ENCOUNTER — TELEPHONE (OUTPATIENT)
Dept: FAMILY MEDICINE CLINIC | Age: 58
End: 2023-10-31

## 2023-10-31 ENCOUNTER — HOSPITAL ENCOUNTER (OUTPATIENT)
Age: 58
Setting detail: SPECIMEN
Discharge: HOME | End: 2023-10-31

## 2023-10-31 VITALS
HEIGHT: 62 IN | BODY MASS INDEX: 25.03 KG/M2 | WEIGHT: 136 LBS | DIASTOLIC BLOOD PRESSURE: 62 MMHG | SYSTOLIC BLOOD PRESSURE: 107 MMHG | HEART RATE: 52 BPM

## 2023-10-31 DIAGNOSIS — N18.32 STAGE 3B CHRONIC KIDNEY DISEASE (HCC): Primary | ICD-10-CM

## 2023-10-31 DIAGNOSIS — I10 ESSENTIAL HYPERTENSION: ICD-10-CM

## 2023-10-31 DIAGNOSIS — M72.2 PLANTAR FASCIITIS, BILATERAL: ICD-10-CM

## 2023-10-31 DIAGNOSIS — M72.2 PLANTAR FASCIITIS, BILATERAL: Primary | ICD-10-CM

## 2023-10-31 LAB
ALBUMIN SERPL-MCNC: 4.7 G/DL (ref 3.5–5.2)
ALBUMIN/GLOB SERPL: 1.4 {RATIO} (ref 1–2.5)
ALP SERPL-CCNC: 81 U/L (ref 35–104)
ALT SERPL-CCNC: 13 U/L (ref 5–33)
ANION GAP SERPL CALCULATED.3IONS-SCNC: 14 MMOL/L (ref 9–17)
AST SERPL-CCNC: 31 U/L
BILIRUB SERPL-MCNC: 0.3 MG/DL (ref 0.3–1.2)
BUN SERPL-MCNC: 37 MG/DL (ref 6–20)
CALCIUM SERPL-MCNC: 9.9 MG/DL (ref 8.6–10.4)
CHLORIDE SERPL-SCNC: 99 MMOL/L (ref 98–107)
CO2 SERPL-SCNC: 24 MMOL/L (ref 20–31)
CREAT SERPL-MCNC: 1.7 MG/DL (ref 0.5–0.9)
GFR SERPL CREATININE-BSD FRML MDRD: 35 ML/MIN/1.73M2
GLUCOSE SERPL-MCNC: 88 MG/DL (ref 70–99)
POTASSIUM SERPL-SCNC: 5.4 MMOL/L (ref 3.7–5.3)
PROT SERPL-MCNC: 8.1 G/DL (ref 6.4–8.3)
SODIUM SERPL-SCNC: 137 MMOL/L (ref 135–144)

## 2023-10-31 PROCEDURE — G8420 CALC BMI NORM PARAMETERS: HCPCS

## 2023-10-31 PROCEDURE — 1036F TOBACCO NON-USER: CPT

## 2023-10-31 PROCEDURE — 3017F COLORECTAL CA SCREEN DOC REV: CPT

## 2023-10-31 PROCEDURE — 3078F DIAST BP <80 MM HG: CPT

## 2023-10-31 PROCEDURE — 3074F SYST BP LT 130 MM HG: CPT

## 2023-10-31 PROCEDURE — G8484 FLU IMMUNIZE NO ADMIN: HCPCS

## 2023-10-31 PROCEDURE — 99213 OFFICE O/P EST LOW 20 MIN: CPT

## 2023-10-31 PROCEDURE — G8427 DOCREV CUR MEDS BY ELIG CLIN: HCPCS

## 2023-10-31 RX ORDER — ACETAMINOPHEN 500 MG
500 TABLET ORAL EVERY 6 HOURS PRN
Qty: 30 TABLET | Refills: 1 | Status: SHIPPED | OUTPATIENT
Start: 2023-10-31

## 2023-10-31 SDOH — ECONOMIC STABILITY: FOOD INSECURITY: WITHIN THE PAST 12 MONTHS, YOU WORRIED THAT YOUR FOOD WOULD RUN OUT BEFORE YOU GOT MONEY TO BUY MORE.: NEVER TRUE

## 2023-10-31 SDOH — ECONOMIC STABILITY: FOOD INSECURITY: WITHIN THE PAST 12 MONTHS, THE FOOD YOU BOUGHT JUST DIDN'T LAST AND YOU DIDN'T HAVE MONEY TO GET MORE.: NEVER TRUE

## 2023-10-31 SDOH — ECONOMIC STABILITY: HOUSING INSECURITY
IN THE LAST 12 MONTHS, WAS THERE A TIME WHEN YOU DID NOT HAVE A STEADY PLACE TO SLEEP OR SLEPT IN A SHELTER (INCLUDING NOW)?: NO

## 2023-10-31 SDOH — ECONOMIC STABILITY: INCOME INSECURITY: HOW HARD IS IT FOR YOU TO PAY FOR THE VERY BASICS LIKE FOOD, HOUSING, MEDICAL CARE, AND HEATING?: NOT HARD AT ALL

## 2023-10-31 ASSESSMENT — ENCOUNTER SYMPTOMS
SORE THROAT: 0
DIARRHEA: 0
NAUSEA: 0
CONSTIPATION: 0
VOMITING: 0
ABDOMINAL PAIN: 0
RHINORRHEA: 0
SHORTNESS OF BREATH: 0

## 2023-10-31 NOTE — PROGRESS NOTES
[facility]    Family Medicine Residency Program - Outpatient Note      Subjective:    Nanette Richardson is a 62 y.o. female with  has a past medical history of AICD discharge, RIP (acute kidney injury) ([address]), Asthma, Asthma with COPD with exacerbation ([address]), CAD (coronary artery disease), Cardiomegaly, Chest pain, CHF (congestive heart failure) ([address]), Chronic systolic heart failure ([address]) s/p AICD, COPD (chronic obstructive pulmonary disease) ([address]), Depression, Fibroids, Hypertension, Hypomagnesemia, Intraparenchymal hematoma of left side of brain due to trauma [facility]), Lesion of right native kidney, Lung nodule < 6cm on CT, MI (myocardial infarction) ([address]), Mild intermittent asthma, Non-ischemic cardiomyopathy ([address]), NSTEMI (non-ST elevated myocardial infarction) ([address]), QT prolongation, Syncope, and Unspecified diseases of blood and blood-forming organs. Presented to the office today for:  Chief Complaint   Patient presents with    Gout     Possible gout flare up - both feet - 2 weeks - 8/10 pain scale    Health Maintenance     Decline vaccines       HPI  Patient is a 79-year-old female with past medical history HFrEF with EF 20 to 25%, hypertension, COPD seen today with complaints of bilateral plantar foot pain x2 weeks. Plantar fasciitis  - B/l plantar and heel pain x 2 week  - Reports that she is on her feet a lot. Wears tennis shows   - Aggravated with standing after rest  - Alleviated with rest   - only smokes marijuana pain management  -Denies weakness, swelling, erythema of feet. Review of Systems   Constitutional:  Negative for fatigue. HENT:  Negative for rhinorrhea and sore throat. Eyes:  Negative for visual disturbance. Respiratory:  Negative for shortness of breath. Cardiovascular:  Negative for chest pain and palpitations. Gastrointestinal:  Negative for abdominal pain, constipation, diarrhea, nausea and vomiting. Endocrine: Negative.     Genitourinary:

## 2023-10-31 NOTE — PROGRESS NOTES
Visit Information    Have you changed or started any medications since your last visit including any over-the-counter medicines, vitamins, or herbal medicines? no   Have you stopped taking any of your medications? Is so, why? -  no  Are you having any side effects from any of your medications? - no    Have you seen any other physician or provider since your last visit?  no   Have you had any other diagnostic tests since your last visit? yes - Echo (TTE)   Have you been seen in the emergency room and/or had an admission in a hospital since we last saw you?  no   Have you had your routine dental cleaning in the past 6 months?  no     Do you have an active MyChart account? If no, what is the barrier?   Yes    Patient Care Team:  Gill Hinton MD as PCP - General (Family Medicine)  Katya Alvarez RN as   Jeniffer Bernabe MD as Consulting Physician (Pulmonary Disease)    Medical History Review  Past Medical, Family, and Social History reviewed and does not contribute to the patient presenting condition    Health Maintenance   Topic Date Due    Hepatitis B vaccine (1 of 3 - 3-dose series) Never done    COVID-19 Vaccine (1) Never done    Shingles vaccine (1 of 2) Never done    Pneumococcal 0-64 years Vaccine (2 - PCV) 05/20/2017    Lipids  11/01/2020    Flu vaccine (1) 08/01/2023    GFR test (Diabetes, CKD 3-4, OR last GFR 15-59)  11/27/2023    A1C test (Diabetic or Prediabetic)  06/21/2024    Cervical cancer screen  07/09/2024    Depression Screen  10/30/2024    Breast cancer screen  02/20/2025    Colorectal Cancer Screen  12/14/2025    DTaP/Tdap/Td vaccine (2 - Td or Tdap) 06/09/2029    Hepatitis C screen  Completed    HIV screen  Completed    Hepatitis A vaccine  Aged Out    Hib vaccine  Aged Out    Meningococcal (ACWY) vaccine  Aged Out

## 2023-10-31 NOTE — PROGRESS NOTES
Attending Physician Statement  I have discussed the care of Georges Angulo, including pertinent history and exam findings,  with the resident. I have reviewed the key elements of all parts of the encounter with the resident. I agree with the assessment, plan and orders as documented by the resident.   (Doreen Stevenson)    Abimael Santos MD

## 2023-10-31 NOTE — PROGRESS NOTES
Patient:   FARHAT SURESH            MRN: [id number]            FIN: [id number]               Recent CMP ordered and completed today (10/31/23) shows GFR 35. Blood pressure controlled. Last A1c on 6/21/23 was 5.9. History of HFrEF with EF 20-25%. Will order renal US and refer patient to nephrology.      Lizet Rasmussen MD  Family Medicine Resident, PGY-2  10/31/2023 at 4:12 PM                            Nichole Ochoa

## 2023-11-01 ENCOUNTER — TELEPHONE (OUTPATIENT)
Dept: FAMILY MEDICINE CLINIC | Age: 58
End: 2023-11-01

## 2023-11-01 RX ORDER — OMEPRAZOLE 20 MG/1
CAPSULE, DELAYED RELEASE ORAL
Qty: 30 CAPSULE | Refills: 0 | Status: SHIPPED | OUTPATIENT
Start: 2023-11-01

## 2023-11-01 RX ORDER — LANOLIN ALCOHOL/MO/W.PET/CERES
CREAM (GRAM) TOPICAL
Qty: 30 TABLET | Refills: 0 | Status: SHIPPED | OUTPATIENT
Start: 2023-11-01

## 2023-11-08 ENCOUNTER — TELEPHONE (OUTPATIENT)
Dept: FAMILY MEDICINE CLINIC | Age: 58
End: 2023-11-08

## 2023-11-10 ENCOUNTER — CARE COORDINATION (OUTPATIENT)
Dept: FAMILY MEDICINE CLINIC | Age: 58
End: 2023-11-10

## 2023-11-13 ENCOUNTER — TELEPHONE (OUTPATIENT)
Dept: OTHER | Age: 58
End: 2023-11-13

## 2023-11-13 DIAGNOSIS — I50.22 CHRONIC SYSTOLIC HEART FAILURE (HCC): Primary | ICD-10-CM

## 2023-11-13 NOTE — CARE COORDINATION
[address]  11/10/2023          [facility] Outreach nurse vs    Met with [place] Bar[place] for nurse vs.  C/o of abdominal fullness, cough    Emotional/coping  Alert,oriented x 3, engaged and cooperative   Affect  full   Reports intermittent anxiety   Thought process- logical    Socialization/family   Reports family calls or visits daily. Other social activities include watching TV, visits from friends. Kristina Ross recently moved to one bedroom subsidized apartment   Apartment is well furnished. No throw rugs on floors. She has grab bars in bathroom and built in seat in bathtub. [place] Barroso has adequate food in the home. Medications  Reviewed medication schedule and medication adherence packages   [place] Bar[place] demonstrates adherence with taking oral medications  She reports infrequent use of albuterol inhaler. She reports increase in cough without sputum production. She was instructed to use albuterol inhaler every 6 hours as needed. Reports her nebulizer is at the home of her brother and she has not used in past month. She was instructed to obtain nebulizer from her brother's home for use as needed. Current Outpatient Medications   Medication Instructions    acetaminophen (TYLENOL) 500 mg, Oral, EVERY 6 HOURS PRN    albuterol sulfate HFA (VENTOLIN HFA) 108 (90 Base) MCG/ACT inhaler INHALE 2 PUFFS INTO THE LUNGS EVERY 6 HOURS AS NEEDED FOR WHEEZING.     amiodarone (CORDARONE) 200 mg, Oral, DAILY    amitriptyline (ELAVIL) 25 mg, Oral, NIGHTLY    ASPIRIN LOW DOSE 81 MG EC tablet TAKE ONE TABLET BY MOUTH ONE TIME A DAY    atorvastatin (LIPITOR) 40 MG tablet TAKE ONE TABLET BY MOUTH EVERY NIGHT    bumetanide (BUMEX) 1 MG tablet TAKE 1 TABLET BY MOUTH 2 TIMES A DAY    fluticasone (FLONASE) 50 MCG/ACT nasal spray 2 sprays, Each Nostril, DAILY    ipratropium-albuterol (DUONEB) 0.5-2.5 (3) MG/3ML SOLN nebulizer solution 3 mLs, Inhalation, 2 TIMES DAILY PRN    magnesium oxide (MAG-OX) 400 (240 Mg) MG

## 2023-11-15 DIAGNOSIS — Z13.220 LIPID SCREENING: ICD-10-CM

## 2023-11-15 DIAGNOSIS — I50.22 CHRONIC SYSTOLIC HEART FAILURE (HCC): ICD-10-CM

## 2023-11-15 NOTE — TELEPHONE ENCOUNTER
Last visit: 10/31/23  Last Med refill: 5/4/23  Does patient have enough medication for 72 hours: No:     Next Visit Date:  Future Appointments   Date Time Provider [address]   11/15/2023  1:45 PM Chel Auguste APRN - NP [facility]   11/27/2023  1:00 PM [facility] 1 [facility]   12/4/2023 10:45 AM Myron Jeong MD [address] [place] [address],[place] [address] Maintenance   Topic Date Due    Hepatitis B vaccine (1 of 3 - 3-dose series) Never done    COVID-19 Vaccine (1) Never done    Shingles vaccine (1 of 2) Never done    Pneumococcal 0-64 years Vaccine (2 - PCV) 05/20/2017    Lipids  11/01/2020    Flu vaccine (1) 08/01/2023    A1C test (Diabetic or Prediabetic)  06/21/2024    Cervical cancer screen  07/09/2024    Depression Screen  10/30/2024    GFR test (Diabetes, CKD 3-4, OR last GFR 15-59)  10/31/2024    Breast cancer screen  02/20/2025    Colorectal Cancer Screen  12/14/2025    DTaP/Tdap/Td vaccine (2 - Td or Tdap) 06/09/2029    Hepatitis C screen  Completed    HIV screen  Completed    Hepatitis A vaccine  Aged Out    Hib vaccine  Aged Out    Meningococcal (ACWY) vaccine  Aged Out       Hemoglobin A1C (%)   Date Value   06/21/2023 5.9   05/26/2022 5.7   11/06/2019 5.7             ( goal A1C is < 7)   No components found for: \"LABMICR\"  LDL Cholesterol (mg/dL)   Date Value   11/01/2019 131 (H)   11/25/2018 128       (goal LDL is <100)   AST (U/L)   Date Value   10/31/2023 31     ALT (U/L)   Date Value   10/31/2023 13     BUN (mg/dL)   Date Value   10/31/2023 37 (H)     BP Readings from Last 3 Encounters:   10/31/23 107/62   08/30/23 118/72   08/10/23 118/72          (goal 120/80)    All Future Testing planned in [facility]  Lab Frequency Next Occurrence   Lipid Panel Once 06/21/2023   US RENAL COMPLETE Once 10/31/2023               Patient Active Problem List:     COPD (chronic obstructive pulmonary disease) ([address])     Fibroids     Syncope     Lung nodule < 6cm on CT     Chronic

## 2023-11-16 RX ORDER — BUMETANIDE 1 MG/1
TABLET ORAL
Qty: 60 TABLET | Refills: 1 | Status: SHIPPED | OUTPATIENT
Start: 2023-11-16

## 2023-11-16 RX ORDER — SPIRONOLACTONE 25 MG/1
TABLET ORAL
Qty: 30 TABLET | Refills: 1 | Status: SHIPPED | OUTPATIENT
Start: 2023-11-16

## 2023-11-16 RX ORDER — ATORVASTATIN CALCIUM 40 MG/1
40 TABLET, FILM COATED ORAL
Qty: 30 TABLET | Refills: 1 | Status: SHIPPED | OUTPATIENT
Start: 2023-11-16

## 2023-11-16 NOTE — TELEPHONE ENCOUNTER
Met with patient at her home. Patient took this writer on a tour of her home to see all of the amenities. Assisted patient with completing an information change application for [employer] as well as the medical transportation application. Will submit both applications on her behalf. Patient stated that she has no needs at this time.

## 2023-11-16 NOTE — TELEPHONE ENCOUNTER
Phone call from patient. Patient complaining about pain in her feet and feels as if she's experiencing gout symptoms. Scheduled PCP appointment for patient for 10/31 @ 11am.  Will meet with patient at appointment.

## 2023-11-16 NOTE — TELEPHONE ENCOUNTER
Met with patient at her PCP appointment. Patient stated that she is comfortable in her new home but admitted to spending a lot of money to move and purchase goods for her home. Patient stated that all of her bills have been paid for the month and she has food. Patient has yet to change her address with [employer]. Will meet with patient to complete change of address information and to complete medical transportation application.

## 2023-11-16 NOTE — TELEPHONE ENCOUNTER
Phone call from patient. Patient is requesting assistance with obtaining a Medicare card. Patient is not currently enrolled in Medicare. Patient will go to [employer] office for more assistance.

## 2023-11-27 ENCOUNTER — HOSPITAL ENCOUNTER (OUTPATIENT)
Dept: OTHER | Age: 58
Discharge: HOME | End: 2023-11-27
Payer: MEDICAID

## 2023-11-27 VITALS
DIASTOLIC BLOOD PRESSURE: 72 MMHG | HEART RATE: 66 BPM | BODY MASS INDEX: 25.88 KG/M2 | WEIGHT: 141.5 LBS | OXYGEN SATURATION: 98 % | SYSTOLIC BLOOD PRESSURE: 120 MMHG | RESPIRATION RATE: 20 BRPM

## 2023-11-27 PROCEDURE — 99212 OFFICE O/P EST SF 10 MIN: CPT

## 2023-11-29 ENCOUNTER — HOSPITAL ENCOUNTER (EMERGENCY)
Age: 58
Discharge: HOME | End: 2023-11-29
Attending: EMERGENCY MEDICINE
Payer: MEDICAID

## 2023-11-29 VITALS
SYSTOLIC BLOOD PRESSURE: 123 MMHG | HEART RATE: 58 BPM | OXYGEN SATURATION: 97 % | TEMPERATURE: 97.5 F | RESPIRATION RATE: 18 BRPM | DIASTOLIC BLOOD PRESSURE: 69 MMHG

## 2023-11-29 DIAGNOSIS — K02.9 DENTAL CARIES: Primary | ICD-10-CM

## 2023-11-29 PROCEDURE — 99283 EMERGENCY DEPT VISIT LOW MDM: CPT

## 2023-11-29 PROCEDURE — 6370000000 HC RX 637 (ALT 250 FOR IP): Performed by: STUDENT IN AN ORGANIZED HEALTH CARE EDUCATION/TRAINING PROGRAM

## 2023-11-29 RX ORDER — PENICILLIN V POTASSIUM 500 MG/1
500 TABLET ORAL 4 TIMES DAILY
Qty: 28 TABLET | Refills: 0 | Status: SHIPPED | OUTPATIENT
Start: 2023-11-29 | End: 2023-12-06

## 2023-11-29 RX ORDER — PENICILLIN V POTASSIUM 250 MG/1
500 TABLET ORAL ONCE
Status: COMPLETED | OUTPATIENT
Start: 2023-11-29 | End: 2023-11-29

## 2023-11-29 RX ADMIN — PENICILLIN V POTASSIUM 500 MG: 250 TABLET ORAL at 00:49

## 2023-11-29 RX ADMIN — Medication: at 00:49

## 2023-11-29 ASSESSMENT — PAIN - FUNCTIONAL ASSESSMENT
PAIN_FUNCTIONAL_ASSESSMENT: NONE - DENIES PAIN
PAIN_FUNCTIONAL_ASSESSMENT: 0-10

## 2023-11-29 ASSESSMENT — ENCOUNTER SYMPTOMS
SHORTNESS OF BREATH: 0
TROUBLE SWALLOWING: 0

## 2023-11-29 NOTE — ED TRIAGE NOTES
Pt arrives to ED ambulatory through triage for left side dental pain. Pt states she has several cracked teeth and has yet to see a dentist.   Pt states pain radiates to left jaw and left ear. Pt reports trying tylenol and orajel for pain without relief. Pt A&Ox4, rr even and nonlabored, nad.

## 2023-11-29 NOTE — DISCHARGE INSTRUCTIONS
Thank you for coming to [facility] emergency department! You were seen today for dental pain, you were diagnosed with caries. You were prescribed penicillin, benzocaine, please pick these medications up at the pharmacy. Follow up with your dentist.  If you have any worsening of symptoms or any other concerns, please return to the emergency department. We are always available!

## 2023-12-05 ENCOUNTER — CARE COORDINATION (OUTPATIENT)
Dept: FAMILY MEDICINE CLINIC | Age: 58
End: 2023-12-05

## 2023-12-06 NOTE — CARE COORDINATION
Margarita Jeters  12/5/2023                [facility] Outreach nurse vs    Met with Emilee Umanzor in her home for nursing visit in follow up to ED visit on 11/29/23. Emotional/coping  Alert, oriented x 3, engaged and cooperative    Affect blunted   Thought process- logical   She reports difficulty sleeping/resting related to continued pain in jaw and teeth    Socialization/family  States family relationships are supportive   She engages with family and friends daily in person or by phone. Housing -  She has adequate food in the home and reports she is able to afford monthly living expenses    Medications   Medication schedule reviewed  She has all medications in the home and is taking oral medications in adherence packages and taking PCN  mg 4 x day for past 5 -6 days. She reports intermittent use of inhalers and aerosol treatments and states cough symptoms are controlled. Current Outpatient Medications   Medication Instructions    acetaminophen (TYLENOL) 500 mg, Oral, EVERY 6 HOURS PRN    albuterol sulfate HFA (VENTOLIN HFA) 108 (90 Base) MCG/ACT inhaler INHALE 2 PUFFS INTO THE LUNGS EVERY 6 HOURS AS NEEDED FOR WHEEZING.     amiodarone (CORDARONE) 200 mg, Oral, DAILY    amitriptyline (ELAVIL) 25 mg, NIGHTLY    ASPIRIN LOW DOSE 81 MG EC tablet TAKE ONE TABLET BY MOUTH ONE TIME A DAY    atorvastatin (LIPITOR) 40 mg, Oral    benzocaine (ORAL ANALGESIC MAX ST) 20 % GEL mucosal gel 1 each, Mouth/Throat, 2 TIMES DAILY PRN    bumetanide (BUMEX) 1 MG tablet TAKE ONE TABLET BY MOUTH TWICE A DAY    fluticasone (FLONASE) 50 MCG/ACT nasal spray 2 sprays, Each Nostril, DAILY    ipratropium-albuterol (DUONEB) 0.5-2.5 (3) MG/3ML SOLN nebulizer solution 3 mLs, Inhalation, 2 TIMES DAILY PRN    magnesium oxide (MAG-OX) 400 (240 Mg) MG tablet TAKE ONE TABLET BY MOUTH ONCE A DAY    metoprolol succinate (TOPROL XL) 50 MG extended release tablet TAKE ONE TABLET BY MOUTH EVERY MORNING    Multiple Vitamin (MULTIVITAMIN)

## 2023-12-07 ENCOUNTER — OFFICE VISIT (OUTPATIENT)
Dept: FAMILY MEDICINE CLINIC | Age: 58
End: 2023-12-07
Payer: MEDICAID

## 2023-12-07 ENCOUNTER — TELEPHONE (OUTPATIENT)
Dept: FAMILY MEDICINE CLINIC | Age: 58
End: 2023-12-07

## 2023-12-07 VITALS
HEIGHT: 62 IN | BODY MASS INDEX: 25.4 KG/M2 | SYSTOLIC BLOOD PRESSURE: 93 MMHG | WEIGHT: 138 LBS | DIASTOLIC BLOOD PRESSURE: 68 MMHG | OXYGEN SATURATION: 97 % | HEART RATE: 55 BPM

## 2023-12-07 DIAGNOSIS — K02.9 DENTAL CARIES: Primary | ICD-10-CM

## 2023-12-07 DIAGNOSIS — J44.9 CHRONIC OBSTRUCTIVE PULMONARY DISEASE, UNSPECIFIED COPD TYPE (HCC): ICD-10-CM

## 2023-12-07 PROCEDURE — G8427 DOCREV CUR MEDS BY ELIG CLIN: HCPCS

## 2023-12-07 PROCEDURE — 3017F COLORECTAL CA SCREEN DOC REV: CPT

## 2023-12-07 PROCEDURE — 3023F SPIROM DOC REV: CPT

## 2023-12-07 PROCEDURE — 99213 OFFICE O/P EST LOW 20 MIN: CPT

## 2023-12-07 PROCEDURE — G8417 CALC BMI ABV UP PARAM F/U: HCPCS

## 2023-12-07 PROCEDURE — G8484 FLU IMMUNIZE NO ADMIN: HCPCS

## 2023-12-07 PROCEDURE — 3078F DIAST BP <80 MM HG: CPT

## 2023-12-07 PROCEDURE — 1036F TOBACCO NON-USER: CPT

## 2023-12-07 PROCEDURE — 3074F SYST BP LT 130 MM HG: CPT

## 2023-12-07 RX ORDER — CHLORHEXIDINE GLUCONATE 1.2 MG/ML
15 RINSE ORAL 2 TIMES DAILY
Qty: 420 ML | Refills: 0 | Status: SHIPPED | OUTPATIENT
Start: 2023-12-07 | End: 2023-12-21

## 2023-12-07 RX ORDER — AMOXICILLIN AND CLAVULANATE POTASSIUM 875; 125 MG/1; MG/1
1 TABLET, FILM COATED ORAL 2 TIMES DAILY
Qty: 14 TABLET | Refills: 0 | Status: SHIPPED | OUTPATIENT
Start: 2023-12-07 | End: 2023-12-14

## 2023-12-07 RX ORDER — NAPROXEN 375 MG/1
375 TABLET ORAL 2 TIMES DAILY WITH MEALS
Qty: 10 TABLET | Refills: 0 | Status: SHIPPED | OUTPATIENT
Start: 2023-12-07 | End: 2023-12-12

## 2023-12-07 ASSESSMENT — ENCOUNTER SYMPTOMS
SINUS PRESSURE: 0
SORE THROAT: 0
FACIAL SWELLING: 0

## 2023-12-07 NOTE — PATIENT INSTRUCTIONS
Thank you for letting us take care of you today. We hope all your questions were addressed. If a question was overlooked or something else comes to mind after you return home, please contact a member of your Care Team listed below. Your Care Team at [address] [place] [address] is Team #  Urban Ricks, (Faculty)  Faustino Gosselin (Resident)  Manisha Mas (Resident)  Nuria Herbert (Resident)   Hanna Oconnor (Resident)  Roxy Peña (Resident)  Beny Melo., [facility]  Lucien Stone., [facility], [facility]  Jb Magaña, [facility]  Gina Weller, [facility]  Ruth Ann Sharif, [facility]  Felipe Gordillo) [place], [facility] (Clinical Practice Manager)  Germaine Walker, [facility] (Clinical Pharmacist)     Office phone number: [phone]    If you need to get in right away due to illness, please be advised we have \"Same Day\" appointments available Monday-Friday. Please call us at [phone] option #3 to schedule your \"Same Day\" appointment.

## 2023-12-07 NOTE — PROGRESS NOTES
HYPERTENSION visit     BP Readings from Last 3 Encounters:   11/29/23 123/69   11/27/23 120/72   11/15/23 116/62       LDL Cholesterol (mg/dL)   Date Value   11/01/2019 131 (H)     HDL (mg/dL)   Date Value   11/01/2019 59     BUN (mg/dL)   Date Value   10/31/2023 37 (H)     Creatinine (mg/dL)   Date Value   10/31/2023 1.7 (H)     Glucose (mg/dL)   Date Value   10/31/2023 88              Have you changed or started any medications since your last visit including any over-the-counter medicines, vitamins, or herbal medicines? no   Have you stopped taking any of your medications? Is so, why? -  no  Are you having any side effects from any of your medications? - no  How often do you miss doses of your medication? no      Have you seen any other physician or provider since your last visit? yes - Cardiology    Have you had any other diagnostic tests since your last visit? yes - labs   Have you been seen in the emergency room and/or had an admission in a hospital since we last saw you?  yes - [facility]   Have you had your routine dental cleaning in the past 6 months?  no     Do you have an active MyChart account? If no, what is the barrier?   Yes    Patient Care Team:  Jennifer Rodríguez MD as PCP - General (Family Medicine)  Mahi Welsh MD as PCP - Empaneled Provider  Nati Dinh RN as   Tristen Colón MD as Consulting Physician (Pulmonary Disease)    Medical History Review  Past Medical, Family, and Social History reviewed and does not contribute to the patient presenting condition    Health Maintenance   Topic Date Due    Hepatitis B vaccine (1 of 3 - 3-dose series) Never done    COVID-19 Vaccine (1) Never done    Shingles vaccine (1 of 2) Never done    Pneumococcal 0-64 years Vaccine (2 - PCV) 05/20/2017    Lipids  11/01/2020    Flu vaccine (1) 08/01/2023    A1C test (Diabetic or Prediabetic)  06/21/2024    Cervical cancer screen  07/09/2024    Depression Screen  10/30/2024    GFR test (Diabetes,

## 2023-12-07 NOTE — PROGRESS NOTES
[facility]    Family Medicine Residency Program - Outpatient Note      Subjective:    Makayla Fischer is a 62 y.o. female with  has a past medical history of AICD discharge, RIP (acute kidney injury) ([address]), Asthma, Asthma with COPD with exacerbation ([address]), CAD (coronary artery disease), Cardiomegaly, Chest pain, CHF (congestive heart failure) ([address]), Chronic systolic heart failure ([address]) s/p AICD, COPD (chronic obstructive pulmonary disease) ([address]), Depression, Fibroids, Hypertension, Hypomagnesemia, Intraparenchymal hematoma of left side of brain due to trauma [facility]), Lesion of right native kidney, Lung nodule < 6cm on CT, MI (myocardial infarction) ([address]), Mild intermittent asthma, Non-ischemic cardiomyopathy ([address]), NSTEMI (non-ST elevated myocardial infarction) ([address]), QT prolongation, Syncope, and Unspecified diseases of blood and blood-forming organs. Presented to the office today for:  Chief Complaint   Patient presents with    Follow-up     Patient here to f/u on tooth pain - Patient states her mouth is still in pain        HPI    52-year-old-year-old female with history of heart failure, moderate mitral regurgitation, stage I CKD showed up in office today with tooth pain left side involving molar, she had swelling in that tooth, the pain has been going on for a month, she recently had an ED visit where she was prescribed penicillin local benzocaine gel and Tylenol that is not helping with the pain. Patient has very poor dental oral hygiene, left buccal mucosa and periodontal space side visibly swollen as compared to right the pain is radiating to her right masseter, no erythema, no bleeding or pus or any discharge noticed. Patient was provided with resources for urgent walk-in dental visit also, patient had an appointment on December 13 with her dentist.      Review of Systems   Constitutional:  Negative for chills, diaphoresis and fatigue. HENT:  Positive for dental problem.

## 2023-12-07 NOTE — PROGRESS NOTES
Attending Physician Statement  I have discussed the care of CynthiaStoudamireincluding pertinent history and exam findings,  with the resident. I have reviewed the key elements of all parts of the encounter with the resident. I agree with the assessment, plan and orders as documented by the resident.   (GE Modifier)    S/P ED visit  Infected Tooth-

## 2023-12-12 ENCOUNTER — TELEPHONE (OUTPATIENT)
Dept: FAMILY MEDICINE CLINIC | Age: 58
End: 2023-12-12

## 2023-12-13 DIAGNOSIS — I50.22 CHRONIC SYSTOLIC HEART FAILURE (HCC): ICD-10-CM

## 2023-12-13 DIAGNOSIS — I25.10 CORONARY ARTERY DISEASE INVOLVING NATIVE HEART WITHOUT ANGINA PECTORIS, UNSPECIFIED VESSEL OR LESION TYPE: ICD-10-CM

## 2023-12-13 RX ORDER — OMEPRAZOLE 20 MG/1
CAPSULE, DELAYED RELEASE ORAL
Qty: 30 CAPSULE | Refills: 0 | Status: SHIPPED | OUTPATIENT
Start: 2023-12-13

## 2023-12-13 RX ORDER — ASPIRIN 81 MG/1
81 TABLET, COATED ORAL DAILY
Qty: 90 TABLET | Refills: 0 | Status: SHIPPED | OUTPATIENT
Start: 2023-12-13

## 2023-12-13 RX ORDER — METOPROLOL SUCCINATE 50 MG/1
TABLET, EXTENDED RELEASE ORAL
Qty: 90 TABLET | Refills: 0 | Status: SHIPPED | OUTPATIENT
Start: 2023-12-13

## 2023-12-13 RX ORDER — LANOLIN ALCOHOL/MO/W.PET/CERES
CREAM (GRAM) TOPICAL
Qty: 30 TABLET | Refills: 0 | Status: SHIPPED | OUTPATIENT
Start: 2023-12-13

## 2023-12-13 NOTE — TELEPHONE ENCOUNTER
E-scribe request for prilosec and magnesium oxide. Please review and e-scribe if applicable.      Last Visit Date:  12/7/23  Next Visit Date:  12/13/2023    Hemoglobin A1C (%)   Date Value   06/21/2023 5.9   05/26/2022 5.7   11/06/2019 5.7             ( goal A1C is < 7)   No components found for: \"LABMICR\"  LDL Cholesterol (mg/dL)   Date Value   11/01/2019 131 (H)       (goal LDL is <100)   AST (U/L)   Date Value   10/31/2023 31     ALT (U/L)   Date Value   10/31/2023 13     BUN (mg/dL)   Date Value   10/31/2023 37 (H)     BP Readings from Last 3 Encounters:   12/07/23 93/68   11/29/23 123/69   11/27/23 120/72          (goal 120/80)        Patient Active Problem List:     COPD (chronic obstructive pulmonary disease) ([facility])     Fibroids     Syncope     Lung nodule < 6cm on CT     Chronic systolic heart failure (HCC) s/p AICD     Mild intermittent asthma     Essential hypertension     Chest pain     QT prolongation     Asthma with COPD with exacerbation ([facility])     Non-ischemic cardiomyopathy (HCC)     Lesion of right native kidney     NSTEMI (non-ST elevated myocardial infarction) ([address])     CAD (coronary artery disease)     Intraparenchymal hematoma of left side of brain due to trauma (HCC)     Chronic combined systolic and diastolic congestive heart failure (HCC)     AICD discharge     RIP (acute kidney injury) ([address])     Hypomagnesemia     Other heart failure ([facility])     Atypical chest pain     Plantar fasciitis, bilateral

## 2023-12-13 NOTE — TELEPHONE ENCOUNTER
E-scribe request for toprol XL and aspirin. Please review and e-scribe if applicable.      Last Visit Date:  12/7/23  Next Visit Date:  1/8/2024    Hemoglobin A1C (%)   Date Value   06/21/2023 5.9   05/26/2022 5.7   11/06/2019 5.7             ( goal A1C is < 7)   No components found for: \"LABMICR\"  LDL Cholesterol (mg/dL)   Date Value   11/01/2019 131 (H)       (goal LDL is <100)   AST (U/L)   Date Value   10/31/2023 31     ALT (U/L)   Date Value   10/31/2023 13     BUN (mg/dL)   Date Value   10/31/2023 37 (H)     BP Readings from Last 3 Encounters:   12/07/23 93/68   11/29/23 123/69   11/27/23 120/72          (goal 120/80)        Patient Active Problem List:     COPD (chronic obstructive pulmonary disease) ([facility])     Fibroids     Syncope     Lung nodule < 6cm on CT     Chronic systolic heart failure (HCC) s/p AICD     Mild intermittent asthma     Essential hypertension     Chest pain     QT prolongation     Asthma with COPD with exacerbation (HCC)     Non-ischemic cardiomyopathy (HCC)     Lesion of right native kidney     NSTEMI (non-ST elevated myocardial infarction) ([address])     CAD (coronary artery disease)     Intraparenchymal hematoma of left side of brain due to trauma (HCC)     Chronic combined systolic and diastolic congestive heart failure (HCC)     AICD discharge     RIP (acute kidney injury) ([address])     Hypomagnesemia     Other heart failure (HCC)     Atypical chest pain     Plantar fasciitis, bilateral

## 2023-12-14 ENCOUNTER — TELEPHONE (OUTPATIENT)
Dept: FAMILY MEDICINE CLINIC | Age: 58
End: 2023-12-14

## 2023-12-18 RX ORDER — LANOLIN ALCOHOL/MO/W.PET/CERES
CREAM (GRAM) TOPICAL
Qty: 30 TABLET | Refills: 0 | OUTPATIENT
Start: 2023-12-18

## 2023-12-27 ENCOUNTER — TELEPHONE (OUTPATIENT)
Dept: FAMILY MEDICINE CLINIC | Age: 58
End: 2023-12-27

## 2023-12-28 ENCOUNTER — HOSPITAL ENCOUNTER (OUTPATIENT)
Dept: OTHER | Age: 58
Discharge: HOME | End: 2023-12-28

## 2024-01-03 ENCOUNTER — TELEPHONE (OUTPATIENT)
Dept: FAMILY MEDICINE CLINIC | Age: 59
End: 2024-01-03

## 2024-01-04 ENCOUNTER — TELEPHONE (OUTPATIENT)
Dept: FAMILY MEDICINE CLINIC | Age: 59
End: 2024-01-04

## 2024-01-04 NOTE — TELEPHONE ENCOUNTER
Phone call to patient.  Patient stated that she went to her dental appointment and she was able to get her teeth pulled.  Patient stated that she has a follow up appointment to get the remaining teeth pulled  Patient was satisfied with the service an has no complaints.

## 2024-01-04 NOTE — TELEPHONE ENCOUNTER
Met with patient at her doctor's appt.  Patient stated that she was miserable and is having difficulty sleeping due to dental pain.  Patient's dental appointment is next Wednesday at [facility] and they cannot schedule appointment for a sooner date.      Patient also mentioned that she is experiencing stress due to family issues.  Patient stated that she is learning to make her health and wellness a priority.    Patient seen by physician and stated that she understood instructions.    Spoke with patient later in the day and she opted to go home after picking up the prescribed medications.

## 2024-01-04 NOTE — TELEPHONE ENCOUNTER
Home visit with patient.  Patient requested assistance with reviewing paperwork she received from S regarding medical cab.  Also took copies of patient's lease and [employer] bill.      Patient stated that she received a phone call from [facility] to schedule the cardiac cath.   Patient was preoccupied at the time and unable to converse with [facility] staff at the time.  Patient expressed her concerns regarding the surgery but stated that she will follow up.

## 2024-01-04 NOTE — TELEPHONE ENCOUNTER
Reminded patient about her upcoming appointments and scheduled cabs.  Patient stated that she is aware.  Patient stated that her dental pain has subsided and she was able to get some rest.  Patient still intends on going to her dental appointment tomorrow.

## 2024-01-04 NOTE — TELEPHONE ENCOUNTER
Phone call from patient requesting assistance.  Patient stated that she received a call from [facility] regarding her upcoming surgery and was told that her Medicaid was inactive.    Several phone calls made to [facility] and [place] Medicaid.  Was informed that patient's medicaid is inactive because patient did not respond to renewal request.      New application was submitted as of 1/3/24.

## 2024-01-09 ENCOUNTER — TELEPHONE (OUTPATIENT)
Dept: FAMILY MEDICINE CLINIC | Age: 59
End: 2024-01-09

## 2024-01-10 ENCOUNTER — TELEPHONE (OUTPATIENT)
Dept: FAMILY MEDICINE CLINIC | Age: 59
End: 2024-01-10

## 2024-01-11 NOTE — TELEPHONE ENCOUNTER
Phone call to patient.  Updated patient on status of medicaid application.  Strongly urged patient to start requesting proof of income documents as this will more than likely be needed on order to finish processing medicaid application.

## 2024-01-11 NOTE — TELEPHONE ENCOUNTER
Received information from "[employer]" that patient's application was denied.  Follow up call to [facility] and [place] Medicaid Hotline for clarification.  Unable to connect with a  to discuss.  Left messages.  Will contact again tomorrow.

## 2024-01-11 NOTE — TELEPHONE ENCOUNTER
Received call from [facility] .  Was told that patient's application is still pending and they need proof of income documents in order to continue processing application.  Was also told that the denial letter was sent in error according to [facility]'s records.      Relayed conversation to patient.  Strongly urged patient to obtain proof of income statements.  Patient reluctantly agreed.

## 2024-01-16 ENCOUNTER — TELEPHONE (OUTPATIENT)
Dept: FAMILY MEDICINE CLINIC | Age: 59
End: 2024-01-16

## 2024-01-16 ENCOUNTER — OFFICE VISIT (OUTPATIENT)
Dept: FAMILY MEDICINE CLINIC | Age: 59
End: 2024-01-16
Payer: MEDICAID

## 2024-01-16 VITALS
OXYGEN SATURATION: 97 % | WEIGHT: 134.4 LBS | SYSTOLIC BLOOD PRESSURE: 98 MMHG | HEART RATE: 72 BPM | DIASTOLIC BLOOD PRESSURE: 66 MMHG | BODY MASS INDEX: 24.73 KG/M2 | HEIGHT: 62 IN | TEMPERATURE: 97.5 F

## 2024-01-16 DIAGNOSIS — R10.32 LEFT LOWER QUADRANT ABDOMINAL PAIN: ICD-10-CM

## 2024-01-16 DIAGNOSIS — K57.92 DIVERTICULITIS: Primary | ICD-10-CM

## 2024-01-16 LAB
BILIRUBIN, POC: NORMAL
BLOOD URINE, POC: NEGATIVE
CLARITY, POC: NORMAL
COLOR, POC: NORMAL
GLUCOSE URINE, POC: NEGATIVE
KETONES, POC: NORMAL
LEUKOCYTE EST, POC: NEGATIVE
NITRITE, POC: NEGATIVE
PH, POC: 6
PROTEIN, POC: 100
SPECIFIC GRAVITY, POC: 1.03
UROBILINOGEN, POC: 0.2

## 2024-01-16 PROCEDURE — 99213 OFFICE O/P EST LOW 20 MIN: CPT

## 2024-01-16 PROCEDURE — 3078F DIAST BP <80 MM HG: CPT

## 2024-01-16 PROCEDURE — 3074F SYST BP LT 130 MM HG: CPT

## 2024-01-16 PROCEDURE — 81002 URINALYSIS NONAUTO W/O SCOPE: CPT

## 2024-01-16 RX ORDER — AMOXICILLIN AND CLAVULANATE POTASSIUM 875; 125 MG/1; MG/1
1 TABLET, FILM COATED ORAL 2 TIMES DAILY
Qty: 20 TABLET | Refills: 0 | Status: SHIPPED | OUTPATIENT
Start: 2024-01-16 | End: 2024-01-16

## 2024-01-16 RX ORDER — AMOXICILLIN AND CLAVULANATE POTASSIUM 875; 125 MG/1; MG/1
1 TABLET, FILM COATED ORAL 2 TIMES DAILY
Qty: 20 TABLET | Refills: 0 | Status: SHIPPED | OUTPATIENT
Start: 2024-01-16 | End: 2024-01-26

## 2024-01-16 ASSESSMENT — PATIENT HEALTH QUESTIONNAIRE - PHQ9
SUM OF ALL RESPONSES TO PHQ QUESTIONS 1-9: 0
SUM OF ALL RESPONSES TO PHQ9 QUESTIONS 1 & 2: 0
SUM OF ALL RESPONSES TO PHQ QUESTIONS 1-9: 0
1. LITTLE INTEREST OR PLEASURE IN DOING THINGS: 0
2. FEELING DOWN, DEPRESSED OR HOPELESS: 0

## 2024-01-16 ASSESSMENT — ENCOUNTER SYMPTOMS
SHORTNESS OF BREATH: 0
ABDOMINAL DISTENTION: 1
DIARRHEA: 0
NAUSEA: 1
BLOOD IN STOOL: 0
COUGH: 0
ABDOMINAL PAIN: 1
WHEEZING: 0
CONSTIPATION: 1
VOMITING: 0

## 2024-01-16 NOTE — PATIENT INSTRUCTIONS
Thank you for letting us take care of you today. We hope all your questions were addressed. If a question was overlooked or something else comes to mind after you return home, please contact a member of your Care Team listed below.      Your Care Team at [facility] is Team #1  Kerri Torres, Faculty Advisor  Wei Butt, Resident Physician  Era Lynn, Resident Physician  Myrtle Brito, Resident Physician  Jess Chapman, [facility]  Norma Watkins, [facility]  Angel Hdz, [facility]  Rosemary Westfall, [facility]  Rubia Heaton, [facility]  Kimberly Trujillo, [facility]  Jennyfer Rojas, [facility]  Kathryn Quarles, [facility]  Felipe (LJ) Evan,   Ada Pichardo formerly [facility] (Clinical Pharmacist)     Office phone number: [phone]    If you need to get in right away due to illness, please be advised we have \"Same Day\" appointments available Monday-Friday. Please call us at [phone] option #3 to schedule your \"Same Day\" appointment.

## 2024-01-16 NOTE — PROGRESS NOTES
Attending Physician Statement  I have discussed the care of Stacia Arora, including pertinent history and exam findings,  with the resident. I have seen and examined the patient and the key elements of all parts of the encounter have been performed by me.  I agree with the assessment, plan and orders as documented by the resident.  (GC Modifier)    Lexie Blackwell MD

## 2024-01-16 NOTE — PROGRESS NOTES
HYPERTENSION visit     BP Readings from Last 3 Encounters:   12/07/23 93/68   11/29/23 123/69   11/27/23 120/72       LDL Cholesterol (mg/dL)   Date Value   11/01/2019 131 (H)     HDL (mg/dL)   Date Value   11/01/2019 59     BUN (mg/dL)   Date Value   10/31/2023 37 (H)     Creatinine (mg/dL)   Date Value   10/31/2023 1.7 (H)     Glucose (mg/dL)   Date Value   10/31/2023 88              Have you changed or started any medications since your last visit including any over-the-counter medicines, vitamins, or herbal medicines? no   Have you stopped taking any of your medications? Is so, why? -  no  Are you having any side effects from any of your medications? - no  How often do you miss doses of your medication? no      Have you seen any other physician or provider since your last visit?  yes - Cardiology   Have you had any other diagnostic tests since your last visit?  yes - Stress test    Have you been seen in the emergency room and/or had an admission in a hospital since we last saw you?  no   Have you had your routine dental cleaning in the past 6 months?  no     Do you have an active MyChart account? If no, what is the barrier?  Yes    Patient Care Team:  Logan Jeong MD as PCP - General (Family Medicine)  Andreas Tatum MD as PCP - Empaneled Provider  Crystal Toledo RN as   Gonzalo Boyle MD as Consulting Physician (Pulmonary Disease)    Medical History Review  Past Medical, Family, and Social History reviewed and does not contribute to the patient presenting condition    Health Maintenance   Topic Date Due    Hepatitis B vaccine (1 of 3 - 3-dose series) Never done    COVID-19 Vaccine (1) Never done    Shingles vaccine (1 of 2) Never done    Pneumococcal 0-64 years Vaccine (2 - PCV) 05/20/2017    Lipids  11/01/2020    Flu vaccine (1) 08/01/2023    A1C test (Diabetic or Prediabetic)  06/21/2024    Cervical cancer screen  07/09/2024    Depression Screen  10/30/2024    GFR test (Diabetes, CKD 3-4, OR

## 2024-01-16 NOTE — PROGRESS NOTES
[facility] Residency Program - Outpatient Note      Subjective:    Stacia Arora is a 58 y.o. female with  has a past medical history of AICD discharge, RIP (acute kidney injury) (HCC), Asthma, Asthma with COPD with exacerbation (HCC), CAD (coronary artery disease), Cardiomegaly, Chest pain, CHF (congestive heart failure) (HCC), Chronic systolic heart failure (HCC) s/p AICD, COPD (chronic obstructive pulmonary disease) (HCC), COPD (chronic obstructive pulmonary disease) (HCC), Depression, Fibroids, Hypertension, Hypomagnesemia, Intraparenchymal hematoma of left side of brain due to trauma (HCC), Lesion of right native kidney, Lung nodule < 6cm on CT, MI (myocardial infarction) (HCC), Mild intermittent asthma, Non-ischemic cardiomyopathy (HCC), NSTEMI (non-ST elevated myocardial infarction) (HCC), QT prolongation, Syncope, and Unspecified diseases of blood and blood-forming organs.    Presented to the office today for:  Chief Complaint   Patient presents with    Flank Pain     Left side flank pain for 2 days        Patient is a 58-year-old female presenting for abdominal pain and flank pain.  Patient states that the pain started 2 days ago.  She is having nausea and feels like food wants, but there is no vomiting.  Pain is described as constant is exacerbated by breathing and walking.  Nothing is relieving the pain.  Patient says that the pain is located in the left lower quadrant and left flank.  She has never had pain like this in the past.  Patient denies any fevers, dysuria, incontinence, hematuria, or change in urinary frequency.  Patient states that she is constipated and has increased straining but she did have a bowel movement last night.  Patient does not use tobacco products or alcohol at this time.  Patient does use marijuana daily.            Review of Systems   Constitutional:  Negative for chills, diaphoresis and fever.   Respiratory:  Negative for cough,

## 2024-01-18 DIAGNOSIS — Z13.220 LIPID SCREENING: ICD-10-CM

## 2024-01-18 DIAGNOSIS — I50.22 CHRONIC SYSTOLIC HEART FAILURE (HCC): ICD-10-CM

## 2024-01-18 NOTE — TELEPHONE ENCOUNTER
E-scribe request for PENDED MEDICATIONS. Please review and e-scribe if applicable.     Last Visit Date:  1/16/24  Next Visit Date:  1/22/2024    Hemoglobin A1C (%)   Date Value   06/21/2023 5.9   05/26/2022 5.7   11/06/2019 5.7             ( goal A1C is < 7)   No components found for: \"LABMICR\"  LDL Cholesterol (mg/dL)   Date Value   11/01/2019 131 (H)       (goal LDL is <100)   AST (U/L)   Date Value   10/31/2023 31     ALT (U/L)   Date Value   10/31/2023 13     BUN (mg/dL)   Date Value   10/31/2023 37 (H)     BP Readings from Last 3 Encounters:   01/16/24 98/66   12/07/23 93/68   11/29/23 123/69          (goal 120/80)        Patient Active Problem List:     COPD (chronic obstructive pulmonary disease) (HCC)     Fibroids     Syncope     Lung nodule < 6cm on CT     Chronic systolic heart failure (HCC) s/p AICD     Mild intermittent asthma     Essential hypertension     Chest pain     QT prolongation     Asthma with COPD with exacerbation (HCC)     Non-ischemic cardiomyopathy (HCC)     Lesion of right native kidney     NSTEMI (non-ST elevated myocardial infarction) (HCC)     CAD (coronary artery disease)     Intraparenchymal hematoma of left side of brain due to trauma (HCC)     Chronic combined systolic and diastolic congestive heart failure (HCC)     AICD discharge     RIP (acute kidney injury) (HCC)     Hypomagnesemia     Other heart failure (HCC)     Atypical chest pain     Plantar fasciitis, bilateral     Left lower quadrant abdominal pain     Diverticulitis      ----JF

## 2024-01-19 RX ORDER — ATORVASTATIN CALCIUM 40 MG/1
40 TABLET, FILM COATED ORAL NIGHTLY
Qty: 30 TABLET | Refills: 1 | Status: SHIPPED | OUTPATIENT
Start: 2024-01-19

## 2024-01-19 RX ORDER — BUMETANIDE 1 MG/1
TABLET ORAL
Qty: 60 TABLET | Refills: 1 | Status: SHIPPED | OUTPATIENT
Start: 2024-01-19

## 2024-01-19 RX ORDER — OMEPRAZOLE 20 MG/1
CAPSULE, DELAYED RELEASE ORAL
Qty: 30 CAPSULE | Refills: 0 | Status: SHIPPED | OUTPATIENT
Start: 2024-01-19

## 2024-01-19 RX ORDER — SPIRONOLACTONE 25 MG/1
TABLET ORAL
Qty: 30 TABLET | Refills: 1 | Status: SHIPPED | OUTPATIENT
Start: 2024-01-19

## 2024-01-19 RX ORDER — LANOLIN ALCOHOL/MO/W.PET/CERES
CREAM (GRAM) TOPICAL
Qty: 30 TABLET | Refills: 0 | Status: SHIPPED | OUTPATIENT
Start: 2024-01-19

## 2024-01-22 ENCOUNTER — CARE COORDINATION (OUTPATIENT)
Dept: FAMILY MEDICINE CLINIC | Age: 59
End: 2024-01-22

## 2024-01-23 ENCOUNTER — TELEPHONE (OUTPATIENT)
Dept: FAMILY MEDICINE CLINIC | Age: 59
End: 2024-01-23

## 2024-01-24 ENCOUNTER — TELEPHONE (OUTPATIENT)
Dept: FAMILY MEDICINE CLINIC | Age: 59
End: 2024-01-24

## 2024-01-30 ENCOUNTER — HOSPITAL ENCOUNTER (OUTPATIENT)
Age: 59
Setting detail: OBSERVATION
Discharge: HOME | End: 2024-01-31
Attending: EMERGENCY MEDICINE | Admitting: EMERGENCY MEDICINE

## 2024-01-30 ENCOUNTER — ANCILLARY PROCEDURE (OUTPATIENT)
Dept: EMERGENCY DEPT | Age: 59
End: 2024-01-30
Attending: EMERGENCY MEDICINE

## 2024-01-30 ENCOUNTER — TELEPHONE (OUTPATIENT)
Dept: FAMILY MEDICINE CLINIC | Age: 59
End: 2024-01-30

## 2024-01-30 ENCOUNTER — APPOINTMENT (OUTPATIENT)
Dept: GENERAL RADIOLOGY | Age: 59
End: 2024-01-30

## 2024-01-30 DIAGNOSIS — R55 PRE-SYNCOPE: Primary | ICD-10-CM

## 2024-01-30 DIAGNOSIS — I20.89 ANGINAL CHEST PAIN AT REST: ICD-10-CM

## 2024-01-30 LAB
ALBUMIN SERPL-MCNC: 4.1 G/DL (ref 3.5–5.2)
ALBUMIN/GLOB SERPL: 1.3 {RATIO} (ref 1–2.5)
ALP SERPL-CCNC: 76 U/L (ref 35–104)
ALT SERPL-CCNC: 13 U/L (ref 5–33)
ANION GAP SERPL CALCULATED.3IONS-SCNC: 13 MMOL/L (ref 9–17)
AST SERPL-CCNC: 18 U/L
BASOPHILS # BLD: 0.04 K/UL (ref 0–0.2)
BASOPHILS # BLD: 0.07 K/UL (ref 0–0.2)
BASOPHILS NFR BLD: 1 % (ref 0–2)
BASOPHILS NFR BLD: 1 % (ref 0–2)
BILIRUB SERPL-MCNC: 0.2 MG/DL (ref 0.3–1.2)
BUN SERPL-MCNC: 30 MG/DL (ref 6–20)
CALCIUM SERPL-MCNC: 9.4 MG/DL (ref 8.6–10.4)
CHLORIDE SERPL-SCNC: 101 MMOL/L (ref 98–107)
CO2 SERPL-SCNC: 22 MMOL/L (ref 20–31)
CREAT SERPL-MCNC: 1.3 MG/DL (ref 0.5–0.9)
D DIMER PPP FEU-MCNC: 0.47 UG/ML FEU (ref 0–0.57)
EOSINOPHIL # BLD: 0.07 K/UL (ref 0–0.44)
EOSINOPHIL # BLD: 0.08 K/UL (ref 0–0.44)
EOSINOPHILS RELATIVE PERCENT: 1 % (ref 1–4)
EOSINOPHILS RELATIVE PERCENT: 1 % (ref 1–4)
ERYTHROCYTE [DISTWIDTH] IN BLOOD BY AUTOMATED COUNT: 12.8 % (ref 11.8–14.4)
ERYTHROCYTE [DISTWIDTH] IN BLOOD BY AUTOMATED COUNT: 15.4 % (ref 11.8–14.4)
GFR SERPL CREATININE-BSD FRML MDRD: 48 ML/MIN/1.73M2
GLUCOSE SERPL-MCNC: 87 MG/DL (ref 70–99)
HCT VFR BLD AUTO: 33.3 % (ref 36.3–47.1)
HCT VFR BLD AUTO: 44.9 % (ref 36.3–47.1)
HGB BLD-MCNC: 10.7 G/DL (ref 11.9–15.1)
HGB BLD-MCNC: 14.6 G/DL (ref 11.9–15.1)
IMM GRANULOCYTES # BLD AUTO: 0.15 K/UL (ref 0–0.3)
IMM GRANULOCYTES # BLD AUTO: <0.03 K/UL (ref 0–0.3)
IMM GRANULOCYTES NFR BLD: 0 %
IMM GRANULOCYTES NFR BLD: 1 %
LYMPHOCYTES NFR BLD: 1.89 K/UL (ref 1.1–3.7)
LYMPHOCYTES NFR BLD: 1.93 K/UL (ref 1.1–3.7)
LYMPHOCYTES RELATIVE PERCENT: 13 % (ref 24–43)
LYMPHOCYTES RELATIVE PERCENT: 36 % (ref 24–43)
MCH RBC QN AUTO: 28.4 PG (ref 25.2–33.5)
MCH RBC QN AUTO: 29.4 PG (ref 25.2–33.5)
MCHC RBC AUTO-ENTMCNC: 32.1 G/DL (ref 28.4–34.8)
MCHC RBC AUTO-ENTMCNC: 32.5 G/DL (ref 28.4–34.8)
MCV RBC AUTO: 88.3 FL (ref 82.6–102.9)
MCV RBC AUTO: 90.3 FL (ref 82.6–102.9)
MONOCYTES NFR BLD: 0.42 K/UL (ref 0.1–1.2)
MONOCYTES NFR BLD: 0.81 K/UL (ref 0.1–1.2)
MONOCYTES NFR BLD: 6 % (ref 3–12)
MONOCYTES NFR BLD: 8 % (ref 3–12)
NEUTROPHILS NFR BLD: 54 % (ref 36–65)
NEUTROPHILS NFR BLD: 78 % (ref 36–65)
NEUTS SEG NFR BLD: 11.51 K/UL (ref 1.5–8.1)
NEUTS SEG NFR BLD: 2.89 K/UL (ref 1.5–8.1)
NRBC BLD-RTO: 0 PER 100 WBC
NRBC BLD-RTO: 0 PER 100 WBC
PLATELET # BLD AUTO: 173 K/UL (ref 138–453)
PLATELET # BLD AUTO: 215 K/UL (ref 138–453)
PMV BLD AUTO: 10.9 FL (ref 8.1–13.5)
PMV BLD AUTO: 12.9 FL (ref 8.1–13.5)
POTASSIUM SERPL-SCNC: 4.1 MMOL/L (ref 3.7–5.3)
PROT SERPL-MCNC: 7.2 G/DL (ref 6.4–8.3)
RBC # BLD AUTO: 3.77 M/UL (ref 3.95–5.11)
RBC # BLD AUTO: 4.97 M/UL (ref 3.95–5.11)
RBC # BLD: ABNORMAL 10*6/UL
SODIUM SERPL-SCNC: 136 MMOL/L (ref 135–144)
TROPONIN I SERPL HS-MCNC: 15 NG/L (ref 0–14)
TROPONIN I SERPL HS-MCNC: 8 NG/L (ref 0–14)
WBC OTHER # BLD: 14.6 K/UL (ref 3.5–11.3)
WBC OTHER # BLD: 5.3 K/UL (ref 3.5–11.3)

## 2024-01-30 PROCEDURE — 80053 COMPREHEN METABOLIC PANEL: CPT

## 2024-01-30 PROCEDURE — 85379 FIBRIN DEGRADATION QUANT: CPT

## 2024-01-30 PROCEDURE — G0378 HOSPITAL OBSERVATION PER HR: HCPCS

## 2024-01-30 PROCEDURE — 2580000003 HC RX 258: Performed by: STUDENT IN AN ORGANIZED HEALTH CARE EDUCATION/TRAINING PROGRAM

## 2024-01-30 PROCEDURE — 96360 HYDRATION IV INFUSION INIT: CPT

## 2024-01-30 PROCEDURE — 6370000000 HC RX 637 (ALT 250 FOR IP): Performed by: STUDENT IN AN ORGANIZED HEALTH CARE EDUCATION/TRAINING PROGRAM

## 2024-01-30 PROCEDURE — 99285 EMERGENCY DEPT VISIT HI MDM: CPT

## 2024-01-30 PROCEDURE — 85025 COMPLETE CBC W/AUTO DIFF WBC: CPT

## 2024-01-30 PROCEDURE — 93005 ELECTROCARDIOGRAM TRACING: CPT | Performed by: STUDENT IN AN ORGANIZED HEALTH CARE EDUCATION/TRAINING PROGRAM

## 2024-01-30 PROCEDURE — 96361 HYDRATE IV INFUSION ADD-ON: CPT

## 2024-01-30 PROCEDURE — 84484 ASSAY OF TROPONIN QUANT: CPT

## 2024-01-30 PROCEDURE — 71046 X-RAY EXAM CHEST 2 VIEWS: CPT

## 2024-01-30 PROCEDURE — 93976 VASCULAR STUDY: CPT

## 2024-01-30 RX ORDER — SODIUM CHLORIDE 0.9 % (FLUSH) 0.9 %
5-40 SYRINGE (ML) INJECTION EVERY 12 HOURS SCHEDULED
Status: DISCONTINUED | OUTPATIENT
Start: 2024-01-30 | End: 2024-01-31 | Stop reason: HOSPADM

## 2024-01-30 RX ORDER — POTASSIUM CHLORIDE 20 MEQ/1
40 TABLET, EXTENDED RELEASE ORAL PRN
Status: DISCONTINUED | OUTPATIENT
Start: 2024-01-30 | End: 2024-01-31 | Stop reason: HOSPADM

## 2024-01-30 RX ORDER — SPIRONOLACTONE 25 MG/1
25 TABLET ORAL DAILY
Status: DISCONTINUED | OUTPATIENT
Start: 2024-01-30 | End: 2024-01-31

## 2024-01-30 RX ORDER — ONDANSETRON 4 MG/1
4 TABLET, ORALLY DISINTEGRATING ORAL EVERY 8 HOURS PRN
Status: DISCONTINUED | OUTPATIENT
Start: 2024-01-30 | End: 2024-01-31 | Stop reason: HOSPADM

## 2024-01-30 RX ORDER — POTASSIUM CHLORIDE 7.45 MG/ML
10 INJECTION INTRAVENOUS PRN
Status: DISCONTINUED | OUTPATIENT
Start: 2024-01-30 | End: 2024-01-31 | Stop reason: HOSPADM

## 2024-01-30 RX ORDER — 0.9 % SODIUM CHLORIDE 0.9 %
500 INTRAVENOUS SOLUTION INTRAVENOUS ONCE
Status: COMPLETED | OUTPATIENT
Start: 2024-01-30 | End: 2024-01-30

## 2024-01-30 RX ORDER — SODIUM CHLORIDE 9 MG/ML
INJECTION, SOLUTION INTRAVENOUS CONTINUOUS
Status: DISCONTINUED | OUTPATIENT
Start: 2024-01-30 | End: 2024-01-31 | Stop reason: HOSPADM

## 2024-01-30 RX ORDER — LANOLIN ALCOHOL/MO/W.PET/CERES
400 CREAM (GRAM) TOPICAL DAILY
Status: DISCONTINUED | OUTPATIENT
Start: 2024-01-30 | End: 2024-01-31 | Stop reason: HOSPADM

## 2024-01-30 RX ORDER — ACETAMINOPHEN 325 MG/1
650 TABLET ORAL EVERY 6 HOURS PRN
Status: DISCONTINUED | OUTPATIENT
Start: 2024-01-30 | End: 2024-01-31 | Stop reason: HOSPADM

## 2024-01-30 RX ORDER — POLYETHYLENE GLYCOL 3350 17 G/17G
17 POWDER, FOR SOLUTION ORAL DAILY PRN
Status: DISCONTINUED | OUTPATIENT
Start: 2024-01-30 | End: 2024-01-31 | Stop reason: HOSPADM

## 2024-01-30 RX ORDER — ASPIRIN 81 MG/1
81 TABLET ORAL DAILY
Status: DISCONTINUED | OUTPATIENT
Start: 2024-01-31 | End: 2024-01-31 | Stop reason: HOSPADM

## 2024-01-30 RX ORDER — ATORVASTATIN CALCIUM 40 MG/1
40 TABLET, FILM COATED ORAL NIGHTLY
Status: DISCONTINUED | OUTPATIENT
Start: 2024-01-30 | End: 2024-01-31 | Stop reason: HOSPADM

## 2024-01-30 RX ORDER — SODIUM CHLORIDE 0.9 % (FLUSH) 0.9 %
5-40 SYRINGE (ML) INJECTION PRN
Status: DISCONTINUED | OUTPATIENT
Start: 2024-01-30 | End: 2024-01-31 | Stop reason: HOSPADM

## 2024-01-30 RX ORDER — AMIODARONE HYDROCHLORIDE 200 MG/1
200 TABLET ORAL DAILY
Status: DISCONTINUED | OUTPATIENT
Start: 2024-01-30 | End: 2024-01-31 | Stop reason: HOSPADM

## 2024-01-30 RX ORDER — ONDANSETRON 2 MG/ML
4 INJECTION INTRAMUSCULAR; INTRAVENOUS EVERY 6 HOURS PRN
Status: DISCONTINUED | OUTPATIENT
Start: 2024-01-30 | End: 2024-01-31 | Stop reason: HOSPADM

## 2024-01-30 RX ORDER — MULTIVITAMIN WITH IRON
1 TABLET ORAL DAILY
Status: DISCONTINUED | OUTPATIENT
Start: 2024-01-30 | End: 2024-01-31 | Stop reason: HOSPADM

## 2024-01-30 RX ORDER — METOPROLOL SUCCINATE 25 MG/1
50 TABLET, EXTENDED RELEASE ORAL EVERY MORNING
Status: DISCONTINUED | OUTPATIENT
Start: 2024-01-31 | End: 2024-01-31 | Stop reason: HOSPADM

## 2024-01-30 RX ORDER — ENOXAPARIN SODIUM 100 MG/ML
40 INJECTION SUBCUTANEOUS DAILY
Status: DISCONTINUED | OUTPATIENT
Start: 2024-01-30 | End: 2024-01-31 | Stop reason: HOSPADM

## 2024-01-30 RX ORDER — MAGNESIUM SULFATE IN WATER 40 MG/ML
2000 INJECTION, SOLUTION INTRAVENOUS PRN
Status: DISCONTINUED | OUTPATIENT
Start: 2024-01-30 | End: 2024-01-31 | Stop reason: HOSPADM

## 2024-01-30 RX ORDER — SODIUM CHLORIDE 9 MG/ML
INJECTION, SOLUTION INTRAVENOUS PRN
Status: DISCONTINUED | OUTPATIENT
Start: 2024-01-30 | End: 2024-01-31 | Stop reason: HOSPADM

## 2024-01-30 RX ORDER — ACETAMINOPHEN 650 MG/1
650 SUPPOSITORY RECTAL EVERY 6 HOURS PRN
Status: DISCONTINUED | OUTPATIENT
Start: 2024-01-30 | End: 2024-01-31 | Stop reason: HOSPADM

## 2024-01-30 RX ADMIN — MAGNESIUM GLUCONATE 500 MG ORAL TABLET 400 MG: 500 TABLET ORAL at 21:26

## 2024-01-30 RX ADMIN — SACUBITRIL AND VALSARTAN 1 TABLET: 24; 26 TABLET, FILM COATED ORAL at 21:26

## 2024-01-30 RX ADMIN — ACETAMINOPHEN 650 MG: 325 TABLET ORAL at 21:26

## 2024-01-30 RX ADMIN — SODIUM CHLORIDE: 9 INJECTION, SOLUTION INTRAVENOUS at 20:51

## 2024-01-30 RX ADMIN — ALCOHOL 1 TABLET: 70.47 GEL TOPICAL at 21:26

## 2024-01-30 RX ADMIN — ATORVASTATIN CALCIUM 40 MG: 40 TABLET, FILM COATED ORAL at 21:26

## 2024-01-30 RX ADMIN — AMIODARONE HYDROCHLORIDE 200 MG: 200 TABLET ORAL at 21:37

## 2024-01-30 RX ADMIN — SPIRONOLACTONE 25 MG: 25 TABLET, FILM COATED ORAL at 21:26

## 2024-01-30 RX ADMIN — SODIUM CHLORIDE 500 ML: 9 INJECTION, SOLUTION INTRAVENOUS at 14:35

## 2024-01-30 ASSESSMENT — PAIN SCALES - GENERAL
PAINLEVEL_OUTOF10: 8
PAINLEVEL_OUTOF10: 8

## 2024-01-30 ASSESSMENT — HEART SCORE: ECG: 1

## 2024-01-30 ASSESSMENT — PAIN DESCRIPTION - LOCATION
LOCATION: HEAD
LOCATION: HEAD

## 2024-01-30 ASSESSMENT — ENCOUNTER SYMPTOMS
NAUSEA: 1
SHORTNESS OF BREATH: 0

## 2024-01-30 NOTE — ED NOTES
ED to inpatient nurses report      Chief Complaint:  Chief Complaint   Patient presents with    Dizziness    Nausea     Present to ED from: Home    MOA:     LOC: alert and orientated to name, place, date  Mobility: Independent  Oxygen Baseline: room air    Current needs required: Room air   Pending ED orders: No  Present condition: stable    Why did the patient come to the ED? Dizzy spell followed by heat flash   What is the plan? Cardiology consult due to run of afib last week (per the report from interrogating defib)   Any procedures or intervention occur? Interrogated defib, labs,  Any safety concerns??    Mental Status:       Psych Assessment:   Psychosocial  Psychosocial (WDL): Within Defined Limits  Vital signs   Vitals:    01/30/24 1716 01/30/24 1731 01/30/24 1746 01/30/24 1800   BP: 110/71 112/75 107/73 115/72   Pulse: 57 54 55 52   Resp:       Temp:       TempSrc:       SpO2:   98% 96%   Weight:            Vitals:  Patient Vitals for the past 24 hrs:   BP Temp Temp src Pulse Resp SpO2 Weight   01/30/24 1800 115/72 -- -- 52 -- 96 % --   01/30/24 1746 107/73 -- -- 55 -- 98 % --   01/30/24 1731 112/75 -- -- 54 -- -- --   01/30/24 1716 110/71 -- -- 57 -- -- --   01/30/24 1701 97/65 -- -- 52 -- -- --   01/30/24 1646 101/62 -- -- 56 18 -- --   01/30/24 1631 99/66 -- -- 55 16 -- --   01/30/24 1616 106/68 -- -- 56 18 -- --   01/30/24 1601 102/64 -- -- 51 17 -- --   01/30/24 1546 92/74 -- -- 58 20 -- --   01/30/24 1530 106/77 -- -- 54 21 94 % --   01/30/24 1515 (!) 120/91 -- -- 57 19 100 % --   01/30/24 1501 117/76 -- -- 53 14 99 % --   01/30/24 1446 108/73 -- -- 53 15 99 % --   01/30/24 1435 -- -- -- 57 17 -- --   01/30/24 1333 (!) 96/46 96.9 °F (36.1 °C) Oral 56 16 98 % 62.6 kg (138 lb)      Visit Vitals  /72   Pulse 52   Temp 96.9 °F (36.1 °C) (Oral)   Resp 18   Wt 62.6 kg (138 lb)   SpO2 96%   BMI 25.24 kg/m²        LDAs:   Peripheral IV 01/30/24 Right Forearm (Active)   Site Assessment Clean, dry & intact  disease) ([facility])     COPD (chronic obstructive pulmonary disease) ([facility]) 07/27/2014    Depression     Fibroids 11/25/2014    Uterine artery embolization planned by Interventional Rad 12-3-14     Hypertension     Hypomagnesemia 02/26/2020    Intraparenchymal hematoma of left side of brain due to trauma ([facility])     Lesion of right native kidney 02/03/2019    Lung nodule < 6cm on CT 10/04/2015    MI (myocardial infarction) ([facility])     Mild intermittent asthma 05/20/2016    Non-ischemic cardiomyopathy ([facility]) 02/03/2019    NSTEMI (non-ST elevated myocardial infarction) ([facility]) 06/05/2019    QT prolongation 02/03/2019    Syncope 07/30/2015    Unspecified diseases of blood and blood-forming organs     anemia,        Labs:  Labs Reviewed   CBC WITH AUTO DIFFERENTIAL - Abnormal; Notable for the following components:       Result Value    WBC 14.6 (*)     Neutrophils % 78 (*)     Lymphocytes % 13 (*)     Immature Granulocytes 1 (*)     Neutrophils Absolute 11.51 (*)     All other components within normal limits   CBC WITH AUTO DIFFERENTIAL - Abnormal; Notable for the following components:    RBC 3.77 (*)     Hemoglobin 10.7 (*)     Hematocrit 33.3 (*)     RDW 15.4 (*)     All other components within normal limits   COMPREHENSIVE METABOLIC PANEL - Abnormal; Notable for the following components:    BUN 30 (*)     Creatinine 1.3 (*)     Est, Glom Filt Rate 48 (*)     Total Bilirubin 0.2 (*)     All other components within normal limits   TROPONIN - Abnormal; Notable for the following components:    Troponin, High Sensitivity 15 (*)     All other components within normal limits   TROPONIN   D-DIMER, QUANTITATIVE   PREVIOUS SPECIMEN       Electronically signed by Daniella Multani RN on 1/30/2024 at 6:05 PM

## 2024-01-30 NOTE — ED NOTES
The following labs were labeled with appropriate pt sticker and tubed to lab:     [x] Blue     [x] Lavender   [] on ice  [x] Green/yellow  [x] Green/black [] on ice  [] Grey  [] on ice  [] Yellow  [] Red  [] Pink  [] Type/ Screen  [] ABG  [] VBG    [] COVID-19 swab    [] Rapid  [] PCR  [] Flu swab  [] Peds Viral Panel     [] Urine Sample  [] Fecal Sample  [] Pelvic Cultures  [] Blood Cultures  [] X 2  [] STREP Cultures  [] Wound Cultures

## 2024-01-30 NOTE — ED NOTES
Patient arrives to the ED with c/o a dizzy spell followed by a heat flash. Patient states it's happened a few times over the past couple weeks. Patient states it may be linked to smoking weed and drinking alcohol. Patient reports that she did smoke and drink today just prior to the event.

## 2024-01-30 NOTE — ED PROVIDER NOTES
[facility]     Emergency Department     Faculty Note/ Attestation      Pt Name: Stacia Arora                                       MRN: [id number]  Birthdate 1965  Date of evaluation: 1/30/2024  Note Started: 2:37 PM EST    Patients PCP:    Logan Jeong MD    Attestation  I performed a history and physical examination of the patient and discussed management with the resident. I reviewed the resident’s note and agree with the documented findings and plan of care. Any areas of disagreement are noted on the chart. I was personally present for the key portions of any procedures. I have documented in the chart those procedures where I was not present during the key portions. I have reviewed the emergency nurses triage note. I agree with the chief complaint, past medical history, past surgical history, allergies, medications, social and family history as documented unless otherwise noted below.    For Physician Assistant/ Nurse Practitioner cases/documentation I have personally evaluated this patient and have completed at least one if not all key elements of the E/M (history, physical exam, and MDM). Additional findings are as noted.    Initial Screens:     Heart Score for chest pain patients  History: Moderately Suspicious  ECG: Non-Specifc repolarization disturbance/LBTB/PM  Patient Age: > 45 and < 65 years  *Risk factors for Atherosclerotic disease: Coronary Artery Disease, Hypertension, Positive family History  Risk Factors: > 3 Risk factors or history of atherosclerotic disease*  Troponin: < 1X normal limit  Heart Score Total: 5  Arrey Coma Scale  Eye Opening: Spontaneous  Best Verbal Response: Oriented  Best Motor Response: Obeys commands  Anastacio Coma Scale Score: 15    Vitals:    Vitals:    01/30/24 1333 01/30/24 1435 01/30/24 1515 01/30/24 1530   BP: (!) 96/46  (!) 120/91 106/77   Pulse: 56 57 57 54   Resp: 16 17 19 21   Temp: 96.9 °F (36.1 °C)      TempSrc: Oral

## 2024-01-31 ENCOUNTER — APPOINTMENT (OUTPATIENT)
Dept: CT IMAGING | Age: 59
End: 2024-01-31

## 2024-01-31 VITALS
WEIGHT: 138 LBS | BODY MASS INDEX: 25.24 KG/M2 | DIASTOLIC BLOOD PRESSURE: 64 MMHG | TEMPERATURE: 97.2 F | OXYGEN SATURATION: 100 % | RESPIRATION RATE: 15 BRPM | HEART RATE: 63 BPM | SYSTOLIC BLOOD PRESSURE: 121 MMHG

## 2024-01-31 PROBLEM — I20.89 ANGINAL CHEST PAIN AT REST: Status: ACTIVE | Noted: 2024-01-30

## 2024-01-31 PROBLEM — I42.8 NICM (NONISCHEMIC CARDIOMYOPATHY) (HCC): Status: ACTIVE | Noted: 2024-01-31

## 2024-01-31 PROBLEM — R94.39 ABNORMAL CARDIOVASCULAR STRESS TEST: Status: ACTIVE | Noted: 2024-01-31

## 2024-01-31 PROBLEM — Z95.810 PRESENCE OF CARDIAC RESYNCHRONIZATION THERAPY DEFIBRILLATOR (CRT-D): Status: ACTIVE | Noted: 2024-01-31

## 2024-01-31 LAB
EKG ATRIAL RATE: 51 BPM
EKG P AXIS: 66 DEGREES
EKG P-R INTERVAL: 186 MS
EKG Q-T INTERVAL: 592 MS
EKG QRS DURATION: 138 MS
EKG QTC CALCULATION (BAZETT): 545 MS
EKG R AXIS: -34 DEGREES
EKG T AXIS: 61 DEGREES
EKG VENTRICULAR RATE: 51 BPM

## 2024-01-31 PROCEDURE — 99153 MOD SED SAME PHYS/QHP EA: CPT | Performed by: INTERNAL MEDICINE

## 2024-01-31 PROCEDURE — 6370000000 HC RX 637 (ALT 250 FOR IP): Performed by: NURSE PRACTITIONER

## 2024-01-31 PROCEDURE — 93010 ELECTROCARDIOGRAM REPORT: CPT | Performed by: INTERNAL MEDICINE

## 2024-01-31 PROCEDURE — G0378 HOSPITAL OBSERVATION PER HR: HCPCS

## 2024-01-31 PROCEDURE — 2500000003 HC RX 250 WO HCPCS: Performed by: INTERNAL MEDICINE

## 2024-01-31 PROCEDURE — 99254 IP/OBS CNSLTJ NEW/EST MOD 60: CPT | Performed by: INTERNAL MEDICINE

## 2024-01-31 PROCEDURE — 2580000003 HC RX 258: Performed by: STUDENT IN AN ORGANIZED HEALTH CARE EDUCATION/TRAINING PROGRAM

## 2024-01-31 PROCEDURE — 6360000004 HC RX CONTRAST MEDICATION: Performed by: INTERNAL MEDICINE

## 2024-01-31 PROCEDURE — 6360000002 HC RX W HCPCS: Performed by: INTERNAL MEDICINE

## 2024-01-31 PROCEDURE — C1894 INTRO/SHEATH, NON-LASER: HCPCS | Performed by: INTERNAL MEDICINE

## 2024-01-31 PROCEDURE — 93454 CORONARY ARTERY ANGIO S&I: CPT | Performed by: INTERNAL MEDICINE

## 2024-01-31 PROCEDURE — 93005 ELECTROCARDIOGRAM TRACING: CPT | Performed by: EMERGENCY MEDICINE

## 2024-01-31 PROCEDURE — 99152 MOD SED SAME PHYS/QHP 5/>YRS: CPT | Performed by: INTERNAL MEDICINE

## 2024-01-31 PROCEDURE — 93458 L HRT ARTERY/VENTRICLE ANGIO: CPT | Performed by: INTERNAL MEDICINE

## 2024-01-31 PROCEDURE — C1769 GUIDE WIRE: HCPCS | Performed by: INTERNAL MEDICINE

## 2024-01-31 PROCEDURE — 93452 LEFT HRT CATH W/VENTRCLGRPHY: CPT | Performed by: INTERNAL MEDICINE

## 2024-01-31 PROCEDURE — 70450 CT HEAD/BRAIN W/O DYE: CPT

## 2024-01-31 PROCEDURE — 6370000000 HC RX 637 (ALT 250 FOR IP): Performed by: STUDENT IN AN ORGANIZED HEALTH CARE EDUCATION/TRAINING PROGRAM

## 2024-01-31 PROCEDURE — 96361 HYDRATE IV INFUSION ADD-ON: CPT

## 2024-01-31 PROCEDURE — 2709999900 HC NON-CHARGEABLE SUPPLY: Performed by: INTERNAL MEDICINE

## 2024-01-31 RX ORDER — HEPARIN SODIUM 1000 [USP'U]/ML
INJECTION, SOLUTION INTRAVENOUS; SUBCUTANEOUS PRN
Status: DISCONTINUED | OUTPATIENT
Start: 2024-01-31 | End: 2024-01-31 | Stop reason: HOSPADM

## 2024-01-31 RX ORDER — FENTANYL CITRATE 50 UG/ML
INJECTION, SOLUTION INTRAMUSCULAR; INTRAVENOUS PRN
Status: DISCONTINUED | OUTPATIENT
Start: 2024-01-31 | End: 2024-01-31 | Stop reason: HOSPADM

## 2024-01-31 RX ORDER — LIDOCAINE HYDROCHLORIDE 10 MG/ML
INJECTION, SOLUTION INFILTRATION; PERINEURAL PRN
Status: DISCONTINUED | OUTPATIENT
Start: 2024-01-31 | End: 2024-01-31 | Stop reason: HOSPADM

## 2024-01-31 RX ORDER — SPIRONOLACTONE 25 MG/1
12.5 TABLET ORAL DAILY
Status: DISCONTINUED | OUTPATIENT
Start: 2024-02-01 | End: 2024-01-31 | Stop reason: HOSPADM

## 2024-01-31 RX ORDER — TIZANIDINE 4 MG/1
4 TABLET ORAL 3 TIMES DAILY PRN
Status: DISCONTINUED | OUTPATIENT
Start: 2024-01-31 | End: 2024-01-31 | Stop reason: HOSPADM

## 2024-01-31 RX ORDER — LOSARTAN POTASSIUM 25 MG/1
12.5 TABLET ORAL DAILY
Status: DISCONTINUED | OUTPATIENT
Start: 2024-01-31 | End: 2024-01-31 | Stop reason: HOSPADM

## 2024-01-31 RX ORDER — DIPHENHYDRAMINE HYDROCHLORIDE 50 MG/ML
INJECTION INTRAMUSCULAR; INTRAVENOUS PRN
Status: DISCONTINUED | OUTPATIENT
Start: 2024-01-31 | End: 2024-01-31 | Stop reason: HOSPADM

## 2024-01-31 RX ORDER — SODIUM CHLORIDE 0.9 % (FLUSH) 0.9 %
5-40 SYRINGE (ML) INJECTION PRN
Status: DISCONTINUED | OUTPATIENT
Start: 2024-01-31 | End: 2024-01-31 | Stop reason: HOSPADM

## 2024-01-31 RX ORDER — ACETAMINOPHEN 325 MG/1
650 TABLET ORAL EVERY 4 HOURS PRN
Status: DISCONTINUED | OUTPATIENT
Start: 2024-01-31 | End: 2024-01-31 | Stop reason: HOSPADM

## 2024-01-31 RX ORDER — METHYLPREDNISOLONE SODIUM SUCCINATE 1 G/16ML
INJECTION, POWDER, LYOPHILIZED, FOR SOLUTION INTRAMUSCULAR; INTRAVENOUS PRN
Status: DISCONTINUED | OUTPATIENT
Start: 2024-01-31 | End: 2024-01-31 | Stop reason: HOSPADM

## 2024-01-31 RX ORDER — SODIUM CHLORIDE 9 MG/ML
INJECTION, SOLUTION INTRAVENOUS PRN
Status: DISCONTINUED | OUTPATIENT
Start: 2024-01-31 | End: 2024-01-31 | Stop reason: HOSPADM

## 2024-01-31 RX ORDER — TIZANIDINE 4 MG/1
4 TABLET ORAL 3 TIMES DAILY PRN
Qty: 30 TABLET | Refills: 0 | Status: SHIPPED | OUTPATIENT
Start: 2024-01-31

## 2024-01-31 RX ORDER — SODIUM CHLORIDE 0.9 % (FLUSH) 0.9 %
5-40 SYRINGE (ML) INJECTION EVERY 12 HOURS SCHEDULED
Status: DISCONTINUED | OUTPATIENT
Start: 2024-01-31 | End: 2024-01-31 | Stop reason: HOSPADM

## 2024-01-31 RX ADMIN — AMIODARONE HYDROCHLORIDE 200 MG: 200 TABLET ORAL at 09:01

## 2024-01-31 RX ADMIN — MAGNESIUM GLUCONATE 500 MG ORAL TABLET 400 MG: 500 TABLET ORAL at 09:01

## 2024-01-31 RX ADMIN — SACUBITRIL AND VALSARTAN 1 TABLET: 24; 26 TABLET, FILM COATED ORAL at 09:01

## 2024-01-31 RX ADMIN — ASPIRIN 81 MG: 81 TABLET, COATED ORAL at 09:01

## 2024-01-31 RX ADMIN — ALCOHOL 1 TABLET: 70.47 GEL TOPICAL at 09:01

## 2024-01-31 RX ADMIN — SODIUM CHLORIDE, PRESERVATIVE FREE 10 ML: 5 INJECTION INTRAVENOUS at 09:04

## 2024-01-31 RX ADMIN — SPIRONOLACTONE 25 MG: 25 TABLET, FILM COATED ORAL at 09:01

## 2024-01-31 RX ADMIN — ATORVASTATIN CALCIUM 40 MG: 40 TABLET, FILM COATED ORAL at 20:02

## 2024-01-31 RX ADMIN — TIZANIDINE 4 MG: 4 TABLET ORAL at 20:02

## 2024-01-31 NOTE — CARE COORDINATION
DISCHARGE PLANNING EVALUATION: OP/OBSERVATION        1/31/24, 10:58 AM YURY Arora         Location: OBS 30/30-1   Reason for hospitalization: Pre-syncope [R55]     CM Services requested for transitional needs.     PCP: Logan Jeong MD    Transportation/Food Security/Housekeeping Addressed:  No issues identified.     Equipment needs: none    Transition plan:  Goal is home, signif other Etienne to provide transp home, fills meds at [facility]

## 2024-01-31 NOTE — CONSULTS
[facility]   Consult Note                 Date:   1/31/2024  Patient name: Stacia Arora  Date of admission:  1/30/2024  1:33 PM  MRN:   [id number]  YOB: 1965    Reason for Consult:  pre-syncope, hypotension     CHIEF COMPLAINT:  lightheadedness    History Obtained From:  Patient     HISTORY OF PRESENT ILLNESS:    The patient is a 58 y.o. female  with a history of CHF, LV EF 20-25 with AICD placed in 2005 presenting to ED following an episode of lightheadedness yesterday. She states she was at [employer] when suddenly it felt like the room was spinning, she had some tunnel vision, and \"felt like everything was far away\". Her  prevented her from falling. She also describes a headache, mainly left frontal area that she has had a history of from time to time. She reports decreased food and drink intake recently. She was hypotensive at 96/46 in the ED. Troponins were 15 and 8 in ED. She's been receiving IV fluids and reports feeling better this morning. She is on bumex 1 mg Bid but reports missing doses due to insurance issues. However she is lying almost flat and does not have any pitting edema. Blood pressure has improved since coming in, vitals otherwise unremarkable.       Past Medical History:   has a past medical history of AICD discharge, RIP (acute kidney injury) ([facility]), Asthma, Asthma with COPD with exacerbation ([facility]), CAD (coronary artery disease), Cardiomegaly, Chest pain, CHF (congestive heart failure) ([facility]), Chronic systolic heart failure ([facility]) s/p AICD, COPD (chronic obstructive pulmonary disease) ([facility]), COPD (chronic obstructive pulmonary disease) ([facility]), Depression, Fibroids, Hypertension, Hypomagnesemia, Intraparenchymal hematoma of left side of brain due to trauma ([facility]), Lesion of right native kidney, Lung nodule < 6cm on CT, MI (myocardial infarction) ([facility]), Mild intermittent asthma, Non-ischemic cardiomyopathy ([facility]), NSTEMI (non-ST elevated myocardial  stools.  Genitourinary: No dysuria, trouble voiding, or hematuria.  Musculoskeletal:  No gait disturbance, No weakness or joint complaints.  Neurological: No headache, diplopia, change in muscle strength, numbness or tingling.         PHYSICAL EXAM:    Physical Examination:    BP (!) 100/58   Pulse 51   Temp 97.2 °F (36.2 °C) (Temporal)   Resp 16   Wt 62.6 kg (138 lb)   LMP 04/07/2016   SpO2 98%   BMI 25.24 kg/m²    Constitutional and General Appearance: alert, cooperative, in no apparent resp distress HEENT: PERRL, no cervical lymphadenopathy  Respiratory:  Good respiratory effort. No for increased work of breathing.   On auscultation: clear to auscultation bilaterally, no basilar rales, no wheezing   Cardiovascular:  regular S1 and S2.  No murmur audible   No Jugular venous distention, no hepatojugular reflex  Peripheral pulses are symmetrical and full   Abdomen:  No masses or tenderness  Bowel sounds present  Extremities:   No Cyanosis or Clubbing   Lower extremity edema: No   Skin: Warm and dry  Neurological:  Not done       Labs:     CBC:   Recent Labs     01/30/24  1350 01/30/24  1413   WBC 14.6* 5.3   HGB 14.6 10.7*   HCT 44.9 33.3*    173     BMP:   Recent Labs     01/30/24  1413      K 4.1   CO2 22   BUN 30*   CREATININE 1.3*   LABGLOM 48*   GLUCOSE 87     BNP: No results for input(s): \"BNP\" in the last 72 hours.  PT/INR: No results for input(s): \"PROTIME\", \"INR\" in the last 72 hours.  APTT:No results for input(s): \"APTT\" in the last 72 hours.  CARDIAC ENZYMES:No results for input(s): \"CKTOTAL\", \"CKMB\", \"CKMBINDEX\", \"TROPONINI\" in the last 72 hours.  FASTING LIPID PANEL:  Lab Results   Component Value Date/Time    HDL 59 11/01/2019 05:46 AM    TRIG 95 11/01/2019 05:46 AM     LIVER PROFILE:  Recent Labs     01/30/24  1413   AST 18   ALT 13   LABALBU 4.1       DATA:    Diagnostics:      EKG  Atrial-sensed ventricular-paced rhythm  Abnormal ECG  When compared with ECG of 30-PRO-2024

## 2024-01-31 NOTE — H&P
[facility]  CDU / OBSERVATION ENCOUNTER  ATTENDING NOTE     Pt Name: Stacia Arora  MRN: [id number]  Birthdate 1965  Date of evaluation: 1/31/24  Patient's PCP is :  Logan Jeong MD    CHIEF COMPLAINT       Chief Complaint   Patient presents with    Dizziness    Nausea         HISTORY OF PRESENT ILLNESS    Stacia Arora is a 58 y.o. female who presents with an episode of vertigo.  Patient describes this is being very positionally related and severe [facility].  Patient had minutes worth of symptoms which were significant enough to get her to the emergency department but by the time she arrived she was nearly resolving and by the time she was seen this morning she was fully resolved.  Patient describes the world moving on her with ringing in her ears.  The symptoms ultimately resolved without intervention.    Patient was admitted for cardiac workup given near syncopal type of event but patient's symptoms are much more consistent with a peripheral vertigo than a cardiac event.  However, the patient had had recent cardiac testing and this was noted by the cardiologist who felt the patient's risk factors and prior positive stress test necessitated further workup.    Location/Symptom: Primary symptom is of peripheral vertigo.  Secondary issue is of chest pain and history of myocardial ischemia workup being positive  Timing/Onset: Previous workup has been some weeks.  The vertigo was just prior to presentation  Provocation: Unclear for both  Quality: Vertigo is very much a peripheral type of symptom.  Not really syncopal in nature.  Associated with problems with balance and systemic complaint which is now resolved.  Patient resolved in the emergency department yesterday.  Radiation: None  Severity: See above.  Patient had significant of symptoms she required admission for further evaluation  Timing/Duration: Hours  Modifying  METABOLIC PANEL - Abnormal; Notable for the following components:    BUN 30 (*)     Creatinine 1.3 (*)     Est, Glom Filt Rate 48 (*)     Total Bilirubin 0.2 (*)     All other components within normal limits   TROPONIN - Abnormal; Notable for the following components:    Troponin, High Sensitivity 15 (*)     All other components within normal limits   TROPONIN   D-DIMER, QUANTITATIVE   PREVIOUS SPECIMEN         CDU IMPRESSION / PLAN      Stacia Arora is a 58 y.o. female who presents with vertigo.    Vertigo  Episode now resolved.  Very consistent with peripheral type of features.  Patient for supportive therapy.  Abrupt onset but resolved fairly quickly  Headache.  Due to associated headache patient will have CT scan done.  Cardiac risk factors.  Cardiology is consulted for syncope workup.  While the patient's presentation is not really consistent with syncope the cardiologist did review the patient's records and saw that she had had a positive stress test and had not yet been addressed  Discussion was had with the patient regarding her risk factors and risk factor management.  Patient had been appropriately referred previously as an outpatient but never made it in.  Patient is n.p.o. now will be kept for cardiac catheterization.  This will address the previous chest pain and syncopal issues  Continue home medications and pain control  Monitor vitals, labs, and imaging  DISPO: pending consults and clinical improvement    CONSULTS:    IP CONSULT TO CARDIOLOGY    PROCEDURES:  Not indicated        PATIENT REFERRED TO:    No follow-up provider specified.    --  Werner Levin MD   Emergency Medicine Attending    This dictation was generated by voice recognition computer software.  Although all attempts are made to edit the dictation for accuracy, there may be errors in the transcription that are not intended.

## 2024-01-31 NOTE — ED PROVIDER NOTES
[facility] ED  Emergency Department Encounter  Emergency Medicine Resident     Pt Name:Stacia Arora  MRN: [id number]  Birthdate 1965  Date of evaluation: 1/30/24  PCP:  Logan Jeong MD  Note Started: 7:12 PM EST    CHIEF COMPLAINT       Chief Complaint   Patient presents with    Dizziness    Nausea     HISTORY OF PRESENT ILLNESS  (Location/Symptom, Timing/Onset, Context/Setting, Quality, Duration, Modifying Factors, Severity.)      Stacia Arora is a 58 y.o. female with history of COPD, systolic heart failure with EF of 25% with AICD in place, who presents with sudden onset of lightheadedness and near syncope.  Patient states that everything seemed faraway like tunnel vision and started having a headache and feeling weak.  On arrival to the emergency department she was hypotensive.  While talking with the patient she stated that she was starting to feel somewhat better and blood pressure was noted to have increased lately.  She denies any chest pain or shortness of breath with this episode.  No abdominal pain.  No other symptoms aside from the lightheadedness and some slight nausea no recent sick symptoms.    PAST MEDICAL / SURGICAL / SOCIAL / FAMILY HISTORY      has a past medical history of AICD discharge, RIP (acute kidney injury) ([facility]), Asthma, Asthma with COPD with exacerbation ([facility]), CAD (coronary artery disease), Cardiomegaly, Chest pain, CHF (congestive heart failure) ([facility]), Chronic systolic heart failure ([facility]) s/p AICD, COPD (chronic obstructive pulmonary disease) ([facility]), COPD (chronic obstructive pulmonary disease) ([facility]), Depression, Fibroids, Hypertension, Hypomagnesemia, Intraparenchymal hematoma of left side of brain due to trauma ([facility]), Lesion of right native kidney, Lung nodule < 6cm on CT, MI (myocardial infarction) ([facility]), Mild intermittent asthma, Non-ischemic cardiomyopathy ([facility]), NSTEMI (non-ST elevated myocardial infarction) ([facility]), QT prolongation, Syncope,  for weakness, light-headedness and headaches.       PHYSICAL EXAM      INITIAL VITALS:   /72   Pulse 52   Temp 96.9 °F (36.1 °C) (Oral)   Resp 18   Wt 62.6 kg (138 lb)   LMP 04/07/2016   SpO2 96%   BMI 25.24 kg/m²     Physical Exam  Constitutional:       Appearance: She is ill-appearing.   HENT:      Head: Normocephalic.      Mouth/Throat:      Mouth: Mucous membranes are moist.   Eyes:      Extraocular Movements: Extraocular movements intact.      Pupils: Pupils are equal, round, and reactive to light.   Cardiovascular:      Rate and Rhythm: Normal rate and regular rhythm.      Pulses: Normal pulses.      Heart sounds: Normal heart sounds. No murmur heard.     Comments: Normal DP and PT pulses.  Normal radial pulses, symmetric  Pulmonary:      Effort: Pulmonary effort is normal.      Breath sounds: Normal breath sounds.   Abdominal:      General: There is no distension.      Palpations: Abdomen is soft.      Tenderness: There is no abdominal tenderness. There is no guarding.   Musculoskeletal:      Cervical back: Normal range of motion. No tenderness.      Right lower leg: No edema.      Left lower leg: No edema.   Lymphadenopathy:      Cervical: No cervical adenopathy.   Neurological:      General: No focal deficit present.      Mental Status: She is alert and oriented to person, place, and time.      Cranial Nerves: No cranial nerve deficit.      Motor: No weakness.         DDX/DIAGNOSTIC RESULTS / EMERGENCY DEPARTMENT COURSE / MDM     Medical Decision Making  This is a 58-year-old female present to the emergency department for evaluation of presyncopal event, dizziness, headache and nausea which is improving as her blood pressure increases.  Patient initially hypertensive upon arrival to the emergency department.  Will give small fluid bolus however will watch extent of fluid administration given her history of CHF with AICD in place.  Plan for cardiac evaluation.  Will likely need admission due to

## 2024-01-31 NOTE — PROGRESS NOTES
Patient walked off the floor to go to the [facility]. Cath Lab came to the floor and was advised she would be back in a minute. RN went to get patient who was in the hallway retuning to her room. The time a returned to nurses station transport personnel was gone. Patient is wanted to leave.   Dr. Levin advised

## 2024-02-01 ENCOUNTER — TELEPHONE (OUTPATIENT)
Dept: FAMILY MEDICINE CLINIC | Age: 59
End: 2024-02-01

## 2024-02-01 ENCOUNTER — TELEPHONE (OUTPATIENT)
Dept: OTHER | Age: 59
End: 2024-02-01

## 2024-02-01 LAB
EKG ATRIAL RATE: 56 BPM
EKG P AXIS: 46 DEGREES
EKG P-R INTERVAL: 194 MS
EKG Q-T INTERVAL: 538 MS
EKG QRS DURATION: 140 MS
EKG QTC CALCULATION (BAZETT): 519 MS
EKG R AXIS: -31 DEGREES
EKG T AXIS: 43 DEGREES
EKG VENTRICULAR RATE: 56 BPM

## 2024-02-01 PROCEDURE — 93010 ELECTROCARDIOGRAM REPORT: CPT | Performed by: INTERNAL MEDICINE

## 2024-02-01 NOTE — TELEPHONE ENCOUNTER
Patient's phone unable to accept message told Yudy Toledo at outreach will go to patients home in next few days and call us to schedule a appt. With us.

## 2024-02-01 NOTE — TELEPHONE ENCOUNTER
Care Transitions Initial Follow Up Call    Outreach made within 2 business days of discharge: Yes    Patient: Stacia Arora Patient : 1965   MRN: [id number]  Reason for Admission: There are no discharge diagnoses documented for the most recent discharge.  Discharge Date: 24       Spoke with: Patients phone is not accepting calls at this time. Unable to contact patient.    Discharge department[facility]facility: [facility]

## 2024-02-01 NOTE — DISCHARGE SUMMARY
65 %    Lymphocytes % 13 (L) 24 - 43 %    Monocytes % 6 3 - 12 %    Eosinophils % 1 1 - 4 %    Basophils % 1 0 - 2 %    Immature Granulocytes 1 (H) 0 %    Neutrophils Absolute 11.51 (H) 1.50 - 8.10 k/uL    Lymphocytes Absolute 1.93 1.10 - 3.70 k/uL    Monocytes Absolute 0.81 0.10 - 1.20 k/uL    Eosinophils Absolute 0.08 0.00 - 0.44 k/uL    Basophils Absolute 0.07 0.00 - 0.20 k/uL    Absolute Immature Granulocyte 0.15 0.00 - 0.30 k/uL   Troponin   Result Value Ref Range    Troponin, High Sensitivity 8 0 - 14 ng/L   CBC with Auto Differential   Result Value Ref Range    WBC 5.3 3.5 - 11.3 k/uL    RBC 3.77 (L) 3.95 - 5.11 m/uL    Hemoglobin 10.7 (L) 11.9 - 15.1 g/dL    Hematocrit 33.3 (L) 36.3 - 47.1 %    MCV 88.3 82.6 - 102.9 fL    MCH 28.4 25.2 - 33.5 pg    MCHC 32.1 28.4 - 34.8 g/dL    RDW 15.4 (H) 11.8 - 14.4 %    Platelets 173 138 - 453 k/uL    MPV 12.9 8.1 - 13.5 fL    NRBC Automated 0.0 0.0 per 100 WBC    RBC Morphology ANISOCYTOSIS PRESENT     Neutrophils % 54 36 - 65 %    Lymphocytes % 36 24 - 43 %    Monocytes % 8 3 - 12 %    Eosinophils % 1 1 - 4 %    Basophils % 1 0 - 2 %    Immature Granulocytes 0 0 %    Neutrophils Absolute 2.89 1.50 - 8.10 k/uL    Lymphocytes Absolute 1.89 1.10 - 3.70 k/uL    Monocytes Absolute 0.42 0.10 - 1.20 k/uL    Eosinophils Absolute 0.07 0.00 - 0.44 k/uL    Basophils Absolute 0.04 0.00 - 0.20 k/uL    Absolute Immature Granulocyte <0.03 0.00 - 0.30 k/uL   Comprehensive Metabolic Panel   Result Value Ref Range    Sodium 136 135 - 144 mmol/L    Potassium 4.1 3.7 - 5.3 mmol/L    Chloride 101 98 - 107 mmol/L    CO2 22 20 - 31 mmol/L    Anion Gap 13 9 - 17 mmol/L    Glucose 87 70 - 99 mg/dL    BUN 30 (H) 6 - 20 mg/dL    Creatinine 1.3 (H) 0.5 - 0.9 mg/dL    Est, Glom Filt Rate 48 (L) >60 mL/min/1.73m2    Calcium 9.4 8.6 - 10.4 mg/dL    Total Protein 7.2 6.4 - 8.3 g/dL    Albumin 4.1 3.5 - 5.2 g/dL    Albumin/Globulin Ratio 1.3 1.0 - 2.5    Total Bilirubin 0.2 (L) 0.3 - 1.2 mg/dL     the visualized skull or soft tissues.     No acute intracranial abnormality.     POC US DUP ABD PEL RETRO SCROT LIMITED    Result Date: 1/30/2024  POCUS_Aorta     Exam Information:          Exam type:  Clinically indicated     Indication(s) for Exam:          The exam was performed with the following indications::  Abdominal Pain     Views Obtained & Images Saved for These Views:                  Transverse proximal view         Transverse mid view         Transverse distal view     Findings:          Exam of the above structures revealed the following findings::  No aneurysm         Aorta, tapering::  Normal     Interpretation:          Normal abdominal aorta exam, no aneurysm     Confirmatory study:          What confirmatory study was done?:  Not applicable  Electronically signed by Howard Brewer on Tuesday, January 30, 2024 at 2:46 PM Electronically signed by Donell Nicholas on Tuesday, January 30, 2024 at 6:22 PM : Howard Brewer Attending: Donell Nicholas     XR CHEST (2 VW)    Result Date: 1/30/2024  EXAMINATION: TWO XRAY VIEWS OF THE CHEST 1/30/2024 2:03 pm COMPARISON: 11/27/2022 HISTORY: ORDERING SYSTEM PROVIDED HISTORY: hypotension TECHNOLOGIST PROVIDED HISTORY: hypotension FINDINGS: Cardiomegaly and pacemaker defibrillator appears stable.  No evidence of pulmonary edema or focal infiltrate.  No pleural effusion or pneumothorax.     Cardiomegaly. No acute cardiopulmonary process.  Stable pacemaker defibrillator.           Physical Exam:    General appearance - NAD, AOx 3   Lungs -CTAB, no R/R/R  Heart - RRR, no M/R/G  Abdomen - Soft, NT/ND  Neurological:  MAEx4, No focal motor deficit, sensory loss  Extremities - Cap refil <2 sec in all ext., no edema  Skin -warm, dry      Hospital Course:  Clinical course has improved, labs and imaging reviewed.     Stacia Arora originally presented to the hospital on 1/30/2024  1:33 PM with complaints of headache, dizziness and presyncope.

## 2024-02-02 ENCOUNTER — CARE COORDINATION (OUTPATIENT)
Dept: FAMILY MEDICINE CLINIC | Age: 59
End: 2024-02-02

## 2024-02-02 ENCOUNTER — TELEPHONE (OUTPATIENT)
Dept: FAMILY MEDICINE CLINIC | Age: 59
End: 2024-02-02

## 2024-02-02 NOTE — TELEPHONE ENCOUNTER
Care Transitions Initial Follow Up Call    Outreach made within 2 business days of discharge: Yes    Patient: Stacia Arora Patient : 1965   MRN: [id number]  Reason for Admission: There are no discharge diagnoses documented for the most recent discharge.  Discharge Date: 24       Spoke with: Mailbox full - 2nd attempt    [facility]

## 2024-02-05 NOTE — CARE COORDINATION
Care Transitions Initial Follow Up Call       Date: 2024    Call within 2 business days of discharge: Yes     Patient: Stacia Arora Patient : 1965 MRN: [id number]    Last Discharge Facility       Date Complaint Diagnosis Description Type Department Provider    24 Dizziness; Nausea Pre-syncope ... ED to Hosp-Admission (Discharged) (ADMITTED) Werner Vasquez MD; Quincy Nicholas...            RARS: No data recorded     Spoke with: Stacia    Discharge department/facility: [facility]   Discharge date 2024    Non-face-to-face services provided:  Obtained and reviewed discharge summary and/or continuity of care documents    Post Acute Care Discharge In person Assessment     Review purpose of telephone call with: Stacia      - To evaluate the client after hospital discharge  - To ensure the client has received and understands self care instructions  - To identify and prevent potential adverse events  - To provide additional education and initiate post acute services       Stacia Arora   : 1965 Phone #: [phone] (home)        Was instructed to follow up in: 7-10 days    Hospital Diagnosis: Acute dizziness with presyncope secondary to vertigo.        Hospital Consultants:  Cardiology,  Internal Medicine    Initial contact Date: 2024  Type of Contact:  in person      2.  Assessment of Current Condition      Questions:  How have you felt since discharge from the hospital:     better    Did you receive a discharge summary from the hospital? yes    Is there any lingering or new fever? No    Are you eating and drinking OK? yes    Are there any new complaints of pain? No    If you have a wound - is the dressing clean, dry, and intact?  N/A    [employer] Education    Does the patient know -   1.  What to do if her symptoms worsen? yes   2.  For what symptoms she should call the doctor?  yes   3.  What symptoms she should get help with right away? Yes   Reviewed and

## 2024-02-06 ENCOUNTER — OFFICE VISIT (OUTPATIENT)
Dept: FAMILY MEDICINE CLINIC | Age: 59
End: 2024-02-06

## 2024-02-06 ENCOUNTER — TELEPHONE (OUTPATIENT)
Dept: FAMILY MEDICINE CLINIC | Age: 59
End: 2024-02-06

## 2024-02-06 VITALS
SYSTOLIC BLOOD PRESSURE: 101 MMHG | BODY MASS INDEX: 25.03 KG/M2 | DIASTOLIC BLOOD PRESSURE: 70 MMHG | HEIGHT: 62 IN | HEART RATE: 62 BPM | WEIGHT: 136 LBS

## 2024-02-06 DIAGNOSIS — I50.22 CHRONIC SYSTOLIC HEART FAILURE (HCC): ICD-10-CM

## 2024-02-06 DIAGNOSIS — I95.9 HYPOTENSION, UNSPECIFIED HYPOTENSION TYPE: Primary | ICD-10-CM

## 2024-02-06 PROCEDURE — 99213 OFFICE O/P EST LOW 20 MIN: CPT

## 2024-02-06 PROCEDURE — 3078F DIAST BP <80 MM HG: CPT

## 2024-02-06 PROCEDURE — 3074F SYST BP LT 130 MM HG: CPT

## 2024-02-06 RX ORDER — BLOOD PRESSURE TEST KIT
KIT MISCELLANEOUS
Qty: 1 KIT | Refills: 0 | Status: SHIPPED | OUTPATIENT
Start: 2024-02-06

## 2024-02-06 ASSESSMENT — PATIENT HEALTH QUESTIONNAIRE - PHQ9
SUM OF ALL RESPONSES TO PHQ QUESTIONS 1-9: 0
SUM OF ALL RESPONSES TO PHQ QUESTIONS 1-9: 0
1. LITTLE INTEREST OR PLEASURE IN DOING THINGS: 0
2. FEELING DOWN, DEPRESSED OR HOPELESS: 0
SUM OF ALL RESPONSES TO PHQ QUESTIONS 1-9: 0
SUM OF ALL RESPONSES TO PHQ QUESTIONS 1-9: 0
SUM OF ALL RESPONSES TO PHQ9 QUESTIONS 1 & 2: 0

## 2024-02-06 ASSESSMENT — ENCOUNTER SYMPTOMS
CONSTIPATION: 0
VOMITING: 0
NAUSEA: 0
DIARRHEA: 0
SHORTNESS OF BREATH: 0
ABDOMINAL PAIN: 0

## 2024-02-06 NOTE — PROGRESS NOTES
Subjective:    Stacia Arora is a 58 y.o. female with  has a past medical history of AICD discharge, RIP (acute kidney injury) ([facility]), Asthma, Asthma with COPD with exacerbation ([facility]), CAD (coronary artery disease), Cardiomegaly, Chest pain, CHF (congestive heart failure) ([facility]), Chronic systolic heart failure ([facility]) s/p AICD, COPD (chronic obstructive pulmonary disease) ([facility]), COPD (chronic obstructive pulmonary disease) ([facility]), Depression, Fibroids, Hypertension, Hypomagnesemia, Intraparenchymal hematoma of left side of brain due to trauma ([facility]), Lesion of right native kidney, Lung nodule < 6cm on CT, MI (myocardial infarction) ([facility]), Mild intermittent asthma, Non-ischemic cardiomyopathy ([facility]), NSTEMI (non-ST elevated myocardial infarction) ([facility]), QT prolongation, Syncope, and Unspecified diseases of blood and blood-forming organs.    Presented to the office today for:  Chief Complaint   Patient presents with    Follow-Up from Hospital       HPI    Patient is a 58-year-old female with history of heart failure with reduced ejection fraction, moderate mitral regurg, CKD, she is presenting to the clinic today for hospital follow-up.  Patient initially presented to the ED on January 30, 2024 for presyncopal like episodes, she was given IV hydration.   Cardiology was consulted, they wanted to proceed with cardiac cath given patient's significant cardiac history, cardiac cath done showed minimal CAD, severely reduced systolic function.  Patient was on discharge, today patient states she is feeling fine.  She denies any chest pain, abdominal pain, shortness of breath.      Review of Systems   Respiratory:  Negative for shortness of breath.    Cardiovascular:  Negative for chest pain.   Gastrointestinal:  Negative for abdominal pain, constipation, diarrhea, nausea and vomiting.   Neurological:  Negative for headaches.                 The patient has a   Family History   Problem Relation Age of Onset    Diabetes Mother  
Visit Information    Have you changed or started any medications since your last visit including any over-the-counter medicines, vitamins, or herbal medicines? no   Are you having any side effects from any of your medications? -  no  Have you stopped taking any of your medications? Is so, why? -  no    Have you seen any other physician or provider since your last visit? No  Have you had any other diagnostic tests since your last visit? Yes - Records Requested  Have you been seen in the emergency room and/or had an admission to a hospital since we last saw you? Yes - Records Requested  Have you had your routine dental cleaning in the past 6 months? no    Have you activated your [employer] account? If not, what are your barriers? Yes     Patient Care Team:  Logan Jeong MD as PCP - General (Family Medicine)  Andreas Tatum MD as PCP - Empaneled Provider  Crystal Toledo RN as   Gonzalo Boyle MD as Consulting Physician (Pulmonary Disease)    Medical History Review  Past Medical, Family, and Social History reviewed and does not contribute to the patient presenting condition    Health Maintenance   Topic Date Due    Hepatitis B vaccine (1 of 3 - 3-dose series) Never done    COVID-19 Vaccine (1) Never done    Shingles vaccine (1 of 2) Never done    Pneumococcal 0-64 years Vaccine (2 - PCV) 05/20/2017    Lipids  11/01/2020    Flu vaccine (1) 08/01/2023    A1C test (Diabetic or Prediabetic)  06/21/2024    Cervical cancer screen  07/09/2024    Depression Screen  01/16/2025    GFR test (Diabetes, CKD 3-4, OR last GFR 15-59)  01/30/2025    Breast cancer screen  02/20/2025    Colorectal Cancer Screen  12/14/2025    DTaP/Tdap/Td vaccine (2 - Td or Tdap) 06/09/2029    Hepatitis C screen  Completed    HIV screen  Completed    Hepatitis A vaccine  Aged Out    Hib vaccine  Aged Out    Polio vaccine  Aged Out    Meningococcal (ACWY) vaccine  Aged Out       
Patient may benefit from further discussion with cardiology prior to use starting SGLT 2 medications.  Patient also may benefit from establishing with nephrology team in the setting of patient chronic renal disease and repeat renal function within the next 1 to 2 months with GFR is persistently between 30-45.  Patient also still may benefit from repeat CBC as well.  Return in about 3 months (around 5/6/2024) for routine follow up.   ([employer] ) Dr. GUILLE SHERIFF MD

## 2024-02-06 NOTE — PATIENT INSTRUCTIONS
Thank you for letting us take care of you today. We hope all your questions were addressed. If a question was overlooked or something else comes to mind after you return home, please contact a member of your Care Team listed below.      Your Care Team at [facility] is Team #  Jose Harris, (Faculty)  Patria Stephen (Resident)  Fawad Arriaza (Resident)  Patria Vail (Resident)   Nikkie Jeong (Resident)  Amanuel Trujillo (Resident)  Jess Fisher., [facility]  Rubia Heaton., [facility]  Norma Watkins, [facility]  Kathryn Quarles, [facility]  Angel Hdz, [facility]  Rosemary Westfall, [facility]  Kimberly Trujlilo, [facility]  Jennyfer Rojas, [facility]  Felipe (LJ) KILO Gordon (Clinical Practice Manager)  Ada Pichardo [facility] (Clinical Pharmacist)     Office phone number: [phone]    If you need to get in right away due to illness, please be advised we have \"Same Day\" appointments available Monday-Friday. Please call us at [phone] option #3 to schedule your \"Same Day\" appointment.

## 2024-02-07 ENCOUNTER — TELEPHONE (OUTPATIENT)
Dept: FAMILY MEDICINE CLINIC | Age: 59
End: 2024-02-07

## 2024-02-07 ENCOUNTER — HOSPITAL ENCOUNTER (OUTPATIENT)
Age: 59
Setting detail: SPECIMEN
Discharge: HOME | End: 2024-02-07

## 2024-02-07 DIAGNOSIS — I95.9 HYPOTENSION, UNSPECIFIED HYPOTENSION TYPE: ICD-10-CM

## 2024-02-07 LAB — TSH SERPL DL<=0.05 MIU/L-ACNC: 0.6 UIU/ML (ref 0.3–5)

## 2024-02-08 ENCOUNTER — TELEPHONE (OUTPATIENT)
Dept: FAMILY MEDICINE CLINIC | Age: 59
End: 2024-02-08

## 2024-02-08 NOTE — TELEPHONE ENCOUNTER
Phone call to patient to inform her about her appointment today at [facility].  Scheduled cab.

## 2024-02-08 NOTE — TELEPHONE ENCOUNTER
Met with patient at her home.  Patient's friend, Leonie was visiting at this time.  Reviewed patient's documents as requested.  Patient also received a Medicaid card from [facility].  Patient stated that she attempted to present the card at the pharmacy and it was refused causing patient to pay for the medication out of pocket.  Will follow up with [facility] on patient's behalf.

## 2024-02-08 NOTE — TELEPHONE ENCOUNTER
Met with patient at her home to drop off requested paperwork. Patient also wanted this writer to review her [facility] documents.  Patient received a letter from [facility] stating that her mail has been sent back to their office due to an issue with the post office or her address.  Will follow up with [facility] for clarification.

## 2024-02-08 NOTE — TELEPHONE ENCOUNTER
Phone call to [facility] regarding mail sent to patient and to follow up on Medicaid application status.  Was informed that her S mail has been mailed back to their office.  [facility] has patient's updated info but will make a note of it in their system that this may be an issue with the post office.      Also inquired if medicaid would be able to retroactively pay patient's medical bills that incurred during Medicaid lapse.  Was told that Medicaid \"should\" be able to pay retroactively.     Relayed conversation to patient.

## 2024-02-08 NOTE — TELEPHONE ENCOUNTER
Met with patient at [facility] appointment.  Patient stated that she is feeling much better but was concerned about her blood pressure being low.  Patient stated that she received some mail from [facility] and would like this writer to review.    Will meet with patient at her home tomorrow.

## 2024-02-08 NOTE — TELEPHONE ENCOUNTER
Phone call to patient informing her that her Medicaid has been approved and that she should receive a new medicaid card in the mail.  Patient stated that she understood and that she is feeling okay with no concerns.    Patient called back approximately an hour later stating that she was going to the ER. Patient stated that she was at [facility] and saw a white flash of light and started sweating profusely.      Will continue to follow up.

## 2024-02-18 ENCOUNTER — HOSPITAL ENCOUNTER (EMERGENCY)
Age: 59
Discharge: HOME | End: 2024-02-18
Attending: EMERGENCY MEDICINE
Payer: MEDICAID

## 2024-02-18 VITALS
HEART RATE: 60 BPM | OXYGEN SATURATION: 98 % | TEMPERATURE: 98 F | RESPIRATION RATE: 18 BRPM | SYSTOLIC BLOOD PRESSURE: 122 MMHG | DIASTOLIC BLOOD PRESSURE: 76 MMHG

## 2024-02-18 DIAGNOSIS — K08.89 PAIN, DENTAL: Primary | ICD-10-CM

## 2024-02-18 PROCEDURE — 99283 EMERGENCY DEPT VISIT LOW MDM: CPT

## 2024-02-18 PROCEDURE — 6370000000 HC RX 637 (ALT 250 FOR IP)

## 2024-02-18 RX ORDER — AMOXICILLIN AND CLAVULANATE POTASSIUM 875; 125 MG/1; MG/1
1 TABLET, FILM COATED ORAL ONCE
Status: COMPLETED | OUTPATIENT
Start: 2024-02-18 | End: 2024-02-18

## 2024-02-18 RX ORDER — IBUPROFEN 400 MG/1
400 TABLET ORAL ONCE
Status: COMPLETED | OUTPATIENT
Start: 2024-02-18 | End: 2024-02-18

## 2024-02-18 RX ORDER — AMOXICILLIN AND CLAVULANATE POTASSIUM 875; 125 MG/1; MG/1
1 TABLET, FILM COATED ORAL 2 TIMES DAILY
Qty: 14 TABLET | Refills: 0 | Status: SHIPPED | OUTPATIENT
Start: 2024-02-18 | End: 2024-02-25

## 2024-02-18 RX ADMIN — AMOXICILLIN AND CLAVULANATE POTASSIUM 1 TABLET: 875; 125 TABLET, FILM COATED ORAL at 12:40

## 2024-02-18 RX ADMIN — IBUPROFEN 400 MG: 400 TABLET, FILM COATED ORAL at 12:40

## 2024-02-18 NOTE — ED PROVIDER NOTES
[facility] ED  Emergency Department Encounter  Emergency Medicine Resident     Pt Name:Stacia Arora  MRN: [id number]  Birthdate 1965  Date of evaluation: 2/18/24  PCP:  Logan Jeong MD  Note Started: 12:25 PM EST      CHIEF COMPLAINT       Chief Complaint   Patient presents with    Dental Pain       HISTORY OF PRESENT ILLNESS  (Location/Symptom, Timing/Onset, Context/Setting, Quality, Duration, Modifying Factors, Severity.)      Stacia Arora is a 58 y.o. female who presents with left lower jaw/dental pain that has been ongoing for the past 3 days.  Patient had root canal in October was on antibiotics at the time with the pain improving this has been ongoing on and off since.  She has taken some leftover antibiotics she had for a urinary tract infection without improvements in this dental pain.  Pain has worsened and there now is a small lump there.  Patient denies fever, chills, however the pain is spreading and radiating up to left side of the face and head.  She has not taken anything for the pain.     Patient has previously been referred to and does have a dentist to follow-up with.  She has not seen a dentist since this pain started.    PAST MEDICAL / SURGICAL / SOCIAL / FAMILY HISTORY      has a past medical history of AICD discharge, RIP (acute kidney injury) ([facility]), Asthma, Asthma with COPD with exacerbation ([facility]), CAD (coronary artery disease), Cardiomegaly, Chest pain, CHF (congestive heart failure) ([facility]), Chronic systolic heart failure ([facility]) s/p AICD, COPD (chronic obstructive pulmonary disease) ([facility]), COPD (chronic obstructive pulmonary disease) ([facility]), Depression, Fibroids, Hypertension, Hypomagnesemia, Intraparenchymal hematoma of left side of brain due to trauma ([facility]), Lesion of right native kidney, Lung nodule < 6cm on CT, MI (myocardial infarction) ([facility]), Mild intermittent asthma, Non-ischemic cardiomyopathy ([facility]), NSTEMI (non-ST elevated myocardial

## 2024-02-18 NOTE — DISCHARGE INSTRUCTIONS
You were seen in the emergency room for dental pain in the left lower jaw.  This appears to be a dental abscess or infection.  You are being discharged with 7 days of antibiotics to be taken twice a day.  Please ensure you complete the entire course of antibiotics even if you are feeling better.  Please follow-up with your dentist for definitive management and further recommendations.  You may take over-the-counter Motrin or Tylenol for symptomatic and pain management as needed.    Please return to the emergency room should you have worsening pain, swelling, difficulty breathing or swallowing your own saliva, or new symptoms of concern.

## 2024-02-18 NOTE — ED TRIAGE NOTES
Pt to ed c/o dental abscess. Pt states she has had this intermittently for \"a while\". Pt states she took old antibiotics with no relief. Pt states she has not taken anything for the pain. Pt states she does not have an appt with her dentist.     Pt is alert and oriented x4. Ambulatory to room. Vitals stable. Family at bedside. Will continue with plan of care.

## 2024-02-18 NOTE — ED PROVIDER NOTES
[facility]     Emergency Department     Faculty Attestation    I performed a history and physical examination of the patient and discussed management with the resident. I reviewed the resident’s note and agree with the documented findings and plan of care. Any areas of disagreement are noted on the chart. I was personally present for the key portions of any procedures. I have documented in the chart those procedures where I was not present during the key portions. I have reviewed the emergency nurses triage note. I agree with the chief complaint, past medical history, past surgical history, allergies, medications, social and family history as documented unless otherwise noted below. Documentation of the HPI, Physical Exam and Medical Decision Making performed by medical students or scribes is based on my personal performance of the HPI, PE and MDM. For Physician Assistant/ Nurse Practitioner cases/documentation I have personally evaluated this patient and have completed at least one if not all key elements of the E/M (history, physical exam, and MDM). Additional findings are as noted.    Vital signs:   Vitals:    02/18/24 1219   BP: 122/76   Pulse: 60   Resp: 18   Temp: 98 °F (36.7 °C)   SpO2: 98%      Right lower dental pain. No abscess. No tongue elevation.         Jesusita Chritsie M.D,  Attending Emergency  Physician            Jesusita Christie MD  02/18/24 8099

## 2024-02-19 NOTE — TELEPHONE ENCOUNTER
Last visit: 02/06/2024  Last Med refill: 01/19/2024  Does patient have enough medication for 72 hours: No:     Next Visit Date:  Future Appointments   Date Time Provider Department Center   3/7/2024  2:00 PM SCHEDULE, [facility] SCHEDULE [facility]   3/22/2024  9:10 AM Edmund Madrid MD [facility] None   5/6/2024  1:30 PM Logan Jeong MD [facility]   5/16/2024 11:15 AM Ganga Joya MD [facility]       Health Maintenance   Topic Date Due    Hepatitis B vaccine (1 of 3 - 3-dose series) Never done    COVID-19 Vaccine (1) Never done    Shingles vaccine (1 of 2) Never done    Pneumococcal 0-64 years Vaccine (2 - PCV) 05/20/2017    Lipids  11/01/2020    Flu vaccine (1) 08/01/2023    A1C test (Diabetic or Prediabetic)  06/21/2024    Cervical cancer screen  07/09/2024    GFR test (Diabetes, CKD 3-4, OR last GFR 15-59)  01/30/2025    Depression Screen  02/06/2025    Breast cancer screen  02/20/2025    Colorectal Cancer Screen  12/14/2025    DTaP/Tdap/Td vaccine (2 - Td or Tdap) 06/09/2029    Hepatitis C screen  Completed    HIV screen  Completed    Hepatitis A vaccine  Aged Out    Hib vaccine  Aged Out    Polio vaccine  Aged Out    Meningococcal (ACWY) vaccine  Aged Out       Hemoglobin A1C (%)   Date Value   06/21/2023 5.9   05/26/2022 5.7   11/06/2019 5.7             ( goal A1C is < 7)   No components found for: \"LABMICR\"  LDL Cholesterol (mg/dL)   Date Value   11/01/2019 131 (H)   11/25/2018 128       (goal LDL is <100)   AST (U/L)   Date Value   01/30/2024 18     ALT (U/L)   Date Value   01/30/2024 13     BUN (mg/dL)   Date Value   01/30/2024 30 (H)     BP Readings from Last 3 Encounters:   02/18/24 122/76   02/06/24 101/70   01/31/24 121/64          (goal 120/80)    All Future Testing planned in [facility]  Lab Frequency Next Occurrence   Lipid Panel Once 06/21/2023   US RENAL COMPLETE Once 10/31/2023               Patient Active Problem List:     COPD

## 2024-02-20 RX ORDER — LANOLIN ALCOHOL/MO/W.PET/CERES
CREAM (GRAM) TOPICAL
Qty: 30 TABLET | Refills: 0 | Status: SHIPPED | OUTPATIENT
Start: 2024-02-20

## 2024-02-20 RX ORDER — OMEPRAZOLE 20 MG/1
CAPSULE, DELAYED RELEASE ORAL
Qty: 30 CAPSULE | Refills: 0 | Status: SHIPPED | OUTPATIENT
Start: 2024-02-20

## 2024-03-14 DIAGNOSIS — I50.22 CHRONIC SYSTOLIC HEART FAILURE (HCC): ICD-10-CM

## 2024-03-14 DIAGNOSIS — I25.10 CORONARY ARTERY DISEASE INVOLVING NATIVE HEART WITHOUT ANGINA PECTORIS, UNSPECIFIED VESSEL OR LESION TYPE: ICD-10-CM

## 2024-03-14 DIAGNOSIS — Z13.220 LIPID SCREENING: ICD-10-CM

## 2024-03-16 RX ORDER — ATORVASTATIN CALCIUM 40 MG/1
40 TABLET, FILM COATED ORAL NIGHTLY
Qty: 30 TABLET | Refills: 1 | Status: SHIPPED | OUTPATIENT
Start: 2024-03-16

## 2024-03-16 RX ORDER — LANOLIN ALCOHOL/MO/W.PET/CERES
CREAM (GRAM) TOPICAL
Qty: 30 TABLET | Refills: 0 | Status: SHIPPED | OUTPATIENT
Start: 2024-03-16

## 2024-03-16 RX ORDER — SPIRONOLACTONE 25 MG/1
TABLET ORAL
Qty: 30 TABLET | Refills: 1 | Status: SHIPPED | OUTPATIENT
Start: 2024-03-16

## 2024-03-16 RX ORDER — ASPIRIN 81 MG/1
81 TABLET, COATED ORAL DAILY
Qty: 90 TABLET | Refills: 0 | Status: SHIPPED | OUTPATIENT
Start: 2024-03-16

## 2024-03-16 RX ORDER — METOPROLOL SUCCINATE 50 MG/1
TABLET, EXTENDED RELEASE ORAL
Qty: 90 TABLET | Refills: 0 | Status: SHIPPED | OUTPATIENT
Start: 2024-03-16

## 2024-03-16 RX ORDER — BUMETANIDE 1 MG/1
TABLET ORAL
Qty: 60 TABLET | Refills: 1 | Status: SHIPPED | OUTPATIENT
Start: 2024-03-16

## 2024-03-16 RX ORDER — OMEPRAZOLE 20 MG/1
CAPSULE, DELAYED RELEASE ORAL
Qty: 30 CAPSULE | Refills: 0 | Status: SHIPPED | OUTPATIENT
Start: 2024-03-16

## 2024-03-16 RX ORDER — SACUBITRIL AND VALSARTAN 24; 26 MG/1; MG/1
TABLET, FILM COATED ORAL
Qty: 60 TABLET | Refills: 5 | Status: SHIPPED | OUTPATIENT
Start: 2024-03-16

## 2024-03-25 ENCOUNTER — CARE COORDINATION (OUTPATIENT)
Dept: FAMILY MEDICINE CLINIC | Age: 59
End: 2024-03-25

## 2024-04-09 ENCOUNTER — TELEPHONE (OUTPATIENT)
Dept: FAMILY MEDICINE CLINIC | Age: 59
End: 2024-04-09

## 2024-04-10 ENCOUNTER — TELEPHONE (OUTPATIENT)
Dept: FAMILY MEDICINE CLINIC | Age: 59
End: 2024-04-10

## 2024-04-10 NOTE — CARE COORDINATION
[facility] Outreach nurse vs    Met with Stacia to assess self care management of CHF, COPD, follow up for dental care     Emotional/coping  Alert, oriented x 3, engaged and cooperative   Affect full   thought process  logical   Stacia reports chronic anxiety   She denies panic episodes   States she reaches out to family and friends for support when anxious and uses coping strategies     Housing   Home is clean, well furnished   States she is able to pay utility and rental costs monthly.  Reports she enjoys meeting with neighbors and friends that visit    Socialization/family  Reports she has supportive relationships with family and friends   She engages daily with family and/or friends    Medications   Medication adherence pack meds reviewed with Stacia   She demonstrates adherence with taking oral medications   She reports she has not needed to take albuterol inhaler and has not needed duo neb aerosol for past 3-4 weeks.   She takes flonase inhaler less than weekly.      Current Outpatient Medications   Medication Instructions    acetaminophen (TYLENOL) 500 mg, Oral, EVERY 6 HOURS PRN    albuterol sulfate HFA (VENTOLIN HFA) 108 (90 Base) MCG/ACT inhaler INHALE 2 PUFFS INTO THE LUNGS EVERY 6 HOURS AS NEEDED FOR WHEEZING.    amiodarone (CORDARONE) 200 mg, Oral, DAILY    Aspirin Low Dose 81 mg, Oral, DAILY    atorvastatin (LIPITOR) 40 mg, Oral, NIGHTLY, at bedtime.    benzocaine (ORAL ANALGESIC MAX ST) 20 % GEL mucosal gel 1 each, Mouth/Throat, 2 TIMES DAILY PRN    Blood Pressure KIT Measure daily    bumetanide (BUMEX) 1 MG tablet TAKE ONE TABLET BY MOUTH TWICE A DAY    empagliflozin (JARDIANCE) 10 mg, Oral, DAILY    ENTRESTO 24-26 MG per tablet TAKE ONE TABLET BY MOUTH TWICE A DAY    fluticasone (FLONASE) 50 MCG/ACT nasal spray 2 sprays, Each Nostril, DAILY    ipratropium-albuterol (DUONEB) 0.5-2.5 (3) MG/3ML SOLN nebulizer solution 3 mLs, Inhalation, 2 TIMES DAILY PRN    magnesium oxide (MAG-OX) 400 (240 Mg) MG

## 2024-04-11 ENCOUNTER — TELEPHONE (OUTPATIENT)
Dept: FAMILY MEDICINE CLINIC | Age: 59
End: 2024-04-11

## 2024-04-11 RX ORDER — LANOLIN ALCOHOL/MO/W.PET/CERES
CREAM (GRAM) TOPICAL
Qty: 30 TABLET | Refills: 0 | Status: SHIPPED | OUTPATIENT
Start: 2024-04-11

## 2024-04-11 RX ORDER — OMEPRAZOLE 20 MG/1
CAPSULE, DELAYED RELEASE ORAL
Qty: 30 CAPSULE | Refills: 0 | Status: SHIPPED | OUTPATIENT
Start: 2024-04-11

## 2024-04-19 DIAGNOSIS — J44.9 CHRONIC OBSTRUCTIVE PULMONARY DISEASE, UNSPECIFIED COPD TYPE (HCC): ICD-10-CM

## 2024-04-19 NOTE — TELEPHONE ENCOUNTER
Last visit: 02/06/2024  Last Med refill: 08/28/2023  Does patient have enough medication for 72 hours: No:     Next Visit Date:  Future Appointments   Date Time Provider Department Center   5/6/2024  1:30 PM Logan Jeong MD [facility]   5/16/2024 11:15 AM Ganga Joya MD [facility]       Health Maintenance   Topic Date Due    Hepatitis B vaccine (1 of 3 - 3-dose series) Never done    COVID-19 Vaccine (1) Never done    Shingles vaccine (1 of 2) Never done    Pneumococcal 0-64 years Vaccine (2 of 2 - PCV) 05/20/2017    Lipids  11/01/2020    A1C test (Diabetic or Prediabetic)  06/21/2024    Cervical cancer screen  07/09/2024    Flu vaccine (Season Ended) 08/01/2024    GFR test (Diabetes, CKD 3-4, OR last GFR 15-59)  01/30/2025    Depression Screen  02/06/2025    Breast cancer screen  02/20/2025    Colorectal Cancer Screen  12/14/2025    DTaP/Tdap/Td vaccine (2 - Td or Tdap) 06/09/2029    Hepatitis C screen  Completed    HIV screen  Completed    Hepatitis A vaccine  Aged Out    Hib vaccine  Aged Out    Polio vaccine  Aged Out    Meningococcal (ACWY) vaccine  Aged Out       Hemoglobin A1C (%)   Date Value   06/21/2023 5.9   05/26/2022 5.7   11/06/2019 5.7             ( goal A1C is < 7)   No components found for: \"LABMICR\"  LDL Cholesterol (mg/dL)   Date Value   11/01/2019 131 (H)   11/25/2018 128       (goal LDL is <100)   AST (U/L)   Date Value   01/30/2024 18     ALT (U/L)   Date Value   01/30/2024 13     BUN (mg/dL)   Date Value   01/30/2024 30 (H)     BP Readings from Last 3 Encounters:   02/18/24 122/76   02/06/24 101/70   01/31/24 121/64          (goal 120/80)    All Future Testing planned in [facility]  Lab Frequency Next Occurrence   Lipid Panel Once 06/21/2023   US RENAL COMPLETE Once 10/31/2023               Patient Active Problem List:     COPD (chronic obstructive pulmonary disease) (HCC)     Fibroids     Syncope     Lung nodule < 6cm on CT     Chronic systolic heart failure (HCC)

## 2024-04-22 RX ORDER — ALBUTEROL SULFATE 90 UG/1
AEROSOL, METERED RESPIRATORY (INHALATION)
Qty: 18 G | Refills: 3 | Status: SHIPPED | OUTPATIENT
Start: 2024-04-22

## 2024-05-01 ENCOUNTER — TELEPHONE (OUTPATIENT)
Dept: SURGERY | Age: 59
End: 2024-05-01

## 2024-05-01 NOTE — TELEPHONE ENCOUNTER
Tried to contact patient to inform her that provider would not be in on 05/06/2024 so patient could reschedule appointment. Voice mail full, no message left .

## 2024-05-02 ENCOUNTER — TELEPHONE (OUTPATIENT)
Dept: FAMILY MEDICINE CLINIC | Age: 59
End: 2024-05-02

## 2024-05-06 ENCOUNTER — CARE COORDINATION (OUTPATIENT)
Dept: FAMILY MEDICINE CLINIC | Age: 59
End: 2024-05-06

## 2024-05-07 DIAGNOSIS — Z13.220 LIPID SCREENING: ICD-10-CM

## 2024-05-07 DIAGNOSIS — I50.22 CHRONIC SYSTOLIC HEART FAILURE (HCC): ICD-10-CM

## 2024-05-08 NOTE — TELEPHONE ENCOUNTER
E-scribe request for aldactone, MAGOX, LIPITOR, PRILOSEC, BUMEX. Please review and e-scribe if applicable.     Last Visit Date:  2/6/24  Next Visit Date:  Visit date not found    Hemoglobin A1C (%)   Date Value   06/21/2023 5.9   05/26/2022 5.7   11/06/2019 5.7             ( goal A1C is < 7)   No components found for: \"LABMICR\"  No components found for: \"LDLCHOLESTEROL\", \"LDLCALC\"    (goal LDL is <100)   AST (U/L)   Date Value   01/30/2024 18     ALT (U/L)   Date Value   01/30/2024 13     BUN (mg/dL)   Date Value   01/30/2024 30 (H)     BP Readings from Last 3 Encounters:   02/18/24 122/76   02/06/24 101/70   01/31/24 121/64          (goal 120/80)        Patient Active Problem List:     COPD (chronic obstructive pulmonary disease) ([facility])     Fibroids     Syncope     Lung nodule < 6cm on CT     Chronic systolic heart failure ([facility]) s/p AICD     Mild intermittent asthma     Essential hypertension     Chest pain     QT prolongation     Asthma with COPD with exacerbation ([facility])     Non-ischemic cardiomyopathy ([facility])     Lesion of right native kidney     NSTEMI (non-ST elevated myocardial infarction) ([facility])     CAD (coronary artery disease)     Intraparenchymal hematoma of left side of brain due to trauma ([facility])     Chronic combined systolic and diastolic congestive heart failure ([facility])     AICD discharge     RIP (acute kidney injury) ([facility])     Hypomagnesemia     Other heart failure ([facility])     Atypical chest pain     Plantar fasciitis, bilateral     Left lower quadrant abdominal pain     Diverticulitis     Pre-syncope     Anginal chest pain at rest ([facility])     Presence of cardiac resynchronization therapy defibrillator (CRT-D)     Abnormal cardiovascular stress test     NICM (nonischemic cardiomyopathy) ([facility])

## 2024-05-09 ENCOUNTER — TELEPHONE (OUTPATIENT)
Dept: FAMILY MEDICINE CLINIC | Age: 59
End: 2024-05-09

## 2024-05-13 RX ORDER — BUMETANIDE 1 MG/1
TABLET ORAL
Qty: 60 TABLET | Refills: 1 | Status: SHIPPED | OUTPATIENT
Start: 2024-05-13

## 2024-05-13 RX ORDER — SPIRONOLACTONE 25 MG/1
TABLET ORAL
Qty: 30 TABLET | Refills: 1 | Status: SHIPPED | OUTPATIENT
Start: 2024-05-13

## 2024-05-13 RX ORDER — ATORVASTATIN CALCIUM 40 MG/1
40 TABLET, FILM COATED ORAL NIGHTLY
Qty: 30 TABLET | Refills: 1 | Status: SHIPPED | OUTPATIENT
Start: 2024-05-13

## 2024-05-13 RX ORDER — LANOLIN ALCOHOL/MO/W.PET/CERES
CREAM (GRAM) TOPICAL
Qty: 30 TABLET | Refills: 0 | Status: SHIPPED | OUTPATIENT
Start: 2024-05-13

## 2024-05-13 RX ORDER — OMEPRAZOLE 20 MG/1
CAPSULE, DELAYED RELEASE ORAL
Qty: 30 CAPSULE | Refills: 0 | Status: SHIPPED | OUTPATIENT
Start: 2024-05-13

## 2024-05-14 NOTE — CARE COORDINATION
Stacia Arora  5/6/2024              [facility] Outreach nurse vs    Met with Stacia to assess her self care management of CHF, COPD    Emotional/coping   Alert, oriented x 3   Engaged and cooperative   Affect - full   thought process- logical   She denies feelings, thoughts of depression  Reports she is sleeping well at night.     Socialization/family  Reports supportive family and friend relationships  States she engages with family and friends daily   Other social activities include watching television, movies, playing video games     Housing   home is clean, well furnished and she has adequate food in the home  Reports she is able to afford housing and utility expenses.     Medications   Medication schedule reviewed   She demonstrates adherence in taking oral medications and oral meds are packaged in adherence packages by her pharmacy  She reports taking albuterol inhaler less than daily and states she had not needed to take duoneb aerosol for past 2 months.    Current Outpatient Medications   Medication Instructions    acetaminophen (TYLENOL) 500 mg, Oral, EVERY 6 HOURS PRN    albuterol sulfate HFA (VENTOLIN HFA) 108 (90 Base) MCG/ACT inhaler INHALE 2 PUFFS INTO THE LUNGS EVERY 6 HOURS AS NEEDED FOR WHEEZING.    amiodarone (CORDARONE) 200 mg, Oral, DAILY    Aspirin Low Dose 81 mg, Oral, DAILY    atorvastatin (LIPITOR) 40 mg, Oral, NIGHTLY, at bedtime.    benzocaine (ORAL ANALGESIC MAX ST) 20 % GEL mucosal gel 1 each, Mouth/Throat, 2 TIMES DAILY PRN    Blood Pressure KIT Measure daily    bumetanide (BUMEX) 1 MG tablet TAKE ONE TABLET BY MOUTH TWICE A DAY    empagliflozin (JARDIANCE) 10 mg, Oral, DAILY    ENTRESTO 24-26 MG per tablet TAKE ONE TABLET BY MOUTH TWICE A DAY    fluticasone (FLONASE) 50 MCG/ACT nasal spray 2 sprays, Each Nostril, DAILY    ipratropium-albuterol (DUONEB) 0.5-2.5 (3) MG/3ML SOLN nebulizer solution 3 mLs, Inhalation, 2 TIMES DAILY PRN    magnesium oxide (MAG-OX) 400 (240 Mg) MG

## 2024-05-16 ENCOUNTER — TELEPHONE (OUTPATIENT)
Dept: FAMILY MEDICINE CLINIC | Age: 59
End: 2024-05-16

## 2024-05-21 ENCOUNTER — OFFICE VISIT (OUTPATIENT)
Dept: FAMILY MEDICINE CLINIC | Age: 59
End: 2024-05-21
Payer: MEDICAID

## 2024-05-21 ENCOUNTER — TELEPHONE (OUTPATIENT)
Dept: FAMILY MEDICINE CLINIC | Age: 59
End: 2024-05-21

## 2024-05-21 VITALS
WEIGHT: 133 LBS | BODY MASS INDEX: 24.32 KG/M2 | DIASTOLIC BLOOD PRESSURE: 65 MMHG | SYSTOLIC BLOOD PRESSURE: 113 MMHG | HEART RATE: 97 BPM

## 2024-05-21 DIAGNOSIS — M25.522 LEFT ELBOW PAIN: ICD-10-CM

## 2024-05-21 DIAGNOSIS — G44.209 TENSION HEADACHE: Primary | ICD-10-CM

## 2024-05-21 PROBLEM — G43.909 MIGRAINE: Status: ACTIVE | Noted: 2024-05-21

## 2024-05-21 PROCEDURE — 3074F SYST BP LT 130 MM HG: CPT

## 2024-05-21 PROCEDURE — 99213 OFFICE O/P EST LOW 20 MIN: CPT

## 2024-05-21 PROCEDURE — G8428 CUR MEDS NOT DOCUMENT: HCPCS

## 2024-05-21 PROCEDURE — G8420 CALC BMI NORM PARAMETERS: HCPCS

## 2024-05-21 PROCEDURE — 1036F TOBACCO NON-USER: CPT

## 2024-05-21 PROCEDURE — 3017F COLORECTAL CA SCREEN DOC REV: CPT

## 2024-05-21 PROCEDURE — 3078F DIAST BP <80 MM HG: CPT

## 2024-05-21 RX ORDER — ACETAMINOPHEN 500 MG
1000 TABLET ORAL 3 TIMES DAILY PRN
Qty: 180 TABLET | Refills: 0 | Status: SHIPPED | OUTPATIENT
Start: 2024-05-21

## 2024-05-21 ASSESSMENT — ENCOUNTER SYMPTOMS
VOMITING: 0
ABDOMINAL PAIN: 0
CONSTIPATION: 0
DIARRHEA: 0
NAUSEA: 0
SHORTNESS OF BREATH: 0

## 2024-05-21 NOTE — PROGRESS NOTES
Attending Physician Statement  I have discussed the care of Stacia Arora, including pertinent history and exam findings,  with the resident. I have reviewed the key elements of all parts of the encounter with the resident.  I agree with the assessment, plan and orders as documented by the resident.  (GE Modifier)    Lexie Blackwell MD

## 2024-05-21 NOTE — PROGRESS NOTES
Subjective:    Stacia Arora is a 58 y.o. female with  has a past medical history of AICD discharge, RIP (acute kidney injury) ([facility]), Asthma, Asthma with COPD with exacerbation (HCC), CAD (coronary artery disease), Cardiomegaly, Chest pain, CHF (congestive heart failure) (HCC), Chronic systolic heart failure (HCC) s/p AICD, COPD (chronic obstructive pulmonary disease) (HCC), COPD (chronic obstructive pulmonary disease) (HCC), Depression, Fibroids, Hypertension, Hypomagnesemia, Intraparenchymal hematoma of left side of brain due to trauma (HCC), Lesion of right native kidney, Lung nodule < 6cm on CT, MI (myocardial infarction) (HCC), Mild intermittent asthma, Non-ischemic cardiomyopathy (HCC), NSTEMI (non-ST elevated myocardial infarction) ([facility]), QT prolongation, Syncope, and Unspecified diseases of blood and blood-forming organs.    Presented to the office today for:  Chief Complaint   Patient presents with    Headache     Middle of head        Headache      Patient is a 58-year-old female with history of heart failure with reduced ejection fraction with AICD in place, CKD, she is presenting to the clinic today for left elbow pain and headache.  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  She feels the pain radiate down to her fingertips.  Associated with some numbness and tingling.  She describes elbow pain as a throbbing pain.  She denies any trauma to the area.    Next concern is her headaches.  Patient states this has been going on for years.  She had a recent CT scan back in January which was negative for any acute pathology.  She denies any blurry vision, floaters, any aura symptoms.  She cannot take any NSAIDs due to her CKD, and cannot take any sumatriptan due to her history of CAD.  Currently she denies any history of headaches at this time.    Review of Systems   Constitutional:  Negative for chills

## 2024-05-22 NOTE — TELEPHONE ENCOUNTER
Phone call to patient to inform her of upcoming PCP appointment.  Patient stated that she will attend and does not need transportation for the appointment.

## 2024-05-22 NOTE — TELEPHONE ENCOUNTER
Phone call to patient for appointment reminder.  Patient stated that she will need a cab.  Patient requested assistance with applying for PIPP as she feels her electric bill is too high.      Will meet with patient during her appointment to assist with complete application for PIPP.  Will also assist with scheduling cab.

## 2024-05-22 NOTE — TELEPHONE ENCOUNTER
Phone call follow up from patient.  Patient requested assistance with finding out her next dental appointment.  Will assist and schedule transportation if needed.

## 2024-05-22 NOTE — TELEPHONE ENCOUNTER
Attempted follow up with patient to complete change to different managed care provider.  No answer, message left.

## 2024-05-22 NOTE — TELEPHONE ENCOUNTER
Met with patient at her appointment.  Patient informed the physician that she's been experiencing ongoing headaches and elbow pain.  Physician wrote a referral for neurology and an order for an xray. Will assist patient with scheduling neurology appt.      Assisted patient with completing application for PIPP.  Will submit on patient's behalf.

## 2024-05-22 NOTE — TELEPHONE ENCOUNTER
Phone call from patient. Patient inquired about upcoming appointments.  Informed patient her PCP appointment was canceled due to scheduling conflicts.  Patient requested assistance with rescheduling as she has a vacation scheduled for June to [place] and wants to ensure she has medication to take with her on her trip.      Will reschedule on patient's behalf.

## 2024-05-22 NOTE — TELEPHONE ENCOUNTER
Called [facility] for follow up regarding patient's appointment.  Was informed that patient's appointment was scheduled for 4/13 @ 9am.  Relayed to patient.  Patient understood and requested transportation to be scheduled for appointment.  Patient requesting assistance with changing her managed care provider to [place] as she feels she'd receive more benefits.  Will meet with patient tomorrow.

## 2024-06-05 DIAGNOSIS — I50.22 CHRONIC SYSTOLIC HEART FAILURE (HCC): ICD-10-CM

## 2024-06-05 DIAGNOSIS — I25.10 CORONARY ARTERY DISEASE INVOLVING NATIVE HEART WITHOUT ANGINA PECTORIS, UNSPECIFIED VESSEL OR LESION TYPE: ICD-10-CM

## 2024-06-06 NOTE — TELEPHONE ENCOUNTER
E-scribe request for PENDED MEDICATIONS. Please review and e-scribe if applicable.     Last Visit Date:  5/21/24  Next Visit Date:  Visit date not found    Hemoglobin A1C (%)   Date Value   06/21/2023 5.9   05/26/2022 5.7   11/06/2019 5.7             ( goal A1C is < 7)   No components found for: \"LABMICR\"  No components found for: \"LDLCHOLESTEROL\", \"LDLCALC\"    (goal LDL is <100)   AST (U/L)   Date Value   01/30/2024 18     ALT (U/L)   Date Value   01/30/2024 13     BUN (mg/dL)   Date Value   01/30/2024 30 (H)     BP Readings from Last 3 Encounters:   05/21/24 113/65   02/18/24 122/76   02/06/24 101/70          (goal 120/80)        Patient Active Problem List:     COPD (chronic obstructive pulmonary disease) ([facility])     Fibroids     Syncope     Lung nodule < 6cm on CT     Chronic systolic heart failure (HCC) s/p AICD     Mild intermittent asthma     Essential hypertension     Chest pain     QT prolongation     Asthma with COPD with exacerbation (HCC)     Non-ischemic cardiomyopathy (HCC)     Lesion of right native kidney     NSTEMI (non-ST elevated myocardial infarction) ([facility])     CAD (coronary artery disease)     Intraparenchymal hematoma of left side of brain due to trauma (HCC)     Chronic combined systolic and diastolic congestive heart failure (HCC)     AICD discharge     RIP (acute kidney injury) ([facility])     Hypomagnesemia     Other heart failure ([facility])     Atypical chest pain     Plantar fasciitis, bilateral     Left lower quadrant abdominal pain     Diverticulitis     Pre-syncope     Anginal chest pain at rest ([facility])     Presence of cardiac resynchronization therapy defibrillator (CRT-D)     Abnormal cardiovascular stress test     NICM (nonischemic cardiomyopathy) ([facility])     Migraine     Left elbow pain     Tension headache      ----JF

## 2024-06-10 RX ORDER — ASPIRIN 81 MG/1
81 TABLET, COATED ORAL DAILY
Qty: 90 TABLET | Refills: 0 | Status: SHIPPED | OUTPATIENT
Start: 2024-06-10

## 2024-06-10 RX ORDER — OMEPRAZOLE 20 MG/1
CAPSULE, DELAYED RELEASE ORAL
Qty: 30 CAPSULE | Refills: 0 | Status: SHIPPED | OUTPATIENT
Start: 2024-06-10

## 2024-06-10 RX ORDER — METOPROLOL SUCCINATE 50 MG/1
TABLET, EXTENDED RELEASE ORAL
Qty: 90 TABLET | Refills: 0 | Status: SHIPPED | OUTPATIENT
Start: 2024-06-10

## 2024-06-10 RX ORDER — EMPAGLIFLOZIN 10 MG/1
10 TABLET, FILM COATED ORAL DAILY
Qty: 30 TABLET | Refills: 3 | Status: SHIPPED | OUTPATIENT
Start: 2024-06-10

## 2024-06-10 RX ORDER — LANOLIN ALCOHOL/MO/W.PET/CERES
CREAM (GRAM) TOPICAL
Qty: 30 TABLET | Refills: 0 | Status: SHIPPED | OUTPATIENT
Start: 2024-06-10

## 2024-07-05 DIAGNOSIS — Z13.220 LIPID SCREENING: ICD-10-CM

## 2024-07-05 DIAGNOSIS — M25.522 LEFT ELBOW PAIN: ICD-10-CM

## 2024-07-05 DIAGNOSIS — I50.22 CHRONIC SYSTOLIC HEART FAILURE (HCC): ICD-10-CM

## 2024-07-05 RX ORDER — LANOLIN ALCOHOL/MO/W.PET/CERES
CREAM (GRAM) TOPICAL
Qty: 30 TABLET | Refills: 0 | Status: SHIPPED | OUTPATIENT
Start: 2024-07-05

## 2024-07-05 RX ORDER — BUMETANIDE 1 MG/1
TABLET ORAL
Qty: 60 TABLET | Refills: 1 | Status: SHIPPED | OUTPATIENT
Start: 2024-07-05

## 2024-07-05 RX ORDER — OMEPRAZOLE 20 MG/1
CAPSULE, DELAYED RELEASE ORAL
Qty: 30 CAPSULE | Refills: 0 | Status: SHIPPED | OUTPATIENT
Start: 2024-07-05

## 2024-07-05 RX ORDER — DICLOFENAC SODIUM 10 MG/G
GEL TOPICAL
Qty: 100 G | Refills: 0 | Status: SHIPPED | OUTPATIENT
Start: 2024-07-05

## 2024-07-05 RX ORDER — SPIRONOLACTONE 25 MG/1
TABLET ORAL
Qty: 30 TABLET | Refills: 1 | Status: SHIPPED | OUTPATIENT
Start: 2024-07-05

## 2024-07-05 RX ORDER — ATORVASTATIN CALCIUM 40 MG/1
40 TABLET, FILM COATED ORAL NIGHTLY
Qty: 30 TABLET | Refills: 1 | Status: SHIPPED | OUTPATIENT
Start: 2024-07-05

## 2024-07-05 NOTE — TELEPHONE ENCOUNTER
PCP NO LONGER IN OFFICE, ROUTING TO PRIOR CARE TEAM    E-scribe request for PENDED MEDICATIONS. Please review and e-scribe if applicable.     Last Visit Date:  5/21/24  Next Visit Date:  Visit date not found    Hemoglobin A1C (%)   Date Value   06/21/2023 5.9   05/26/2022 5.7   11/06/2019 5.7             ( goal A1C is < 7)   No components found for: \"LABMICR\"  No components found for: \"LDLCHOLESTEROL\", \"LDLCALC\"    (goal LDL is <100)   AST (U/L)   Date Value   01/30/2024 18     ALT (U/L)   Date Value   01/30/2024 13     BUN (mg/dL)   Date Value   01/30/2024 30 (H)     BP Readings from Last 3 Encounters:   05/21/24 113/65   02/18/24 122/76   02/06/24 101/70          (goal 120/80)        Patient Active Problem List:     COPD (chronic obstructive pulmonary disease) ([facility])     Fibroids     Syncope     Lung nodule < 6cm on CT     Chronic systolic heart failure (HCC) s/p AICD     Mild intermittent asthma     Essential hypertension     Chest pain     QT prolongation     Asthma with COPD with exacerbation (HCC)     Non-ischemic cardiomyopathy ([facility])     Lesion of right native kidney     NSTEMI (non-ST elevated myocardial infarction) ([facility])     CAD (coronary artery disease)     Intraparenchymal hematoma of left side of brain due to trauma ([facility])     Chronic combined systolic and diastolic congestive heart failure ([facility])     AICD discharge     RIP (acute kidney injury) ([facility])     Hypomagnesemia     Other heart failure ([facility])     Atypical chest pain     Plantar fasciitis, bilateral     Left lower quadrant abdominal pain     Diverticulitis     Pre-syncope     Anginal chest pain at rest ([facility])     Presence of cardiac resynchronization therapy defibrillator (CRT-D)     Abnormal cardiovascular stress test     NICM (nonischemic cardiomyopathy) ([facility])     Migraine     Left elbow pain     Tension headache      ----JF    
failure ([facility]) s/p AICD     Mild intermittent asthma     Essential hypertension     Chest pain     QT prolongation     Asthma with COPD with exacerbation ([facility])     Non-ischemic cardiomyopathy ([facility])     Lesion of right native kidney     NSTEMI (non-ST elevated myocardial infarction) ([facility])     CAD (coronary artery disease)     Intraparenchymal hematoma of left side of brain due to trauma ([facility])     Chronic combined systolic and diastolic congestive heart failure ([facility])     AICD discharge     RIP (acute kidney injury) ([facility])     Hypomagnesemia     Other heart failure ([facility])     Atypical chest pain     Plantar fasciitis, bilateral     Left lower quadrant abdominal pain     Diverticulitis     Pre-syncope     Anginal chest pain at rest ([facility])     Presence of cardiac resynchronization therapy defibrillator (CRT-D)     Abnormal cardiovascular stress test     NICM (nonischemic cardiomyopathy) ([facility])     Migraine     Left elbow pain     Tension headache

## 2024-08-01 RX ORDER — OMEPRAZOLE 20 MG/1
CAPSULE, DELAYED RELEASE ORAL
Qty: 30 CAPSULE | Refills: 0 | Status: SHIPPED | OUTPATIENT
Start: 2024-08-01

## 2024-08-01 RX ORDER — LANOLIN ALCOHOL/MO/W.PET/CERES
CREAM (GRAM) TOPICAL
Qty: 30 TABLET | Refills: 0 | Status: SHIPPED | OUTPATIENT
Start: 2024-08-01

## 2024-08-01 NOTE — TELEPHONE ENCOUNTER
Last visit: 05/21/24  Last Med refill: 07/05/24  Does patient have enough medication for 72 hours: Yes    Next Visit Date:  Future Appointments   Date Time Provider Department Center   11/5/2024  2:00 PM Rafael Wilkerson MD [facility] -       Health Maintenance   Topic Date Due    Hepatitis B vaccine (1 of 3 - 3-dose series) Never done    COVID-19 Vaccine (1) Never done    Shingles vaccine (1 of 2) Never done    Pneumococcal 0-64 years Vaccine (2 of 2 - PCV) 05/20/2017    Lipids  11/01/2020    A1C test (Diabetic or Prediabetic)  06/21/2024    Flu vaccine (1) 08/01/2024    Cervical cancer screen  07/09/2024    GFR test (Diabetes, CKD 3-4, OR last GFR 15-59)  01/30/2025    Depression Screen  02/06/2025    Breast cancer screen  02/20/2025    Colorectal Cancer Screen  12/14/2025    DTaP/Tdap/Td vaccine (2 - Td or Tdap) 06/09/2029    Hepatitis C screen  Completed    HIV screen  Completed    Hepatitis A vaccine  Aged Out    Hib vaccine  Aged Out    Polio vaccine  Aged Out    Meningococcal (ACWY) vaccine  Aged Out       Hemoglobin A1C (%)   Date Value   06/21/2023 5.9   05/26/2022 5.7   11/06/2019 5.7             ( goal A1C is < 7)   No components found for: \"LABMICR\"  No components found for: \"LDLCHOLESTEROL\", \"LDLCALC\"    (goal LDL is <100)   AST (U/L)   Date Value   01/30/2024 18     ALT (U/L)   Date Value   01/30/2024 13     BUN (mg/dL)   Date Value   01/30/2024 30 (H)     BP Readings from Last 3 Encounters:   07/11/24 130/78   05/21/24 113/65   02/18/24 122/76          (goal 120/80)    All Future Testing planned in [facility]  Lab Frequency Next Occurrence   US RENAL COMPLETE Once 10/31/2023   XR ELBOW LEFT (MIN 3 VIEWS) Once 05/21/2024               Patient Active Problem List:     COPD (chronic obstructive pulmonary disease) (HCC)     Fibroids     Syncope     Lung nodule < 6cm on CT     Chronic systolic heart failure (HCC) s/p AICD     Mild intermittent asthma     Essential hypertension

## 2024-08-30 DIAGNOSIS — I50.22 CHRONIC SYSTOLIC HEART FAILURE (HCC): ICD-10-CM

## 2024-08-30 DIAGNOSIS — I25.10 CORONARY ARTERY DISEASE INVOLVING NATIVE HEART WITHOUT ANGINA PECTORIS, UNSPECIFIED VESSEL OR LESION TYPE: ICD-10-CM

## 2024-08-30 DIAGNOSIS — M25.522 LEFT ELBOW PAIN: ICD-10-CM

## 2024-08-30 DIAGNOSIS — Z13.220 LIPID SCREENING: ICD-10-CM

## 2024-08-30 DIAGNOSIS — J44.9 CHRONIC OBSTRUCTIVE PULMONARY DISEASE, UNSPECIFIED COPD TYPE (HCC): ICD-10-CM

## 2024-08-30 RX ORDER — ATORVASTATIN CALCIUM 40 MG/1
40 TABLET, FILM COATED ORAL NIGHTLY
Qty: 30 TABLET | Refills: 1 | Status: SHIPPED | OUTPATIENT
Start: 2024-08-30

## 2024-08-30 RX ORDER — ALBUTEROL SULFATE 90 UG/1
AEROSOL, METERED RESPIRATORY (INHALATION)
Qty: 18 G | Refills: 3 | Status: SHIPPED | OUTPATIENT
Start: 2024-08-30

## 2024-08-30 RX ORDER — SACUBITRIL AND VALSARTAN 24; 26 MG/1; MG/1
TABLET, FILM COATED ORAL
Qty: 60 TABLET | Refills: 5 | Status: SHIPPED | OUTPATIENT
Start: 2024-08-30

## 2024-08-30 RX ORDER — ASPIRIN 81 MG/1
81 TABLET ORAL DAILY
Qty: 90 TABLET | Refills: 0 | Status: SHIPPED | OUTPATIENT
Start: 2024-08-30

## 2024-08-30 RX ORDER — SPIRONOLACTONE 25 MG/1
TABLET ORAL
Qty: 30 TABLET | Refills: 1 | Status: SHIPPED | OUTPATIENT
Start: 2024-08-30

## 2024-08-30 RX ORDER — BUMETANIDE 1 MG/1
TABLET ORAL
Qty: 60 TABLET | Refills: 1 | Status: SHIPPED | OUTPATIENT
Start: 2024-08-30

## 2024-08-30 RX ORDER — LANOLIN ALCOHOL/MO/W.PET/CERES
CREAM (GRAM) TOPICAL
Qty: 30 TABLET | Refills: 0 | Status: SHIPPED | OUTPATIENT
Start: 2024-08-30

## 2024-08-30 RX ORDER — METOPROLOL SUCCINATE 50 MG/1
TABLET, EXTENDED RELEASE ORAL
Qty: 90 TABLET | Refills: 0 | Status: SHIPPED | OUTPATIENT
Start: 2024-08-30

## 2024-08-30 NOTE — TELEPHONE ENCOUNTER
Faxed Refill Request of pended medications received from [facility]. Medication Pended. Pt last seen on 5/21/24, Next appt is unscheduled.    Health Maintenance   Topic Date Due    Hepatitis B vaccine (1 of 3 - 19+ 3-dose series) Never done    Shingles vaccine (1 of 2) Never done    Pneumococcal 0-64 years Vaccine (2 of 2 - PCV) 05/20/2017    Lipids  11/01/2020    COVID-19 Vaccine (1 - 2023-24 season) Never done    A1C test (Diabetic or Prediabetic)  06/21/2024    Cervical cancer screen  07/09/2024    Flu vaccine (1) 08/01/2024    GFR test (Diabetes, CKD 3-4, OR last GFR 15-59)  01/30/2025    Depression Screen  02/06/2025    Breast cancer screen  02/20/2025    Colorectal Cancer Screen  12/14/2025    DTaP/Tdap/Td vaccine (2 - Td or Tdap) 06/09/2029    Hepatitis C screen  Completed    HIV screen  Completed    Hepatitis A vaccine  Aged Out    Hib vaccine  Aged Out    Polio vaccine  Aged Out    Meningococcal (ACWY) vaccine  Aged Out       Hemoglobin A1C (%)   Date Value   06/21/2023 5.9   05/26/2022 5.7   11/06/2019 5.7             ( goal A1C is < 7)   No components found for: \"LABMICR\"  No components found for: \"LDLCHOLESTEROL\", \"LDLCALC\"    (goal LDL is <100)   AST (U/L)   Date Value   01/30/2024 18     ALT (U/L)   Date Value   01/30/2024 13     BUN (mg/dL)   Date Value   01/30/2024 30 (H)     BP Readings from Last 3 Encounters:   07/11/24 130/78   05/21/24 113/65   02/18/24 122/76          (goal 120/80)    All Future Testing planned in [facility]  Lab Frequency Next Occurrence   US RENAL COMPLETE Once 10/31/2023   XR ELBOW LEFT (MIN 3 VIEWS) Once 05/21/2024       Next Visit Date:  Future Appointments   Date Time Provider Department Center   11/5/2024  2:00 PM Rafael Wilkerson MD [facility] -            Patient Active Problem List:     COPD (chronic obstructive pulmonary disease) (HCC)     Fibroids     Syncope     Lung nodule < 6cm on CT     Chronic systolic heart failure (HCC) s/p AICD

## 2024-09-17 ENCOUNTER — CARE COORDINATION (OUTPATIENT)
Dept: FAMILY MEDICINE CLINIC | Age: 59
End: 2024-09-17

## 2024-09-27 DIAGNOSIS — I50.22 CHRONIC SYSTOLIC HEART FAILURE (HCC): ICD-10-CM

## 2024-09-30 RX ORDER — LANOLIN ALCOHOL/MO/W.PET/CERES
CREAM (GRAM) TOPICAL
Qty: 30 TABLET | Refills: 0 | Status: SHIPPED | OUTPATIENT
Start: 2024-09-30

## 2024-09-30 NOTE — TELEPHONE ENCOUNTER
E-scribe request for PENDED MEDICATIONS. Please review and e-scribe if applicable.     Last Visit Date:  5/21/24  Next Visit Date:  10/1/2024    Hemoglobin A1C (%)   Date Value   06/21/2023 5.9   05/26/2022 5.7   11/06/2019 5.7             ( goal A1C is < 7)   No components found for: \"LABMICR\"  No components found for: \"LDLCHOLESTEROL\", \"LDLCALC\"    (goal LDL is <100)   AST (U/L)   Date Value   01/30/2024 18     ALT (U/L)   Date Value   01/30/2024 13     BUN (mg/dL)   Date Value   01/30/2024 30 (H)     BP Readings from Last 3 Encounters:   07/11/24 130/78   05/21/24 113/65   02/18/24 122/76          (goal 120/80)        Patient Active Problem List:     COPD (chronic obstructive pulmonary disease) ([facility])     Fibroids     Syncope     Lung nodule < 6cm on CT     Chronic systolic heart failure (HCC) s/p AICD     Mild intermittent asthma     Essential hypertension     Chest pain     QT prolongation     Asthma with COPD with exacerbation (HCC)     Non-ischemic cardiomyopathy (HCC)     Lesion of right native kidney     NSTEMI (non-ST elevated myocardial infarction) ([facility])     CAD (coronary artery disease)     Intraparenchymal hematoma of left side of brain due to trauma     Chronic combined systolic and diastolic congestive heart failure (HCC)     AICD discharge     RIP (acute kidney injury) ([facility])     Hypomagnesemia     Other heart failure ([facility])     Atypical chest pain     Plantar fasciitis, bilateral     Left lower quadrant abdominal pain     Diverticulitis     Pre-syncope     Anginal chest pain at rest ([facility])     Presence of cardiac resynchronization therapy defibrillator (CRT-D)     Abnormal cardiovascular stress test     NICM (nonischemic cardiomyopathy) ([facility])     Migraine     Left elbow pain     Tension headache      ----JF

## 2024-10-01 ENCOUNTER — OFFICE VISIT (OUTPATIENT)
Dept: FAMILY MEDICINE CLINIC | Age: 59
End: 2024-10-01
Payer: MEDICAID

## 2024-10-01 VITALS
HEART RATE: 68 BPM | OXYGEN SATURATION: 99 % | SYSTOLIC BLOOD PRESSURE: 129 MMHG | TEMPERATURE: 97.6 F | BODY MASS INDEX: 24.69 KG/M2 | DIASTOLIC BLOOD PRESSURE: 75 MMHG | WEIGHT: 135 LBS

## 2024-10-01 DIAGNOSIS — I50.22 CHRONIC SYSTOLIC HEART FAILURE (HCC): Primary | ICD-10-CM

## 2024-10-01 DIAGNOSIS — Z87.898 HISTORY OF PREDIABETES: ICD-10-CM

## 2024-10-01 DIAGNOSIS — M79.671 HEEL PAIN, CHRONIC, RIGHT: ICD-10-CM

## 2024-10-01 DIAGNOSIS — G89.29 HEEL PAIN, CHRONIC, RIGHT: ICD-10-CM

## 2024-10-01 DIAGNOSIS — Z13.220 SCREENING FOR HYPERLIPIDEMIA: ICD-10-CM

## 2024-10-01 DIAGNOSIS — R55 SYNCOPE, UNSPECIFIED SYNCOPE TYPE: ICD-10-CM

## 2024-10-01 DIAGNOSIS — K21.9 GASTROESOPHAGEAL REFLUX DISEASE, UNSPECIFIED WHETHER ESOPHAGITIS PRESENT: ICD-10-CM

## 2024-10-01 DIAGNOSIS — E83.42 HYPOMAGNESEMIA: ICD-10-CM

## 2024-10-01 DIAGNOSIS — Z13.220 LIPID SCREENING: ICD-10-CM

## 2024-10-01 DIAGNOSIS — M25.50 ARTHRALGIA, UNSPECIFIED JOINT: ICD-10-CM

## 2024-10-01 DIAGNOSIS — Z00.00 HEALTH CARE MAINTENANCE: ICD-10-CM

## 2024-10-01 LAB — HBA1C MFR BLD: 5.6 %

## 2024-10-01 PROCEDURE — 3017F COLORECTAL CA SCREEN DOC REV: CPT

## 2024-10-01 PROCEDURE — 83036 HEMOGLOBIN GLYCOSYLATED A1C: CPT

## 2024-10-01 PROCEDURE — 3078F DIAST BP <80 MM HG: CPT

## 2024-10-01 PROCEDURE — 1036F TOBACCO NON-USER: CPT

## 2024-10-01 PROCEDURE — 99213 OFFICE O/P EST LOW 20 MIN: CPT

## 2024-10-01 PROCEDURE — 99211 OFF/OP EST MAY X REQ PHY/QHP: CPT

## 2024-10-01 PROCEDURE — 3074F SYST BP LT 130 MM HG: CPT

## 2024-10-01 PROCEDURE — G8484 FLU IMMUNIZE NO ADMIN: HCPCS

## 2024-10-01 PROCEDURE — G8427 DOCREV CUR MEDS BY ELIG CLIN: HCPCS

## 2024-10-01 PROCEDURE — G8420 CALC BMI NORM PARAMETERS: HCPCS

## 2024-10-01 RX ORDER — SPIRONOLACTONE 25 MG/1
TABLET ORAL
Qty: 30 TABLET | Refills: 1 | Status: SHIPPED | OUTPATIENT
Start: 2024-10-01

## 2024-10-01 RX ORDER — BUMETANIDE 1 MG/1
TABLET ORAL
Qty: 60 TABLET | Refills: 1 | Status: SHIPPED | OUTPATIENT
Start: 2024-10-01

## 2024-10-01 RX ORDER — LANOLIN ALCOHOL/MO/W.PET/CERES
400 CREAM (GRAM) TOPICAL DAILY
Qty: 30 TABLET | Refills: 0 | Status: CANCELLED | OUTPATIENT
Start: 2024-10-01

## 2024-10-01 RX ORDER — ATORVASTATIN CALCIUM 40 MG/1
40 TABLET, FILM COATED ORAL NIGHTLY
Qty: 30 TABLET | Refills: 1 | Status: SHIPPED | OUTPATIENT
Start: 2024-10-01

## 2024-10-01 RX ORDER — METOPROLOL SUCCINATE 50 MG/1
TABLET, EXTENDED RELEASE ORAL
Qty: 90 TABLET | Refills: 0 | Status: SHIPPED | OUTPATIENT
Start: 2024-10-01

## 2024-10-01 ASSESSMENT — ENCOUNTER SYMPTOMS
CHEST TIGHTNESS: 0
SHORTNESS OF BREATH: 0
APNEA: 0
COUGH: 0

## 2024-10-01 NOTE — PROGRESS NOTES
Visit Information    Have you changed or started any medications since your last visit including any over-the-counter medicines, vitamins, or herbal medicines? no   Have you stopped taking any of your medications? Is so, why? -  no  Are you having any side effects from any of your medications? - no    Have you seen any other physician or provider since your last visit?  no   Have you had any other diagnostic tests since your last visit?  no   Have you been seen in the emergency room and/or had an admission in a hospital since we last saw you?  no   Have you had your routine dental cleaning in the past 6 months?  no     Do you have an active MyChart account? If no, what is the barrier?  No:     Patient Care Team:  Vince Obrien MD as PCP - General (Family Medicine)  Andreas Tatum MD as PCP - Empaneled Provider  Crystal Toledo RN as   Gonzalo Boyle MD as Consulting Physician (Pulmonary Disease)    Medical History Review  Past Medical, Family, and Social History reviewed and does not contribute to the patient presenting condition    Health Maintenance   Topic Date Due    Hepatitis B vaccine (1 of 3 - 19+ 3-dose series) Never done    Shingles vaccine (1 of 2) Never done    Pneumococcal 0-64 years Vaccine (2 of 2 - PCV) 05/20/2017    Lipids  11/01/2020    A1C test (Diabetic or Prediabetic)  06/21/2024    Cervical cancer screen  07/09/2024    Flu vaccine (1) 08/01/2024    COVID-19 Vaccine (1 - 2023-24 season) Never done    GFR test (Diabetes, CKD 3-4, OR last GFR 15-59)  01/30/2025    Depression Screen  02/06/2025    Breast cancer screen  02/20/2025    Colorectal Cancer Screen  12/14/2025    DTaP/Tdap/Td vaccine (2 - Td or Tdap) 06/09/2029    Hepatitis C screen  Completed    HIV screen  Completed    Hepatitis A vaccine  Aged Out    Hib vaccine  Aged Out    Polio vaccine  Aged Out    Meningococcal (ACWY) vaccine  Aged Out             
Attending Physician Statement  I have discussed the care of Stacia Arora, including pertinent history and exam findings,  with the resident. I have seen and examined the patient and the key elements of all parts of the encounter have been performed by me.  I agree with the assessment, plan and orders as documented by the resident.  (GC Modifier)    Lexie Blackwell MD  
Although all effort is made to fix these errors, please do not hesitate to contact our office if there isany concern with the understanding of this note.

## 2024-10-01 NOTE — PATIENT INSTRUCTIONS
Thank you for letting us take care of you today. We hope all your questions were addressed. If a question was overlooked or something else comes to mind after you return home, please contact a member of your Care Team listed below.        Your Care Team at [facility] is Team #4  Jama Crystal M.D. (Faculty)  Hi Jacobo M.D. (Resident)  Lakshmi Bah M.D.  (Resident)  Kevin Charles M.D. (Resident)  Vince Obrien M.D. (Resident)  Angel Hdz, HARSH Watkins, HARSH Westfall, [facility]  Kathryn Quarles, [facility]  Kimberly Trujillo, [facility]  Rubia Heaton, HARSH Rojas, HARSH Wang (LJ) KILO Gordon (Clinical Practice Manager)  Ada Pichardo [facility] (Clinical Pharmacist)       Office phone number: [phone]    If you need to get in right away due to illness, please be advised we have \"Same Day\" appointments available Monday-Friday. Please call us at [phone] option #3 to schedule your \"Same Day\" appointment.

## 2024-10-10 ENCOUNTER — HOSPITAL ENCOUNTER (INPATIENT)
Age: 59
LOS: 3 days | Discharge: HOME | DRG: 244 | End: 2024-10-13
Attending: EMERGENCY MEDICINE | Admitting: FAMILY MEDICINE
Payer: MEDICAID

## 2024-10-10 ENCOUNTER — APPOINTMENT (OUTPATIENT)
Dept: CT IMAGING | Age: 59
DRG: 244 | End: 2024-10-10
Payer: MEDICAID

## 2024-10-10 DIAGNOSIS — K57.32 DIVERTICULITIS OF COLON: Primary | ICD-10-CM

## 2024-10-10 DIAGNOSIS — L02.91 ABSCESS: ICD-10-CM

## 2024-10-10 DIAGNOSIS — N18.31 STAGE 3A CHRONIC KIDNEY DISEASE (HCC): ICD-10-CM

## 2024-10-10 PROBLEM — K65.1 INTRA-ABDOMINAL ABSCESS (HCC): Status: ACTIVE | Noted: 2024-10-10

## 2024-10-10 LAB
ALBUMIN SERPL-MCNC: 4.7 G/DL (ref 3.5–5.2)
ALBUMIN/GLOB SERPL: 1 {RATIO} (ref 1–2.5)
ALP SERPL-CCNC: 87 U/L (ref 35–104)
ALT SERPL-CCNC: 9 U/L (ref 10–35)
ANION GAP SERPL CALCULATED.3IONS-SCNC: 12 MMOL/L (ref 9–16)
AST SERPL-CCNC: 22 U/L (ref 10–35)
BASOPHILS # BLD: 0.03 K/UL (ref 0–0.2)
BASOPHILS NFR BLD: 0 % (ref 0–2)
BILIRUB SERPL-MCNC: 0.4 MG/DL (ref 0–1.2)
BUN SERPL-MCNC: 21 MG/DL (ref 6–20)
CALCIUM SERPL-MCNC: 10.2 MG/DL (ref 8.6–10.4)
CHLORIDE SERPL-SCNC: 100 MMOL/L (ref 98–107)
CO2 SERPL-SCNC: 25 MMOL/L (ref 20–31)
CREAT SERPL-MCNC: 1.3 MG/DL (ref 0.5–0.9)
EOSINOPHIL # BLD: 0.06 K/UL (ref 0–0.44)
EOSINOPHILS RELATIVE PERCENT: 1 % (ref 1–4)
ERYTHROCYTE [DISTWIDTH] IN BLOOD BY AUTOMATED COUNT: 15.2 % (ref 11.8–14.4)
GFR, ESTIMATED: 47 ML/MIN/1.73M2
GLUCOSE SERPL-MCNC: 91 MG/DL (ref 74–99)
HCT VFR BLD AUTO: 43.8 % (ref 36.3–47.1)
HGB BLD-MCNC: 13.7 G/DL (ref 11.9–15.1)
IMM GRANULOCYTES # BLD AUTO: 0.03 K/UL (ref 0–0.3)
IMM GRANULOCYTES NFR BLD: 0 %
LACTIC ACID, WHOLE BLOOD: 0.9 MMOL/L (ref 0.7–2.1)
LIPASE SERPL-CCNC: 34 U/L (ref 13–60)
LYMPHOCYTES NFR BLD: 2.06 K/UL (ref 1.1–3.7)
LYMPHOCYTES RELATIVE PERCENT: 23 % (ref 24–43)
MCH RBC QN AUTO: 28.1 PG (ref 25.2–33.5)
MCHC RBC AUTO-ENTMCNC: 31.3 G/DL (ref 28.4–34.8)
MCV RBC AUTO: 89.9 FL (ref 82.6–102.9)
MONOCYTES NFR BLD: 0.86 K/UL (ref 0.1–1.2)
MONOCYTES NFR BLD: 10 % (ref 3–12)
NEUTROPHILS NFR BLD: 66 % (ref 36–65)
NEUTS SEG NFR BLD: 5.98 K/UL (ref 1.5–8.1)
NRBC BLD-RTO: 0 PER 100 WBC
PLATELET # BLD AUTO: 155 K/UL (ref 138–453)
PMV BLD AUTO: 12.8 FL (ref 8.1–13.5)
POTASSIUM SERPL-SCNC: 4.1 MMOL/L (ref 3.7–5.3)
PROT SERPL-MCNC: 8.1 G/DL (ref 6.6–8.7)
RBC # BLD AUTO: 4.87 M/UL (ref 3.95–5.11)
RBC # BLD: ABNORMAL 10*6/UL
SODIUM SERPL-SCNC: 137 MMOL/L (ref 136–145)
WBC OTHER # BLD: 9 K/UL (ref 3.5–11.3)

## 2024-10-10 PROCEDURE — 99285 EMERGENCY DEPT VISIT HI MDM: CPT

## 2024-10-10 PROCEDURE — 74177 CT ABD & PELVIS W/CONTRAST: CPT

## 2024-10-10 PROCEDURE — 6360000002 HC RX W HCPCS

## 2024-10-10 PROCEDURE — 87077 CULTURE AEROBIC IDENTIFY: CPT

## 2024-10-10 PROCEDURE — 96374 THER/PROPH/DIAG INJ IV PUSH: CPT

## 2024-10-10 PROCEDURE — 2580000003 HC RX 258

## 2024-10-10 PROCEDURE — 36415 COLL VENOUS BLD VENIPUNCTURE: CPT

## 2024-10-10 PROCEDURE — 99223 1ST HOSP IP/OBS HIGH 75: CPT | Performed by: FAMILY MEDICINE

## 2024-10-10 PROCEDURE — 87154 CUL TYP ID BLD PTHGN 6+ TRGT: CPT

## 2024-10-10 PROCEDURE — 6360000002 HC RX W HCPCS: Performed by: NURSE PRACTITIONER

## 2024-10-10 PROCEDURE — 6360000002 HC RX W HCPCS: Performed by: FAMILY MEDICINE

## 2024-10-10 PROCEDURE — 6360000004 HC RX CONTRAST MEDICATION

## 2024-10-10 PROCEDURE — 6370000000 HC RX 637 (ALT 250 FOR IP): Performed by: FAMILY MEDICINE

## 2024-10-10 PROCEDURE — 2580000003 HC RX 258: Performed by: FAMILY MEDICINE

## 2024-10-10 PROCEDURE — 83690 ASSAY OF LIPASE: CPT

## 2024-10-10 PROCEDURE — 87040 BLOOD CULTURE FOR BACTERIA: CPT

## 2024-10-10 PROCEDURE — 83605 ASSAY OF LACTIC ACID: CPT

## 2024-10-10 PROCEDURE — 87205 SMEAR GRAM STAIN: CPT

## 2024-10-10 PROCEDURE — 80053 COMPREHEN METABOLIC PANEL: CPT

## 2024-10-10 PROCEDURE — 96375 TX/PRO/DX INJ NEW DRUG ADDON: CPT

## 2024-10-10 PROCEDURE — 1200000000 HC SEMI PRIVATE

## 2024-10-10 PROCEDURE — 85025 COMPLETE CBC W/AUTO DIFF WBC: CPT

## 2024-10-10 RX ORDER — MORPHINE SULFATE 4 MG/ML
2 INJECTION, SOLUTION INTRAMUSCULAR; INTRAVENOUS EVERY 4 HOURS PRN
Status: DISCONTINUED | OUTPATIENT
Start: 2024-10-10 | End: 2024-10-13 | Stop reason: HOSPADM

## 2024-10-10 RX ORDER — HEPARIN SODIUM 5000 [USP'U]/ML
5000 INJECTION, SOLUTION INTRAVENOUS; SUBCUTANEOUS EVERY 8 HOURS SCHEDULED
Status: DISCONTINUED | OUTPATIENT
Start: 2024-10-10 | End: 2024-10-13 | Stop reason: HOSPADM

## 2024-10-10 RX ORDER — SODIUM CHLORIDE 9 MG/ML
INJECTION, SOLUTION INTRAVENOUS PRN
Status: DISCONTINUED | OUTPATIENT
Start: 2024-10-10 | End: 2024-10-13 | Stop reason: HOSPADM

## 2024-10-10 RX ORDER — METOPROLOL SUCCINATE 25 MG/1
50 TABLET, EXTENDED RELEASE ORAL DAILY
Status: DISCONTINUED | OUTPATIENT
Start: 2024-10-10 | End: 2024-10-11

## 2024-10-10 RX ORDER — ALBUTEROL SULFATE 90 UG/1
2 INHALANT RESPIRATORY (INHALATION) EVERY 4 HOURS PRN
Status: DISCONTINUED | OUTPATIENT
Start: 2024-10-10 | End: 2024-10-13 | Stop reason: HOSPADM

## 2024-10-10 RX ORDER — IPRATROPIUM BROMIDE AND ALBUTEROL SULFATE 2.5; .5 MG/3ML; MG/3ML
1 SOLUTION RESPIRATORY (INHALATION) 2 TIMES DAILY PRN
Status: DISCONTINUED | OUTPATIENT
Start: 2024-10-10 | End: 2024-10-13 | Stop reason: HOSPADM

## 2024-10-10 RX ORDER — ACETAMINOPHEN 650 MG/1
650 SUPPOSITORY RECTAL EVERY 6 HOURS PRN
Status: DISCONTINUED | OUTPATIENT
Start: 2024-10-10 | End: 2024-10-13 | Stop reason: HOSPADM

## 2024-10-10 RX ORDER — FENTANYL CITRATE 50 UG/ML
12.5 INJECTION, SOLUTION INTRAMUSCULAR; INTRAVENOUS ONCE
Status: COMPLETED | OUTPATIENT
Start: 2024-10-10 | End: 2024-10-10

## 2024-10-10 RX ORDER — ONDANSETRON 2 MG/ML
4 INJECTION INTRAMUSCULAR; INTRAVENOUS EVERY 6 HOURS PRN
Status: DISCONTINUED | OUTPATIENT
Start: 2024-10-10 | End: 2024-10-13 | Stop reason: HOSPADM

## 2024-10-10 RX ORDER — ONDANSETRON 4 MG/1
4 TABLET, ORALLY DISINTEGRATING ORAL EVERY 8 HOURS PRN
Status: DISCONTINUED | OUTPATIENT
Start: 2024-10-10 | End: 2024-10-13 | Stop reason: HOSPADM

## 2024-10-10 RX ORDER — ACETAMINOPHEN 325 MG/1
650 TABLET ORAL EVERY 6 HOURS PRN
Status: DISCONTINUED | OUTPATIENT
Start: 2024-10-10 | End: 2024-10-13 | Stop reason: HOSPADM

## 2024-10-10 RX ORDER — SPIRONOLACTONE 25 MG/1
25 TABLET ORAL DAILY
Status: CANCELLED | OUTPATIENT
Start: 2024-10-10

## 2024-10-10 RX ORDER — ACETAMINOPHEN 325 MG/1
650 TABLET ORAL EVERY 4 HOURS PRN
Status: DISCONTINUED | OUTPATIENT
Start: 2024-10-10 | End: 2024-10-13 | Stop reason: HOSPADM

## 2024-10-10 RX ORDER — POLYETHYLENE GLYCOL 3350 17 G/17G
17 POWDER, FOR SOLUTION ORAL DAILY
Status: DISCONTINUED | OUTPATIENT
Start: 2024-10-10 | End: 2024-10-13 | Stop reason: HOSPADM

## 2024-10-10 RX ORDER — IOPAMIDOL 755 MG/ML
75 INJECTION, SOLUTION INTRAVASCULAR
Status: COMPLETED | OUTPATIENT
Start: 2024-10-10 | End: 2024-10-10

## 2024-10-10 RX ORDER — POTASSIUM CHLORIDE 7.45 MG/ML
10 INJECTION INTRAVENOUS PRN
Status: DISCONTINUED | OUTPATIENT
Start: 2024-10-10 | End: 2024-10-13 | Stop reason: HOSPADM

## 2024-10-10 RX ORDER — MAGNESIUM SULFATE 1 G/100ML
1000 INJECTION INTRAVENOUS PRN
Status: DISCONTINUED | OUTPATIENT
Start: 2024-10-10 | End: 2024-10-13 | Stop reason: HOSPADM

## 2024-10-10 RX ORDER — SODIUM CHLORIDE 0.9 % (FLUSH) 0.9 %
10 SYRINGE (ML) INJECTION PRN
Status: DISCONTINUED | OUTPATIENT
Start: 2024-10-10 | End: 2024-10-13 | Stop reason: HOSPADM

## 2024-10-10 RX ORDER — SODIUM CHLORIDE 0.9 % (FLUSH) 0.9 %
5-40 SYRINGE (ML) INJECTION EVERY 12 HOURS SCHEDULED
Status: DISCONTINUED | OUTPATIENT
Start: 2024-10-10 | End: 2024-10-13 | Stop reason: HOSPADM

## 2024-10-10 RX ORDER — MORPHINE SULFATE 4 MG/ML
4 INJECTION, SOLUTION INTRAMUSCULAR; INTRAVENOUS ONCE
Status: COMPLETED | OUTPATIENT
Start: 2024-10-10 | End: 2024-10-10

## 2024-10-10 RX ORDER — BUMETANIDE 1 MG/1
1 TABLET ORAL 2 TIMES DAILY
Status: DISCONTINUED | OUTPATIENT
Start: 2024-10-10 | End: 2024-10-11

## 2024-10-10 RX ORDER — POTASSIUM CHLORIDE 1500 MG/1
40 TABLET, EXTENDED RELEASE ORAL PRN
Status: DISCONTINUED | OUTPATIENT
Start: 2024-10-10 | End: 2024-10-13 | Stop reason: HOSPADM

## 2024-10-10 RX ORDER — POLYETHYLENE GLYCOL 3350 17 G/17G
17 POWDER, FOR SOLUTION ORAL DAILY PRN
Status: DISCONTINUED | OUTPATIENT
Start: 2024-10-10 | End: 2024-10-13 | Stop reason: HOSPADM

## 2024-10-10 RX ORDER — ASPIRIN 81 MG/1
81 TABLET ORAL DAILY
Status: DISCONTINUED | OUTPATIENT
Start: 2024-10-10 | End: 2024-10-13 | Stop reason: HOSPADM

## 2024-10-10 RX ORDER — ONDANSETRON 2 MG/ML
4 INJECTION INTRAMUSCULAR; INTRAVENOUS ONCE
Status: COMPLETED | OUTPATIENT
Start: 2024-10-10 | End: 2024-10-10

## 2024-10-10 RX ORDER — ATORVASTATIN CALCIUM 40 MG/1
40 TABLET, FILM COATED ORAL NIGHTLY
Status: DISCONTINUED | OUTPATIENT
Start: 2024-10-10 | End: 2024-10-13 | Stop reason: HOSPADM

## 2024-10-10 RX ORDER — AMIODARONE HYDROCHLORIDE 200 MG/1
200 TABLET ORAL DAILY
Status: DISCONTINUED | OUTPATIENT
Start: 2024-10-10 | End: 2024-10-13 | Stop reason: HOSPADM

## 2024-10-10 RX ADMIN — ONDANSETRON 4 MG: 2 INJECTION INTRAMUSCULAR; INTRAVENOUS at 13:08

## 2024-10-10 RX ADMIN — EMPAGLIFLOZIN 10 MG: 10 TABLET, FILM COATED ORAL at 20:31

## 2024-10-10 RX ADMIN — SACUBITRIL AND VALSARTAN 1 TABLET: 24; 26 TABLET, FILM COATED ORAL at 20:31

## 2024-10-10 RX ADMIN — BUMETANIDE 1 MG: 1 TABLET ORAL at 20:31

## 2024-10-10 RX ADMIN — MORPHINE SULFATE 4 MG: 4 INJECTION INTRAVENOUS at 13:08

## 2024-10-10 RX ADMIN — FENTANYL CITRATE 12.5 MCG: 50 INJECTION, SOLUTION INTRAMUSCULAR; INTRAVENOUS at 20:31

## 2024-10-10 RX ADMIN — SODIUM CHLORIDE, PRESERVATIVE FREE 10 ML: 5 INJECTION INTRAVENOUS at 20:32

## 2024-10-10 RX ADMIN — AMIODARONE HYDROCHLORIDE 200 MG: 200 TABLET ORAL at 20:30

## 2024-10-10 RX ADMIN — ATORVASTATIN CALCIUM 40 MG: 40 TABLET, FILM COATED ORAL at 20:31

## 2024-10-10 RX ADMIN — IOPAMIDOL 75 ML: 755 INJECTION, SOLUTION INTRAVENOUS at 14:32

## 2024-10-10 RX ADMIN — MORPHINE SULFATE 2 MG: 4 INJECTION INTRAVENOUS at 22:51

## 2024-10-10 RX ADMIN — PIPERACILLIN AND TAZOBACTAM 3375 MG: 3; .375 INJECTION, POWDER, LYOPHILIZED, FOR SOLUTION INTRAVENOUS at 18:15

## 2024-10-10 RX ADMIN — ASPIRIN 81 MG: 81 TABLET, COATED ORAL at 20:31

## 2024-10-10 ASSESSMENT — PAIN SCALES - GENERAL
PAINLEVEL_OUTOF10: 10
PAINLEVEL_OUTOF10: 9
PAINLEVEL_OUTOF10: 4
PAINLEVEL_OUTOF10: 9
PAINLEVEL_OUTOF10: 8

## 2024-10-10 ASSESSMENT — LIFESTYLE VARIABLES
HOW MANY STANDARD DRINKS CONTAINING ALCOHOL DO YOU HAVE ON A TYPICAL DAY: 1 OR 2
HOW OFTEN DO YOU HAVE A DRINK CONTAINING ALCOHOL: 4 OR MORE TIMES A WEEK

## 2024-10-10 ASSESSMENT — PAIN - FUNCTIONAL ASSESSMENT: PAIN_FUNCTIONAL_ASSESSMENT: 0-10

## 2024-10-10 ASSESSMENT — PAIN DESCRIPTION - LOCATION
LOCATION: ABDOMEN

## 2024-10-10 ASSESSMENT — PAIN DESCRIPTION - ORIENTATION
ORIENTATION: LEFT

## 2024-10-10 NOTE — CONSULTS
acute inflammation in the descending colon.  Several diverticular present in this region or a focally inflamed diverticulum is not identified.  The short segment of involvement is supportive of acute diverticulitis, though other nonspecific infectious colitis is also possible. 2.  Rim enhancing, 1 cm fluid collection in the posterior wall of the descending colon suspicious for mural abscess.        Thank you for the interesting evaluation. Further recommendations to follow.    Radha Valladares DO  10/10/2024, 4:37 PM

## 2024-10-10 NOTE — ED PROVIDER NOTES
[facility]MED SURG  Emergency Department Encounter  Emergency Medicine Resident     Pt Name:Stacia Arora  MRN: [id number]  Birthdate 1965  Date of evaluation: 10/10/24  PCP:  Vince Obrien MD  Note Started: 12:48 PM EDT      CHIEF COMPLAINT       Chief Complaint   Patient presents with    Abdominal Pain       HISTORY OF PRESENT ILLNESS  (Location/Symptom, Timing/Onset, Context/Setting, Quality, Duration, Modifying Factors, Severity.)      Stacia Arora is a 59 y.o. female who presents with left-sided abdominal pain that started last night approximately 7 PM.  Associated with nausea but no vomiting.  Last bowel movement was approximately 2 to 3 days ago.  Past patient is passing gas.  Denies any blood in the stool and any urinary symptoms.  No dysuria, hematuria or frequency.  Decreased oral intake of solids but has been having well oral intake of fluids.  Denies any fevers, chills, chest pain or shortness of breath.  Patient does have a significant past medical history for CHF with ICD and hypertension.  No previous surgeries on the abdomen.    PAST MEDICAL / SURGICAL / SOCIAL / FAMILY HISTORY      has a past medical history of AICD discharge, RIP (acute kidney injury) ([facility]), Asthma, Asthma with COPD with exacerbation ([facility]), CAD (coronary artery disease), Cardiomegaly, Chest pain, CHF (congestive heart failure) ([facility]), Chronic systolic heart failure ([facility]) s/p AICD, COPD (chronic obstructive pulmonary disease) ([facility]), COPD (chronic obstructive pulmonary disease) ([facility]), Depression, Fibroids, Gastroesophageal reflux disease, Hypertension, Hypomagnesemia, Intraparenchymal hematoma of left side of brain due to trauma, Lesion of right native kidney, Lung nodule < 6cm on CT, MI (myocardial infarction) ([facility]), Mild intermittent asthma, Non-ischemic cardiomyopathy ([facility]), NSTEMI (non-ST elevated myocardial infarction) ([facility]), QT prolongation, Syncope, and Unspecified diseases of blood and blood-forming

## 2024-10-10 NOTE — ED NOTES
Labeled blood specimens sent to lab via tube system.    [] Lavender   [] on ice   [] Blue   [] Green/yellow  [] Green/black [] on ice  [] Pink  [] Red  [] Yellow  [] on ice  [x] Blood Cultures  [x] x1 [] x2   
Pt presents to ED with c/o left sided abdominal pain  Pt states it started last night. Pt states she's eating and drinking normal and using the restroom without difficulty.  Pt denies N/V, chest pain and shortness of breath.  Patient alert and oriented x4, talking in complete sentences. Respirations even and unlabored, hooked up to continuous cardiac monitor and pulse oximetry. Call light in reach, all needs met at this time.    
Pt return from CT scan  
Pt to CT scan  
Report given to DOMINIQUE Wallace. All questions answered.  
Communication with Friends and Family: More than three times a week     Frequency of Social Gatherings with Friends and Family: Once a week     Attends Rastafari Services: Never     Active Member of Clubs or Organizations: No     Attends Club or Organization Meetings: Never     Marital Status:    Intimate Partner Violence: Unknown (7/15/2020)    Humiliation, Afraid, Rape, and Kick questionnaire     Fear of Current or Ex-Partner: Patient declined     Emotionally Abused: Patient declined     Physically Abused: Patient declined     Sexually Abused: Patient declined   Housing Stability: Unknown (10/31/2023)    Housing Stability Vital Sign     Unstable Housing in the Last Year: No       FAMILY HISTORY       Family History   Problem Relation Age of Onset    Diabetes Mother     High Blood Pressure Mother     Heart Disease Mother     Diabetes Father     Heart Disease Brother        ALLERGIES     Iv contrast [iodides]    CURRENT MEDICATIONS       Previous Medications    ACETAMINOPHEN (TYLENOL) 500 MG TABLET    Take 2 tablets by mouth 3 times daily as needed for Pain    ALBUTEROL SULFATE HFA (VENTOLIN HFA) 108 (90 BASE) MCG/ACT INHALER    INHALE 2 PUFFS INTO THE LUNGS EVERY 6 HOURS AS NEEDED FOR WHEEZING.    AMIODARONE (CORDARONE) 200 MG TABLET    TAKE ONE TABLET BY MOUTH ONCE A DAY    ASPIRIN (ASPIRIN LOW DOSE) 81 MG EC TABLET    Take 1 tablet by mouth daily    ATORVASTATIN (LIPITOR) 40 MG TABLET    Take 1 tablet by mouth nightly    BENZOCAINE (ORAL ANALGESIC MAX ST) 20 % GEL MUCOSAL GEL    Take 1 each by mouth 2 times daily as needed for Pain    BUMETANIDE (BUMEX) 1 MG TABLET    TAKE ONE TABLET BY MOUTH TWICE A DAY    DICLOFENAC SODIUM (VOLTAREN) 1 % GEL    APPLY 4 GRAMS TOPICALLY TO AFFECTED AREA FOUR TIMES DAILY    DICLOFENAC SODIUM (VOLTAREN) 1 % GEL    Apply 4 g topically 4 times daily    EMPAGLIFLOZIN (JARDIANCE) 10 MG TABLET    Take 1 tablet by mouth daily    FLUTICASONE (FLONASE) 50 MCG/ACT NASAL SPRAY    2

## 2024-10-10 NOTE — H&P
TAKE ONE TABLET BY MOUTH TWICE A DAY 8/30/24   Patria Vail MD   aspirin (ASPIRIN LOW DOSE) 81 MG EC tablet Take 1 tablet by mouth daily 8/30/24   Patria Vail MD   albuterol sulfate HFA (VENTOLIN HFA) 108 (90 Base) MCG/ACT inhaler INHALE 2 PUFFS INTO THE LUNGS EVERY 6 HOURS AS NEEDED FOR WHEEZING. 8/30/24   Patria Vail MD   acetaminophen (TYLENOL) 500 MG tablet Take 2 tablets by mouth 3 times daily as needed for Pain 5/21/24   Logan Jeong MD   amiodarone (CORDARONE) 200 MG tablet TAKE ONE TABLET BY MOUTH ONCE A DAY 3/15/24   Ganga Joya MD   benzocaine (ORAL ANALGESIC MAX ST) 20 % GEL mucosal gel Take 1 each by mouth 2 times daily as needed for Pain 11/29/23   Radha Ayala DO   fluticasone (FLONASE) 50 MCG/ACT nasal spray 2 sprays by Each Nostril route daily 11/28/22   Andreas Tatum MD   ipratropium-albuterol (DUONEB) 0.5-2.5 (3) MG/3ML SOLN nebulizer solution Inhale 3 mLs into the lungs 2 times daily as needed for Shortness of Breath 4/4/22   Andreas Tatum MD   Multiple Vitamin (MULTIVITAMIN) tablet TAKE ONE TABLET BY MOUTH ONE TIME A DAY 6/18/18   Wanda Lee MD        Allergies:     Iv contrast [iodides]    Social History:     Tobacco:    reports that she quit smoking about 4 years ago. Her smoking use included cigarettes. She started smoking about 34 years ago. She has a 7.5 pack-year smoking history. She has never used smokeless tobacco.  Alcohol:      reports current alcohol use of about 1.0 standard drink of alcohol per week.  Drug Use:  reports current drug use. Drug: Marijuana (Weed).    Family History:     Family History   Problem Relation Age of Onset    Diabetes Mother     High Blood Pressure Mother     Heart Disease Mother     Diabetes Father     Heart Disease Brother        Review of Systems:     Positive and Negative as described in HPI.    Review of Systems   Constitutional:  Positive for activity change and appetite change. Negative for chills, diaphoresis and fever.

## 2024-10-10 NOTE — ED PROVIDER NOTES
[facility]     Emergency Department     Faculty Attestation    I performed a history and physical examination of the patient and discussed management with the resident. I have reviewed and agree with the resident’s findings including all diagnostic interpretations, and treatment plans as written at the time of my review. Any areas of disagreement are noted on the chart. I was personally present for the key portions of any procedures. I have documented in the chart those procedures where I was not present during the key portions. For Physician Assistant/ Nurse Practitioner cases/documentation I have personally evaluated this patient and have completed at least one if not all key elements of the E/M (history, physical exam, and MDM). Additional findings are as noted.    PtName: Stacia Arora  MRN: [id number]  Birthdate 1965  Date of evaluation: 10/10/24  Note Started: 1:04 PM EDT    Primary Care Physician: Vince Obrien MD        History: This is a 59 y.o. female who presents to the Emergency Department with complaint of abdominal pain that began approximately 7 PM last night.  Patient arrives as a crampy pain.  She has some associated nausea but denies any vomiting.  She says she is constipated with no stool but is still passing gas.  Denies any dysuria, frequency or urgency.  Denies any fever, chills or sweats.    Physical:   height is 1.575 m (5' 2\") and weight is 61.2 kg (135 lb). Her oral temperature is 97.8 °F (36.6 °C). Her blood pressure is 148/79 (abnormal) and her pulse is 97. Her respiration is 28 and oxygen saturation is 96%.  Abdomen is soft failure hypoactive bowel sounds tender in the left side    Impression: Abdominal pain    Plan: Antiemetic, analgesia, CBC, CMP, lipase, urinalysis,      Medical Decision Making  Problems Addressed:  Abscess: acute illness or injury  Diverticulitis of colon: acute illness or injury    Amount and/or

## 2024-10-11 LAB
ANION GAP SERPL CALCULATED.3IONS-SCNC: 17 MMOL/L (ref 9–16)
BASOPHILS # BLD: 0.04 K/UL (ref 0–0.2)
BASOPHILS NFR BLD: 0 % (ref 0–2)
BUN SERPL-MCNC: 19 MG/DL (ref 6–20)
CALCIUM SERPL-MCNC: 10 MG/DL (ref 8.6–10.4)
CHLORIDE SERPL-SCNC: 100 MMOL/L (ref 98–107)
CO2 SERPL-SCNC: 22 MMOL/L (ref 20–31)
CREAT SERPL-MCNC: 1.5 MG/DL (ref 0.5–0.9)
EOSINOPHIL # BLD: <0.03 K/UL (ref 0–0.44)
EOSINOPHILS RELATIVE PERCENT: 0 % (ref 1–4)
ERYTHROCYTE [DISTWIDTH] IN BLOOD BY AUTOMATED COUNT: 15.3 % (ref 11.8–14.4)
GFR, ESTIMATED: 40 ML/MIN/1.73M2
GLUCOSE SERPL-MCNC: 82 MG/DL (ref 74–99)
HCT VFR BLD AUTO: 43 % (ref 36.3–47.1)
HGB BLD-MCNC: 14 G/DL (ref 11.9–15.1)
IMM GRANULOCYTES # BLD AUTO: 0.03 K/UL (ref 0–0.3)
IMM GRANULOCYTES NFR BLD: 0 %
INR PPP: 1
LYMPHOCYTES NFR BLD: 1.44 K/UL (ref 1.1–3.7)
LYMPHOCYTES RELATIVE PERCENT: 16 % (ref 24–43)
MCH RBC QN AUTO: 29 PG (ref 25.2–33.5)
MCHC RBC AUTO-ENTMCNC: 32.6 G/DL (ref 28.4–34.8)
MCV RBC AUTO: 89.2 FL (ref 82.6–102.9)
MONOCYTES NFR BLD: 0.7 K/UL (ref 0.1–1.2)
MONOCYTES NFR BLD: 8 % (ref 3–12)
NEUTROPHILS NFR BLD: 76 % (ref 36–65)
NEUTS SEG NFR BLD: 7.06 K/UL (ref 1.5–8.1)
NRBC BLD-RTO: 0 PER 100 WBC
PLATELET # BLD AUTO: ABNORMAL K/UL (ref 138–453)
PLATELET, FLUORESCENCE: 170 K/UL (ref 138–453)
PLATELETS.RETICULATED NFR BLD AUTO: 9.8 % (ref 1.1–10.3)
POTASSIUM SERPL-SCNC: 4.6 MMOL/L (ref 3.7–5.3)
PROTHROMBIN TIME: 13.1 SEC (ref 11.7–14.9)
RBC # BLD AUTO: 4.82 M/UL (ref 3.95–5.11)
RBC # BLD: ABNORMAL 10*6/UL
SODIUM SERPL-SCNC: 139 MMOL/L (ref 136–145)
WBC OTHER # BLD: 9.3 K/UL (ref 3.5–11.3)

## 2024-10-11 PROCEDURE — 6360000002 HC RX W HCPCS: Performed by: FAMILY MEDICINE

## 2024-10-11 PROCEDURE — 36415 COLL VENOUS BLD VENIPUNCTURE: CPT

## 2024-10-11 PROCEDURE — 80048 BASIC METABOLIC PNL TOTAL CA: CPT

## 2024-10-11 PROCEDURE — 85025 COMPLETE CBC W/AUTO DIFF WBC: CPT

## 2024-10-11 PROCEDURE — 2580000003 HC RX 258: Performed by: FAMILY MEDICINE

## 2024-10-11 PROCEDURE — 1200000000 HC SEMI PRIVATE

## 2024-10-11 PROCEDURE — 85610 PROTHROMBIN TIME: CPT

## 2024-10-11 PROCEDURE — 85055 RETICULATED PLATELET ASSAY: CPT

## 2024-10-11 PROCEDURE — 99222 1ST HOSP IP/OBS MODERATE 55: CPT | Performed by: FAMILY MEDICINE

## 2024-10-11 PROCEDURE — 6370000000 HC RX 637 (ALT 250 FOR IP): Performed by: FAMILY MEDICINE

## 2024-10-11 PROCEDURE — 6370000000 HC RX 637 (ALT 250 FOR IP)

## 2024-10-11 RX ORDER — METOPROLOL SUCCINATE 25 MG/1
25 TABLET, EXTENDED RELEASE ORAL DAILY
Status: DISCONTINUED | OUTPATIENT
Start: 2024-10-11 | End: 2024-10-13 | Stop reason: HOSPADM

## 2024-10-11 RX ORDER — PANTOPRAZOLE SODIUM 40 MG/1
40 TABLET, DELAYED RELEASE ORAL
Status: DISCONTINUED | OUTPATIENT
Start: 2024-10-11 | End: 2024-10-13 | Stop reason: HOSPADM

## 2024-10-11 RX ORDER — BUMETANIDE 1 MG/1
0.5 TABLET ORAL 2 TIMES DAILY
Status: DISCONTINUED | OUTPATIENT
Start: 2024-10-11 | End: 2024-10-13 | Stop reason: HOSPADM

## 2024-10-11 RX ORDER — LIDOCAINE 4 G/G
1 PATCH TOPICAL DAILY
Status: DISCONTINUED | OUTPATIENT
Start: 2024-10-11 | End: 2024-10-13 | Stop reason: HOSPADM

## 2024-10-11 RX ORDER — LANOLIN ALCOHOL/MO/W.PET/CERES
400 CREAM (GRAM) TOPICAL DAILY
Status: DISCONTINUED | OUTPATIENT
Start: 2024-10-11 | End: 2024-10-13 | Stop reason: HOSPADM

## 2024-10-11 RX ADMIN — SACUBITRIL AND VALSARTAN 1 TABLET: 24; 26 TABLET, FILM COATED ORAL at 20:33

## 2024-10-11 RX ADMIN — PIPERACILLIN AND TAZOBACTAM 3375 MG: 3; .375 INJECTION, POWDER, LYOPHILIZED, FOR SOLUTION INTRAVENOUS at 15:16

## 2024-10-11 RX ADMIN — ONDANSETRON 4 MG: 2 INJECTION INTRAMUSCULAR; INTRAVENOUS at 03:11

## 2024-10-11 RX ADMIN — AMIODARONE HYDROCHLORIDE 200 MG: 200 TABLET ORAL at 08:58

## 2024-10-11 RX ADMIN — PANTOPRAZOLE SODIUM 40 MG: 40 TABLET, DELAYED RELEASE ORAL at 09:32

## 2024-10-11 RX ADMIN — SODIUM CHLORIDE, PRESERVATIVE FREE 10 ML: 5 INJECTION INTRAVENOUS at 20:34

## 2024-10-11 RX ADMIN — PIPERACILLIN AND TAZOBACTAM 3375 MG: 3; .375 INJECTION, POWDER, LYOPHILIZED, FOR SOLUTION INTRAVENOUS at 09:34

## 2024-10-11 RX ADMIN — PIPERACILLIN AND TAZOBACTAM 3375 MG: 3; .375 INJECTION, POWDER, LYOPHILIZED, FOR SOLUTION INTRAVENOUS at 01:40

## 2024-10-11 RX ADMIN — ASPIRIN 81 MG: 81 TABLET, COATED ORAL at 08:58

## 2024-10-11 RX ADMIN — PIPERACILLIN AND TAZOBACTAM 3375 MG: 3; .375 INJECTION, POWDER, LYOPHILIZED, FOR SOLUTION INTRAVENOUS at 20:39

## 2024-10-11 RX ADMIN — BUMETANIDE 0.5 MG: 1 TABLET ORAL at 18:42

## 2024-10-11 RX ADMIN — METOPROLOL SUCCINATE 25 MG: 25 TABLET, EXTENDED RELEASE ORAL at 08:58

## 2024-10-11 RX ADMIN — EMPAGLIFLOZIN 10 MG: 10 TABLET, FILM COATED ORAL at 08:58

## 2024-10-11 RX ADMIN — BUMETANIDE 0.5 MG: 1 TABLET ORAL at 08:58

## 2024-10-11 RX ADMIN — MORPHINE SULFATE 2 MG: 4 INJECTION INTRAVENOUS at 05:23

## 2024-10-11 RX ADMIN — Medication 400 MG: at 08:58

## 2024-10-11 RX ADMIN — ATORVASTATIN CALCIUM 40 MG: 40 TABLET, FILM COATED ORAL at 20:33

## 2024-10-11 RX ADMIN — POLYETHYLENE GLYCOL 3350 17 G: 17 POWDER, FOR SOLUTION ORAL at 08:58

## 2024-10-11 RX ADMIN — SACUBITRIL AND VALSARTAN 1 TABLET: 24; 26 TABLET, FILM COATED ORAL at 08:59

## 2024-10-11 ASSESSMENT — PAIN DESCRIPTION - DESCRIPTORS: DESCRIPTORS: SORE

## 2024-10-11 ASSESSMENT — PAIN SCALES - GENERAL
PAINLEVEL_OUTOF10: 3
PAINLEVEL_OUTOF10: 8
PAINLEVEL_OUTOF10: 0

## 2024-10-11 ASSESSMENT — PAIN DESCRIPTION - LOCATION: LOCATION: ABDOMEN

## 2024-10-11 NOTE — CARE COORDINATION
Case Management Assessment  Initial Evaluation    Date/Time of Evaluation: 10/11/2024 11:22 AM  Assessment Completed by: Jayne Marina RN    If patient is discharged prior to next notation, then this note serves as note for discharge by case management.    Patient Name: Stacia Arora                   YOB: 1965  Diagnosis: Abscess [L02.91]  Diverticulitis of colon [K57.32]  Intra-abdominal abscess (HCC) [K65.1]                   Date / Time: 10/10/2024 12:40 PM    Patient Admission Status: Inpatient   Readmission Risk (Low < 19, Mod (19-27), High > 27): Readmission Risk Score: 9.6    Current PCP: Vince Obrien MD  PCP verified by CM? (P) Yes (Vince Obrien)    Chart Reviewed: Yes      History Provided by: (P) Patient  Patient Orientation: (P) Alert and Oriented, Person, Place, Situation, Self    Patient Cognition: (P) Alert    Hospitalization in the last 30 days (Readmission):  No    If yes, Readmission Assessment in  Navigator will be completed.    Advance Directives:      Code Status: Full Code   Patient's Primary Decision Maker is: (P) Legal Next of Kin      Discharge Planning:    Patient lives with: (P) Spouse/Significant Other Type of Home: (P) House  Primary Care Giver: (P) Self  Patient Support Systems include: (P) Spouse/Significant Other, Family Members, Friends/Neighbors   Current Financial resources: (P) Medicaid  Current community resources: (P) Other (Comment) (Salma, with Outreach program)  Current services prior to admission: (P) None            Current DME:              Type of Home Care services:  (P) None    ADLS  Prior functional level: (P) Independent in ADLs/IADLs  Current functional level: (P) Independent in ADLs/IADLs    PT AM-PAC:   /24  OT AM-PAC:   /24    Family can provide assistance at DC: (P) Yes  Would you like Case Management to discuss the discharge plan with any other family members/significant others, and if so, who? (P) No  Plans to Return to Present Housing:

## 2024-10-11 NOTE — PLAN OF CARE
Transfer Of Care from Internal Medicine    Brief [facility]    Saira is a 59-year-old female with past medical history significant for combined CHF (EF 25-30), nonischemic cardiomyopathy status post AICD, COPD and asthma, GERD, A-fib on anemia, prediabetes and essential hypertension    Came into ER with severe abdominal pain that started the night prior to presentation, crampy, 8/10 associated with nausea but no vomiting, has constipation. Workup in the ER including vitals and labs were remarkable for hypertension and deranged creatinine, CT abdomen and pelvis came back with picture of diverticulitis on top there is 1 cm rim-enhancing fluid collection posterior wall of the descending colon suspicious for mural abscess, , there was also finding of chronic right renal cyst that is unchanged as well as a 1.7 cm hypoattenuating lesion in liver that seems smaller in size compared to a CT from 2018.  Surgery was consulted and per them no surgical intervention was required and recommended to manage patient with antibiotics, pain and appropriate hydration.      S  Patient seen and examined at bedside today.  Vital stable, soft BP.  She reports slightly improved abdominal pain.  Reports improved nausea and vomiting.  Denies fever or chills. She is tolerating liquid diet well.     Patient also reports of chronic back pain, for which she has been following her PCP and pain clinic.    Patient has no other active complaints      O  /64 (10/11/24 0811)    Temp 97.8 °F (36.6 °C) (10/11/24 0811)    Pulse 65 (10/11/24 0811)   Resp 18 (10/11/24 0811)    SpO2 96 % (10/11/24 0811)          A&P    Abdominal pain 2/2 Acute Diverticulitis with Abscess  - Severe left-sided abdominal pain, with nausea and vomiting  - CT abdomen: Acute diverticulitis with 1cm mural abscess and posterior descending colon, chronic right renal cyst and hypoattenuating liver lesion  -WBC normal  -Cultures NGTD  -Surgery consulted, per surgery, no surgical

## 2024-10-12 PROBLEM — K57.32 DIVERTICULITIS OF COLON: Status: ACTIVE | Noted: 2024-10-12

## 2024-10-12 LAB
ALBUMIN SERPL-MCNC: 4.1 G/DL (ref 3.5–5.2)
ALBUMIN/GLOB SERPL: 1 {RATIO} (ref 1–2.5)
ALP SERPL-CCNC: 80 U/L (ref 35–104)
ALT SERPL-CCNC: 6 U/L (ref 10–35)
ANION GAP SERPL CALCULATED.3IONS-SCNC: 15 MMOL/L (ref 9–16)
AST SERPL-CCNC: 17 U/L (ref 10–35)
BASOPHILS # BLD: 0.04 K/UL (ref 0–0.2)
BASOPHILS NFR BLD: 1 % (ref 0–2)
BILIRUB SERPL-MCNC: 0.6 MG/DL (ref 0–1.2)
BUN SERPL-MCNC: 22 MG/DL (ref 6–20)
CALCIUM SERPL-MCNC: 9.4 MG/DL (ref 8.6–10.4)
CHLORIDE SERPL-SCNC: 102 MMOL/L (ref 98–107)
CO2 SERPL-SCNC: 20 MMOL/L (ref 20–31)
CREAT SERPL-MCNC: 1.8 MG/DL (ref 0.5–0.9)
EOSINOPHIL # BLD: 0.07 K/UL (ref 0–0.44)
EOSINOPHILS RELATIVE PERCENT: 1 % (ref 1–4)
ERYTHROCYTE [DISTWIDTH] IN BLOOD BY AUTOMATED COUNT: 15.4 % (ref 11.8–14.4)
GFR, ESTIMATED: 33 ML/MIN/1.73M2
GLUCOSE SERPL-MCNC: 112 MG/DL (ref 74–99)
HCT VFR BLD AUTO: 40.5 % (ref 36.3–47.1)
HGB BLD-MCNC: 12.5 G/DL (ref 11.9–15.1)
IMM GRANULOCYTES # BLD AUTO: <0.03 K/UL (ref 0–0.3)
IMM GRANULOCYTES NFR BLD: 0 %
LYMPHOCYTES NFR BLD: 1.28 K/UL (ref 1.1–3.7)
LYMPHOCYTES RELATIVE PERCENT: 18 % (ref 24–43)
MCH RBC QN AUTO: 28 PG (ref 25.2–33.5)
MCHC RBC AUTO-ENTMCNC: 30.9 G/DL (ref 28.4–34.8)
MCV RBC AUTO: 90.8 FL (ref 82.6–102.9)
MICROORGANISM SPEC CULT: ABNORMAL
MONOCYTES NFR BLD: 0.63 K/UL (ref 0.1–1.2)
MONOCYTES NFR BLD: 9 % (ref 3–12)
NEUTROPHILS NFR BLD: 72 % (ref 36–65)
NEUTS SEG NFR BLD: 5.21 K/UL (ref 1.5–8.1)
NRBC BLD-RTO: 0 PER 100 WBC
PLATELET # BLD AUTO: ABNORMAL K/UL (ref 138–453)
PLATELET, FLUORESCENCE: 157 K/UL (ref 138–453)
PLATELETS.RETICULATED NFR BLD AUTO: 10.5 % (ref 1.1–10.3)
POTASSIUM SERPL-SCNC: 3.6 MMOL/L (ref 3.7–5.3)
PROT SERPL-MCNC: 7.5 G/DL (ref 6.6–8.7)
RBC # BLD AUTO: 4.46 M/UL (ref 3.95–5.11)
RBC # BLD: ABNORMAL 10*6/UL
SERVICE CMNT-IMP: ABNORMAL
SODIUM SERPL-SCNC: 137 MMOL/L (ref 136–145)
SPECIMEN DESCRIPTION: ABNORMAL
WBC OTHER # BLD: 7.2 K/UL (ref 3.5–11.3)

## 2024-10-12 PROCEDURE — 99232 SBSQ HOSP IP/OBS MODERATE 35: CPT | Performed by: FAMILY MEDICINE

## 2024-10-12 PROCEDURE — 6360000002 HC RX W HCPCS: Performed by: FAMILY MEDICINE

## 2024-10-12 PROCEDURE — 99232 SBSQ HOSP IP/OBS MODERATE 35: CPT | Performed by: SURGERY

## 2024-10-12 PROCEDURE — 36415 COLL VENOUS BLD VENIPUNCTURE: CPT

## 2024-10-12 PROCEDURE — 2580000003 HC RX 258: Performed by: FAMILY MEDICINE

## 2024-10-12 PROCEDURE — 6370000000 HC RX 637 (ALT 250 FOR IP)

## 2024-10-12 PROCEDURE — 6370000000 HC RX 637 (ALT 250 FOR IP): Performed by: FAMILY MEDICINE

## 2024-10-12 PROCEDURE — 85055 RETICULATED PLATELET ASSAY: CPT

## 2024-10-12 PROCEDURE — 85025 COMPLETE CBC W/AUTO DIFF WBC: CPT

## 2024-10-12 PROCEDURE — 80053 COMPREHEN METABOLIC PANEL: CPT

## 2024-10-12 PROCEDURE — 1200000000 HC SEMI PRIVATE

## 2024-10-12 RX ADMIN — SODIUM CHLORIDE: 9 INJECTION, SOLUTION INTRAVENOUS at 15:05

## 2024-10-12 RX ADMIN — MORPHINE SULFATE 2 MG: 4 INJECTION INTRAVENOUS at 14:19

## 2024-10-12 RX ADMIN — EMPAGLIFLOZIN 10 MG: 10 TABLET, FILM COATED ORAL at 08:20

## 2024-10-12 RX ADMIN — POLYETHYLENE GLYCOL 3350 17 G: 17 POWDER, FOR SOLUTION ORAL at 08:19

## 2024-10-12 RX ADMIN — BUMETANIDE 0.5 MG: 1 TABLET ORAL at 17:57

## 2024-10-12 RX ADMIN — ASPIRIN 81 MG: 81 TABLET, COATED ORAL at 08:21

## 2024-10-12 RX ADMIN — ATORVASTATIN CALCIUM 40 MG: 40 TABLET, FILM COATED ORAL at 21:37

## 2024-10-12 RX ADMIN — METOPROLOL SUCCINATE 25 MG: 25 TABLET, EXTENDED RELEASE ORAL at 08:21

## 2024-10-12 RX ADMIN — PANTOPRAZOLE SODIUM 40 MG: 40 TABLET, DELAYED RELEASE ORAL at 05:53

## 2024-10-12 RX ADMIN — Medication 400 MG: at 08:20

## 2024-10-12 RX ADMIN — SODIUM CHLORIDE, PRESERVATIVE FREE 10 ML: 5 INJECTION INTRAVENOUS at 08:24

## 2024-10-12 RX ADMIN — SACUBITRIL AND VALSARTAN 1 TABLET: 24; 26 TABLET, FILM COATED ORAL at 08:20

## 2024-10-12 RX ADMIN — BUMETANIDE 0.5 MG: 1 TABLET ORAL at 08:20

## 2024-10-12 RX ADMIN — PIPERACILLIN AND TAZOBACTAM 3375 MG: 3; .375 INJECTION, POWDER, LYOPHILIZED, FOR SOLUTION INTRAVENOUS at 08:51

## 2024-10-12 RX ADMIN — AMIODARONE HYDROCHLORIDE 200 MG: 200 TABLET ORAL at 08:21

## 2024-10-12 RX ADMIN — SACUBITRIL AND VALSARTAN 1 TABLET: 24; 26 TABLET, FILM COATED ORAL at 21:37

## 2024-10-12 RX ADMIN — PIPERACILLIN AND TAZOBACTAM 3375 MG: 3; .375 INJECTION, POWDER, LYOPHILIZED, FOR SOLUTION INTRAVENOUS at 15:15

## 2024-10-12 RX ADMIN — PIPERACILLIN AND TAZOBACTAM 3375 MG: 3; .375 INJECTION, POWDER, LYOPHILIZED, FOR SOLUTION INTRAVENOUS at 03:11

## 2024-10-12 ASSESSMENT — PAIN DESCRIPTION - LOCATION
LOCATION: ABDOMEN

## 2024-10-12 ASSESSMENT — PAIN DESCRIPTION - DESCRIPTORS: DESCRIPTORS: ACHING

## 2024-10-12 ASSESSMENT — PAIN SCALES - GENERAL
PAINLEVEL_OUTOF10: 8
PAINLEVEL_OUTOF10: 7
PAINLEVEL_OUTOF10: 8

## 2024-10-12 ASSESSMENT — PAIN DESCRIPTION - ORIENTATION: ORIENTATION: LEFT;RIGHT;ANTERIOR

## 2024-10-12 NOTE — PLAN OF CARE
Problem: Chronic Conditions and Co-morbidities  Goal: Patient's chronic conditions and co-morbidity symptoms are monitored and maintained or improved  10/12/2024 1852 by Jess Jimenez RN  Outcome: Progressing     Problem: Discharge Planning  Goal: Discharge to home or other facility with appropriate resources  10/12/2024 1852 by Jess Jimenez RN  Outcome: Progressing     Problem: Pain  Goal: Verbalizes/displays adequate comfort level or baseline comfort level  10/12/2024 1852 by Jess Jimenez RN  Outcome: Progressing     Problem: Safety - Adult  Goal: Free from fall injury  10/12/2024 1852 by Jess Jimenez RN  Outcome: Progressing

## 2024-10-13 VITALS
DIASTOLIC BLOOD PRESSURE: 66 MMHG | TEMPERATURE: 97.6 F | RESPIRATION RATE: 16 BRPM | HEIGHT: 62 IN | SYSTOLIC BLOOD PRESSURE: 122 MMHG | OXYGEN SATURATION: 97 % | BODY MASS INDEX: 24.83 KG/M2 | WEIGHT: 134.92 LBS | HEART RATE: 58 BPM

## 2024-10-13 LAB
ALBUMIN SERPL-MCNC: 3.8 G/DL (ref 3.5–5.2)
ALBUMIN/GLOB SERPL: 1 {RATIO} (ref 1–2.5)
ALP SERPL-CCNC: 83 U/L (ref 35–104)
ALT SERPL-CCNC: 8 U/L (ref 10–35)
ANION GAP SERPL CALCULATED.3IONS-SCNC: 12 MMOL/L (ref 9–16)
AST SERPL-CCNC: 18 U/L (ref 10–35)
BASOPHILS # BLD: 0 K/UL (ref 0–0.2)
BASOPHILS NFR BLD: 0 % (ref 0–2)
BILIRUB SERPL-MCNC: 0.3 MG/DL (ref 0–1.2)
BUN SERPL-MCNC: 18 MG/DL (ref 6–20)
CALCIUM SERPL-MCNC: 9.3 MG/DL (ref 8.6–10.4)
CHLORIDE SERPL-SCNC: 103 MMOL/L (ref 98–107)
CO2 SERPL-SCNC: 23 MMOL/L (ref 20–31)
CREAT SERPL-MCNC: 1.4 MG/DL (ref 0.5–0.9)
EOSINOPHIL # BLD: 0.11 K/UL (ref 0–0.4)
EOSINOPHILS RELATIVE PERCENT: 2 % (ref 1–4)
ERYTHROCYTE [DISTWIDTH] IN BLOOD BY AUTOMATED COUNT: 15.3 % (ref 11.8–14.4)
GFR, ESTIMATED: 42 ML/MIN/1.73M2
GLUCOSE SERPL-MCNC: 109 MG/DL (ref 74–99)
HCT VFR BLD AUTO: 38.3 % (ref 36.3–47.1)
HGB BLD-MCNC: 12.1 G/DL (ref 11.9–15.1)
IMM GRANULOCYTES # BLD AUTO: 0 K/UL (ref 0–0.3)
IMM GRANULOCYTES NFR BLD: 0 %
LYMPHOCYTES NFR BLD: 1.06 K/UL (ref 1–4.8)
LYMPHOCYTES RELATIVE PERCENT: 20 % (ref 24–44)
MCH RBC QN AUTO: 28 PG (ref 25.2–33.5)
MCHC RBC AUTO-ENTMCNC: 31.6 G/DL (ref 28.4–34.8)
MCV RBC AUTO: 88.7 FL (ref 82.6–102.9)
MONOCYTES NFR BLD: 0.42 K/UL (ref 0.1–0.8)
MONOCYTES NFR BLD: 8 % (ref 1–7)
MORPHOLOGY: ABNORMAL
NEUTROPHILS NFR BLD: 70 % (ref 36–66)
NEUTS SEG NFR BLD: 3.71 K/UL (ref 1.8–7.7)
NRBC BLD-RTO: 0 PER 100 WBC
PLATELET # BLD AUTO: 141 K/UL (ref 138–453)
PMV BLD AUTO: 12.9 FL (ref 8.1–13.5)
POTASSIUM SERPL-SCNC: 3.4 MMOL/L (ref 3.7–5.3)
PROT SERPL-MCNC: 7.1 G/DL (ref 6.6–8.7)
RBC # BLD AUTO: 4.32 M/UL (ref 3.95–5.11)
SODIUM SERPL-SCNC: 138 MMOL/L (ref 136–145)
WBC OTHER # BLD: 5.3 K/UL (ref 3.5–11.3)

## 2024-10-13 PROCEDURE — 36415 COLL VENOUS BLD VENIPUNCTURE: CPT

## 2024-10-13 PROCEDURE — 85025 COMPLETE CBC W/AUTO DIFF WBC: CPT

## 2024-10-13 PROCEDURE — 80053 COMPREHEN METABOLIC PANEL: CPT

## 2024-10-13 PROCEDURE — 6360000002 HC RX W HCPCS: Performed by: FAMILY MEDICINE

## 2024-10-13 PROCEDURE — 6370000000 HC RX 637 (ALT 250 FOR IP): Performed by: FAMILY MEDICINE

## 2024-10-13 PROCEDURE — 2580000003 HC RX 258: Performed by: FAMILY MEDICINE

## 2024-10-13 PROCEDURE — 99239 HOSP IP/OBS DSCHRG MGMT >30: CPT | Performed by: FAMILY MEDICINE

## 2024-10-13 PROCEDURE — 6370000000 HC RX 637 (ALT 250 FOR IP)

## 2024-10-13 RX ORDER — LEVOFLOXACIN 500 MG/1
500 TABLET, FILM COATED ORAL DAILY
Qty: 7 TABLET | Refills: 0 | Status: SHIPPED | OUTPATIENT
Start: 2024-10-13 | End: 2024-10-20

## 2024-10-13 RX ORDER — METRONIDAZOLE 500 MG/1
500 TABLET ORAL 3 TIMES DAILY
Qty: 21 TABLET | Refills: 0 | Status: SHIPPED | OUTPATIENT
Start: 2024-10-13 | End: 2024-10-18

## 2024-10-13 RX ADMIN — PIPERACILLIN SODIUM AND TAZOBACTAM SODIUM 3375 MG: 3; .375 INJECTION, SOLUTION INTRAVENOUS at 01:26

## 2024-10-13 RX ADMIN — SODIUM CHLORIDE, PRESERVATIVE FREE 10 ML: 5 INJECTION INTRAVENOUS at 09:02

## 2024-10-13 RX ADMIN — MORPHINE SULFATE 2 MG: 4 INJECTION INTRAVENOUS at 08:04

## 2024-10-13 RX ADMIN — PANTOPRAZOLE SODIUM 40 MG: 40 TABLET, DELAYED RELEASE ORAL at 08:05

## 2024-10-13 RX ADMIN — POLYETHYLENE GLYCOL 3350 17 G: 17 POWDER, FOR SOLUTION ORAL at 09:10

## 2024-10-13 RX ADMIN — EMPAGLIFLOZIN 10 MG: 10 TABLET, FILM COATED ORAL at 09:11

## 2024-10-13 RX ADMIN — ASPIRIN 81 MG: 81 TABLET, COATED ORAL at 09:06

## 2024-10-13 RX ADMIN — BUMETANIDE 0.5 MG: 1 TABLET ORAL at 09:06

## 2024-10-13 RX ADMIN — Medication 400 MG: at 09:06

## 2024-10-13 RX ADMIN — PIPERACILLIN SODIUM AND TAZOBACTAM SODIUM 3375 MG: 3; .375 INJECTION, SOLUTION INTRAVENOUS at 08:10

## 2024-10-13 RX ADMIN — SODIUM CHLORIDE, PRESERVATIVE FREE 10 ML: 5 INJECTION INTRAVENOUS at 01:27

## 2024-10-13 RX ADMIN — SACUBITRIL AND VALSARTAN 1 TABLET: 24; 26 TABLET, FILM COATED ORAL at 09:07

## 2024-10-13 RX ADMIN — AMIODARONE HYDROCHLORIDE 200 MG: 200 TABLET ORAL at 09:06

## 2024-10-13 ASSESSMENT — PAIN SCALES - GENERAL
PAINLEVEL_OUTOF10: 8
PAINLEVEL_OUTOF10: 0

## 2024-10-13 ASSESSMENT — PAIN DESCRIPTION - DESCRIPTORS: DESCRIPTORS: ACHING

## 2024-10-13 ASSESSMENT — PAIN SCALES - WONG BAKER: WONGBAKER_NUMERICALRESPONSE: NO HURT

## 2024-10-13 NOTE — DISCHARGE INSTRUCTIONS
Follow-up with your PCP in 1 week  Continue taking antibiotics for 7 days  Follow-up with nephrology referral and talk about diagnosis of CKD stage III  If symptoms worsen go to your nearest urgent care or emergency department

## 2024-10-13 NOTE — PROGRESS NOTES
PROGRESS NOTE          PATIENT NAME: Stacia MURILLO [facility]  MEDICAL RECORD NO. [id number]  DATE: 10/11/2024    HD: # 1      Patient Active Problem List   Diagnosis    COPD (chronic obstructive pulmonary disease) ([facility])    Fibroids    Syncope    Lung nodule < 6cm on CT    Chronic systolic heart failure ([facility]) s/p AICD    Mild intermittent asthma    Essential hypertension    Chest pain    QT prolongation    Asthma with COPD with exacerbation ([facility])    Non-ischemic cardiomyopathy ([facility])    Lesion of right native kidney    NSTEMI (non-ST elevated myocardial infarction) ([facility])    CAD (coronary artery disease)    Intraparenchymal hematoma of left side of brain due to trauma    Chronic combined systolic and diastolic congestive heart failure ([facility])    AICD discharge    RIP (acute kidney injury) ([facility])    Hypomagnesemia    Other heart failure ([facility])    Atypical chest pain    Plantar fasciitis, bilateral    Left lower quadrant abdominal pain    Diverticulitis    Pre-syncope    Anginal chest pain at rest ([facility])    Presence of cardiac resynchronization therapy defibrillator (CRT-D)    Abnormal cardiovascular stress test    NICM (nonischemic cardiomyopathy) ([facility])    Migraine    Left elbow pain    Tension headache    Lipid screening    Health care maintenance    Arthralgia    Heel pain, chronic, right    Gastroesophageal reflux disease    History of prediabetes    Intra-abdominal abscess ([facility])       DIAGNOSIS AND PLAN    59-year-old female with colonic inflammation and 1 cm intramural abscess on CT concerning for acute diverticulitis.   Continue NPO with sips or ice chips only, IV antibiotics and IV fluids  Will continue to monitor clinically with serial labs and exams.  Will advance diet if clinically improves.    Chief Complaint: \"Abdominal pain\"    SUBJECTIVE  No acute events overnight. She continues to have abdominal pain and was given a liquid diet overnight. She has no nausea and is passing flatus. No recent bowel 
CLINICAL PHARMACY NOTE: MEDS TO BEDS    Total # of Prescriptions Filled: 2   The following medications were delivered to the patient:  Levofloxacin 500mg  Metronidazole 500mg    Additional Documentation: dropped off to patient in room 338 on 10/13/24 at 2:45pm. No copay  
Congestive Heart Failure Education note:      Discussed 2000mg/day sodium restricted diet; patient verbalized understanding.    Moderate daily exercise encouraged as tolerated. Discussed rest breaks as needed; patient verbalized understanding.    Patient instructed to weigh self at the same time of each day, using same clothes and same scale; reinforced teaching to monitor for 3-5 lb weight increase over 1-2 days notify physician if charge noted.  Patient verbalized understanding.    Patient instructed to limit fluid intake to 2 liters per day.  Patient verbalized understanding.    Signs and symptoms of CHF discussed with patient, such as feeling more tired than normal, feeling short of breath, coughing that increases when you lie down, sudden weight gain, swelling of your feet, legs or belly.  Patient verbalized understanding to notify physician office if these symptoms occur.    Compliance with plan of care and further disease process causes discussed with patient, patient encouraged to keep all follow up appointments.  Patient verbalized understanding.    Patient last seen [facility] clinic 11/23 not interested in coming back doing fine from CHF standpoint   
DOMINIQUE stafford states that Dr Gómez wants to override the allergy prep as it's not a real allergy, The patients reaction is nausea and vomitting. The patient received zofran.  
Occupational Therapy    [facility]  Occupational Therapy Not Seen Note    DATE: 10/11/2024    NAME: Stacia Arora  MRN: [id number]   : 1965      Patient not seen this date for Occupational Therapy due to:    Patient independent with ADLs and functional tasks with no acute OT needs. Will defer OT evaluation at this time. Please reorder OT if future needs arise.     Electronically signed by JACKELYN Arreaga/L on 10/11/2024 at 2:21 PM    
Patient given all discharge instructions and education. Patient voices no further questions or concerns at this time. Patient ambulates off unit with family  
Physical Therapy        Physical Therapy Cancel Note      DATE: 10/11/2024    NAME: Stacia Arora  MRN: [id number]   : 1965      Patient not seen this date for Physical Therapy due to:    Discussed with nurse. Patient is independent. Has already been up in halls to walk outside.  DC PT.      Electronically signed by Shannan Tomas PT on 10/11/2024 at 2:02 PM     
Progress Note  Date:10/11/2024       Room:0324/0324-01  Patient Name:Stacia Arora     YOB: 1965     Age:59 y.o.        Subjective    Subjective:  Symptoms:  Stable.  She reports malaise and weakness.    Diet:  Poor intake.  She reports  nausea.    Activity level: Impaired due to pain.    Pain:  She complains of pain that is mild.  She reports pain is improving.       Review of Systems   Constitutional:  Negative for fever.   Gastrointestinal:  Positive for nausea.   Neurological:  Positive for weakness.     Objective         Vitals Last 24 Hours:  TEMPERATURE:  Temp  Av.7 °F (36.5 °C)  Min: 97.5 °F (36.4 °C)  Max: 97.8 °F (36.6 °C)  RESPIRATIONS RANGE: Resp  Av.2  Min: 16  Max: 28  PULSE OXIMETRY RANGE: SpO2  Av.2 %  Min: 95 %  Max: 98 %  PULSE RANGE: Pulse  Av.1  Min: 65  Max: 97  BLOOD PRESSURE RANGE: Systolic (24hrs), Av , Min:94 , Max:148   ; Diastolic (24hrs), Av, Min:60, Max:88    I/O (24Hr):    Intake/Output Summary (Last 24 hours) at 10/11/2024 1124  Last data filed at 10/10/2024 1845  Gross per 24 hour   Intake 48.92 ml   Output --   Net 48.92 ml     Objective:  General Appearance:  In no acute distress and in pain.    Vital signs: (most recent): Blood pressure 100/64, pulse 65, temperature 97.8 °F (36.6 °C), resp. rate 18, height 1.575 m (5' 2\"), weight 61.2 kg (134 lb 14.7 oz), last menstrual period 2016, SpO2 96%, not currently breastfeeding.  Vital signs are normal.  No fever.    Output: Producing urine and producing stool.    Lungs:  Normal effort and normal respiratory rate.  Breath sounds clear to auscultation.  She is not in respiratory distress.  No stridor.  No decreased breath sounds or wheezes.    Heart: Normal rate.  Regular rhythm.  S1 normal and S2 normal.    Abdomen: Abdomen is soft and non-distended.  There are no signs of ascites.  Bowel sounds are normal.   There is generalized tenderness.  There is left lower quadrant tenderness.  
[facility] Inpatient Service  [facility]  Progress Note    Date:   10/12/2024  Patient name:  Stacia Arora  Date of admission:  10/10/2024 12:40 PM  MRN:   [id number]  YOB: 1965    SUBJECTIVE/Last 24 hours update:     Patient seen and examined at the bed side   no new acute events overnight   Still has not gotten food, will try clear liquids today and see how patient does  Creatinine slightly bumped up, most likely secondary to not eating  Patient not a candidate for IV fluids at this time  We will still give a one-time bladder scan check just in case  Patient still afebrile and no leukocytosis  Will wait for general surgery further recommendations  Will also wait for patient, clinical improvement      HPI:   Saira is a 59-year-old female with past medical history significant for combined CHF (EF 25-30), nonischemic cardiomyopathy status post AICD, COPD and asthma, GERD, A-fib on anemia, prediabetes and essential hypertension     Came into ER with severe abdominal pain that started the night prior to presentation, crampy, 8/10 associated with nausea but no vomiting, has constipation. Workup in the ER including vitals and labs were remarkable for hypertension and deranged creatinine, CT abdomen and pelvis came back with picture of diverticulitis on top there is 1 cm rim-enhancing fluid collection posterior wall of the descending colon suspicious for mural abscess, , there was also finding of chronic right renal cyst that is unchanged as well as a 1.7 cm hypoattenuating lesion in liver that seems smaller in size compared to a CT from 2018.  Surgery was consulted and per them no surgical intervention was required and recommended to manage patient with antibiotics, pain and appropriate hydration.    REVIEW OF SYSTEMS:   Review of Systems   Constitutional:  Negative for chills and fever.   HENT:  Negative for congestion.    Respiratory:  Negative for cough, shortness 
reformatted images are provided for review. Automated exposure control, iterative reconstruction, and/or weight based adjustment of the mA/kV was utilized to reduce the radiation dose to as low as reasonably achievable. COMPARISON: June 17, 2018 HISTORY: ORDERING SYSTEM PROVIDED HISTORY: left sided abdominal pain, TECHNOLOGIST PROVIDED HISTORY: left sided abdominal pain, Decision Support Exception - unselect if not a suspected or confirmed emergency medical condition->Emergency Medical Condition (MA) Reason for Exam: abdominal pain, left side FINDINGS: Lower Chest: Lung bases are normal.Left ventricle is dilated.  Cardiac leads are present. Organs: *Liver: There is an indeterminate 1.7 cm hypoattenuating lesion in the lateral segment with a few small foci of calcification.  Lesion is mildly decreased in size compared to 2018 CT were measured 2.3 cm.  There is a too small to characterize low attenuating lesion in the posterior segment on image 33. *Spleen: Normal *Kidneys: Right kidney is atrophic.  Heterogeneously hypoattenuating 1.1 cm right renal lesion is similar to 2018 most likely hemorrhagic cyst. *Adrenal Glands: Normal *Pancreas: Normal *Gallbladder and bile ducts: Normal *GI/Bowel: There is focal wall thickening of the descending colon with associated post closed fracture infiltration and fascial thickening.  A few diverticula are present in this region, though a focal inflamed diverticulum is not identified.  In the region of wall thickening there is a rim enhancing 1 cm fluid collection in the posterior wall on series 2, image 66. *Genitourinary: There are numerous calcified uterine fibroids.  Ovaries are unremarkable.  In all *Urinary Bladder: Normal Vessels: Normal Peritoneum: No ascites. Retroperitoneum: Normal Lymph nodes: Normal Abdominal wall: Normal Bones: Mild degenerative changes in the lower thoracic and lumbar spine.     1.  All Short-segment acute inflammation in the descending colon.  Several 
will discharge on oral antibiotics. Will need gen surg further recommendations     OT/PT/SW     Code Status: Full        Above plan discussed with the patient in room, who agreed to the above plan   Plan will be discussed with the attending, Dr. Rishi Starr MD  Family Medicine Resident  10/13/2024 8:54 AM   Attending Physician Statement  I have discussed the care of Stacia Arora, including pertinent history and exam findings,  with the resident. I have seen and examined the patient and the key elements of all parts of the encounter have been performed by me.  I agree with the assessment, plan and orders as documented by the resident.  (GC Modifier)   Patient seen and examined along with the resident Physicians.  Admitted for the following,  1.Abdominal pain.  2.Small intra-abdominal Abscess.  3.Diverticulitis.  Doing well and eating and drinking and no Surgery planned.  Would be discharged Home today on oral Antibiotics if O.K with surgery and would follow as outpatient with her PCP and Surgery in 1-2 weeks time.  Lexie Blackwell.

## 2024-10-13 NOTE — PLAN OF CARE
Problem: Chronic Conditions and Co-morbidities  Goal: Patient's chronic conditions and co-morbidity symptoms are monitored and maintained or improved  10/13/2024 1527 by Jess Jimenez RN  Outcome: Adequate for Discharge     Problem: Discharge Planning  Goal: Discharge to home or other facility with appropriate resources  10/13/2024 1527 by Jess Jimenez RN  Outcome: Adequate for Discharge     Problem: Pain  Goal: Verbalizes/displays adequate comfort level or baseline comfort level  10/13/2024 1527 by Jess Jimenez RN  Outcome: Adequate for Discharge     Problem: Safety - Adult  Goal: Free from fall injury  10/13/2024 1527 by Jess Jimenez RN  Outcome: Adequate for Discharge

## 2024-10-13 NOTE — PLAN OF CARE
Problem: Chronic Conditions and Co-morbidities  Goal: Patient's chronic conditions and co-morbidity symptoms are monitored and maintained or improved  10/13/2024 0526 by Mamie Lloyd RN  Outcome: Progressing  10/12/2024 1852 by Jess Jimenez RN  Outcome: Progressing     Problem: Discharge Planning  Goal: Discharge to home or other facility with appropriate resources  10/13/2024 0526 by Mamie Lloyd RN  Outcome: Progressing  10/12/2024 1852 by Jess Jimenez RN  Outcome: Progressing     Problem: Pain  Goal: Verbalizes/displays adequate comfort level or baseline comfort level  10/13/2024 0526 by Mamie Lloyd RN  Outcome: Progressing  10/12/2024 1852 by Jess Jimenez RN  Outcome: Progressing     Problem: Safety - Adult  Goal: Free from fall injury  10/13/2024 0526 by Mamie Lloyd RN  Outcome: Progressing  10/12/2024 1852 by Jess Jimenez RN  Outcome: Progressing

## 2024-10-14 ENCOUNTER — CARE COORDINATION (OUTPATIENT)
Dept: FAMILY MEDICINE CLINIC | Age: 59
End: 2024-10-14

## 2024-10-14 ENCOUNTER — TELEPHONE (OUTPATIENT)
Dept: FAMILY MEDICINE CLINIC | Age: 59
End: 2024-10-14

## 2024-10-14 NOTE — TELEPHONE ENCOUNTER
Care Transitions Initial Follow Up Call    Outreach made within 2 business days of discharge: Yes    Patient: Stacia Arora Patient : 1965   MRN: [id number]  Reason for Admission: Intra-abdominal abscess  Discharge Date: 10/13/24       Spoke with: MIRANDA for patient to call office to schedule hospital follow up    Discharge department/facility: [place]    Norma [facility]

## 2024-10-14 NOTE — DISCHARGE SUMMARY
asthma, prediabetes and chronic back pain was managed with home meds.    BMP was significant for CKD stage IIIa which which is a new diagnosis.  Patient was advised to follow-up outpatient nephrologist    Today on day of discharge pt feels better with no further complaints. Vitals and Labs are at pts baseline.  All consultants involved during this admission are agreeable to d/c.    Consults:  general surgery    Significant Diagnostic/theraputic interventions:   CT scan      Disposition:   home    Instructions to Patient:     - Follow up with PCP Vince Obrien MD in 1 week  - Follow-up with nephrologist outpatient and discuss about elevated baseline creatinine and CKD stage IIIa  -Follow-up with pain management for chronic back pain      Discharge Medications:       Medication List        START taking these medications      levoFLOXacin 500 MG tablet  Commonly known as: LEVAQUIN  Take 1 tablet by mouth daily for 7 days     metroNIDAZOLE 500 MG tablet  Commonly known as: FLAGYL  Take 1 tablet by mouth 3 times daily for 7 days            CONTINUE taking these medications      acetaminophen 500 MG tablet  Commonly known as: TYLENOL  Take 2 tablets by mouth 3 times daily as needed for Pain     albuterol sulfate  (90 Base) MCG/ACT inhaler  Commonly known as: Ventolin HFA  INHALE 2 PUFFS INTO THE LUNGS EVERY 6 HOURS AS NEEDED FOR WHEEZING.     amiodarone 200 MG tablet  Commonly known as: CORDARONE  TAKE ONE TABLET BY MOUTH ONCE A DAY     aspirin 81 MG EC tablet  Commonly known as: Aspirin Low Dose  Take 1 tablet by mouth daily     atorvastatin 40 MG tablet  Commonly known as: LIPITOR  Take 1 tablet by mouth nightly     benzocaine 20 % Gel mucosal gel  Commonly known as: Oral Analgesic Max St  Take 1 each by mouth 2 times daily as needed for Pain     bumetanide 1 MG tablet  Commonly known as: BUMEX  TAKE ONE TABLET BY MOUTH TWICE A DAY     * diclofenac sodium 1 % Gel  Commonly known as: VOLTAREN  APPLY 4 GRAMS

## 2024-10-14 NOTE — CARE COORDINATION
(ASPIRIN LOW DOSE) 81 MG EC tablet Take 1 tablet by mouth daily 90 tablet 0    albuterol sulfate HFA (VENTOLIN HFA) 108 (90 Base) MCG/ACT inhaler INHALE 2 PUFFS INTO THE LUNGS EVERY 6 HOURS AS NEEDED FOR WHEEZING. 18 g 3    acetaminophen (TYLENOL) 500 MG tablet Take 2 tablets by mouth 3 times daily as needed for Pain 180 tablet 0    amiodarone (CORDARONE) 200 MG tablet TAKE ONE TABLET BY MOUTH ONCE A DAY 30 tablet 9    fluticasone (FLONASE) 50 MCG/ACT nasal spray 2 sprays by Each Nostril route daily 16 g 0    tiZANidine (ZANAFLEX) 4 MG tablet Take 1 tablet by mouth 3 times daily as needed (muscle spasm) 30 tablet 0    diclofenac sodium (VOLTAREN) 1 % GEL Apply 4 g topically 4 times daily (Patient not taking: Reported on 10/14/2024) 2 g 1    diclofenac sodium (VOLTAREN) 1 % GEL APPLY 4 GRAMS TOPICALLY TO AFFECTED AREA FOUR TIMES DAILY (Patient not taking: Reported on 10/14/2024) 100 g 0    benzocaine (ORAL ANALGESIC MAX ST) 20 % GEL mucosal gel Take 1 each by mouth 2 times daily as needed for Pain 5.1 g 0    ipratropium-albuterol (DUONEB) 0.5-2.5 (3) MG/3ML SOLN nebulizer solution Inhale 3 mLs into the lungs 2 times daily as needed for Shortness of Breath (Patient not taking: Reported on 10/14/2024) 360 mL 3    Multiple Vitamin (MULTIVITAMIN) tablet TAKE ONE TABLET BY MOUTH ONE TIME A DAY (Patient not taking: Reported on 10/10/2024) 30 tablet 3     No current facility-administered medications for this visit.         Current Medications (record all meds as 'taken' or 'not taken' in home med activity)  Outpatient Medications Marked as Taking for the 10/14/24 encounter (Care Coordination) with Crystal Toledo, RN   Medication Sig Dispense Refill    levoFLOXacin (LEVAQUIN) 500 MG tablet Take 1 tablet by mouth daily for 7 days 7 tablet 0    metroNIDAZOLE (FLAGYL) 500 MG tablet Take 1 tablet by mouth 3 times daily for 7 days 21 tablet 0    atorvastatin (LIPITOR) 40 MG tablet Take 1 tablet by mouth nightly 30 tablet 1

## 2024-10-15 LAB
MICROORGANISM SPEC CULT: NORMAL
SERVICE CMNT-IMP: NORMAL
SPECIMEN DESCRIPTION: NORMAL

## 2024-10-18 ENCOUNTER — OFFICE VISIT (OUTPATIENT)
Dept: FAMILY MEDICINE CLINIC | Age: 59
End: 2024-10-18

## 2024-10-18 VITALS
DIASTOLIC BLOOD PRESSURE: 92 MMHG | BODY MASS INDEX: 23.92 KG/M2 | SYSTOLIC BLOOD PRESSURE: 140 MMHG | HEART RATE: 79 BPM | WEIGHT: 130 LBS | HEIGHT: 62 IN

## 2024-10-18 DIAGNOSIS — I95.2 HYPOTENSION DUE TO DRUGS: ICD-10-CM

## 2024-10-18 DIAGNOSIS — M10.9 ACUTE GOUT INVOLVING TOE OF RIGHT FOOT, UNSPECIFIED CAUSE: Primary | ICD-10-CM

## 2024-10-18 DIAGNOSIS — I50.22 CHRONIC SYSTOLIC HEART FAILURE (HCC): ICD-10-CM

## 2024-10-18 RX ORDER — ACETAMINOPHEN 500 MG
500 TABLET ORAL 4 TIMES DAILY PRN
Qty: 120 TABLET | Refills: 0 | Status: SHIPPED | OUTPATIENT
Start: 2024-10-18

## 2024-10-18 RX ORDER — LOSARTAN POTASSIUM 50 MG/1
25 TABLET ORAL DAILY
Qty: 90 TABLET | Refills: 1 | Status: SHIPPED | OUTPATIENT
Start: 2024-10-18 | End: 2024-10-18 | Stop reason: ALTCHOICE

## 2024-10-18 RX ORDER — PREDNISONE 20 MG/1
20 TABLET ORAL DAILY
Qty: 5 TABLET | Refills: 0 | Status: SHIPPED | OUTPATIENT
Start: 2024-10-18 | End: 2024-10-23

## 2024-10-18 RX ORDER — LOSARTAN POTASSIUM 50 MG/1
50 TABLET ORAL DAILY
Qty: 90 TABLET | Refills: 1 | Status: SHIPPED | OUTPATIENT
Start: 2024-10-18 | End: 2024-10-18

## 2024-10-18 RX ORDER — LOSARTAN POTASSIUM 25 MG/1
25 TABLET ORAL DAILY
Qty: 90 TABLET | Refills: 1 | Status: SHIPPED | OUTPATIENT
Start: 2024-10-18

## 2024-10-18 RX ORDER — METOPROLOL SUCCINATE 25 MG/1
25 TABLET, EXTENDED RELEASE ORAL DAILY
Qty: 90 TABLET | Refills: 1 | Status: SHIPPED | OUTPATIENT
Start: 2024-10-18

## 2024-10-18 ASSESSMENT — ENCOUNTER SYMPTOMS
DIARRHEA: 0
ABDOMINAL DISTENTION: 0
CONSTIPATION: 0
NAUSEA: 0
APNEA: 0
COLOR CHANGE: 1
CHOKING: 0
CHEST TIGHTNESS: 0
SHORTNESS OF BREATH: 0
ABDOMINAL PAIN: 0
STRIDOR: 0
COUGH: 0
WHEEZING: 0
VOMITING: 0

## 2024-10-18 NOTE — PROGRESS NOTES
Attending Physician Statement  I have discussed the care off. First name Stoudamireincluding pertinent history and exam findings,  with the resident. I have seen and examined the patient and the key elements of all parts of the encounter have been performed by me.  I agree with the assessment, plan and orders as documented by the resident.  (GC Modifier)    R foot Big toe - Acute gout- Tylenol/Pred 20 mg daily X 5 days  AFib Paroxysmal- Amiodarone  Low BP- DC entresto /Start Losartan 25 mg/ Decrease Metoprolol to 25 mg  
Visit Information    Have you changed or started any medications since your last visit including any over-the-counter medicines, vitamins, or herbal medicines? no   Have you stopped taking any of your medications? Is so, why? -  no  Are you having any side effects from any of your medications? - no    Have you seen any other physician or provider since your last visit?  no   Have you had any other diagnostic tests since your last visit?  yes - Labs 10/13, Imaging 10/10   Have you been seen in the emergency room and/or had an admission in a hospital since we last saw you?  yes - STV 10/10 to 10/13 for CHF, Intra-Abdominal Abscess   Have you had your routine dental cleaning in the past 6 months?  no     Do you have an active MyChart account? If no, what is the barrier?  Yes    Patient Care Team:  Vince Obrien MD as PCP - General (Family Medicine)  Andreas Tatum MD as PCP - Empaneled Provider  Crystal Toledo RN as   Gonzalo Boyle MD as Consulting Physician (Pulmonary Disease)    Medical History Review  Past Medical, Family, and Social History reviewed and does contribute to the patient presenting condition    Health Maintenance   Topic Date Due    Hepatitis B vaccine (1 of 3 - 19+ 3-dose series) Never done    Shingles vaccine (1 of 2) Never done    Pneumococcal 0-64 years Vaccine (2 of 2 - PCV) 05/20/2017    Lipids  11/01/2020    Cervical cancer screen  07/09/2024    COVID-19 Vaccine (1 - 2023-24 season) Never done    Flu vaccine (1) 10/15/2025 (Originally 8/1/2024)    Depression Screen  02/06/2025    Breast cancer screen  02/20/2025    GFR test (Diabetes, CKD 3-4, OR last GFR 15-59)  10/13/2025    Colorectal Cancer Screen  12/14/2025    DTaP/Tdap/Td vaccine (2 - Td or Tdap) 06/09/2029    Hepatitis C screen  Completed    HIV screen  Completed    Hepatitis A vaccine  Aged Out    Hib vaccine  Aged Out    Polio vaccine  Aged Out    Meningococcal (ACWY) vaccine  Aged Out    A1C test (Diabetic or 
Abdomen is soft. There is no mass.      Tenderness: There is no abdominal tenderness. There is no guarding or rebound.      Hernia: No hernia is present.   Skin:     Coloration: Skin is not jaundiced.      Findings: No bruising.      Comments: Right big toe gout, redness, swelling, tender.       Assessment and Plan:    1. Acute gout involving toe of right foot, unspecified cause  Classic flare of gout involving the right big toe.  Has a history of gout but has not been on prophylactic treatment due to concerns about polypharmacy and drug interactions including colchicine (major drug interaction with amiodarone, spironolactone), NSAIDs (CKD IIIA).  - predniSONE (DELTASONE) 20 MG tablet; Take 1 tablet by mouth daily for 5 days  Dispense: 5 tablet; Refill: 0  - acetaminophen (TYLENOL) 500 MG tablet; Take 1 tablet by mouth 4 times daily as needed for Pain  Dispense: 120 tablet; Refill: 0    2. Chronic systolic heart failure (HCC) s/p AICD  Known history of chronic heart failure with ejection fraction 20-25%.  Last follow-up with a cardiologist: Was advised to stop Entresto and start losartan.  -Will discontinue Entresto at this time.  Patient needs to follow-up with cardiology.  - losartan (COZAAR) 25 MG tablet; Take 1 tablet by mouth daily  Dispense: 90 tablet; Refill: 1  - metoprolol succinate (TOPROL XL) 25 MG extended release tablet; Take 1 tablet by mouth daily  Dispense: 90 tablet; Refill: 1  - [facility] - Ron Kathleen DO, Cardiology, [place]    3. Hypotension due to drugs  Patient reports episode of low blood pressure, which might be exacerbated by her current heart failure and antihypertensive medications.  Current medication list reviewed with, will stop Entresto, and start on losartan 50 mg as well as decrease metoprolol succinate 50 mg to 25 mg a day.  - losartan (COZAAR) 25 MG tablet; Take 1 tablet by mouth daily  Dispense: 90 tablet; Refill: 1  - metoprolol succinate (TOPROL XL) 25 MG extended release

## 2024-10-21 ENCOUNTER — CARE COORDINATION (OUTPATIENT)
Dept: FAMILY MEDICINE CLINIC | Age: 59
End: 2024-10-21

## 2024-10-21 NOTE — CARE COORDINATION
teeth and will have follow up with dental center in 2-3 weeks.    Crystal Toledo RN, BSN    [facility]

## 2024-10-29 ENCOUNTER — TELEPHONE (OUTPATIENT)
Dept: FAMILY MEDICINE CLINIC | Age: 59
End: 2024-10-29

## 2024-10-29 RX ORDER — LANOLIN ALCOHOL/MO/W.PET/CERES
CREAM (GRAM) TOPICAL
Qty: 30 TABLET | Refills: 0 | Status: SHIPPED | OUTPATIENT
Start: 2024-10-29

## 2024-10-29 NOTE — TELEPHONE ENCOUNTER
Called patient to inform her provider would not be in clinic 11/12/2024, Patient has an appointment on 12/17/2024 she will keep that appointment.  Call ended

## 2024-10-29 NOTE — TELEPHONE ENCOUNTER
Faxed Refill Request of Entresto received from [facility]. Medication Pended. Pt last seen on 10/18/24, Next appt is 11/12/24.    Health Maintenance   Topic Date Due    Hepatitis B vaccine (1 of 3 - 19+ 3-dose series) Never done    Shingles vaccine (1 of 2) Never done    Pneumococcal 0-64 years Vaccine (2 of 2 - PCV) 05/20/2017    Lipids  11/01/2020    Cervical cancer screen  07/09/2024    COVID-19 Vaccine (1 - 2023-24 season) Never done    Flu vaccine (1) 10/15/2025 (Originally 8/1/2024)    Depression Screen  02/06/2025    Breast cancer screen  02/20/2025    GFR test (Diabetes, CKD 3-4, OR last GFR 15-59)  10/13/2025    Colorectal Cancer Screen  12/14/2025    DTaP/Tdap/Td vaccine (2 - Td or Tdap) 06/09/2029    Hepatitis C screen  Completed    HIV screen  Completed    Hepatitis A vaccine  Aged Out    Hib vaccine  Aged Out    Polio vaccine  Aged Out    Meningococcal (ACWY) vaccine  Aged Out    A1C test (Diabetic or Prediabetic)  Discontinued       Hemoglobin A1C (%)   Date Value   10/01/2024 5.6   06/21/2023 5.9   05/26/2022 5.7             ( goal A1C is < 7)   No components found for: \"LABMICR\"  No components found for: \"LDLCHOLESTEROL\", \"LDLCALC\"    (goal LDL is <100)   AST (U/L)   Date Value   10/13/2024 18     ALT (U/L)   Date Value   10/13/2024 8 (L)     BUN (mg/dL)   Date Value   10/13/2024 18     BP Readings from Last 3 Encounters:   10/28/24 104/70   10/18/24 (!) 140/92   10/13/24 122/66          (goal 120/80)    All Future Testing planned in [facility]  Lab Frequency Next Occurrence   US RENAL COMPLETE Once 10/31/2023   XR ELBOW LEFT (MIN 3 VIEWS) Once 05/21/2024   Hemoglobin A1c Once 11/01/2024   Lipid Panel Once 10/01/2024   Magnesium Once 10/01/2024   XR FOOT RIGHT (2 VIEWS) Once 10/01/2024       Next Visit Date:  Future Appointments   Date Time Provider Department Center   11/5/2024  2:00 PM Rafael Wilkerson MD [facility] Neurology -   11/12/2024 10:30 AM Vince Obrien MD [facility]

## 2024-10-31 PROBLEM — Z00.00 HEALTH CARE MAINTENANCE: Status: RESOLVED | Noted: 2024-10-01 | Resolved: 2024-10-31

## 2024-10-31 PROBLEM — Z13.220 LIPID SCREENING: Status: RESOLVED | Noted: 2024-10-01 | Resolved: 2024-10-31

## 2024-11-21 DIAGNOSIS — I25.10 CORONARY ARTERY DISEASE INVOLVING NATIVE HEART WITHOUT ANGINA PECTORIS, UNSPECIFIED VESSEL OR LESION TYPE: ICD-10-CM

## 2024-11-22 RX ORDER — ASPIRIN 81 MG/1
81 TABLET, COATED ORAL DAILY
Qty: 90 TABLET | Refills: 2 | Status: SHIPPED | OUTPATIENT
Start: 2024-11-22

## 2024-11-22 RX ORDER — LANOLIN ALCOHOL/MO/W.PET/CERES
CREAM (GRAM) TOPICAL
Qty: 30 TABLET | Refills: 0 | Status: SHIPPED | OUTPATIENT
Start: 2024-11-22

## 2024-11-22 NOTE — TELEPHONE ENCOUNTER
Please address the medication refill and close the encounter.  If I can be of assistance, please route to the applicable pool.      Thank you.      Last visit: 10-  Last Med refill: 8-  Does patient have enough medication for 72 hours: No:     Next Visit Date:  Future Appointments   Date Time Provider Department Center   12/17/2024  9:30 AM Vince Obrien MD [facility] DEP   2/13/2025  9:30 AM SCHEDULE, [facility] SCHEDULE [facility]   2/13/2025  9:45 AM Ganga Joya MD [facility]       Health Maintenance   Topic Date Due    Hepatitis B vaccine (1 of 3 - 19+ 3-dose series) Never done    Shingles vaccine (1 of 2) Never done    Pneumococcal 0-64 years Vaccine (2 of 2 - PCV) 05/20/2017    Lipids  11/01/2020    Cervical cancer screen  07/09/2024    COVID-19 Vaccine (1 - 2023-24 season) Never done    Flu vaccine (1) 10/15/2025 (Originally 8/1/2024)    Depression Screen  02/06/2025    Breast cancer screen  02/20/2025    GFR test (Diabetes, CKD 3-4, OR last GFR 15-59)  10/13/2025    Colorectal Cancer Screen  12/14/2025    DTaP/Tdap/Td vaccine (2 - Td or Tdap) 06/09/2029    Hepatitis C screen  Completed    HIV screen  Completed    Hepatitis A vaccine  Aged Out    Hib vaccine  Aged Out    Polio vaccine  Aged Out    Meningococcal (ACWY) vaccine  Aged Out    A1C test (Diabetic or Prediabetic)  Discontinued       Hemoglobin A1C (%)   Date Value   10/01/2024 5.6   06/21/2023 5.9   05/26/2022 5.7             ( goal A1C is < 7)   No components found for: \"LABMICR\"  No components found for: \"LDLCHOLESTEROL\", \"LDLCALC\"    (goal LDL is <100)   AST (U/L)   Date Value   10/13/2024 18     ALT (U/L)   Date Value   10/13/2024 8 (L)     BUN (mg/dL)   Date Value   10/13/2024 18     BP Readings from Last 3 Encounters:   10/28/24 104/70   10/18/24 (!) 140/92   10/13/24 122/66          (goal 120/80)    All Future Testing planned in [facility]  Lab Frequency Next Occurrence

## 2024-11-22 NOTE — TELEPHONE ENCOUNTER
Please address the medication refill and close the encounter.  If I can be of assistance, please route to the applicable pool.      Thank you.      Last visit: 10/18/2024  Last Med refill: 10-29-24  Does patient have enough medication for 72 hours: No:     Next Visit Date:  Future Appointments   Date Time Provider Department Center   12/17/2024  9:30 AM Vince Obrien MD [facility] DEP   2/13/2025  9:30 AM SCHEDULE, [facility] SCHEDULE [facility]   2/13/2025  9:45 AM Ganga Joya MD [facility]       Health Maintenance   Topic Date Due    Hepatitis B vaccine (1 of 3 - 19+ 3-dose series) Never done    Shingles vaccine (1 of 2) Never done    Pneumococcal 0-64 years Vaccine (2 of 2 - PCV) 05/20/2017    Lipids  11/01/2020    Cervical cancer screen  07/09/2024    COVID-19 Vaccine (1 - 2023-24 season) Never done    Flu vaccine (1) 10/15/2025 (Originally 8/1/2024)    Depression Screen  02/06/2025    Breast cancer screen  02/20/2025    GFR test (Diabetes, CKD 3-4, OR last GFR 15-59)  10/13/2025    Colorectal Cancer Screen  12/14/2025    DTaP/Tdap/Td vaccine (2 - Td or Tdap) 06/09/2029    Hepatitis C screen  Completed    HIV screen  Completed    Hepatitis A vaccine  Aged Out    Hib vaccine  Aged Out    Polio vaccine  Aged Out    Meningococcal (ACWY) vaccine  Aged Out    A1C test (Diabetic or Prediabetic)  Discontinued       Hemoglobin A1C (%)   Date Value   10/01/2024 5.6   06/21/2023 5.9   05/26/2022 5.7             ( goal A1C is < 7)   No components found for: \"LABMICR\"  No components found for: \"LDLCHOLESTEROL\", \"LDLCALC\"    (goal LDL is <100)   AST (U/L)   Date Value   10/13/2024 18     ALT (U/L)   Date Value   10/13/2024 8 (L)     BUN (mg/dL)   Date Value   10/13/2024 18     BP Readings from Last 3 Encounters:   10/28/24 104/70   10/18/24 (!) 140/92   10/13/24 122/66          (goal 120/80)    All Future Testing planned in [facility]  Lab Frequency Next Occurrence

## 2024-12-17 ENCOUNTER — OFFICE VISIT (OUTPATIENT)
Dept: FAMILY MEDICINE CLINIC | Age: 59
End: 2024-12-17
Payer: MEDICAID

## 2024-12-17 VITALS
TEMPERATURE: 97 F | WEIGHT: 137.4 LBS | HEIGHT: 62 IN | HEART RATE: 57 BPM | DIASTOLIC BLOOD PRESSURE: 79 MMHG | SYSTOLIC BLOOD PRESSURE: 120 MMHG | OXYGEN SATURATION: 94 % | BODY MASS INDEX: 25.28 KG/M2

## 2024-12-17 DIAGNOSIS — M10.9 ACUTE GOUT INVOLVING TOE OF RIGHT FOOT, UNSPECIFIED CAUSE: ICD-10-CM

## 2024-12-17 DIAGNOSIS — Z13.220 SCREENING FOR HYPERLIPIDEMIA: ICD-10-CM

## 2024-12-17 DIAGNOSIS — J44.9 CHRONIC OBSTRUCTIVE PULMONARY DISEASE, UNSPECIFIED COPD TYPE (HCC): ICD-10-CM

## 2024-12-17 DIAGNOSIS — N18.32 STAGE 3B CHRONIC KIDNEY DISEASE (HCC): ICD-10-CM

## 2024-12-17 DIAGNOSIS — Z79.899 ON AMIODARONE THERAPY: ICD-10-CM

## 2024-12-17 DIAGNOSIS — I20.89 ANGINAL CHEST PAIN AT REST (HCC): Primary | ICD-10-CM

## 2024-12-17 DIAGNOSIS — M25.562 ACUTE PAIN OF LEFT KNEE: ICD-10-CM

## 2024-12-17 DIAGNOSIS — I10 ESSENTIAL HYPERTENSION: ICD-10-CM

## 2024-12-17 PROCEDURE — 93005 ELECTROCARDIOGRAM TRACING: CPT

## 2024-12-17 PROCEDURE — 99211 OFF/OP EST MAY X REQ PHY/QHP: CPT | Performed by: FAMILY MEDICINE

## 2024-12-17 RX ORDER — ACETAMINOPHEN 500 MG
500 TABLET ORAL 4 TIMES DAILY PRN
Qty: 120 TABLET | Refills: 0 | Status: SHIPPED | OUTPATIENT
Start: 2024-12-17

## 2024-12-17 SDOH — ECONOMIC STABILITY: FOOD INSECURITY: WITHIN THE PAST 12 MONTHS, THE FOOD YOU BOUGHT JUST DIDN'T LAST AND YOU DIDN'T HAVE MONEY TO GET MORE.: NEVER TRUE

## 2024-12-17 SDOH — ECONOMIC STABILITY: FOOD INSECURITY: WITHIN THE PAST 12 MONTHS, YOU WORRIED THAT YOUR FOOD WOULD RUN OUT BEFORE YOU GOT MONEY TO BUY MORE.: NEVER TRUE

## 2024-12-17 SDOH — ECONOMIC STABILITY: INCOME INSECURITY: HOW HARD IS IT FOR YOU TO PAY FOR THE VERY BASICS LIKE FOOD, HOUSING, MEDICAL CARE, AND HEATING?: NOT HARD AT ALL

## 2024-12-17 ASSESSMENT — ENCOUNTER SYMPTOMS
CHEST TIGHTNESS: 0
SHORTNESS OF BREATH: 1

## 2024-12-17 ASSESSMENT — PATIENT HEALTH QUESTIONNAIRE - PHQ9
SUM OF ALL RESPONSES TO PHQ QUESTIONS 1-9: 0
SUM OF ALL RESPONSES TO PHQ QUESTIONS 1-9: 0
2. FEELING DOWN, DEPRESSED OR HOPELESS: NOT AT ALL
SUM OF ALL RESPONSES TO PHQ QUESTIONS 1-9: 0
1. LITTLE INTEREST OR PLEASURE IN DOING THINGS: NOT AT ALL
SUM OF ALL RESPONSES TO PHQ9 QUESTIONS 1 & 2: 0
SUM OF ALL RESPONSES TO PHQ QUESTIONS 1-9: 0

## 2024-12-17 NOTE — PROGRESS NOTES
[facility] - Outpatient Note      Subjective:    Stacia Arora is a 59 y.o. female with  has a past medical history of AICD discharge, RPI (acute kidney injury) ([facility]), Asthma, Asthma with COPD with exacerbation (HCC), CAD (coronary artery disease), Cardiomegaly, Chest pain, CHF (congestive heart failure) (HCC), Chronic systolic heart failure (HCC) s/p AICD, COPD (chronic obstructive pulmonary disease) (HCC), COPD (chronic obstructive pulmonary disease) (HCC), Depression, Fibroids, Gastroesophageal reflux disease, Hypertension, Hypomagnesemia, Intraparenchymal hematoma of left side of brain due to trauma, Lesion of right native kidney, Lung nodule < 6cm on CT, MI (myocardial infarction) (HCC), Mild intermittent asthma, Non-ischemic cardiomyopathy (HCC), NSTEMI (non-ST elevated myocardial infarction) ([facility]), QT prolongation, Syncope, and Unspecified diseases of blood and blood-forming organs.    Presented to the office today for:  Chief Complaint   Patient presents with    Congestive Heart Failure    Knee Pain     Left knee pain for the last week     Tingling     Left arm     Health Maintenance     Due for pap        HPI    Patient here for 2 months follow-up congestive heart failure s/p AICD.  Her ejection fraction in last echo 8/3/23 was 20 to 25%. Left heart cath on 1/31/24-EF 25-30%, minimal CAD. Her Entresto was stopped as advised per cardiology due to low blood pressure and she was started on losartan 25 Mg. She does follow with cardiology, is on GDMT. No signs or symptoms of heart failure exacerbation. She did gain 3 pounds in 2 months.    Today she complained of sharp sudden pin point midline chest pain in the clinic. She has this on and off since a year, radiates to her shoulder. Not associated with sweating, nausea, vomiting, headaches, dizziness, blurring of vision. She does not note increase frequency or intensity of pain.    She has not followed 
Visit Information    Have you changed or started any medications since your last visit including any over-the-counter medicines, vitamins, or herbal medicines? no   Have you stopped taking any of your medications? Is so, why? -  no  Are you having any side effects from any of your medications? - no    Have you seen any other physician or provider since your last visit?  no   Have you had any other diagnostic tests since your last visit?  no   Have you been seen in the emergency room and/or had an admission in a hospital since we last saw you?  no   Have you had your routine dental cleaning in the past 6 months?  no     Do you have an active MyChart account? If no, what is the barrier?  Yes    Patient Care Team:  Vince Obrien MD as PCP - General (Family Medicine)  Crystal Toledo RN as   Gonzalo Boyle MD as Consulting Physician (Pulmonary Disease)    Medical History Review  Past Medical, Family, and Social History reviewed and does not contribute to the patient presenting condition    Health Maintenance   Topic Date Due    Hepatitis B vaccine (1 of 3 - 19+ 3-dose series) Never done    Shingles vaccine (1 of 2) Never done    Pneumococcal 0-64 years Vaccine (2 of 2 - PCV) 05/20/2017    Lipids  11/01/2020    Cervical cancer screen  07/09/2024    COVID-19 Vaccine (1 - 2023-24 season) Never done    Flu vaccine (1) 10/15/2025 (Originally 8/1/2024)    Depression Screen  02/06/2025    Breast cancer screen  02/20/2025    GFR test (Diabetes, CKD 3-4, OR last GFR 15-59)  10/13/2025    Colorectal Cancer Screen  12/14/2025    DTaP/Tdap/Td vaccine (2 - Td or Tdap) 06/09/2029    Hepatitis C screen  Completed    HIV screen  Completed    Hepatitis A vaccine  Aged Out    Hib vaccine  Aged Out    Polio vaccine  Aged Out    Meningococcal (ACWY) vaccine  Aged Out    A1C test (Diabetic or Prediabetic)  Discontinued               
amiodarone use with CMP, TSH with T4, and chest x-ray.  Resident team to notify FM IP team as well as ER.    More than 25 minutes spent  in face to face encounter with the patient and more than half in counseling. Patient's questions were answered.   Patient Voiced understanding to the counseling.  Return in about 2 days (around 12/19/2024) for Follow-up within 48 to 72 hours from leaving the ER.   (GC Modifier)-Dr. GUILLE SHERIFF MD

## 2024-12-17 NOTE — PATIENT INSTRUCTIONS
Thank you for letting us take care of you today. We hope all your questions were addressed. If a question was overlooked or something else comes to mind after you return home, please contact a member of your Care Team listed below.        Your Care Team at [facility] is Team #4  Jama Crystal M.D. (Faculty)  Hi Jacobo M.D. (Resident)  Lakshmi Bah M.D.  (Resident)  Kevin Charles M.D. (Resident)  Vince Obrien M.D. (Resident)  Rafa Watkins, HARSH Westfall, [facility]  Kathryn Quarles, [facility]  Kimberly Trujillo, [facility]  Rubia Heaton, [facility]  Jennyfer Rojas, [facility]  Arturo Wang (LJ) KILO Gordon (Clinical Practice Manager)  Ada Pichardo [facility] (Clinical Pharmacist)       Office phone number: [phone]    If you need to get in right away due to illness, please be advised we have \"Same Day\" appointments available Monday-Friday. Please call us at [phone] option #3 to schedule your \"Same Day\" appointment.

## 2024-12-18 ENCOUNTER — HOSPITAL ENCOUNTER (OUTPATIENT)
Age: 59
Discharge: HOME | End: 2024-12-20
Payer: MEDICAID

## 2024-12-18 ENCOUNTER — HOSPITAL ENCOUNTER (OUTPATIENT)
Age: 59
Discharge: HOME | End: 2024-12-18
Payer: MEDICAID

## 2024-12-18 ENCOUNTER — HOSPITAL ENCOUNTER (OUTPATIENT)
Dept: GENERAL RADIOLOGY | Age: 59
Discharge: HOME | End: 2024-12-20
Payer: MEDICAID

## 2024-12-18 DIAGNOSIS — G44.209 TENSION HEADACHE: Primary | ICD-10-CM

## 2024-12-18 DIAGNOSIS — Z13.220 LIPID SCREENING: ICD-10-CM

## 2024-12-18 DIAGNOSIS — Z79.899 ON AMIODARONE THERAPY: ICD-10-CM

## 2024-12-18 DIAGNOSIS — Z13.220 SCREENING FOR HYPERLIPIDEMIA: ICD-10-CM

## 2024-12-18 DIAGNOSIS — I50.22 CHRONIC SYSTOLIC HEART FAILURE (HCC): ICD-10-CM

## 2024-12-18 DIAGNOSIS — M25.562 ACUTE PAIN OF LEFT KNEE: ICD-10-CM

## 2024-12-18 LAB
ALBUMIN SERPL-MCNC: 5.2 G/DL (ref 3.5–5.2)
ALBUMIN/GLOB SERPL: 1.6 {RATIO} (ref 1–2.5)
ALP SERPL-CCNC: 91 U/L (ref 35–104)
ALT SERPL-CCNC: 12 U/L (ref 10–35)
ANION GAP SERPL CALCULATED.3IONS-SCNC: 14 MMOL/L (ref 9–16)
AST SERPL-CCNC: 22 U/L (ref 10–35)
BILIRUB SERPL-MCNC: 0.3 MG/DL (ref 0–1.2)
BUN SERPL-MCNC: 31 MG/DL (ref 6–20)
CALCIUM SERPL-MCNC: 10.6 MG/DL (ref 8.6–10.4)
CHLORIDE SERPL-SCNC: 103 MMOL/L (ref 98–107)
CHOLEST SERPL-MCNC: 283 MG/DL (ref 0–199)
CHOLESTEROL/HDL RATIO: 2.9
CO2 SERPL-SCNC: 25 MMOL/L (ref 20–31)
CREAT SERPL-MCNC: 1.4 MG/DL (ref 0.6–0.9)
GFR, ESTIMATED: 43 ML/MIN/1.73M2
GLUCOSE SERPL-MCNC: 97 MG/DL (ref 74–99)
HDLC SERPL-MCNC: 99 MG/DL
LDLC SERPL CALC-MCNC: 160 MG/DL (ref 0–100)
POTASSIUM SERPL-SCNC: 3.9 MMOL/L (ref 3.7–5.3)
PROT SERPL-MCNC: 8.4 G/DL (ref 6.6–8.7)
SODIUM SERPL-SCNC: 142 MMOL/L (ref 136–145)
TRIGL SERPL-MCNC: 118 MG/DL
TSH SERPL DL<=0.05 MIU/L-ACNC: 0.9 UIU/ML (ref 0.27–4.2)
VLDLC SERPL CALC-MCNC: 24 MG/DL (ref 1–30)

## 2024-12-18 PROCEDURE — 80061 LIPID PANEL: CPT

## 2024-12-18 PROCEDURE — 73560 X-RAY EXAM OF KNEE 1 OR 2: CPT

## 2024-12-18 PROCEDURE — 71046 X-RAY EXAM CHEST 2 VIEWS: CPT

## 2024-12-18 PROCEDURE — 84443 ASSAY THYROID STIM HORMONE: CPT

## 2024-12-18 PROCEDURE — 36415 COLL VENOUS BLD VENIPUNCTURE: CPT

## 2024-12-18 PROCEDURE — 80053 COMPREHEN METABOLIC PANEL: CPT

## 2024-12-18 NOTE — TELEPHONE ENCOUNTER
E-scribe request for med refill. Please review and e-scribe if applicable.     Last Visit Date:  12/17/2024  Next Visit Date:  Visit date not found    Hemoglobin A1C (%)   Date Value   10/01/2024 5.6   06/21/2023 5.9   05/26/2022 5.7             ( goal A1C is < 7)   No components found for: \"LABMICR\"  No components found for: \"LDLCHOLESTEROL\", \"LDLCALC\"    (goal LDL is <100)   AST (U/L)   Date Value   12/18/2024 22     ALT (U/L)   Date Value   12/18/2024 12     BUN (mg/dL)   Date Value   12/18/2024 31 (H)     BP Readings from Last 3 Encounters:   12/17/24 120/79   10/28/24 104/70   10/18/24 (!) 140/92          (goal 120/80)        Patient Active Problem List:     COPD (chronic obstructive pulmonary disease) (HCC)     Fibroids     Syncope     Lung nodule < 6cm on CT     Chronic systolic heart failure (HCC) s/p AICD     Mild intermittent asthma     Essential hypertension     Chest pain     QT prolongation     Asthma with COPD with exacerbation (HCC)     Non-ischemic cardiomyopathy (HCC)     Lesion of right native kidney     NSTEMI (non-ST elevated myocardial infarction) (HCC)     CAD (coronary artery disease)     Intraparenchymal hematoma of left side of brain due to trauma     Chronic combined systolic and diastolic congestive heart failure (HCC)     AICD discharge     RIP (acute kidney injury) (HCC)     Hypomagnesemia     Other heart failure (HCC)     Atypical chest pain     Plantar fasciitis, bilateral     Left lower quadrant abdominal pain     Diverticulitis     Pre-syncope     Anginal chest pain at rest (HCC)     Presence of cardiac resynchronization therapy defibrillator (CRT-D)     Abnormal cardiovascular stress test     NICM (nonischemic cardiomyopathy) (HCC)     Migraine     Left elbow pain     Tension headache     Arthralgia     Heel pain, chronic, right     Gastroesophageal reflux disease     History of prediabetes     Intra-abdominal abscess (HCC)     Diverticulitis of colon      ----JERILYN

## 2024-12-19 ENCOUNTER — APPOINTMENT (OUTPATIENT)
Dept: GENERAL RADIOLOGY | Age: 59
End: 2024-12-19
Payer: MEDICAID

## 2024-12-19 ENCOUNTER — HOSPITAL ENCOUNTER (EMERGENCY)
Age: 59
Discharge: HOME | End: 2024-12-19
Attending: STUDENT IN AN ORGANIZED HEALTH CARE EDUCATION/TRAINING PROGRAM
Payer: MEDICAID

## 2024-12-19 VITALS
OXYGEN SATURATION: 100 % | DIASTOLIC BLOOD PRESSURE: 65 MMHG | SYSTOLIC BLOOD PRESSURE: 101 MMHG | HEART RATE: 58 BPM | RESPIRATION RATE: 11 BRPM | HEIGHT: 62 IN | BODY MASS INDEX: 25.27 KG/M2 | WEIGHT: 137.35 LBS | TEMPERATURE: 97.3 F

## 2024-12-19 DIAGNOSIS — I50.22 CHRONIC SYSTOLIC HEART FAILURE (HCC): ICD-10-CM

## 2024-12-19 DIAGNOSIS — R07.9 CHEST PAIN, UNSPECIFIED TYPE: Primary | ICD-10-CM

## 2024-12-19 DIAGNOSIS — N17.9 AKI (ACUTE KIDNEY INJURY) (HCC): ICD-10-CM

## 2024-12-19 LAB
ANION GAP SERPL CALCULATED.3IONS-SCNC: 15 MMOL/L (ref 9–16)
BASOPHILS # BLD: 0.03 K/UL (ref 0–0.2)
BASOPHILS NFR BLD: 1 % (ref 0–2)
BNP SERPL-MCNC: 356 PG/ML (ref 0–125)
BUN SERPL-MCNC: 35 MG/DL (ref 6–20)
CALCIUM SERPL-MCNC: 9.9 MG/DL (ref 8.6–10.4)
CHLORIDE SERPL-SCNC: 100 MMOL/L (ref 98–107)
CO2 SERPL-SCNC: 24 MMOL/L (ref 20–31)
CREAT SERPL-MCNC: 1.7 MG/DL (ref 0.6–0.9)
EOSINOPHIL # BLD: 0.07 K/UL (ref 0–0.44)
EOSINOPHILS RELATIVE PERCENT: 1 % (ref 1–4)
ERYTHROCYTE [DISTWIDTH] IN BLOOD BY AUTOMATED COUNT: 16 % (ref 11.8–14.4)
GFR, ESTIMATED: 34 ML/MIN/1.73M2
GLUCOSE SERPL-MCNC: 86 MG/DL (ref 74–99)
HCT VFR BLD AUTO: 43.4 % (ref 36.3–47.1)
HGB BLD-MCNC: 13.8 G/DL (ref 11.9–15.1)
IMM GRANULOCYTES # BLD AUTO: <0.03 K/UL (ref 0–0.3)
IMM GRANULOCYTES NFR BLD: 0 %
LYMPHOCYTES NFR BLD: 2.14 K/UL (ref 1.1–3.7)
LYMPHOCYTES RELATIVE PERCENT: 42 % (ref 24–43)
MCH RBC QN AUTO: 28 PG (ref 25.2–33.5)
MCHC RBC AUTO-ENTMCNC: 31.8 G/DL (ref 28.4–34.8)
MCV RBC AUTO: 88 FL (ref 82.6–102.9)
MONOCYTES NFR BLD: 0.38 K/UL (ref 0.1–1.2)
MONOCYTES NFR BLD: 7 % (ref 3–12)
NEUTROPHILS NFR BLD: 49 % (ref 36–65)
NEUTS SEG NFR BLD: 2.53 K/UL (ref 1.5–8.1)
NRBC BLD-RTO: 0 PER 100 WBC
PLATELET # BLD AUTO: ABNORMAL K/UL (ref 138–453)
PLATELET, FLUORESCENCE: 196 K/UL (ref 138–453)
PLATELETS.RETICULATED NFR BLD AUTO: 9.3 % (ref 1.1–10.3)
POTASSIUM SERPL-SCNC: 4.3 MMOL/L (ref 3.7–5.3)
RBC # BLD AUTO: 4.93 M/UL (ref 3.95–5.11)
RBC # BLD: ABNORMAL 10*6/UL
SODIUM SERPL-SCNC: 139 MMOL/L (ref 136–145)
TROPONIN I SERPL HS-MCNC: 17 NG/L (ref 0–14)
TROPONIN I SERPL HS-MCNC: 21 NG/L (ref 0–14)
TSH SERPL DL<=0.05 MIU/L-ACNC: 0.67 UIU/ML (ref 0.27–4.2)
WBC OTHER # BLD: 5.2 K/UL (ref 3.5–11.3)

## 2024-12-19 PROCEDURE — 85025 COMPLETE CBC W/AUTO DIFF WBC: CPT

## 2024-12-19 PROCEDURE — 84484 ASSAY OF TROPONIN QUANT: CPT

## 2024-12-19 PROCEDURE — 93005 ELECTROCARDIOGRAM TRACING: CPT

## 2024-12-19 PROCEDURE — 85055 RETICULATED PLATELET ASSAY: CPT

## 2024-12-19 PROCEDURE — 84443 ASSAY THYROID STIM HORMONE: CPT

## 2024-12-19 PROCEDURE — 99285 EMERGENCY DEPT VISIT HI MDM: CPT

## 2024-12-19 PROCEDURE — 83880 ASSAY OF NATRIURETIC PEPTIDE: CPT

## 2024-12-19 PROCEDURE — 80048 BASIC METABOLIC PNL TOTAL CA: CPT

## 2024-12-19 PROCEDURE — 71046 X-RAY EXAM CHEST 2 VIEWS: CPT

## 2024-12-19 ASSESSMENT — HEART SCORE: ECG: NORMAL

## 2024-12-19 ASSESSMENT — LIFESTYLE VARIABLES
HOW OFTEN DO YOU HAVE A DRINK CONTAINING ALCOHOL: 4 OR MORE TIMES A WEEK
HOW MANY STANDARD DRINKS CONTAINING ALCOHOL DO YOU HAVE ON A TYPICAL DAY: 1 OR 2

## 2024-12-19 ASSESSMENT — PAIN SCALES - GENERAL: PAINLEVEL_OUTOF10: 8

## 2024-12-19 NOTE — TELEPHONE ENCOUNTER
Last visit: 12/17/24  Last Med refill: 8/30/24  Does patient have enough medication for 72 hours: no    Next Visit Date:  Future Appointments   Date Time Provider Department Center   2/13/2025  9:30 AM SCHEDULE, [facility] SCHEDU[facility]   2/13/2025  9:45 AM Ganga Joya MD [facility]       Health Maintenance   Topic Date Due    Hepatitis B vaccine (1 of 3 - 19+ 3-dose series) Never done    Shingles vaccine (1 of 2) Never done    Pneumococcal 0-64 years Vaccine (2 of 2 - PCV) 05/20/2017    Cervical cancer screen  07/09/2024    COVID-19 Vaccine (1 - 2023-24 season) Never done    Flu vaccine (1) 10/15/2025 (Originally 8/1/2024)    Breast cancer screen  02/20/2025    Colorectal Cancer Screen  12/14/2025    Depression Screen  12/17/2025    Lipids  12/18/2025    GFR test (Diabetes, CKD 3-4, OR last GFR 15-59)  12/18/2025    DTaP/Tdap/Td vaccine (2 - Td or Tdap) 06/09/2029    Hepatitis C screen  Completed    HIV screen  Completed    Hepatitis A vaccine  Aged Out    Hib vaccine  Aged Out    Polio vaccine  Aged Out    Meningococcal (ACWY) vaccine  Aged Out    A1C test (Diabetic or Prediabetic)  Discontinued    Diabetes screen  Discontinued       Hemoglobin A1C (%)   Date Value   10/01/2024 5.6   06/21/2023 5.9   05/26/2022 5.7             ( goal A1C is < 7)   No components found for: \"LABMICR\"  No components found for: \"LDLCHOLESTEROL\", \"LDLCALC\"    (goal LDL is <100)   AST (U/L)   Date Value   12/18/2024 22     ALT (U/L)   Date Value   12/18/2024 12     BUN (mg/dL)   Date Value   12/18/2024 31 (H)     BP Readings from Last 3 Encounters:   12/17/24 120/79   10/28/24 104/70   10/18/24 (!) 140/92          (goal 120/80)    All Future Testing planned in [facility]  Lab Frequency Next Occurrence   XR ELBOW LEFT (MIN 3 VIEWS) Once 05/21/2024   Hemoglobin A1c Once 11/01/2024   Magnesium Once 10/01/2024   XR FOOT RIGHT (2 VIEWS) Once 10/01/2024   EKG 12 lead Once 12/17/2024

## 2024-12-19 NOTE — ED PROVIDER NOTES
[facility]     Emergency Department     Faculty Attestation    I performed a history and physical examination of the patient and discussed management with the resident. I have reviewed and agree with the resident’s findings including all diagnostic interpretations, and treatment plans as written at the time of my review. Any areas of disagreement are noted on the chart. I was personally present for the key portions of any procedures. I have documented in the chart those procedures where I was not present during the key portions. For Physician Assistant/ Nurse Practitioner cases/documentation I have personally evaluated this patient and have completed at least one if not all key elements of the E/M (history, physical exam, and MDM). Additional findings are as noted.    PtName: Stacia Arora  MRN: [id number]  Birthdate 1965  Date of evaluation: 12/19/24  Note Started: 1:10 PM EST    Primary Care Physician: Vince Obrien MD    Brief HPI:  59-year-old female presents emergency department with chest pain.  Symptoms for the past 1 week.  Described as sharp and stinging.  History of CAD.  Recent catheterization in January 2024 revealed minimal CAD.    Pertinent Physical Exam Findings:  Vitals:    12/19/24 1256   BP: (!) 144/67   Pulse: 66   Resp: 18   Temp: 97.3 °F (36.3 °C)   SpO2: 100%   Appears well, resting actively, no acute distress, regular rate and rhythm, lungs are clear to auscultation.    Medical Decision Making: Patient is a 59 y.o. female presenting to the emergency department with chest pain. The chart was reviewed for pertinent history relating to the chief complaint.  The patient appears well at this time, no acute distress, vitals are stable.  Initial twelve-lead EKG obtained reveals atrial sensed ventricular paced rhythm, rate of 62, no specific signs of acute ischemia.       All results, including labs (if ordered), imaging (if ordered), and EKGs

## 2024-12-19 NOTE — DISCHARGE INSTRUCTIONS
-You were seen and evaluated by emergency medicine physicians at [facility].    -Please follow-up with your primary care physician and/or with the referrals to specialist.    -You were diagnosed with: Chest pain    -Cardiopulmonary workup showed no acute abnormality.  Your troponins were mildly elevated but down trended.  Chest x-ray was clear.  You did have a mild RIP.  Please hydrate over the next several days.  Please follow-up with your primary care physician and cardiologist within the next 3 days.    -Please return to the Emergency Department if you are experiencing the following symptoms acutely:  Headache, fever, chills, nausea, vomiting, chest pain, shortness of breath, abdominal pain, change with urination, change with bowel movements, change in your skin/hair/nail, weakness, fatigue, altered mental status and/or any change from baseline health.    -Thank you for coming to [facility].

## 2024-12-19 NOTE — ED PROVIDER NOTES
[facility] EMERGENCY DEPARTMENT  Emergency Department Encounter  Emergency Medicine Resident     Pt Name:Stacia Arora  MRN: [id number]  Birthdate 1965  Date of evaluation: 12/19/24  PCP:  Vince Obrien MD  Note Started: 1:10 PM EST      CHIEF COMPLAINT       Chief Complaint   Patient presents with    Chest Pain       HISTORY OF PRESENT ILLNESS  (Location/Symptom, Timing/Onset, Context/Setting, Quality, Duration, Modifying Factors, Severity.)      Stacia Arora is a 59-year-old female who presents to the ED with 1 week of intermittent chest pain with radiation to the left arm.  Patient has a history of MI with cath in January that was largely clean with no stent placement at that time.  Patient has AICD.  Reports no discharge.  Patient was seen by her PCP 2 days ago with cardiac workup that was negative.  She was told to come to the ED if she had persistent pain and discomfort.  Denies any stabbing back pain, diaphoresis, nausea, vomiting, acute shortness of breath.  Patient has no history of blood clots or swelling in her lower extremities.  Denies any calf tenderness.    Patient states she has taken all of her home meds including 81 aspirin    PAST MEDICAL / SURGICAL / SOCIAL / FAMILY HISTORY      has a past medical history of AICD discharge, RIP (acute kidney injury) (formerly [facility]), Asthma, Asthma with COPD with exacerbation (HCC), CAD (coronary artery disease), Cardiomegaly, Chest pain, CHF (congestive heart failure) (HCC), Chronic systolic heart failure (HCC) s/p AICD, COPD (chronic obstructive pulmonary disease) (HCC), COPD (chronic obstructive pulmonary disease) (HCC), Depression, Fibroids, Gastroesophageal reflux disease, Hypertension, Hypomagnesemia, Intraparenchymal hematoma of left side of brain due to trauma, Lesion of right native kidney, Lung nodule < 6cm on CT, MI (myocardial infarction) (formerly [facility]), Mild intermittent asthma, Non-ischemic cardiomyopathy (HCC), NSTEMI (non-ST elevated  Baseline heart score is 3-4.  Patient minimum is an admit to observation to see cardiology.  Patient is amenable to this plan if needed.   [AS]   1409 WBC: 5.2  CBC unremarkable.  No leukocytosis.  Hemoglobin stable and within reference range.  No indication of vitamin efficiency based on MCV. [AS]   1450 NT Pro-BNP(!): 356  Elevated but within patient's baseline range per chart review.  No BNP level has been drawn in the last year; however, patient does not appear fluid overloaded at this time [AS]   1450 Creatinine(!): 1.7  BMP shows no acute electrolyte derangement.  Creatinine is elevated above patient's baseline range.  Patient has a mild RIP. [AS]   1450 TSH, 3rd Generation: 0.67  Within normal range [AS]   1451 Troponin, High Sensitivity(!): 21  Troponin above patient's baseline range.  Will trend.  If troponin continues to increase, will give completion dose of aspirin and repeat EKG and consult cardiology. [AS]   1451 Chest x-ray shows no acute process [AS]   1517 Downtrend.  Delta -4.  Just above patient's baseline range [AS]   1518 Heart score 4 [AS]   1520 Reassessed patient.  Patient stated she is feeling better and would like to go home.  Will discuss the results with the recommendation that she should follow-up with her cardiologist within the next 3 days.  This will be informed discharge [AS]      ED Course User Index  [AS] Ortiz Reid, DO       PROCEDURES:      CONSULTS:  None    CRITICAL CARE:  There was significant risk of life threatening deterioration of patient's condition requiring my direct management. Critical care time  minutes, excluding any documented procedures.    FINAL IMPRESSION      1. Chest pain, unspecified type    2. RIP (acute kidney injury) (HCC)          DISPOSITION / PLAN     DISPOSITION Decision To Discharge 12/19/2024 03:22:48 PM   DISPOSITION CONDITION Stable           PATIENT REFERRED TO:  Vince Obrien MD  [id number] [facility] [address] [address]  [phone]    Schedule

## 2024-12-19 NOTE — ED NOTES
Pt to room 40 for evaluation. Pt reports that she was at her PCP office the other day and was having chest pain. Pt reports that she was advised to be evaluated in ED for chest pain. Pt reports that she is not currently having any pain. Pt reports that she still wants to be evaluated. Pt placed on continuous cardiac monitor, bp and pulse ox. EKG completed, IV established, blood work drawn.   Pt alert and oriented x4, talking in complete sentences, respirations even and unlabored. Pt acting age appropriate. Will continue to plan of care.

## 2024-12-20 LAB
EKG ATRIAL RATE: 62 BPM
EKG P AXIS: 33 DEGREES
EKG P-R INTERVAL: 184 MS
EKG Q-T INTERVAL: 470 MS
EKG QRS DURATION: 136 MS
EKG QTC CALCULATION (BAZETT): 477 MS
EKG R AXIS: 56 DEGREES
EKG T AXIS: -88 DEGREES
EKG VENTRICULAR RATE: 62 BPM

## 2024-12-21 RX ORDER — BUMETANIDE 1 MG/1
TABLET ORAL
Qty: 60 TABLET | Refills: 3 | Status: SHIPPED | OUTPATIENT
Start: 2024-12-21

## 2024-12-21 RX ORDER — LANOLIN ALCOHOL/MO/W.PET/CERES
CREAM (GRAM) TOPICAL
Qty: 30 TABLET | Refills: 0 | Status: SHIPPED | OUTPATIENT
Start: 2024-12-21

## 2024-12-21 RX ORDER — SPIRONOLACTONE 25 MG/1
TABLET ORAL
Qty: 30 TABLET | Refills: 5 | Status: SHIPPED | OUTPATIENT
Start: 2024-12-21

## 2024-12-21 RX ORDER — ATORVASTATIN CALCIUM 40 MG/1
40 TABLET, FILM COATED ORAL NIGHTLY
Qty: 30 TABLET | Refills: 5 | Status: SHIPPED | OUTPATIENT
Start: 2024-12-21

## 2024-12-21 RX ORDER — SACUBITRIL AND VALSARTAN 24; 26 MG/1; MG/1
TABLET, FILM COATED ORAL
Qty: 60 TABLET | Refills: 5 | OUTPATIENT
Start: 2024-12-21

## 2024-12-30 ENCOUNTER — CARE COORDINATION (OUTPATIENT)
Dept: FAMILY MEDICINE CLINIC | Age: 59
End: 2024-12-30

## 2024-12-31 NOTE — CARE COORDINATION
Stacia MURILLO Maite  12/30/2024                [facility] Outreach nurse visit    Met with Stacia to follow up post ED visit and to evaluate patient self care management of CHF.    Emotional/coping   Alert,oriented x 3, engaged and cooperative   Affect- full    Thought process- logical   She denies feelings/thoughts of depression.  Reports occasional anxiety.    Housing   She has adequate food in the home and reports she is able to afford rent, utility expenses.   Home is clean, well furnished    Socialization/family   Reports supportive family and friend relationships.    States family visit or phone daily.    Medications   Stacia received medication adherence pack meds from her pharmacy today   Reviewed medication use and medication schedule with Stacia.   She demonstrates adherence in taking medications and has all medications in the home.    Current Outpatient Medications   Medication Instructions    acetaminophen (TYLENOL) 500 mg, Oral, 4 TIMES DAILY PRN    albuterol sulfate HFA (VENTOLIN HFA) 108 (90 Base) MCG/ACT inhaler INHALE 2 PUFFS INTO THE LUNGS EVERY 6 HOURS AS NEEDED FOR WHEEZING.    amiodarone (CORDARONE) 200 mg, Oral, DAILY    Aspirin Low Dose 81 mg, Oral, DAILY    atorvastatin (LIPITOR) 40 mg, Oral, NIGHTLY    bumetanide (BUMEX) 1 MG tablet TAKE ONE TABLET BY MOUTH TWICE A DAY    empagliflozin (JARDIANCE) 10 mg, Oral, DAILY    losartan (COZAAR) 25 mg, Oral, DAILY    magnesium oxide (MAG-OX) 400 (240 Mg) MG tablet TAKE ONE TABLET BY MOUTH ONCE A DAY.    metoprolol succinate (TOPROL XL) 25 mg, Oral, DAILY    omeprazole (PRILOSEC) 20 MG delayed release capsule TAKE ONE CAPSULE BY MOUTH ONCE A DAY    spironolactone (ALDACTONE) 25 MG tablet TAKE ONE TABLET BY MOUTH ONCE A DAY      Medical   She reports no chest pain.  Denies shortness of breath.   Lungs clear.   States she walks 1-2 city blocks daily without fatigue or dyspnea.   No lower leg edema  Reports she experienced pain in right foot and great

## 2025-01-20 DIAGNOSIS — I50.22 CHRONIC SYSTOLIC HEART FAILURE (HCC): ICD-10-CM

## 2025-01-20 DIAGNOSIS — G44.209 TENSION HEADACHE: ICD-10-CM

## 2025-01-20 RX ORDER — EMPAGLIFLOZIN 10 MG/1
10 TABLET, FILM COATED ORAL DAILY
Qty: 30 TABLET | Refills: 3 | Status: SHIPPED | OUTPATIENT
Start: 2025-01-20

## 2025-01-20 RX ORDER — LANOLIN ALCOHOL/MO/W.PET/CERES
CREAM (GRAM) TOPICAL
Qty: 30 TABLET | Refills: 0 | Status: SHIPPED | OUTPATIENT
Start: 2025-01-20

## 2025-01-20 NOTE — TELEPHONE ENCOUNTER
Last visit: 12/17/2024  Last Med refill: 12/21/2024  Does patient have enough medication for 72 hours: No:     Next Visit Date:  Future Appointments   Date Time Provider Department Center   2/13/2025  9:30 AM SCHEDULE, [facility]EDULE [facility]   2/13/2025  9:45 AM Ganga Joya MD [facility]       Health Maintenance   Topic Date Due    Hepatitis B vaccine (1 of 3 - 19+ 3-dose series) Never done    Shingles vaccine (1 of 2) Never done    Pneumococcal 0-64 years Vaccine (2 of 2 - PCV) 05/20/2017    Cervical cancer screen  07/09/2024    COVID-19 Vaccine (1 - 2023-24 season) Never done    Breast cancer screen  02/20/2025    Flu vaccine (1) 10/15/2025 (Originally 8/1/2024)    Colorectal Cancer Screen  12/14/2025    Depression Screen  12/17/2025    Lipids  12/18/2025    GFR test (Diabetes, CKD 3-4, OR last GFR 15-59)  12/19/2025    DTaP/Tdap/Td vaccine (2 - Td or Tdap) 06/09/2029    Hepatitis C screen  Completed    HIV screen  Completed    Hepatitis A vaccine  Aged Out    Hib vaccine  Aged Out    Polio vaccine  Aged Out    Meningococcal (ACWY) vaccine  Aged Out    A1C test (Diabetic or Prediabetic)  Discontinued    Diabetes screen  Discontinued       Hemoglobin A1C (%)   Date Value   10/01/2024 5.6   06/21/2023 5.9   05/26/2022 5.7             ( goal A1C is < 7)   No components found for: \"LABMICR\"  No components found for: \"LDLCHOLESTEROL\", \"LDLCALC\"    (goal LDL is <100)   AST (U/L)   Date Value   12/18/2024 22     ALT (U/L)   Date Value   12/18/2024 12     BUN (mg/dL)   Date Value   12/19/2024 35 (H)     BP Readings from Last 3 Encounters:   12/19/24 101/65   12/17/24 120/79   10/28/24 104/70          (goal 120/80)    All Future Testing planned in [facility]  Lab Frequency Next Occurrence   XR ELBOW LEFT (MIN 3 VIEWS) Once 05/21/2024   Hemoglobin A1c Once 11/01/2024   Magnesium Once 10/01/2024   XR FOOT RIGHT (2 VIEWS) Once 10/01/2024   EKG 12 lead Once 12/17/2024

## 2025-01-29 DIAGNOSIS — J44.9 CHRONIC OBSTRUCTIVE PULMONARY DISEASE, UNSPECIFIED COPD TYPE (HCC): ICD-10-CM

## 2025-01-29 RX ORDER — ALBUTEROL SULFATE 90 UG/1
INHALANT RESPIRATORY (INHALATION)
Qty: 18 G | Refills: 5 | Status: SHIPPED | OUTPATIENT
Start: 2025-01-29

## 2025-01-29 NOTE — TELEPHONE ENCOUNTER
Last visit: 12-17-24  Last Med refill:   Does patient have enough medication for 72 hours: No:     Next Visit Date:  Future Appointments   Date Time Provider Department Center   2/13/2025  9:30 AM SCHEDULE, [facility]EDU[facility]   2/13/2025  9:45 AM Ganga Joya MD [facility]       Health Maintenance   Topic Date Due    Hepatitis B vaccine (1 of 3 - 19+ 3-dose series) Never done    Shingles vaccine (1 of 2) Never done    Pneumococcal 0-64 years Vaccine (2 of 2 - PCV) 05/20/2017    Cervical cancer screen  07/09/2024    COVID-19 Vaccine (1 - 2023-24 season) Never done    Breast cancer screen  02/20/2025    Flu vaccine (1) 10/15/2025 (Originally 8/1/2024)    Colorectal Cancer Screen  12/14/2025    Depression Screen  12/17/2025    Lipids  12/18/2025    GFR test (Diabetes, CKD 3-4, OR last GFR 15-59)  12/19/2025    DTaP/Tdap/Td vaccine (2 - Td or Tdap) 06/09/2029    Hepatitis C screen  Completed    HIV screen  Completed    Hepatitis A vaccine  Aged Out    Hib vaccine  Aged Out    Polio vaccine  Aged Out    Meningococcal (ACWY) vaccine  Aged Out    A1C test (Diabetic or Prediabetic)  Discontinued    Diabetes screen  Discontinued       Hemoglobin A1C (%)   Date Value   10/01/2024 5.6   06/21/2023 5.9   05/26/2022 5.7             ( goal A1C is < 7)   No components found for: \"LABMICR\"  No components found for: \"LDLCHOLESTEROL\", \"LDLCALC\"    (goal LDL is <100)   AST (U/L)   Date Value   12/18/2024 22     ALT (U/L)   Date Value   12/18/2024 12     BUN (mg/dL)   Date Value   12/19/2024 35 (H)     BP Readings from Last 3 Encounters:   12/19/24 101/65   12/17/24 120/79   10/28/24 104/70          (goal 120/80)    All Future Testing planned in [facility]  Lab Frequency Next Occurrence   XR ELBOW LEFT (MIN 3 VIEWS) Once 05/21/2024   Hemoglobin A1c Once 11/01/2024   Magnesium Once 10/01/2024   XR FOOT RIGHT (2 VIEWS) Once 10/01/2024   EKG 12 lead Once 12/17/2024

## 2025-02-06 ENCOUNTER — TELEPHONE (OUTPATIENT)
Dept: FAMILY MEDICINE CLINIC | Age: 60
End: 2025-02-06

## 2025-02-11 ENCOUNTER — TELEPHONE (OUTPATIENT)
Dept: FAMILY MEDICINE CLINIC | Age: 60
End: 2025-02-11

## 2025-02-17 DIAGNOSIS — G44.209 TENSION HEADACHE: ICD-10-CM

## 2025-02-17 NOTE — TELEPHONE ENCOUNTER
Last visit: 12/17/2024  Last Med refill: 01/20/2025  Does patient have enough medication for 72 hours: No:     Next Visit Date:  Future Appointments   Date Time Provider Department Center   3/13/2025  9:15 AM SCHEDULE, [facility]ED[facility]   4/10/2025 11:15 AM Ganga Joya MD [facility]       Health Maintenance   Topic Date Due    Hepatitis B vaccine (1 of 3 - 19+ 3-dose series) Never done    Shingles vaccine (1 of 2) Never done    Pneumococcal 50+ years Vaccine (2 of 2 - PCV) 05/20/2017    Cervical cancer screen  07/09/2024    COVID-19 Vaccine (1 - 2024-25 season) Never done    Breast cancer screen  02/20/2025    Flu vaccine (1) 10/15/2025 (Originally 8/1/2024)    Colorectal Cancer Screen  12/14/2025    Depression Screen  12/17/2025    Lipids  12/18/2025    GFR test (Diabetes, CKD 3-4, OR last GFR 15-59)  12/19/2025    DTaP/Tdap/Td vaccine (2 - Td or Tdap) 06/09/2029    Hepatitis C screen  Completed    HIV screen  Completed    Hepatitis A vaccine  Aged Out    Hib vaccine  Aged Out    Polio vaccine  Aged Out    Meningococcal (ACWY) vaccine  Aged Out    A1C test (Diabetic or Prediabetic)  Discontinued    Pneumococcal 0-49 years Vaccine  Discontinued    Diabetes screen  Discontinued       Hemoglobin A1C (%)   Date Value   10/01/2024 5.6   06/21/2023 5.9   05/26/2022 5.7             ( goal A1C is < 7)   No components found for: \"LABMICR\"  No components found for: \"LDLCHOLESTEROL\", \"LDLCALC\"    (goal LDL is <100)   AST (U/L)   Date Value   12/18/2024 22     ALT (U/L)   Date Value   12/18/2024 12     BUN (mg/dL)   Date Value   12/19/2024 35 (H)     BP Readings from Last 3 Encounters:   12/19/24 101/65   12/17/24 120/79   10/28/24 104/70          (goal 120/80)    All Future Testing planned in [facility]  Lab Frequency Next Occurrence   XR ELBOW LEFT (MIN 3 VIEWS) Once 05/21/2024   Hemoglobin A1c Once 11/01/2024   Magnesium Once 10/01/2024   XR FOOT RIGHT (2

## 2025-02-18 ENCOUNTER — TELEPHONE (OUTPATIENT)
Dept: FAMILY MEDICINE CLINIC | Age: 60
End: 2025-02-18

## 2025-02-18 RX ORDER — LANOLIN ALCOHOL/MO/W.PET/CERES
CREAM (GRAM) TOPICAL
Qty: 30 TABLET | Refills: 0 | Status: SHIPPED | OUTPATIENT
Start: 2025-02-18

## 2025-02-19 ENCOUNTER — TELEPHONE (OUTPATIENT)
Dept: FAMILY MEDICINE CLINIC | Age: 60
End: 2025-02-19

## 2025-02-20 ENCOUNTER — TELEPHONE (OUTPATIENT)
Dept: FAMILY MEDICINE CLINIC | Age: 60
End: 2025-02-20

## 2025-02-26 NOTE — TELEPHONE ENCOUNTER
Received phone call from patient stating the [employer] approved a credit for her [employer] account and the shut off notice has been canceled.

## 2025-02-26 NOTE — TELEPHONE ENCOUNTER
Followed up with [employer] on patient's behalf.  Was informed that patient completed the interview and submitted all of the necessary documents.  Additionally, they stated that nothing else is needed and she should receive a response no later than 3/1/25.    Relayed conversation to patient.

## 2025-02-26 NOTE — TELEPHONE ENCOUNTER
Phone call from patient. Patient stated that she completed the interview and will be submitting requested documents to [employer] in person.  Patient stated that additional information is needed but wasn't able to state specifically.      Will follow up with [employer] on patient's behalf.

## 2025-02-26 NOTE — TELEPHONE ENCOUNTER
Met with patient at her home as patient needed assistance with completing paperwork.      Assisted patient with completing the recertification paperwork for [employer]NAP benefits.  Reviewed patient's electric bill as she was concerned about the charges.  Patient has a disconnection notice scheduled for 2/20 for $85.27.  Patient stated that she would not be able to pay the amount until next month. Reminded patient that a PIPP application was submitted on her behalf and she should receive follow up soon. If needed, we will complete a medical certificate and [employer] application to ensure her electricity stays on.    Patient stated that she requested a scale and a blood pressure cuff at her last appointment and has yet to receive the items.  Will follow up on patient's behalf.      Will submit [employer] paperwork and inquire about the order for the scale and blood pressure cuff.

## 2025-02-26 NOTE — TELEPHONE ENCOUNTER
Phone call to patient inquiring if she completed her phone interview to continue receiving her SNAP benefits.  Patient stated that she did not.  Strongly urged patient to call early tomorrow morning or she may run the risk of losing her benefits.  Patient agreed to plan.

## 2025-03-28 ENCOUNTER — HOSPITAL ENCOUNTER (OUTPATIENT)
Age: 60
Setting detail: OBSERVATION
Discharge: HOME | End: 2025-03-29
Attending: EMERGENCY MEDICINE | Admitting: EMERGENCY MEDICINE
Payer: MEDICAID

## 2025-03-28 ENCOUNTER — APPOINTMENT (OUTPATIENT)
Dept: GENERAL RADIOLOGY | Age: 60
End: 2025-03-28
Payer: MEDICAID

## 2025-03-28 DIAGNOSIS — J44.1 COPD EXACERBATION (HCC): Primary | ICD-10-CM

## 2025-03-28 DIAGNOSIS — R06.00 DYSPNEA, UNSPECIFIED TYPE: ICD-10-CM

## 2025-03-28 DIAGNOSIS — R07.9 CHEST PAIN, UNSPECIFIED TYPE: ICD-10-CM

## 2025-03-28 LAB
ANION GAP SERPL CALCULATED.3IONS-SCNC: 16 MMOL/L (ref 9–16)
BASOPHILS # BLD: 0.03 K/UL (ref 0–0.2)
BASOPHILS NFR BLD: 0 % (ref 0–2)
BNP SERPL-MCNC: 5304 PG/ML (ref 0–125)
BODY TEMPERATURE: 37
BUN SERPL-MCNC: 13 MG/DL (ref 6–20)
CALCIUM SERPL-MCNC: 10.1 MG/DL (ref 8.6–10.4)
CHLORIDE SERPL-SCNC: 107 MMOL/L (ref 98–107)
CO2 SERPL-SCNC: 20 MMOL/L (ref 20–31)
COHGB MFR BLD: 1.2 % (ref 0–5)
CREAT SERPL-MCNC: 1.1 MG/DL (ref 0.6–0.9)
D DIMER PPP FEU-MCNC: 0.39 UG/ML FEU (ref 0–0.57)
EOSINOPHIL # BLD: <0.03 K/UL (ref 0–0.44)
EOSINOPHILS RELATIVE PERCENT: 0 % (ref 1–4)
ERYTHROCYTE [DISTWIDTH] IN BLOOD BY AUTOMATED COUNT: 15.7 % (ref 11.8–14.4)
FIO2 ON VENT: ABNORMAL %
FLUAV AG SPEC QL: NEGATIVE
FLUBV AG SPEC QL: NEGATIVE
GFR, ESTIMATED: 58 ML/MIN/1.73M2
GLUCOSE SERPL-MCNC: 124 MG/DL (ref 74–99)
HCO3 VENOUS: 22.7 MMOL/L (ref 24–30)
HCT VFR BLD AUTO: 39.7 % (ref 36.3–47.1)
HGB BLD-MCNC: 12.7 G/DL (ref 11.9–15.1)
IMM GRANULOCYTES # BLD AUTO: <0.03 K/UL (ref 0–0.3)
IMM GRANULOCYTES NFR BLD: 0 %
LYMPHOCYTES NFR BLD: 1.41 K/UL (ref 1.1–3.7)
LYMPHOCYTES RELATIVE PERCENT: 17 % (ref 24–43)
MCH RBC QN AUTO: 27.7 PG (ref 25.2–33.5)
MCHC RBC AUTO-ENTMCNC: 32 G/DL (ref 28.4–34.8)
MCV RBC AUTO: 86.7 FL (ref 82.6–102.9)
MONOCYTES NFR BLD: 1.07 K/UL (ref 0.1–1.2)
MONOCYTES NFR BLD: 13 % (ref 3–12)
NEGATIVE BASE EXCESS, VEN: 3.8 MMOL/L (ref 0–2)
NEUTROPHILS NFR BLD: 69 % (ref 36–65)
NEUTS SEG NFR BLD: 5.73 K/UL (ref 1.5–8.1)
NRBC BLD-RTO: 0 PER 100 WBC
O2 SAT, VEN: 59.4 % (ref 60–85)
PCO2 VENOUS: 46.8 MM HG (ref 39–55)
PH VENOUS: 7.3 (ref 7.32–7.42)
PLATELET # BLD AUTO: ABNORMAL K/UL (ref 138–453)
PLATELET, FLUORESCENCE: 163 K/UL (ref 138–453)
PLATELETS.RETICULATED NFR BLD AUTO: 10.5 % (ref 1.1–10.3)
PO2 VENOUS: 34.1 MM HG (ref 30–50)
POTASSIUM SERPL-SCNC: 3.8 MMOL/L (ref 3.7–5.3)
RBC # BLD AUTO: 4.58 M/UL (ref 3.95–5.11)
RBC # BLD: ABNORMAL 10*6/UL
SARS-COV-2 RDRP RESP QL NAA+PROBE: NOT DETECTED
SODIUM SERPL-SCNC: 143 MMOL/L (ref 136–145)
SPECIMEN DESCRIPTION: NORMAL
TROPONIN I SERPL HS-MCNC: 24 NG/L (ref 0–14)
TROPONIN I SERPL HS-MCNC: 26 NG/L (ref 0–14)
WBC OTHER # BLD: 8.3 K/UL (ref 3.5–11.3)

## 2025-03-28 PROCEDURE — 85379 FIBRIN DEGRADATION QUANT: CPT

## 2025-03-28 PROCEDURE — G0378 HOSPITAL OBSERVATION PER HR: HCPCS

## 2025-03-28 PROCEDURE — 6360000002 HC RX W HCPCS

## 2025-03-28 PROCEDURE — 84484 ASSAY OF TROPONIN QUANT: CPT

## 2025-03-28 PROCEDURE — 6370000000 HC RX 637 (ALT 250 FOR IP)

## 2025-03-28 PROCEDURE — 94640 AIRWAY INHALATION TREATMENT: CPT

## 2025-03-28 PROCEDURE — 85055 RETICULATED PLATELET ASSAY: CPT

## 2025-03-28 PROCEDURE — 83880 ASSAY OF NATRIURETIC PEPTIDE: CPT

## 2025-03-28 PROCEDURE — 36415 COLL VENOUS BLD VENIPUNCTURE: CPT

## 2025-03-28 PROCEDURE — 2580000003 HC RX 258

## 2025-03-28 PROCEDURE — 71046 X-RAY EXAM CHEST 2 VIEWS: CPT

## 2025-03-28 PROCEDURE — 96375 TX/PRO/DX INJ NEW DRUG ADDON: CPT

## 2025-03-28 PROCEDURE — 82805 BLOOD GASES W/O2 SATURATION: CPT

## 2025-03-28 PROCEDURE — 96374 THER/PROPH/DIAG INJ IV PUSH: CPT

## 2025-03-28 PROCEDURE — 85025 COMPLETE CBC W/AUTO DIFF WBC: CPT

## 2025-03-28 PROCEDURE — 99285 EMERGENCY DEPT VISIT HI MDM: CPT

## 2025-03-28 PROCEDURE — 93005 ELECTROCARDIOGRAM TRACING: CPT

## 2025-03-28 PROCEDURE — 87804 INFLUENZA ASSAY W/OPTIC: CPT

## 2025-03-28 PROCEDURE — 80048 BASIC METABOLIC PNL TOTAL CA: CPT

## 2025-03-28 PROCEDURE — 87635 SARS-COV-2 COVID-19 AMP PRB: CPT

## 2025-03-28 PROCEDURE — 2500000003 HC RX 250 WO HCPCS

## 2025-03-28 RX ORDER — AZITHROMYCIN 250 MG/1
500 TABLET, FILM COATED ORAL ONCE
Status: COMPLETED | OUTPATIENT
Start: 2025-03-28 | End: 2025-03-28

## 2025-03-28 RX ORDER — SODIUM CHLORIDE 9 MG/ML
INJECTION, SOLUTION INTRAVENOUS CONTINUOUS
Status: DISCONTINUED | OUTPATIENT
Start: 2025-03-28 | End: 2025-03-29 | Stop reason: HOSPADM

## 2025-03-28 RX ORDER — ONDANSETRON 4 MG/1
4 TABLET, ORALLY DISINTEGRATING ORAL EVERY 4 HOURS PRN
Status: DISCONTINUED | OUTPATIENT
Start: 2025-03-28 | End: 2025-03-29 | Stop reason: HOSPADM

## 2025-03-28 RX ORDER — ONDANSETRON 2 MG/ML
4 INJECTION INTRAMUSCULAR; INTRAVENOUS EVERY 6 HOURS PRN
Status: DISCONTINUED | OUTPATIENT
Start: 2025-03-28 | End: 2025-03-29 | Stop reason: HOSPADM

## 2025-03-28 RX ORDER — ATORVASTATIN CALCIUM 40 MG/1
40 TABLET, FILM COATED ORAL NIGHTLY
Status: DISCONTINUED | OUTPATIENT
Start: 2025-03-28 | End: 2025-03-29 | Stop reason: HOSPADM

## 2025-03-28 RX ORDER — SODIUM CHLORIDE 0.9 % (FLUSH) 0.9 %
5-40 SYRINGE (ML) INJECTION EVERY 12 HOURS SCHEDULED
Status: DISCONTINUED | OUTPATIENT
Start: 2025-03-28 | End: 2025-03-29 | Stop reason: HOSPADM

## 2025-03-28 RX ORDER — ALBUTEROL SULFATE 0.83 MG/ML
2.5 SOLUTION RESPIRATORY (INHALATION) EVERY 4 HOURS PRN
Status: DISCONTINUED | OUTPATIENT
Start: 2025-03-28 | End: 2025-03-29 | Stop reason: HOSPADM

## 2025-03-28 RX ORDER — FENTANYL CITRATE 50 UG/ML
50 INJECTION, SOLUTION INTRAMUSCULAR; INTRAVENOUS ONCE
Status: COMPLETED | OUTPATIENT
Start: 2025-03-28 | End: 2025-03-28

## 2025-03-28 RX ORDER — ALBUTEROL SULFATE 0.83 MG/ML
2.5 SOLUTION RESPIRATORY (INHALATION)
Status: DISCONTINUED | OUTPATIENT
Start: 2025-03-29 | End: 2025-03-29

## 2025-03-28 RX ORDER — ENOXAPARIN SODIUM 100 MG/ML
40 INJECTION SUBCUTANEOUS DAILY
Status: DISCONTINUED | OUTPATIENT
Start: 2025-03-29 | End: 2025-03-29 | Stop reason: HOSPADM

## 2025-03-28 RX ORDER — ACETAMINOPHEN 325 MG/1
650 TABLET ORAL EVERY 4 HOURS PRN
Status: DISCONTINUED | OUTPATIENT
Start: 2025-03-28 | End: 2025-03-29 | Stop reason: HOSPADM

## 2025-03-28 RX ORDER — SPIRONOLACTONE 25 MG/1
25 TABLET ORAL DAILY
Status: DISCONTINUED | OUTPATIENT
Start: 2025-03-29 | End: 2025-03-29 | Stop reason: HOSPADM

## 2025-03-28 RX ORDER — SODIUM CHLORIDE 9 MG/ML
INJECTION, SOLUTION INTRAVENOUS PRN
Status: DISCONTINUED | OUTPATIENT
Start: 2025-03-28 | End: 2025-03-29 | Stop reason: HOSPADM

## 2025-03-28 RX ORDER — ALBUTEROL SULFATE 0.83 MG/ML
2.5 SOLUTION RESPIRATORY (INHALATION)
Status: DISCONTINUED | OUTPATIENT
Start: 2025-03-28 | End: 2025-03-29

## 2025-03-28 RX ORDER — ASPIRIN 81 MG/1
324 TABLET, CHEWABLE ORAL ONCE
Status: COMPLETED | OUTPATIENT
Start: 2025-03-28 | End: 2025-03-28

## 2025-03-28 RX ORDER — ALBUTEROL SULFATE 0.83 MG/ML
2.5 SOLUTION RESPIRATORY (INHALATION)
Status: DISCONTINUED | OUTPATIENT
Start: 2025-03-28 | End: 2025-03-28

## 2025-03-28 RX ORDER — BUMETANIDE 1 MG/1
1 TABLET ORAL 2 TIMES DAILY
Status: DISCONTINUED | OUTPATIENT
Start: 2025-03-28 | End: 2025-03-29 | Stop reason: HOSPADM

## 2025-03-28 RX ORDER — SODIUM CHLORIDE 0.9 % (FLUSH) 0.9 %
5-40 SYRINGE (ML) INJECTION PRN
Status: DISCONTINUED | OUTPATIENT
Start: 2025-03-28 | End: 2025-03-29 | Stop reason: HOSPADM

## 2025-03-28 RX ORDER — LOSARTAN POTASSIUM 50 MG/1
25 TABLET ORAL DAILY
Status: DISCONTINUED | OUTPATIENT
Start: 2025-03-29 | End: 2025-03-29 | Stop reason: HOSPADM

## 2025-03-28 RX ORDER — DEXAMETHASONE SODIUM PHOSPHATE 10 MG/ML
10 INJECTION, SOLUTION INTRAMUSCULAR; INTRAVENOUS ONCE
Status: COMPLETED | OUTPATIENT
Start: 2025-03-28 | End: 2025-03-28

## 2025-03-28 RX ORDER — METOPROLOL SUCCINATE 25 MG/1
25 TABLET, EXTENDED RELEASE ORAL DAILY
Status: DISCONTINUED | OUTPATIENT
Start: 2025-03-29 | End: 2025-03-29 | Stop reason: HOSPADM

## 2025-03-28 RX ORDER — AZITHROMYCIN 250 MG/1
250 TABLET, FILM COATED ORAL DAILY
Status: DISCONTINUED | OUTPATIENT
Start: 2025-03-29 | End: 2025-03-29 | Stop reason: HOSPADM

## 2025-03-28 RX ORDER — AMIODARONE HYDROCHLORIDE 200 MG/1
200 TABLET ORAL DAILY
Status: DISCONTINUED | OUTPATIENT
Start: 2025-03-29 | End: 2025-03-29 | Stop reason: HOSPADM

## 2025-03-28 RX ORDER — PANTOPRAZOLE SODIUM 40 MG/1
40 TABLET, DELAYED RELEASE ORAL
Status: DISCONTINUED | OUTPATIENT
Start: 2025-03-29 | End: 2025-03-29 | Stop reason: HOSPADM

## 2025-03-28 RX ORDER — ASPIRIN 81 MG/1
81 TABLET ORAL DAILY
Status: DISCONTINUED | OUTPATIENT
Start: 2025-03-29 | End: 2025-03-29 | Stop reason: HOSPADM

## 2025-03-28 RX ORDER — MORPHINE SULFATE 4 MG/ML
4 INJECTION, SOLUTION INTRAMUSCULAR; INTRAVENOUS ONCE
Status: COMPLETED | OUTPATIENT
Start: 2025-03-28 | End: 2025-03-28

## 2025-03-28 RX ADMIN — BUMETANIDE 1 MG: 1 TABLET ORAL at 22:02

## 2025-03-28 RX ADMIN — SODIUM CHLORIDE: 0.9 INJECTION, SOLUTION INTRAVENOUS at 22:29

## 2025-03-28 RX ADMIN — ATORVASTATIN CALCIUM 40 MG: 40 TABLET, FILM COATED ORAL at 22:02

## 2025-03-28 RX ADMIN — MORPHINE SULFATE 4 MG: 4 INJECTION INTRAVENOUS at 16:48

## 2025-03-28 RX ADMIN — ALBUTEROL SULFATE 2.5 MG: 2.5 SOLUTION RESPIRATORY (INHALATION) at 20:19

## 2025-03-28 RX ADMIN — AZITHROMYCIN DIHYDRATE 500 MG: 250 TABLET ORAL at 19:15

## 2025-03-28 RX ADMIN — ALBUTEROL SULFATE 2.5 MG: 2.5 SOLUTION RESPIRATORY (INHALATION) at 17:24

## 2025-03-28 RX ADMIN — SODIUM CHLORIDE, PRESERVATIVE FREE 10 ML: 5 INJECTION INTRAVENOUS at 22:02

## 2025-03-28 RX ADMIN — ASPIRIN 324 MG: 81 TABLET, CHEWABLE ORAL at 16:48

## 2025-03-28 RX ADMIN — ACETAMINOPHEN 650 MG: 325 TABLET ORAL at 22:01

## 2025-03-28 RX ADMIN — DEXAMETHASONE SODIUM PHOSPHATE 10 MG: 10 INJECTION, SOLUTION INTRAMUSCULAR; INTRAVENOUS at 19:15

## 2025-03-28 RX ADMIN — FENTANYL CITRATE 50 MCG: 50 INJECTION, SOLUTION INTRAMUSCULAR; INTRAVENOUS at 22:01

## 2025-03-28 ASSESSMENT — PAIN SCALES - WONG BAKER: WONGBAKER_NUMERICALRESPONSE: NO HURT

## 2025-03-28 ASSESSMENT — PAIN - FUNCTIONAL ASSESSMENT: PAIN_FUNCTIONAL_ASSESSMENT: 0-10

## 2025-03-28 ASSESSMENT — PAIN SCALES - GENERAL
PAINLEVEL_OUTOF10: 10
PAINLEVEL_OUTOF10: 10
PAINLEVEL_OUTOF10: 8

## 2025-03-28 ASSESSMENT — PAIN DESCRIPTION - LOCATION: LOCATION: CHEST

## 2025-03-28 ASSESSMENT — PAIN DESCRIPTION - DESCRIPTORS: DESCRIPTORS: SHARP

## 2025-03-28 NOTE — ED PROVIDER NOTES
FACULTY SIGN-OUT  ADDENDUM       Patient: Stacia Arora   MRN: [id number]  PCP:  Vince Obrien MD  Note Started: 3/28/25, 6:21 PM EDT  Attestation  I was available and discussed any additional care issues that arose and coordinated the management plans with the resident(s) caring for the patient during my duty period. Any areas of disagreement with resident's documentation of care or procedures are noted on the chart. I was personally present for the key portions of any/all procedures during my duty period. I have documented in the chart those procedures where I was not present during the key portions.   The patient's initial evaluation and plan have been discussed with the prior provider who initially evaluated the patient.      Pertinent Comments:  The patient is a 59 y.o. female taken in signout with cough and shortness of breath now wheezing as well  We are awaiting further workup including BMP and reevaluation of    ED COURSE      The patient was given the following medications:  Orders Placed This Encounter   Medications    morphine injection 4 mg    aspirin chewable tablet 324 mg    dexAMETHasone (PF) (DECADRON) injection 10 mg    albuterol (PROVENTIL) (2.5 MG/3ML) 0.083% nebulizer solution 2.5 mg     Initiate RT Bronchodilator Protocol:   Yes - Inpatient Protocol       RECENT VITALS:   BP: 138/87  Pulse: (!) 101  Respirations: 22  Temp: 97.9 °F (36.6 °C) SpO2: 96 %    (Please note that portions of this note were completed with a voice recognition program.  Efforts were made to edit the dictations but occasionally words are mis-transcribed.)    Suhail Stark MD [facility]  Attending Emergency Medicine Physician       Suhail Stark MD  03/28/25 [id number]

## 2025-03-28 NOTE — ED PROVIDER NOTES
[facility] EMERGENCY DEPARTMENT     Emergency Department     Faculty Attestation    I performed a history and physical examination of the patient and discussed management with the resident. I reviewed the resident’s note and agree with the documented findings and plan of care. Any areas of disagreement are noted on the chart. I was personally present for the key portions of any procedures. I have documented in the chart those procedures where I was not present during the key portions. I have reviewed the emergency nurses triage note. I agree with the chief complaint, past medical history, past surgical history, allergies, medications, social and family history as documented unless otherwise noted below. For Physician Assistant/ Nurse Practitioner cases/documentation I have personally evaluated this patient and have completed at least one if not all key elements of the E/M (history, physical exam, and MDM). Additional findings are as noted.    Note Started: 5:11 PM EDT    Patient here with shortness of breath.  Extensive heart and lung history.  States symptoms began 2 days ago minimally productive cough clear sputum no hemoptysis.  Arthralgias myalgias.  Some nausea but no vomiting but does have decreased appetite decreased activity.  No sick contacts no travel.  On exam talking with the nurse tachypneic but still speaking in full sentences.  Lungs scattered wheezing but no retractions heart is tachycardic but regular abdomen soft no focal tenderness rebound or guarding no calf tenderness no edema no asymmetry fortunately pulses intact.  Will proceed with cardiac workup COVID flu chest x-ray breathing treatments reevaluate    EKG interpretation: Paced rhythm ventricular rate of 116 with several breakthrough PVCs but nonconsecutive.  Wide QRS at 126.  No acute ST or T changes Sgarbossa negative for paced rhythm.  Overall similar morphology to prior on 12/19/2024      Critical Care     none    Carson HODGE

## 2025-03-28 NOTE — ED NOTES
ED to inpatient nurses report      Chief Complaint:  Chief Complaint   Patient presents with    Cough    Shortness of Breath    Dizziness     Present to ED from: home    MOA:     LOC: alert and orientated to name, place, date  Mobility: Independent  Oxygen Baseline: RA    Current needs required: RA   Pending ED orders: none  Present condition: stable    Why did the patient come to the ED? SOB and cough  What is the plan? Admit for COPD exacerbation   Any procedures or intervention occur? Labs, x-ray, IV pain meds   Any safety concerns??    Mental Status:  Level of Consciousness: Alert (0)    Psych Assessment:   Psychosocial  Psychosocial (WDL): Within Defined Limits  Vital signs   Vitals:    03/28/25 1622 03/28/25 1637 03/28/25 1645 03/28/25 1657   BP: (!) 152/109 (!) 143/114 138/87    Pulse: (!) 113 (!) 121 (!) 107 (!) 101   Resp: 24  26 22   Temp: 97.9 °F (36.6 °C)      TempSrc: Oral      SpO2: 97% 98% 96% 96%   Weight: 56.7 kg (125 lb)           Vitals:  Patient Vitals for the past 24 hrs:   BP Temp Temp src Pulse Resp SpO2 Weight   03/28/25 1657 -- -- -- (!) 101 22 96 % --   03/28/25 1645 138/87 -- -- (!) 107 26 96 % --   03/28/25 1637 (!) 143/114 -- -- (!) 121 -- 98 % --   03/28/25 1622 (!) 152/109 97.9 °F (36.6 °C) Oral (!) 113 24 97 % 56.7 kg (125 lb)      Visit Vitals  /87   Pulse (!) 101   Temp 97.9 °F (36.6 °C) (Oral)   Resp 22   Wt 56.7 kg (125 lb)   SpO2 96%   BMI 22.86 kg/m²        LDAs:   Peripheral IV 03/28/25 Left Forearm (Active)       Ambulatory Status:  Presents to emergency department  because of falls (Syncope, seizure, or loss of consciousness): No, Age > 70: No, Altered Mental Status, Intoxication with alcohol or substance confusion (Disorientation, impaired judgment, poor safety awaremess, or inability to follow instructions): No, Impaired Mobility: Ambulates or transfers with assistive devices or assistance; Unable to ambulate or transer.: No, Nursing Judgement:

## 2025-03-28 NOTE — ED TRIAGE NOTES
Pt arrived to ED 30 via triage.   Pt co cough and Sob.   Pt states that it started 2 days ago.   Pt states that she has been taking her home inhaler with minimal relief.   Pt denies any vomiting or diarrhea.   Pt states that she is coughing up foamy sputum.   Pt is resting on stretcher with call light within reach.  Will continue to follow plan of care

## 2025-03-28 NOTE — ED PROVIDER NOTES
[facility] EMERGENCY DEPARTMENT  Emergency Department Encounter  Emergency Medicine Resident     Pt Name:Stacia Arora  MRN: [id number]  Birthdate 1965  Date of evaluation: 3/28/25  PCP:  Vince Obrien MD  Note Started: 4:41 PM EDT      CHIEF COMPLAINT       Chief Complaint   Patient presents with    Cough    Shortness of Breath    Dizziness       HISTORY OF PRESENT ILLNESS  (Location/Symptom, Timing/Onset, Context/Setting, Quality, Duration, Modifying Factors, Severity.)      Stacia Arora is a 59 y.o. female with history of COPD, CAD, AICD, CHF, prior MI presents with 2-day history of shortness of breath, nonproductive cough and left-sided chest pain.  Patient states that symptoms came on suddenly.  Per patient she has been unable to utilize her rescue albuterol inhaler due to having difficulty taking in deep breaths.  Chest pain is described as sharp, constant retrosternal, primarily located on the left side, nonradiating, no relieving or aggravating factors.  There is no associated diaphoresis palpitations.  There is a subjective fever, she states she felt very warm yesterday, however she did not take her temperature.    PAST MEDICAL / SURGICAL / SOCIAL / FAMILY HISTORY      has a past medical history of AICD discharge, RIP (acute kidney injury), Asthma, Asthma with COPD with exacerbation ([facility]), CAD (coronary artery disease), Cardiomegaly, Chest pain, CHF (congestive heart failure) ([facility]), Chronic systolic heart failure (HCC) s/p AICD, COPD (chronic obstructive pulmonary disease) (HCC), COPD (chronic obstructive pulmonary disease) (HCC), Depression, Fibroids, Gastroesophageal reflux disease, Hypertension, Hypomagnesemia, Intraparenchymal hematoma of left side of brain due to trauma (HCC), Lesion of right native kidney, Lung nodule < 6cm on CT, MI (myocardial infarction) ([facility]), Mild intermittent asthma, Non-ischemic cardiomyopathy ([facility]), NSTEMI (non-ST elevated myocardial infarction)  breathing treatment, IV steroids.  Will treat as COPD exacerbation. [BG]   1720 D-dimer 0.39, PE excluded per years criteria [BG]   1721 XR CHEST (2 VW)  IMPRESSION:  Cardiomegaly with pulmonary vascular congestion and possible edema suggesting  congestive heart failure   [BG]   1745 COVID and flu negative.  Troponin 26, mildly elevated from baseline.  Awaiting repeat troponin. [BG]   1745 Creatinine 1.1, appears to be at patient's baseline. [BG]   1847 Patient re-evaluated, much improvement in work of breathing however she continues to have very faint scattered expiratory wheezing, tachypnea and tachycardia.  Will plan for admission to the observation unit, start course of azithromycin for COPD exacerbation, scheduled breathing treatments. [BG]   1852 NT Pro-BNP(!): 5,304  Mildly elevated BNP [BG]   1901 Home medications reconciled and ordered.  Breathing treatment scheduled every 4 hours ordered.  Patient received dose of Decadron in the emergency department. [BG]      ED Course User Index  [BG] Chriss Bess MD       PROCEDURES:  None    CONSULTS:  None      FINAL IMPRESSION      1. COPD exacerbation (HCC)    2. Dyspnea, unspecified type    3. Chest pain, unspecified type          DISPOSITION / PLAN     DISPOSITION Admitted 03/28/2025 06:52:31 PM         PATIENT REFERRED TO:  No follow-up provider specified.    DISCHARGE MEDICATIONS:  New Prescriptions    No medications on file       Chriss Bess MD  Emergency Medicine Resident    (Please note that portions of thisnote were completed with a voice recognition program.  Efforts were made to edit the dictations but occasionally words are mis-transcribed.)

## 2025-03-29 VITALS
WEIGHT: 125 LBS | SYSTOLIC BLOOD PRESSURE: 125 MMHG | TEMPERATURE: 98.4 F | HEIGHT: 62 IN | DIASTOLIC BLOOD PRESSURE: 76 MMHG | RESPIRATION RATE: 18 BRPM | OXYGEN SATURATION: 96 % | BODY MASS INDEX: 23 KG/M2 | HEART RATE: 89 BPM

## 2025-03-29 LAB
ANION GAP SERPL CALCULATED.3IONS-SCNC: 16 MMOL/L (ref 9–16)
BASOPHILS # BLD: 0 K/UL (ref 0–0.2)
BASOPHILS NFR BLD: 0 % (ref 0–2)
BUN SERPL-MCNC: 15 MG/DL (ref 6–20)
CALCIUM SERPL-MCNC: 9.1 MG/DL (ref 8.6–10.4)
CHLORIDE SERPL-SCNC: 108 MMOL/L (ref 98–107)
CO2 SERPL-SCNC: 16 MMOL/L (ref 20–31)
CREAT SERPL-MCNC: 1.1 MG/DL (ref 0.6–0.9)
EOSINOPHIL # BLD: 0 K/UL (ref 0–0.44)
EOSINOPHILS RELATIVE PERCENT: 0 % (ref 1–4)
ERYTHROCYTE [DISTWIDTH] IN BLOOD BY AUTOMATED COUNT: 15.7 % (ref 11.8–14.4)
GFR, ESTIMATED: 58 ML/MIN/1.73M2
GLUCOSE SERPL-MCNC: 155 MG/DL (ref 74–99)
HCT VFR BLD AUTO: 37.2 % (ref 36.3–47.1)
HGB BLD-MCNC: 11.8 G/DL (ref 11.9–15.1)
IMM GRANULOCYTES # BLD AUTO: 0 K/UL (ref 0–0.3)
IMM GRANULOCYTES NFR BLD: 0 %
LYMPHOCYTES NFR BLD: 0.54 K/UL (ref 1.1–3.7)
LYMPHOCYTES RELATIVE PERCENT: 10 % (ref 24–43)
MCH RBC QN AUTO: 28 PG (ref 25.2–33.5)
MCHC RBC AUTO-ENTMCNC: 31.7 G/DL (ref 28.4–34.8)
MCV RBC AUTO: 88.2 FL (ref 82.6–102.9)
MONOCYTES NFR BLD: 0.16 K/UL (ref 0.1–1.2)
MONOCYTES NFR BLD: 3 % (ref 3–12)
MORPHOLOGY: ABNORMAL
NEUTROPHILS NFR BLD: 87 % (ref 36–65)
NEUTS SEG NFR BLD: 4.7 K/UL (ref 1.5–8.1)
NRBC BLD-RTO: 0 PER 100 WBC
PLATELET # BLD AUTO: ABNORMAL K/UL (ref 138–453)
PLATELET, FLUORESCENCE: 150 K/UL (ref 138–453)
PLATELETS.RETICULATED NFR BLD AUTO: 9.2 % (ref 1.1–10.3)
POTASSIUM SERPL-SCNC: 3.9 MMOL/L (ref 3.7–5.3)
RBC # BLD AUTO: 4.22 M/UL (ref 3.95–5.11)
SODIUM SERPL-SCNC: 140 MMOL/L (ref 136–145)
WBC OTHER # BLD: 5.4 K/UL (ref 3.5–11.3)

## 2025-03-29 PROCEDURE — G0378 HOSPITAL OBSERVATION PER HR: HCPCS

## 2025-03-29 PROCEDURE — 36415 COLL VENOUS BLD VENIPUNCTURE: CPT

## 2025-03-29 PROCEDURE — 6360000002 HC RX W HCPCS

## 2025-03-29 PROCEDURE — 6370000000 HC RX 637 (ALT 250 FOR IP)

## 2025-03-29 PROCEDURE — 85055 RETICULATED PLATELET ASSAY: CPT

## 2025-03-29 PROCEDURE — 94640 AIRWAY INHALATION TREATMENT: CPT

## 2025-03-29 PROCEDURE — 2580000003 HC RX 258

## 2025-03-29 PROCEDURE — 2500000003 HC RX 250 WO HCPCS

## 2025-03-29 PROCEDURE — 80048 BASIC METABOLIC PNL TOTAL CA: CPT

## 2025-03-29 PROCEDURE — 85025 COMPLETE CBC W/AUTO DIFF WBC: CPT

## 2025-03-29 RX ORDER — ALBUTEROL SULFATE 0.83 MG/ML
2.5 SOLUTION RESPIRATORY (INHALATION) EVERY 4 HOURS PRN
Qty: 60 EACH | Refills: 0 | Status: SHIPPED | OUTPATIENT
Start: 2025-03-29 | End: 2025-04-08

## 2025-03-29 RX ORDER — PREDNISONE 20 MG/1
40 TABLET ORAL DAILY
Qty: 10 TABLET | Refills: 0 | Status: SHIPPED | OUTPATIENT
Start: 2025-03-29 | End: 2025-04-03

## 2025-03-29 RX ORDER — ALBUTEROL SULFATE 0.83 MG/ML
2.5 SOLUTION RESPIRATORY (INHALATION)
Status: DISCONTINUED | OUTPATIENT
Start: 2025-03-29 | End: 2025-03-29 | Stop reason: HOSPADM

## 2025-03-29 RX ORDER — BENZONATATE 100 MG/1
100 CAPSULE ORAL 3 TIMES DAILY PRN
Qty: 21 CAPSULE | Refills: 0 | Status: SHIPPED | OUTPATIENT
Start: 2025-03-29 | End: 2025-04-05

## 2025-03-29 RX ORDER — AZITHROMYCIN 250 MG/1
250 TABLET, FILM COATED ORAL DAILY
Qty: 3 TABLET | Refills: 0 | Status: SHIPPED | OUTPATIENT
Start: 2025-03-30 | End: 2025-04-02

## 2025-03-29 RX ORDER — BENZONATATE 100 MG/1
100 CAPSULE ORAL 3 TIMES DAILY PRN
Status: DISCONTINUED | OUTPATIENT
Start: 2025-03-29 | End: 2025-03-29 | Stop reason: HOSPADM

## 2025-03-29 RX ADMIN — AZITHROMYCIN DIHYDRATE 250 MG: 250 TABLET ORAL at 08:54

## 2025-03-29 RX ADMIN — ALBUTEROL SULFATE 2.5 MG: 2.5 SOLUTION RESPIRATORY (INHALATION) at 07:58

## 2025-03-29 RX ADMIN — BUMETANIDE 1 MG: 1 TABLET ORAL at 08:53

## 2025-03-29 RX ADMIN — AMIODARONE HYDROCHLORIDE 200 MG: 200 TABLET ORAL at 08:53

## 2025-03-29 RX ADMIN — BENZONATATE 100 MG: 100 CAPSULE ORAL at 12:10

## 2025-03-29 RX ADMIN — SODIUM CHLORIDE, PRESERVATIVE FREE 10 ML: 5 INJECTION INTRAVENOUS at 08:54

## 2025-03-29 RX ADMIN — SODIUM CHLORIDE: 0.9 INJECTION, SOLUTION INTRAVENOUS at 06:40

## 2025-03-29 RX ADMIN — METOPROLOL SUCCINATE 25 MG: 25 TABLET, EXTENDED RELEASE ORAL at 08:53

## 2025-03-29 RX ADMIN — EMPAGLIFLOZIN 10 MG: 10 TABLET, FILM COATED ORAL at 08:53

## 2025-03-29 RX ADMIN — SPIRONOLACTONE 25 MG: 25 TABLET, FILM COATED ORAL at 08:54

## 2025-03-29 RX ADMIN — BENZONATATE 100 MG: 100 CAPSULE ORAL at 08:54

## 2025-03-29 RX ADMIN — LOSARTAN POTASSIUM 25 MG: 50 TABLET, FILM COATED ORAL at 08:53

## 2025-03-29 RX ADMIN — ASPIRIN 81 MG: 81 TABLET, COATED ORAL at 08:53

## 2025-03-29 RX ADMIN — PANTOPRAZOLE SODIUM 40 MG: 40 TABLET, DELAYED RELEASE ORAL at 08:54

## 2025-03-29 ASSESSMENT — PAIN SCALES - GENERAL: PAINLEVEL_OUTOF10: 0

## 2025-03-29 NOTE — CARE COORDINATION
Case Management Assessment  Initial Evaluation    Date/Time of Evaluation: 3/29/2025 11:54 AM  Assessment Completed by: ELIN FUENTES RN    If patient is discharged prior to next notation, then this note serves as note for discharge by case management.    Patient Name: Stacia Arora                   YOB: 1965  Diagnosis: COPD exacerbation (HCC) [J44.1]  Dyspnea, unspecified type [R06.00]  Chest pain, unspecified type [R07.9]                   Date / Time: 3/28/2025  4:24 PM    Patient Admission Status: Observation   Readmission Risk (Low < 19, Mod (19-27), High > 27): Readmission Risk Score: 12.1    Current PCP: Vince Obrien MD  PCP verified by CM? Yes    Chart Reviewed: Yes      History Provided by: Patient  Patient Orientation: Alert and Oriented    Patient Cognition: Alert    Hospitalization in the last 30 days (Readmission):  No    If yes, Readmission Assessment in CM Navigator will be completed.    Advance Directives:      Code Status: Full Code   Patient's Primary Decision Maker is:        Discharge Planning:    Patient lives with: Spouse/Significant Other Type of Home: Apartment  Primary Care Giver: Self  Patient Support Systems include: Spouse/Significant Other   Current Financial resources:    Current community resources:    Current services prior to admission: None            Current DME:              Type of Home Care services:  None    ADLS  Prior functional level: Independent in ADLs/IADLs  Current functional level: Independent in ADLs/IADLs    PT AM-PAC:   /24  OT AM-PAC:   /24    Family can provide assistance at DC: No  Would you like Case Management to discuss the discharge plan with any other family members/significant others, and if so, who? No  Plans to Return to Present Housing: Yes  Other Identified Issues/Barriers to RETURNING to current housing: none  Potential Assistance needed at discharge: N/A            Potential DME:    Patient expects to discharge to:

## 2025-03-29 NOTE — H&P
[facility]  CDU / OBSERVATION ENCOUNTER  Physician NOTE     Pt Name: Stacia Arora  MRN: [id number]  Birthdate 1965  Date of evaluation: 3/29/25  Patient's PCP is :  Vince Obrien MD    CHIEF COMPLAINT       Chief Complaint   Patient presents with    Cough    Shortness of Breath    Dizziness     HISTORY OF PRESENT ILLNESS    Stacia Arora is a 59 y.o. female with a history of COPD, CHF, CAD who presents 2-day history of worsening shortness of breath and left-sided chest pain.  Also noted nonproductive cough that started suddenly 2 days ago.  Patient is been using rescue inhaler but not having good relief due to inability to take deep breaths.  Pain is described as sharp, constant, retrosternal and nonradiating.  She is not having any associated diaphoresis, palpitations, nausea or vomiting.    This morning she states she is feeling significantly better.  She states that she is breathing much more easily but still complaining of a cough.  She states that she feels comfortable going home if she can have something to help her cough.  She has no other questions or concerns today    Location/Symptom: Chest  Timing/Onset: 2 days ago  Provocation: None  Quality: Sharp  Radiation: None  Severity: Severe  Timing/Duration: Constant  Modifying Factors: None noted    History was obtained in part through review of the ED chart. When possible, a direct discussion was had with ED nurses, residents, and attendings  REVIEW OF SYSTEMS       General ROS - No fevers, No malaise   Ophthalmic ROS - No discharge, No changes in vision  ENT ROS -  No sore throat, No rhinorrhea,   Respiratory ROS - n shortness of breath, cough, wheezing  Cardiovascular ROS - chest pain, dyspnea on exertion  Gastrointestinal ROS - No abdominal pain, no nausea or vomiting, no change in bowel habits, no black or bloody stools  Genito-Urinary ROS - No dysuria, trouble voiding, or hematuria  Musculoskeletal ROS - No myalgias,  No arthalgias  Neurological ROS - No headache, no dizziness/lightheadedness, No focal weakness, no loss of sensation  Dermatological ROS - No lesions, No rash     (PQRS) Advance directives on face sheet per hospital policy. No change unless specifically mentioned in chart    PAST MEDICAL HISTORY    has a past medical history of AICD discharge, RIP (acute kidney injury), Asthma, Asthma with COPD with exacerbation ([facility]), CAD (coronary artery disease), Cardiomegaly, Chest pain, CHF (congestive heart failure) ([facility]), Chronic systolic heart failure (HCC) s/p AICD, COPD (chronic obstructive pulmonary disease) (HCC), COPD (chronic obstructive pulmonary disease) ([facility]), Depression, Fibroids, Gastroesophageal reflux disease, Hypertension, Hypomagnesemia, Intraparenchymal hematoma of left side of brain due to trauma ([facility]), Lesion of right native kidney, Lung nodule < 6cm on CT, MI (myocardial infarction) ([facility]), Mild intermittent asthma, Non-ischemic cardiomyopathy ([facility]), NSTEMI (non-ST elevated myocardial infarction) ([facility]), QT prolongation, Syncope, and Unspecified diseases of blood and blood-forming organs.    I have reviewed the past medical history with the patient and it is pertinent to this complaint.    SURGICAL HISTORY      has a past surgical history that includes Cardiac defibrillator placement (09/2005); Pacemaker insertion; Tubal ligation; Cardiac defibrillator placement; Uterine fibroid surgery (maarch 2015); Cardiac procedure (N/A, 1/31/2024); and Cardiac procedure (N/A, 1/31/2024).  I have reviewed and agree with Surgical History entered and it is pertinent to this complaint.     CURRENT MEDICATIONS     benzonatate (TESSALON) capsule 100 mg, TID PRN  albuterol (PROVENTIL) (2.5 MG/3ML) 0.083% nebulizer solution 2.5 mg, BID RT  0.9 % sodium chloride infusion, Continuous  sodium chloride flush 0.9 % injection 5-40 mL, 2 times per day  sodium chloride flush 0.9 % injection 5-40 mL, PRN  0.9 % sodium chloride infusion,

## 2025-03-29 NOTE — DISCHARGE SUMMARY
CDU Discharge Summary        Patient:  Stacia Arora  YOB: 1965    MRN: [id number]   Acct: [id number]    Primary Care Physician: Vince Obrien MD    Admit date:  3/28/2025  4:24 PM  Discharge date: 3/29/2025  3:34 PM     Discharge Diagnoses:     1.)  Patient had chest pain and shortness of breath with sudden onset due to COPD exacerbation.  Treated with breathing treatments, steroids, antibiotic.  Patient's symptoms are improved with the plan to continue steroids, breathing treatments, antibiotics and follow-up with PCP early next week.    Follow-up:  Call today/tomorrow for a follow up appointment with Vince Obrien MD , or return to the Emergency Room with worsening symptoms    Stressed to patient the importance of following up with primary care doctor for further workup/management of symptoms.  Pt verbalizes understanding and agrees with plan.    Discharge Medication Changes:       Medication List        START taking these medications      azithromycin 250 MG tablet  Commonly known as: ZITHROMAX  Take 1 tablet by mouth daily for 3 doses  Start taking on: March 30, 2025     benzonatate 100 MG capsule  Commonly known as: TESSALON  Take 1 capsule by mouth 3 times daily as needed for Cough     predniSONE 20 MG tablet  Commonly known as: DELTASONE  Take 2 tablets by mouth daily for 5 days            CHANGE how you take these medications      * albuterol sulfate  (90 Base) MCG/ACT inhaler  Commonly known as: Ventolin HFA  INHALE 2 PUFFS INTO THE LUNGS EVERY 6 HOURS AS NEEDED FOR WHEEZING.  What changed: Another medication with the same name was added. Make sure you understand how and when to take each.     * albuterol (2.5 MG/3ML) 0.083% nebulizer solution  Commonly known as: PROVENTIL  Take 3 mLs by nebulization every 4 hours as needed for Wheezing  What changed: You were already taking a medication with the same name, and this prescription was added. Make sure you understand how and when

## 2025-03-29 NOTE — DISCHARGE INSTRUCTIONS
Your workup from the emergency department did not show any significant pathology but you were admitted to the observation unit for ongoing evaluation.    Your testing included chest Xray, labs.    Your treatments included breathing treatments, steroids, and antibiotics.       We no longer need to keep you in the hospital but that does not mean you are done being treated  -Take your prescribed medications as directed  -Follow-up with your primary care physician or the specialist designated or both as scheduled    Please return if your condition worsens or there are new symptoms    If there are concerns that have not been addressed while you have been in the hospital please let the discharge nurse know so the physician can be informed -it is easier to fix problems while you are still here    If you have questions after you have left the hospital please call the unit at  and ask for the observation resident on-call

## 2025-03-29 NOTE — PROGRESS NOTES
Verbally reviewed medication list with patient; patient verbalized understanding.    Discussed 2000mg/day sodium restricted diet; patient verbalized understanding.    Moderate daily exercise encouraged as tolerated. Discussed rest breaks as needed; patient verbalized understanding.    Patient instructed to weigh self at the same time of each day, using same clothes and same scale; reinforced teaching to monitor for 3-5 lb weight increase over 1-2 days, and to notify the CHF clinic at [phone] or physician office if weight change noted.  Patient verbalized understanding.    Risks of smoking discussed with the patient if applicable; patient strongly discouraged to smoke.  Patient verbalized understanding.    Signs and symptoms of CHF discussed with patient, such as feeling more tired than normal, feeling short of breath, coughing that increases when you lie down, sudden weight gain, swelling of your feet, legs or belly.  Patient verbalized understanding to notify the CHF clinic at [phone] or physician office if these symptoms occur.    Compliance with plan of care and further disease process causes discussed with patient, patient encouraged to keep all follow up appointments.  Patient verbalized understanding.

## 2025-03-29 NOTE — PROGRESS NOTES
[facility]  CDU / OBSERVATION eNCOUnter  Attending NOte       I performed a history and physical examination of the patient and discussed management with the resident.  This patient was placed in the observation unit for reevaluation for possible admission to the hospital. I reviewed the resident’s note and agree with the documented findings and plan of care. Any areas of disagreement are noted on the chart. I was personally present for the key portions of any procedures. I have documented in the chart those procedures where I was not present during the key portions. I have reviewed the nurses notes. I agree with the chief complaint, past medical history, past surgical history, allergies, medications, social and family history as documented unless otherwise noted below.    The patient was placed in the observation unit for reevaluation for possible admission to the hospital.      The Family history, social history, and ROS are effectively unchanged since admission unless noted elsewhere in the chart.    59-year-old female admitted for COPD exacerbation.  Patient states that she is feeling much better today.  On exam, breath sounds are mostly clear with a few scattered wheezes over the upper lung fields.  No rales or rhonchi.  No increased work of breathing.  Plan for discharge home with oral steroids and antibiotics.    Jesusita Christie MD  Attending Emergency  Physician

## 2025-03-29 NOTE — PROGRESS NOTES
CLINICAL PHARMACY NOTE: MEDS TO BEDS    Total # of Prescriptions Filled: 4   The following medications were delivered to the patient:  Prednisone 20 mg  Azithromycin 250 mg  Benzonatate 100 mg  Albuterol neb 2.5mg/3mL      Additional Documentation:   Delivered to pt on 3/29 at 1:48 pm. Pt had no copay.

## 2025-03-30 LAB
EKG ATRIAL RATE: 115 BPM
EKG P AXIS: 57 DEGREES
EKG P-R INTERVAL: 156 MS
EKG Q-T INTERVAL: 346 MS
EKG QRS DURATION: 126 MS
EKG QTC CALCULATION (BAZETT): 480 MS
EKG R AXIS: 24 DEGREES
EKG T AXIS: 59 DEGREES
EKG VENTRICULAR RATE: 116 BPM

## 2025-03-31 ENCOUNTER — TELEPHONE (OUTPATIENT)
Dept: OTHER | Age: 60
End: 2025-03-31

## 2025-03-31 ENCOUNTER — TELEPHONE (OUTPATIENT)
Dept: FAMILY MEDICINE CLINIC | Age: 60
End: 2025-03-31

## 2025-03-31 PROCEDURE — 93010 ELECTROCARDIOGRAM REPORT: CPT | Performed by: INTERNAL MEDICINE

## 2025-03-31 NOTE — TELEPHONE ENCOUNTER
Called and spoke with pt following discharge from the hospital. Pt stated she is tired but doing ok. Encouraged pt to monitor sodium and fluid intake and weigh herself daily. Pt denies any wt gain or edema. She does get SOB with exertion and has been using nebulizer txs at home. Reminded pt of CHF Clinic appt 4/10/25 @ 0830. Pt encouraged to call if any concerns or questions. Pt verbalized understanding.

## 2025-03-31 NOTE — TELEPHONE ENCOUNTER
Care Transitions Initial Follow Up Call    Outreach made within 2 business days of discharge: Yes    Patient: Stacia Arora Patient : 1965   MRN: [id number]  Reason for Admission: COPD exacerbation   Discharge Date: 3/29/25       Spoke with: Patient, scheduled  at 3:45PM.    Discharge department/facility: [facility] Interactive Patient Contact:  Was patient able to fill all prescriptions: Yes  Was patient instructed to bring all medications to the follow-up visit: Yes  Is patient taking all medications as directed in the discharge summary? Yes  Does patient understand their discharge instructions: Yes  Does patient have questions or concerns that need addressed prior to 7-14 day follow up office visit: no    Additional needs identified to be addressed with provider  No needs identified             Scheduled appointment with PCP within 7-14 days    Follow Up  Future Appointments   Date Time Provider Department Center   2025  3:45 PM Vince Obrien MD [facility]   4/10/2025  8:30 AM [facility] CLINIC RM 1 [facility] CHF CLI [facility]   4/10/2025 11:15 AM Ganga Joya MD [facility]   2025  9:30 AM SCHEDULE, [facility] SCHEDULE [facility]       Rafa Cornelius MA

## 2025-04-01 ENCOUNTER — CARE COORDINATION (OUTPATIENT)
Dept: FAMILY MEDICINE CLINIC | Age: 60
End: 2025-04-01

## 2025-04-01 SDOH — SOCIAL STABILITY: SOCIAL NETWORK: HOW OFTEN DO YOU GET TOGETHER WITH FRIENDS OR RELATIVES?: MORE THAN THREE TIMES A WEEK

## 2025-04-01 SDOH — SOCIAL STABILITY: SOCIAL INSECURITY: WITHIN THE LAST YEAR, HAVE YOU BEEN HUMILIATED OR EMOTIONALLY ABUSED IN OTHER WAYS BY YOUR PARTNER OR EX-PARTNER?: YES

## 2025-04-01 SDOH — HEALTH STABILITY: MENTAL HEALTH
DO YOU FEEL STRESS - TENSE, RESTLESS, NERVOUS, OR ANXIOUS, OR UNABLE TO SLEEP AT NIGHT BECAUSE YOUR MIND IS TROUBLED ALL THE TIME - THESE DAYS?: RATHER MUCH

## 2025-04-01 SDOH — SOCIAL STABILITY: SOCIAL INSECURITY
WITHIN THE LAST YEAR, HAVE YOU BEEN KICKED, HIT, SLAPPED, OR OTHERWISE PHYSICALLY HURT BY YOUR PARTNER OR EX-PARTNER?: NO

## 2025-04-01 SDOH — SOCIAL STABILITY: SOCIAL NETWORK: HOW OFTEN DO YOU ATTEND MEETINGS OF THE CLUBS OR ORGANIZATIONS YOU BELONG TO?: NEVER

## 2025-04-01 SDOH — ECONOMIC STABILITY: HOUSING INSECURITY: IN THE LAST 12 MONTHS, WAS THERE A TIME WHEN YOU WERE NOT ABLE TO PAY THE MORTGAGE OR RENT ON TIME?: NO

## 2025-04-01 SDOH — SOCIAL STABILITY: SOCIAL INSECURITY
WITHIN THE LAST YEAR, HAVE YOU BEEN RAPED OR FORCED TO HAVE ANY KIND OF SEXUAL ACTIVITY BY YOUR PARTNER OR EX-PARTNER?: NO

## 2025-04-01 SDOH — SOCIAL STABILITY: SOCIAL NETWORK: HOW OFTEN DO YOU ATTEND CHURCH OR RELIGIOUS SERVICES?: NEVER

## 2025-04-01 SDOH — SOCIAL STABILITY: SOCIAL NETWORK
IN A TYPICAL WEEK, HOW MANY TIMES DO YOU TALK ON THE PHONE WITH FAMILY, FRIENDS, OR NEIGHBORS?: MORE THAN THREE TIMES A WEEK

## 2025-04-01 SDOH — SOCIAL STABILITY: SOCIAL INSECURITY: WITHIN THE LAST YEAR, HAVE YOU BEEN AFRAID OF YOUR PARTNER OR EX-PARTNER?: NO

## 2025-04-01 SDOH — SOCIAL STABILITY: SOCIAL INSECURITY: ARE YOU MARRIED, WIDOWED, DIVORCED, SEPARATED, NEVER MARRIED, OR LIVING WITH A PARTNER?: WIDOWED

## 2025-04-01 SDOH — HEALTH STABILITY: PHYSICAL HEALTH: ON AVERAGE, HOW MANY DAYS PER WEEK DO YOU ENGAGE IN MODERATE TO STRENUOUS EXERCISE (LIKE A BRISK WALK)?: 0 DAYS

## 2025-04-01 SDOH — ECONOMIC STABILITY: FOOD INSECURITY
WITHIN THE PAST 12 MONTHS, YOU WORRIED THAT YOUR FOOD WOULD RUN OUT BEFORE YOU GOT THE MONEY TO BUY MORE.: SOMETIMES TRUE

## 2025-04-01 SDOH — HEALTH STABILITY: PHYSICAL HEALTH: ON AVERAGE, HOW MANY MINUTES DO YOU ENGAGE IN EXERCISE AT THIS LEVEL?: 0 MIN

## 2025-04-01 SDOH — ECONOMIC STABILITY: FOOD INSECURITY: HOW HARD IS IT FOR YOU TO PAY FOR THE VERY BASICS LIKE FOOD, HOUSING, MEDICAL CARE, AND HEATING?: NOT HARD AT ALL

## 2025-04-01 SDOH — ECONOMIC STABILITY: TRANSPORTATION INSECURITY: IN THE PAST 12 MONTHS, HAS LACK OF TRANSPORTATION KEPT YOU FROM MEDICAL APPOINTMENTS OR FROM GETTING MEDICATIONS?: YES

## 2025-04-01 NOTE — CARE COORDINATION
Care Transitions Initial Follow Up Call    Call within 2 business days of discharge: Yes     Patient: Stacia Arora Patient : 1965 MRN: [id number]    Last Discharge Facility       Date Complaint Diagnosis Description Type Department Provider    3/28/25 Cough; Shortness of Breath; Dizziness COPD exacerbation (HCC) ... ED to Hosp-Admission (Discharged) (ADMITTED) Werner Vasquez MD; Nick Sutherland...            RARS: Readmission Risk Score: 12.1       Spoke with: Stacia    Post Acute Care Discharge in person Assessment     Review purpose of telephone call with: Stacia  Date: 25    - To evaluate the client after hospital discharge  - To ensure the client has received and understands self care instructions  - To identify and prevent potential adverse events  - To provide additional education and initiate post acute services       Stacia Arora   : 1965 Phone #: [phone] (home)    1.  Hospital Information    Discharged from: [facility] Observation Unit    Discharge to home  Discharge Date: 3/29/2025   Admission Date: 3/28/2025    Non-face-to-face services provided:  Obtained and reviewed discharge summary and/or continuity of care documents    Hospital records: obtained and reviewed    Was instructed to follow up in: 7-10 days    Hospital Diagnosis: Exacerbation of COPD       Hospital Consultants:  Internal Medicine,     Initial contact Date: 2025  Type of Contact:  in person      2.  Assessment of Current Condition      Questions:  How have you felt since discharge from the hospital:     Improving.  Stacia reports breathing has improved with less shortness of breath.  Reports persistent cough, wheezing    Did you receive a discharge summary from the hospital? yes    Is there any lingering or new fever? No    Are you eating and drinking OK? yes    Are there any new complaints of pain? No    If you have a wound - is the dressing clean, dry, and intact?

## 2025-04-07 ENCOUNTER — OFFICE VISIT (OUTPATIENT)
Dept: FAMILY MEDICINE CLINIC | Age: 60
End: 2025-04-07
Payer: MEDICAID

## 2025-04-07 VITALS
HEIGHT: 62 IN | SYSTOLIC BLOOD PRESSURE: 108 MMHG | HEART RATE: 63 BPM | WEIGHT: 133.8 LBS | BODY MASS INDEX: 24.62 KG/M2 | DIASTOLIC BLOOD PRESSURE: 72 MMHG

## 2025-04-07 DIAGNOSIS — Z09 HOSPITAL DISCHARGE FOLLOW-UP: Primary | ICD-10-CM

## 2025-04-07 DIAGNOSIS — J44.9 CHRONIC OBSTRUCTIVE PULMONARY DISEASE, UNSPECIFIED COPD TYPE (HCC): ICD-10-CM

## 2025-04-07 DIAGNOSIS — I50.22 CHRONIC SYSTOLIC HEART FAILURE (HCC): ICD-10-CM

## 2025-04-07 PROCEDURE — 3023F SPIROM DOC REV: CPT

## 2025-04-07 PROCEDURE — 3074F SYST BP LT 130 MM HG: CPT

## 2025-04-07 PROCEDURE — G8420 CALC BMI NORM PARAMETERS: HCPCS

## 2025-04-07 PROCEDURE — 1111F DSCHRG MED/CURRENT MED MERGE: CPT

## 2025-04-07 PROCEDURE — 99213 OFFICE O/P EST LOW 20 MIN: CPT

## 2025-04-07 PROCEDURE — 3017F COLORECTAL CA SCREEN DOC REV: CPT

## 2025-04-07 PROCEDURE — 3078F DIAST BP <80 MM HG: CPT

## 2025-04-07 PROCEDURE — G8427 DOCREV CUR MEDS BY ELIG CLIN: HCPCS

## 2025-04-07 PROCEDURE — 1036F TOBACCO NON-USER: CPT

## 2025-04-07 RX ORDER — SPIRONOLACTONE 25 MG/1
12.5 TABLET ORAL DAILY
Qty: 30 TABLET | Refills: 0 | Status: SHIPPED | OUTPATIENT
Start: 2025-04-07

## 2025-04-07 ASSESSMENT — PATIENT HEALTH QUESTIONNAIRE - PHQ9
SUM OF ALL RESPONSES TO PHQ QUESTIONS 1-9: 0
1. LITTLE INTEREST OR PLEASURE IN DOING THINGS: NOT AT ALL
SUM OF ALL RESPONSES TO PHQ QUESTIONS 1-9: 0
2. FEELING DOWN, DEPRESSED OR HOPELESS: NOT AT ALL
SUM OF ALL RESPONSES TO PHQ QUESTIONS 1-9: 0
SUM OF ALL RESPONSES TO PHQ QUESTIONS 1-9: 0
DEPRESSION UNABLE TO ASSESS: PT REFUSES

## 2025-04-07 NOTE — PROGRESS NOTES
Attending Physician Statement  I  have discussed the care of Stacia Arora including pertinent history and exam findings with the resident. I agree with the assessment, plan and orders as documented by the resident.      /72 (BP Site: Right Upper Arm, Patient Position: Sitting, BP Cuff Size: Medium Adult)   Pulse 63   Ht 1.575 m (5' 2.01\")   Wt 60.7 kg (133 lb 12.8 oz)   LMP 04/07/2016   BMI 24.47 kg/m²    BP Readings from Last 3 Encounters:   04/07/25 108/72   03/29/25 125/76   12/19/24 101/65     Wt Readings from Last 3 Encounters:   04/07/25 60.7 kg (133 lb 12.8 oz)   03/28/25 56.7 kg (125 lb)   12/19/24 62.3 kg (137 lb 5.6 oz)          Diagnosis Orders   1. Hospital discharge follow-up  IL DISCHARGE MEDS RECONCILED W/ CURRENT OUTPATIENT MED LIST      2. Chronic systolic heart failure (HCC) s/p AICD  spironolactone (ALDACTONE) 25 MG tablet      3. Chronic obstructive pulmonary disease, unspecified COPD type (HCC)  Full PFT Study With Bronchodilator              Andreas Tatum MD 4/7/2025 11:51 AM      
Visit Information    Have you changed or started any medications since your last visit including any over-the-counter medicines, vitamins, or herbal medicines? no   Have you stopped taking any of your medications? Is so, why? -  no  Are you having any side effects from any of your medications? - no    Have you seen any other physician or provider since your last visit?  no   Have you had any other diagnostic tests since your last visit?  yes - Labs, XR, EKG   Have you been seen in the emergency room and/or had an admission in a hospital since we last saw you?  yes - [facility]   Have you had your routine dental cleaning in the past 6 months?  no     Do you have an active MyChart account? If no, what is the barrier?  Yes    Patient Care Team:  Vince Obrien MD as PCP - General (Family Medicine)  Crystal Toledo RN as   Gonzalo Boyle MD as Consulting Physician (Pulmonary Disease)    Medical History Review  Past Medical, Family, and Social History reviewed and does not contribute to the patient presenting condition    Health Maintenance   Topic Date Due    Hepatitis B vaccine (1 of 3 - 19+ 3-dose series) Never done    Shingles vaccine (1 of 2) Never done    Pneumococcal 50+ years Vaccine (2 of 2 - PCV) 05/20/2017    Cervical cancer screen  07/09/2024    COVID-19 Vaccine (1 - 2024-25 season) Never done    Breast cancer screen  02/20/2025    Flu vaccine (Season Ended) 10/15/2025 (Originally 8/1/2025)    Colorectal Cancer Screen  12/14/2025    Depression Screen  12/17/2025    Lipids  12/18/2025    GFR test (Diabetes, CKD 3-4, OR last GFR 15-59)  03/29/2026    DTaP/Tdap/Td vaccine (2 - Td or Tdap) 06/09/2029    Hepatitis C screen  Completed    HIV screen  Completed    Hepatitis A vaccine  Aged Out    Hib vaccine  Aged Out    Polio vaccine  Aged Out    Meningococcal (ACWY) vaccine  Aged Out    Meningococcal B vaccine  Aged Out    A1C test (Diabetic or Prediabetic)  Discontinued    Pneumococcal 0-49 years 
inhaler INHALE 2 PUFFS INTO THE LUNGS EVERY 6 HOURS AS NEEDED FOR WHEEZING. 18 g 5    JARDIANCE 10 MG tablet TAKE ONE TABLET BY MOUTH ONCE A DAY 30 tablet 3    atorvastatin (LIPITOR) 40 MG tablet TAKE 1 TABLET BY MOUTH NIGHTLY 30 tablet 5    bumetanide (BUMEX) 1 MG tablet TAKE ONE TABLET BY MOUTH TWICE A DAY 60 tablet 3    acetaminophen (TYLENOL) 500 MG tablet Take 1 tablet by mouth 4 times daily as needed for Pain (knee pain) 120 tablet 0    aspirin (ASPIRIN LOW DOSE) 81 MG EC tablet TAKE ONE TABLET BY MOUTH ONCE A DAY 90 tablet 2    metoprolol succinate (TOPROL XL) 25 MG extended release tablet Take 1 tablet by mouth daily 90 tablet 1    losartan (COZAAR) 25 MG tablet Take 1 tablet by mouth daily 90 tablet 1    omeprazole (PRILOSEC) 20 MG delayed release capsule TAKE ONE CAPSULE BY MOUTH ONCE A DAY 30 capsule 5        Medications patient taking as of now reconciled against medications ordered at time of hospital discharge: Yes    A comprehensive review of systems was negative except for what was noted in the HPI.    Objective:    /72 (BP Site: Right Upper Arm, Patient Position: Sitting, BP Cuff Size: Medium Adult)   Pulse 63   Ht 1.575 m (5' 2.01\")   Wt 60.7 kg (133 lb 12.8 oz)   LMP 04/07/2016   BMI 24.47 kg/m²   General Appearance: alert and oriented to person, place and time, well-developed and well-nourished, in no acute distress  Pulmonary/Chest: clear to auscultation bilaterally- no wheezes, rales or rhonchi, normal air movement, no respiratory distress  Cardiovascular: normal rate, normal S1 and S2, no gallops, intact distal pulses, and no carotid bruits  Abdomen: soft, non-tender, non-distended, normal bowel sounds, no masses or organomegaly  Extremities: no cyanosis and no clubbing    An electronic signature was used to authenticate this note.  --Risa Pérez MD

## 2025-04-07 NOTE — PATIENT INSTRUCTIONS
Thank you for letting us take care of you today. We hope all your questions were addressed. If a question was overlooked or something else comes to mind after you return home, please contact a member of your Care Team listed below.      Your Care Team at [facility] is Team #  Carson Sutherland M.D. (Faculty)  Risa Pérez M.D. (Resident)  Patria Stephen M.D. (Resident)   Cherelle Spaulding M.D. (Resident)  Saravanan Starr M.D. (Resident)  Leela Ruiz M.D. (Resident)  Rubia Heaton., [facility]  Norma Watkins, [facility]  Kathryn Quarles, [facility]  Rafa Westfall, [facility]  Kimberly Trujillo, [facility]  Jennyfer Rojas, [facility]  Arturo Wang (LJ) [facility] (Clinical Practice Manager)  Ada Pichardo [facility] (Clinical Pharmacist)     Office phone number: [phone]    If you need to get in right away due to illness, please be advised we have \"Same Day\" appointments available Monday-Friday. Please call us at [phone] option #3 to schedule your \"Same Day\" appointment.

## 2025-04-09 DIAGNOSIS — I10 ESSENTIAL HYPERTENSION: ICD-10-CM

## 2025-04-09 DIAGNOSIS — G43.009 MIGRAINE WITHOUT AURA AND WITHOUT STATUS MIGRAINOSUS, NOT INTRACTABLE: ICD-10-CM

## 2025-04-09 DIAGNOSIS — I50.22 CHRONIC SYSTOLIC HEART FAILURE (HCC): Primary | ICD-10-CM

## 2025-04-09 DIAGNOSIS — K21.9 GASTROESOPHAGEAL REFLUX DISEASE, UNSPECIFIED WHETHER ESOPHAGITIS PRESENT: ICD-10-CM

## 2025-04-09 DIAGNOSIS — J45.20 MILD INTERMITTENT ASTHMA WITHOUT COMPLICATION: ICD-10-CM

## 2025-04-09 DIAGNOSIS — G44.209 TENSION HEADACHE: ICD-10-CM

## 2025-04-09 DIAGNOSIS — I50.22 CHRONIC SYSTOLIC HEART FAILURE (HCC): ICD-10-CM

## 2025-04-09 DIAGNOSIS — M10.9 ACUTE GOUT INVOLVING TOE OF RIGHT FOOT, UNSPECIFIED CAUSE: Primary | ICD-10-CM

## 2025-04-10 ENCOUNTER — TELEPHONE (OUTPATIENT)
Dept: OTHER | Age: 60
End: 2025-04-10

## 2025-04-10 ENCOUNTER — HOSPITAL ENCOUNTER (EMERGENCY)
Age: 60
Discharge: HOME | End: 2025-04-10
Attending: EMERGENCY MEDICINE
Payer: MEDICAID

## 2025-04-10 ENCOUNTER — APPOINTMENT (OUTPATIENT)
Dept: GENERAL RADIOLOGY | Age: 60
End: 2025-04-10
Payer: MEDICAID

## 2025-04-10 VITALS
DIASTOLIC BLOOD PRESSURE: 114 MMHG | HEART RATE: 59 BPM | HEIGHT: 62 IN | SYSTOLIC BLOOD PRESSURE: 135 MMHG | OXYGEN SATURATION: 99 % | TEMPERATURE: 97.7 F | WEIGHT: 130.95 LBS | BODY MASS INDEX: 24.1 KG/M2 | RESPIRATION RATE: 18 BRPM

## 2025-04-10 DIAGNOSIS — M72.2 PLANTAR FASCIITIS OF LEFT FOOT: Primary | ICD-10-CM

## 2025-04-10 PROCEDURE — 73630 X-RAY EXAM OF FOOT: CPT

## 2025-04-10 PROCEDURE — 6370000000 HC RX 637 (ALT 250 FOR IP)

## 2025-04-10 PROCEDURE — 99283 EMERGENCY DEPT VISIT LOW MDM: CPT | Performed by: EMERGENCY MEDICINE

## 2025-04-10 RX ORDER — ACETAMINOPHEN 500 MG
1000 TABLET ORAL ONCE
Status: COMPLETED | OUTPATIENT
Start: 2025-04-10 | End: 2025-04-10

## 2025-04-10 RX ORDER — IBUPROFEN 600 MG/1
600 TABLET, FILM COATED ORAL ONCE
Status: COMPLETED | OUTPATIENT
Start: 2025-04-10 | End: 2025-04-10

## 2025-04-10 RX ADMIN — ACETAMINOPHEN 1000 MG: 500 TABLET ORAL at 11:13

## 2025-04-10 RX ADMIN — IBUPROFEN 600 MG: 600 TABLET, FILM COATED ORAL at 11:13

## 2025-04-10 ASSESSMENT — PAIN DESCRIPTION - DESCRIPTORS
DESCRIPTORS: ACHING;DISCOMFORT
DESCRIPTORS: PATIENT UNABLE TO DESCRIBE

## 2025-04-10 ASSESSMENT — PAIN DESCRIPTION - ONSET: ONSET: AWAKENED FROM SLEEP

## 2025-04-10 ASSESSMENT — PAIN DESCRIPTION - LOCATION
LOCATION: FOOT
LOCATION: FOOT

## 2025-04-10 ASSESSMENT — PAIN DESCRIPTION - FREQUENCY: FREQUENCY: CONTINUOUS

## 2025-04-10 ASSESSMENT — PAIN DESCRIPTION - ORIENTATION
ORIENTATION: LEFT
ORIENTATION: LEFT

## 2025-04-10 ASSESSMENT — PAIN SCALES - GENERAL
PAINLEVEL_OUTOF10: 10
PAINLEVEL_OUTOF10: 10

## 2025-04-10 ASSESSMENT — PAIN - FUNCTIONAL ASSESSMENT
PAIN_FUNCTIONAL_ASSESSMENT: 0-10
PAIN_FUNCTIONAL_ASSESSMENT: ACTIVITIES ARE NOT PREVENTED

## 2025-04-10 ASSESSMENT — PAIN DESCRIPTION - PAIN TYPE: TYPE: ACUTE PAIN

## 2025-04-10 NOTE — DISCHARGE INSTRUCTIONS
Call today or tomorrow to follow up with Vince Obrien MD  in 2 days. Please follow up with a podiatrist as soon as possible    Use an ice pack or bag filled with ice and apply to the injured area 3 - 4 times a day for 15 - 20 minutes each time.    Use ibuprofen or Tylenol (unless prescribed medications that have Tylenol in it) for pain.  You can take over the counter Ibuprofen (advil) tablets (4 every 8 hours or 3 every 6 hours or 2 every 4 hours)    Return to the Emergency Department for worsening of pain, increase swelling to the ankle, inability to move your toes, any other care or concern.

## 2025-04-10 NOTE — ED PROVIDER NOTES
[facility] EMERGENCY DEPARTMENT     Emergency Department     Faculty Attestation        I performed a history and physical examination of the patient and discussed management with the resident. I reviewed the resident’s note and agree with the documented findings and plan of care. Any areas of disagreement are noted on the chart. I was personally present for the key portions of any procedures. I have documented in the chart those procedures where I was not present during the key portions. I have reviewed the emergency nurses triage note. I agree with the chief complaint, past medical history, past surgical history, allergies, medications, social and family history as documented unless otherwise noted below.  For Physician Assistant/ Nurse Practitioner cases/documentation I have personally evaluated this patient and have completed at least one if not all key elements of the E/M (history, physical exam, and MDM). Additional findings are as noted.      Vital Signs: BP: (!) 135/114  Pulse: 59  Respirations: 18  Temp: 97.7 °F (36.5 °C) SpO2: 99 %  PCP:  Vince Obrien MD  Note Started: 4/10/25, 11:00 AM EDT    Pertinent Comments:         Critical Care  None      (Please note that portions of this note were completed with a voice recognition program. Efforts were made to edit the dictations but occasionally words are mis-transcribed. Whenever words are used in this note in any gender, they shall be construed as though they were used in the gender appropriate to the circumstances; and whenever words are used in this note in the singular or plural form, they shall be construed as though they were used in the form appropriate to the circumstances.)    Suhail Stark MD [facility]  Attending Emergency Medicine Physician           Suhail Stark MD  04/10/25 1100

## 2025-04-10 NOTE — ED NOTES
Pt is A+Ox4  Pt complains of left foot pain for 5 days  Pt denies any injury or trauma to the injured foot  Pt provided with an ice pack for the left foot  No bruise  or active bleeding to the left foot  Pt provided with a warm blanket  Family at the bedside  All questions answered and needs met at this time

## 2025-04-10 NOTE — TELEPHONE ENCOUNTER
Date Value   03/29/2025 15     BP Readings from Last 3 Encounters:   04/10/25 132/64   04/07/25 108/72   03/29/25 125/76          (goal 120/80)    All Future Testing planned in [facility]  Lab Frequency Next Occurrence   XR ELBOW LEFT (MIN 3 VIEWS) Once 05/21/2024   Hemoglobin A1c Once 11/01/2024   Magnesium Once 10/01/2024   XR FOOT RIGHT (2 VIEWS) Once 10/01/2024   Full PFT Study With Bronchodilator Once 04/07/2025               Patient Active Problem List:     COPD (chronic obstructive pulmonary disease) ([facility])     Fibroids     Syncope     Lung nodule < 6cm on CT     Chronic systolic heart failure ([facility]) s/p AICD     Mild intermittent asthma     Essential hypertension     Chest pain     QT prolongation     Asthma with COPD with exacerbation ([facility])     Non-ischemic cardiomyopathy ([facility])     Lesion of right native kidney     NSTEMI (non-ST elevated myocardial infarction) ([facility])     CAD (coronary artery disease)     Intraparenchymal hematoma of left side of brain due to trauma ([facility])     Chronic combined systolic and diastolic congestive heart failure ([facility])     AICD discharge     RIP (acute kidney injury)     Hypomagnesemia     Other heart failure     Atypical chest pain     Plantar fasciitis, bilateral     Left lower quadrant abdominal pain     Diverticulitis     Pre-syncope     Anginal chest pain at rest     Presence of cardiac resynchronization therapy defibrillator (CRT-D)     Abnormal cardiovascular stress test     NICM (nonischemic cardiomyopathy) ([facility])     Migraine     Left elbow pain     Tension headache     Arthralgia     Heel pain, chronic, right     Gastroesophageal reflux disease     History of prediabetes     Intra-abdominal abscess ([facility])     Diverticulitis of colon     COPD exacerbation ([facility])

## 2025-04-11 RX ORDER — ALBUTEROL SULFATE 0.83 MG/ML
SOLUTION RESPIRATORY (INHALATION)
Qty: 540 ML | Refills: 2 | Status: SHIPPED | OUTPATIENT
Start: 2025-04-11

## 2025-04-11 RX ORDER — OMEPRAZOLE 20 MG/1
20 CAPSULE, DELAYED RELEASE ORAL DAILY
Qty: 30 CAPSULE | Refills: 2 | Status: SHIPPED | OUTPATIENT
Start: 2025-04-11

## 2025-04-11 RX ORDER — METOPROLOL SUCCINATE 25 MG/1
25 TABLET, EXTENDED RELEASE ORAL DAILY
Qty: 30 TABLET | Refills: 2 | Status: SHIPPED | OUTPATIENT
Start: 2025-04-11

## 2025-04-11 RX ORDER — PSEUDOEPHED/ACETAMINOPH/DIPHEN 30MG-500MG
TABLET ORAL
Qty: 120 TABLET | Refills: 2 | Status: SHIPPED | OUTPATIENT
Start: 2025-04-11

## 2025-04-11 RX ORDER — LANOLIN ALCOHOL/MO/W.PET/CERES
400 CREAM (GRAM) TOPICAL DAILY
Qty: 30 TABLET | Refills: 2 | Status: SHIPPED | OUTPATIENT
Start: 2025-04-11

## 2025-04-11 RX ORDER — LOSARTAN POTASSIUM 25 MG/1
TABLET ORAL
Qty: 30 TABLET | Refills: 2 | Status: SHIPPED | OUTPATIENT
Start: 2025-04-11

## 2025-04-13 NOTE — ED PROVIDER NOTES
[facility] EMERGENCY DEPARTMENT  Emergency Department Encounter  Emergency Medicine Resident     Pt Name:Stacia Arora  MRN: [id number]  Birthdate 1965  Date of evaluation: 4/13/25  PCP:  Vince Obrien MD  Note Started: 11:26 AM EDT      CHIEF COMPLAINT       Chief Complaint   Patient presents with    Foot Pain     left       HISTORY OF PRESENT ILLNESS  (Location/Symptom, Timing/Onset, Context/Setting, Quality, Duration, Modifying Factors, Severity.)      Stacia Arora is a 59 y.o. female who presents with left Daklinza started about 4 5 days ago.  She reports that the sole of her foot has been hurting.  The pain is mostly localized over the arch of the foot and the heel.  She denies any injury or trauma to her foot.  She has not taking any analgesics at home.  She denies any numbness or tingling.  No history of similar pain.  She denies any pain in her other foot.  She denies any fever, chills, sweats, wounds on her foot, trauma, numbness, tingling    PAST MEDICAL / SURGICAL / SOCIAL / FAMILY HISTORY      has a past medical history of AICD discharge, RIP (acute kidney injury), Asthma, Asthma with COPD with exacerbation ([facility]), CAD (coronary artery disease), Cardiomegaly, Chest pain, CHF (congestive heart failure) ([facility]), Chronic systolic heart failure (HCC) s/p AICD, COPD (chronic obstructive pulmonary disease) (HCC), COPD (chronic obstructive pulmonary disease) (HCC), Depression, Fibroids, Gastroesophageal reflux disease, Hypertension, Hypomagnesemia, Intraparenchymal hematoma of left side of brain due to trauma (HCC), Lesion of right native kidney, Lung nodule < 6cm on CT, MI (myocardial infarction) ([facility]), Mild intermittent asthma, Non-ischemic cardiomyopathy (HCC), NSTEMI (non-ST elevated myocardial infarction) ([facility]), QT prolongation, Syncope, and Unspecified diseases of blood and blood-forming organs.     has a past surgical history that includes Cardiac defibrillator placement

## 2025-05-02 ENCOUNTER — HOSPITAL ENCOUNTER (OUTPATIENT)
Dept: OTHER | Age: 60
Discharge: HOME | End: 2025-05-02
Payer: MEDICAID

## 2025-05-02 VITALS
SYSTOLIC BLOOD PRESSURE: 144 MMHG | OXYGEN SATURATION: 97 % | HEART RATE: 70 BPM | RESPIRATION RATE: 16 BRPM | DIASTOLIC BLOOD PRESSURE: 114 MMHG | WEIGHT: 133 LBS | BODY MASS INDEX: 24.33 KG/M2

## 2025-05-02 PROCEDURE — 99213 OFFICE O/P EST LOW 20 MIN: CPT

## 2025-05-02 NOTE — PROGRESS NOTES
Date:  2025  Time:  9:20 AM    CHF Clinic at [facility]    Office: [phone] Fax: [phone]    Re:  Stacia Arora   Patient : 1965    Vital Signs: BP (!) 144/114   Pulse 70   Resp 16   Wt 60.3 kg (133 lb)   LMP 2016   SpO2 97%   BMI 24.33 kg/m² repeat b/p 120/80                                                  No results for input(s): \"CBC\", \"HGB\", \"HCT\", \"WBC\", \"PLATELET\", \"NA\", \"K\", \"CL\", \"CO2\", \"BUN\", \"CREATININE\", \"GLUCOSE\", \"BNP\", \"INR\" in the last 72 hours.     Respiratory:    Assessment  Charting Type: Reassessment    Breath Sounds  Right Upper Lobe: Clear  Right Middle Lobe: Clear  Right Lower Lobe: Clear  Left Upper Lobe: Clear  Left Lower Lobe: Clear    Cough/Sputum  Cough: None         Peripheral Vascular  RLE Edema: None  LLE Edema: None    Future Appointments   Date Time Provider Department Center   2025  8:30 AM Vince Obrien MD [facility]   2025  2:30 PM [facility]   2025  9:30 AM SCHEDULE, [facility]LE [facility]   10/9/2025 10:00 AM SCHEDULE, [facility]   10/9/2025 10:15 AM Ganga Joya MD [facility]      Complaints: No new complaints     Physician Orders No new orders     Comment : Here today for Consult appt.use to come to clinic last seen 2023.Given a scale at today's appt. Had seen Dr.Mark Joya 25 for device check. Discussed in detail low sodium diet 2000mg per day eats no red meat or pork. Also discussed 64 ounces of fluid per day.Given CHF papers. Has no pedal edema, lungs are clear. Has all her medications will have Ashia Garcia CNP to review her medications at her visit on 25.     Electronically signed by Daylin Polanco RN on 2025 at 9:20 AM

## 2025-05-09 ENCOUNTER — TELEPHONE (OUTPATIENT)
Dept: OTHER | Age: 60
End: 2025-05-09

## 2025-05-14 DIAGNOSIS — I50.22 CHRONIC SYSTOLIC HEART FAILURE (HCC): ICD-10-CM

## 2025-05-15 RX ORDER — BUMETANIDE 1 MG/1
1 TABLET ORAL 2 TIMES DAILY
Qty: 72 TABLET | Refills: 2 | Status: SHIPPED | OUTPATIENT
Start: 2025-05-15

## 2025-05-15 NOTE — TELEPHONE ENCOUNTER
Please address the medication refill and close the encounter.  If I can be of assistance, please route to the applicable pool.      Thank you.      Last visit: 4-7-2025  Last Med refill: 12-21-24  Does patient have enough medication for 72 hours: No:     Next Visit Date:  Future Appointments   Date Time Provider Department Center   5/23/2025  2:30 PM [facility] RM 1 [facility] CHF [facility]   5/27/2025  3:00 PM Vince Obrien MD [facility] ECC DEP   9/11/2025  9:30 AM SCHEDULE, [facility] SCHED[facility]LE [facility]   10/9/2025 10:00 AM SCHEDULE, [facility]   10/9/2025 10:15 AM Ganga Joya MD [facility]       Health Maintenance   Topic Date Due    Hepatitis B vaccine (1 of 3 - 19+ 3-dose series) Never done    Shingles vaccine (1 of 2) Never done    Pneumococcal 50+ years Vaccine (2 of 2 - PCV) 05/20/2017    Cervical cancer screen  07/09/2024    COVID-19 Vaccine (1 - 2024-25 season) Never done    Breast cancer screen  02/20/2025    Flu vaccine (Season Ended) 10/15/2025 (Originally 8/1/2025)    Colorectal Cancer Screen  12/14/2025    Lipids  12/18/2025    GFR test (Diabetes, CKD 3-4, OR last GFR 15-59)  03/29/2026    Depression Screen  04/07/2026    DTaP/Tdap/Td vaccine (2 - Td or Tdap) 06/09/2029    Hepatitis C screen  Completed    HIV screen  Completed    Hepatitis A vaccine  Aged Out    Hib vaccine  Aged Out    Polio vaccine  Aged Out    Meningococcal (ACWY) vaccine  Aged Out    Meningococcal B vaccine  Aged Out    A1C test (Diabetic or Prediabetic)  Discontinued    Pneumococcal 0-49 years Vaccine  Discontinued    Diabetes screen  Discontinued       Hemoglobin A1C (%)   Date Value   10/01/2024 5.6   06/21/2023 5.9   05/26/2022 5.7             ( goal A1C is < 7)   No components found for: \"LABMICR\"  No components found for: \"LDLCHOLESTEROL\", \"LDLCALC\"    (goal LDL is <100)   AST (U/L)   Date Value   12/18/2024 22

## 2025-05-16 ENCOUNTER — TELEPHONE (OUTPATIENT)
Dept: OTHER | Age: 60
End: 2025-05-16

## 2025-06-09 ENCOUNTER — CARE COORDINATION (OUTPATIENT)
Dept: FAMILY MEDICINE CLINIC | Age: 60
End: 2025-06-09

## 2025-06-11 NOTE — CARE COORDINATION
clear.  No lower leg edema   B/P 90/58  pulse 63  resp 16/min and non labored.  02 sat at rest 93%.   States she is working with [facility] , Jose Manuel Heredia for scheduling of dental care and follow up.     Housing   She has adequate food in the home and home is clean, well furnished.    She voiced concern for eviction due to inability to pay rent this month.    Socialization/family  Reports supportive relationships with family and friends.      Action:  - Stacia was advised to contact her  to discuss deferred payment arrangements to request to  pay both June and July 2025 rent in early July when she receives her next SSDI check.   She was encouraged to seek behavioral health counseling and financial counseling for  money management.      Plan:  - Jose Manuel Heredia, [facility]  to follow up and assist with community resources and referrals.    Nurse revisit in one month.    Crystal Toledo RN, BSN    [facility]

## 2025-06-12 DIAGNOSIS — I50.22 CHRONIC SYSTOLIC HEART FAILURE (HCC): ICD-10-CM

## 2025-06-12 NOTE — TELEPHONE ENCOUNTER
Last visit: 4.7.25  Last Med refill: 5.14.25  Does patient have enough medication for 72 hours: Yes    Next Visit Date:  Future Appointments   Date Time Provider Department Center   6/17/2025  2:00 PM [facility] RM 1 [facility]   9/11/2025  9:30 AM SCHEDULE, [facility]LE [facility]   10/9/2025 10:00 AM SCHEDULE, [facility]   10/9/2025 10:15 AM Ganga Joya MD [facility]       Health Maintenance   Topic Date Due    Hepatitis B vaccine (1 of 3 - 19+ 3-dose series) Never done    Shingles vaccine (1 of 2) Never done    Pneumococcal 50+ years Vaccine (2 of 2 - PCV) 05/20/2017    Cervical cancer screen  07/09/2024    COVID-19 Vaccine (1 - 2024-25 season) Never done    Breast cancer screen  02/20/2025    Flu vaccine (Season Ended) 10/15/2025 (Originally 8/1/2025)    Colorectal Cancer Screen  12/14/2025    Lipids  12/18/2025    GFR test (Diabetes, CKD 3-4, OR last GFR 15-59)  03/29/2026    Depression Screen  04/07/2026    DTaP/Tdap/Td vaccine (2 - Td or Tdap) 06/09/2029    Hepatitis C screen  Completed    HIV screen  Completed    Hepatitis A vaccine  Aged Out    Hib vaccine  Aged Out    Polio vaccine  Aged Out    Meningococcal (ACWY) vaccine  Aged Out    Meningococcal B vaccine  Aged Out    A1C test (Diabetic or Prediabetic)  Discontinued    Pneumococcal 0-49 years Vaccine  Discontinued    Diabetes screen  Discontinued       Hemoglobin A1C (%)   Date Value   10/01/2024 5.6   06/21/2023 5.9   05/26/2022 5.7             ( goal A1C is < 7)   No components found for: \"LABMICR\"  No components found for: \"LDLCHOLESTEROL\", \"LDLCALC\"    (goal LDL is <100)   AST (U/L)   Date Value   12/18/2024 22     ALT (U/L)   Date Value   12/18/2024 12     BUN (mg/dL)   Date Value   03/29/2025 15     BP Readings from Last 3 Encounters:   05/02/25 (!) 144/114   04/10/25 (!) 135/114   04/10/25 132/64          (goal

## 2025-06-13 RX ORDER — EMPAGLIFLOZIN 10 MG/1
10 TABLET, FILM COATED ORAL DAILY
Qty: 64 TABLET | Refills: 0 | Status: SHIPPED | OUTPATIENT
Start: 2025-06-13

## 2025-07-02 DIAGNOSIS — J45.20 MILD INTERMITTENT ASTHMA WITHOUT COMPLICATION: ICD-10-CM

## 2025-07-02 NOTE — TELEPHONE ENCOUNTER
Last visit: 04/07/25  Last Med refill: 04/11/25  Does patient have enough medication for 72 hours: No:     Next Visit Date:  Future Appointments   Date Time Provider Department Center   9/11/2025  9:30 AM SCHEDULE, [facility]   10/9/2025 10:00 AM SCHEDULE, [facility]   10/9/2025 10:15 AM Ganga Joya MD [facility]       Health Maintenance   Topic Date Due    Shingles vaccine (1 of 2) Never done    Pneumococcal 50+ years Vaccine (2 of 2 - PCV) 05/20/2017    Cervical cancer screen  07/09/2024    COVID-19 Vaccine (1 - 2024-25 season) Never done    Breast cancer screen  02/20/2025    Respiratory Syncytial Virus (RSV) Pregnant or age 60 yrs+ (1 - Risk 60-74 years 1-dose series) Never done    Flu vaccine (1) 08/01/2025    Colorectal Cancer Screen  12/14/2025    Lipids  12/18/2025    GFR test (Diabetes, CKD 3-4, OR last GFR 15-59)  03/29/2026    Depression Screen  04/07/2026    DTaP/Tdap/Td vaccine (2 - Td or Tdap) 06/09/2029    Hepatitis C screen  Completed    HIV screen  Completed    Hepatitis A vaccine  Aged Out    Hepatitis B vaccine  Aged Out    Hib vaccine  Aged Out    Polio vaccine  Aged Out    Meningococcal (ACWY) vaccine  Aged Out    Meningococcal B vaccine  Aged Out    A1C test (Diabetic or Prediabetic)  Discontinued    Pneumococcal 0-49 years Vaccine  Discontinued    Diabetes screen  Discontinued       Hemoglobin A1C (%)   Date Value   10/01/2024 5.6   06/21/2023 5.9   05/26/2022 5.7             ( goal A1C is < 7)   No components found for: \"LABMICR\"  No components found for: \"LDLCHOLESTEROL\", \"LDLCALC\"    (goal LDL is <100)   AST (U/L)   Date Value   12/18/2024 22     ALT (U/L)   Date Value   12/18/2024 12     BUN (mg/dL)   Date Value   03/29/2025 15     BP Readings from Last 3 Encounters:   05/02/25 (!) 144/114   04/10/25 (!) 135/114   04/10/25 132/64          (goal 120/80)    All

## 2025-07-03 RX ORDER — ALBUTEROL SULFATE 0.83 MG/ML
SOLUTION RESPIRATORY (INHALATION)
Qty: 540 ML | Refills: 2 | Status: SHIPPED | OUTPATIENT
Start: 2025-07-03

## 2025-07-15 DIAGNOSIS — I50.22 CHRONIC SYSTOLIC HEART FAILURE (HCC): ICD-10-CM

## 2025-07-15 DIAGNOSIS — I10 ESSENTIAL HYPERTENSION: ICD-10-CM

## 2025-07-15 DIAGNOSIS — Z13.220 LIPID SCREENING: ICD-10-CM

## 2025-07-15 RX ORDER — METOPROLOL SUCCINATE 25 MG/1
25 TABLET, EXTENDED RELEASE ORAL DAILY
Qty: 30 TABLET | Refills: 0 | Status: SHIPPED | OUTPATIENT
Start: 2025-07-15

## 2025-07-15 RX ORDER — ATORVASTATIN CALCIUM 40 MG/1
TABLET, FILM COATED ORAL
Qty: 96 TABLET | Refills: 0 | Status: SHIPPED | OUTPATIENT
Start: 2025-07-15

## 2025-07-15 RX ORDER — LOSARTAN POTASSIUM 25 MG/1
TABLET ORAL
Qty: 30 TABLET | Refills: 0 | Status: SHIPPED | OUTPATIENT
Start: 2025-07-15

## 2025-08-11 DIAGNOSIS — I50.22 CHRONIC SYSTOLIC HEART FAILURE (HCC): ICD-10-CM

## 2025-08-11 DIAGNOSIS — I10 ESSENTIAL HYPERTENSION: ICD-10-CM

## 2025-08-11 RX ORDER — METOPROLOL SUCCINATE 25 MG/1
25 TABLET, EXTENDED RELEASE ORAL DAILY
Qty: 30 TABLET | Refills: 0 | Status: SHIPPED | OUTPATIENT
Start: 2025-08-11

## 2025-08-11 RX ORDER — LOSARTAN POTASSIUM 25 MG/1
25 TABLET ORAL NIGHTLY
Qty: 30 TABLET | Refills: 0 | Status: SHIPPED | OUTPATIENT
Start: 2025-08-11

## 2025-08-11 RX ORDER — EMPAGLIFLOZIN 10 MG/1
10 TABLET, FILM COATED ORAL DAILY
Qty: 64 TABLET | Refills: 0 | Status: SHIPPED | OUTPATIENT
Start: 2025-08-11

## 2025-08-14 ENCOUNTER — TELEPHONE (OUTPATIENT)
Age: 60
End: 2025-08-14

## 2025-08-20 ENCOUNTER — CARE COORDINATION (OUTPATIENT)
Dept: FAMILY MEDICINE CLINIC | Age: 60
End: 2025-08-20

## 2025-08-20 RX ORDER — AMIODARONE HYDROCHLORIDE 200 MG/1
200 TABLET ORAL DAILY
COMMUNITY
Start: 2025-08-11

## 2025-08-21 ENCOUNTER — CARE COORDINATION (OUTPATIENT)
Dept: FAMILY MEDICINE CLINIC | Age: 60
End: 2025-08-21

## (undated) DEVICE — CATHETER ANGIO 6FR L100CM DIA0.056IN FR4 CRV VASC ACCS EXPO

## (undated) DEVICE — ANGIOGRAPHIC CATHETER: Brand: EXPO™

## (undated) DEVICE — BAND COMPR L24CM REG CLR PLAS HEMSTAT EXT HK AND LOOP RETEN

## (undated) DEVICE — SURGICAL PROCEDURE TRAY CRD CATH SVMMC

## (undated) DEVICE — STRAP ARMBRD W1.5XL32IN FOAM STR YET SFT W/ HK AND LOOP

## (undated) DEVICE — GUIDEWIRE 35/260/FC/PTFE/3J: Brand: GUIDEWIRE

## (undated) DEVICE — GLIDESHEATH SLENDER STAINLESS STEEL KIT: Brand: GLIDESHEATH SLENDER